# Patient Record
Sex: MALE | Race: WHITE | HISPANIC OR LATINO | Employment: OTHER | URBAN - METROPOLITAN AREA
[De-identification: names, ages, dates, MRNs, and addresses within clinical notes are randomized per-mention and may not be internally consistent; named-entity substitution may affect disease eponyms.]

---

## 2017-03-01 ENCOUNTER — TELEPHONE (OUTPATIENT)
Dept: NEUROLOGY | Facility: CLINIC | Age: 77
End: 2017-03-01

## 2017-03-01 DIAGNOSIS — M48.00 SPINAL STENOSIS, UNSPECIFIED SPINAL REGION: Primary | ICD-10-CM

## 2017-03-01 NOTE — TELEPHONE ENCOUNTER
I have pushed the CT of the spine through with a note for CS to please call the pt's daughter Denita at [phone] when scheduling this procedure and to explain that this needs to be completed prior to the pt seeing Dr. Howard's office per Dr. Howard's scheduling department.

## 2017-03-20 ENCOUNTER — TELEPHONE (OUTPATIENT)
Dept: NEUROLOGY | Facility: CLINIC | Age: 77
End: 2017-03-20

## 2017-03-20 NOTE — TELEPHONE ENCOUNTER
----- Message from Rossana Chavez PA-C sent at 3/20/2017  9:12 AM EDT -----  Regarding: RE: Referral for CT Cervical Spine w/wo contrast  Thanks.     ----- Message -----     From: Danuta Marin CMA     Sent: 3/17/2017   3:52 PM       To: Rossana Chavez PA-C  Subject: FW: Referral for CT Cervical Spine w/wo cont#     No I believe the other will work, I will work on this Monday morning Thanks  ----- Message -----     From: Rossana Chavez PA-C     Sent: 3/17/2017   3:10 PM       To: Danuta Marin CMA  Subject: RE: Referral for CT Cervical Spine w/wo cont#    Let's try referring them to Dr. García at  then. Do I need to re-order this?     ----- Message -----     From: Danuta Marin CMA     Sent: 3/17/2017   8:21 AM       To: Rossana Chavez PA-C  Subject: Referral for CT Cervical Spine w/wo contrast     CS said the procedure had been denied per [facility].  Dr. Murray's office will not see him until he has this procedure done.  What would you like to do?

## 2017-03-20 NOTE — TELEPHONE ENCOUNTER
Faxed over a request for an appt to Dr. Jacinto García at [phone] and sent notes/demographics/insurance info and so forth

## 2017-04-05 ENCOUNTER — TRANSCRIBE ORDERS (OUTPATIENT)
Dept: ADMINISTRATIVE | Facility: HOSPITAL | Age: 77
End: 2017-04-05

## 2017-04-05 DIAGNOSIS — R31.9 HEMATURIA SYNDROME: Primary | ICD-10-CM

## 2017-04-07 ENCOUNTER — HOSPITAL ENCOUNTER (OUTPATIENT)
Dept: ULTRASOUND IMAGING | Facility: HOSPITAL | Age: 77
Discharge: HOME | End: 2017-04-07
Admitting: FAMILY MEDICINE

## 2017-04-07 DIAGNOSIS — R31.9 HEMATURIA SYNDROME: ICD-10-CM

## 2017-04-07 PROCEDURE — 76775 US EXAM ABDO BACK WALL LIM: CPT

## 2018-01-01 ENCOUNTER — OFFICE VISIT (OUTPATIENT)
Dept: NEUROLOGY | Facility: CLINIC | Age: 78
End: 2018-01-01

## 2018-01-01 VITALS
DIASTOLIC BLOOD PRESSURE: 84 MMHG | SYSTOLIC BLOOD PRESSURE: 127 MMHG | BODY MASS INDEX: 27.15 KG/M2 | WEIGHT: 173 LBS | HEIGHT: 67 IN | OXYGEN SATURATION: 94 % | HEART RATE: 72 BPM

## 2018-01-01 DIAGNOSIS — G60.9 IDIOPATHIC PERIPHERAL NEUROPATHY: Primary | ICD-10-CM

## 2018-01-01 DIAGNOSIS — F03.A0 MILD DEMENTIA (HCC): ICD-10-CM

## 2018-01-01 PROCEDURE — 99213 OFFICE O/P EST LOW 20 MIN: CPT | Performed by: PSYCHIATRY & NEUROLOGY

## 2018-07-19 ENCOUNTER — OFFICE VISIT (OUTPATIENT)
Dept: NEUROLOGY | Facility: CLINIC | Age: 78
End: 2018-07-19

## 2018-07-19 VITALS
BODY MASS INDEX: 29.03 KG/M2 | HEART RATE: 68 BPM | SYSTOLIC BLOOD PRESSURE: 132 MMHG | DIASTOLIC BLOOD PRESSURE: 80 MMHG | HEIGHT: 67 IN | WEIGHT: 185 LBS | OXYGEN SATURATION: 98 %

## 2018-07-19 DIAGNOSIS — G31.84 MILD COGNITIVE IMPAIRMENT: Primary | ICD-10-CM

## 2018-07-19 DIAGNOSIS — G60.9 IDIOPATHIC PERIPHERAL NEUROPATHY: ICD-10-CM

## 2018-07-19 PROBLEM — G62.9 PERIPHERAL NEUROPATHY: Status: ACTIVE | Noted: 2018-07-19

## 2018-07-19 PROCEDURE — 99214 OFFICE O/P EST MOD 30 MIN: CPT | Performed by: PSYCHIATRY & NEUROLOGY

## 2018-07-19 RX ORDER — CHOLECALCIFEROL (VITAMIN D3) 125 MCG
500 CAPSULE ORAL DAILY
COMMUNITY

## 2018-07-19 RX ORDER — OMEPRAZOLE 20 MG/1
20 CAPSULE, DELAYED RELEASE ORAL 2 TIMES DAILY
COMMUNITY
End: 2019-01-01 | Stop reason: SDUPTHER

## 2018-07-19 RX ORDER — DONEPEZIL HYDROCHLORIDE 5 MG/1
5 TABLET, FILM COATED ORAL NIGHTLY
Qty: 30 TABLET | Refills: 0 | Status: SHIPPED | OUTPATIENT
Start: 2018-07-19 | End: 2018-07-25 | Stop reason: HOSPADM

## 2018-07-19 RX ORDER — FLUTICASONE PROPIONATE 50 MCG
2 SPRAY, SUSPENSION (ML) NASAL DAILY
COMMUNITY
End: 2019-01-01 | Stop reason: SDUPTHER

## 2018-07-19 RX ORDER — TROSPIUM CHLORIDE ER 60 MG/1
20 CAPSULE ORAL EVERY MORNING
Status: ON HOLD | COMMUNITY
End: 2019-01-01

## 2018-07-19 RX ORDER — DONEPEZIL HYDROCHLORIDE 10 MG/1
10 TABLET, FILM COATED ORAL DAILY
Qty: 30 TABLET | Refills: 11 | Status: SHIPPED | OUTPATIENT
Start: 2018-07-19 | End: 2018-09-18

## 2018-07-19 RX ORDER — IPRATROPIUM BROMIDE 42 UG/1
2 SPRAY, METERED NASAL 4 TIMES DAILY PRN
Status: ON HOLD | COMMUNITY
End: 2019-01-01

## 2018-07-23 ENCOUNTER — OFFICE VISIT (OUTPATIENT)
Dept: ORTHOPEDIC SURGERY | Facility: CLINIC | Age: 78
End: 2018-07-23

## 2018-07-23 ENCOUNTER — HOSPITAL ENCOUNTER (INPATIENT)
Facility: HOSPITAL | Age: 78
LOS: 2 days | Discharge: HOME | End: 2018-07-25
Attending: INTERNAL MEDICINE | Admitting: INTERNAL MEDICINE

## 2018-07-23 VITALS — WEIGHT: 182.98 LBS | BODY MASS INDEX: 28.72 KG/M2 | OXYGEN SATURATION: 97 % | HEART RATE: 85 BPM | HEIGHT: 67 IN

## 2018-07-23 DIAGNOSIS — R52 PAIN: Primary | ICD-10-CM

## 2018-07-23 DIAGNOSIS — M70.21 OLECRANON BURSITIS OF RIGHT ELBOW: ICD-10-CM

## 2018-07-23 DIAGNOSIS — Z74.09 IMPAIRED FUNCTIONAL MOBILITY, BALANCE, GAIT, AND ENDURANCE: Primary | ICD-10-CM

## 2018-07-23 DIAGNOSIS — M71.121 SEPTIC BURSITIS OF ELBOW, RIGHT: ICD-10-CM

## 2018-07-23 PROBLEM — I10 HTN (HYPERTENSION): Status: ACTIVE | Noted: 2018-07-23

## 2018-07-23 PROBLEM — E78.5 HLD (HYPERLIPIDEMIA): Status: ACTIVE | Noted: 2018-07-23

## 2018-07-23 PROBLEM — Z86.73 HISTORY OF STROKE: Status: ACTIVE | Noted: 2018-07-23

## 2018-07-23 PROCEDURE — 25010000002 CEFTRIAXONE PER 250 MG: Performed by: NURSE PRACTITIONER

## 2018-07-23 PROCEDURE — A9270 NON-COVERED ITEM OR SERVICE: HCPCS | Performed by: NURSE PRACTITIONER

## 2018-07-23 PROCEDURE — 63710000001 TERAZOSIN 5 MG CAPSULE: Performed by: NURSE PRACTITIONER

## 2018-07-23 PROCEDURE — 99204 OFFICE O/P NEW MOD 45 MIN: CPT | Performed by: ORTHOPAEDIC SURGERY

## 2018-07-23 PROCEDURE — 63710000001 PRAVASTATIN 40 MG TABLET: Performed by: NURSE PRACTITIONER

## 2018-07-23 PROCEDURE — 63710000001 LISINOPRIL 10 MG TABLET: Performed by: NURSE PRACTITIONER

## 2018-07-23 PROCEDURE — 25010000002 VANCOMYCIN 10 G RECONSTITUTED SOLUTION

## 2018-07-23 PROCEDURE — 63710000001 BISOPROLOL 5 MG TABLET: Performed by: NURSE PRACTITIONER

## 2018-07-23 RX ORDER — MAGNESIUM GLUCONATE 30 MG(550)
595 TABLET ORAL DAILY
COMMUNITY
End: 2019-01-01 | Stop reason: HOSPADM

## 2018-07-23 RX ORDER — ONDANSETRON 2 MG/ML
4 INJECTION INTRAMUSCULAR; INTRAVENOUS EVERY 6 HOURS PRN
Status: DISCONTINUED | OUTPATIENT
Start: 2018-07-23 | End: 2018-07-25 | Stop reason: HOSPADM

## 2018-07-23 RX ORDER — ACETAMINOPHEN 325 MG/1
650 TABLET ORAL EVERY 4 HOURS PRN
Status: DISCONTINUED | OUTPATIENT
Start: 2018-07-23 | End: 2018-07-25 | Stop reason: HOSPADM

## 2018-07-23 RX ORDER — MESALAMINE 1.2 G/1
1200 TABLET, DELAYED RELEASE ORAL DAILY
Status: ON HOLD | COMMUNITY
Start: 2017-07-11 | End: 2019-01-01

## 2018-07-23 RX ORDER — LABETALOL HYDROCHLORIDE 5 MG/ML
10 INJECTION, SOLUTION INTRAVENOUS EVERY 4 HOURS PRN
Status: DISCONTINUED | OUTPATIENT
Start: 2018-07-23 | End: 2018-07-25 | Stop reason: HOSPADM

## 2018-07-23 RX ORDER — LISINOPRIL 10 MG/1
10 TABLET ORAL 2 TIMES DAILY
Status: DISCONTINUED | OUTPATIENT
Start: 2018-07-23 | End: 2018-07-25 | Stop reason: HOSPADM

## 2018-07-23 RX ORDER — LISINOPRIL 10 MG/1
10 TABLET ORAL 2 TIMES DAILY
COMMUNITY
Start: 2018-07-10 | End: 2019-01-01 | Stop reason: HOSPADM

## 2018-07-23 RX ORDER — BISOPROLOL FUMARATE 5 MG/1
TABLET, FILM COATED ORAL
COMMUNITY
End: 2019-01-01 | Stop reason: SDUPTHER

## 2018-07-23 RX ORDER — CEFTRIAXONE SODIUM 1 G/50ML
1 INJECTION, SOLUTION INTRAVENOUS EVERY 24 HOURS
Status: DISCONTINUED | OUTPATIENT
Start: 2018-07-23 | End: 2018-07-25 | Stop reason: HOSPADM

## 2018-07-23 RX ORDER — FLUTICASONE PROPIONATE 50 MCG
SPRAY, SUSPENSION (ML) NASAL
COMMUNITY
End: 2018-07-25 | Stop reason: HOSPADM

## 2018-07-23 RX ORDER — PRAVASTATIN SODIUM 40 MG
40 TABLET ORAL NIGHTLY
Status: DISCONTINUED | OUTPATIENT
Start: 2018-07-23 | End: 2018-07-24

## 2018-07-23 RX ORDER — SODIUM CHLORIDE 0.9 % (FLUSH) 0.9 %
1-10 SYRINGE (ML) INJECTION AS NEEDED
Status: DISCONTINUED | OUTPATIENT
Start: 2018-07-23 | End: 2018-07-25 | Stop reason: HOSPADM

## 2018-07-23 RX ORDER — GABAPENTIN 100 MG/1
CAPSULE ORAL
Status: ON HOLD | COMMUNITY
End: 2018-07-23

## 2018-07-23 RX ORDER — BISOPROLOL FUMARATE 5 MG/1
5 TABLET, FILM COATED ORAL
Status: DISCONTINUED | OUTPATIENT
Start: 2018-07-23 | End: 2018-07-25 | Stop reason: HOSPADM

## 2018-07-23 RX ORDER — IPRATROPIUM BROMIDE 42 UG/1
SPRAY, METERED NASAL
Status: ON HOLD | COMMUNITY
End: 2018-07-23

## 2018-07-23 RX ORDER — TERAZOSIN 5 MG/1
5 CAPSULE ORAL NIGHTLY
Status: DISCONTINUED | OUTPATIENT
Start: 2018-07-23 | End: 2018-07-25 | Stop reason: HOSPADM

## 2018-07-23 RX ORDER — LORATADINE 10 MG/1
TABLET ORAL
Status: ON HOLD | COMMUNITY
End: 2019-01-01

## 2018-07-23 RX ORDER — PANTOPRAZOLE SODIUM 40 MG/1
40 TABLET, DELAYED RELEASE ORAL
Status: DISCONTINUED | OUTPATIENT
Start: 2018-07-24 | End: 2018-07-25 | Stop reason: HOSPADM

## 2018-07-23 RX ORDER — DOXYCYCLINE HYCLATE 100 MG/1
CAPSULE ORAL EVERY 12 HOURS SCHEDULED
COMMUNITY
Start: 2018-07-23 | End: 2018-07-25 | Stop reason: HOSPADM

## 2018-07-23 RX ADMIN — BISOPROLOL FUMARATE 5 MG: 5 TABLET ORAL at 21:47

## 2018-07-23 RX ADMIN — VANCOMYCIN HYDROCHLORIDE 2000 MG: 10 INJECTION, POWDER, LYOPHILIZED, FOR SOLUTION INTRAVENOUS at 21:46

## 2018-07-23 RX ADMIN — CEFTRIAXONE SODIUM 1 G: 1 INJECTION, SOLUTION INTRAVENOUS at 20:20

## 2018-07-23 RX ADMIN — PRAVASTATIN SODIUM 40 MG: 40 TABLET ORAL at 21:47

## 2018-07-23 RX ADMIN — TERAZOSIN HYDROCHLORIDE ANHYDROUS 5 MG: 5 CAPSULE ORAL at 21:47

## 2018-07-23 RX ADMIN — LISINOPRIL 10 MG: 10 TABLET ORAL at 21:47

## 2018-07-23 NOTE — PROGRESS NOTES
[facility] Note    Subjective     Chief Complaint   Patient presents with   • Right Elbow - Pain        HPI      Vargas De is a 77 y.o. male.  He has a two-week history of right elbow pain and redness is getting worse.  He has been on doxycycline.  He feels sick.  He had a shot of Rocephin that did not help.  He is here today for evaluation.  His pain is severe and hurts to move it and better with rest        Past Medical History:   Diagnosis Date   • Arthritis    • Depression    • Gout    • Hyperlipidemia    • Hypertension    • Macular degeneration    • Stroke (CMS/HCC)    • Stroke (CMS/HCC)    • Ulcerative colitis (CMS/HCC)       History reviewed. No pertinent surgical history.   Family History   Problem Relation Age of Onset   • Cancer Mother    • Alzheimer's disease Father    • Cancer Brother    • Diabetes Maternal Aunt      Social History     Social History   • Marital status:      Spouse name: N/A   • Number of children: N/A   • Years of education: N/A     Occupational History   • Not on file.     Social History Main Topics   • Smoking status: Never Smoker   • Smokeless tobacco: Never Used   • Alcohol use Yes      Comment: 1 a month   • Drug use: No   • Sexual activity: Defer     Other Topics Concern   • Not on file     Social History Narrative   • No narrative on file      Current Outpatient Prescriptions on File Prior to Visit   Medication Sig Dispense Refill   • acetaminophen (TYLENOL) 500 MG tablet Take 500 mg by mouth Every 6 (Six) Hours As Needed for mild pain (1-3).     • aspirin 81 MG EC tablet Take 81 mg by mouth Daily.     • bisoprolol-hydrochlorothiazide (ZIAC) 5-6.25 MG per tablet      • Cholecalciferol (VITAMIN D3) 5000 units capsule capsule Take 5,000 Units by mouth Daily.     • dicyclomine (BENTYL) 20 MG tablet      • donepezil (ARICEPT) 10 MG tablet Take 1 tablet by mouth Daily. 30 tablet 11   • donepezil (ARICEPT) 5 MG tablet Take 1 tablet by mouth Every  "Night. 30 tablet 0   • doxazosin (CARDURA) 4 MG tablet Take 4 mg by mouth Every Night.     • fluticasone (FLONASE) 50 MCG/ACT nasal spray 2 sprays into each nostril Daily.     • gabapentin (NEURONTIN) 100 MG capsule Take 100 mg by mouth 3 (Three) Times a Day.     • HYDROcodone-acetaminophen (NORCO) 7.5-325 MG per tablet      • ipratropium (ATROVENT) 0.06 % nasal spray 2 sprays into each nostril 4 (Four) Times a Day.     • lisinopril (PRINIVIL,ZESTRIL) 10 MG tablet      • Multiple Vitamins-Minerals (PRESERVISION AREDS PO) Take  by mouth.     • omeprazole (priLOSEC) 20 MG capsule Take 20 mg by mouth Daily.     • pravastatin (PRAVACHOL) 40 MG tablet      • predniSONE (DELTASONE) 5 MG tablet      • sertraline (ZOLOFT) 50 MG tablet Take 50 mg by mouth Daily.     • trospium 60 MG capsule sustained-release 24 hr capsule Take 20 mg by mouth Every Morning.     • vitamin B-12 (CYANOCOBALAMIN) 500 MCG tablet Take 500 mcg by mouth Daily.     • Zinc 50 MG capsule Take  by mouth.     • [DISCONTINUED] mesalamine (LIALDA) 1.2 G EC tablet Take 1,200 mg by mouth Daily With Breakfast.       No current facility-administered medications on file prior to visit.       Allergies   Allergen Reactions   • Iodine    • Sulfa Antibiotics         The following portions of the patient's history were reviewed and updated as appropriate: allergies, current medications, past family history, past medical history, past social history, past surgical history and problem list.    Review of Systems   Constitutional: Negative.    HENT: Negative.    Eyes: Negative.    Respiratory: Negative.    Cardiovascular: Negative.    Gastrointestinal: Negative.    Endocrine: Negative.    Genitourinary: Negative.    Musculoskeletal: Positive for arthralgias.   Skin: Negative.    Allergic/Immunologic: Negative.    Neurological: Negative.    Hematological: Negative.    Psychiatric/Behavioral: Negative.         Objective      Physical Exam  Pulse 85   Ht 170.2 cm (67.01\") "   Wt 83 kg (182 lb 15.7 oz)   SpO2 97%   BMI 28.65 kg/m²     Body mass index is 28.65 kg/m².        GENERAL APPEARANCE: awake, alert & oriented x 3, in no acute distress and well developed, well nourished  PSYCH: normal mood and affect  LUNGS:  breathing nonlabored, no wheezing  EYES: sclera anicteric, pupils equal  CARDIOVASCULAR: palpable pulses dorsalis pedis, palpable posterior tibial bilaterally. Capillary refill less than 2 seconds  INTEGUMENTARY: skin intact, no clubbing, cyanosis  NEUROLOGIC:  Normal Sensation and reflexes             Ortho Exam  Peripheral Vascular   Right Upper Extremity    No cyanotic nail beds    Pink nail beds and rapid capillary refill   Palpation    Radial Pulse - Bilaterally normal    Neurologic   Sensory - Elbow   Inspection and Palpation:    Light touch: intact - right hand   Muscle Strength and Tone:    Right bicep - 5/5    Right tricep - 5/5    Right wrist extensors - 5/5     Right wrist flexors - 5/5    Right intrinsics - 5/5    Musculoskeletal   Right Upper Extremity - Elbow   Inspection and Palpation     Tenderness -olecranon bursa with erythema and no fluctuance    Effusion - none    Crepitus - none   Range of Motion    Flexion: AROM - 145 degrees    Extension - AROM - 0 degrees     Forearm supination: AROM - 90 degrees    Forearm pronation - AROM - 90 degrees   Instability    Right - tennis elbow test negative   Deformities/Malalignments/Discepancies - non   Functional testing:    Tinel's sign negative    Valgus stress test negative    Varus stress test negative    Imaging/Studies  Imaging Results (last 7 days)     Procedure Component Value Units Date/Time    XR Elbow 3+ View Right [[id number]] Resulted:  07/23/18 1442     Updated:  07/23/18 1442    Narrative:       Right Elbow X-Ray  Indication: Pain  Views: AP, oblique and Lateral views    Findings:  No fracture  No bony lesion  Normal soft tissues  Normal joint spaces    No prior studies were available for  comparison.                  Assessment/Plan        ICD-10-CM ICD-9-CM   1. Pain R52 780.96   2. Olecranon bursitis of right elbow M70.21 726.33       Orders Placed This Encounter   Procedures   • XR Elbow 3+ View Right        Right elbow septic olecranon bursitis.  He needs to get IV antibiotics.  I called Dr. PRADO and he will admit him.  I will follow him in the hospital to make sure he gets better.  At this point he does not need surgery.    Medical Decision Making  Management Options : over-the-counter medicine and prescription/IM medicine  Data/Risk: radiology tests and independent visualization of imaging, lab tests, or EMG/NCV    Dave Dorado MD  07/23/18  3:10 PM         EMR Dragon/Transcription disclaimer:  Much of this encounter note is an electronic transcription of spoken language to printed text. Electronic transcription of spoken language may permit erroneous, or at times, nonsensical words or phrases to be inadvertently transcribed. Although I have reviewed the note for such errors, some may still exist.

## 2018-07-23 NOTE — H&P
Patient Name: Vargas De  MRN: [id number]  : 1940  DOS: 2018    Attending: Teodoro Vazquez MD    Primary Care Provider: Saba Arrington MD      Chief complaint:  Right Elbow - Pain    Subjective   Patient is a 77 y.o. male presented for direct admission from Dr. Dorado's office with right elbow redness, pain and swelling.     It started about 10 days ago. He was originally seen at urgent care and was prescribed oral antibiotics. The symptoms persisted and he was seen by his primary care doctor, his elbow was aspirated with no growth. He continued oral antibiotics and was referred to Dr. Dorado. He denies fevers, chills or night sweats. He is admitted for further medical management.     When seen he is doing ok. The elbow is very painful. He denies nausea, shortness of breath or chest pain.     He does have history of stroke about 5 years ago with residual right sided weakness.  He has hx of Ulcerative colitis, is followed by .wy    ( Above is note/ agreed/ reviewed with pt and his son)wy       Allergies:  Allergies   Allergen Reactions   • Iodine    • Sulfa Antibiotics        Meds:  Prescriptions Prior to Admission   Medication Sig Dispense Refill Last Dose   • acetaminophen (TYLENOL) 500 MG tablet Take 500 mg by mouth Every 6 (Six) Hours As Needed for mild pain (1-3).   2018 at Unknown time   • aspirin 81 MG EC tablet Take 81 mg by mouth Daily.   2018 at Unknown time   • bisoprolol (ZEBeta) 5 MG tablet bisoprolol fumarate 5 mg tablet   2018 at Unknown time   • doxycycline (VIBRAMYCIN) 100 MG capsule Take  by mouth Every 12 (Twelve) Hours.   2018 at Unknown time   • fluticasone (FLONASE) 50 MCG/ACT nasal spray fluticasone 50 mcg/actuation nasal spray,suspension   2018 at Unknown time   • gabapentin (NEURONTIN) 100 MG capsule Take 100 mg by mouth every night at bedtime.   2018 at Unknown time   • ipratropium (ATROVENT) 0.06 % nasal spray 2 sprays into  each nostril 4 (Four) Times a Day As Needed.   7/23/2018 at Unknown time   • lisinopril (PRINIVIL,ZESTRIL) 10 MG tablet Take 10 mg by mouth 2 (Two) Times a Day.   7/23/2018 at Unknown time   • mesalamine (LIALDA) 1.2 g EC tablet Take 1,200 mg by mouth Daily.   7/23/2018 at Unknown time   • Multiple Vitamins-Minerals (PRESERVISION AREDS PO) Take  by mouth.   7/23/2018 at Unknown time   • omeprazole (priLOSEC) 20 MG capsule Take 20 mg by mouth 2 (Two) Times a Day.   7/23/2018 at Unknown time   • potassium gluconate 595 (99 K) MG tablet tablet Take 595 mg by mouth Daily.   7/23/2018 at Unknown time   • pravastatin (PRAVACHOL) 40 MG tablet Take 40 mg by mouth Daily.   7/23/2018 at Unknown time   • predniSONE (DELTASONE) 5 MG tablet 5 mg 2 (Two) Times a Day.   7/23/2018 at Unknown time   • sertraline (ZOLOFT) 50 MG tablet Take 50 mg by mouth Daily.   7/23/2018 at Unknown time   • trospium 60 MG capsule sustained-release 24 hr capsule Take 20 mg by mouth Every Morning.   7/23/2018 at Unknown time   • vitamin B-12 (CYANOCOBALAMIN) 500 MCG tablet Take 500 mcg by mouth Daily.   7/23/2018 at Unknown time   • Zinc 50 MG capsule Take 50 mg by mouth Daily.   7/23/2018 at Unknown time   • bisoprolol-hydrochlorothiazide (ZIAC) 5-6.25 MG per tablet    Taking   • Cholecalciferol (VITAMIN D3) 5000 units capsule capsule Take 5,000 Units by mouth Daily.   Taking   • dicyclomine (BENTYL) 20 MG tablet 2 (Two) Times a Day As Needed.   Unknown at Unknown time   • donepezil (ARICEPT) 10 MG tablet Take 1 tablet by mouth Daily. 30 tablet 11 Taking   • donepezil (ARICEPT) 5 MG tablet Take 1 tablet by mouth Every Night. 30 tablet 0 Taking   • doxazosin (CARDURA) 4 MG tablet Take 4 mg by mouth Every Night.   Taking   • doxylamine (UNISOM) 25 MG tablet Take  by mouth.   Taking   • fluticasone (FLONASE) 50 MCG/ACT nasal spray 2 sprays into each nostril Daily.   Taking   • Fluvastatin Sodium (LESCOL PO) Lescol   Taking   • loratadine (ALLERGY) 10  "MG tablet Allergy   claritin   Taking       History:   Past Medical History:   Diagnosis Date   • Arthritis    • Depression    • Gout    • Hyperlipidemia    • Hypertension    • Macular degeneration    • Stroke (CMS/HCC)    • Stroke (CMS/HCC)    • Ulcerative colitis (CMS/HCC)      No past surgical history on file.  Family History   Problem Relation Age of Onset   • Cancer Mother    • Alzheimer's disease Father    • Cancer Brother    • Diabetes Maternal Aunt      Social History   Substance Use Topics   • Smoking status: Never Smoker   • Smokeless tobacco: Never Used   • Alcohol use Yes      Comment: 1 a month   He is a  with 4 children. He is a retired horseman.    Review of Systems  All systems were reviewed and negative except for:  Gastrointestinal: postitive for  constipation    Vital Signs  /90 (BP Location: Right arm, Patient Position: Sitting)   Pulse 75   Temp 97.6 °F (36.4 °C) (Oral)   Resp 16   Ht 170.2 cm (67\")   Wt 78.5 kg (173 lb 1 oz)   SpO2 95%   BMI 27.11 kg/m²     Physical Exam:    General Appearance:    Alert, cooperative, in no acute distress   Head:    Normocephalic, without obvious abnormality, atraumatic   Eyes:            Lids and lashes normal, conjunctivae and sclerae normal, no   icterus, no pallor, corneas clear    Ears:    Ears appear intact with no abnormalities noted   Neck:   No adenopathy, supple, trachea midline, no thyromegaly    Lungs:     Clear to auscultation,respirations regular, even and                   unlabored    Heart:    Regular rhythm and normal rate, normal S1 and S2, no            murmur, no gallop    Abdomen:     Normal bowel sounds, no masses, no organomegaly, soft        non-tender, non-distended, no guarding, no rebound                 tenderness   Genitalia:    Deferred   Extremities:   Right elbow erythema and swelling. Pain with flexion over 90 degrees.wy   Pulses:   Pulses palpable and equal bilaterally   Skin:   No bleeding, bruising or rash "   Neurologic:   Cranial nerves 2 - 12 grossly intact, sensation intact      I reviewed the patient's new clinical results.     Right Elbow X-Ray  Indication: Pain  Views: AP, oblique and Lateral views     Findings:  No fracture  No bony lesion  Normal soft tissues  Normal joint spaces     No prior studies were available for comparison.    Assessment and Plan:   Principal Problem:    Septic bursitis of elbow, right  Active Problems:    HTN (hypertension)    HLD (hyperlipidemia)    History of stroke      Plan  Having failed po abx course outpt, will admit for IV antibiotics, close monitoring to ensure improvement. If fails, consider surgery.   1. Dr. Dorado to follow  2. Pain control-prns   3. IS-encourage  4. DVT proph- Mechs/Lovenox  5. Bowel regimen  6. Resume home medications as appropriate  7. Monitor labs    [facility] consult in AM  - vanc and rocephin for now    HTN, HLD  - Continue home zebeta, lisinopril and statin  - Monitor BP   - Holding parameters for BP meds  - Labetalol PRN for SBP>170    I have personally performed the evaluation on this patient. My history is consistant  with HPI obtained. My exam finding are listed above. I have personally reviewed and discussed the above formulated treatment plan with patient, son, and . Discussed with  ANA MARÍA Simon  07/23/18  5:58 PM

## 2018-07-24 ENCOUNTER — RESULTS ENCOUNTER (OUTPATIENT)
Dept: NEUROLOGY | Facility: CLINIC | Age: 78
End: 2018-07-24

## 2018-07-24 DIAGNOSIS — G60.9 IDIOPATHIC PERIPHERAL NEUROPATHY: ICD-10-CM

## 2018-07-24 LAB
ANION GAP SERPL CALCULATED.3IONS-SCNC: 9 MMOL/L (ref 3–11)
BASOPHILS # BLD AUTO: 0.01 10*3/MM3 (ref 0–0.2)
BASOPHILS NFR BLD AUTO: 0.1 % (ref 0–1)
BUN BLD-MCNC: 9 MG/DL (ref 9–23)
BUN/CREAT SERPL: 15 (ref 7–25)
CALCIUM SPEC-SCNC: 9.3 MG/DL (ref 8.7–10.4)
CHLORIDE SERPL-SCNC: 105 MMOL/L (ref 99–109)
CK SERPL-CCNC: 43 U/L (ref 26–174)
CO2 SERPL-SCNC: 24 MMOL/L (ref 20–31)
CREAT BLD-MCNC: 0.6 MG/DL (ref 0.6–1.3)
DEPRECATED RDW RBC AUTO: 50.4 FL (ref 37–54)
EOSINOPHIL # BLD AUTO: 0.2 10*3/MM3 (ref 0–0.3)
EOSINOPHIL NFR BLD AUTO: 2.5 % (ref 0–3)
ERYTHROCYTE [DISTWIDTH] IN BLOOD BY AUTOMATED COUNT: 14 % (ref 11.3–14.5)
GFR SERPL CREATININE-BSD FRML MDRD: 131 ML/MIN/1.73
GLUCOSE BLD-MCNC: 106 MG/DL (ref 70–100)
HCT VFR BLD AUTO: 43.2 % (ref 38.9–50.9)
HGB BLD-MCNC: 14 G/DL (ref 13.1–17.5)
IMM GRANULOCYTES # BLD: 0.03 10*3/MM3 (ref 0–0.03)
IMM GRANULOCYTES NFR BLD: 0.4 % (ref 0–0.6)
LYMPHOCYTES # BLD AUTO: 3.15 10*3/MM3 (ref 0.6–4.8)
LYMPHOCYTES NFR BLD AUTO: 39.5 % (ref 24–44)
MCH RBC QN AUTO: 32 PG (ref 27–31)
MCHC RBC AUTO-ENTMCNC: 32.4 G/DL (ref 32–36)
MCV RBC AUTO: 98.9 FL (ref 80–99)
MONOCYTES # BLD AUTO: 1.27 10*3/MM3 (ref 0–1)
MONOCYTES NFR BLD AUTO: 15.9 % (ref 0–12)
NEUTROPHILS # BLD AUTO: 3.34 10*3/MM3 (ref 1.5–8.3)
NEUTROPHILS NFR BLD AUTO: 42 % (ref 41–71)
PLATELET # BLD AUTO: 195 10*3/MM3 (ref 150–450)
PMV BLD AUTO: 10.6 FL (ref 6–12)
POTASSIUM BLD-SCNC: 3.7 MMOL/L (ref 3.5–5.5)
RBC # BLD AUTO: 4.37 10*6/MM3 (ref 4.2–5.76)
SODIUM BLD-SCNC: 138 MMOL/L (ref 132–146)
WBC NRBC COR # BLD: 7.97 10*3/MM3 (ref 3.5–10.8)

## 2018-07-24 PROCEDURE — 99231 SBSQ HOSP IP/OBS SF/LOW 25: CPT | Performed by: ORTHOPAEDIC SURGERY

## 2018-07-24 PROCEDURE — A9270 NON-COVERED ITEM OR SERVICE: HCPCS | Performed by: NURSE PRACTITIONER

## 2018-07-24 PROCEDURE — 85025 COMPLETE CBC W/AUTO DIFF WBC: CPT | Performed by: NURSE PRACTITIONER

## 2018-07-24 PROCEDURE — 25010000002 ENOXAPARIN PER 10 MG: Performed by: NURSE PRACTITIONER

## 2018-07-24 PROCEDURE — 63710000001 ACETAMINOPHEN 325 MG TABLET: Performed by: NURSE PRACTITIONER

## 2018-07-24 PROCEDURE — 63710000001 LISINOPRIL 10 MG TABLET: Performed by: NURSE PRACTITIONER

## 2018-07-24 PROCEDURE — 63710000001 PREDNISONE PER 5 MG: Performed by: NURSE PRACTITIONER

## 2018-07-24 PROCEDURE — 25010000002 DAPTOMYCIN PER 1 MG: Performed by: INTERNAL MEDICINE

## 2018-07-24 PROCEDURE — 80048 BASIC METABOLIC PNL TOTAL CA: CPT | Performed by: NURSE PRACTITIONER

## 2018-07-24 PROCEDURE — 25010000002 CEFTRIAXONE PER 250 MG: Performed by: NURSE PRACTITIONER

## 2018-07-24 PROCEDURE — 82550 ASSAY OF CK (CPK): CPT | Performed by: INTERNAL MEDICINE

## 2018-07-24 PROCEDURE — 25010000002 VANCOMYCIN

## 2018-07-24 PROCEDURE — 63710000001 DIPHENHYDRAMINE PER 50 MG: Performed by: NURSE PRACTITIONER

## 2018-07-24 PROCEDURE — 63710000001 SERTRALINE 50 MG TABLET: Performed by: NURSE PRACTITIONER

## 2018-07-24 PROCEDURE — 63710000001 MESALAMINE 1.2 G TABLET DELAYED-RELEASE: Performed by: NURSE PRACTITIONER

## 2018-07-24 PROCEDURE — 63710000001 PANTOPRAZOLE 40 MG TABLET DELAYED-RELEASE: Performed by: NURSE PRACTITIONER

## 2018-07-24 RX ORDER — DIPHENHYDRAMINE HCL 25 MG
25 CAPSULE ORAL EVERY 6 HOURS PRN
Status: DISCONTINUED | OUTPATIENT
Start: 2018-07-24 | End: 2018-07-25 | Stop reason: HOSPADM

## 2018-07-24 RX ORDER — PREDNISONE 10 MG/1
5 TABLET ORAL 2 TIMES DAILY
Status: DISCONTINUED | OUTPATIENT
Start: 2018-07-24 | End: 2018-07-25 | Stop reason: HOSPADM

## 2018-07-24 RX ORDER — MESALAMINE 1.2 G/1
1200 TABLET, DELAYED RELEASE ORAL DAILY
Status: DISCONTINUED | OUTPATIENT
Start: 2018-07-24 | End: 2018-07-25 | Stop reason: HOSPADM

## 2018-07-24 RX ORDER — GABAPENTIN 100 MG/1
100 CAPSULE ORAL NIGHTLY
Status: DISCONTINUED | OUTPATIENT
Start: 2018-07-24 | End: 2018-07-25 | Stop reason: HOSPADM

## 2018-07-24 RX ADMIN — ACETAMINOPHEN 650 MG: 325 TABLET, FILM COATED ORAL at 00:21

## 2018-07-24 RX ADMIN — DAPTOMYCIN 450 MG: 500 INJECTION, POWDER, LYOPHILIZED, FOR SOLUTION INTRAVENOUS at 15:56

## 2018-07-24 RX ADMIN — PANTOPRAZOLE SODIUM 40 MG: 40 TABLET, DELAYED RELEASE ORAL at 06:04

## 2018-07-24 RX ADMIN — CEFTRIAXONE SODIUM 1 G: 1 INJECTION, SOLUTION INTRAVENOUS at 20:29

## 2018-07-24 RX ADMIN — TERAZOSIN HYDROCHLORIDE ANHYDROUS 5 MG: 5 CAPSULE ORAL at 20:31

## 2018-07-24 RX ADMIN — VANCOMYCIN HYDROCHLORIDE 1250 MG: 10 INJECTION, POWDER, LYOPHILIZED, FOR SOLUTION INTRAVENOUS at 12:29

## 2018-07-24 RX ADMIN — ACETAMINOPHEN 650 MG: 325 TABLET, FILM COATED ORAL at 06:04

## 2018-07-24 RX ADMIN — SERTRALINE HYDROCHLORIDE 50 MG: 50 TABLET ORAL at 09:08

## 2018-07-24 RX ADMIN — ENOXAPARIN SODIUM 40 MG: 40 INJECTION SUBCUTANEOUS at 09:08

## 2018-07-24 RX ADMIN — DIPHENHYDRAMINE HYDROCHLORIDE 25 MG: 25 CAPSULE ORAL at 12:36

## 2018-07-24 RX ADMIN — ACETAMINOPHEN 650 MG: 325 TABLET, FILM COATED ORAL at 21:25

## 2018-07-24 RX ADMIN — PREDNISONE 5 MG: 10 TABLET ORAL at 12:29

## 2018-07-24 RX ADMIN — LISINOPRIL 10 MG: 10 TABLET ORAL at 09:08

## 2018-07-24 RX ADMIN — BISOPROLOL FUMARATE 5 MG: 5 TABLET ORAL at 20:29

## 2018-07-24 RX ADMIN — LISINOPRIL 10 MG: 10 TABLET ORAL at 20:28

## 2018-07-24 RX ADMIN — PREDNISONE 5 MG: 10 TABLET ORAL at 20:28

## 2018-07-24 RX ADMIN — GABAPENTIN 100 MG: 100 CAPSULE ORAL at 20:30

## 2018-07-24 RX ADMIN — MESALAMINE 1.2 G: 1.2 TABLET, DELAYED RELEASE ORAL at 12:57

## 2018-07-24 NOTE — PROGRESS NOTES
" progress note      Vargas De  [id number]  1940     LOS: 0 days     Attending: Teodoro Vazquez MD    Primary Care Provider: Saba Arrington MD      Chief Complaint/Reason for visit:  Right Elbow - Pain    Subjective   Doing ok. Didn't sleep well last night. Adequate pain control. Denies f/c/n/v/sob/cp.  ( limited ambulation so far. No new problems. Pain improved)wy  Objective     Vital Signs  Blood pressure 127/82, pulse 74, temperature 97.6 °F (36.4 °C), temperature source Oral, resp. rate 16, height 170.2 cm (67\"), weight 78.5 kg (173 lb 1 oz), SpO2 92 %.  Temp (24hrs), Av.8 °F (36.6 °C), Min:97.6 °F (36.4 °C), Max:98.5 °F (36.9 °C)      Nutrition: PO    Respiratory: RA    Physical Exam:     General Appearance:    Alert, cooperative, in no acute distress   Head:    Normocephalic, without obvious abnormality, atraumatic    Lungs:     Normal effort, symmetric chest rise, no crepitus, clear to      auscultation bilaterally             Heart:    Regular rhythm and normal rate, normal S1 and S2   Abdomen:     Normal bowel sounds, no masses, no organomegaly, soft        non-tender, non-distended, no guarding, no rebound                tenderness   Extremities:   Right elbow erythema and swelling. Pain with flexion over 90 degrees/ improved flexion today/ less redness.wy   Pulses:   Pulses palpable and equal bilaterally   Skin:   No bleeding, bruising or rash   Neurologic:   Moves all extremities with no obvious focal motor deficit.  Cranial nerves 2 - 12 grossly intact     Results Review:     I reviewed the patient's new clinical results.     Results from last 7 days  Lab Units 18  0458   WBC 10*3/mm3 7.97   HEMOGLOBIN g/dL 14.0   HEMATOCRIT % 43.2   PLATELETS 10*3/mm3 195       Results from last 7 days  Lab Units 18  0458   SODIUM mmol/L 138   POTASSIUM mmol/L 3.7   CHLORIDE mmol/L 105   CO2 mmol/L 24.0   BUN mg/dL 9   CREATININE mg/dL 0.60   CALCIUM mg/dL 9.3   GLUCOSE mg/dL 106* "     I reviewed the patient's new imaging including images and reports.    All medications reviewed.     bisoprolol 5 mg Oral Q24H   ceftriaxone 1 g Intravenous Q24H   enoxaparin 40 mg Subcutaneous Q24H   gabapentin 100 mg Oral Nightly   lisinopril 10 mg Oral BID   mesalamine 1,200 mg Oral Daily   pantoprazole 40 mg Oral Q AM   pravastatin 40 mg Oral Nightly   predniSONE 5 mg Oral BID   sertraline 50 mg Oral Daily   terazosin 5 mg Oral Nightly   vancomycin 1,250 mg Intravenous Once   [START ON 7/25/2018] vancomycin 1,250 mg Intravenous Q12H       Assessment/Plan   Principal Problem:    Septic bursitis of elbow, right  Active Problems:    HTN (hypertension)    HLD (hyperlipidemia)    History of stroke      Plan  1. Ambulate. Elevate RUE/ Ask PT to see.   2. Pain control-prns   3. IS-encouraged  4. DVT proph- mechs/Lovenox  5. Bowel regimen  6. Monitor post-op labs  7. DC planning for home when abx arranged    [facility] consult   - yemi and rocephin for now( Discussed with )wy     HTN, HLD  - Continue home zebeta, lisinopril and statin  - Monitor BP   - Holding parameters for BP meds  - Labetalol PRN for SBP>170    I have personally performed the evaluation on this patient. My history is consistant  with HPI obtained. My exam finding are listed above. I have personally reviewed and discussed the above formulated treatment plan with pt, family, ID, and APRN.    ANA MARÍA Angeles  07/24/18  11:15 AM

## 2018-07-24 NOTE — PROGRESS NOTES
Discharge Planning Assessment  [facility]     Patient Name: Vargas De  MRN: [id number]  Today's Date: 7/24/2018    Admit Date: 7/23/2018          Discharge Needs Assessment     Row Name 07/24/18 0953       Living Environment    Lives With child(orion), adult;grandchild(orion)    Current Living Arrangements home/apartment/condo    Primary Care Provided by self    Provides Primary Care For no one, unable/limited ability to care for self    Family Caregiver if Needed child(orion), adult    Quality of Family Relationships helpful;involved;supportive    Able to Return to Prior Arrangements yes       Resource/Environmental Concerns    Resource/Environmental Concerns none    Transportation Concerns car, none       Transition Planning    Patient/Family Anticipates Transition to home with family    Patient/Family Anticipated Services at Transition none    Transportation Anticipated family or friend will provide       Discharge Needs Assessment    Readmission Within the Last 30 Days no previous admission in last 30 days    Concerns to be Addressed denies needs/concerns at this time;discharge planning    Equipment Currently Used at Home none    Anticipated Changes Related to Illness none    Equipment Needed After Discharge none    Current Discharge Risk chronically ill            Discharge Plan     Row Name 07/24/18 0954       Plan    Plan Return home with family at discharge    Patient/Family in Agreement with Plan yes    Plan Comments Met with Mr. De and his daughter, Ami, at bedside to initiate discharge planning. Mr. De speaks both English and Georgian. He lives in a home in [facility] [place] [facility] with his other daughter, Denita, and her  and son. He is fairly independent and does not use any DME. Per daughter, he is not very active but is able to drive himself to [employer] to do light shopping. Denita does the majority of the meal preparation, housekeeping, etc.     He had home health about 10 years ago  related to an infection and having a PICC line with IV antibiotics. He has never been to an inpatient rehab facility. Plan is to return home at discharge. Denies needs at this time, however an ID consult is pending - may require home IV antibiotics. Family can transport home. CM will continue to follow. Suzanne Yao RN [phone]    Final Discharge Disposition Code 01 - home or self-care        Destination     No service coordination in this encounter.      Durable Medical Equipment     No service coordination in this encounter.      Dialysis/Infusion     No service coordination in this encounter.      Home Medical Care     No service coordination in this encounter.      Social Care     No service coordination in this encounter.                Demographic Summary     Row Name 07/24/18 0952       General Information    Arrived From physician office    Referral Source admission list    Reason for Consult discharge planning    Preferred Language English            Functional Status     Row Name 07/24/18 0952       Functional Status    Usual Activity Tolerance fair    Current Activity Tolerance fair       Functional Status, IADL    Medications independent    Meal Preparation assistive person    Housekeeping assistive person    Laundry assistive person    Shopping assistive person       Employment/    Employment/ Comments [employer] Medicare Replacement with Rx medication coverage            Psychosocial    No documentation.           Abuse/Neglect    No documentation.           Legal    No documentation.           Substance Abuse    No documentation.           Patient Forms    No documentation.         Suzanne Yao RN

## 2018-07-24 NOTE — PLAN OF CARE
Problem: Patient Care Overview  Goal: Plan of Care Review  Outcome: Ongoing (interventions implemented as appropriate)   07/24/18 9014   Coping/Psychosocial   Plan of Care Reviewed With patient;daughter   Plan of Care Review   Progress improving   OTHER   Outcome Summary Pt VSS, continuing with antibiotic therapy. Right elbow swelling going down, less red. Pt reports being tired r/t lack of sleep from not receiving his proper medications last night, spoke with provider and meds are now correct. Will continue to monitor.     Goal: Discharge Needs Assessment  Outcome: Ongoing (interventions implemented as appropriate)    Goal: Interprofessional Rounds/Family Conf  Outcome: Ongoing (interventions implemented as appropriate)      Problem: Infection, Risk/Actual (Adult)  Goal: Identify Related Risk Factors and Signs and Symptoms  Outcome: Ongoing (interventions implemented as appropriate)    Goal: Infection Prevention/Resolution  Outcome: Ongoing (interventions implemented as appropriate)

## 2018-07-24 NOTE — CONSULTS
"Vargas De  1940  [id number]    Date of Consult: 7/24/2018    Admit Date:  7/23/2018      Requesting Provider: Teodoro Vazquez MD  Evaluating Physician: Richard Andino MD    Chief Complaint: right arm infection    Reason for Consultation: Right elbow septic bursitis    History of present illness:   Patient is a 77 y.o.  Yr old male with history of ulcerative colitis per past records in addition to prior stroke, stays with his daughter and had trauma to the right elbow with scrape/bump near the olecrenon bursa 3 weeks prior to his July admission.  2 weeks prior to his July admission, he had increased redness posterior right elbow and upper/lower arm, was seen at [facility] outpatient office and prescribed empiric doxycycline.  He later saw his PCP, Dr. Saba Arrington and received at least 1 \"shot\" antibiotics in addition to continue doxycycline but didn't make significant improvements according to daughters.  He was admitted July 23 with persistent redness/swelling.    He has pain in the posterior right elbow and upper/forearm that is dull, constant, nonradiating, worse with palpation, better with pain meds and not associated with any active drainage.  He does not have any specific restriction in elbow range of motion.    He denies any specific penetrating trauma.  No animal insect or arthropod bite.  No fresh/brackish/salt water exposure.  No prior history MRSA VRE C. difficile or ESBL/K PC organisms.    Past Medical History:   Diagnosis Date   • Arthritis    • Depression    • Gout    • Hyperlipidemia    • Hypertension    • Macular degeneration    • Stroke (CMS/[facility])    • Stroke (CMS/[facility]ly [facility])    • Ulcerative colitis (CMS/[facility]ly [facility])        No past surgical history on file.    Pediatric History   Patient Guardian Status   • Not on file.     Other Topics Concern   • Not on file     Social History Narrative   • No narrative on file   He denies current tobacco alcohol or illicit drugs    family history " includes Alzheimer's disease in his father; Cancer in his brother and mother; Diabetes in his maternal aunt.    Allergies   Allergen Reactions   • Iodine    • Sulfa Antibiotics        Medication:  Current Facility-Administered Medications   Medication Dose Route Frequency Provider Last Rate Last Dose   • acetaminophen (TYLENOL) tablet 650 mg  650 mg Oral Q4H PRN Jennifer Meneses APRN   650 mg at 07/24/18 0604   • bisoprolol (ZEBeta) tablet 5 mg  5 mg Oral Q24H Jennifer Meneses APRN   5 mg at 07/23/18 2147   • cefTRIAXone (ROCEPHIN) IVPB 1 g  1 g Intravenous Q24H Jennifer Meneses APRN   Stopped at 07/23/18 2251   • DAPTOmycin (CUBICIN) 450 mg in sodium chloride 0.9 % 50 mL IVPB  6 mg/kg Intravenous Q24H Richard Andino MD       • diphenhydrAMINE (BENADRYL) capsule 25 mg  25 mg Oral Q6H PRN ANA MARÍA Angeles       • doxylamine (UNISOM) tablet 25 mg  25 mg Oral Nightly PRN ANA MARÍA Angeles       • enoxaparin (LOVENOX) syringe 40 mg  40 mg Subcutaneous Q24H Jennifer Meneses APRN   40 mg at 07/24/18 0908   • gabapentin (NEURONTIN) capsule 100 mg  100 mg Oral Nightly Teodoro Vazquez MD       • labetalol (NORMODYNE,TRANDATE) injection 10 mg  10 mg Intravenous Q4H PRN ANA MARÍA Angeles       • lisinopril (PRINIVIL,ZESTRIL) tablet 10 mg  10 mg Oral BID ANA MARÍA Angeles   10 mg at 07/24/18 0908   • mesalamine (LIALDA) EC tablet 1.2 g  1,200 mg Oral Daily ANA MARÍA Angeles       • ondansetron (ZOFRAN) injection 4 mg  4 mg Intravenous Q6H PRN ANA MARÍA Angeles       • pantoprazole (PROTONIX) EC tablet 40 mg  40 mg Oral Q AM Jennifer Meneses APRN   40 mg at 07/24/18 0604   • Pharmacy to dose vancomycin   Does not apply Continuous PRN ANA MARÍA Angeles       • predniSONE (DELTASONE) tablet 5 mg  5 mg Oral BID ANA MARÍA Angeles       • sertraline (ZOLOFT) tablet 50 mg  50 mg Oral Daily ANA MARÍA Angeles   50 mg at 07/24/18 0908   • sodium chloride 0.9 % bolus 500 mL  500 mL Intravenous  TID PRN ANA MARÍA Angeles       • sodium chloride 0.9 % flush 1-10 mL  1-10 mL Intravenous PRN ANA MARÍA Angeles       • terazosin (HYTRIN) capsule 5 mg  5 mg Oral Nightly ANA MARÍA Angeles   5 mg at 07/23/18 2147   • vancomycin 1250 mg/250 mL 0.9% NS IVPB ([facility])  1,250 mg Intravenous Once Roz Mendoza RPH           Antibiotics:  Anti-Infectives     Ordered     Dose/Rate Route Frequency Start Stop    07/24/18 1215  DAPTOmycin (CUBICIN) 450 mg in sodium chloride 0.9 % 50 mL IVPB     Ordering Provider:  Richard Andino MD    6 mg/kg × 78.5 kg  100 mL/hr over 30 Minutes Intravenous Every 24 Hours 07/24/18 1315 08/07/18 1329    07/24/18 1059  vancomycin 1250 mg/250 mL 0.9% NS IVPB ([facility])     Ordering Provider:  Roz Mendoza RPH    1,250 mg  over 90 Minutes Intravenous Once 07/24/18 1200      07/23/18 1802  cefTRIAXone (ROCEPHIN) IVPB 1 g     Ordering Provider:  ANA MARÍA Angeles    1 g  100 mL/hr over 30 Minutes Intravenous Every 24 Hours 07/23/18 2000 07/28/18 1959    07/23/18 1812  vancomycin 2000 mg/500 mL 0.9% NS IVPB ([facility])     Ordering Provider:  Saba Javier RPH    25 mg/kg × 78.5 kg  over 120 Minutes Intravenous Once 07/23/18 2000 07/24/18 0210    07/23/18 1802  Pharmacy to dose vancomycin     Comments:  Vargas De  3E, S-344  By Jennifer GOTTI   Ordering Provider:  ANA MARÍA Angeles     Does not apply Continuous PRN 07/23/18 1801 07/28/18 1800            Review of Systems    Constitutional-- No Fever, chills or sweats.  Appetite good, and no malaise. No fatigue.  Heent-- No new vision, hearing or throat complaints.  No epistaxis or oral sores.  Denies odynophagia or dysphagia.  No flashers, floaters or eye pain. No odynophagia or dysphagia. No headache, photophobia or neck stiffness.  CV-- No chest pain, palpitation or syncope  Resp-- No SOB/cough/Hemoptysis  GI- No nausea, vomiting, or diarrhea.  No hematochezia, melena, or hematemesis. Denies jaundice or chronic  "liver disease.  -- No dysuria, hematuria, or flank pain.  Denies hesitancy, urgency or flank pain.  Lymph- no swollen lymph nodes in neck/axilla or groin.   Heme- No active bruising or bleeding; no Hx of DVT or PE.  MS-- no swelling or pain in the bones or joints of arms/legs aside from above.  No new back pain.  Neuro-- No acute focal weakness or numbness in the arms or legs.  No seizures.    Full 12 point review of systems reviewed and negative otherwise for acute complaints, except for above    Physical Exam:   Vital Signs   /56 (BP Location: Right arm, Patient Position: Lying)   Pulse 70   Temp 97.2 °F (36.2 °C) (Oral)   Resp 16   Ht 170.2 cm (67\")   Wt 78.5 kg (173 lb 1 oz)   SpO2 94%   BMI 27.11 kg/m²     GENERAL: Awake and alert, in no acute distress.   HEENT: Normocephalic, atraumatic.  PERRL. EOMI. No conjunctival injection. No icterus. Oropharynx clear without evidence of thrush or exudate. No evidence of peridontal disease.    NECK: Supple without nuchal rigidity. No mass.  LYMPH: No cervical, axillary or inguinal lymphadenopathy.  HEART: RRR; No murmur, rubs, gallops.   LUNGS: Clear to auscultation bilaterally without wheezing, rales, rhonchi. Normal respiratory effort. Nonlabored. No dullness.  ABDOMEN: Soft, nontender, nondistended. Positive bowel sounds. No rebound or guarding. NO mass or HSM.  EXT:  No cyanosis, clubbing or edema. No cord.  : Genitalia generally unremarkable.  Without Ortega catheter.  MSK: FROM without joint effusions noted arms/legs.    SKIN: Warm and dry without cutaneous eruptions on Inspection/palpation aside from below.    NEURO: Oriented to PPT.  Right side generally weaker than left in general, he has difficult time cooperating with a detailed motor exam with positioning in bed.    Right posterior arm with vague redness/induration and warmth/tenderness.  He has light scale at the posterior elbow/olecrenon bursa but no open wound or active drainage.  Vague " erythema extends to the upper arm and forearm.  No discrete mass bulge or fluctuance.  No crepitus or bulla.  No definitive ballotable fluid at the olecrenon bursa;  Skin is thickened    No peripheral stigmata/phenomena of endocarditis    Laboratory Data      Results from last 7 days  Lab Units 07/24/18  0458   WBC 10*3/mm3 7.97   HEMOGLOBIN g/dL 14.0   HEMATOCRIT % 43.2   PLATELETS 10*3/mm3 195       Results from last 7 days  Lab Units 07/24/18  0458   SODIUM mmol/L 138   POTASSIUM mmol/L 3.7   CHLORIDE mmol/L 105   CO2 mmol/L 24.0   BUN mg/dL 9   CREATININE mg/dL 0.60   GLUCOSE mg/dL 106*   CALCIUM mg/dL 9.3                   Estimated Creatinine Clearance: 88.6 mL/min (by C-G formula based on SCr of 0.6 mg/dL).      Microbiology:      Radiology:  Imaging Results (last 72 hours)     ** No results found for the last 72 hours. **            Impression:     --Acute right upper extremity septic olecrenon bursitis; superficial trauma 3 weeks prior to admission, subsequent soft tissue infection and not responding to empiric doxycycline.  Dominant pathogen in most cases is staph species.  Broad coverage ongoing with daptomycin/Rocephin.  If significant ballotable fluid evolves, orthopedics would need to give consideration to aspiration or debridement; this would help with both microbiologic diagnosis and therapy if it evolves.  In addition, if not steadily better with current antibiotics, surgical intervention should be considered for both therapeutic/diagnostic purposes to help rule out foreign body or other atypical microbiologic diagnosis.  Patient/family voice understanding and complexity and the potential for further functional loss/chronic pain or recurrent/progressive infection etc.; no evidence for foreign body by exam.     --Subacute trauma right elbow as above    --History stroke with residual right-sided weakness with chronic debility    PLAN: Thank you for asking us to see Vargas De, I recommend the  following:    --IV daptomycin/Rocephin    --I discussed potential risks and benefits of the prescribed antibiotics that include, but are not limited to, solid organ toxicity, neuro/muscle/pulmonary toxicity, renal toxicity, CDiff, cytopenias, hypersensitivity,  etc.. Patient/Family voice understanding and agree to proceed.    --Check/review labs cultures and scans    --Discussed with microbiology    --Partial history per nursing staff and family    --Discussed with patient's daughters in detail    --Discussed with Dr. PRADO    --Highly complex set of issues with high risk for further serious morbidity and other serious sequela       Richard Andino MD  7/24/2018

## 2018-07-24 NOTE — PROGRESS NOTES
"      [facility] Orthopaedic Surgery Progress Note    Subjective      LOS: 0 days   Patient Care Team:  Saba Arrington MD as PCP - General (Internal Medicine)    No chief complaint on file.       Interval History:   Right elbow still hurts but slightly better on less than 24hrs IV ABX    Objective      Vital Signs  Temp (24hrs), Av.8 °F (36.6 °C), Min:97.6 °F (36.4 °C), Max:98.5 °F (36.9 °C)      /82   Pulse 74   Temp 97.6 °F (36.4 °C) (Oral)   Resp 16   Ht 170.2 cm (67\")   Wt 78.5 kg (173 lb 1 oz)   SpO2 92%   BMI 27.11 kg/m²     Examination:   Red swollen right olecranon bursa, elbow full motion and strength, NVI    Labs:    Results from last 7 days  Lab Units 18  0458   WBC 10*3/mm3 7.97   RBC 10*6/mm3 4.37   HEMOGLOBIN g/dL 14.0   HEMATOCRIT % 43.2   PLATELETS 10*3/mm3 195       Radiology:  Imaging Results (last 24 hours)     ** No results found for the last 24 hours. **          PT:  none     Results Review:     I reviewed the patient's new clinical results.    Assessment and Plan     Assessment:   Right elbow septic olecranon bursitis    Principal Problem:    Septic bursitis of elbow, right  Active Problems:    HTN (hypertension)    HLD (hyperlipidemia)    History of stroke      Plan for disposition:Continue IV abx and may need I&D if not better in 24-48hrs      Dave Dorado MD  18  11:34 AM  "

## 2018-07-25 VITALS
RESPIRATION RATE: 16 BRPM | BODY MASS INDEX: 27.16 KG/M2 | HEART RATE: 81 BPM | SYSTOLIC BLOOD PRESSURE: 120 MMHG | OXYGEN SATURATION: 94 % | TEMPERATURE: 97.9 F | HEIGHT: 67 IN | WEIGHT: 173.06 LBS | DIASTOLIC BLOOD PRESSURE: 76 MMHG

## 2018-07-25 PROBLEM — M25.529 ELBOW PAIN: Status: ACTIVE | Noted: 2018-07-25

## 2018-07-25 PROCEDURE — 97116 GAIT TRAINING THERAPY: CPT

## 2018-07-25 PROCEDURE — 97162 PT EVAL MOD COMPLEX 30 MIN: CPT

## 2018-07-25 PROCEDURE — 99231 SBSQ HOSP IP/OBS SF/LOW 25: CPT | Performed by: ORTHOPAEDIC SURGERY

## 2018-07-25 PROCEDURE — 63710000001 PREDNISONE PER 5 MG: Performed by: NURSE PRACTITIONER

## 2018-07-25 PROCEDURE — 25010000002 ENOXAPARIN PER 10 MG: Performed by: NURSE PRACTITIONER

## 2018-07-25 RX ORDER — CEFTRIAXONE SODIUM 1 G/50ML
1 INJECTION, SOLUTION INTRAVENOUS EVERY 24 HOURS
Qty: 150 ML | Refills: 0
Start: 2018-07-25 | End: 2018-07-28

## 2018-07-25 RX ADMIN — SERTRALINE HYDROCHLORIDE 50 MG: 50 TABLET ORAL at 08:49

## 2018-07-25 RX ADMIN — BISOPROLOL FUMARATE 5 MG: 5 TABLET ORAL at 08:49

## 2018-07-25 RX ADMIN — MESALAMINE 1.2 G: 1.2 TABLET, DELAYED RELEASE ORAL at 08:49

## 2018-07-25 RX ADMIN — PREDNISONE 5 MG: 10 TABLET ORAL at 08:49

## 2018-07-25 RX ADMIN — LISINOPRIL 10 MG: 10 TABLET ORAL at 08:48

## 2018-07-25 RX ADMIN — PANTOPRAZOLE SODIUM 40 MG: 40 TABLET, DELAYED RELEASE ORAL at 05:48

## 2018-07-25 RX ADMIN — ENOXAPARIN SODIUM 40 MG: 40 INJECTION SUBCUTANEOUS at 08:49

## 2018-07-25 NOTE — DISCHARGE SUMMARY
Patient Name: Vargas De  MRN: [id number]  : 1940  DOS: 2018    Attending: No att. providers found    Primary Care Provider: Saba Arrington MD    Date of Admission:.2018  3:35 PM    Date of Discharge:  2018    Discharge Diagnosis: Principal Problem:    Septic bursitis of elbow, right  Active Problems:    HTN (hypertension)    HLD (hyperlipidemia)    History of stroke    Elbow pain      Hospital Course  Patient is a 77 y.o. male presented for direct admission from Dr. Dorado's office with right elbow redness, pain and swelling.      It started about 10 days ago. He was originally seen at urgent care and was prescribed oral antibiotics. The symptoms persisted and he was seen by his primary care doctor, his elbow was aspirated with no growth. He continued oral antibiotics and was referred to Dr. Dorado. He denies fevers, chills or night sweats. He was admitted for further medical management.      He does have history of stroke about 5 years ago with residual right sided weakness.  He has hx of Ulcerative colitis, is followed by .      After his admit, he was started on IV abx of Vanco and Ceftriaxone.     He was seen by SHAMIKA Harrison, for antibiotic management. Antibiotics were modified.   He will be discharged on IV antibiotics and will follow-up tomorrow at office.    Patient was provided pain medications as needed for pain control.  The patient used an IS for atelectasis prophylaxis and mechanicals for DVT prophylaxis.  Home medications were resumed as appropriate, and labs were monitored and remained fairly stable.     During his stay he had some confusion at night, cleared during the day.    With the progress he has made, he is ready for DC home today.    Discussed with patient regarding plan and he shows understanding and agreement.      Consultants  SHAMIKA Harrison    Pertinent Test Results:    I reviewed the patient's new clinical results.     Results from last 7  "days  Lab Units 18  0458   WBC 10*3/mm3 7.97   HEMOGLOBIN g/dL 14.0   HEMATOCRIT % 43.2   PLATELETS 10*3/mm3 195       Results from last 7 days  Lab Units 18  0458   SODIUM mmol/L 138   POTASSIUM mmol/L 3.7   CHLORIDE mmol/L 105   CO2 mmol/L 24.0   BUN mg/dL 9   CREATININE mg/dL 0.60   CALCIUM mg/dL 9.3   GLUCOSE mg/dL 106*     I reviewed the patient's new imaging including images and reports.      Physical therapy: PT eval complete. Pt presents with R elbow septic olecranon bursitis, demonstrated mild unsteadiness without use of assistive device during ambulation. Pt ambulated 200 feet with RW and CGA, limited by fatigue. Pt required CGA for bed mobility and transfers. Will attempt ambulation with SPC for household use (recommend RW for community use) and stair training next visit. Recommend d/c home with assist and HHPT vs. OPPT (family deciding).     Discharge Assessment:    Vital Signs  /76 (BP Location: Right arm, Patient Position: Lying)   Pulse 81   Temp 97.9 °F (36.6 °C) (Oral)   Resp 16   Ht 170.2 cm (67\")   Wt 78.5 kg (173 lb 1 oz)   SpO2 94%   BMI 27.11 kg/m²   Temp (24hrs), Av.1 °F (36.7 °C), Min:97.5 °F (36.4 °C), Max:99 °F (37.2 °C)      General Appearance:    Alert, cooperative, in no acute distress   Lungs:     Clear to auscultation,respirations regular, even and                   unlabored    Heart:    Regular rhythm and normal rate, normal S1 and S2   Abdomen:     Normal bowel sounds, no masses, no organomegaly, soft        non-tender, non-distended, no guarding, no rebound                 tenderness   Extremities:   Right elbow erythema and swelling. Pain with flexion over 90 degrees, improving flexion and redness   Pulses:   Pulses palpable and equal bilaterally   Skin:   No bleeding, bruising or rash   Neurologic:   Cranial nerves 2 - 12 grossly intact, sensation intact       Discharge Disposition: Home    Discharge Medications     Discharge Medications      New " Medications      Instructions Start Date   cefTRIAXone 20 MG/ML IVPB  Commonly known as:  ROCEPHIN   1 g, Intravenous, Every 24 Hours, Per [facility]      DAPTOmycin 450 mg in sodium chloride 0.9 % 50 mL   6 mg/kg, Intravenous, Every 24 Hours, Per [facility]         Changes to Medications      Instructions Start Date   donepezil 10 MG tablet  Commonly known as:  ARICEPT  What changed:  Another medication with the same name was removed. Continue taking this medication, and follow the directions you see here.   10 mg, Oral, Daily      fluticasone 50 MCG/ACT nasal spray  Commonly known as:  FLONASE  What changed:  Another medication with the same name was removed. Continue taking this medication, and follow the directions you see here.   2 sprays, Nasal, Daily         Continue These Medications      Instructions Start Date   acetaminophen 500 MG tablet  Commonly known as:  TYLENOL   500 mg, Oral, Every 6 Hours PRN      ALLERGY 10 MG tablet  Generic drug:  loratadine   Allergy   claritin      aspirin 81 MG EC tablet   81 mg, Oral, Daily      bisoprolol 5 MG tablet  Commonly known as:  ZEBeta   bisoprolol fumarate 5 mg tablet      dicyclomine 20 MG tablet  Commonly known as:  BENTYL   2 Times Daily PRN      doxazosin 4 MG tablet  Commonly known as:  CARDURA   4 mg, Oral, Nightly      doxylamine 25 MG tablet  Commonly known as:  UNISOM   Oral      gabapentin 100 MG capsule  Commonly known as:  NEURONTIN   100 mg, Oral, Every Night at Bedtime      ipratropium 0.06 % nasal spray  Commonly known as:  ATROVENT   2 sprays, Nasal, 4 Times Daily PRN      LESCOL PO   Lescol      LIALDA 1.2 g EC tablet  Generic drug:  mesalamine   1,200 mg, Oral, Daily      lisinopril 10 MG tablet  Commonly known as:  PRINIVIL,ZESTRIL   10 mg, Oral, 2 Times Daily      omeprazole 20 MG capsule  Commonly known as:  priLOSEC   20 mg, Oral, 2 Times Daily      potassium gluconate 595 (99 K) MG tablet tablet   595 mg, Oral, Daily      pravastatin 40 MG  tablet  Commonly known as:  PRAVACHOL   40 mg, Oral, Daily      predniSONE 5 MG tablet  Commonly known as:  DELTASONE   5 mg, 2 Times Daily      PRESERVISION AREDS PO   Oral      sertraline 50 MG tablet  Commonly known as:  ZOLOFT   50 mg, Oral, Daily      trospium 60 MG capsule sustained-release 24 hr capsule   20 mg, Oral, Every Morning      vitamin B-12 500 MCG tablet  Commonly known as:  CYANOCOBALAMIN   500 mcg, Oral, Daily      vitamin D3 5000 units capsule capsule   5,000 Units, Oral, Daily      Zinc 50 MG capsule   50 mg, Oral, Daily         Stop These Medications    doxycycline 100 MG capsule  Commonly known as:  VIBRAMYCIN            Discharge Diet: Regular diet    Activity at Discharge: ambulate    Follow-up Appointments  Dr. Dorado per his orders  Dr. Andino tomorrow for abx infusion, Thursday, July 26 at 9:30 AM     Discharge took over 30 min.    Discussed with pt and family, discussed with  and .     Teodoro Vazquez MD  07/26/18  10:22 AM

## 2018-07-25 NOTE — PROGRESS NOTES
Case Management Discharge Note    Final Note: Per ID note, plan is for patient to continue oupatient IV abx therapy at the [facility] office. Confirmed with Damaris at [facility] that patient has his first follow-up appointment with Dr. Andino tomorrow, 7/26/18, at 09:30. The appointment has been entered in Epic. Patient will get his abx at the time of the appointment. PT has recommended a RW and outpatient PT. Met with patient and his granddaughter in the room. They state they have a rolling walker at home. Patient would like an ambulatory order for PT at [facility] in [place]. Ambulatory order placed in Epic for APRN to cosign. Patient has been instructed to take this order to his first PT appointment, which he will schedule. Patient has also been informed of his appointment with [facility] tomorrow. No other discharge needs identified/voiced.     Destination     No service has been selected for the patient.      Durable Medical Equipment     No service has been selected for the patient.      Dialysis/Infusion - Selection Complete     Service Request Status Selected Specialties Address Phone Number Fax Number    [facility] [address] [place] [address] [address], [facility] [id number] [phone]      Home Medical Care     No service has been selected for the patient.      Social Care     No service has been selected for the patient.             Final Discharge Disposition Code: 01 - home or self-care

## 2018-07-25 NOTE — THERAPY EVALUATION
Acute Care - Physical Therapy Initial Evaluation  [facility]     Patient Name: Vargas De  : 1940  MRN: [id number]  Today's Date: 2018   Onset of Illness/Injury or Date of Surgery: 18  Date of Referral to PT: 18  Referring Physician: MD George      Admit Date: 2018    Visit Dx:     ICD-10-CM ICD-9-CM   1. Impaired functional mobility, balance, gait, and endurance Z74.09 V49.89     Patient Active Problem List   Diagnosis   • Mild cognitive impairment   • Peripheral neuropathy   • HTN (hypertension)   • Septic bursitis of elbow, right   • HLD (hyperlipidemia)   • History of stroke     Past Medical History:   Diagnosis Date   • Arthritis    • Depression    • Gout    • Hyperlipidemia    • Hypertension    • Macular degeneration    • Stroke (CMS/HCC)    • Stroke (CMS/HCC)    • Ulcerative colitis (CMS/HCC)      History reviewed. No pertinent surgical history.     PT ASSESSMENT (last 12 hours)      Physical Therapy Evaluation     Row Name 18 0938          PT Evaluation Time/Intention    Subjective Information complains of   stiffness  -LM     Document Type evaluation  -LM     Mode of Treatment physical therapy;individual therapy  -LM     Patient Effort good  -LM     Symptoms Noted During/After Treatment fatigue  -LM     Comment Next session, attempt SPC use with ambulation as pt states RW may not fit within basement apartment. RW still recommended for community distances.  -LM     Row Name 18 0938          General Information    Patient Profile Reviewed? yes  -LM     Onset of Illness/Injury or Date of Surgery 18  -LM     Referring Physician MD George  -LM     Patient Observations alert;cooperative;agree to therapy  -LM     Patient/Family Observations Granddaughter present.  -LM     General Observations of Patient Pt received semi-isabel in bed.  -LM     Prior Level of Function min assist:;community mobility;using stairs;all household  mobility;independent:;gait;transfer;bed mobility;ADL's  -LM     Equipment Currently Used at Home cane, straight;grab bar   owns cane, does not use  -LM     Pertinent History of Current Functional Problem Pt is 77 year old male, presents with 2 week history of right elbow pain and redness. Pt has history of CVA with residual R sided weakness. Found to have R elbow septic olecranon bursitis.  -LM     Existing Precautions/Restrictions fall;other (see comments)   R elbow olecranon bursitis, mild expressive aphasia  -LM     Risks Reviewed patient and family:;LOB;nausea/vomiting;dizziness;increased discomfort;change in vital signs  -LM     Benefits Reviewed patient and family:;improve function;increase independence;increase strength;increase balance;decrease pain  -LM     Barriers to Rehab previous functional deficit  -LM     Row Name 07/25/18 0938          Relationship/Environment    Lives With child(orion), adult  -LM     Concerns About Impact on Relationships Family available to assist at home as needed; not available 24/7.  -LM     Row Name 07/25/18 0938          Resource/Environmental Concerns    Current Living Arrangements home/apartment/condo  -LM     Row Name 07/25/18 0938          Stairs Within Home, Primary    Stairs, Within Home, Primary 12  -LM     Stair Railings, Within Home, Primary railing on right side (ascending)  -LM     Stairs Comment, Within Home, Primary Pt utilized steps to basement apartment.  -LM     Row Name 07/25/18 0938          Cognitive Assessment/Interventions    Additional Documentation Cognitive Assessment/Intervention (Group)  -LM     Row Name 07/25/18 0938          Cognitive Assessment/Intervention- PT/OT    Affect/Mental Status (Cognitive) WFL  -LM     Orientation Status (Cognition) oriented x 4   increased time  -LM     Follows Commands (Cognition) follows one step commands;over 90% accuracy;increased processing time needed  -LM     Cognitive Function (Cognitive) safety deficit  -LM      Safety Deficit (Cognitive) mild deficit;awareness of need for assistance;insight into deficits/self awareness;problem solving;safety precautions awareness;safety precautions follow-through/compliance  -LM     Row Name 07/25/18 0938          Safety Issues, Functional Mobility    Safety Issues Affecting Function (Mobility) awareness of need for assistance;insight into deficits/self awareness;positioning of assistive device;safety precautions follow-through/compliance;safety precaution awareness;sequencing abilities  -LM     Impairments Affecting Function (Mobility) balance;endurance/activity tolerance;coordination  -LM     Row Name 07/25/18 0938          Mobility Assessment/Treatment    Extremity Weight-bearing Status right upper extremity  -LM     Right Upper Extremity (Weight-bearing Status) weight-bearing as tolerated (WBAT)  -LM     Row Name 07/25/18 0938          Bed Mobility Assessment/Treatment    Bed Mobility Assessment/Treatment supine-sit;scooting/bridging  -LM     Supine-Sit Cloud (Bed Mobility) contact guard;verbal cues  -LM     Bed Mobility, Safety Issues decreased use of arms for pushing/pulling  -LM     Assistive Device (Bed Mobility) head of bed elevated  -LM     Comment (Bed Mobility) Verbal cues for sequencing.  -LM     Row Name 07/25/18 0938          Transfer Assessment/Treatment    Transfer Assessment/Treatment sit-stand transfer;stand-sit transfer  -LM     Comment (Transfers) Cues to push from bed with UEs when standing, reach for chair armrests when sitting.  -LM     Sit-Stand Cloud (Transfers) contact guard;verbal cues  -LM     Stand-Sit Cloud (Transfers) contact guard;verbal cues  -LM     Row Name 07/25/18 0938          Sit-Stand Transfer    Assistive Device (Sit-Stand Transfers) walker, front-wheeled  -LM     Row Name 07/25/18 0938          Stand-Sit Transfer    Assistive Device (Stand-Sit Transfers) other (see comments)   none  -     Row Name 07/25/18 0938           Gait/Stairs Assessment/Training    [place] Level (Gait) contact guard;verbal cues  -LM     Assistive Device (Gait) walker, front-wheeled   last 15 feet without AD  -LM     Distance in Feet (Gait) 200  -LM     Pattern (Gait) step-through  -LM     Deviations/Abnormal Patterns (Gait) bilateral deviations;huber decreased;stride length decreased  -LM     Bilateral Gait Deviations forward flexed posture;heel strike decreased  -LM     Comment (Gait/Stairs) Pt ambulated with step-through pattern at slow pace. Initially required cues for RW use but improvement noted with practice. Verbal cues for upright posture, remaining within RW. Pt attempted last 15 feet without AD use, demo increased unsteadiness. Pt would benefit from continued RW/AD use. Gait limited by fatigue.  -LM     Row Name 07/25/18 0938          General ROM    GENERAL ROM COMMENTS BLE WFL  -LM     Row Name 07/25/18 0938          MMT (Manual Muscle Testing)    Additional Documentation General Assessment (Manual Muscle Testing) (Group)  -LM     Row Name 07/25/18 0938          General Assessment (Manual Muscle Testing)    General Manual Muscle Testing (MMT) Assessment lower extremity strength deficits identified  -LM     Comment, General Manual Muscle Testing (MMT) Assessment BLEs 4+/5  -LM     Row Name 07/25/18 0938          Motor Assessment/Intervention    Additional Documentation Balance (Group)  -LM     Row Name 07/25/18 0938          Balance    Balance static sitting balance;static standing balance  -LM     Row Name 07/25/18 0938          Static Sitting Balance    Level of [place] (Unsupported Sitting, Static Balance) supervision  -LM     Sitting Position (Unsupported Sitting, Static Balance) sitting on edge of bed  -LM     Time Able to Maintain Position (Unsupported Sitting, Static Balance) 1 to 2 minutes  -LM     Row Name 07/25/18 0938          Static Standing Balance    Level of [place] (Supported Standing, Static Balance) contact guard  "assist  -LM     Time Able to Maintain Position (Supported Standing, Static Balance) 1 to 2 minutes  -LM     Assistive Device Utilized (Supported Standing, Static Balance) rolling walker  -LM     Row Name 07/25/18 0938          Sensory Assessment/Intervention    Sensory General Assessment no sensation deficits identified  -LM     Row Name 07/25/18 0938          Pain Assessment    Additional Documentation Pain Scale: Numbers Pre/Post-Treatment (Group)  -LM     Row Name 07/25/18 0938          Pain Scale: Numbers Pre/Post-Treatment    Pain Scale: Numbers, Pretreatment 0/10 - no pain  -LM     Pain Scale: Numbers, Post-Treatment 0/10 - no pain  -LM     Pain Location - Side Right  -LM     Pain Location - Orientation generalized  -LM     Pain Location elbow  -LM     Pre/Post Treatment Pain Comment \"stiffness\"  -LM     Pain Intervention(s) Repositioned;Ambulation/increased activity  -LM     Row Name             Wound 07/23/18 1626 Right posterior elbow unspecified    Wound - Properties Group Date first assessed: 07/23/18  -JL Time first assessed: 1626  -JL Present On Admission : yes  -JL Side: Right  -JL Orientation: posterior  -JL Location: elbow  -JL Type: unspecified  -JL    Row Name 07/25/18 0938          Plan of Care Review    Plan of Care Reviewed With patient;grandchild(orion)  -LM     Row Name 07/25/18 0938          Physical Therapy Clinical Impression    Date of Referral to PT 07/24/18  -LM     PT Diagnosis (PT Clinical Impression) Impaired functional mobility  -LM     Prognosis (PT Clinical Impression) good  -LM     Patient/Family Goals Statement (PT Clinical Impression) return home  -LM     Criteria for Skilled Interventions Met (PT Clinical Impression) yes;treatment indicated  -LM     Rehab Potential (PT Clinical Summary) good, to achieve stated therapy goals  -LM     Care Plan Review (PT) evaluation/treatment results reviewed  -LM     Care Plan Review, Other Participant (PT Clinical Impression) family  -LM     Row " Name 07/25/18 0938          Physical Therapy Goals    Bed Mobility Goal Selection (PT) bed mobility, PT goal 1  -LM     Transfer Goal Selection (PT) transfer, PT goal 1  -LM     Gait Training Goal Selection (PT) gait training, PT goal 1  -LM     Stairs Goal Selection (PT) stairs, PT goal 1  -LM     Additional Documentation Stairs Goal Selection (PT) (Row)  -LM     Row Name 07/25/18 0938          Bed Mobility Goal 1 (PT)    Activity/Assistive Device (Bed Mobility Goal 1, PT) sit to supine/supine to sit  -LM     [place] Level/Cues Needed (Bed Mobility Goal 1, PT) conditional independence  -LM     Time Frame (Bed Mobility Goal 1, PT) long term goal (LTG);5 days  -LM     Row Name 07/25/18 0938          Transfer Goal 1 (PT)    Activity/Assistive Device (Transfer Goal 1, PT) sit-to-stand/stand-to-sit;walker, rolling;cane, straight  -LM     [place] Level/Cues Needed (Transfer Goal 1, PT) conditional independence  -LM     Time Frame (Transfer Goal 1, PT) long term goal (LTG);5 days  -LM     Row Name 07/25/18 0938          Gait Training Goal 1 (PT)    Activity/Assistive Device (Gait Training Goal 1, PT) gait (walking locomotion);assistive device use;walker, rolling  -LM     [place] Level (Gait Training Goal 1, PT) conditional independence  -LM     Distance (Gait Goal 1, PT) 300 feet  -LM     Time Frame (Gait Training Goal 1, PT) long term goal (LTG);5 days  -LM     Row Name 07/25/18 0938          Stairs Goal 1 (PT)    Activity/Assistive Device (Stairs Goal 1, PT) ascending stairs;descending stairs;using handrail, left;cane, straight  -LM     [place] Level/Cues Needed (Stairs Goal 1, PT) supervision required  -LM     Number of Stairs (Stairs Goal 1, PT) 12  -LM     Time Frame (Stairs Goal 1, PT) long term goal (LTG);5 days  -LM     Row Name 07/25/18 0938          Positioning and Restraints    Pre-Treatment Position in bed  -LM     Post Treatment Position chair  -LM     In Chair notified  nsg;reclined;sitting;call light within reach;encouraged to call for assist;with family/caregiver;RUE elevated;legs elevated  -LM     Row Name 07/25/18 0938          Living Environment    Home Accessibility stairs within home;tub/shower is not walk in  -       User Key  (r) = Recorded By, (t) = Taken By, (c) = Cosigned By    Initials Name Provider Type    BERTIN Jacobo, RNA Registered Nurse    FREEDOM Nogueira, PT Physical Therapist          Physical Therapy Education     Title: PT OT SLP Therapies (Done)     Topic: Physical Therapy (Done)     Point: Mobility training (Done)    Learning Progress Summary     Learner Status Readiness Method Response Comment Documented by    Patient Done Acceptance E,D VU,NR Reviewed benefits of activity, safety with mobility and transfers, RW use, correct gait mechanics, progression of POC.  07/25/18 0938    Family Done Acceptance ED VU,NR Reviewed benefits of activity, safety with mobility and transfers, RW use, correct gait mechanics, progression of POC.  07/25/18 0938          Point: Home exercise program (Done)    Learning Progress Summary     Learner Status Readiness Method Response Comment Documented by    Patient Done Acceptance E,D VU,NR Reviewed benefits of activity, safety with mobility and transfers, RW use, correct gait mechanics, progression of POC.  07/25/18 0938    Family Done Acceptance DEISYD VU,NR Reviewed benefits of activity, safety with mobility and transfers, RW use, correct gait mechanics, progression of POC.  07/25/18 0938          Point: Body mechanics (Done)    Learning Progress Summary     Learner Status Readiness Method Response Comment Documented by    Patient Done Acceptance DEISYD VU,NR Reviewed benefits of activity, safety with mobility and transfers, RW use, correct gait mechanics, progression of POC.  07/25/18 0938    Family Done Acceptance ED VU,NR Reviewed benefits of activity, safety with mobility and transfers, RW use, correct gait  mechanics, progression of POC.  07/25/18 0938          Point: Precautions (Done)    Learning Progress Summary     Learner Status Readiness Method Response Comment Documented by    Patient Done Acceptance TUSHAR OLIVAS NR Reviewed benefits of activity, safety with mobility and transfers, RW use, correct gait mechanics, progression of POC.  07/25/18 0938    Family Done Acceptance TUSHAR OLIVAS NR Reviewed benefits of activity, safety with mobility and transfers, RW use, correct gait mechanics, progression of POC.  07/25/18 0938                      User Key     Initials Effective Dates Name Provider Type Discipline     04/03/18 -  Yen Nogueira, PT Physical Therapist PT                PT Recommendation and Plan  Anticipated Discharge Disposition (PT): home with home health, home with OP services (HHPT vs. OPPT (family deciding))  Planned Therapy Interventions (PT Eval): balance training, bed mobility training, gait training, home exercise program, stair training, strengthening, transfer training, patient/family education  Therapy Frequency (PT Clinical Impression): daily  Outcome Summary/Treatment Plan (PT)  Anticipated Equipment Needs at Discharge (PT): front wheeled walker  Anticipated Discharge Disposition (PT): home with home health, home with OP services (HHPT vs. OPPT (family deciding))  Plan of Care Reviewed With: patient, grandchild(orion)  Outcome Summary: PT eval complete. Pt presents with R elbow septic olecranon bursitis, demonstrated mild unsteadiness without use of assistive device during ambulation. Pt ambulated 200 feet with RW and CGA, limited by fatigue. Pt required CGA for bed mobility and transfers. Will attempt ambulation with SPC for household use (recommend RW for community use) and stair training next visit. Recommend d/c home with assist and HHPT vs. OPPT (family deciding).           Outcome Measures     Row Name 07/25/18 0938             How much help from another person do you currently need...     Turning from your back to your side while in flat bed without using bedrails? 3  -LM      Moving from lying on back to sitting on the side of a flat bed without bedrails? 3  -LM      Moving to and from a bed to a chair (including a wheelchair)? 3  -LM      Standing up from a chair using your arms (e.g., wheelchair, bedside chair)? 3  -LM      Climbing 3-5 steps with a railing? 2  -LM      To walk in hospital room? 3  -LM      AM-PAC 6 Clicks Score 17  -LM         Functional Assessment    Outcome Measure Options AM-PAC 6 Clicks Basic Mobility (PT)  -LM        User Key  (r) = Recorded By, (t) = Taken By, (c) = Cosigned By    Initials Name Provider Type    FREEDOM Nogueira PT Physical Therapist           Time Calculation:         PT Charges     Row Name 07/25/18 0938             Time Calculation    Start Time 0938  -LM      PT Received On 07/25/18  -LM      PT Goal Re-Cert Due Date 08/04/18  -LM         Time Calculation- PT    Total Timed Code Minutes- PT 10 minute(s)  -LM         Timed Charges    66794 - Gait Training Minutes  10  -LM        User Key  (r) = Recorded By, (t) = Taken By, (c) = Cosigned By    Initials Name Provider Type    FREEDOM Nogueira PT Physical Therapist        Therapy Suggested Charges     Code   Minutes Charges    74057 (CPT®) Hc Pt Neuromusc Re Education Ea 15 Min      92086 (CPT®) Hc Pt Ther Proc Ea 15 Min      03124 (CPT®) Hc Gait Training Ea 15 Min 10 1    96621 (CPT®) Hc Pt Therapeutic Act Ea 15 Min      08975 (CPT®) Hc Pt Manual Therapy Ea 15 Min      33812 (CPT®) Hc Pt Iontophoresis Ea 15 Min      15551 (CPT®) Hc Pt Elec Stim Ea-Per 15 Min      92891 (CPT®) Hc Pt Ultrasound Ea 15 Min      31907 (CPT®) Hc Pt Self Care/Mgmt/Train Ea 15 Min      Total  10 1        Therapy Charges for Today     Code Description Service Date Service Provider Modifiers Qty    65254869990 HC GAIT TRAINING EA 15 MIN 7/25/2018 Yen Nogueira PT GP 1    63754582343 HC PT EVAL MOD COMPLEXITY 4 7/25/2018 Yen  SHEY Nogueira, PT GP 1          PT G-Codes  Outcome Measure Options: AM-PAC 6 Clicks Basic Mobility (PT)      Yen Nogueira, PT  7/25/2018

## 2018-07-25 NOTE — PLAN OF CARE
Problem: Patient Care Overview  Goal: Plan of Care Review  Outcome: Ongoing (interventions implemented as appropriate)   07/25/18 3996   Coping/Psychosocial   Plan of Care Reviewed With patient;grandchild(orion)   OTHER   Outcome Summary PT eval complete. Pt presents with R elbow septic olecranon bursitis, demonstrated mild unsteadiness without use of assistive device during ambulation. Pt ambulated 200 feet with RW and CGA, limited by fatigue. Pt required CGA for bed mobility and transfers. Will attempt ambulation with SPC for household use (recommend RW for community use) and stair training next visit. Recommend d/c home with assist and HHPT vs. OPPT (family deciding).

## 2018-07-25 NOTE — PROGRESS NOTES
"      [facility] [place] Orthopaedic Surgery Progress Note    Subjective      LOS: 2 days   Patient Care Team:  Saba Arrington MD as PCP - General (Internal Medicine)    No chief complaint on file.       Interval History:   Right elbow feels better    Objective      Vital Signs  Temp (24hrs), Av.1 °F (36.7 °C), Min:97.5 °F (36.4 °C), Max:99 °F (37.2 °C)      /76 (BP Location: Right arm, Patient Position: Lying)   Pulse 81   Temp 97.9 °F (36.6 °C) (Oral)   Resp 16   Ht 170.2 cm (67\")   Wt 78.5 kg (173 lb 1 oz)   SpO2 94%   BMI 27.11 kg/m²     Examination:   Right elbow, less erythema, but still Dark red over the olecranon with swelling and no fluctuance.  He has full motion and strength.    Labs:    Results from last 7 days  Lab Units 18  0458   WBC 10*3/mm3 7.97   RBC 10*6/mm3 4.37   HEMOGLOBIN g/dL 14.0   HEMATOCRIT % 43.2   PLATELETS 10*3/mm3 195       Radiology:  Imaging Results (last 24 hours)     ** No results found for the last 24 hours. **            Results Review:     I reviewed the patient's new clinical results.    Assessment and Plan     Assessment:    Principal Problem:    Septic bursitis of elbow, right  Active Problems:    HTN (hypertension)    HLD (hyperlipidemia)    History of stroke      Plan for disposition: I agree with IV antibiotics.  He needs observation.  These can be done as an outpatient basis and with follow-up.  At this time he does not need surgery.  If symptoms worsen or do not were not completely go away surgical debridement and incision may be necessary.  I discussed this with his admitting physician and if symptoms get worse he may need surgery.  I will be out of town next week but my partners will be covering.      Dave Dorado MD  18  12:51 PM  "

## 2018-07-27 ENCOUNTER — TELEPHONE (OUTPATIENT)
Dept: NEUROLOGY | Facility: CLINIC | Age: 78
End: 2018-07-27

## 2018-07-27 ENCOUNTER — OFFICE VISIT (OUTPATIENT)
Dept: ORTHOPEDIC SURGERY | Facility: CLINIC | Age: 78
End: 2018-07-27

## 2018-07-27 VITALS — WEIGHT: 173.06 LBS | BODY MASS INDEX: 27.16 KG/M2 | HEART RATE: 83 BPM | HEIGHT: 67 IN | OXYGEN SATURATION: 95 %

## 2018-07-27 DIAGNOSIS — M71.121 SEPTIC BURSITIS OF ELBOW, RIGHT: Primary | ICD-10-CM

## 2018-07-27 DIAGNOSIS — M25.521 RIGHT ELBOW PAIN: ICD-10-CM

## 2018-07-27 PROCEDURE — 99212 OFFICE O/P EST SF 10 MIN: CPT | Performed by: PHYSICIAN ASSISTANT

## 2018-07-27 NOTE — TELEPHONE ENCOUNTER
Spoke to Ms Woods, to check on incomplete labs/ MRI pt currently has and elbow infection, and is on IV antibiotics once, this is healed she will cb to complete labs/ MRI.

## 2018-07-27 NOTE — PROGRESS NOTES
"    [facility] [place] Orthopaedic Surgery Clinic Note    Subjective     CC: Follow-up of the Right Elbow (4 day - Olecranon bursitis of right elbow)      EDDI De is a 77 y.o. male.  Patient was previously evaluated by Dr. Dorado and diagnosed with right elbow septic bursitis on 7/23/2018.  He was admitted for IV antibiotics.  He was discharged on 7/25/2018 and is now receiving IV antibiotics daily until Monday.  He has a follow-up appointment next Wednesday with his PCP, Dr. Arrington.    Currently he reports improvement of pain, redness and swelling.  He denies any fever, chills, night sweats or other constitutional symptoms.  He denies any numbness or tingling into the extremity.  He is very happy with the outcome thus far.      ROS:    Constiutional:Pt denies fever, chills, nausea, or vomiting.  MSK:as above    Objective      Past Medical History  Past Medical History:   Diagnosis Date   • Arthritis    • Depression    • Gout    • Hyperlipidemia    • Hypertension    • Macular degeneration    • Stroke (CMS/HCC)    • Stroke (CMS/HCC)    • Ulcerative colitis (CMS/HCC)          Physical Exam  Pulse 83   Ht 170.2 cm (67.01\")   Wt 78.5 kg (173 lb 1 oz)   SpO2 95%   BMI 27.10 kg/m²     Body mass index is 27.1 kg/m².    Patient is well nourished and well developed.        Ortho Exam  Peripheral Vascular   Right Upper Extremity    No cyanotic nail beds    Pink nail beds and rapid capillary refill   Palpation    Radial Pulse - Bilaterally normal    Neurologic   Sensory - Elbow   Inspection and Palpation:    Light touch: intact - right hand   Muscle Strength and Tone:    Right bicep - 5/5    Right tricep - 5/5    Right wrist extensors - 5/5     Right wrist flexors - 5/5    Right intrinsics - 5/5    Musculoskeletal   Right Upper Extremity - Elbow   Inspection and Palpation     Tenderness - directly over olecranon bursa with mild erythema.  No fluctuance appreciable today.    Effusion - none    Crepitus - none   Range of " Motion    Flexion: AROM - 140 degrees    Extension - AROM - 0 degrees     Forearm supination: AROM - 90 degrees    Forearm pronation - AROM - 90 degrees   Instability    Right - tennis elbow test negative   Deformities/Malalignments/Discepancies - none   Functional testing:    Valgus stress test negative    Varus stress test negative    Tinel's sign at Cubital tunnel negative    Imaging/Labs/EMG Reviewed:  Imaging Results (last 24 hours)     ** No results found for the last 24 hours. **      No new imaging today.    Assessment:  1. Septic bursitis of elbow, right    2. Right elbow pain        Plan:  1. Discussion was had with patient regarding exam, assessment and treatment options.  2. Continue working on range of motion.  3. Keep follow-up appointments for daily IV antibiotic infusion as well as follow-up appointment with PCP next week.    4. Follow-up orthotic clinic 6 August.  Return sooner if issues arise.  Questions and concerns answered.        Medical Decision Making  Management Options : prescription/IM medicine      Roz Sharma PA-C  07/27/18  1:01 PM

## 2018-07-30 ENCOUNTER — APPOINTMENT (OUTPATIENT)
Dept: MRI IMAGING | Facility: HOSPITAL | Age: 78
End: 2018-07-30
Attending: PSYCHIATRY & NEUROLOGY

## 2018-07-30 ENCOUNTER — LAB REQUISITION (OUTPATIENT)
Dept: LAB | Facility: HOSPITAL | Age: 78
End: 2018-07-30

## 2018-07-30 ENCOUNTER — HOSPITAL ENCOUNTER (OUTPATIENT)
Dept: MRI IMAGING | Facility: HOSPITAL | Age: 78
Discharge: HOME | End: 2018-07-30
Attending: PSYCHIATRY & NEUROLOGY

## 2018-07-30 DIAGNOSIS — Z00.00 ROUTINE GENERAL MEDICAL EXAMINATION AT A HEALTH CARE FACILITY: ICD-10-CM

## 2018-07-30 LAB
ALBUMIN SERPL-MCNC: 4 G/DL (ref 3.2–4.8)
ALBUMIN/GLOB SERPL: 1.5 G/DL (ref 1.5–2.5)
ALP SERPL-CCNC: 87 U/L (ref 25–100)
ALT SERPL W P-5'-P-CCNC: 32 U/L (ref 7–40)
ANION GAP SERPL CALCULATED.3IONS-SCNC: 11 MMOL/L (ref 3–11)
AST SERPL-CCNC: 25 U/L (ref 0–33)
BASOPHILS # BLD AUTO: 0.02 10*3/MM3 (ref 0–0.2)
BASOPHILS NFR BLD AUTO: 0.3 % (ref 0–1)
BILIRUB SERPL-MCNC: 0.6 MG/DL (ref 0.3–1.2)
BUN BLD-MCNC: 11 MG/DL (ref 9–23)
BUN/CREAT SERPL: 14.3 (ref 7–25)
CALCIUM SPEC-SCNC: 9.3 MG/DL (ref 8.7–10.4)
CHLORIDE SERPL-SCNC: 105 MMOL/L (ref 99–109)
CK SERPL-CCNC: 120 U/L (ref 26–174)
CO2 SERPL-SCNC: 27 MMOL/L (ref 20–31)
CREAT BLD-MCNC: 0.77 MG/DL (ref 0.6–1.3)
DEPRECATED RDW RBC AUTO: 51.6 FL (ref 37–54)
EOSINOPHIL # BLD AUTO: 0.11 10*3/MM3 (ref 0–0.3)
EOSINOPHIL NFR BLD AUTO: 1.4 % (ref 0–3)
ERYTHROCYTE [DISTWIDTH] IN BLOOD BY AUTOMATED COUNT: 14.1 % (ref 11.3–14.5)
GFR SERPL CREATININE-BSD FRML MDRD: 98 ML/MIN/1.73
GLOBULIN UR ELPH-MCNC: 2.7 GM/DL
GLUCOSE BLD-MCNC: 144 MG/DL (ref 70–100)
HCT VFR BLD AUTO: 44 % (ref 38.9–50.9)
HGB BLD-MCNC: 14.7 G/DL (ref 13.1–17.5)
IMM GRANULOCYTES # BLD: 0.01 10*3/MM3 (ref 0–0.03)
IMM GRANULOCYTES NFR BLD: 0.1 % (ref 0–0.6)
LYMPHOCYTES # BLD AUTO: 1.96 10*3/MM3 (ref 0.6–4.8)
LYMPHOCYTES NFR BLD AUTO: 24.7 % (ref 24–44)
MCH RBC QN AUTO: 33.3 PG (ref 27–31)
MCHC RBC AUTO-ENTMCNC: 33.4 G/DL (ref 32–36)
MCV RBC AUTO: 99.5 FL (ref 80–99)
MONOCYTES # BLD AUTO: 0.71 10*3/MM3 (ref 0–1)
MONOCYTES NFR BLD AUTO: 9 % (ref 0–12)
NEUTROPHILS # BLD AUTO: 5.13 10*3/MM3 (ref 1.5–8.3)
NEUTROPHILS NFR BLD AUTO: 64.6 % (ref 41–71)
PLATELET # BLD AUTO: 222 10*3/MM3 (ref 150–450)
PMV BLD AUTO: 10.2 FL (ref 6–12)
POTASSIUM BLD-SCNC: 4 MMOL/L (ref 3.5–5.5)
PROT SERPL-MCNC: 6.7 G/DL (ref 5.7–8.2)
RBC # BLD AUTO: 4.42 10*6/MM3 (ref 4.2–5.76)
SODIUM BLD-SCNC: 143 MMOL/L (ref 132–146)
WBC NRBC COR # BLD: 7.93 10*3/MM3 (ref 3.5–10.8)

## 2018-07-30 PROCEDURE — 80053 COMPREHEN METABOLIC PANEL: CPT | Performed by: INTERNAL MEDICINE

## 2018-07-30 PROCEDURE — 82550 ASSAY OF CK (CPK): CPT | Performed by: INTERNAL MEDICINE

## 2018-07-30 PROCEDURE — 85025 COMPLETE CBC W/AUTO DIFF WBC: CPT | Performed by: INTERNAL MEDICINE

## 2018-08-01 ENCOUNTER — TRANSCRIBE ORDERS (OUTPATIENT)
Dept: ADMINISTRATIVE | Facility: HOSPITAL | Age: 78
End: 2018-08-01

## 2018-08-01 ENCOUNTER — HOSPITAL ENCOUNTER (OUTPATIENT)
Dept: CARDIOLOGY | Facility: HOSPITAL | Age: 78
Discharge: HOME | End: 2018-08-01
Attending: INTERNAL MEDICINE | Admitting: INTERNAL MEDICINE

## 2018-08-01 DIAGNOSIS — R60.9 EDEMA, UNSPECIFIED TYPE: Primary | ICD-10-CM

## 2018-08-01 DIAGNOSIS — R60.9 EDEMA, UNSPECIFIED TYPE: ICD-10-CM

## 2018-08-01 LAB
BH CV UPPER VENOUS LEFT SUBCLAVIAN AUGMENT: NORMAL
BH CV UPPER VENOUS LEFT SUBCLAVIAN COMPRESS: NORMAL
BH CV UPPER VENOUS LEFT SUBCLAVIAN PHASIC: NORMAL
BH CV UPPER VENOUS LEFT SUBCLAVIAN SPONT: NORMAL
BH CV UPPER VENOUS RIGHT AXILLARY AUGMENT: NORMAL
BH CV UPPER VENOUS RIGHT AXILLARY COMPRESS: NORMAL
BH CV UPPER VENOUS RIGHT AXILLARY PHASIC: NORMAL
BH CV UPPER VENOUS RIGHT AXILLARY SPONT: NORMAL
BH CV UPPER VENOUS RIGHT BASILIC UPPER COMPRESS: NORMAL
BH CV UPPER VENOUS RIGHT BRACHIAL COMPRESS: NORMAL
BH CV UPPER VENOUS RIGHT CEPHALIC UPPER COMPRESS: NORMAL
BH CV UPPER VENOUS RIGHT INTERNAL JUGULAR AUGMENT: NORMAL
BH CV UPPER VENOUS RIGHT INTERNAL JUGULAR COMPRESS: NORMAL
BH CV UPPER VENOUS RIGHT INTERNAL JUGULAR PHASIC: NORMAL
BH CV UPPER VENOUS RIGHT INTERNAL JUGULAR SPONT: NORMAL
BH CV UPPER VENOUS RIGHT RADIAL COMPRESS: NORMAL
BH CV UPPER VENOUS RIGHT SUBCLAVIAN AUGMENT: NORMAL
BH CV UPPER VENOUS RIGHT SUBCLAVIAN COMPRESS: NORMAL
BH CV UPPER VENOUS RIGHT SUBCLAVIAN PHASIC: NORMAL
BH CV UPPER VENOUS RIGHT SUBCLAVIAN SPONT: NORMAL
BH CV UPPER VENOUS RIGHT ULNAR COMPRESS: NORMAL

## 2018-08-01 PROCEDURE — 93971 EXTREMITY STUDY: CPT | Performed by: INTERNAL MEDICINE

## 2018-08-01 PROCEDURE — 93971 EXTREMITY STUDY: CPT

## 2018-08-20 ENCOUNTER — TELEPHONE (OUTPATIENT)
Dept: NEUROLOGY | Facility: CLINIC | Age: 78
End: 2018-08-20

## 2018-08-20 NOTE — TELEPHONE ENCOUNTER
----- Message from Citlaly Wynn sent at 8/20/2018  9:22 AM EDT -----  Contact: Denita Woods (Daughter)  Matt Barger called in regards to her father, Vargas.  They had to cancel his MRI and they were wanting to reschedule. She called the diagnostic center and they said he needs a new order for the MRI, because they can no longer see it.

## 2018-08-21 DIAGNOSIS — G31.84 MILD COGNITIVE IMPAIRMENT: Primary | ICD-10-CM

## 2018-09-18 ENCOUNTER — LAB REQUISITION (OUTPATIENT)
Dept: LAB | Facility: HOSPITAL | Age: 78
End: 2018-09-18

## 2018-09-18 ENCOUNTER — OFFICE VISIT (OUTPATIENT)
Dept: NEUROLOGY | Facility: CLINIC | Age: 78
End: 2018-09-18

## 2018-09-18 ENCOUNTER — APPOINTMENT (OUTPATIENT)
Dept: LAB | Facility: HOSPITAL | Age: 78
End: 2018-09-18

## 2018-09-18 VITALS
DIASTOLIC BLOOD PRESSURE: 82 MMHG | OXYGEN SATURATION: 93 % | HEART RATE: 85 BPM | HEIGHT: 67 IN | WEIGHT: 173 LBS | SYSTOLIC BLOOD PRESSURE: 138 MMHG | BODY MASS INDEX: 27.15 KG/M2

## 2018-09-18 DIAGNOSIS — Z00.00 ROUTINE GENERAL MEDICAL EXAMINATION AT A HEALTH CARE FACILITY: ICD-10-CM

## 2018-09-18 DIAGNOSIS — G60.9 IDIOPATHIC PERIPHERAL NEUROPATHY: Primary | ICD-10-CM

## 2018-09-18 DIAGNOSIS — G31.84 MILD COGNITIVE IMPAIRMENT: ICD-10-CM

## 2018-09-18 PROCEDURE — 36415 COLL VENOUS BLD VENIPUNCTURE: CPT | Performed by: PSYCHIATRY & NEUROLOGY

## 2018-09-18 PROCEDURE — 99213 OFFICE O/P EST LOW 20 MIN: CPT | Performed by: PSYCHIATRY & NEUROLOGY

## 2018-09-18 RX ORDER — MEMANTINE HYDROCHLORIDE 28 MG/1
28 CAPSULE, EXTENDED RELEASE ORAL DAILY
Qty: 30 CAPSULE | Refills: 11 | Status: SHIPPED | OUTPATIENT
Start: 2018-09-18 | End: 2018-09-21 | Stop reason: SDUPTHER

## 2018-09-18 NOTE — PROGRESS NOTES
Subjective:    CC: Vargas De is seen today  for Gait Problem       HPI:  Current visit-since his last visit he started taking Aricept however he had night terrors even on the 5 mg daily dose.  Hence he stopped taking it.  He continues to have word finding difficulties as well as problems recalling things more so when he is depressed or is in pain.  He continues to be on Zoloft 50 mg daily.  Right after he saw me he was admitted to the hospital for a right arm infection (possible MRSA) and just completed his course of antibiotics a week ago.  He had his MRI brain yesterday that shows atrophy and extensive white matter changes with chronic infarcts in the right frontal and parietal region as well as extensive microhemorrhages consistent with a amyloid angiopathy.  With regards to his neuropathy he did not have blood work however he did increase his gabapentin to 300 mg at night which has helped improve his symptoms.    Initial [id number]-old male accompanied by his daughter with a history of hypertension, hyperlipidemia, stroke in 2013, ulcerative colitis, macular degeneration, hearing loss in left ear, depression now presents with word finding difficulties, shuffling gait and tingling and numbness in his feet.  As per patient he had a frontal stroke in 2013 that caused right-sided weakness.  He denies having any speech problems after the stroke however 6 months ago he started to have word finding difficulties.  Denies any severe problems with his memory.  He does report to being depressed after the death of his wife 3 years ago and has been on antidepressants since then.  He lives with his daughter and states that he doesn't feel like interacting with friends anymore.  He also reports to disturbances in sleep due to frequent awakenings to use the bathroom He has also had problems with his balance and walking where he has started to shuffle his feet.  He had MRIs of his back that showed severe degenerative  changes in his spine throughout.  After that he saw a surgeon who told him that he was not a good surgical candidate.  He has also been having tingling and numbness that started in his feet and has progressed up into his legs a few years ago.  He was prescribed gabapentin but is only taking 200 mg at night.  He denies having any history of diabetes or any excessive alcohol intake.    The following portions of the patient's history were reviewed today and updated as of 09/18/2018  : allergies, current medications, past family history, past medical history, past social history, past surgical history and problem list  These document will be scanned to patient's chart.      Current Outpatient Prescriptions:   •  acetaminophen (TYLENOL) 500 MG tablet, Take 500 mg by mouth Every 6 (Six) Hours As Needed for mild pain (1-3)., Disp: , Rfl:   •  aspirin 81 MG EC tablet, Take 81 mg by mouth Daily., Disp: , Rfl:   •  bisoprolol (ZEBeta) 5 MG tablet, bisoprolol fumarate 5 mg tablet, Disp: , Rfl:   •  Cholecalciferol (VITAMIN D3) 5000 units capsule capsule, Take 5,000 Units by mouth Daily., Disp: , Rfl:   •  dicyclomine (BENTYL) 20 MG tablet, 2 (Two) Times a Day As Needed., Disp: , Rfl:   •  doxazosin (CARDURA) 4 MG tablet, Take 4 mg by mouth Every Night., Disp: , Rfl:   •  doxylamine (UNISOM) 25 MG tablet, Take  by mouth., Disp: , Rfl:   •  fluticasone (FLONASE) 50 MCG/ACT nasal spray, 2 sprays into each nostril Daily., Disp: , Rfl:   •  Fluvastatin Sodium (LESCOL PO), Lescol, Disp: , Rfl:   •  gabapentin (NEURONTIN) 100 MG capsule, Take 100 mg by mouth every night at bedtime., Disp: , Rfl:   •  ipratropium (ATROVENT) 0.06 % nasal spray, 2 sprays into each nostril 4 (Four) Times a Day As Needed., Disp: , Rfl:   •  lisinopril (PRINIVIL,ZESTRIL) 10 MG tablet, Take 10 mg by mouth 2 (Two) Times a Day., Disp: , Rfl:   •  loratadine (ALLERGY) 10 MG tablet, Allergy  claritin, Disp: , Rfl:   •  mesalamine (LIALDA) 1.2 g EC tablet, Take  1,200 mg by mouth Daily., Disp: , Rfl:   •  Multiple Vitamins-Minerals (PRESERVISION AREDS PO), Take  by mouth., Disp: , Rfl:   •  omeprazole (priLOSEC) 20 MG capsule, Take 20 mg by mouth 2 (Two) Times a Day., Disp: , Rfl:   •  potassium gluconate 595 (99 K) MG tablet tablet, Take 595 mg by mouth Daily., Disp: , Rfl:   •  pravastatin (PRAVACHOL) 40 MG tablet, Take 40 mg by mouth Daily., Disp: , Rfl:   •  predniSONE (DELTASONE) 5 MG tablet, 5 mg 2 (Two) Times a Day., Disp: , Rfl:   •  sertraline (ZOLOFT) 50 MG tablet, Take 50 mg by mouth Daily., Disp: , Rfl:   •  trospium 60 MG capsule sustained-release 24 hr capsule, Take 20 mg by mouth Every Morning., Disp: , Rfl:   •  vitamin B-12 (CYANOCOBALAMIN) 500 MCG tablet, Take 500 mcg by mouth Daily., Disp: , Rfl:   •  Zinc 50 MG capsule, Take 50 mg by mouth Daily., Disp: , Rfl:   •  memantine (NAMENDA XR) 28 MG capsule sustained-release 24 hr extended release capsule, Take 1 capsule by mouth Daily., Disp: 30 capsule, Rfl: 11  •  Memantine HCl ER (NAMENDA XR TITRATION PACK) 7 & 14 & 21 &28 MG capsule sustained-release 24 hr, Take 1 po daily as directed on package, Disp: 30 capsule, Rfl: 0   Past Medical History:   Diagnosis Date   • Arthritis    • Depression    • Gout    • Hyperlipidemia    • Hypertension    • Macular degeneration    • Stroke (CMS/HCC)    • Stroke (CMS/HCC)    • Ulcerative colitis (CMS/HCC)       History reviewed. No pertinent surgical history.   Family History   Problem Relation Age of Onset   • Cancer Mother    • Alzheimer's disease Father    • Cancer Brother    • Diabetes Maternal Aunt       Social History     Social History   • Marital status:      Spouse name: N/A   • Number of children: N/A   • Years of education: N/A     Occupational History   • Not on file.     Social History Main Topics   • Smoking status: Never Smoker   • Smokeless tobacco: Never Used   • Alcohol use Yes      Comment: 1 a month   • Drug use: No   • Sexual activity: Defer  "    Other Topics Concern   • Not on file     Social History Narrative   • No narrative on file     Review of Systems   HENT: Positive for hearing loss and rhinorrhea.    Eyes: Positive for visual disturbance.   Genitourinary: Positive for frequency.   Musculoskeletal: Positive for arthralgias, back pain and gait problem.   Neurological: Positive for weakness and numbness.   Hematological: Bruises/bleeds easily.   Psychiatric/Behavioral: Positive for sleep disturbance and depressed mood.   All other systems reviewed and are negative.      Objective:    /82 (BP Location: Right arm, Patient Position: Sitting, Cuff Size: Adult)   Pulse 85   Ht 170.2 cm (67.01\")   Wt 78.5 kg (173 lb)   SpO2 93%   BMI 27.09 kg/m²     Neurology Exam:    General apperance: NAD.     Mental status: Alert, awake and oriented to time place and person.    Recent and Remote memory: Mildly impaired, immediate recall at this visit 2/3 with a delayed recall 3/3, MMSE  at last visit was 25 with a deviated recall of 0, he scored 1/3 on clock drawing    Attention span and Concentration: Normal.     Language and Speech: Intact- No dysarthria.    Fluency, Naming , Repitition and Comprehension:  Intact    Cranial Nerves:   CN II: Visual fields are full. Intact. Fundi - Normal, No papillederma, Pupils - ANAHI  CN III, IV and VI: Extraocular movements are intact. Normal saccades.   CN V: Facial sensation is intact.   CN VII: Muscles of facial expression reveal no asymmetry. Intact.   CN VIII: Hearing is intact. Whispered voice intact.   CN IX and X: Palate elevates symmetrically. Intact  CN XI: Shoulder shrug is intact.   CN XII: Tongue is midline without evidence of atrophy or fasciculation.     Motor:  Right UE muscle strength 5/5. Normal tone.     Left UE muscle strength 5/5. Normal tone.      Right LE muscle strength5/5. Normal tone.     Left LE muscle strength 5/5. Normal tone.      Sensory: Normal light touch, vibration and pinprick " sensation bilaterally.    DTRs: 2+ bilaterally in upper and lower extremities.    Babinski: Negative bilaterally.    Co-ordination: Normal finger-to-nose, heel to shin B/L.    Rhomberg: Negative.    Gait: Slow and cautious    Cardiovascular: Regular rate and rhythm without murmur, gallop or rub.    Assessment and Plan:  1. Mild cognitive impairment  His underlying depression and hearing loss may be also contributing to his symptoms  I will start him on Namenda XR gradually increasing to 28 mg daily since he could not tolerate Aricept because of night terrors.  Because of the microhemorrhages in his brain I have asked him to maintain his blood pressure less than 130/90  I have counseled him to carry out physical and mental exercises    2. Idiopathic peripheral neuropathy  I will reorder his neuropathy panel.  He should continue gabapentin 300 mg at night  - TUYET; Future  - Hemoglobin A1c; Future  - Protein Elec + Interp, Serum; Future  - TSH+Free T4; Future  - Vitamin B12 & Folate; Future  - Vitamin B6; Future  - Hemoglobin A1c       Return in about 6 weeks (around 10/30/2018).         Rafaela Gutierrez MD

## 2018-09-20 ENCOUNTER — TELEPHONE (OUTPATIENT)
Dept: NEUROLOGY | Facility: CLINIC | Age: 78
End: 2018-09-20

## 2018-09-20 NOTE — TELEPHONE ENCOUNTER
Zuleima from [facility] called they do not have any Namenda titration packets and is unsure when they will come in.  Please advise.

## 2018-09-21 ENCOUNTER — TELEPHONE (OUTPATIENT)
Dept: NEUROLOGY | Facility: CLINIC | Age: 78
End: 2018-09-21

## 2018-09-21 LAB
ALBUMIN SERPL ELPH-MCNC: 3.7 G/DL (ref 2.9–4.4)
ALBUMIN/GLOB SERPL: 1.1 {RATIO} (ref 0.7–1.7)
ALPHA1 GLOB SERPL ELPH-MCNC: 0.3 G/DL (ref 0–0.4)
ALPHA2 GLOB SERPL ELPH-MCNC: 1 G/DL (ref 0.4–1)
ANA SER QL: NEGATIVE
B-GLOBULIN SERPL ELPH-MCNC: 1 G/DL (ref 0.7–1.3)
FOLATE SERPL-MCNC: 12.39 NG/ML (ref 3.2–20)
GAMMA GLOB SERPL ELPH-MCNC: 1.2 G/DL (ref 0.4–1.8)
GLOBULIN SER CALC-MCNC: 3.4 G/DL (ref 2.2–3.9)
HBA1C MFR BLD: 6 % (ref 4.8–5.6)
LABORATORY COMMENT REPORT: NORMAL
M PROTEIN SERPL ELPH-MCNC: NORMAL G/DL
PROT PATTERN SERPL ELPH-IMP: NORMAL
PROT SERPL-MCNC: 7.1 G/DL (ref 6–8.5)
T4 FREE SERPL-MCNC: 0.96 NG/DL (ref 0.89–1.76)
TSH SERPL DL<=0.005 MIU/L-ACNC: 0.58 MIU/ML (ref 0.35–5.35)
VIT B12 SERPL-MCNC: 592 PG/ML (ref 211–911)
VIT B6 SERPL-MCNC: 3.2 UG/L (ref 5.3–46.7)

## 2018-09-21 RX ORDER — MEMANTINE HYDROCHLORIDE 28 MG/1
28 CAPSULE, EXTENDED RELEASE ORAL DAILY
Qty: 30 CAPSULE | Refills: 11 | Status: SHIPPED | OUTPATIENT
Start: 2018-09-21 | End: 2019-01-01 | Stop reason: SDUPTHER

## 2018-09-21 NOTE — TELEPHONE ENCOUNTER
"--- Message -----     From: Vargas De     Sent: 9/20/2018  9:41 PM EDT       To: Rafaela Gutierrez MD  Subject: Prescription Question    We are having a hard time getting the prescription you prescribed my father \"Namenda XR\". [facility] pharmacy is telling us that they don't have access to that medication and they have tried contacting you to see if there is something else in its place you can prescribe. They said that they tried to get the Namenda from a few different distributors they go through but can't get it. I am wondering if this is just a [facility] issue and if we should try to get the Namenda XR from another pharmacy instead. Please advice since my father is eager to start this medication and is asking daily when he can go pick it up. You can call me on my cell at [phone].   Thank you.   Denita Woods  Vargas De's daughter      Forwarded new script to [facility] and advised patient   "

## 2018-09-24 ENCOUNTER — TELEPHONE (OUTPATIENT)
Dept: NEUROLOGY | Facility: CLINIC | Age: 78
End: 2018-09-24

## 2018-09-24 NOTE — TELEPHONE ENCOUNTER
----- Message from Rafaela Gutierrez MD sent at 9/21/2018  3:01 PM EDT -----  His blood work shows that he is a prediabetic. He should watch his diet.  Also his B6 level is extremely low which could cause neuropathy.  Hence he should take vitamin B6 100 mg daily for now.  Other blood work was normal

## 2018-11-01 PROBLEM — F03.A0 MILD DEMENTIA (HCC): Status: ACTIVE | Noted: 2018-01-01

## 2018-11-01 PROBLEM — G31.84 MILD COGNITIVE IMPAIRMENT: Status: RESOLVED | Noted: 2018-07-19 | Resolved: 2018-01-01

## 2018-11-01 NOTE — PROGRESS NOTES
Subjective:    CC: Vargas De is seen today  for Peripheral Neuropathy       HPI:  Current visit-since his last visit patient reports to his memory being stable.  He has reached a dose of 28 mg on his Namenda XR.  As per his daughter he still has word finding difficulties.  Today the patient also admits to having visual hallucinations once or twice where he has seen his daughter beside himself when she has not been there.  He denies any depressive symptoms currently and is doing well on Zoloft.  With regards to his neuropathy he feels it is stable on gabapentin 300 mg at night but he has to take up to 4 Tylenols a day for his arthritis.  His blood work was normal other than a low vitamin B6 level and he has started taking supplements.      Last visit- since his last visit he started taking Aricept however he had night terrors even on the 5 mg daily dose.  Hence he stopped taking it.  He continues to have word finding difficulties as well as problems recalling things more so when he is depressed or is in pain.  He continues to be on Zoloft 50 mg daily.  Right after he saw me he was admitted to the hospital for a right arm infection (possible MRSA) and just completed his course of antibiotics a week ago.  He had his MRI brain yesterday that shows atrophy and extensive white matter changes with chronic infarcts in the right frontal and parietal region as well as extensive microhemorrhages consistent with a amyloid angiopathy.  With regards to his neuropathy he did not have blood work however he did increase his gabapentin to 300 mg at night which has helped improve his symptoms.    Initial [id number]-old male accompanied by his daughter with a history of hypertension, hyperlipidemia, stroke in 2013, ulcerative colitis, macular degeneration, hearing loss in left ear, depression now presents with word finding difficulties, shuffling gait and tingling and numbness in his feet.  As per patient he had a frontal  stroke in 2013 that caused right-sided weakness.  He denies having any speech problems after the stroke however 6 months ago he started to have word finding difficulties.  Denies any severe problems with his memory.  He does report to being depressed after the death of his wife 3 years ago and has been on antidepressants since then.  He lives with his daughter and states that he doesn't feel like interacting with friends anymore.  He also reports to disturbances in sleep due to frequent awakenings to use the bathroom He has also had problems with his balance and walking where he has started to shuffle his feet.  He had MRIs of his back that showed severe degenerative changes in his spine throughout.  After that he saw a surgeon who told him that he was not a good surgical candidate.  He has also been having tingling and numbness that started in his feet and has progressed up into his legs a few years ago.  He was prescribed gabapentin but is only taking 200 mg at night.  He denies having any history of diabetes or any excessive alcohol intake.    The following portions of the patient's history were reviewed today and updated as of 09/18/2018  : allergies, current medications, past family history, past medical history, past social history, past surgical history and problem list  These document will be scanned to patient's chart.      Current Outpatient Prescriptions:   •  acetaminophen (TYLENOL) 500 MG tablet, Take 500 mg by mouth Every 6 (Six) Hours As Needed for mild pain (1-3)., Disp: , Rfl:   •  aspirin 81 MG EC tablet, Take 81 mg by mouth Daily., Disp: , Rfl:   •  bisoprolol (ZEBeta) 5 MG tablet, bisoprolol fumarate 5 mg tablet, Disp: , Rfl:   •  Cholecalciferol (VITAMIN D3) 5000 units capsule capsule, Take 5,000 Units by mouth Daily., Disp: , Rfl:   •  dicyclomine (BENTYL) 20 MG tablet, 2 (Two) Times a Day As Needed., Disp: , Rfl:   •  doxazosin (CARDURA) 4 MG tablet, Take 4 mg by mouth Every Night., Disp: ,  Rfl:   •  doxylamine (UNISOM) 25 MG tablet, Take  by mouth., Disp: , Rfl:   •  fluticasone (FLONASE) 50 MCG/ACT nasal spray, 2 sprays into each nostril Daily., Disp: , Rfl:   •  Fluvastatin Sodium (LESCOL PO), Lescol, Disp: , Rfl:   •  gabapentin (NEURONTIN) 100 MG capsule, Take 100 mg by mouth every night at bedtime., Disp: , Rfl:   •  ipratropium (ATROVENT) 0.06 % nasal spray, 2 sprays into each nostril 4 (Four) Times a Day As Needed., Disp: , Rfl:   •  lisinopril (PRINIVIL,ZESTRIL) 10 MG tablet, Take 10 mg by mouth 2 (Two) Times a Day., Disp: , Rfl:   •  loratadine (ALLERGY) 10 MG tablet, Allergy  claritin, Disp: , Rfl:   •  memantine (NAMENDA XR) 28 MG capsule sustained-release 24 hr extended release capsule, Take 1 capsule by mouth Daily., Disp: 30 capsule, Rfl: 11  •  mesalamine (LIALDA) 1.2 g EC tablet, Take 1,200 mg by mouth Daily., Disp: , Rfl:   •  Multiple Vitamins-Minerals (PRESERVISION AREDS PO), Take  by mouth., Disp: , Rfl:   •  omeprazole (priLOSEC) 20 MG capsule, Take 20 mg by mouth 2 (Two) Times a Day., Disp: , Rfl:   •  potassium gluconate 595 (99 K) MG tablet tablet, Take 595 mg by mouth Daily., Disp: , Rfl:   •  pravastatin (PRAVACHOL) 40 MG tablet, Take 40 mg by mouth Daily., Disp: , Rfl:   •  predniSONE (DELTASONE) 5 MG tablet, 5 mg 2 (Two) Times a Day., Disp: , Rfl:   •  Pyridoxine HCl (VITAMIN B6 PO), Take  by mouth., Disp: , Rfl:   •  sertraline (ZOLOFT) 50 MG tablet, Take 50 mg by mouth Daily., Disp: , Rfl:   •  trospium 60 MG capsule sustained-release 24 hr capsule, Take 20 mg by mouth Every Morning., Disp: , Rfl:   •  vitamin B-12 (CYANOCOBALAMIN) 500 MCG tablet, Take 500 mcg by mouth Daily., Disp: , Rfl:   •  Zinc 50 MG capsule, Take 50 mg by mouth Daily., Disp: , Rfl:    Past Medical History:   Diagnosis Date   • Arthritis    • Depression    • Gout    • Hyperlipidemia    • Hypertension    • Macular degeneration    • Stroke (CMS/HCC)    • Stroke (CMS/HCC)    • Ulcerative colitis  "(CMS/[facility])       No past surgical history on file.   Family History   Problem Relation Age of Onset   • Cancer Mother    • Alzheimer's disease Father    • Cancer Brother    • Diabetes Maternal Aunt       Social History     Social History   • Marital status:      Spouse name: N/A   • Number of children: N/A   • Years of education: N/A     Occupational History   • Not on file.     Social History Main Topics   • Smoking status: Never Smoker   • Smokeless tobacco: Never Used   • Alcohol use Yes      Comment: 1 a month   • Drug use: No   • Sexual activity: Defer     Other Topics Concern   • Not on file     Social History Narrative   • No narrative on file     Review of Systems   HENT: Positive for hearing loss and rhinorrhea.    Eyes: Positive for visual disturbance.   Genitourinary: Positive for frequency.   Musculoskeletal: Positive for arthralgias, back pain and gait problem.   Neurological: Positive for weakness and numbness.   Hematological: Bruises/bleeds easily.   Psychiatric/Behavioral: Positive for sleep disturbance and depressed mood.   All other systems reviewed and are negative.      Objective:    /84   Pulse 72   Ht 170.2 cm (67.01\")   Wt 78.5 kg (173 lb)   SpO2 94%   BMI 27.09 kg/m²     Neurology Exam:    General apperance: NAD.     Mental status: Alert, awake and oriented to time, place and person.    Recent and Remote memory: Mildly impaired, MMSE  at last visit was 25 with a deviated recall of 0, he scored 1/3 on clock drawing    Attention span and Concentration: Normal.     Language and Speech: Intact- No dysarthria.    Fluency, Naming , Repitition and Comprehension:  Intact    Cranial Nerves:   CN II: Visual fields are full. Intact. Fundi - Normal, No papillederma, Pupils - ANAHI  CN III, IV and VI: Extraocular movements are intact. Normal saccades.   CN V: Facial sensation is intact.   CN VII: Muscles of facial expression reveal no asymmetry. Intact.   CN VIII: Hearing is intact. " Whispered voice intact.   CN IX and X: Palate elevates symmetrically. Intact  CN XI: Shoulder shrug is intact.   CN XII: Tongue is midline without evidence of atrophy or fasciculation.     Motor:  Right UE muscle strength 5/5. Normal tone.     Left UE muscle strength 5/5. Normal tone.      Right LE muscle strength5/5. Normal tone.     Left LE muscle strength 5/5. Normal tone.      Sensory: Normal light touch, vibration and pinprick sensation bilaterally.    DTRs: 2+ bilaterally in upper and lower extremities.    Babinski: Negative bilaterally.    Co-ordination: Normal finger-to-nose, heel to shin B/L.    Rhomberg: Negative.    Gait: Slow and cautious    Cardiovascular: Regular rate and rhythm without murmur, gallop or rub.    Assessment and Plan:  1.  Mild dementia-  Continue Namenda XR  28 mg daily since he could not tolerate Aricept because of night terrors.  Because of the microhemorrhages in his brain I have asked him to maintain his blood pressure less than 130/90  I have again counseled him to carry out physical and mental exercises    2. Idiopathic peripheral neuropathy  He should continue gabapentin 300 mg at night.  Continue vitamin B6 supplementation  I have told him to speak to his PCP about reducing his dose of Tylenol and may be switching him to another medication for his arthritis.      Return in about 3 months (around 2/1/2019).         Rafaela Gutierrez MD

## 2019-01-01 ENCOUNTER — APPOINTMENT (OUTPATIENT)
Dept: GENERAL RADIOLOGY | Facility: HOSPITAL | Age: 79
End: 2019-01-01

## 2019-01-01 ENCOUNTER — ANCILLARY PROCEDURE (OUTPATIENT)
Dept: SPEECH THERAPY | Facility: HOSPITAL | Age: 79
End: 2019-01-01

## 2019-01-01 ENCOUNTER — HOSPITAL ENCOUNTER (INPATIENT)
Facility: HOSPITAL | Age: 79
LOS: 21 days | Discharge: NURSING, REHAB OR LONG-TERM CARE | End: 2019-08-13
Attending: EMERGENCY MEDICINE | Admitting: INTERNAL MEDICINE

## 2019-01-01 ENCOUNTER — LAB (OUTPATIENT)
Dept: LAB | Facility: HOSPITAL | Age: 79
End: 2019-01-01

## 2019-01-01 ENCOUNTER — APPOINTMENT (OUTPATIENT)
Dept: CARDIOLOGY | Facility: HOSPITAL | Age: 79
End: 2019-01-01

## 2019-01-01 ENCOUNTER — OFFICE VISIT (OUTPATIENT)
Dept: CARDIOLOGY | Facility: CLINIC | Age: 79
End: 2019-01-01

## 2019-01-01 ENCOUNTER — TELEPHONE (OUTPATIENT)
Dept: NEUROLOGY | Facility: CLINIC | Age: 79
End: 2019-01-01

## 2019-01-01 ENCOUNTER — APPOINTMENT (OUTPATIENT)
Dept: MRI IMAGING | Facility: HOSPITAL | Age: 79
End: 2019-01-01

## 2019-01-01 ENCOUNTER — APPOINTMENT (OUTPATIENT)
Dept: CT IMAGING | Facility: HOSPITAL | Age: 79
End: 2019-01-01

## 2019-01-01 ENCOUNTER — OFFICE VISIT (OUTPATIENT)
Dept: INTERNAL MEDICINE | Facility: CLINIC | Age: 79
End: 2019-01-01

## 2019-01-01 ENCOUNTER — TELEPHONE (OUTPATIENT)
Dept: INTERNAL MEDICINE | Facility: CLINIC | Age: 79
End: 2019-01-01

## 2019-01-01 ENCOUNTER — OFFICE VISIT (OUTPATIENT)
Dept: NEUROLOGY | Facility: CLINIC | Age: 79
End: 2019-01-01

## 2019-01-01 ENCOUNTER — ANESTHESIA (OUTPATIENT)
Dept: TELEMETRY | Facility: HOSPITAL | Age: 79
End: 2019-01-01

## 2019-01-01 ENCOUNTER — HOSPITAL ENCOUNTER (OUTPATIENT)
Dept: GENERAL RADIOLOGY | Facility: HOSPITAL | Age: 79
Discharge: HOME | End: 2019-02-04
Admitting: PHYSICIAN ASSISTANT

## 2019-01-01 ENCOUNTER — OUTSIDE FACILITY SERVICE (OUTPATIENT)
Dept: INTERNAL MEDICINE | Facility: CLINIC | Age: 79
End: 2019-01-01

## 2019-01-01 ENCOUNTER — ANESTHESIA EVENT (OUTPATIENT)
Dept: TELEMETRY | Facility: HOSPITAL | Age: 79
End: 2019-01-01

## 2019-01-01 ENCOUNTER — HOSPITAL ENCOUNTER (INPATIENT)
Facility: HOSPITAL | Age: 79
LOS: 6 days | Discharge: NURSING, REHAB OR LONG-TERM CARE | End: 2019-03-05
Attending: EMERGENCY MEDICINE | Admitting: HOSPITALIST

## 2019-01-01 ENCOUNTER — READMISSION MANAGEMENT (OUTPATIENT)
Dept: CALL CENTER | Facility: HOSPITAL | Age: 79
End: 2019-01-01

## 2019-01-01 ENCOUNTER — HOSPITAL ENCOUNTER (INPATIENT)
Facility: HOSPITAL | Age: 79
LOS: 2 days | End: 2019-10-18
Attending: EMERGENCY MEDICINE | Admitting: INTERNAL MEDICINE

## 2019-01-01 ENCOUNTER — LAB REQUISITION (OUTPATIENT)
Dept: LAB | Facility: HOSPITAL | Age: 79
End: 2019-01-01

## 2019-01-01 ENCOUNTER — EPISODE CHANGES (OUTPATIENT)
Dept: CALL CENTER | Facility: HOSPITAL | Age: 79
End: 2019-01-01

## 2019-01-01 ENCOUNTER — TRANSITIONAL CARE MANAGEMENT TELEPHONE ENCOUNTER (OUTPATIENT)
Dept: INTERNAL MEDICINE | Facility: CLINIC | Age: 79
End: 2019-01-01

## 2019-01-01 ENCOUNTER — DOCUMENTATION (OUTPATIENT)
Dept: INTERNAL MEDICINE | Facility: CLINIC | Age: 79
End: 2019-01-01

## 2019-01-01 ENCOUNTER — TELEPHONE (OUTPATIENT)
Dept: CARDIOLOGY | Facility: CLINIC | Age: 79
End: 2019-01-01

## 2019-01-01 ENCOUNTER — APPOINTMENT (OUTPATIENT)
Dept: NEUROLOGY | Facility: HOSPITAL | Age: 79
End: 2019-01-01

## 2019-01-01 ENCOUNTER — HOSPITAL ENCOUNTER (EMERGENCY)
Facility: HOSPITAL | Age: 79
Discharge: HOME | End: 2019-07-20
Attending: EMERGENCY MEDICINE | Admitting: EMERGENCY MEDICINE

## 2019-01-01 VITALS
WEIGHT: 186.18 LBS | OXYGEN SATURATION: 57 % | RESPIRATION RATE: 12 BRPM | HEART RATE: 156 BPM | HEIGHT: 70 IN | DIASTOLIC BLOOD PRESSURE: 54 MMHG | BODY MASS INDEX: 26.65 KG/M2 | SYSTOLIC BLOOD PRESSURE: 74 MMHG | TEMPERATURE: 100.4 F

## 2019-01-01 VITALS
HEART RATE: 76 BPM | HEIGHT: 67 IN | OXYGEN SATURATION: 96 % | SYSTOLIC BLOOD PRESSURE: 102 MMHG | DIASTOLIC BLOOD PRESSURE: 64 MMHG | BODY MASS INDEX: 30.07 KG/M2

## 2019-01-01 VITALS
WEIGHT: 191 LBS | SYSTOLIC BLOOD PRESSURE: 148 MMHG | HEIGHT: 67 IN | BODY MASS INDEX: 29.98 KG/M2 | HEART RATE: 74 BPM | DIASTOLIC BLOOD PRESSURE: 84 MMHG | OXYGEN SATURATION: 98 %

## 2019-01-01 VITALS
SYSTOLIC BLOOD PRESSURE: 108 MMHG | HEIGHT: 69 IN | HEART RATE: 72 BPM | WEIGHT: 192 LBS | DIASTOLIC BLOOD PRESSURE: 72 MMHG | BODY MASS INDEX: 28.44 KG/M2

## 2019-01-01 VITALS
TEMPERATURE: 99.3 F | BODY MASS INDEX: 29.98 KG/M2 | HEIGHT: 67 IN | WEIGHT: 191 LBS | DIASTOLIC BLOOD PRESSURE: 90 MMHG | SYSTOLIC BLOOD PRESSURE: 160 MMHG | HEART RATE: 60 BPM

## 2019-01-01 VITALS
SYSTOLIC BLOOD PRESSURE: 146 MMHG | HEIGHT: 68 IN | DIASTOLIC BLOOD PRESSURE: 90 MMHG | BODY MASS INDEX: 28.34 KG/M2 | WEIGHT: 187 LBS | HEART RATE: 76 BPM

## 2019-01-01 VITALS
HEIGHT: 69 IN | BODY MASS INDEX: 27.99 KG/M2 | WEIGHT: 189 LBS | OXYGEN SATURATION: 93 % | RESPIRATION RATE: 16 BRPM | DIASTOLIC BLOOD PRESSURE: 79 MMHG | HEART RATE: 65 BPM | SYSTOLIC BLOOD PRESSURE: 124 MMHG | TEMPERATURE: 98 F

## 2019-01-01 VITALS
BODY MASS INDEX: 28.92 KG/M2 | DIASTOLIC BLOOD PRESSURE: 82 MMHG | WEIGHT: 190.8 LBS | HEART RATE: 73 BPM | SYSTOLIC BLOOD PRESSURE: 132 MMHG | HEIGHT: 68 IN

## 2019-01-01 VITALS
HEART RATE: 64 BPM | SYSTOLIC BLOOD PRESSURE: 118 MMHG | BODY MASS INDEX: 29.1 KG/M2 | DIASTOLIC BLOOD PRESSURE: 74 MMHG | WEIGHT: 192 LBS | HEIGHT: 68 IN

## 2019-01-01 VITALS
HEART RATE: 68 BPM | WEIGHT: 192 LBS | OXYGEN SATURATION: 98 % | SYSTOLIC BLOOD PRESSURE: 128 MMHG | BODY MASS INDEX: 28.44 KG/M2 | DIASTOLIC BLOOD PRESSURE: 82 MMHG | HEIGHT: 69 IN

## 2019-01-01 VITALS
SYSTOLIC BLOOD PRESSURE: 108 MMHG | HEIGHT: 68 IN | BODY MASS INDEX: 28.79 KG/M2 | WEIGHT: 190 LBS | HEART RATE: 66 BPM | DIASTOLIC BLOOD PRESSURE: 70 MMHG

## 2019-01-01 VITALS
HEART RATE: 78 BPM | RESPIRATION RATE: 16 BRPM | OXYGEN SATURATION: 95 % | WEIGHT: 192.02 LBS | HEIGHT: 67 IN | SYSTOLIC BLOOD PRESSURE: 154 MMHG | BODY MASS INDEX: 30.14 KG/M2 | TEMPERATURE: 97.7 F | DIASTOLIC BLOOD PRESSURE: 86 MMHG

## 2019-01-01 VITALS
OXYGEN SATURATION: 96 % | HEART RATE: 71 BPM | SYSTOLIC BLOOD PRESSURE: 124 MMHG | BODY MASS INDEX: 29.1 KG/M2 | DIASTOLIC BLOOD PRESSURE: 70 MMHG | HEIGHT: 68 IN | WEIGHT: 192 LBS

## 2019-01-01 VITALS
DIASTOLIC BLOOD PRESSURE: 78 MMHG | BODY MASS INDEX: 29.1 KG/M2 | WEIGHT: 192 LBS | SYSTOLIC BLOOD PRESSURE: 138 MMHG | HEIGHT: 68 IN | HEART RATE: 72 BPM

## 2019-01-01 VITALS
BODY MASS INDEX: 28.34 KG/M2 | HEIGHT: 68 IN | SYSTOLIC BLOOD PRESSURE: 114 MMHG | HEART RATE: 73 BPM | DIASTOLIC BLOOD PRESSURE: 76 MMHG | OXYGEN SATURATION: 94 % | WEIGHT: 187 LBS

## 2019-01-01 VITALS
DIASTOLIC BLOOD PRESSURE: 71 MMHG | RESPIRATION RATE: 16 BRPM | HEIGHT: 70 IN | HEART RATE: 65 BPM | SYSTOLIC BLOOD PRESSURE: 116 MMHG | TEMPERATURE: 98.5 F | BODY MASS INDEX: 27.49 KG/M2 | OXYGEN SATURATION: 94 % | WEIGHT: 192 LBS

## 2019-01-01 DIAGNOSIS — G31.83 LEWY BODY DEMENTIA WITHOUT BEHAVIORAL DISTURBANCE (HCC): Primary | ICD-10-CM

## 2019-01-01 DIAGNOSIS — I63.511 ACUTE ISCHEMIC RIGHT MCA STROKE (HCC): ICD-10-CM

## 2019-01-01 DIAGNOSIS — S42.291D OTHER CLOSED DISPLACED FRACTURE OF PROXIMAL END OF RIGHT HUMERUS WITH ROUTINE HEALING, SUBSEQUENT ENCOUNTER: ICD-10-CM

## 2019-01-01 DIAGNOSIS — I48.0 PAROXYSMAL ATRIAL FIBRILLATION (HCC): Primary | ICD-10-CM

## 2019-01-01 DIAGNOSIS — E78.2 MIXED HYPERLIPIDEMIA: Chronic | ICD-10-CM

## 2019-01-01 DIAGNOSIS — S42.291A CLOSED 3-PART FRACTURE OF PROXIMAL END OF RIGHT HUMERUS, INITIAL ENCOUNTER: Primary | ICD-10-CM

## 2019-01-01 DIAGNOSIS — Z12.5 ENCOUNTER FOR SCREENING FOR MALIGNANT NEOPLASM OF PROSTATE: ICD-10-CM

## 2019-01-01 DIAGNOSIS — S39.012A LOW BACK STRAIN, INITIAL ENCOUNTER: ICD-10-CM
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
## 2019-01-01 DIAGNOSIS — F02.80 LEWY BODY DEMENTIA WITHOUT BEHAVIORAL DISTURBANCE (HCC): Chronic | ICD-10-CM

## 2019-01-01 DIAGNOSIS — F03.A0 MILD DEMENTIA (HCC): Primary | ICD-10-CM

## 2019-01-01 DIAGNOSIS — E53.1 VITAMIN B6 DEFICIENCY: ICD-10-CM

## 2019-01-01 DIAGNOSIS — R41.841 COGNITIVE COMMUNICATION DEFICIT: ICD-10-CM

## 2019-01-01 DIAGNOSIS — R41.82 ALTERED MENTAL STATUS, UNSPECIFIED ALTERED MENTAL STATUS TYPE: ICD-10-CM

## 2019-01-01 DIAGNOSIS — I69.30 SEQUELAE, POST-STROKE: ICD-10-CM

## 2019-01-01 DIAGNOSIS — F32.4 MAJOR DEPRESSIVE DISORDER WITH SINGLE EPISODE, IN PARTIAL REMISSION (HCC): ICD-10-CM

## 2019-01-01 DIAGNOSIS — R13.12 OROPHARYNGEAL DYSPHAGIA: ICD-10-CM

## 2019-01-01 DIAGNOSIS — R53.1 ACUTE LEFT-SIDED WEAKNESS: ICD-10-CM

## 2019-01-01 DIAGNOSIS — I63.511 ACUTE ISCHEMIC RIGHT MCA STROKE (HCC): Primary | ICD-10-CM

## 2019-01-01 DIAGNOSIS — I63.9 FACIAL DROOP DUE TO ACUTE STROKE (HCC): ICD-10-CM

## 2019-01-01 DIAGNOSIS — S42.291D OTHER CLOSED DISPLACED FRACTURE OF PROXIMAL END OF RIGHT HUMERUS WITH ROUTINE HEALING, SUBSEQUENT ENCOUNTER: Chronic | ICD-10-CM

## 2019-01-01 DIAGNOSIS — H61.22 IMPACTED CERUMEN OF LEFT EAR: Chronic | ICD-10-CM

## 2019-01-01 DIAGNOSIS — I10 ELEVATED BLOOD PRESSURE READING WITH DIAGNOSIS OF HYPERTENSION: ICD-10-CM

## 2019-01-01 DIAGNOSIS — I10 BENIGN ESSENTIAL HYPERTENSION: Primary | ICD-10-CM

## 2019-01-01 DIAGNOSIS — R26.89 POOR BALANCE: ICD-10-CM

## 2019-01-01 DIAGNOSIS — I10 BENIGN ESSENTIAL HYPERTENSION: ICD-10-CM

## 2019-01-01 DIAGNOSIS — I10 ESSENTIAL HYPERTENSION: Chronic | ICD-10-CM

## 2019-01-01 DIAGNOSIS — F01.A0 MAJOR NEUROCOGNITIVE DISORDER, DUE TO VASCULAR DISEASE, WITHOUT BEHAVIORAL DISTURBANCE, MILD (HCC): ICD-10-CM

## 2019-01-01 DIAGNOSIS — R53.1 GENERALIZED WEAKNESS: ICD-10-CM

## 2019-01-01 DIAGNOSIS — M25.561 ACUTE PAIN OF BOTH KNEES: ICD-10-CM

## 2019-01-01 DIAGNOSIS — Q21.12 PFO (PATENT FORAMEN OVALE): ICD-10-CM

## 2019-01-01 DIAGNOSIS — R26.89 SHUFFLING GAIT: ICD-10-CM

## 2019-01-01 DIAGNOSIS — R41.82 ACUTE ALTERATION IN MENTAL STATUS: Primary | ICD-10-CM

## 2019-01-01 DIAGNOSIS — Z78.9 IMPAIRED MOBILITY AND ADLS: ICD-10-CM

## 2019-01-01 DIAGNOSIS — M54.41 CHRONIC BILATERAL LOW BACK PAIN WITH BILATERAL SCIATICA: ICD-10-CM

## 2019-01-01 DIAGNOSIS — M62.81 RIGHT-SIDED MUSCLE WEAKNESS: ICD-10-CM

## 2019-01-01 DIAGNOSIS — M54.50 CHRONIC RIGHT-SIDED LOW BACK PAIN WITHOUT SCIATICA: Chronic | ICD-10-CM

## 2019-01-01 DIAGNOSIS — Z74.09 IMPAIRED FUNCTIONAL MOBILITY, BALANCE, GAIT, AND ENDURANCE: ICD-10-CM

## 2019-01-01 DIAGNOSIS — Z87.898 HISTORY OF ELEVATED PSA: ICD-10-CM

## 2019-01-01 DIAGNOSIS — Z79.899 HIGH RISK MEDICATION USE: Chronic | ICD-10-CM

## 2019-01-01 DIAGNOSIS — L82.0 SEBORRHEIC KERATOSES, INFLAMED: Chronic | ICD-10-CM

## 2019-01-01 DIAGNOSIS — Z00.00 ROUTINE GENERAL MEDICAL EXAMINATION AT A HEALTH CARE FACILITY: ICD-10-CM

## 2019-01-01 DIAGNOSIS — G31.83 LEWY BODY DEMENTIA WITHOUT BEHAVIORAL DISTURBANCE (HCC): Chronic | ICD-10-CM

## 2019-01-01 DIAGNOSIS — J96.01 ACUTE RESPIRATORY FAILURE WITH HYPOXIA (HCC): Chronic | ICD-10-CM

## 2019-01-01 DIAGNOSIS — E78.2 MIXED HYPERLIPIDEMIA: ICD-10-CM

## 2019-01-01 DIAGNOSIS — I95.1 ORTHOSTATIC HYPOTENSION: Primary | Chronic | ICD-10-CM

## 2019-01-01 DIAGNOSIS — I10 ESSENTIAL HYPERTENSION: ICD-10-CM

## 2019-01-01 DIAGNOSIS — R41.82 ACUTE ALTERATION IN MENTAL STATUS: Chronic | ICD-10-CM

## 2019-01-01 DIAGNOSIS — Z74.09 IMPAIRED MOBILITY AND ADLS: ICD-10-CM

## 2019-01-01 DIAGNOSIS — I48.0 PAROXYSMAL ATRIAL FIBRILLATION (HCC): ICD-10-CM

## 2019-01-01 DIAGNOSIS — I63.81 MULTIPLE LACUNAR INFARCTS (HCC): Chronic | ICD-10-CM

## 2019-01-01 DIAGNOSIS — I95.1 ORTHOSTATIC HYPOTENSION: ICD-10-CM

## 2019-01-01 DIAGNOSIS — I16.1 HYPERTENSIVE EMERGENCY: ICD-10-CM

## 2019-01-01 DIAGNOSIS — G60.9 IDIOPATHIC PERIPHERAL NEUROPATHY: Chronic | ICD-10-CM

## 2019-01-01 DIAGNOSIS — E55.9 VITAMIN D DEFICIENCY: ICD-10-CM

## 2019-01-01 DIAGNOSIS — R29.810 FACIAL DROOP DUE TO ACUTE STROKE (HCC): ICD-10-CM

## 2019-01-01 DIAGNOSIS — I61.9 HEMORRHAGIC STROKE (HCC): Primary | ICD-10-CM

## 2019-01-01 DIAGNOSIS — R47.89 WORD FINDING DIFFICULTY: ICD-10-CM

## 2019-01-01 DIAGNOSIS — F02.80 LEWY BODY DEMENTIA WITHOUT BEHAVIORAL DISTURBANCE (HCC): Primary | ICD-10-CM

## 2019-01-01 DIAGNOSIS — R30.0 DYSURIA: Primary | ICD-10-CM

## 2019-01-01 DIAGNOSIS — M54.42 CHRONIC BILATERAL LOW BACK PAIN WITH BILATERAL SCIATICA: ICD-10-CM

## 2019-01-01 DIAGNOSIS — I63.81 MULTIPLE LACUNAR INFARCTS (HCC): ICD-10-CM

## 2019-01-01 DIAGNOSIS — F51.01 PRIMARY INSOMNIA: Chronic | ICD-10-CM

## 2019-01-01 DIAGNOSIS — M25.562 ACUTE PAIN OF BOTH KNEES: ICD-10-CM

## 2019-01-01 DIAGNOSIS — F03.A0 MILD DEMENTIA (HCC): ICD-10-CM

## 2019-01-01 DIAGNOSIS — Z86.79 HISTORY OF ATRIAL FIBRILLATION: ICD-10-CM

## 2019-01-01 DIAGNOSIS — G89.29 CHRONIC RIGHT-SIDED LOW BACK PAIN WITHOUT SCIATICA: Chronic | ICD-10-CM

## 2019-01-01 DIAGNOSIS — I69.30 SEQUELAE, POST-STROKE: Primary | ICD-10-CM

## 2019-01-01 DIAGNOSIS — I67.9 CEREBROVASCULAR SMALL VESSEL DISEASE: ICD-10-CM

## 2019-01-01 DIAGNOSIS — G89.29 CHRONIC BILATERAL LOW BACK PAIN WITH BILATERAL SCIATICA: ICD-10-CM

## 2019-01-01 LAB
25(OH)D3+25(OH)D2 SERPL-MCNC: 43 NG/ML
ABO + RH BLD: NORMAL
ABO + RH BLD: NORMAL
ABO GROUP BLD: NORMAL
ABO GROUP BLD: NORMAL
ALBUMIN SERPL-MCNC: 2.2 G/DL (ref 3.5–5.2)
ALBUMIN SERPL-MCNC: 2.3 G/DL (ref 3.5–5.2)
ALBUMIN SERPL-MCNC: 2.3 G/DL (ref 3.5–5.2)
ALBUMIN SERPL-MCNC: 2.4 G/DL (ref 3.5–5.2)
ALBUMIN SERPL-MCNC: 2.7 G/DL (ref 3.5–5.2)
ALBUMIN SERPL-MCNC: 3.3 G/DL (ref 3.5–5.2)
ALBUMIN SERPL-MCNC: 3.4 G/DL (ref 3.5–5.2)
ALBUMIN SERPL-MCNC: 4.16 G/DL (ref 3.2–4.8)
ALBUMIN SERPL-MCNC: 4.49 G/DL (ref 3.2–4.8)
ALBUMIN SERPL-MCNC: 4.5 G/DL (ref 3.5–5.2)
ALBUMIN/GLOB SERPL: 0.6 G/DL
ALBUMIN/GLOB SERPL: 0.6 G/DL
ALBUMIN/GLOB SERPL: 0.7 G/DL
ALBUMIN/GLOB SERPL: 0.9 G/DL
ALBUMIN/GLOB SERPL: 1 G/DL
ALBUMIN/GLOB SERPL: 1.6 G/DL
ALBUMIN/GLOB SERPL: 1.8 G/DL (ref 1.5–2.5)
ALBUMIN/GLOB SERPL: 1.9 G/DL (ref 1.5–2.5)
ALP SERPL-CCNC: 107 U/L (ref 25–100)
ALP SERPL-CCNC: 118 U/L (ref 39–117)
ALP SERPL-CCNC: 63 U/L (ref 39–117)
ALP SERPL-CCNC: 78 U/L (ref 39–117)
ALP SERPL-CCNC: 80 U/L (ref 39–117)
ALP SERPL-CCNC: 80 U/L (ref 39–117)
ALP SERPL-CCNC: 83 U/L (ref 39–117)
ALP SERPL-CCNC: 85 U/L (ref 39–117)
ALP SERPL-CCNC: 87 U/L (ref 25–100)
ALP SERPL-CCNC: 93 U/L (ref 39–117)
ALT SERPL W P-5'-P-CCNC: 13 U/L (ref 1–41)
ALT SERPL W P-5'-P-CCNC: 16 U/L (ref 1–41)
ALT SERPL W P-5'-P-CCNC: 18 U/L (ref 1–41)
ALT SERPL W P-5'-P-CCNC: 22 U/L (ref 1–41)
ALT SERPL W P-5'-P-CCNC: 28 U/L (ref 7–40)
ALT SERPL W P-5'-P-CCNC: 31 U/L (ref 7–40)
ALT SERPL W P-5'-P-CCNC: 41 U/L (ref 1–41)
ALT SERPL W P-5'-P-CCNC: 46 U/L (ref 1–41)
ALT SERPL W P-5'-P-CCNC: 52 U/L (ref 1–41)
ALT SERPL W P-5'-P-CCNC: 53 U/L (ref 1–41)
ALT SERPL-CCNC: 24 U/L (ref 1–41)
ALT SERPL-CCNC: 61 U/L (ref 7–40)
AMMONIA BLD-SCNC: 12 UMOL/L (ref 16–60)
AMPHET+METHAMPHET UR QL: NEGATIVE
AMPHETAMINES UR QL: NEGATIVE
ANION GAP SERPL CALCULATED.3IONS-SCNC: 10 MMOL/L (ref 5–15)
ANION GAP SERPL CALCULATED.3IONS-SCNC: 11 MMOL/L (ref 5–15)
ANION GAP SERPL CALCULATED.3IONS-SCNC: 12 MMOL/L (ref 5–15)
ANION GAP SERPL CALCULATED.3IONS-SCNC: 12 MMOL/L (ref 5–15)
ANION GAP SERPL CALCULATED.3IONS-SCNC: 13 MMOL/L (ref 5–15)
ANION GAP SERPL CALCULATED.3IONS-SCNC: 15 MMOL/L (ref 5–15)
ANION GAP SERPL CALCULATED.3IONS-SCNC: 5 MMOL/L (ref 3–11)
ANION GAP SERPL CALCULATED.3IONS-SCNC: 8 MMOL/L (ref 3–11)
ANION GAP SERPL CALCULATED.3IONS-SCNC: 8 MMOL/L (ref 5–15)
ANION GAP SERPL CALCULATED.3IONS-SCNC: 9 MMOL/L (ref 3–11)
ANION GAP SERPL CALCULATED.3IONS-SCNC: 9 MMOL/L (ref 5–15)
ANION GAP SERPL CALCULATED.3IONS-SCNC: 9 MMOL/L (ref 5–15)
APAP SERPL-MCNC: <5 MCG/ML (ref 10–30)
APTT PPP: 25 SECONDS (ref 24–37)
APTT PPP: 27.4 SECONDS (ref 24–37)
ARTERIAL PATENCY WRIST A: ABNORMAL
ARTERIAL PATENCY WRIST A: POSITIVE
ARTICHOKE IGE QN: 129 MG/DL (ref 0–130)
ARTICHOKE IGE QN: 132 MG/DL (ref 0–130)
AST SERPL-CCNC: 12 U/L (ref 1–40)
AST SERPL-CCNC: 17 U/L (ref 1–40)
AST SERPL-CCNC: 18 U/L (ref 1–40)
AST SERPL-CCNC: 21 U/L (ref 1–40)
AST SERPL-CCNC: 23 U/L (ref 1–40)
AST SERPL-CCNC: 27 U/L (ref 0–33)
AST SERPL-CCNC: 28 U/L (ref 0–33)
AST SERPL-CCNC: 35 U/L (ref 1–40)
AST SERPL-CCNC: 39 U/L (ref 0–33)
AST SERPL-CCNC: 39 U/L (ref 1–40)
AST SERPL-CCNC: 45 U/L (ref 1–40)
AST SERPL-CCNC: 50 U/L (ref 1–40)
ATMOSPHERIC PRESS: ABNORMAL MMHG
ATMOSPHERIC PRESS: ABNORMAL MM[HG]
AV VENA CONTRACTA: 0.49 CM
BACTERIA SPEC AEROBE CULT: NO GROWTH
BACTERIA SPEC AEROBE CULT: NORMAL
BACTERIA UR QL AUTO: ABNORMAL /HPF
BARBITURATES UR QL SCN: NEGATIVE
BASE EXCESS BLDA CALC-SCNC: -0.3 MMOL/L (ref 0–2)
BASE EXCESS BLDA CALC-SCNC: 4.7 MMOL/L (ref 0–2)
BASOPHILS # BLD AUTO: 0 10*3/MM3 (ref 0–0.2)
BASOPHILS # BLD AUTO: 0 10*3/MM3 (ref 0–0.2)
BASOPHILS # BLD AUTO: 0.01 10*3/MM3 (ref 0–0.2)
BASOPHILS # BLD AUTO: 0.01 10*3/MM3 (ref 0–0.2)
BASOPHILS # BLD AUTO: 0.02 10*3/MM3 (ref 0–0.2)
BASOPHILS # BLD AUTO: 0.03 10*3/MM3 (ref 0–0.2)
BASOPHILS # BLD AUTO: 0.04 10*3/MM3 (ref 0–0.2)
BASOPHILS # BLD AUTO: 0.04 10*3/MM3 (ref 0–0.2)
BASOPHILS NFR BLD AUTO: 0 % (ref 0–1)
BASOPHILS NFR BLD AUTO: 0 % (ref 0–1)
BASOPHILS NFR BLD AUTO: 0.1 % (ref 0–1)
BASOPHILS NFR BLD AUTO: 0.1 % (ref 0–1.5)
BASOPHILS NFR BLD AUTO: 0.2 % (ref 0–1.5)
BASOPHILS NFR BLD AUTO: 0.2 % (ref 0–1.5)
BASOPHILS NFR BLD AUTO: 0.3 % (ref 0–1)
BASOPHILS NFR BLD AUTO: 0.3 % (ref 0–1.5)
BASOPHILS NFR BLD AUTO: 0.4 % (ref 0–1.5)
BASOPHILS NFR BLD AUTO: 0.5 % (ref 0–1.5)
BASOPHILS NFR BLD AUTO: 0.5 % (ref 0–1.5)
BASOPHILS NFR BLD AUTO: 0.6 % (ref 0–1.5)
BDY SITE: ABNORMAL
BDY SITE: ABNORMAL
BENZODIAZ UR QL SCN: NEGATIVE
BH BB BLOOD EXPIRATION DATE: NORMAL
BH BB BLOOD EXPIRATION DATE: NORMAL
BH BB BLOOD TYPE BARCODE: 5100
BH BB BLOOD TYPE BARCODE: 9500
BH BB DISPENSE STATUS: NORMAL
BH BB DISPENSE STATUS: NORMAL
BH BB PRODUCT CODE: NORMAL
BH BB PRODUCT CODE: NORMAL
BH BB UNIT NUMBER: NORMAL
BH BB UNIT NUMBER: NORMAL
BH CV ECHO MEAS - AI DEC SLOPE: 257.7 CM/SEC^2
BH CV ECHO MEAS - AI MAX PG: 103.2 MMHG
BH CV ECHO MEAS - AI MAX VEL: 507.9 CM/SEC
BH CV ECHO MEAS - AI P1/2T: 577.2 MSEC
BH CV ECHO MEAS - AO MAX PG (FULL): 11.5 MMHG
BH CV ECHO MEAS - AO MAX PG (FULL): 13.7 MMHG
BH CV ECHO MEAS - AO MAX PG: 15.8 MMHG
BH CV ECHO MEAS - AO MAX PG: 17.2 MMHG
BH CV ECHO MEAS - AO MEAN PG (FULL): 7 MMHG
BH CV ECHO MEAS - AO MEAN PG (FULL): 7.7 MMHG
BH CV ECHO MEAS - AO MEAN PG: 9 MMHG
BH CV ECHO MEAS - AO MEAN PG: 9.4 MMHG
BH CV ECHO MEAS - AO ROOT AREA (BSA CORRECTED): 1.7
BH CV ECHO MEAS - AO ROOT AREA (BSA CORRECTED): 1.8
BH CV ECHO MEAS - AO ROOT AREA: 10.7 CM^2
BH CV ECHO MEAS - AO ROOT AREA: 9.1 CM^2
BH CV ECHO MEAS - AO ROOT DIAM: 3.4 CM
BH CV ECHO MEAS - AO ROOT DIAM: 3.5 CM
BH CV ECHO MEAS - AO ROOT DIAM: 3.7 CM
BH CV ECHO MEAS - AO V2 MAX: 199 CM/SEC
BH CV ECHO MEAS - AO V2 MAX: 207.1 CM/SEC
BH CV ECHO MEAS - AO V2 MEAN: 135 CM/SEC
BH CV ECHO MEAS - AO V2 MEAN: 145.2 CM/SEC
BH CV ECHO MEAS - AO V2 VTI: 42.2 CM
BH CV ECHO MEAS - AO V2 VTI: 47.1 CM
BH CV ECHO MEAS - ASC AORTA: 3.4 CM
BH CV ECHO MEAS - AVA(I,A): 1.5 CM^2
BH CV ECHO MEAS - AVA(I,A): 2 CM^2
BH CV ECHO MEAS - AVA(I,D): 1.5 CM^2
BH CV ECHO MEAS - AVA(I,D): 2 CM^2
BH CV ECHO MEAS - AVA(V,A): 1.4 CM^2
BH CV ECHO MEAS - AVA(V,A): 1.8 CM^2
BH CV ECHO MEAS - AVA(V,D): 1.4 CM^2
BH CV ECHO MEAS - AVA(V,D): 1.8 CM^2
BH CV ECHO MEAS - BSA(HAYCOCK): 2.1 M^2
BH CV ECHO MEAS - BSA: 2 M^2
BH CV ECHO MEAS - BZI_BMI: 27.9 KILOGRAMS/M^2
BH CV ECHO MEAS - BZI_BMI: 27.9 KILOGRAMS/M^2
BH CV ECHO MEAS - BZI_BMI: 30.1 KILOGRAMS/M^2
BH CV ECHO MEAS - BZI_METRIC_HEIGHT: 170.2 CM
BH CV ECHO MEAS - BZI_METRIC_HEIGHT: 175.3 CM
BH CV ECHO MEAS - BZI_METRIC_HEIGHT: 175.3 CM
BH CV ECHO MEAS - BZI_METRIC_WEIGHT: 85.7 KG
BH CV ECHO MEAS - BZI_METRIC_WEIGHT: 85.7 KG
BH CV ECHO MEAS - BZI_METRIC_WEIGHT: 87.1 KG
BH CV ECHO MEAS - EDV(CUBED): 67.4 ML
BH CV ECHO MEAS - EDV(CUBED): 85 ML
BH CV ECHO MEAS - EDV(MOD-SP2): 95 ML
BH CV ECHO MEAS - EDV(MOD-SP4): 74 ML
BH CV ECHO MEAS - EDV(TEICH): 72.9 ML
BH CV ECHO MEAS - EDV(TEICH): 87.5 ML
BH CV ECHO MEAS - EF(CUBED): 76.1 %
BH CV ECHO MEAS - EF(CUBED): 83.3 %
BH CV ECHO MEAS - EF(MOD-SP2): 66.3 %
BH CV ECHO MEAS - EF(MOD-SP4): 70.3 %
BH CV ECHO MEAS - EF(TEICH): 68.3 %
BH CV ECHO MEAS - EF(TEICH): 76.8 %
BH CV ECHO MEAS - ESV(CUBED): 11.2 ML
BH CV ECHO MEAS - ESV(CUBED): 20.3 ML
BH CV ECHO MEAS - ESV(MOD-SP2): 32 ML
BH CV ECHO MEAS - ESV(MOD-SP4): 22 ML
BH CV ECHO MEAS - ESV(TEICH): 17 ML
BH CV ECHO MEAS - ESV(TEICH): 27.7 ML
BH CV ECHO MEAS - FS: 37.9 %
BH CV ECHO MEAS - FS: 45 %
BH CV ECHO MEAS - IVS/LVPW: 1.1
BH CV ECHO MEAS - IVS/LVPW: 1.3
BH CV ECHO MEAS - IVSD: 0.97 CM
BH CV ECHO MEAS - IVSD: 1.1 CM
BH CV ECHO MEAS - LA DIMENSION: 3.7 CM
BH CV ECHO MEAS - LA DIMENSION: 4.1 CM
BH CV ECHO MEAS - LA/AO: 0.99
BH CV ECHO MEAS - LA/AO: 1.2
BH CV ECHO MEAS - LAD MAJOR: 6.3 CM
BH CV ECHO MEAS - LAT PEAK E' VEL: 5.2 CM/SEC
BH CV ECHO MEAS - LAT PEAK E' VEL: 7 CM/SEC
BH CV ECHO MEAS - LATERAL E/E' RATIO: 4.4
BH CV ECHO MEAS - LATERAL E/E' RATIO: 8.7
BH CV ECHO MEAS - LV DIASTOLIC VOL/BSA (35-75): 36.7 ML/M^2
BH CV ECHO MEAS - LV MASS(C)D: 127 GRAMS
BH CV ECHO MEAS - LV MASS(C)D: 136 GRAMS
BH CV ECHO MEAS - LV MASS(C)DI: 63.9 GRAMS/M^2
BH CV ECHO MEAS - LV MASS(C)DI: 67.4 GRAMS/M^2
BH CV ECHO MEAS - LV MAX PG: 3.4 MMHG
BH CV ECHO MEAS - LV MAX PG: 4.3 MMHG
BH CV ECHO MEAS - LV MEAN PG: 1.8 MMHG
BH CV ECHO MEAS - LV MEAN PG: 2 MMHG
BH CV ECHO MEAS - LV SYSTOLIC VOL/BSA (12-30): 10.9 ML/M^2
BH CV ECHO MEAS - LV V1 MAX: 104 CM/SEC
BH CV ECHO MEAS - LV V1 MAX: 92.6 CM/SEC
BH CV ECHO MEAS - LV V1 MEAN: 61.3 CM/SEC
BH CV ECHO MEAS - LV V1 MEAN: 70.1 CM/SEC
BH CV ECHO MEAS - LV V1 VTI: 22.4 CM
BH CV ECHO MEAS - LV V1 VTI: 24.1 CM
BH CV ECHO MEAS - LVIDD: 4.1 CM
BH CV ECHO MEAS - LVIDD: 4.4 CM
BH CV ECHO MEAS - LVIDS: 2.2 CM
BH CV ECHO MEAS - LVIDS: 2.7 CM
BH CV ECHO MEAS - LVLD AP2: 8.3 CM
BH CV ECHO MEAS - LVLD AP4: 7 CM
BH CV ECHO MEAS - LVLS AP2: 6.9 CM
BH CV ECHO MEAS - LVLS AP4: 5.3 CM
BH CV ECHO MEAS - LVOT AREA (M): 3.1 CM^2
BH CV ECHO MEAS - LVOT AREA (M): 3.5 CM^2
BH CV ECHO MEAS - LVOT AREA: 3.1 CM^2
BH CV ECHO MEAS - LVOT AREA: 3.5 CM^2
BH CV ECHO MEAS - LVOT DIAM: 2 CM
BH CV ECHO MEAS - LVOT DIAM: 2.1 CM
BH CV ECHO MEAS - LVPWD: 0.87 CM
BH CV ECHO MEAS - LVPWD: 0.91 CM
BH CV ECHO MEAS - MED PEAK E' VEL: 3.8 CM/SEC
BH CV ECHO MEAS - MEDIAL E/E' RATIO: 12
BH CV ECHO MEAS - MEDIAL E/E' RATIO: 5.1
BH CV ECHO MEAS - MV A MAX VEL: 68.6 CM/SEC
BH CV ECHO MEAS - MV A MAX VEL: 87.1 CM/SEC
BH CV ECHO MEAS - MV DEC TIME: 0.35 SEC
BH CV ECHO MEAS - MV E MAX VEL: 46.6 CM/SEC
BH CV ECHO MEAS - MV E MAX VEL: 47.9 CM/SEC
BH CV ECHO MEAS - MV E/A: 0.54
BH CV ECHO MEAS - MV E/A: 0.7
BH CV ECHO MEAS - MV MAX PG: 3.5 MMHG
BH CV ECHO MEAS - MV MEAN PG: 1.3 MMHG
BH CV ECHO MEAS - MV V2 MAX: 94.1 CM/SEC
BH CV ECHO MEAS - MV V2 MEAN: 53.4 CM/SEC
BH CV ECHO MEAS - MV V2 VTI: 24.8 CM
BH CV ECHO MEAS - MVA(VTI): 2.8 CM^2
BH CV ECHO MEAS - PA ACC SLOPE: 345.6 CM/SEC^2
BH CV ECHO MEAS - PA ACC SLOPE: 512 CM/SEC^2
BH CV ECHO MEAS - PA ACC TIME: 0.16 SEC
BH CV ECHO MEAS - PA ACC TIME: 0.16 SEC
BH CV ECHO MEAS - PA MAX PG: 3.3 MMHG
BH CV ECHO MEAS - PA MAX PG: 5.9 MMHG
BH CV ECHO MEAS - PA PR(ACCEL): 6.1 MMHG
BH CV ECHO MEAS - PA PR(ACCEL): 7.7 MMHG
BH CV ECHO MEAS - PA V2 MAX: 121 CM/SEC
BH CV ECHO MEAS - PA V2 MAX: 91.1 CM/SEC
BH CV ECHO MEAS - PI END-D VEL: 130.9 CM/SEC
BH CV ECHO MEAS - RAP SYSTOLE: 8 MMHG
BH CV ECHO MEAS - RAP SYSTOLE: 8 MMHG
BH CV ECHO MEAS - RVSP: 31.4 MMHG
BH CV ECHO MEAS - RVSP: 33 MMHG
BH CV ECHO MEAS - SI(AO): 192.8 ML/M^2
BH CV ECHO MEAS - SI(AO): 250.3 ML/M^2
BH CV ECHO MEAS - SI(CUBED): 28.3 ML/M^2
BH CV ECHO MEAS - SI(CUBED): 32.1 ML/M^2
BH CV ECHO MEAS - SI(LVOT): 34.3 ML/M^2
BH CV ECHO MEAS - SI(LVOT): 42 ML/M^2
BH CV ECHO MEAS - SI(MOD-SP2): 31.2 ML/M^2
BH CV ECHO MEAS - SI(MOD-SP4): 25.8 ML/M^2
BH CV ECHO MEAS - SI(TEICH): 28.2 ML/M^2
BH CV ECHO MEAS - SI(TEICH): 29.7 ML/M^2
BH CV ECHO MEAS - SV(AO): 383.1 ML
BH CV ECHO MEAS - SV(AO): 504.7 ML
BH CV ECHO MEAS - SV(CUBED): 56.2 ML
BH CV ECHO MEAS - SV(CUBED): 64.7 ML
BH CV ECHO MEAS - SV(LVOT): 69.1 ML
BH CV ECHO MEAS - SV(LVOT): 83.5 ML
BH CV ECHO MEAS - SV(MOD-SP2): 63 ML
BH CV ECHO MEAS - SV(MOD-SP4): 52 ML
BH CV ECHO MEAS - SV(TEICH): 56 ML
BH CV ECHO MEAS - SV(TEICH): 59.8 ML
BH CV ECHO MEAS - TAPSE (>1.6): 2.2 CM2
BH CV ECHO MEAS - TAPSE (>1.6): 2.7 CM2
BH CV ECHO MEAS - TR MAX PG: 23.4 MMHG
BH CV ECHO MEAS - TR MAX PG: 25 MMHG
BH CV ECHO MEAS - TR MAX VEL: 241.6 CM/SEC
BH CV ECHO MEAS - TR MAX VEL: 250.2 CM/SEC
BH CV ECHO MEAS - TV MAX PG: 2.6 MMHG
BH CV ECHO MEAS - TV V2 MAX: 80.5 CM/SEC
BH CV ECHO MEASUREMENTS AVERAGE E/E' RATIO: 10.36
BH CV LOWER VASCULAR LEFT COMMON FEMORAL AUGMENT: NORMAL
BH CV LOWER VASCULAR LEFT COMMON FEMORAL COMPRESS: NORMAL
BH CV LOWER VASCULAR LEFT COMMON FEMORAL PHASIC: NORMAL
BH CV LOWER VASCULAR LEFT COMMON FEMORAL SPONT: NORMAL
BH CV LOWER VASCULAR LEFT DISTAL FEMORAL AUGMENT: NORMAL
BH CV LOWER VASCULAR LEFT DISTAL FEMORAL COMPRESS: NORMAL
BH CV LOWER VASCULAR LEFT GASTRONEMIUS COMPRESS: NORMAL
BH CV LOWER VASCULAR LEFT GREATER SAPH AK COMPRESS: NORMAL
BH CV LOWER VASCULAR LEFT GREATER SAPH BK COMPRESS: NORMAL
BH CV LOWER VASCULAR LEFT LESSER SAPH COMPRESS: NORMAL
BH CV LOWER VASCULAR LEFT MID FEMORAL AUGMENT: NORMAL
BH CV LOWER VASCULAR LEFT MID FEMORAL COMPRESS: NORMAL
BH CV LOWER VASCULAR LEFT MID FEMORAL PHASIC: NORMAL
BH CV LOWER VASCULAR LEFT MID FEMORAL SPONT: NORMAL
BH CV LOWER VASCULAR LEFT PERONEAL COMPRESS: NORMAL
BH CV LOWER VASCULAR LEFT POPLITEAL AUGMENT: NORMAL
BH CV LOWER VASCULAR LEFT POPLITEAL COMPRESS: NORMAL
BH CV LOWER VASCULAR LEFT POPLITEAL PHASIC: NORMAL
BH CV LOWER VASCULAR LEFT POPLITEAL SPONT: NORMAL
BH CV LOWER VASCULAR LEFT POSTERIOR TIBIAL AUGMENT: NORMAL
BH CV LOWER VASCULAR LEFT POSTERIOR TIBIAL COMPRESS: NORMAL
BH CV LOWER VASCULAR LEFT PROFUNDA FEMORAL AUGMENT: NORMAL
BH CV LOWER VASCULAR LEFT PROFUNDA FEMORAL COMPRESS: NORMAL
BH CV LOWER VASCULAR LEFT PROFUNDA FEMORAL PHASIC: NORMAL
BH CV LOWER VASCULAR LEFT PROFUNDA FEMORAL SPONT: NORMAL
BH CV LOWER VASCULAR LEFT PROXIMAL FEMORAL AUGMENT: NORMAL
BH CV LOWER VASCULAR LEFT PROXIMAL FEMORAL COMPRESS: NORMAL
BH CV LOWER VASCULAR LEFT SAPHENOFEMORAL JUNCTION AUGMENT: NORMAL
BH CV LOWER VASCULAR LEFT SAPHENOFEMORAL JUNCTION COMPRESS: NORMAL
BH CV LOWER VASCULAR LEFT SAPHENOFEMORAL JUNCTION PHASIC: NORMAL
BH CV LOWER VASCULAR LEFT SAPHENOFEMORAL JUNCTION SPONT: NORMAL
BH CV LOWER VASCULAR RIGHT COMMON FEMORAL AUGMENT: NORMAL
BH CV LOWER VASCULAR RIGHT COMMON FEMORAL COMPRESS: NORMAL
BH CV LOWER VASCULAR RIGHT COMMON FEMORAL PHASIC: NORMAL
BH CV LOWER VASCULAR RIGHT COMMON FEMORAL SPONT: NORMAL
BH CV LOWER VASCULAR RIGHT DISTAL FEMORAL AUGMENT: NORMAL
BH CV LOWER VASCULAR RIGHT DISTAL FEMORAL COMPRESS: NORMAL
BH CV LOWER VASCULAR RIGHT GASTRONEMIUS COMPRESS: NORMAL
BH CV LOWER VASCULAR RIGHT GREATER SAPH AK COMPRESS: NORMAL
BH CV LOWER VASCULAR RIGHT GREATER SAPH BK COMPRESS: NORMAL
BH CV LOWER VASCULAR RIGHT LESSER SAPH COMPRESS: NORMAL
BH CV LOWER VASCULAR RIGHT MID FEMORAL AUGMENT: NORMAL
BH CV LOWER VASCULAR RIGHT MID FEMORAL COMPRESS: NORMAL
BH CV LOWER VASCULAR RIGHT MID FEMORAL PHASIC: NORMAL
BH CV LOWER VASCULAR RIGHT MID FEMORAL SPONT: NORMAL
BH CV LOWER VASCULAR RIGHT PERONEAL AUGMENT: NORMAL
BH CV LOWER VASCULAR RIGHT PERONEAL COMPRESS: NORMAL
BH CV LOWER VASCULAR RIGHT POPLITEAL AUGMENT: NORMAL
BH CV LOWER VASCULAR RIGHT POPLITEAL COMPRESS: NORMAL
BH CV LOWER VASCULAR RIGHT POPLITEAL PHASIC: NORMAL
BH CV LOWER VASCULAR RIGHT POPLITEAL SPONT: NORMAL
BH CV LOWER VASCULAR RIGHT POSTERIOR TIBIAL AUGMENT: NORMAL
BH CV LOWER VASCULAR RIGHT POSTERIOR TIBIAL COMPRESS: NORMAL
BH CV LOWER VASCULAR RIGHT PROFUNDA FEMORAL COMPRESS: NORMAL
BH CV LOWER VASCULAR RIGHT PROXIMAL FEMORAL AUGMENT: NORMAL
BH CV LOWER VASCULAR RIGHT PROXIMAL FEMORAL COMPRESS: NORMAL
BH CV LOWER VASCULAR RIGHT SAPHENOFEMORAL JUNCTION AUGMENT: NORMAL
BH CV LOWER VASCULAR RIGHT SAPHENOFEMORAL JUNCTION COMPRESS: NORMAL
BH CV LOWER VASCULAR RIGHT SAPHENOFEMORAL JUNCTION PHASIC: NORMAL
BH CV LOWER VASCULAR RIGHT SAPHENOFEMORAL JUNCTION SPONT: NORMAL
BH CV VAS BP LEFT ARM: NORMAL MMHG
BH CV VAS BP RIGHT ARM: NORMAL MMHG
BH CV VAS TCD LEFT DISTAL M1: 25 CM/SEC
BH CV VAS TCD LEFT MID M1: 29 CM/SEC
BH CV VAS TCD LEFT PROXIMAL M1: 30 CM/SEC
BH CV VAS TCD LEFT TERMINAL ICA: 23 CM/SEC
BH CV VAS TCD RIGHT DISTAL M1: 32 CM/SEC
BH CV VAS TCD RIGHT MID M1: 34 CM/SEC
BH CV VAS TCD RIGHT PROXIMAL M1: 27 CM/SEC
BH CV VAS TCD RIGHT TERMINAL ICA: 23 CM/SEC
BH CV XLRA - RV BASE: 2.7 CM
BH CV XLRA - RV BASE: 3.5 CM
BH CV XLRA - RV LENGTH: 6.3 CM
BH CV XLRA - RV LENGTH: 7.2 CM
BH CV XLRA - RV MID: 2.2 CM
BH CV XLRA - RV MID: 3.6 CM
BH CV XLRA - TDI S': 13.7 CM/SEC
BH CV XLRA - TDI S': 17.1 CM/SEC
BH CV XLRA MEAS LEFT CCA RATIO VEL: 57.8 CM/SEC
BH CV XLRA MEAS LEFT DIST CCA EDV: 18.2 CM/SEC
BH CV XLRA MEAS LEFT DIST CCA PSV: 58.5 CM/SEC
BH CV XLRA MEAS LEFT DIST ICA EDV: 26.2 CM/SEC
BH CV XLRA MEAS LEFT DIST ICA PSV: 91.2 CM/SEC
BH CV XLRA MEAS LEFT ICA RATIO VEL: 69.9 CM/SEC
BH CV XLRA MEAS LEFT ICA/CCA RATIO: 1.2
BH CV XLRA MEAS LEFT MID CCA EDV: 17.6 CM/SEC
BH CV XLRA MEAS LEFT MID CCA PSV: 91.8 CM/SEC
BH CV XLRA MEAS LEFT MID ICA EDV: 24.8 CM/SEC
BH CV XLRA MEAS LEFT MID ICA PSV: 70.3 CM/SEC
BH CV XLRA MEAS LEFT PROX CCA EDV: 15.7 CM/SEC
BH CV XLRA MEAS LEFT PROX CCA PSV: 83.6 CM/SEC
BH CV XLRA MEAS LEFT PROX ECA PSV: 56.8 CM/SEC
BH CV XLRA MEAS LEFT PROX ICA EDV: 13.3 CM/SEC
BH CV XLRA MEAS LEFT PROX ICA PSV: 34.2 CM/SEC
BH CV XLRA MEAS LEFT PROX SCLA PSV: 67.3 CM/SEC
BH CV XLRA MEAS LEFT VERTEBRAL A PSV: 126.6 CM/SEC
BH CV XLRA MEAS RIGHT CCA RATIO VEL: 48.5 CM/SEC
BH CV XLRA MEAS RIGHT DIST CCA EDV: 13.8 CM/SEC
BH CV XLRA MEAS RIGHT DIST CCA PSV: 49.1 CM/SEC
BH CV XLRA MEAS RIGHT DIST ICA EDV: 14.9 CM/SEC
BH CV XLRA MEAS RIGHT DIST ICA PSV: 58.5 CM/SEC
BH CV XLRA MEAS RIGHT ICA RATIO VEL: 42.3 CM/SEC
BH CV XLRA MEAS RIGHT ICA/CCA RATIO: 0.87
BH CV XLRA MEAS RIGHT MID CCA EDV: 19.6 CM/SEC
BH CV XLRA MEAS RIGHT MID CCA PSV: 77 CM/SEC
BH CV XLRA MEAS RIGHT MID ICA EDV: 10.4 CM/SEC
BH CV XLRA MEAS RIGHT MID ICA PSV: 40.6 CM/SEC
BH CV XLRA MEAS RIGHT PROX CCA EDV: 16.5 CM/SEC
BH CV XLRA MEAS RIGHT PROX CCA PSV: 102.1 CM/SEC
BH CV XLRA MEAS RIGHT PROX ECA PSV: 64.7 CM/SEC
BH CV XLRA MEAS RIGHT PROX ICA EDV: 15.3 CM/SEC
BH CV XLRA MEAS RIGHT PROX ICA PSV: 42.8 CM/SEC
BH CV XLRA MEAS RIGHT PROX SCLA PSV: 137.5 CM/SEC
BH CV XLRA MEAS RIGHT VERTEBRAL A PSV: 47.6 CM/SEC
BILIRUB BLD-MCNC: NEGATIVE MG/DL
BILIRUB SERPL-MCNC: 0.6 MG/DL (ref 0.2–1.2)
BILIRUB SERPL-MCNC: 0.6 MG/DL (ref 0.2–1.2)
BILIRUB SERPL-MCNC: 0.8 MG/DL (ref 0.2–1.2)
BILIRUB SERPL-MCNC: 0.9 MG/DL (ref 0.2–1.2)
BILIRUB SERPL-MCNC: 1 MG/DL (ref 0.2–1.2)
BILIRUB SERPL-MCNC: 1.1 MG/DL (ref 0.3–1.2)
BILIRUB SERPL-MCNC: 1.2 MG/DL (ref 0.3–1.2)
BILIRUB SERPL-MCNC: 1.4 MG/DL (ref 0.2–1.2)
BILIRUB SERPL-MCNC: 1.8 MG/DL (ref 0.2–1.2)
BILIRUB SERPL-MCNC: 2.4 MG/DL (ref 0.2–1.2)
BILIRUB UR QL STRIP: NEGATIVE
BLD GP AB SCN SERPL QL: NEGATIVE
BODY TEMPERATURE: 37 C
BUN BLD-MCNC: 10 MG/DL (ref 8–23)
BUN BLD-MCNC: 11 MG/DL (ref 8–23)
BUN BLD-MCNC: 11 MG/DL (ref 9–23)
BUN BLD-MCNC: 12 MG/DL (ref 9–23)
BUN BLD-MCNC: 12 MG/DL (ref 9–23)
BUN BLD-MCNC: 13 MG/DL (ref 8–23)
BUN BLD-MCNC: 15 MG/DL (ref 8–23)
BUN BLD-MCNC: 16 MG/DL (ref 8–23)
BUN BLD-MCNC: 17 MG/DL (ref 8–23)
BUN BLD-MCNC: 21 MG/DL (ref 8–23)
BUN BLD-MCNC: 5 MG/DL (ref 8–23)
BUN BLD-MCNC: 7 MG/DL (ref 8–23)
BUN BLD-MCNC: 7 MG/DL (ref 8–23)
BUN BLD-MCNC: 9 MG/DL (ref 8–23)
BUN BLDA-MCNC: 10 MG/DL (ref 8–26)
BUN BLDA-MCNC: 12 MG/DL (ref 8–26)
BUN SERPL-MCNC: 11 MG/DL (ref 9–23)
BUN SERPL-MCNC: 9 MG/DL (ref 8–23)
BUN/CREAT SERPL: 10.9 (ref 7–25)
BUN/CREAT SERPL: 11.3 (ref 7–25)
BUN/CREAT SERPL: 11.7 (ref 7–25)
BUN/CREAT SERPL: 12.2 (ref 7–25)
BUN/CREAT SERPL: 13.3 (ref 7–25)
BUN/CREAT SERPL: 14.5 (ref 7–25)
BUN/CREAT SERPL: 14.9 (ref 7–25)
BUN/CREAT SERPL: 15.8 (ref 7–25)
BUN/CREAT SERPL: 16.9 (ref 7–25)
BUN/CREAT SERPL: 17.1 (ref 7–25)
BUN/CREAT SERPL: 18.2 (ref 7–25)
BUN/CREAT SERPL: 19.6 (ref 7–25)
BUN/CREAT SERPL: 23.2 (ref 7–25)
BUN/CREAT SERPL: 23.6 (ref 7–25)
BUN/CREAT SERPL: 24.6 (ref 7–25)
BUN/CREAT SERPL: 29.3 (ref 7–25)
BUPRENORPHINE SERPL-MCNC: NEGATIVE NG/ML
CA-I BLDA-SCNC: 1.21 MMOL/L (ref 1.2–1.32)
CA-I BLDA-SCNC: 1.22 MMOL/L (ref 1.2–1.32)
CALCIUM SERPL-MCNC: 9.7 MG/DL (ref 8.6–10.5)
CALCIUM SERPL-MCNC: 9.7 MG/DL (ref 8.7–10.4)
CALCIUM SPEC-SCNC: 7.4 MG/DL (ref 8.6–10.5)
CALCIUM SPEC-SCNC: 7.7 MG/DL (ref 8.6–10.5)
CALCIUM SPEC-SCNC: 8.2 MG/DL (ref 8.6–10.5)
CALCIUM SPEC-SCNC: 8.5 MG/DL (ref 8.6–10.5)
CALCIUM SPEC-SCNC: 8.7 MG/DL (ref 8.6–10.5)
CALCIUM SPEC-SCNC: 8.8 MG/DL (ref 8.6–10.5)
CALCIUM SPEC-SCNC: 8.8 MG/DL (ref 8.6–10.5)
CALCIUM SPEC-SCNC: 8.9 MG/DL (ref 8.6–10.5)
CALCIUM SPEC-SCNC: 8.9 MG/DL (ref 8.7–10.4)
CALCIUM SPEC-SCNC: 9 MG/DL (ref 8.6–10.5)
CALCIUM SPEC-SCNC: 9 MG/DL (ref 8.7–10.4)
CALCIUM SPEC-SCNC: 9.3 MG/DL (ref 8.7–10.4)
CANNABINOIDS SERPL QL: NEGATIVE
CHLORIDE BLDA-SCNC: 101 MMOL/L (ref 98–109)
CHLORIDE BLDA-SCNC: 102 MMOL/L (ref 98–109)
CHLORIDE SERPL-SCNC: 100 MMOL/L (ref 99–109)
CHLORIDE SERPL-SCNC: 101 MMOL/L (ref 98–107)
CHLORIDE SERPL-SCNC: 102 MMOL/L (ref 98–107)
CHLORIDE SERPL-SCNC: 103 MMOL/L (ref 98–107)
CHLORIDE SERPL-SCNC: 104 MMOL/L (ref 98–107)
CHLORIDE SERPL-SCNC: 104 MMOL/L (ref 99–109)
CHLORIDE SERPL-SCNC: 105 MMOL/L (ref 99–109)
CHLORIDE SERPL-SCNC: 107 MMOL/L (ref 99–109)
CHLORIDE SERPL-SCNC: 108 MMOL/L (ref 98–107)
CHLORIDE SERPL-SCNC: 108 MMOL/L (ref 98–107)
CHLORIDE SERPL-SCNC: 97 MMOL/L (ref 98–107)
CHLORIDE SERPL-SCNC: 98 MMOL/L (ref 98–107)
CHOLEST SERPL-MCNC: 110 MG/DL (ref 0–200)
CHOLEST SERPL-MCNC: 185 MG/DL (ref 0–200)
CHOLEST SERPL-MCNC: 189 MG/DL (ref 0–200)
CHOLEST SERPL-MCNC: 89 MG/DL (ref 0–200)
CLARITY UR: CLEAR
CLARITY, POC: CLEAR
CO2 BLDA-SCNC: 27 MMOL/L (ref 22–33)
CO2 BLDA-SCNC: 28 MMOL/L (ref 24–29)
CO2 BLDA-SCNC: 29 MMOL/L (ref 24–29)
CO2 BLDA-SCNC: 29.9 MMOL/L (ref 23–27)
CO2 SERPL-SCNC: 21 MMOL/L (ref 22–29)
CO2 SERPL-SCNC: 22 MMOL/L (ref 22–29)
CO2 SERPL-SCNC: 22 MMOL/L (ref 22–29)
CO2 SERPL-SCNC: 23 MMOL/L (ref 22–29)
CO2 SERPL-SCNC: 24 MMOL/L (ref 22–29)
CO2 SERPL-SCNC: 25 MMOL/L (ref 20–31)
CO2 SERPL-SCNC: 25 MMOL/L (ref 22–29)
CO2 SERPL-SCNC: 26 MMOL/L (ref 22–29)
CO2 SERPL-SCNC: 27.8 MMOL/L (ref 22–29)
CO2 SERPL-SCNC: 28 MMOL/L (ref 20–31)
CO2 SERPL-SCNC: 28 MMOL/L (ref 20–31)
CO2 SERPL-SCNC: 28 MMOL/L (ref 22–29)
CO2 SERPL-SCNC: 28 MMOL/L (ref 22–29)
CO2 SERPL-SCNC: 29 MMOL/L (ref 20–31)
COCAINE UR QL: NEGATIVE
COHGB MFR BLD: 0.9 % (ref 0–2)
COHGB MFR BLD: 1.2 % (ref 0–2)
COLOR UR: ABNORMAL
COLOR UR: YELLOW
CREAT BLD-MCNC: 0.41 MG/DL (ref 0.76–1.27)
CREAT BLD-MCNC: 0.56 MG/DL (ref 0.76–1.27)
CREAT BLD-MCNC: 0.56 MG/DL (ref 0.76–1.27)
CREAT BLD-MCNC: 0.58 MG/DL (ref 0.76–1.27)
CREAT BLD-MCNC: 0.62 MG/DL (ref 0.76–1.27)
CREAT BLD-MCNC: 0.62 MG/DL (ref 0.76–1.27)
CREAT BLD-MCNC: 0.64 MG/DL (ref 0.76–1.27)
CREAT BLD-MCNC: 0.65 MG/DL (ref 0.6–1.3)
CREAT BLD-MCNC: 0.65 MG/DL (ref 0.76–1.27)
CREAT BLD-MCNC: 0.66 MG/DL (ref 0.6–1.3)
CREAT BLD-MCNC: 0.7 MG/DL (ref 0.6–1.3)
CREAT BLD-MCNC: 0.75 MG/DL (ref 0.76–1.27)
CREAT BLD-MCNC: 0.89 MG/DL (ref 0.76–1.27)
CREAT BLD-MCNC: 0.95 MG/DL (ref 0.76–1.27)
CREAT BLDA-MCNC: 0.5 MG/DL (ref 0.6–1.3)
CREAT BLDA-MCNC: 0.7 MG/DL (ref 0.6–1.3)
CREAT SERPL-MCNC: 0.74 MG/DL (ref 0.6–1.3)
CREAT SERPL-MCNC: 0.77 MG/DL (ref 0.76–1.27)
D-LACTATE SERPL-SCNC: 0.7 MMOL/L (ref 0.5–2)
D-LACTATE SERPL-SCNC: 1.4 MMOL/L (ref 0.5–2)
D-LACTATE SERPL-SCNC: 1.4 MMOL/L (ref 0.5–2)
DEPRECATED RDW RBC AUTO: 49.8 FL (ref 37–54)
DEPRECATED RDW RBC AUTO: 50.5 FL (ref 37–54)
DEPRECATED RDW RBC AUTO: 51 FL (ref 37–54)
DEPRECATED RDW RBC AUTO: 51.1 FL (ref 37–54)
DEPRECATED RDW RBC AUTO: 51.3 FL (ref 37–54)
DEPRECATED RDW RBC AUTO: 51.5 FL (ref 37–54)
DEPRECATED RDW RBC AUTO: 51.5 FL (ref 37–54)
DEPRECATED RDW RBC AUTO: 51.6 FL (ref 37–54)
DEPRECATED RDW RBC AUTO: 51.7 FL (ref 37–54)
DEPRECATED RDW RBC AUTO: 51.8 FL (ref 37–54)
DEPRECATED RDW RBC AUTO: 51.9 FL (ref 37–54)
DEPRECATED RDW RBC AUTO: 51.9 FL (ref 37–54)
DEPRECATED RDW RBC AUTO: 52.1 FL (ref 37–54)
DEPRECATED RDW RBC AUTO: 53.9 FL (ref 37–54)
EOSINOPHIL # BLD AUTO: 0 10*3/MM3 (ref 0–0.3)
EOSINOPHIL # BLD AUTO: 0.02 10*3/MM3 (ref 0–0.4)
EOSINOPHIL # BLD AUTO: 0.03 10*3/MM3 (ref 0–0.4)
EOSINOPHIL # BLD AUTO: 0.16 10*3/MM3 (ref 0–0.4)
EOSINOPHIL # BLD AUTO: 0.17 10*3/MM3 (ref 0–0.4)
EOSINOPHIL # BLD AUTO: 0.19 10*3/MM3 (ref 0–0.3)
EOSINOPHIL # BLD AUTO: 0.3 10*3/MM3 (ref 0–0.3)
EOSINOPHIL # BLD AUTO: 0.31 10*3/MM3 (ref 0–0.3)
EOSINOPHIL # BLD AUTO: 0.36 10*3/MM3 (ref 0–0.4)
EOSINOPHIL # BLD AUTO: 0.51 10*3/MM3 (ref 0–0.4)
EOSINOPHIL # BLD AUTO: 0.54 10*3/MM3 (ref 0–0.4)
EOSINOPHIL # BLD AUTO: 0.55 10*3/MM3 (ref 0–0.4)
EOSINOPHIL # BLD AUTO: 0.56 10*3/MM3 (ref 0–0.4)
EOSINOPHIL # BLD AUTO: 0.57 10*3/MM3 (ref 0–0.4)
EOSINOPHIL NFR BLD AUTO: 0 % (ref 0–3)
EOSINOPHIL NFR BLD AUTO: 0.2 % (ref 0.3–6.2)
EOSINOPHIL NFR BLD AUTO: 0.3 % (ref 0.3–6.2)
EOSINOPHIL NFR BLD AUTO: 1.7 % (ref 0.3–6.2)
EOSINOPHIL NFR BLD AUTO: 2.2 % (ref 0–3)
EOSINOPHIL NFR BLD AUTO: 2.3 % (ref 0.3–6.2)
EOSINOPHIL NFR BLD AUTO: 3.8 % (ref 0–3)
EOSINOPHIL NFR BLD AUTO: 5 % (ref 0–3)
EOSINOPHIL NFR BLD AUTO: 6.5 % (ref 0.3–6.2)
EOSINOPHIL NFR BLD AUTO: 7.4 % (ref 0.3–6.2)
EOSINOPHIL NFR BLD AUTO: 7.5 % (ref 0.3–6.2)
EOSINOPHIL NFR BLD AUTO: 7.7 % (ref 0.3–6.2)
EOSINOPHIL NFR BLD AUTO: 8.1 % (ref 0.3–6.2)
EOSINOPHIL NFR BLD AUTO: 8.8 % (ref 0.3–6.2)
EPAP: 0
ERYTHROCYTE [DISTWIDTH] IN BLOOD BY AUTOMATED COUNT: 13.2 % (ref 12.3–15.4)
ERYTHROCYTE [DISTWIDTH] IN BLOOD BY AUTOMATED COUNT: 13.4 % (ref 12.3–15.4)
ERYTHROCYTE [DISTWIDTH] IN BLOOD BY AUTOMATED COUNT: 13.4 % (ref 12.3–15.4)
ERYTHROCYTE [DISTWIDTH] IN BLOOD BY AUTOMATED COUNT: 13.6 % (ref 12.3–15.4)
ERYTHROCYTE [DISTWIDTH] IN BLOOD BY AUTOMATED COUNT: 13.7 % (ref 12.3–15.4)
ERYTHROCYTE [DISTWIDTH] IN BLOOD BY AUTOMATED COUNT: 13.8 % (ref 12.3–15.4)
ERYTHROCYTE [DISTWIDTH] IN BLOOD BY AUTOMATED COUNT: 13.8 % (ref 12.3–15.4)
ERYTHROCYTE [DISTWIDTH] IN BLOOD BY AUTOMATED COUNT: 14 % (ref 11.3–14.5)
ERYTHROCYTE [DISTWIDTH] IN BLOOD BY AUTOMATED COUNT: 14 % (ref 12.3–15.4)
ERYTHROCYTE [DISTWIDTH] IN BLOOD BY AUTOMATED COUNT: 14.1 % (ref 11.3–14.5)
ERYTHROCYTE [DISTWIDTH] IN BLOOD BY AUTOMATED COUNT: 14.3 % (ref 11.3–14.5)
ERYTHROCYTE [DISTWIDTH] IN BLOOD BY AUTOMATED COUNT: 14.4 % (ref 12.3–15.4)
ERYTHROCYTE [DISTWIDTH] IN BLOOD BY AUTOMATED COUNT: 14.8 % (ref 11.3–14.5)
ERYTHROCYTE [SEDIMENTATION RATE] IN BLOOD: 20 MM/HR (ref 0–20)
ETHANOL BLD-MCNC: <10 MG/DL (ref 0–10)
FERRITIN SERPL-MCNC: 366.3 NG/ML (ref 30–400)
FOLATE SERPL-MCNC: 6.32 NG/ML (ref 4.78–24.2)
FOLATE SERPL-MCNC: 8.4 NG/ML (ref 3.2–20)
GFR SERPL CREATININE-BSD FRML MDRD: 101 ML/MIN/1.73
GFR SERPL CREATININE-BSD FRML MDRD: 110 ML/MIN/1.73
GFR SERPL CREATININE-BSD FRML MDRD: 117 ML/MIN/1.73
GFR SERPL CREATININE-BSD FRML MDRD: 119 ML/MIN/1.73
GFR SERPL CREATININE-BSD FRML MDRD: 119 ML/MIN/1.73
GFR SERPL CREATININE-BSD FRML MDRD: 121 ML/MIN/1.73
GFR SERPL CREATININE-BSD FRML MDRD: 126 ML/MIN/1.73
GFR SERPL CREATININE-BSD FRML MDRD: 126 ML/MIN/1.73
GFR SERPL CREATININE-BSD FRML MDRD: 136 ML/MIN/1.73
GFR SERPL CREATININE-BSD FRML MDRD: 142 ML/MIN/1.73
GFR SERPL CREATININE-BSD FRML MDRD: 142 ML/MIN/1.73
GFR SERPL CREATININE-BSD FRML MDRD: 77 ML/MIN/1.73
GFR SERPL CREATININE-BSD FRML MDRD: 83 ML/MIN/1.73
GFR SERPL CREATININE-BSD FRML MDRD: >150 ML/MIN/1.73
GLOBULIN SER CALC-MCNC: 2.3 GM/DL
GLOBULIN SER CALC-MCNC: 2.9 GM/DL
GLOBULIN UR ELPH-MCNC: 2.4 GM/DL
GLOBULIN UR ELPH-MCNC: 3.1 GM/DL
GLOBULIN UR ELPH-MCNC: 3.3 GM/DL
GLOBULIN UR ELPH-MCNC: 3.4 GM/DL
GLOBULIN UR ELPH-MCNC: 3.5 GM/DL
GLOBULIN UR ELPH-MCNC: 3.5 GM/DL
GLOBULIN UR ELPH-MCNC: 3.6 GM/DL
GLOBULIN UR ELPH-MCNC: 3.8 GM/DL
GLUCOSE BLD-MCNC: 100 MG/DL (ref 65–99)
GLUCOSE BLD-MCNC: 100 MG/DL (ref 70–100)
GLUCOSE BLD-MCNC: 103 MG/DL (ref 65–99)
GLUCOSE BLD-MCNC: 119 MG/DL (ref 65–99)
GLUCOSE BLD-MCNC: 127 MG/DL (ref 65–99)
GLUCOSE BLD-MCNC: 130 MG/DL (ref 65–99)
GLUCOSE BLD-MCNC: 137 MG/DL (ref 65–99)
GLUCOSE BLD-MCNC: 150 MG/DL (ref 70–100)
GLUCOSE BLD-MCNC: 90 MG/DL (ref 65–99)
GLUCOSE BLD-MCNC: 93 MG/DL (ref 70–100)
GLUCOSE BLD-MCNC: 95 MG/DL (ref 65–99)
GLUCOSE BLD-MCNC: 97 MG/DL (ref 65–99)
GLUCOSE BLD-MCNC: 97 MG/DL (ref 65–99)
GLUCOSE BLD-MCNC: 99 MG/DL (ref 65–99)
GLUCOSE BLDC GLUCOMTR-MCNC: 100 MG/DL (ref 70–130)
GLUCOSE BLDC GLUCOMTR-MCNC: 105 MG/DL (ref 70–130)
GLUCOSE BLDC GLUCOMTR-MCNC: 105 MG/DL (ref 70–130)
GLUCOSE BLDC GLUCOMTR-MCNC: 106 MG/DL (ref 70–130)
GLUCOSE BLDC GLUCOMTR-MCNC: 114 MG/DL (ref 70–130)
GLUCOSE BLDC GLUCOMTR-MCNC: 121 MG/DL (ref 70–130)
GLUCOSE BLDC GLUCOMTR-MCNC: 122 MG/DL (ref 70–130)
GLUCOSE BLDC GLUCOMTR-MCNC: 134 MG/DL (ref 70–130)
GLUCOSE BLDC GLUCOMTR-MCNC: 135 MG/DL (ref 70–130)
GLUCOSE BLDC GLUCOMTR-MCNC: 135 MG/DL (ref 70–130)
GLUCOSE BLDC GLUCOMTR-MCNC: 151 MG/DL (ref 70–130)
GLUCOSE BLDC GLUCOMTR-MCNC: 177 MG/DL (ref 70–130)
GLUCOSE BLDC GLUCOMTR-MCNC: 81 MG/DL (ref 70–130)
GLUCOSE BLDC GLUCOMTR-MCNC: 90 MG/DL (ref 70–130)
GLUCOSE BLDC GLUCOMTR-MCNC: 94 MG/DL (ref 70–130)
GLUCOSE BLDC GLUCOMTR-MCNC: 98 MG/DL (ref 70–130)
GLUCOSE SERPL-MCNC: 114 MG/DL (ref 65–99)
GLUCOSE SERPL-MCNC: 82 MG/DL (ref 70–100)
GLUCOSE UR STRIP-MCNC: NEGATIVE MG/DL
HBA1C MFR BLD: 5.5 % (ref 4.8–5.6)
HBA1C MFR BLD: 5.8 % (ref 4.8–5.6)
HBA1C MFR BLD: 6.3 % (ref 4.8–5.6)
HCO3 BLDA-SCNC: 25.6 MMOL/L (ref 20–26)
HCO3 BLDA-SCNC: 28.7 MMOL/L (ref 20–26)
HCT VFR BLD AUTO: 26.9 % (ref 37.5–51)
HCT VFR BLD AUTO: 33.1 % (ref 37.5–51)
HCT VFR BLD AUTO: 34.2 % (ref 37.5–51)
HCT VFR BLD AUTO: 35.3 % (ref 37.5–51)
HCT VFR BLD AUTO: 38 % (ref 37.5–51)
HCT VFR BLD AUTO: 40.1 % (ref 37.5–51)
HCT VFR BLD AUTO: 40.5 % (ref 37.5–51)
HCT VFR BLD AUTO: 40.9 % (ref 37.5–51)
HCT VFR BLD AUTO: 41.1 % (ref 37.5–51)
HCT VFR BLD AUTO: 44.6 % (ref 37.5–51)
HCT VFR BLD AUTO: 45.6 % (ref 38.9–50.9)
HCT VFR BLD AUTO: 46.1 % (ref 37.5–51)
HCT VFR BLD AUTO: 46.7 % (ref 38.9–50.9)
HCT VFR BLD AUTO: 48 % (ref 38.9–50.9)
HCT VFR BLD AUTO: 48.4 % (ref 37.5–51)
HCT VFR BLD AUTO: 49.5 % (ref 38.9–50.9)
HCT VFR BLD CALC: 37.6 %
HCT VFR BLD CALC: 44.7 %
HCT VFR BLDA CALC: 48 % (ref 38–51)
HCT VFR BLDA CALC: 49 % (ref 38–51)
HDLC SERPL-MCNC: 46 MG/DL (ref 40–60)
HDLC SERPL-MCNC: 48 MG/DL (ref 40–60)
HDLC SERPL-MCNC: 52 MG/DL (ref 40–60)
HDLC SERPL-MCNC: 57 MG/DL (ref 40–60)
HGB BLD-MCNC: 10.4 G/DL (ref 13–17.7)
HGB BLD-MCNC: 10.9 G/DL (ref 13–17.7)
HGB BLD-MCNC: 11.1 G/DL (ref 13–17.7)
HGB BLD-MCNC: 12.2 G/DL (ref 13–17.7)
HGB BLD-MCNC: 12.4 G/DL (ref 13–17.7)
HGB BLD-MCNC: 12.9 G/DL (ref 13–17.7)
HGB BLD-MCNC: 12.9 G/DL (ref 13–17.7)
HGB BLD-MCNC: 13.3 G/DL (ref 13–17.7)
HGB BLD-MCNC: 14.2 G/DL (ref 13–17.7)
HGB BLD-MCNC: 15.1 G/DL (ref 13.1–17.5)
HGB BLD-MCNC: 15.1 G/DL (ref 13.1–17.5)
HGB BLD-MCNC: 15.1 G/DL (ref 13–17.7)
HGB BLD-MCNC: 15.2 G/DL (ref 13–17.7)
HGB BLD-MCNC: 15.9 G/DL (ref 13.1–17.5)
HGB BLD-MCNC: 15.9 G/DL (ref 13.1–17.5)
HGB BLD-MCNC: 8.2 G/DL (ref 13–17.7)
HGB BLDA-MCNC: 12.3 G/DL (ref 13.5–17.5)
HGB BLDA-MCNC: 14.6 G/DL (ref 13.5–17.5)
HGB BLDA-MCNC: 16.3 G/DL (ref 12–17)
HGB BLDA-MCNC: 16.7 G/DL (ref 12–17)
HGB UR QL STRIP.AUTO: ABNORMAL
HGB UR QL STRIP.AUTO: NEGATIVE
HOLD SPECIMEN: NORMAL
HOROWITZ INDEX BLD+IHG-RTO: 36 %
HOROWITZ INDEX BLD+IHG-RTO: 50 %
HYALINE CASTS UR QL AUTO: ABNORMAL /LPF
IMM GRANULOCYTES # BLD AUTO: 0.02 10*3/MM3 (ref 0–0.03)
IMM GRANULOCYTES # BLD AUTO: 0.02 10*3/MM3 (ref 0–0.05)
IMM GRANULOCYTES # BLD AUTO: 0.03 10*3/MM3 (ref 0–0.05)
IMM GRANULOCYTES # BLD AUTO: 0.04 10*3/MM3 (ref 0–0.05)
IMM GRANULOCYTES # BLD AUTO: 0.04 10*3/MM3 (ref 0–0.05)
IMM GRANULOCYTES # BLD AUTO: 0.05 10*3/MM3 (ref 0–0.05)
IMM GRANULOCYTES # BLD AUTO: 0.06 10*3/MM3 (ref 0–0.05)
IMM GRANULOCYTES # BLD AUTO: 0.06 10*3/MM3 (ref 0–0.05)
IMM GRANULOCYTES NFR BLD AUTO: 0.2 % (ref 0–0.5)
IMM GRANULOCYTES NFR BLD AUTO: 0.2 % (ref 0–0.6)
IMM GRANULOCYTES NFR BLD AUTO: 0.3 % (ref 0–0.5)
IMM GRANULOCYTES NFR BLD AUTO: 0.3 % (ref 0–0.5)
IMM GRANULOCYTES NFR BLD AUTO: 0.3 % (ref 0–0.6)
IMM GRANULOCYTES NFR BLD AUTO: 0.3 % (ref 0–0.6)
IMM GRANULOCYTES NFR BLD AUTO: 0.4 % (ref 0–0.5)
IMM GRANULOCYTES NFR BLD AUTO: 0.5 % (ref 0–0.6)
IMM GRANULOCYTES NFR BLD AUTO: 0.6 % (ref 0–0.5)
IMM GRANULOCYTES NFR BLD AUTO: 0.7 % (ref 0–0.5)
IMM GRANULOCYTES NFR BLD AUTO: 0.7 % (ref 0–0.5)
IMM GRANULOCYTES NFR BLD AUTO: 0.9 % (ref 0–0.5)
INR PPP: 1 (ref 0.8–1.2)
INR PPP: 1 (ref 0.8–1.2)
IPAP: 0
IRON 24H UR-MRATE: 52 MCG/DL (ref 59–158)
IRON SATN MFR SERPL: 26 % (ref 20–50)
KETONES UR QL STRIP: ABNORMAL
KETONES UR QL STRIP: NEGATIVE
KETONES UR QL: ABNORMAL
LDLC SERPL CALC-MCNC: 32 MG/DL (ref 0–100)
LDLC SERPL CALC-MCNC: 46 MG/DL (ref 0–100)
LDLC/HDLC SERPL: 0.66 {RATIO}
LDLC/HDLC SERPL: 0.99 {RATIO}
LEFT ATRIUM VOLUME INDEX: 33.2 ML/M^2
LEFT ATRIUM VOLUME: 67 ML
LEUKOCYTE EST, POC: NEGATIVE
LEUKOCYTE ESTERASE UR QL STRIP.AUTO: ABNORMAL
LEUKOCYTE ESTERASE UR QL STRIP.AUTO: NEGATIVE
LV EF 2D ECHO EST: 55 %
LV EF 2D ECHO EST: 60 %
LYMPHOCYTES # BLD AUTO: 1.12 10*3/MM3 (ref 0.7–3.1)
LYMPHOCYTES # BLD AUTO: 1.41 10*3/MM3 (ref 0.7–3.1)
LYMPHOCYTES # BLD AUTO: 1.45 10*3/MM3 (ref 0.7–3.1)
LYMPHOCYTES # BLD AUTO: 1.48 10*3/MM3 (ref 0.7–3.1)
LYMPHOCYTES # BLD AUTO: 2.12 10*3/MM3 (ref 0.6–4.8)
LYMPHOCYTES # BLD AUTO: 2.13 10*3/MM3 (ref 0.7–3.1)
LYMPHOCYTES # BLD AUTO: 2.24 10*3/MM3 (ref 0.7–3.1)
LYMPHOCYTES # BLD AUTO: 2.31 10*3/MM3 (ref 0.7–3.1)
LYMPHOCYTES # BLD AUTO: 2.33 10*3/MM3 (ref 0.6–4.8)
LYMPHOCYTES # BLD AUTO: 2.41 10*3/MM3 (ref 0.7–3.1)
LYMPHOCYTES # BLD AUTO: 2.53 10*3/MM3 (ref 0.6–4.8)
LYMPHOCYTES # BLD AUTO: 2.72 10*3/MM3 (ref 0.6–4.8)
LYMPHOCYTES # BLD AUTO: 3.09 10*3/MM3 (ref 0.7–3.1)
LYMPHOCYTES # BLD AUTO: 3.18 10*3/MM3 (ref 0.7–3.1)
LYMPHOCYTES NFR BLD AUTO: 12.2 % (ref 19.6–45.3)
LYMPHOCYTES NFR BLD AUTO: 18.3 % (ref 19.6–45.3)
LYMPHOCYTES NFR BLD AUTO: 20.7 % (ref 19.6–45.3)
LYMPHOCYTES NFR BLD AUTO: 20.7 % (ref 19.6–45.3)
LYMPHOCYTES NFR BLD AUTO: 23 % (ref 19.6–45.3)
LYMPHOCYTES NFR BLD AUTO: 23 % (ref 24–44)
LYMPHOCYTES NFR BLD AUTO: 25.2 % (ref 19.6–45.3)
LYMPHOCYTES NFR BLD AUTO: 27.8 % (ref 19.6–45.3)
LYMPHOCYTES NFR BLD AUTO: 29.6 % (ref 24–44)
LYMPHOCYTES NFR BLD AUTO: 31.2 % (ref 24–44)
LYMPHOCYTES NFR BLD AUTO: 33.1 % (ref 19.6–45.3)
LYMPHOCYTES NFR BLD AUTO: 40 % (ref 19.6–45.3)
LYMPHOCYTES NFR BLD AUTO: 40.5 % (ref 24–44)
LYMPHOCYTES NFR BLD AUTO: 45.9 % (ref 19.6–45.3)
MAGNESIUM SERPL-MCNC: 1.8 MG/DL (ref 1.6–2.4)
MAGNESIUM SERPL-MCNC: 1.9 MG/DL (ref 1.6–2.4)
MAGNESIUM SERPL-MCNC: 2 MG/DL (ref 1.6–2.4)
MAGNESIUM SERPL-MCNC: 2.1 MG/DL (ref 1.6–2.4)
MAXIMAL PREDICTED HEART RATE: 144 BPM
MCH RBC QN AUTO: 31.5 PG (ref 26.6–33)
MCH RBC QN AUTO: 31.7 PG (ref 26.6–33)
MCH RBC QN AUTO: 31.7 PG (ref 26.6–33)
MCH RBC QN AUTO: 31.8 PG (ref 26.6–33)
MCH RBC QN AUTO: 31.8 PG (ref 26.6–33)
MCH RBC QN AUTO: 32 PG (ref 26.6–33)
MCH RBC QN AUTO: 32 PG (ref 27–31)
MCH RBC QN AUTO: 32.2 PG (ref 26.6–33)
MCH RBC QN AUTO: 32.3 PG (ref 26.6–33)
MCH RBC QN AUTO: 32.3 PG (ref 26.6–33)
MCH RBC QN AUTO: 32.4 PG (ref 26.6–33)
MCH RBC QN AUTO: 32.5 PG (ref 27–31)
MCH RBC QN AUTO: 32.6 PG (ref 27–31)
MCH RBC QN AUTO: 33 PG (ref 27–31)
MCHC RBC AUTO-ENTMCNC: 30.5 G/DL (ref 31.5–35.7)
MCHC RBC AUTO-ENTMCNC: 30.9 G/DL (ref 31.5–35.7)
MCHC RBC AUTO-ENTMCNC: 31.4 G/DL (ref 31.5–35.7)
MCHC RBC AUTO-ENTMCNC: 31.5 G/DL (ref 31.5–35.7)
MCHC RBC AUTO-ENTMCNC: 31.8 G/DL (ref 31.5–35.7)
MCHC RBC AUTO-ENTMCNC: 31.9 G/DL (ref 31.5–35.7)
MCHC RBC AUTO-ENTMCNC: 31.9 G/DL (ref 31.5–35.7)
MCHC RBC AUTO-ENTMCNC: 32.1 G/DL (ref 31.5–35.7)
MCHC RBC AUTO-ENTMCNC: 32.1 G/DL (ref 32–36)
MCHC RBC AUTO-ENTMCNC: 32.3 G/DL (ref 32–36)
MCHC RBC AUTO-ENTMCNC: 32.4 G/DL (ref 31.5–35.7)
MCHC RBC AUTO-ENTMCNC: 32.8 G/DL (ref 31.5–35.7)
MCHC RBC AUTO-ENTMCNC: 33.1 G/DL (ref 32–36)
MCHC RBC AUTO-ENTMCNC: 33.1 G/DL (ref 32–36)
MCV RBC AUTO: 100.2 FL (ref 79–97)
MCV RBC AUTO: 100.5 FL (ref 79–97)
MCV RBC AUTO: 100.5 FL (ref 79–97)
MCV RBC AUTO: 100.8 FL (ref 79–97)
MCV RBC AUTO: 100.8 FL (ref 79–97)
MCV RBC AUTO: 100.9 FL (ref 80–99)
MCV RBC AUTO: 101.1 FL (ref 79–97)
MCV RBC AUTO: 101.2 FL (ref 79–97)
MCV RBC AUTO: 101.5 FL (ref 79–97)
MCV RBC AUTO: 101.5 FL (ref 79–97)
MCV RBC AUTO: 101.8 FL (ref 79–97)
MCV RBC AUTO: 105.5 FL (ref 79–97)
MCV RBC AUTO: 98.3 FL (ref 80–99)
MCV RBC AUTO: 98.9 FL (ref 79–97)
MCV RBC AUTO: 99.6 FL (ref 80–99)
MCV RBC AUTO: 99.6 FL (ref 80–99)
METHADONE UR QL SCN: NEGATIVE
METHGB BLD QL: 0.3 % (ref 0–1.5)
METHGB BLD QL: 1.1 % (ref 0–1.5)
MODALITY: ABNORMAL
MODALITY: ABNORMAL
MONOCYTES # BLD AUTO: 0.2 10*3/MM3 (ref 0–1)
MONOCYTES # BLD AUTO: 0.53 10*3/MM3 (ref 0.1–0.9)
MONOCYTES # BLD AUTO: 0.62 10*3/MM3 (ref 0–1)
MONOCYTES # BLD AUTO: 0.68 10*3/MM3 (ref 0.1–0.9)
MONOCYTES # BLD AUTO: 0.68 10*3/MM3 (ref 0.1–0.9)
MONOCYTES # BLD AUTO: 0.75 10*3/MM3 (ref 0.1–0.9)
MONOCYTES # BLD AUTO: 0.77 10*3/MM3 (ref 0–1)
MONOCYTES # BLD AUTO: 0.84 10*3/MM3 (ref 0.1–0.9)
MONOCYTES # BLD AUTO: 0.87 10*3/MM3 (ref 0.1–0.9)
MONOCYTES # BLD AUTO: 0.87 10*3/MM3 (ref 0.1–0.9)
MONOCYTES # BLD AUTO: 1.23 10*3/MM3 (ref 0.1–0.9)
MONOCYTES # BLD AUTO: 1.24 10*3/MM3 (ref 0–1)
MONOCYTES # BLD AUTO: 1.28 10*3/MM3 (ref 0.1–0.9)
MONOCYTES # BLD AUTO: 1.69 10*3/MM3 (ref 0.1–0.9)
MONOCYTES NFR BLD AUTO: 10.1 % (ref 5–12)
MONOCYTES NFR BLD AUTO: 10.1 % (ref 5–12)
MONOCYTES NFR BLD AUTO: 10.9 % (ref 5–12)
MONOCYTES NFR BLD AUTO: 11.9 % (ref 5–12)
MONOCYTES NFR BLD AUTO: 12 % (ref 5–12)
MONOCYTES NFR BLD AUTO: 12.2 % (ref 5–12)
MONOCYTES NFR BLD AUTO: 14.2 % (ref 5–12)
MONOCYTES NFR BLD AUTO: 14.2 % (ref 5–12)
MONOCYTES NFR BLD AUTO: 15.8 % (ref 0–12)
MONOCYTES NFR BLD AUTO: 2.2 % (ref 0–12)
MONOCYTES NFR BLD AUTO: 8.6 % (ref 5–12)
MONOCYTES NFR BLD AUTO: 8.8 % (ref 0–12)
MONOCYTES NFR BLD AUTO: 9.8 % (ref 5–12)
MONOCYTES NFR BLD AUTO: 9.9 % (ref 0–12)
NEUTROPHILS # BLD AUTO: 2.4 10*3/MM3 (ref 1.7–7)
NEUTROPHILS # BLD AUTO: 2.74 10*3/MM3 (ref 1.5–8.3)
NEUTROPHILS # BLD AUTO: 2.86 10*3/MM3 (ref 1.7–7)
NEUTROPHILS # BLD AUTO: 3.15 10*3/MM3 (ref 1.7–7)
NEUTROPHILS # BLD AUTO: 3.23 10*3/MM3 (ref 1.7–7)
NEUTROPHILS # BLD AUTO: 3.25 10*3/MM3 (ref 1.7–7)
NEUTROPHILS # BLD AUTO: 3.97 10*3/MM3 (ref 1.5–8.3)
NEUTROPHILS # BLD AUTO: 4.17 10*3/MM3 (ref 1.7–7)
NEUTROPHILS # BLD AUTO: 4.87 10*3/MM3 (ref 1.7–7)
NEUTROPHILS # BLD AUTO: 5.02 10*3/MM3 (ref 1.5–8.3)
NEUTROPHILS # BLD AUTO: 6.72 10*3/MM3 (ref 1.7–7)
NEUTROPHILS # BLD AUTO: 6.87 10*3/MM3 (ref 1.5–8.3)
NEUTROPHILS # BLD AUTO: 8.65 10*3/MM3 (ref 1.7–7)
NEUTROPHILS # BLD AUTO: 8.97 10*3/MM3 (ref 1.7–7)
NEUTROPHILS NFR BLD AUTO: 40.8 % (ref 42.7–76)
NEUTROPHILS NFR BLD AUTO: 41.3 % (ref 42.7–76)
NEUTROPHILS NFR BLD AUTO: 43.8 % (ref 41–71)
NEUTROPHILS NFR BLD AUTO: 47.8 % (ref 42.7–76)
NEUTROPHILS NFR BLD AUTO: 50.5 % (ref 41–71)
NEUTROPHILS NFR BLD AUTO: 52.8 % (ref 42.7–76)
NEUTROPHILS NFR BLD AUTO: 54 % (ref 42.7–76)
NEUTROPHILS NFR BLD AUTO: 56.1 % (ref 42.7–76)
NEUTROPHILS NFR BLD AUTO: 57.5 % (ref 41–71)
NEUTROPHILS NFR BLD AUTO: 58.4 % (ref 42.7–76)
NEUTROPHILS NFR BLD AUTO: 65.2 % (ref 42.7–76)
NEUTROPHILS NFR BLD AUTO: 71 % (ref 42.7–76)
NEUTROPHILS NFR BLD AUTO: 72.7 % (ref 42.7–76)
NEUTROPHILS NFR BLD AUTO: 74.5 % (ref 41–71)
NITRITE UR QL STRIP: NEGATIVE
NITRITE UR-MCNC: NEGATIVE MG/ML
NOTE: ABNORMAL
NOTE: ABNORMAL
NRBC BLD AUTO-RTO: 0 /100 WBC (ref 0–0.2)
OPIATES UR QL: NEGATIVE
OXYCODONE UR QL SCN: NEGATIVE
OXYHGB MFR BLDV: 89.9 % (ref 94–99)
OXYHGB MFR BLDV: 94.4 % (ref 94–99)
PAW @ PEAK INSP FLOW SETTING VENT: 0 CMH2O
PCO2 BLDA: 39.5 MM HG
PCO2 BLDA: 45.4 MM HG
PCO2 TEMP ADJ BLD: 39.5 MM HG (ref 35–48)
PCO2 TEMP ADJ BLD: ABNORMAL MM[HG]
PCP UR QL SCN: NEGATIVE
PH BLDA: 7.36 PH UNITS (ref 7.35–7.45)
PH BLDA: 7.47 PH UNITS (ref 7.35–7.45)
PH UR STRIP.AUTO: 5.5 [PH] (ref 5–8)
PH UR STRIP.AUTO: 5.5 [PH] (ref 5–8)
PH UR STRIP.AUTO: 6 [PH] (ref 5–8)
PH UR STRIP.AUTO: 7 [PH] (ref 5–8)
PH UR STRIP.AUTO: 8 [PH] (ref 5–8)
PH UR STRIP.AUTO: <=5 [PH] (ref 5–8)
PH UR STRIP.AUTO: <=5 [PH] (ref 5–8)
PH UR: 6 [PH] (ref 5–8)
PH, TEMP CORRECTED: 7.47 PH UNITS
PH, TEMP CORRECTED: ABNORMAL
PHOSPHATE SERPL-MCNC: 3.3 MG/DL (ref 2.5–4.5)
PLATELET # BLD AUTO: 110 10*3/MM3 (ref 140–450)
PLATELET # BLD AUTO: 141 10*3/MM3 (ref 140–450)
PLATELET # BLD AUTO: 149 10*3/MM3 (ref 140–450)
PLATELET # BLD AUTO: 166 10*3/MM3 (ref 150–450)
PLATELET # BLD AUTO: 167 10*3/MM3 (ref 140–450)
PLATELET # BLD AUTO: 177 10*3/MM3 (ref 150–450)
PLATELET # BLD AUTO: 179 10*3/MM3 (ref 150–450)
PLATELET # BLD AUTO: 180 10*3/MM3 (ref 150–450)
PLATELET # BLD AUTO: 182 10*3/MM3 (ref 140–450)
PLATELET # BLD AUTO: 195 10*3/MM3 (ref 140–450)
PLATELET # BLD AUTO: 218 10*3/MM3 (ref 140–450)
PLATELET # BLD AUTO: 253 10*3/MM3 (ref 140–450)
PLATELET # BLD AUTO: 257 10*3/MM3 (ref 140–450)
PLATELET # BLD AUTO: 305 10*3/MM3 (ref 140–450)
PLATELET # BLD AUTO: 324 10*3/MM3 (ref 140–450)
PLATELET # BLD AUTO: 361 10*3/MM3 (ref 140–450)
PMV BLD AUTO: 10 FL (ref 6–12)
PMV BLD AUTO: 10.2 FL (ref 6–12)
PMV BLD AUTO: 10.2 FL (ref 6–12)
PMV BLD AUTO: 10.3 FL (ref 6–12)
PMV BLD AUTO: 10.5 FL (ref 6–12)
PMV BLD AUTO: 11 FL (ref 6–12)
PMV BLD AUTO: 11.1 FL (ref 6–12)
PMV BLD AUTO: 9.3 FL (ref 6–12)
PMV BLD AUTO: 9.3 FL (ref 6–12)
PMV BLD AUTO: 9.4 FL (ref 6–12)
PMV BLD AUTO: 9.5 FL (ref 6–12)
PMV BLD AUTO: 9.8 FL (ref 6–12)
PO2 BLDA: 58.3 MM HG (ref 83–108)
PO2 BLDA: 90.7 MM HG (ref 83–108)
PO2 TEMP ADJ BLD: 58.3 MM HG (ref 83–108)
PO2 TEMP ADJ BLD: ABNORMAL MM[HG]
POTASSIUM BLD-SCNC: 3.1 MMOL/L (ref 3.5–5.2)
POTASSIUM BLD-SCNC: 3.2 MMOL/L (ref 3.5–5.2)
POTASSIUM BLD-SCNC: 3.3 MMOL/L (ref 3.5–5.2)
POTASSIUM BLD-SCNC: 3.3 MMOL/L (ref 3.5–5.2)
POTASSIUM BLD-SCNC: 3.5 MMOL/L (ref 3.5–5.5)
POTASSIUM BLD-SCNC: 3.6 MMOL/L (ref 3.5–5.2)
POTASSIUM BLD-SCNC: 3.7 MMOL/L (ref 3.5–5.2)
POTASSIUM BLD-SCNC: 3.8 MMOL/L (ref 3.5–5.2)
POTASSIUM BLD-SCNC: 3.8 MMOL/L (ref 3.5–5.2)
POTASSIUM BLD-SCNC: 3.9 MMOL/L (ref 3.5–5.5)
POTASSIUM BLD-SCNC: 3.9 MMOL/L (ref 3.5–5.5)
POTASSIUM BLD-SCNC: 4 MMOL/L (ref 3.5–5.2)
POTASSIUM BLD-SCNC: 4.1 MMOL/L (ref 3.5–5.2)
POTASSIUM BLD-SCNC: 4.1 MMOL/L (ref 3.5–5.2)
POTASSIUM BLD-SCNC: 4.3 MMOL/L (ref 3.5–5.2)
POTASSIUM BLD-SCNC: 4.3 MMOL/L (ref 3.5–5.2)
POTASSIUM BLD-SCNC: 4.7 MMOL/L (ref 3.5–5.2)
POTASSIUM BLD-SCNC: 5 MMOL/L (ref 3.5–5.2)
POTASSIUM BLDA-SCNC: 3.9 MMOL/L (ref 3.5–4.9)
POTASSIUM BLDA-SCNC: 4.8 MMOL/L (ref 3.5–4.9)
POTASSIUM SERPL-SCNC: 4.2 MMOL/L (ref 3.5–5.5)
POTASSIUM SERPL-SCNC: 5.6 MMOL/L (ref 3.5–5.2)
PROCALCITONIN SERPL-MCNC: 0.04 NG/ML (ref 0.1–0.25)
PROCALCITONIN SERPL-MCNC: 0.11 NG/ML (ref 0.1–0.25)
PROCALCITONIN SERPL-MCNC: 0.22 NG/ML (ref 0.1–0.25)
PROPOXYPH UR QL: NEGATIVE
PROT SERPL-MCNC: 5.4 G/DL (ref 6–8.5)
PROT SERPL-MCNC: 5.6 G/DL (ref 6–8.5)
PROT SERPL-MCNC: 5.9 G/DL (ref 6–8.5)
PROT SERPL-MCNC: 5.9 G/DL (ref 6–8.5)
PROT SERPL-MCNC: 6.5 G/DL (ref 5.7–8.2)
PROT SERPL-MCNC: 6.5 G/DL (ref 6–8.5)
PROT SERPL-MCNC: 6.7 G/DL (ref 6–8.5)
PROT SERPL-MCNC: 6.8 G/DL (ref 6–8.5)
PROT SERPL-MCNC: 6.9 G/DL (ref 5.7–8.2)
PROT SERPL-MCNC: 7.4 G/DL (ref 6–8.5)
PROT UR QL STRIP: ABNORMAL
PROT UR QL STRIP: NEGATIVE
PROT UR STRIP-MCNC: ABNORMAL MG/DL
PROTHROMBIN TIME: 11.5 SECONDS (ref 12.8–15.2)
PROTHROMBIN TIME: 12.2 SECONDS (ref 12.8–15.2)
PSA SERPL-MCNC: 0.58 NG/ML (ref 0–4)
RBC # BLD AUTO: 2.55 10*6/MM3 (ref 4.14–5.8)
RBC # BLD AUTO: 3.27 10*6/MM3 (ref 4.14–5.8)
RBC # BLD AUTO: 3.37 10*6/MM3 (ref 4.14–5.8)
RBC # BLD AUTO: 3.49 10*6/MM3 (ref 4.14–5.8)
RBC # BLD AUTO: 3.77 10*6/MM3 (ref 4.14–5.8)
RBC # BLD AUTO: 3.94 10*6/MM3 (ref 4.14–5.8)
RBC # BLD AUTO: 3.99 10*6/MM3 (ref 4.14–5.8)
RBC # BLD AUTO: 4.07 10*6/MM3 (ref 4.14–5.8)
RBC # BLD AUTO: 4.1 10*6/MM3 (ref 4.14–5.8)
RBC # BLD AUTO: 4.44 10*6/MM3 (ref 4.14–5.8)
RBC # BLD AUTO: 4.63 10*6/MM3 (ref 4.2–5.76)
RBC # BLD AUTO: 4.64 10*6/MM3 (ref 4.2–5.76)
RBC # BLD AUTO: 4.66 10*6/MM3 (ref 4.14–5.8)
RBC # BLD AUTO: 4.8 10*6/MM3 (ref 4.14–5.8)
RBC # BLD AUTO: 4.82 10*6/MM3 (ref 4.2–5.76)
RBC # BLD AUTO: 4.97 10*6/MM3 (ref 4.2–5.76)
RBC # UR STRIP: ABNORMAL /UL
RBC # UR: ABNORMAL /HPF
REF LAB TEST METHOD: ABNORMAL
RH BLD: POSITIVE
RH BLD: POSITIVE
S PYO AG THROAT QL: NEGATIVE
SALICYLATES SERPL-MCNC: <0.3 MG/DL
SODIUM BLD-SCNC: 134 MMOL/L (ref 136–145)
SODIUM BLD-SCNC: 135 MMOL/L (ref 136–145)
SODIUM BLD-SCNC: 137 MMOL/L (ref 132–146)
SODIUM BLD-SCNC: 137 MMOL/L (ref 136–145)
SODIUM BLD-SCNC: 138 MMOL/L (ref 136–145)
SODIUM BLD-SCNC: 139 MMOL/L (ref 132–146)
SODIUM BLD-SCNC: 140 MMOL/L (ref 132–146)
SODIUM BLD-SCNC: 140 MMOL/L (ref 136–145)
SODIUM BLD-SCNC: 141 MMOL/L (ref 136–145)
SODIUM BLD-SCNC: 141 MMOL/L (ref 136–145)
SODIUM BLD-SCNC: 142 MMOL/L (ref 136–145)
SODIUM BLDA-SCNC: 139 MMOL/L (ref 138–146)
SODIUM BLDA-SCNC: 143 MMOL/L (ref 138–146)
SODIUM SERPL-SCNC: 139 MMOL/L (ref 132–146)
SODIUM SERPL-SCNC: 141 MMOL/L (ref 136–145)
SP GR UR STRIP: 1.01 (ref 1–1.03)
SP GR UR STRIP: 1.02 (ref 1–1.03)
SP GR UR STRIP: 1.02 (ref 1–1.03)
SP GR UR STRIP: 1.03 (ref 1–1.03)
SP GR UR STRIP: 1.03 (ref 1–1.03)
SP GR UR: 1.03 (ref 1–1.03)
SQUAMOUS #/AREA URNS HPF: ABNORMAL /HPF
STRESS TARGET HR: 122 BPM
T&S EXPIRATION DATE: NORMAL
T4 FREE SERPL-MCNC: 0.97 NG/DL (ref 0.93–1.7)
T4 FREE SERPL-MCNC: 1.09 NG/DL (ref 0.89–1.76)
T4 FREE SERPL-MCNC: 1.24 NG/DL (ref 0.93–1.7)
TIBC SERPL-MCNC: 203 MCG/DL (ref 298–536)
TOTAL RATE: 0 BREATHS/MINUTE
TRANSFERRIN SERPL-MCNC: 136 MG/DL (ref 200–360)
TRICYCLICS UR QL SCN: NEGATIVE
TRIGL SERPL-MCNC: 101 MG/DL (ref 0–150)
TRIGL SERPL-MCNC: 47 MG/DL (ref 0–150)
TRIGL SERPL-MCNC: 48 MG/DL (ref 0–150)
TRIGL SERPL-MCNC: 92 MG/DL (ref 0–150)
TROPONIN I SERPL-MCNC: 0 NG/ML (ref 0–0.07)
TROPONIN T SERPL-MCNC: <0.01 NG/ML (ref 0–0.03)
TROPONIN T SERPL-MCNC: <0.01 NG/ML (ref 0–0.03)
TSH SERPL DL<=0.05 MIU/L-ACNC: 0.22 MIU/ML (ref 0.35–5.35)
TSH SERPL DL<=0.05 MIU/L-ACNC: 0.29 UIU/ML (ref 0.27–4.2)
TSH SERPL DL<=0.05 MIU/L-ACNC: 0.45 MIU/ML (ref 0.27–4.2)
TSH SERPL DL<=0.05 MIU/L-ACNC: 0.65 MIU/ML (ref 0.35–5.35)
UNIT  ABO: NORMAL
UNIT  ABO: NORMAL
UNIT  RH: NORMAL
UNIT  RH: NORMAL
UROBILINOGEN UR QL STRIP: ABNORMAL
UROBILINOGEN UR QL STRIP: NORMAL
UROBILINOGEN UR QL STRIP: NORMAL
UROBILINOGEN UR QL: NORMAL
VIT B12 BLD-MCNC: 494 PG/ML (ref 211–946)
VIT B12 SERPL-MCNC: 703 PG/ML (ref 211–911)
VIT B6 SERPL-MCNC: 96.4 UG/L (ref 5.3–46.7)
VLDLC SERPL-MCNC: 18.4 MG/DL
VLDLC SERPL-MCNC: 9.4 MG/DL
WBC # BLD AUTO: 6.93 10*3/MM3 (ref 3.4–10.8)
WBC # BLD AUTO: 7.86 10*3/MM3 (ref 3.5–10.8)
WBC NRBC COR # BLD: 10.29 10*3/MM3 (ref 3.4–10.8)
WBC NRBC COR # BLD: 10.29 10*3/MM3 (ref 3.4–10.8)
WBC NRBC COR # BLD: 11.89 10*3/MM3 (ref 3.4–10.8)
WBC NRBC COR # BLD: 12.63 10*3/MM3 (ref 3.4–10.8)
WBC NRBC COR # BLD: 4.45 10*3/MM3 (ref 3.4–10.8)
WBC NRBC COR # BLD: 6.14 10*3/MM3 (ref 3.4–10.8)
WBC NRBC COR # BLD: 6.25 10*3/MM3 (ref 3.5–10.8)
WBC NRBC COR # BLD: 6.76 10*3/MM3 (ref 3.4–10.8)
WBC NRBC COR # BLD: 7.14 10*3/MM3 (ref 3.4–10.8)
WBC NRBC COR # BLD: 7.72 10*3/MM3 (ref 3.4–10.8)
WBC NRBC COR # BLD: 8.68 10*3/MM3 (ref 3.4–10.8)
WBC NRBC COR # BLD: 8.73 10*3/MM3 (ref 3.5–10.8)
WBC NRBC COR # BLD: 9.22 10*3/MM3 (ref 3.5–10.8)
WBC NRBC COR # BLD: 9.95 10*3/MM3 (ref 3.4–10.8)
WBC UR QL AUTO: ABNORMAL /HPF
WHOLE BLOOD HOLD SPECIMEN: NORMAL

## 2019-01-01 PROCEDURE — 99233 SBSQ HOSP IP/OBS HIGH 50: CPT | Performed by: INTERNAL MEDICINE

## 2019-01-01 PROCEDURE — 80061 LIPID PANEL: CPT | Performed by: NURSE PRACTITIONER

## 2019-01-01 PROCEDURE — 97110 THERAPEUTIC EXERCISES: CPT

## 2019-01-01 PROCEDURE — 5A1945Z RESPIRATORY VENTILATION, 24-96 CONSECUTIVE HOURS: ICD-10-PCS | Performed by: EMERGENCY MEDICINE

## 2019-01-01 PROCEDURE — 82962 GLUCOSE BLOOD TEST: CPT

## 2019-01-01 PROCEDURE — 81003 URINALYSIS AUTO W/O SCOPE: CPT | Performed by: NURSE PRACTITIONER

## 2019-01-01 PROCEDURE — 25010000002 PROTHROMBIN COMPLEX CONC HUMAN 1000 UNITS KIT: Performed by: EMERGENCY MEDICINE

## 2019-01-01 PROCEDURE — 97166 OT EVAL MOD COMPLEX 45 MIN: CPT

## 2019-01-01 PROCEDURE — 97535 SELF CARE MNGMENT TRAINING: CPT

## 2019-01-01 PROCEDURE — 90653 IIV ADJUVANT VACCINE IM: CPT | Performed by: INTERNAL MEDICINE

## 2019-01-01 PROCEDURE — 97116 GAIT TRAINING THERAPY: CPT

## 2019-01-01 PROCEDURE — 25010000002 DIPHENHYDRAMINE PER 50 MG: Performed by: EMERGENCY MEDICINE

## 2019-01-01 PROCEDURE — 99214 OFFICE O/P EST MOD 30 MIN: CPT | Performed by: INTERNAL MEDICINE

## 2019-01-01 PROCEDURE — 81003 URINALYSIS AUTO W/O SCOPE: CPT | Performed by: EMERGENCY MEDICINE

## 2019-01-01 PROCEDURE — 80053 COMPREHEN METABOLIC PANEL: CPT | Performed by: PHYSICIAN ASSISTANT

## 2019-01-01 PROCEDURE — 84132 ASSAY OF SERUM POTASSIUM: CPT | Performed by: INTERNAL MEDICINE

## 2019-01-01 PROCEDURE — 92526 ORAL FUNCTION THERAPY: CPT

## 2019-01-01 PROCEDURE — 97530 THERAPEUTIC ACTIVITIES: CPT

## 2019-01-01 PROCEDURE — 94799 UNLISTED PULMONARY SVC/PX: CPT

## 2019-01-01 PROCEDURE — 25010000002 MORPHINE PER 10 MG: Performed by: INTERNAL MEDICINE

## 2019-01-01 PROCEDURE — 99214 OFFICE O/P EST MOD 30 MIN: CPT | Performed by: PSYCHIATRY & NEUROLOGY

## 2019-01-01 PROCEDURE — 80048 BASIC METABOLIC PNL TOTAL CA: CPT | Performed by: INTERNAL MEDICINE

## 2019-01-01 PROCEDURE — 92507 TX SP LANG VOICE COMM INDIV: CPT

## 2019-01-01 PROCEDURE — 82140 ASSAY OF AMMONIA: CPT | Performed by: PSYCHIATRY & NEUROLOGY

## 2019-01-01 PROCEDURE — 87040 BLOOD CULTURE FOR BACTERIA: CPT | Performed by: EMERGENCY MEDICINE

## 2019-01-01 PROCEDURE — 99232 SBSQ HOSP IP/OBS MODERATE 35: CPT | Performed by: INTERNAL MEDICINE

## 2019-01-01 PROCEDURE — 63710000001 PREDNISONE PER 5 MG: Performed by: INTERNAL MEDICINE

## 2019-01-01 PROCEDURE — 94660 CPAP INITIATION&MGMT: CPT

## 2019-01-01 PROCEDURE — 99213 OFFICE O/P EST LOW 20 MIN: CPT | Performed by: INTERNAL MEDICINE

## 2019-01-01 PROCEDURE — 0 IOPAMIDOL PER 1 ML: Performed by: EMERGENCY MEDICINE

## 2019-01-01 PROCEDURE — 80307 DRUG TEST PRSMV CHEM ANLYZR: CPT | Performed by: EMERGENCY MEDICINE

## 2019-01-01 PROCEDURE — 0042T HC CT CEREBRAL PERFUSION W/WO CONTRAST: CPT

## 2019-01-01 PROCEDURE — 25010000002 SULFUR HEXAFLUORIDE MICROSPH 60.7-25 MG RECONSTITUTED SUSPENSION: Performed by: NURSE PRACTITIONER

## 2019-01-01 PROCEDURE — 99233 SBSQ HOSP IP/OBS HIGH 50: CPT | Performed by: NURSE PRACTITIONER

## 2019-01-01 PROCEDURE — 25010000002 SUCCINYLCHOLINE PER 20 MG: Performed by: EMERGENCY MEDICINE

## 2019-01-01 PROCEDURE — 81001 URINALYSIS AUTO W/SCOPE: CPT

## 2019-01-01 PROCEDURE — 70551 MRI BRAIN STEM W/O DYE: CPT

## 2019-01-01 PROCEDURE — 99215 OFFICE O/P EST HI 40 MIN: CPT | Performed by: PSYCHIATRY & NEUROLOGY

## 2019-01-01 PROCEDURE — 93312 ECHO TRANSESOPHAGEAL: CPT | Performed by: INTERNAL MEDICINE

## 2019-01-01 PROCEDURE — G0378 HOSPITAL OBSERVATION PER HR: HCPCS

## 2019-01-01 PROCEDURE — 36600 WITHDRAWAL OF ARTERIAL BLOOD: CPT

## 2019-01-01 PROCEDURE — 70498 CT ANGIOGRAPHY NECK: CPT

## 2019-01-01 PROCEDURE — 71046 X-RAY EXAM CHEST 2 VIEWS: CPT

## 2019-01-01 PROCEDURE — 99222 1ST HOSP IP/OBS MODERATE 55: CPT | Performed by: INTERNAL MEDICINE

## 2019-01-01 PROCEDURE — 99232 SBSQ HOSP IP/OBS MODERATE 35: CPT | Performed by: PSYCHIATRY & NEUROLOGY

## 2019-01-01 PROCEDURE — 25010000002 HYDROCORTISONE SODIUM SUCCINATE 100 MG RECONSTITUTED SOLUTION: Performed by: INTERNAL MEDICINE

## 2019-01-01 PROCEDURE — 25010000002 HYDROMORPHONE 1 MG/ML SOLUTION: Performed by: NURSE PRACTITIONER

## 2019-01-01 PROCEDURE — 97162 PT EVAL MOD COMPLEX 30 MIN: CPT

## 2019-01-01 PROCEDURE — 36415 COLL VENOUS BLD VENIPUNCTURE: CPT | Performed by: INTERNAL MEDICINE

## 2019-01-01 PROCEDURE — 82805 BLOOD GASES W/O2 SATURATION: CPT | Performed by: INTERNAL MEDICINE

## 2019-01-01 PROCEDURE — 84484 ASSAY OF TROPONIN QUANT: CPT

## 2019-01-01 PROCEDURE — 93306 TTE W/DOPPLER COMPLETE: CPT | Performed by: INTERNAL MEDICINE

## 2019-01-01 PROCEDURE — 25010000002 METHYLPREDNISOLONE PER 40 MG: Performed by: EMERGENCY MEDICINE

## 2019-01-01 PROCEDURE — 25010000002 ENOXAPARIN PER 10 MG: Performed by: PHYSICIAN ASSISTANT

## 2019-01-01 PROCEDURE — 99495 TRANSJ CARE MGMT MOD F2F 14D: CPT | Performed by: INTERNAL MEDICINE

## 2019-01-01 PROCEDURE — 25010000002 FUROSEMIDE PER 20 MG: Performed by: NURSE PRACTITIONER

## 2019-01-01 PROCEDURE — 99232 SBSQ HOSP IP/OBS MODERATE 35: CPT | Performed by: PHYSICIAN ASSISTANT

## 2019-01-01 PROCEDURE — 25010000003 POTASSIUM CHLORIDE 10 MEQ/100ML SOLUTION: Performed by: INTERNAL MEDICINE

## 2019-01-01 PROCEDURE — 94003 VENT MGMT INPAT SUBQ DAY: CPT

## 2019-01-01 PROCEDURE — 73502 X-RAY EXAM HIP UNI 2-3 VIEWS: CPT

## 2019-01-01 PROCEDURE — 92523 SPEECH SOUND LANG COMPREHEN: CPT

## 2019-01-01 PROCEDURE — 25010000002 ONDANSETRON PER 1 MG: Performed by: PHYSICIAN ASSISTANT

## 2019-01-01 PROCEDURE — 84450 TRANSFERASE (AST) (SGOT): CPT | Performed by: EMERGENCY MEDICINE

## 2019-01-01 PROCEDURE — 87880 STREP A ASSAY W/OPTIC: CPT | Performed by: INTERNAL MEDICINE

## 2019-01-01 PROCEDURE — 99239 HOSP IP/OBS DSCHRG MGMT >30: CPT | Performed by: NURSE PRACTITIONER

## 2019-01-01 PROCEDURE — 83735 ASSAY OF MAGNESIUM: CPT | Performed by: NURSE PRACTITIONER

## 2019-01-01 PROCEDURE — 93880 EXTRACRANIAL BILAT STUDY: CPT

## 2019-01-01 PROCEDURE — 83036 HEMOGLOBIN GLYCOSYLATED A1C: CPT | Performed by: NURSE PRACTITIONER

## 2019-01-01 PROCEDURE — 86900 BLOOD TYPING SEROLOGIC ABO: CPT

## 2019-01-01 PROCEDURE — 99214 OFFICE O/P EST MOD 30 MIN: CPT | Performed by: PHYSICIAN ASSISTANT

## 2019-01-01 PROCEDURE — 85027 COMPLETE CBC AUTOMATED: CPT | Performed by: PHYSICIAN ASSISTANT

## 2019-01-01 PROCEDURE — 93306 TTE W/DOPPLER COMPLETE: CPT

## 2019-01-01 PROCEDURE — 80053 COMPREHEN METABOLIC PANEL: CPT | Performed by: INTERNAL MEDICINE

## 2019-01-01 PROCEDURE — 81001 URINALYSIS AUTO W/SCOPE: CPT | Performed by: EMERGENCY MEDICINE

## 2019-01-01 PROCEDURE — 85014 HEMATOCRIT: CPT

## 2019-01-01 PROCEDURE — 97110 THERAPEUTIC EXERCISES: CPT | Performed by: PHYSICAL THERAPIST

## 2019-01-01 PROCEDURE — 25010000002 MIDAZOLAM PER 1 MG: Performed by: INTERNAL MEDICINE

## 2019-01-01 PROCEDURE — 86850 RBC ANTIBODY SCREEN: CPT | Performed by: EMERGENCY MEDICINE

## 2019-01-01 PROCEDURE — 85652 RBC SED RATE AUTOMATED: CPT

## 2019-01-01 PROCEDURE — 85025 COMPLETE CBC W/AUTO DIFF WBC: CPT | Performed by: INTERNAL MEDICINE

## 2019-01-01 PROCEDURE — 80061 LIPID PANEL: CPT | Performed by: PHYSICIAN ASSISTANT

## 2019-01-01 PROCEDURE — 92612 ENDOSCOPY SWALLOW (FEES) VID: CPT

## 2019-01-01 PROCEDURE — 80048 BASIC METABOLIC PNL TOTAL CA: CPT | Performed by: NURSE PRACTITIONER

## 2019-01-01 PROCEDURE — 87070 CULTURE OTHR SPECIMN AEROBIC: CPT | Performed by: INTERNAL MEDICINE

## 2019-01-01 PROCEDURE — 93010 ELECTROCARDIOGRAM REPORT: CPT | Performed by: INTERNAL MEDICINE

## 2019-01-01 PROCEDURE — 84145 PROCALCITONIN (PCT): CPT | Performed by: EMERGENCY MEDICINE

## 2019-01-01 PROCEDURE — 93005 ELECTROCARDIOGRAM TRACING: CPT | Performed by: PHYSICIAN ASSISTANT

## 2019-01-01 PROCEDURE — G0180 MD CERTIFICATION HHA PATIENT: HCPCS | Performed by: INTERNAL MEDICINE

## 2019-01-01 PROCEDURE — 99285 EMERGENCY DEPT VISIT HI MDM: CPT

## 2019-01-01 PROCEDURE — 99496 TRANSJ CARE MGMT HIGH F2F 7D: CPT | Performed by: INTERNAL MEDICINE

## 2019-01-01 PROCEDURE — 71045 X-RAY EXAM CHEST 1 VIEW: CPT

## 2019-01-01 PROCEDURE — 99231 SBSQ HOSP IP/OBS SF/LOW 25: CPT | Performed by: INTERNAL MEDICINE

## 2019-01-01 PROCEDURE — 82728 ASSAY OF FERRITIN: CPT | Performed by: INTERNAL MEDICINE

## 2019-01-01 PROCEDURE — 86901 BLOOD TYPING SEROLOGIC RH(D): CPT | Performed by: EMERGENCY MEDICINE

## 2019-01-01 PROCEDURE — 84460 ALANINE AMINO (ALT) (SGPT): CPT | Performed by: EMERGENCY MEDICINE

## 2019-01-01 PROCEDURE — 83540 ASSAY OF IRON: CPT | Performed by: INTERNAL MEDICINE

## 2019-01-01 PROCEDURE — 85610 PROTHROMBIN TIME: CPT

## 2019-01-01 PROCEDURE — 25010000002 ROPIVACAINE PER 1 MG: Performed by: NURSE ANESTHETIST, CERTIFIED REGISTERED

## 2019-01-01 PROCEDURE — 83735 ASSAY OF MAGNESIUM: CPT | Performed by: INTERNAL MEDICINE

## 2019-01-01 PROCEDURE — 96374 THER/PROPH/DIAG INJ IV PUSH: CPT

## 2019-01-01 PROCEDURE — 99232 SBSQ HOSP IP/OBS MODERATE 35: CPT | Performed by: NURSE PRACTITIONER

## 2019-01-01 PROCEDURE — 97165 OT EVAL LOW COMPLEX 30 MIN: CPT

## 2019-01-01 PROCEDURE — 70450 CT HEAD/BRAIN W/O DYE: CPT

## 2019-01-01 PROCEDURE — 36600 WITHDRAWAL OF ARTERIAL BLOOD: CPT | Performed by: INTERNAL MEDICINE

## 2019-01-01 PROCEDURE — 87205 SMEAR GRAM STAIN: CPT | Performed by: INTERNAL MEDICINE

## 2019-01-01 PROCEDURE — 25010000002 PIPERACILLIN SOD-TAZOBACTAM PER 1 G: Performed by: INTERNAL MEDICINE

## 2019-01-01 PROCEDURE — 84443 ASSAY THYROID STIM HORMONE: CPT

## 2019-01-01 PROCEDURE — 69210 REMOVE IMPACTED EAR WAX UNI: CPT | Performed by: INTERNAL MEDICINE

## 2019-01-01 PROCEDURE — 80061 LIPID PANEL: CPT

## 2019-01-01 PROCEDURE — 86901 BLOOD TYPING SEROLOGIC RH(D): CPT

## 2019-01-01 PROCEDURE — 97535 SELF CARE MNGMENT TRAINING: CPT | Performed by: OCCUPATIONAL THERAPIST

## 2019-01-01 PROCEDURE — 83735 ASSAY OF MAGNESIUM: CPT | Performed by: EMERGENCY MEDICINE

## 2019-01-01 PROCEDURE — 85025 COMPLETE CBC W/AUTO DIFF WBC: CPT | Performed by: EMERGENCY MEDICINE

## 2019-01-01 PROCEDURE — 93970 EXTREMITY STUDY: CPT

## 2019-01-01 PROCEDURE — 99231 SBSQ HOSP IP/OBS SF/LOW 25: CPT | Performed by: PSYCHIATRY & NEUROLOGY

## 2019-01-01 PROCEDURE — 80053 COMPREHEN METABOLIC PANEL: CPT | Performed by: EMERGENCY MEDICINE

## 2019-01-01 PROCEDURE — 85027 COMPLETE CBC AUTOMATED: CPT | Performed by: NURSE PRACTITIONER

## 2019-01-01 PROCEDURE — 25010000002 FENTANYL CITRATE (PF) 2500 MCG/50ML SOLUTION: Performed by: EMERGENCY MEDICINE

## 2019-01-01 PROCEDURE — C9132 KCENTRA, PER I.U.: HCPCS | Performed by: EMERGENCY MEDICINE

## 2019-01-01 PROCEDURE — 25010000002 LORAZEPAM PER 2 MG: Performed by: NURSE PRACTITIONER

## 2019-01-01 PROCEDURE — 93005 ELECTROCARDIOGRAM TRACING: CPT | Performed by: INTERNAL MEDICINE

## 2019-01-01 PROCEDURE — 25010000002 KETOROLAC TROMETHAMINE PER 15 MG: Performed by: INTERNAL MEDICINE

## 2019-01-01 PROCEDURE — 87086 URINE CULTURE/COLONY COUNT: CPT | Performed by: INTERNAL MEDICINE

## 2019-01-01 PROCEDURE — 80053 COMPREHEN METABOLIC PANEL: CPT | Performed by: NURSE PRACTITIONER

## 2019-01-01 PROCEDURE — 94002 VENT MGMT INPAT INIT DAY: CPT

## 2019-01-01 PROCEDURE — 93005 ELECTROCARDIOGRAM TRACING: CPT | Performed by: EMERGENCY MEDICINE

## 2019-01-01 PROCEDURE — 93893 TCD STD ICR ART VEN-ART SHNT: CPT

## 2019-01-01 PROCEDURE — 85025 COMPLETE CBC W/AUTO DIFF WBC: CPT | Performed by: NURSE PRACTITIONER

## 2019-01-01 PROCEDURE — 63710000001 PREDNISONE PER 5 MG: Performed by: PHYSICIAN ASSISTANT

## 2019-01-01 PROCEDURE — 85025 COMPLETE CBC W/AUTO DIFF WBC: CPT

## 2019-01-01 PROCEDURE — 73110 X-RAY EXAM OF WRIST: CPT

## 2019-01-01 PROCEDURE — 93880 EXTRACRANIAL BILAT STUDY: CPT | Performed by: INTERNAL MEDICINE

## 2019-01-01 PROCEDURE — 84484 ASSAY OF TROPONIN QUANT: CPT | Performed by: EMERGENCY MEDICINE

## 2019-01-01 PROCEDURE — 94770: CPT

## 2019-01-01 PROCEDURE — 81001 URINALYSIS AUTO W/SCOPE: CPT | Performed by: INTERNAL MEDICINE

## 2019-01-01 PROCEDURE — 92610 EVALUATE SWALLOWING FUNCTION: CPT

## 2019-01-01 PROCEDURE — 93970 EXTREMITY STUDY: CPT | Performed by: INTERNAL MEDICINE

## 2019-01-01 PROCEDURE — 93005 ELECTROCARDIOGRAM TRACING: CPT | Performed by: NURSE PRACTITIONER

## 2019-01-01 PROCEDURE — 84443 ASSAY THYROID STIM HORMONE: CPT | Performed by: PSYCHIATRY & NEUROLOGY

## 2019-01-01 PROCEDURE — P9612 CATHETERIZE FOR URINE SPEC: HCPCS

## 2019-01-01 PROCEDURE — 25010000002 MORPHINE PER 10 MG: Performed by: PHYSICIAN ASSISTANT

## 2019-01-01 PROCEDURE — 82607 VITAMIN B-12: CPT | Performed by: PSYCHIATRY & NEUROLOGY

## 2019-01-01 PROCEDURE — 85730 THROMBOPLASTIN TIME PARTIAL: CPT | Performed by: EMERGENCY MEDICINE

## 2019-01-01 PROCEDURE — 93312 ECHO TRANSESOPHAGEAL: CPT

## 2019-01-01 PROCEDURE — 84100 ASSAY OF PHOSPHORUS: CPT | Performed by: NURSE PRACTITIONER

## 2019-01-01 PROCEDURE — G0179 MD RECERTIFICATION HHA PT: HCPCS | Performed by: INTERNAL MEDICINE

## 2019-01-01 PROCEDURE — 97110 THERAPEUTIC EXERCISES: CPT | Performed by: OCCUPATIONAL THERAPIST

## 2019-01-01 PROCEDURE — 71275 CT ANGIOGRAPHY CHEST: CPT

## 2019-01-01 PROCEDURE — 70490 CT SOFT TISSUE NECK W/O DYE: CPT

## 2019-01-01 PROCEDURE — 87081 CULTURE SCREEN ONLY: CPT | Performed by: INTERNAL MEDICINE

## 2019-01-01 PROCEDURE — 25010000002 PIPERACILLIN SOD-TAZOBACTAM PER 1 G: Performed by: EMERGENCY MEDICINE

## 2019-01-01 PROCEDURE — 87040 BLOOD CULTURE FOR BACTERIA: CPT | Performed by: NURSE PRACTITIONER

## 2019-01-01 PROCEDURE — 84466 ASSAY OF TRANSFERRIN: CPT | Performed by: INTERNAL MEDICINE

## 2019-01-01 PROCEDURE — 80053 COMPREHEN METABOLIC PANEL: CPT

## 2019-01-01 PROCEDURE — 25010000002 ONDANSETRON PER 1 MG

## 2019-01-01 PROCEDURE — 83605 ASSAY OF LACTIC ACID: CPT | Performed by: EMERGENCY MEDICINE

## 2019-01-01 PROCEDURE — 99220 PR INITIAL OBSERVATION CARE/DAY 70 MINUTES: CPT | Performed by: INTERNAL MEDICINE

## 2019-01-01 PROCEDURE — 73560 X-RAY EXAM OF KNEE 1 OR 2: CPT

## 2019-01-01 PROCEDURE — 99291 CRITICAL CARE FIRST HOUR: CPT | Performed by: INTERNAL MEDICINE

## 2019-01-01 PROCEDURE — 81003 URINALYSIS AUTO W/O SCOPE: CPT | Performed by: INTERNAL MEDICINE

## 2019-01-01 PROCEDURE — 93000 ELECTROCARDIOGRAM COMPLETE: CPT | Performed by: INTERNAL MEDICINE

## 2019-01-01 PROCEDURE — 93325 DOPPLER ECHO COLOR FLOW MAPG: CPT | Performed by: INTERNAL MEDICINE

## 2019-01-01 PROCEDURE — G0008 ADMIN INFLUENZA VIRUS VAC: HCPCS | Performed by: INTERNAL MEDICINE

## 2019-01-01 PROCEDURE — 95819 EEG AWAKE AND ASLEEP: CPT

## 2019-01-01 PROCEDURE — 84439 ASSAY OF FREE THYROXINE: CPT | Performed by: EMERGENCY MEDICINE

## 2019-01-01 PROCEDURE — 83605 ASSAY OF LACTIC ACID: CPT | Performed by: NURSE PRACTITIONER

## 2019-01-01 PROCEDURE — 83036 HEMOGLOBIN GLYCOSYLATED A1C: CPT | Performed by: PHYSICIAN ASSISTANT

## 2019-01-01 PROCEDURE — 31500 INSERT EMERGENCY AIRWAY: CPT

## 2019-01-01 PROCEDURE — 96375 TX/PRO/DX INJ NEW DRUG ADDON: CPT

## 2019-01-01 PROCEDURE — 25010000002 CEFTRIAXONE PER 250 MG: Performed by: INTERNAL MEDICINE

## 2019-01-01 PROCEDURE — 94640 AIRWAY INHALATION TREATMENT: CPT

## 2019-01-01 PROCEDURE — 84439 ASSAY OF FREE THYROXINE: CPT | Performed by: INTERNAL MEDICINE

## 2019-01-01 PROCEDURE — 72125 CT NECK SPINE W/O DYE: CPT

## 2019-01-01 PROCEDURE — 84145 PROCALCITONIN (PCT): CPT | Performed by: NURSE PRACTITIONER

## 2019-01-01 PROCEDURE — 80047 BASIC METABLC PNL IONIZED CA: CPT

## 2019-01-01 PROCEDURE — 84443 ASSAY THYROID STIM HORMONE: CPT | Performed by: EMERGENCY MEDICINE

## 2019-01-01 PROCEDURE — 25010000002 KETOROLAC TROMETHAMINE PER 15 MG: Performed by: NURSE PRACTITIONER

## 2019-01-01 PROCEDURE — 25010000002 LORAZEPAM PER 2 MG: Performed by: FAMILY MEDICINE

## 2019-01-01 PROCEDURE — 93320 DOPPLER ECHO COMPLETE: CPT | Performed by: INTERNAL MEDICINE

## 2019-01-01 PROCEDURE — 99238 HOSP IP/OBS DSCHRG MGMT 30/<: CPT | Performed by: INTERNAL MEDICINE

## 2019-01-01 PROCEDURE — 17000 DESTRUCT PREMALG LESION: CPT | Performed by: INTERNAL MEDICINE

## 2019-01-01 PROCEDURE — 81001 URINALYSIS AUTO W/SCOPE: CPT | Performed by: NURSE PRACTITIONER

## 2019-01-01 PROCEDURE — 84443 ASSAY THYROID STIM HORMONE: CPT | Performed by: INTERNAL MEDICINE

## 2019-01-01 PROCEDURE — 97116 GAIT TRAINING THERAPY: CPT | Performed by: PHYSICAL THERAPIST

## 2019-01-01 PROCEDURE — 99223 1ST HOSP IP/OBS HIGH 75: CPT | Performed by: PSYCHIATRY & NEUROLOGY

## 2019-01-01 PROCEDURE — 70496 CT ANGIOGRAPHY HEAD: CPT

## 2019-01-01 PROCEDURE — 73030 X-RAY EXAM OF SHOULDER: CPT

## 2019-01-01 PROCEDURE — 0BH17EZ INSERTION OF ENDOTRACHEAL AIRWAY INTO TRACHEA, VIA NATURAL OR ARTIFICIAL OPENING: ICD-10-PCS | Performed by: EMERGENCY MEDICINE

## 2019-01-01 PROCEDURE — 93325 DOPPLER ECHO COLOR FLOW MAPG: CPT

## 2019-01-01 PROCEDURE — 82805 BLOOD GASES W/O2 SATURATION: CPT

## 2019-01-01 PROCEDURE — 84439 ASSAY OF FREE THYROXINE: CPT | Performed by: PSYCHIATRY & NEUROLOGY

## 2019-01-01 PROCEDURE — 25010000002 DEXAMETHASONE PER 1 MG: Performed by: NURSE PRACTITIONER

## 2019-01-01 PROCEDURE — 99284 EMERGENCY DEPT VISIT MOD MDM: CPT

## 2019-01-01 PROCEDURE — 25010000002 PROPOFOL 10 MG/ML EMULSION: Performed by: EMERGENCY MEDICINE

## 2019-01-01 PROCEDURE — 86900 BLOOD TYPING SEROLOGIC ABO: CPT | Performed by: EMERGENCY MEDICINE

## 2019-01-01 PROCEDURE — 82746 ASSAY OF FOLIC ACID SERUM: CPT | Performed by: PSYCHIATRY & NEUROLOGY

## 2019-01-01 PROCEDURE — 93321 DOPPLER ECHO F-UP/LMTD STD: CPT

## 2019-01-01 PROCEDURE — 17003 DESTRUCT PREMALG LES 2-14: CPT | Performed by: INTERNAL MEDICINE

## 2019-01-01 PROCEDURE — 25010000002 HALOPERIDOL LACTATE PER 5 MG: Performed by: PHYSICIAN ASSISTANT

## 2019-01-01 RX ORDER — MORPHINE SULFATE 2 MG/ML
2 INJECTION, SOLUTION INTRAMUSCULAR; INTRAVENOUS
Status: DISCONTINUED | OUTPATIENT
Start: 2019-01-01 | End: 2019-01-01

## 2019-01-01 RX ORDER — AMLODIPINE BESYLATE 2.5 MG/1
2.5 TABLET ORAL
Start: 2019-01-01 | End: 2019-01-01 | Stop reason: SDUPTHER

## 2019-01-01 RX ORDER — METHYLPREDNISOLONE SODIUM SUCCINATE 40 MG/ML
40 INJECTION, POWDER, LYOPHILIZED, FOR SOLUTION INTRAMUSCULAR; INTRAVENOUS ONCE
Status: COMPLETED | OUTPATIENT
Start: 2019-01-01 | End: 2019-01-01

## 2019-01-01 RX ORDER — IPRATROPIUM BROMIDE AND ALBUTEROL SULFATE 2.5; .5 MG/3ML; MG/3ML
3 SOLUTION RESPIRATORY (INHALATION) EVERY 4 HOURS PRN
Status: DISCONTINUED | OUTPATIENT
Start: 2019-01-01 | End: 2019-01-01 | Stop reason: HOSPADM

## 2019-01-01 RX ORDER — DULOXETIN HYDROCHLORIDE 20 MG/1
20 CAPSULE, DELAYED RELEASE ORAL DAILY
Qty: 30 CAPSULE | Refills: 11 | Status: SHIPPED | OUTPATIENT
Start: 2019-01-01 | End: 2019-01-01 | Stop reason: DRUGHIGH

## 2019-01-01 RX ORDER — POTASSIUM CHLORIDE 750 MG/1
40 CAPSULE, EXTENDED RELEASE ORAL AS NEEDED
Status: DISCONTINUED | OUTPATIENT
Start: 2019-01-01 | End: 2019-01-01 | Stop reason: HOSPADM

## 2019-01-01 RX ORDER — SODIUM CHLORIDE 0.9 % (FLUSH) 0.9 %
3 SYRINGE (ML) INJECTION EVERY 12 HOURS SCHEDULED
Status: DISCONTINUED | OUTPATIENT
Start: 2019-01-01 | End: 2019-01-01 | Stop reason: HOSPADM

## 2019-01-01 RX ORDER — ASPIRIN 81 MG/1
81 TABLET ORAL DAILY
Status: DISCONTINUED | OUTPATIENT
Start: 2019-01-01 | End: 2019-01-01 | Stop reason: HOSPADM

## 2019-01-01 RX ORDER — ATORVASTATIN CALCIUM 80 MG/1
80 TABLET, FILM COATED ORAL NIGHTLY
Qty: 30 TABLET | Refills: 11 | Status: SHIPPED | OUTPATIENT
Start: 2019-01-01

## 2019-01-01 RX ORDER — FAMOTIDINE 20 MG/1
20 TABLET, FILM COATED ORAL
Status: DISCONTINUED | OUTPATIENT
Start: 2019-01-01 | End: 2019-01-01

## 2019-01-01 RX ORDER — DIPHENHYDRAMINE HYDROCHLORIDE 50 MG/ML
25 INJECTION INTRAMUSCULAR; INTRAVENOUS ONCE
Status: COMPLETED | OUTPATIENT
Start: 2019-01-01 | End: 2019-01-01

## 2019-01-01 RX ORDER — QUETIAPINE FUMARATE 25 MG/1
25 TABLET, FILM COATED ORAL NIGHTLY PRN
Status: DISCONTINUED | OUTPATIENT
Start: 2019-01-01 | End: 2019-01-01

## 2019-01-01 RX ORDER — MEMANTINE HYDROCHLORIDE 10 MG/1
10 TABLET ORAL EVERY 12 HOURS SCHEDULED
Status: DISCONTINUED | OUTPATIENT
Start: 2019-01-01 | End: 2019-01-01

## 2019-01-01 RX ORDER — GABAPENTIN 300 MG/1
CAPSULE ORAL
Qty: 30 CAPSULE | Refills: 1 | Status: SHIPPED | OUTPATIENT
Start: 2019-01-01 | End: 2019-01-01

## 2019-01-01 RX ORDER — ACETAMINOPHEN 160 MG/5ML
650 SOLUTION ORAL EVERY 6 HOURS PRN
Status: DISCONTINUED | OUTPATIENT
Start: 2019-01-01 | End: 2019-01-01

## 2019-01-01 RX ORDER — SODIUM CHLORIDE 0.9 % (FLUSH) 0.9 %
10 SYRINGE (ML) INJECTION AS NEEDED
Status: DISCONTINUED | OUTPATIENT
Start: 2019-01-01 | End: 2019-01-01 | Stop reason: HOSPADM

## 2019-01-01 RX ORDER — PANTOPRAZOLE SODIUM 40 MG/1
40 TABLET, DELAYED RELEASE ORAL
Status: DISCONTINUED | OUTPATIENT
Start: 2019-01-01 | End: 2019-01-01 | Stop reason: HOSPADM

## 2019-01-01 RX ORDER — POTASSIUM CHLORIDE 750 MG/1
20 TABLET, FILM COATED, EXTENDED RELEASE ORAL DAILY
Qty: 180 TABLET | Refills: 1 | Status: SHIPPED | OUTPATIENT
Start: 2019-01-01

## 2019-01-01 RX ORDER — ATORVASTATIN CALCIUM 80 MG/1
80 TABLET, FILM COATED ORAL NIGHTLY
Start: 2019-01-01 | End: 2019-01-01 | Stop reason: SDUPTHER

## 2019-01-01 RX ORDER — MINERAL OIL AND WHITE PETROLATUM 150; 830 MG/G; MG/G
OINTMENT OPHTHALMIC NIGHTLY
Status: DISCONTINUED | OUTPATIENT
Start: 2019-01-01 | End: 2019-01-01 | Stop reason: HOSPADM

## 2019-01-01 RX ORDER — MESALAMINE 0.38 G/1
0.75 CAPSULE, EXTENDED RELEASE ORAL NIGHTLY
Status: DISCONTINUED | OUTPATIENT
Start: 2019-01-01 | End: 2019-01-01 | Stop reason: HOSPADM

## 2019-01-01 RX ORDER — LISINOPRIL 40 MG/1
40 TABLET ORAL
Start: 2019-01-01 | End: 2019-01-01 | Stop reason: ALTCHOICE

## 2019-01-01 RX ORDER — GABAPENTIN 300 MG/1
300 CAPSULE ORAL
COMMUNITY

## 2019-01-01 RX ORDER — ACETAMINOPHEN 650 MG/1
650 SUPPOSITORY RECTAL EVERY 4 HOURS PRN
Status: DISCONTINUED | OUTPATIENT
Start: 2019-01-01 | End: 2019-01-01

## 2019-01-01 RX ORDER — TEMAZEPAM 7.5 MG/1
7.5 CAPSULE ORAL NIGHTLY PRN
Status: DISCONTINUED | OUTPATIENT
Start: 2019-01-01 | End: 2019-01-01 | Stop reason: HOSPADM

## 2019-01-01 RX ORDER — FAMOTIDINE 20 MG/1
20 TABLET, FILM COATED ORAL DAILY
Status: DISCONTINUED | OUTPATIENT
Start: 2019-01-01 | End: 2019-01-01 | Stop reason: HOSPADM

## 2019-01-01 RX ORDER — GABAPENTIN 100 MG/1
100 CAPSULE ORAL DAILY PRN
Status: DISCONTINUED | OUTPATIENT
Start: 2019-01-01 | End: 2019-01-01 | Stop reason: HOSPADM

## 2019-01-01 RX ORDER — ANTIOX #8/OM3/DHA/EPA/LUT/ZEAX 250-2.5 MG
1 CAPSULE ORAL 2 TIMES DAILY
Status: DISCONTINUED | OUTPATIENT
Start: 2019-01-01 | End: 2019-01-01 | Stop reason: HOSPADM

## 2019-01-01 RX ORDER — FAMOTIDINE 10 MG/ML
20 INJECTION, SOLUTION INTRAVENOUS EVERY 12 HOURS SCHEDULED
Status: DISCONTINUED | OUTPATIENT
Start: 2019-01-01 | End: 2019-01-01 | Stop reason: HOSPADM

## 2019-01-01 RX ORDER — HYDROXYZINE HYDROCHLORIDE 25 MG/1
25 TABLET, FILM COATED ORAL ONCE
Status: COMPLETED | OUTPATIENT
Start: 2019-01-01 | End: 2019-01-01

## 2019-01-01 RX ORDER — HYDROCODONE BITARTRATE AND ACETAMINOPHEN 5; 325 MG/1; MG/1
1 TABLET ORAL EVERY 6 HOURS PRN
Qty: 15 TABLET | Refills: 0 | Status: SHIPPED | OUTPATIENT
Start: 2019-01-01 | End: 2019-01-01 | Stop reason: SDUPTHER

## 2019-01-01 RX ORDER — POTASSIUM CHLORIDE 1.5 G/1.77G
40 POWDER, FOR SOLUTION ORAL AS NEEDED
Status: DISCONTINUED | OUTPATIENT
Start: 2019-01-01 | End: 2019-01-01 | Stop reason: HOSPADM

## 2019-01-01 RX ORDER — CHOLECALCIFEROL (VITAMIN D3) 125 MCG
5 CAPSULE ORAL NIGHTLY
Start: 2019-01-01 | End: 2019-01-01

## 2019-01-01 RX ORDER — QUETIAPINE FUMARATE 25 MG/1
12.5 TABLET, FILM COATED ORAL EVERY 12 HOURS SCHEDULED
Status: DISCONTINUED | OUTPATIENT
Start: 2019-01-01 | End: 2019-01-01

## 2019-01-01 RX ORDER — LISINOPRIL 20 MG/1
1 TABLET ORAL 2 TIMES DAILY
COMMUNITY
Start: 2019-01-01 | End: 2019-01-01 | Stop reason: SDUPTHER

## 2019-01-01 RX ORDER — MEMANTINE HYDROCHLORIDE 5 MG/1
10 TABLET ORAL EVERY 12 HOURS SCHEDULED
Status: DISCONTINUED | OUTPATIENT
Start: 2019-01-01 | End: 2019-01-01 | Stop reason: HOSPADM

## 2019-01-01 RX ORDER — BISOPROLOL FUMARATE 5 MG/1
TABLET, FILM COATED ORAL
Qty: 30 TABLET | Refills: 10 | Status: SHIPPED | OUTPATIENT
Start: 2019-01-01 | End: 2019-01-01

## 2019-01-01 RX ORDER — POTASSIUM CHLORIDE 7.45 MG/ML
10 INJECTION INTRAVENOUS
Status: DISCONTINUED | OUTPATIENT
Start: 2019-01-01 | End: 2019-01-01 | Stop reason: HOSPADM

## 2019-01-01 RX ORDER — CETIRIZINE HYDROCHLORIDE 10 MG/1
5 TABLET ORAL DAILY
Status: DISCONTINUED | OUTPATIENT
Start: 2019-01-01 | End: 2019-01-01

## 2019-01-01 RX ORDER — SODIUM CHLORIDE 0.9 % (FLUSH) 0.9 %
3-10 SYRINGE (ML) INJECTION AS NEEDED
Status: DISCONTINUED | OUTPATIENT
Start: 2019-01-01 | End: 2019-01-01 | Stop reason: HOSPADM

## 2019-01-01 RX ORDER — MIDODRINE HYDROCHLORIDE 5 MG/1
TABLET ORAL
Qty: 90 TABLET | Refills: 3 | Status: SHIPPED | OUTPATIENT
Start: 2019-01-01 | End: 2019-01-01

## 2019-01-01 RX ORDER — GABAPENTIN 300 MG/1
300 CAPSULE ORAL NIGHTLY
Status: DISCONTINUED | OUTPATIENT
Start: 2019-01-01 | End: 2019-01-01 | Stop reason: HOSPADM

## 2019-01-01 RX ORDER — MESALAMINE 0.38 G/1
0.75 CAPSULE, EXTENDED RELEASE ORAL NIGHTLY
Status: DISCONTINUED | OUTPATIENT
Start: 2019-01-01 | End: 2019-01-01

## 2019-01-01 RX ORDER — PANTOPRAZOLE SODIUM 40 MG/1
40 TABLET, DELAYED RELEASE ORAL
Status: DISCONTINUED | OUTPATIENT
Start: 2019-01-01 | End: 2019-01-01

## 2019-01-01 RX ORDER — LORAZEPAM 2 MG/ML
1 INJECTION INTRAMUSCULAR EVERY 4 HOURS PRN
Status: DISCONTINUED | OUTPATIENT
Start: 2019-01-01 | End: 2019-01-01 | Stop reason: HOSPADM

## 2019-01-01 RX ORDER — AMLODIPINE BESYLATE 2.5 MG/1
2.5 TABLET ORAL DAILY
Status: CANCELLED | OUTPATIENT
Start: 2019-01-01

## 2019-01-01 RX ORDER — BISOPROLOL FUMARATE 5 MG/1
5 TABLET, FILM COATED ORAL
Status: DISCONTINUED | OUTPATIENT
Start: 2019-01-01 | End: 2019-01-01 | Stop reason: HOSPADM

## 2019-01-01 RX ORDER — FENTANYL CITRATE-0.9 % NACL/PF 10 MCG/ML
50-300 PLASTIC BAG, INJECTION (ML) INTRAVENOUS
Status: DISCONTINUED | OUTPATIENT
Start: 2019-01-01 | End: 2019-01-01

## 2019-01-01 RX ORDER — GLYCOPYRROLATE 0.2 MG/ML
0.1 INJECTION INTRAMUSCULAR; INTRAVENOUS ONCE
Status: COMPLETED | OUTPATIENT
Start: 2019-01-01 | End: 2019-01-01

## 2019-01-01 RX ORDER — MESALAMINE 400 MG/1
800 CAPSULE, DELAYED RELEASE ORAL 3 TIMES DAILY
Status: DISCONTINUED | OUTPATIENT
Start: 2019-01-01 | End: 2019-01-01 | Stop reason: ALTCHOICE

## 2019-01-01 RX ORDER — ACETAMINOPHEN 325 MG/1
650 TABLET ORAL EVERY 8 HOURS SCHEDULED
Status: DISCONTINUED | OUTPATIENT
Start: 2019-01-01 | End: 2019-01-01

## 2019-01-01 RX ORDER — CHOLECALCIFEROL (VITAMIN D3) 125 MCG
5 CAPSULE ORAL NIGHTLY
Status: DISCONTINUED | OUTPATIENT
Start: 2019-01-01 | End: 2019-01-01 | Stop reason: HOSPADM

## 2019-01-01 RX ORDER — CHOLECALCIFEROL (VITAMIN D3) 125 MCG
5 CAPSULE ORAL NIGHTLY PRN
Status: DISCONTINUED | OUTPATIENT
Start: 2019-01-01 | End: 2019-01-01 | Stop reason: HOSPADM

## 2019-01-01 RX ORDER — SODIUM CHLORIDE 9 MG/ML
50 INJECTION, SOLUTION INTRAVENOUS CONTINUOUS
Status: DISCONTINUED | OUTPATIENT
Start: 2019-01-01 | End: 2019-01-01

## 2019-01-01 RX ORDER — OMEPRAZOLE 20 MG/1
CAPSULE, DELAYED RELEASE ORAL
Qty: 60 CAPSULE | Refills: 10 | Status: SHIPPED | OUTPATIENT
Start: 2019-01-01

## 2019-01-01 RX ORDER — GABAPENTIN 300 MG/1
300 CAPSULE ORAL NIGHTLY
Status: DISCONTINUED | OUTPATIENT
Start: 2019-01-01 | End: 2019-01-01

## 2019-01-01 RX ORDER — AMLODIPINE BESYLATE 2.5 MG/1
2.5 TABLET ORAL
Status: DISCONTINUED | OUTPATIENT
Start: 2019-01-01 | End: 2019-01-01 | Stop reason: HOSPADM

## 2019-01-01 RX ORDER — LORATADINE 10 MG/1
10 TABLET ORAL AS NEEDED
COMMUNITY
End: 2019-01-01 | Stop reason: HOSPADM

## 2019-01-01 RX ORDER — DICYCLOMINE HCL 20 MG
TABLET ORAL
Qty: 60 TABLET | Refills: 1 | Status: SHIPPED | OUTPATIENT
Start: 2019-01-01 | End: 2019-01-01 | Stop reason: SDUPTHER

## 2019-01-01 RX ORDER — CLOPIDOGREL BISULFATE 75 MG/1
75 TABLET ORAL ONCE
Status: COMPLETED | OUTPATIENT
Start: 2019-01-01 | End: 2019-01-01

## 2019-01-01 RX ORDER — LIDOCAINE 50 MG/G
2 PATCH TOPICAL NIGHTLY
Start: 2019-01-01 | End: 2019-01-01

## 2019-01-01 RX ORDER — POLYVINYL ALCOHOL 14 MG/ML
1 SOLUTION/ DROPS OPHTHALMIC
Status: DISCONTINUED | OUTPATIENT
Start: 2019-01-01 | End: 2019-01-01 | Stop reason: HOSPADM

## 2019-01-01 RX ORDER — HALOPERIDOL 5 MG/ML
2 INJECTION INTRAMUSCULAR ONCE
Status: COMPLETED | OUTPATIENT
Start: 2019-01-01 | End: 2019-01-01

## 2019-01-01 RX ORDER — METHYLPREDNISOLONE SODIUM SUCCINATE 40 MG/ML
80 INJECTION, POWDER, LYOPHILIZED, FOR SOLUTION INTRAMUSCULAR; INTRAVENOUS ONCE
Status: COMPLETED | OUTPATIENT
Start: 2019-01-01 | End: 2019-01-01

## 2019-01-01 RX ORDER — ONDANSETRON 2 MG/ML
4 INJECTION INTRAMUSCULAR; INTRAVENOUS ONCE
Status: COMPLETED | OUTPATIENT
Start: 2019-01-01 | End: 2019-01-01

## 2019-01-01 RX ORDER — ASPIRIN 300 MG/1
300 SUPPOSITORY RECTAL DAILY
Status: DISCONTINUED | OUTPATIENT
Start: 2019-01-01 | End: 2019-01-01

## 2019-01-01 RX ORDER — GLYCOPYRROLATE 0.2 MG/ML
0.2 INJECTION INTRAMUSCULAR; INTRAVENOUS
Status: DISCONTINUED | OUTPATIENT
Start: 2019-01-01 | End: 2019-01-01 | Stop reason: HOSPADM

## 2019-01-01 RX ORDER — PREDNISONE 10 MG/1
5 TABLET ORAL DAILY
Status: DISCONTINUED | OUTPATIENT
Start: 2019-01-01 | End: 2019-01-01

## 2019-01-01 RX ORDER — LISINOPRIL 20 MG/1
20 TABLET ORAL 2 TIMES DAILY
Qty: 180 TABLET | Refills: 1 | Status: SHIPPED | OUTPATIENT
Start: 2019-01-01 | End: 2019-01-01 | Stop reason: HOSPADM

## 2019-01-01 RX ORDER — PANTOPRAZOLE SODIUM 40 MG/10ML
40 INJECTION, POWDER, LYOPHILIZED, FOR SOLUTION INTRAVENOUS
Status: DISCONTINUED | OUTPATIENT
Start: 2019-01-01 | End: 2019-01-01

## 2019-01-01 RX ORDER — KETOROLAC TROMETHAMINE 30 MG/ML
10 INJECTION, SOLUTION INTRAMUSCULAR; INTRAVENOUS EVERY 6 HOURS PRN
Status: DISCONTINUED | OUTPATIENT
Start: 2019-01-01 | End: 2019-01-01

## 2019-01-01 RX ORDER — DICYCLOMINE HCL 20 MG
20 TABLET ORAL 2 TIMES DAILY PRN
COMMUNITY
End: 2019-01-01 | Stop reason: HOSPADM

## 2019-01-01 RX ORDER — MESALAMINE 1.2 G/1
1.2 TABLET, DELAYED RELEASE ORAL
Status: DISCONTINUED | OUTPATIENT
Start: 2019-01-01 | End: 2019-01-01

## 2019-01-01 RX ORDER — IPRATROPIUM BROMIDE AND ALBUTEROL SULFATE 2.5; .5 MG/3ML; MG/3ML
3 SOLUTION RESPIRATORY (INHALATION)
Status: DISCONTINUED | OUTPATIENT
Start: 2019-01-01 | End: 2019-01-01

## 2019-01-01 RX ORDER — BUPIVACAINE HYDROCHLORIDE 2.5 MG/ML
INJECTION, SOLUTION EPIDURAL; INFILTRATION; INTRACAUDAL
Status: COMPLETED | OUTPATIENT
Start: 2019-01-01 | End: 2019-01-01

## 2019-01-01 RX ORDER — ATORVASTATIN CALCIUM 40 MG/1
80 TABLET, FILM COATED ORAL NIGHTLY
Status: DISCONTINUED | OUTPATIENT
Start: 2019-01-01 | End: 2019-01-01 | Stop reason: HOSPADM

## 2019-01-01 RX ORDER — MORPHINE SULFATE 2 MG/ML
2 INJECTION, SOLUTION INTRAMUSCULAR; INTRAVENOUS ONCE
Status: COMPLETED | OUTPATIENT
Start: 2019-01-01 | End: 2019-01-01

## 2019-01-01 RX ORDER — LOPERAMIDE HYDROCHLORIDE 2 MG/1
2 CAPSULE ORAL 4 TIMES DAILY PRN
Status: DISCONTINUED | OUTPATIENT
Start: 2019-01-01 | End: 2019-01-01 | Stop reason: HOSPADM

## 2019-01-01 RX ORDER — DEXAMETHASONE SODIUM PHOSPHATE 4 MG/ML
4 INJECTION, SOLUTION INTRA-ARTICULAR; INTRALESIONAL; INTRAMUSCULAR; INTRAVENOUS; SOFT TISSUE ONCE
Status: COMPLETED | OUTPATIENT
Start: 2019-01-01 | End: 2019-01-01

## 2019-01-01 RX ORDER — MIDAZOLAM HYDROCHLORIDE 1 MG/ML
INJECTION INTRAMUSCULAR; INTRAVENOUS
Status: COMPLETED | OUTPATIENT
Start: 2019-01-01 | End: 2019-01-01

## 2019-01-01 RX ORDER — POTASSIUM CHLORIDE 1.5 G/1.77G
40 POWDER, FOR SOLUTION ORAL AS NEEDED
Status: DISCONTINUED | OUTPATIENT
Start: 2019-01-01 | End: 2019-01-01

## 2019-01-01 RX ORDER — DULOXETIN HYDROCHLORIDE 20 MG/1
20 CAPSULE, DELAYED RELEASE ORAL DAILY
Status: DISCONTINUED | OUTPATIENT
Start: 2019-01-01 | End: 2019-01-01 | Stop reason: HOSPADM

## 2019-01-01 RX ORDER — ACETAMINOPHEN 325 MG/1
650 TABLET ORAL EVERY 4 HOURS PRN
Status: DISCONTINUED | OUTPATIENT
Start: 2019-01-01 | End: 2019-01-01 | Stop reason: HOSPADM

## 2019-01-01 RX ORDER — LISINOPRIL 20 MG/1
20 TABLET ORAL EVERY 12 HOURS SCHEDULED
Status: DISCONTINUED | OUTPATIENT
Start: 2019-01-01 | End: 2019-01-01

## 2019-01-01 RX ORDER — GABAPENTIN 100 MG/1
CAPSULE ORAL
Qty: 150 CAPSULE | Refills: 2 | Status: ON HOLD | OUTPATIENT
Start: 2019-01-01 | End: 2019-01-01

## 2019-01-01 RX ORDER — DONEPEZIL HYDROCHLORIDE 5 MG/1
5 TABLET, FILM COATED ORAL NIGHTLY
Qty: 30 TABLET | Refills: 3 | Status: ON HOLD | OUTPATIENT
Start: 2019-01-01 | End: 2019-01-01

## 2019-01-01 RX ORDER — LORAZEPAM 2 MG/ML
0.5 INJECTION INTRAMUSCULAR ONCE
Status: COMPLETED | OUTPATIENT
Start: 2019-01-01 | End: 2019-01-01

## 2019-01-01 RX ORDER — GABAPENTIN 300 MG/1
300 CAPSULE ORAL NIGHTLY
Status: ON HOLD | COMMUNITY
End: 2019-01-01 | Stop reason: SDUPTHER

## 2019-01-01 RX ORDER — MINERAL OIL AND WHITE PETROLATUM 150; 830 MG/G; MG/G
OINTMENT OPHTHALMIC NIGHTLY
Start: 2019-01-01 | End: 2019-01-01

## 2019-01-01 RX ORDER — BISOPROLOL FUMARATE 5 MG/1
5 TABLET, FILM COATED ORAL EVERY EVENING
COMMUNITY

## 2019-01-01 RX ORDER — ACETAMINOPHEN 325 MG/1
650 TABLET ORAL EVERY 6 HOURS SCHEDULED
Status: DISCONTINUED | OUTPATIENT
Start: 2019-01-01 | End: 2019-01-01 | Stop reason: HOSPADM

## 2019-01-01 RX ORDER — SODIUM CHLORIDE/ALOE VERA
GEL (GRAM) NASAL
Status: DISCONTINUED | OUTPATIENT
Start: 2019-01-01 | End: 2019-01-01 | Stop reason: HOSPADM

## 2019-01-01 RX ORDER — ETOMIDATE 2 MG/ML
20 INJECTION INTRAVENOUS ONCE
Status: COMPLETED | OUTPATIENT
Start: 2019-01-01 | End: 2019-01-01

## 2019-01-01 RX ORDER — SODIUM CHLORIDE 0.9 % (FLUSH) 0.9 %
10 SYRINGE (ML) INJECTION EVERY 12 HOURS SCHEDULED
Status: DISCONTINUED | OUTPATIENT
Start: 2019-01-01 | End: 2019-01-01

## 2019-01-01 RX ORDER — AMOXICILLIN AND CLAVULANATE POTASSIUM 875; 125 MG/1; MG/1
1 TABLET, FILM COATED ORAL EVERY 12 HOURS SCHEDULED
Status: COMPLETED | OUTPATIENT
Start: 2019-01-01 | End: 2019-01-01

## 2019-01-01 RX ORDER — CHLORHEXIDINE GLUCONATE 0.12 MG/ML
15 RINSE ORAL EVERY 12 HOURS SCHEDULED
Status: DISCONTINUED | OUTPATIENT
Start: 2019-01-01 | End: 2019-01-01 | Stop reason: HOSPADM

## 2019-01-01 RX ORDER — ATORVASTATIN CALCIUM 40 MG/1
80 TABLET, FILM COATED ORAL NIGHTLY
Status: DISCONTINUED | OUTPATIENT
Start: 2019-01-01 | End: 2019-01-01

## 2019-01-01 RX ORDER — TRAMADOL HYDROCHLORIDE 50 MG/1
50 TABLET ORAL EVERY 6 HOURS PRN
Status: DISCONTINUED | OUTPATIENT
Start: 2019-01-01 | End: 2019-01-01

## 2019-01-01 RX ORDER — FLUTICASONE PROPIONATE 50 MCG
2 SPRAY, SUSPENSION (ML) NASAL DAILY
Qty: 1 BOTTLE | Refills: 5 | Status: SHIPPED | OUTPATIENT
Start: 2019-01-01 | End: 2019-01-01

## 2019-01-01 RX ORDER — ACETAMINOPHEN 500 MG
500 TABLET ORAL EVERY 6 HOURS PRN
Status: DISCONTINUED | OUTPATIENT
Start: 2019-01-01 | End: 2019-01-01 | Stop reason: HOSPADM

## 2019-01-01 RX ORDER — ONDANSETRON 2 MG/ML
INJECTION INTRAMUSCULAR; INTRAVENOUS
Status: COMPLETED
Start: 2019-01-01 | End: 2019-01-01

## 2019-01-01 RX ORDER — ACETAMINOPHEN 325 MG/1
650 TABLET ORAL EVERY 6 HOURS PRN
Status: DISCONTINUED | OUTPATIENT
Start: 2019-01-01 | End: 2019-01-01

## 2019-01-01 RX ORDER — DULOXETIN HYDROCHLORIDE 30 MG/1
30 CAPSULE, DELAYED RELEASE ORAL DAILY
Qty: 30 CAPSULE | Refills: 5 | Status: SHIPPED | OUTPATIENT
Start: 2019-01-01

## 2019-01-01 RX ORDER — QUETIAPINE FUMARATE 25 MG/1
12.5 TABLET, FILM COATED ORAL NIGHTLY
Status: DISCONTINUED | OUTPATIENT
Start: 2019-01-01 | End: 2019-01-01

## 2019-01-01 RX ORDER — ROPIVACAINE HYDROCHLORIDE 2 MG/ML
6 INJECTION, SOLUTION EPIDURAL; INFILTRATION CONTINUOUS
Status: DISPENSED | OUTPATIENT
Start: 2019-01-01 | End: 2019-01-01

## 2019-01-01 RX ORDER — LISINOPRIL 20 MG/1
20 TABLET ORAL 2 TIMES DAILY
Status: CANCELLED | OUTPATIENT
Start: 2019-01-01

## 2019-01-01 RX ORDER — DICYCLOMINE HCL 20 MG
20 TABLET ORAL 2 TIMES DAILY PRN
Status: CANCELLED | OUTPATIENT
Start: 2019-01-01

## 2019-01-01 RX ORDER — FAMOTIDINE 10 MG/ML
20 INJECTION, SOLUTION INTRAVENOUS ONCE
Status: COMPLETED | OUTPATIENT
Start: 2019-01-01 | End: 2019-01-01

## 2019-01-01 RX ORDER — SODIUM CHLORIDE 9 MG/ML
50 INJECTION, SOLUTION INTRAVENOUS CONTINUOUS
Status: ACTIVE | OUTPATIENT
Start: 2019-01-01 | End: 2019-01-01

## 2019-01-01 RX ORDER — PREDNISONE 1 MG/1
TABLET ORAL
Qty: 180 TABLET | Refills: 3 | Status: SHIPPED | OUTPATIENT
Start: 2019-01-01

## 2019-01-01 RX ORDER — DOCUSATE SODIUM 100 MG/1
100 CAPSULE, LIQUID FILLED ORAL 2 TIMES DAILY
Status: DISCONTINUED | OUTPATIENT
Start: 2019-01-01 | End: 2019-01-01

## 2019-01-01 RX ORDER — HYDROXYZINE HYDROCHLORIDE 25 MG/1
25 TABLET, FILM COATED ORAL 3 TIMES DAILY PRN
Status: DISCONTINUED | OUTPATIENT
Start: 2019-01-01 | End: 2019-01-01 | Stop reason: HOSPADM

## 2019-01-01 RX ORDER — FLUTICASONE PROPIONATE 50 MCG
2 SPRAY, SUSPENSION (ML) NASAL DAILY PRN
COMMUNITY

## 2019-01-01 RX ORDER — BISACODYL 10 MG
10 SUPPOSITORY, RECTAL RECTAL ONCE
Status: COMPLETED | OUTPATIENT
Start: 2019-01-01 | End: 2019-01-01

## 2019-01-01 RX ORDER — DULOXETIN HYDROCHLORIDE 20 MG/1
20 CAPSULE, DELAYED RELEASE ORAL DAILY
Status: CANCELLED | OUTPATIENT
Start: 2019-01-01

## 2019-01-01 RX ORDER — DICYCLOMINE HCL 20 MG
TABLET ORAL
Qty: 60 TABLET | Refills: 0 | Status: SHIPPED | OUTPATIENT
Start: 2019-01-01 | End: 2019-01-01

## 2019-01-01 RX ORDER — SODIUM CHLORIDE 9 MG/ML
75 INJECTION, SOLUTION INTRAVENOUS ONCE
Status: COMPLETED | OUTPATIENT
Start: 2019-01-01 | End: 2019-01-01

## 2019-01-01 RX ORDER — PREDNISONE 10 MG/1
5 TABLET ORAL
Status: DISCONTINUED | OUTPATIENT
Start: 2019-01-01 | End: 2019-01-01 | Stop reason: HOSPADM

## 2019-01-01 RX ORDER — GABAPENTIN 300 MG/1
300 CAPSULE ORAL NIGHTLY
Qty: 30 CAPSULE | Refills: 2 | Status: SHIPPED | OUTPATIENT
Start: 2019-01-01 | End: 2019-01-01 | Stop reason: SDUPTHER

## 2019-01-01 RX ORDER — PREDNISONE 10 MG/1
5 TABLET ORAL 2 TIMES DAILY WITH MEALS
Status: DISCONTINUED | OUTPATIENT
Start: 2019-01-01 | End: 2019-01-01 | Stop reason: HOSPADM

## 2019-01-01 RX ORDER — DULOXETIN HYDROCHLORIDE 20 MG/1
20 CAPSULE, DELAYED RELEASE ORAL DAILY
Start: 2019-01-01 | End: 2019-01-01 | Stop reason: SDUPTHER

## 2019-01-01 RX ORDER — AMLODIPINE BESYLATE 2.5 MG/1
2.5 TABLET ORAL
Status: DISCONTINUED | OUTPATIENT
Start: 2019-01-01 | End: 2019-01-01

## 2019-01-01 RX ORDER — LIDOCAINE 50 MG/G
2 PATCH TOPICAL NIGHTLY
Status: DISCONTINUED | OUTPATIENT
Start: 2019-01-01 | End: 2019-01-01 | Stop reason: HOSPADM

## 2019-01-01 RX ORDER — MEMANTINE HYDROCHLORIDE 10 MG/1
10 TABLET ORAL EVERY 12 HOURS SCHEDULED
Status: DISCONTINUED | OUTPATIENT
Start: 2019-01-01 | End: 2019-01-01 | Stop reason: HOSPADM

## 2019-01-01 RX ORDER — MESALAMINE 0.38 G/1
0.75 CAPSULE, EXTENDED RELEASE ORAL DAILY
Status: DISCONTINUED | OUTPATIENT
Start: 2019-01-01 | End: 2019-01-01 | Stop reason: HOSPADM

## 2019-01-01 RX ORDER — MESALAMINE 375 MG/1
0.75 CAPSULE, EXTENDED RELEASE ORAL NIGHTLY
COMMUNITY
Start: 2019-01-01

## 2019-01-01 RX ORDER — CALCIUM CARBONATE 200(500)MG
1 TABLET,CHEWABLE ORAL 3 TIMES DAILY PRN
Status: DISCONTINUED | OUTPATIENT
Start: 2019-01-01 | End: 2019-01-01 | Stop reason: HOSPADM

## 2019-01-01 RX ORDER — POTASSIUM CHLORIDE 750 MG/1
20 TABLET, FILM COATED, EXTENDED RELEASE ORAL DAILY
COMMUNITY
Start: 2019-01-01 | End: 2019-01-01 | Stop reason: SDUPTHER

## 2019-01-01 RX ORDER — NYSTATIN 100000 [USP'U]/G
POWDER TOPICAL EVERY 12 HOURS SCHEDULED
Status: DISCONTINUED | OUTPATIENT
Start: 2019-01-01 | End: 2019-01-01 | Stop reason: HOSPADM

## 2019-01-01 RX ORDER — SACCHAROMYCES BOULARDII 250 MG
250 CAPSULE ORAL 2 TIMES DAILY
Status: DISCONTINUED | OUTPATIENT
Start: 2019-01-01 | End: 2019-01-01 | Stop reason: HOSPADM

## 2019-01-01 RX ORDER — PANTOPRAZOLE SODIUM 40 MG/1
40 TABLET, DELAYED RELEASE ORAL EVERY MORNING
Status: CANCELLED | OUTPATIENT
Start: 2019-01-01

## 2019-01-01 RX ORDER — BISOPROLOL FUMARATE 5 MG/1
5 TABLET, FILM COATED ORAL EVERY EVENING
Status: DISCONTINUED | OUTPATIENT
Start: 2019-01-01 | End: 2019-01-01

## 2019-01-01 RX ORDER — TERAZOSIN 5 MG/1
5 CAPSULE ORAL NIGHTLY
Status: DISCONTINUED | OUTPATIENT
Start: 2019-01-01 | End: 2019-01-01

## 2019-01-01 RX ORDER — MESALAMINE 1.2 G/1
2.4 TABLET, DELAYED RELEASE ORAL
Status: DISCONTINUED | OUTPATIENT
Start: 2019-01-01 | End: 2019-01-01 | Stop reason: ALTCHOICE

## 2019-01-01 RX ORDER — HYDROCODONE BITARTRATE AND ACETAMINOPHEN 5; 325 MG/1; MG/1
1 TABLET ORAL EVERY 6 HOURS PRN
Qty: 15 TABLET | Refills: 0 | Status: SHIPPED | OUTPATIENT
Start: 2019-01-01 | End: 2019-01-01 | Stop reason: HOSPADM

## 2019-01-01 RX ORDER — AMLODIPINE BESYLATE 2.5 MG/1
2.5 TABLET ORAL DAILY
Qty: 30 TABLET | Refills: 11 | Status: SHIPPED | OUTPATIENT
Start: 2019-01-01 | End: 2019-01-01 | Stop reason: HOSPADM

## 2019-01-01 RX ORDER — LISINOPRIL 20 MG/1
20 TABLET ORAL 2 TIMES DAILY
Status: DISCONTINUED | OUTPATIENT
Start: 2019-01-01 | End: 2019-01-01

## 2019-01-01 RX ORDER — ACETAMINOPHEN 650 MG/1
650 SUPPOSITORY RECTAL EVERY 4 HOURS PRN
Status: DISCONTINUED | OUTPATIENT
Start: 2019-01-01 | End: 2019-01-01 | Stop reason: HOSPADM

## 2019-01-01 RX ORDER — POTASSIUM CHLORIDE 750 MG/1
40 CAPSULE, EXTENDED RELEASE ORAL AS NEEDED
Status: DISCONTINUED | OUTPATIENT
Start: 2019-01-01 | End: 2019-01-01

## 2019-01-01 RX ORDER — GABAPENTIN 300 MG/1
300 CAPSULE ORAL NIGHTLY
Qty: 3 CAPSULE | Refills: 0 | Status: SHIPPED | OUTPATIENT
Start: 2019-01-01 | End: 2019-01-01 | Stop reason: SDUPTHER

## 2019-01-01 RX ORDER — GLYCOPYRROLATE 0.2 MG/ML
0.4 INJECTION INTRAMUSCULAR; INTRAVENOUS ONCE
Status: COMPLETED | OUTPATIENT
Start: 2019-01-01 | End: 2019-01-01

## 2019-01-01 RX ORDER — AMLODIPINE BESYLATE 2.5 MG/1
2.5 TABLET ORAL DAILY
Status: DISCONTINUED | OUTPATIENT
Start: 2019-01-01 | End: 2019-01-01

## 2019-01-01 RX ORDER — FLUTICASONE PROPIONATE 50 MCG
2 SPRAY, SUSPENSION (ML) NASAL DAILY
Status: DISCONTINUED | OUTPATIENT
Start: 2019-01-01 | End: 2019-01-01 | Stop reason: HOSPADM

## 2019-01-01 RX ORDER — DICYCLOMINE HCL 20 MG
20 TABLET ORAL DAILY PRN
Qty: 60 TABLET | Refills: 1 | Status: SHIPPED | OUTPATIENT
Start: 2019-01-01 | End: 2019-01-01 | Stop reason: SDUPTHER

## 2019-01-01 RX ORDER — TRAMADOL HYDROCHLORIDE 50 MG/1
25 TABLET ORAL EVERY 6 HOURS PRN
Status: DISCONTINUED | OUTPATIENT
Start: 2019-01-01 | End: 2019-01-01

## 2019-01-01 RX ORDER — LISINOPRIL 10 MG/1
40 TABLET ORAL
Status: DISCONTINUED | OUTPATIENT
Start: 2019-01-01 | End: 2019-01-01 | Stop reason: HOSPADM

## 2019-01-01 RX ORDER — LISINOPRIL 10 MG/1
10 TABLET ORAL EVERY 12 HOURS SCHEDULED
Status: DISCONTINUED | OUTPATIENT
Start: 2019-01-01 | End: 2019-01-01

## 2019-01-01 RX ORDER — GABAPENTIN 300 MG/1
300 CAPSULE ORAL 2 TIMES DAILY
Status: DISCONTINUED | OUTPATIENT
Start: 2019-01-01 | End: 2019-01-01

## 2019-01-01 RX ORDER — DICYCLOMINE HCL 20 MG
20 TABLET ORAL 2 TIMES DAILY PRN
Status: DISCONTINUED | OUTPATIENT
Start: 2019-01-01 | End: 2019-01-01

## 2019-01-01 RX ORDER — ASPIRIN 325 MG
325 TABLET ORAL DAILY
Status: DISCONTINUED | OUTPATIENT
Start: 2019-01-01 | End: 2019-01-01

## 2019-01-01 RX ORDER — DICYCLOMINE HCL 20 MG
20 TABLET ORAL 2 TIMES DAILY PRN
Status: DISCONTINUED | OUTPATIENT
Start: 2019-01-01 | End: 2019-01-01 | Stop reason: HOSPADM

## 2019-01-01 RX ORDER — FUROSEMIDE 10 MG/ML
20 INJECTION INTRAMUSCULAR; INTRAVENOUS ONCE
Status: COMPLETED | OUTPATIENT
Start: 2019-01-01 | End: 2019-01-01

## 2019-01-01 RX ORDER — LISINOPRIL 10 MG/1
20 TABLET ORAL EVERY 12 HOURS SCHEDULED
Status: DISCONTINUED | OUTPATIENT
Start: 2019-01-01 | End: 2019-01-01

## 2019-01-01 RX ORDER — SACCHAROMYCES BOULARDII 250 MG
250 CAPSULE ORAL 2 TIMES DAILY
Start: 2019-01-01 | End: 2019-01-01

## 2019-01-01 RX ORDER — TEMAZEPAM 7.5 MG/1
7.5 CAPSULE ORAL NIGHTLY PRN
Start: 2019-01-01 | End: 2019-01-01

## 2019-01-01 RX ORDER — ACETAMINOPHEN 325 MG/1
650 TABLET ORAL EVERY 4 HOURS PRN
Start: 2019-01-01

## 2019-01-01 RX ORDER — ACETAMINOPHEN 325 MG/1
650 TABLET ORAL EVERY 8 HOURS
Status: DISCONTINUED | OUTPATIENT
Start: 2019-01-01 | End: 2019-01-01

## 2019-01-01 RX ORDER — KETOROLAC TROMETHAMINE 30 MG/ML
15 INJECTION, SOLUTION INTRAMUSCULAR; INTRAVENOUS ONCE AS NEEDED
Status: COMPLETED | OUTPATIENT
Start: 2019-01-01 | End: 2019-01-01

## 2019-01-01 RX ORDER — SUCCINYLCHOLINE CHLORIDE 20 MG/ML
80 INJECTION INTRAMUSCULAR; INTRAVENOUS ONCE
Status: COMPLETED | OUTPATIENT
Start: 2019-01-01 | End: 2019-01-01

## 2019-01-01 RX ORDER — PANTOPRAZOLE SODIUM 40 MG/1
40 TABLET, DELAYED RELEASE ORAL ONCE
Status: COMPLETED | OUTPATIENT
Start: 2019-01-01 | End: 2019-01-01

## 2019-01-01 RX ORDER — DULOXETIN HYDROCHLORIDE 20 MG/1
20 CAPSULE, DELAYED RELEASE ORAL DAILY
Status: DISCONTINUED | OUTPATIENT
Start: 2019-01-01 | End: 2019-01-01

## 2019-01-01 RX ORDER — MEMANTINE HYDROCHLORIDE 28 MG/1
CAPSULE, EXTENDED RELEASE ORAL
Qty: 30 CAPSULE | Refills: 0 | Status: SHIPPED | OUTPATIENT
Start: 2019-01-01

## 2019-01-01 RX ADMIN — MORPHINE SULFATE 2 MG: 2 INJECTION, SOLUTION INTRAMUSCULAR; INTRAVENOUS at 22:13

## 2019-01-01 RX ADMIN — TRAMADOL HYDROCHLORIDE 50 MG: 50 TABLET, COATED ORAL at 05:55

## 2019-01-01 RX ADMIN — CALCIUM CARBONATE 1 TABLET: 500 TABLET ORAL at 09:27

## 2019-01-01 RX ADMIN — MORPHINE SULFATE 2 MG: 2 INJECTION, SOLUTION INTRAMUSCULAR; INTRAVENOUS at 19:04

## 2019-01-01 RX ADMIN — KETOROLAC TROMETHAMINE 10 MG: 30 INJECTION, SOLUTION INTRAMUSCULAR at 04:40

## 2019-01-01 RX ADMIN — MELATONIN TAB 5 MG 5 MG: 5 TAB at 21:41

## 2019-01-01 RX ADMIN — SODIUM CHLORIDE, PRESERVATIVE FREE 3 ML: 5 INJECTION INTRAVENOUS at 09:19

## 2019-01-01 RX ADMIN — CALCIUM CARBONATE 1 TABLET: 500 TABLET ORAL at 15:33

## 2019-01-01 RX ADMIN — SODIUM CHLORIDE 250 ML: 9 INJECTION, SOLUTION INTRAVENOUS at 15:51

## 2019-01-01 RX ADMIN — ACETAMINOPHEN 650 MG: 325 TABLET ORAL at 21:32

## 2019-01-01 RX ADMIN — PREDNISONE 5 MG: 10 TABLET ORAL at 09:30

## 2019-01-01 RX ADMIN — HYDROCORTISONE SODIUM SUCCINATE 20 MG: 100 INJECTION, POWDER, FOR SOLUTION INTRAMUSCULAR; INTRAVENOUS at 10:57

## 2019-01-01 RX ADMIN — NYSTATIN 5 ML: 100000 SUSPENSION ORAL at 14:00

## 2019-01-01 RX ADMIN — MEMANTINE 10 MG: 10 TABLET ORAL at 21:25

## 2019-01-01 RX ADMIN — SODIUM CHLORIDE, PRESERVATIVE FREE 3 ML: 5 INJECTION INTRAVENOUS at 08:24

## 2019-01-01 RX ADMIN — SODIUM CHLORIDE, PRESERVATIVE FREE 3 ML: 5 INJECTION INTRAVENOUS at 09:33

## 2019-01-01 RX ADMIN — PANTOPRAZOLE SODIUM 40 MG: 40 TABLET, DELAYED RELEASE ORAL at 05:33

## 2019-01-01 RX ADMIN — SODIUM CHLORIDE, PRESERVATIVE FREE 3 ML: 5 INJECTION INTRAVENOUS at 09:24

## 2019-01-01 RX ADMIN — ACETAMINOPHEN 650 MG: 325 TABLET ORAL at 06:43

## 2019-01-01 RX ADMIN — DOCUSATE SODIUM 100 MG: 100 CAPSULE, LIQUID FILLED ORAL at 08:39

## 2019-01-01 RX ADMIN — HYDROCORTISONE SODIUM SUCCINATE 20 MG: 100 INJECTION, POWDER, FOR SOLUTION INTRAMUSCULAR; INTRAVENOUS at 08:24

## 2019-01-01 RX ADMIN — DICLOFENAC SODIUM 2 G: 10 GEL TOPICAL at 09:33

## 2019-01-01 RX ADMIN — ACETAMINOPHEN 650 MG: 325 TABLET ORAL at 06:18

## 2019-01-01 RX ADMIN — LIDOCAINE 2 PATCH: 50 PATCH CUTANEOUS at 21:00

## 2019-01-01 RX ADMIN — ACETAMINOPHEN 650 MG: 325 TABLET ORAL at 18:46

## 2019-01-01 RX ADMIN — SODIUM CHLORIDE, PRESERVATIVE FREE 3 ML: 5 INJECTION INTRAVENOUS at 09:25

## 2019-01-01 RX ADMIN — PANTOPRAZOLE SODIUM 40 MG: 40 TABLET, DELAYED RELEASE ORAL at 05:55

## 2019-01-01 RX ADMIN — AMOXICILLIN AND CLAVULANATE POTASSIUM 1 TABLET: 875; 125 TABLET, FILM COATED ORAL at 08:49

## 2019-01-01 RX ADMIN — ATORVASTATIN CALCIUM 80 MG: 40 TABLET, FILM COATED ORAL at 22:06

## 2019-01-01 RX ADMIN — DICLOFENAC SODIUM 2 G: 10 GEL TOPICAL at 21:22

## 2019-01-01 RX ADMIN — MESALAMINE 0.75 G: 375 CAPSULE, EXTENDED RELEASE ORAL at 21:26

## 2019-01-01 RX ADMIN — GABAPENTIN 300 MG: 300 CAPSULE ORAL at 22:03

## 2019-01-01 RX ADMIN — HYDROMORPHONE HYDROCHLORIDE 1 MG: 1 INJECTION, SOLUTION INTRAMUSCULAR; INTRAVENOUS; SUBCUTANEOUS at 18:17

## 2019-01-01 RX ADMIN — ACETAMINOPHEN 650 MG: 325 TABLET ORAL at 21:25

## 2019-01-01 RX ADMIN — Medication 250 MG: at 22:03

## 2019-01-01 RX ADMIN — MESALAMINE 0.75 G: 0.38 CAPSULE, EXTENDED RELEASE ORAL at 15:42

## 2019-01-01 RX ADMIN — PANTOPRAZOLE SODIUM 40 MG: 40 TABLET, DELAYED RELEASE ORAL at 06:18

## 2019-01-01 RX ADMIN — ATORVASTATIN CALCIUM 80 MG: 40 TABLET, FILM COATED ORAL at 20:25

## 2019-01-01 RX ADMIN — APIXABAN 5 MG: 5 TABLET, FILM COATED ORAL at 09:28

## 2019-01-01 RX ADMIN — ACETAMINOPHEN 650 MG: 325 TABLET ORAL at 21:13

## 2019-01-01 RX ADMIN — APIXABAN 5 MG: 5 TABLET, FILM COATED ORAL at 09:24

## 2019-01-01 RX ADMIN — ASPIRIN 81 MG: 81 TABLET, COATED ORAL at 09:31

## 2019-01-01 RX ADMIN — MINERAL OIL, PETROLATUM: 425; 568 OINTMENT OPHTHALMIC at 20:31

## 2019-01-01 RX ADMIN — AMLODIPINE BESYLATE 2.5 MG: 2.5 TABLET ORAL at 08:39

## 2019-01-01 RX ADMIN — QUETIAPINE FUMARATE 12.5 MG: 25 TABLET ORAL at 20:10

## 2019-01-01 RX ADMIN — MEMANTINE 10 MG: 10 TABLET ORAL at 11:52

## 2019-01-01 RX ADMIN — SODIUM CHLORIDE, PRESERVATIVE FREE 3 ML: 5 INJECTION INTRAVENOUS at 08:46

## 2019-01-01 RX ADMIN — GLYCOPYRROLATE 0.1 MG: 0.2 INJECTION, SOLUTION INTRAMUSCULAR; INTRAVITREAL at 02:44

## 2019-01-01 RX ADMIN — SODIUM CHLORIDE, PRESERVATIVE FREE 3 ML: 5 INJECTION INTRAVENOUS at 08:15

## 2019-01-01 RX ADMIN — PREDNISONE 5 MG: 10 TABLET ORAL at 09:10

## 2019-01-01 RX ADMIN — ATORVASTATIN CALCIUM 80 MG: 40 TABLET, FILM COATED ORAL at 21:06

## 2019-01-01 RX ADMIN — Medication 250 MG: at 09:11

## 2019-01-01 RX ADMIN — MESALAMINE 2.4 G: 1.2 TABLET, DELAYED RELEASE ORAL at 09:11

## 2019-01-01 RX ADMIN — POTASSIUM CHLORIDE 40 MEQ: 1.5 POWDER, FOR SOLUTION ORAL at 12:01

## 2019-01-01 RX ADMIN — ROPIVACAINE HYDROCHLORIDE 6 ML/HR: 2 INJECTION, SOLUTION EPIDURAL; INFILTRATION at 11:48

## 2019-01-01 RX ADMIN — DICLOFENAC SODIUM 2 G: 10 GEL TOPICAL at 18:00

## 2019-01-01 RX ADMIN — MEMANTINE 10 MG: 10 TABLET ORAL at 20:25

## 2019-01-01 RX ADMIN — DICLOFENAC SODIUM 2 G: 10 GEL TOPICAL at 21:02

## 2019-01-01 RX ADMIN — APIXABAN 5 MG: 5 TABLET, FILM COATED ORAL at 09:11

## 2019-01-01 RX ADMIN — SODIUM CHLORIDE, PRESERVATIVE FREE 3 ML: 5 INJECTION INTRAVENOUS at 21:15

## 2019-01-01 RX ADMIN — SODIUM CHLORIDE, PRESERVATIVE FREE 3 ML: 5 INJECTION INTRAVENOUS at 20:40

## 2019-01-01 RX ADMIN — ACETAMINOPHEN 650 MG: 325 TABLET ORAL at 22:52

## 2019-01-01 RX ADMIN — MEMANTINE 10 MG: 5 TABLET ORAL at 09:11

## 2019-01-01 RX ADMIN — PREDNISONE 5 MG: 10 TABLET ORAL at 08:49

## 2019-01-01 RX ADMIN — HYDROMORPHONE HYDROCHLORIDE 1 MG: 1 INJECTION, SOLUTION INTRAMUSCULAR; INTRAVENOUS; SUBCUTANEOUS at 19:47

## 2019-01-01 RX ADMIN — GABAPENTIN 300 MG: 300 CAPSULE ORAL at 20:31

## 2019-01-01 RX ADMIN — BISOPROLOL FUMARATE 5 MG: 5 TABLET ORAL at 10:45

## 2019-01-01 RX ADMIN — FAMOTIDINE 20 MG: 10 INJECTION, SOLUTION INTRAVENOUS at 18:31

## 2019-01-01 RX ADMIN — PREDNISONE 5 MG: 10 TABLET ORAL at 08:42

## 2019-01-01 RX ADMIN — FAMOTIDINE 20 MG: 20 TABLET ORAL at 17:38

## 2019-01-01 RX ADMIN — IPRATROPIUM BROMIDE AND ALBUTEROL SULFATE 3 ML: 2.5; .5 SOLUTION RESPIRATORY (INHALATION) at 19:39

## 2019-01-01 RX ADMIN — ACETAMINOPHEN 650 MG: 325 TABLET ORAL at 23:31

## 2019-01-01 RX ADMIN — NYSTATIN: 100000 POWDER TOPICAL at 09:33

## 2019-01-01 RX ADMIN — LOPERAMIDE HYDROCHLORIDE 2 MG: 2 CAPSULE ORAL at 04:26

## 2019-01-01 RX ADMIN — BISOPROLOL FUMARATE 5 MG: 5 TABLET ORAL at 13:07

## 2019-01-01 RX ADMIN — SODIUM CHLORIDE, PRESERVATIVE FREE 3 ML: 5 INJECTION INTRAVENOUS at 10:03

## 2019-01-01 RX ADMIN — AMOXICILLIN AND CLAVULANATE POTASSIUM 1 TABLET: 875; 125 TABLET, FILM COATED ORAL at 21:02

## 2019-01-01 RX ADMIN — SODIUM CHLORIDE, PRESERVATIVE FREE 3 ML: 5 INJECTION INTRAVENOUS at 21:01

## 2019-01-01 RX ADMIN — LISINOPRIL 20 MG: 20 TABLET ORAL at 22:07

## 2019-01-01 RX ADMIN — IPRATROPIUM BROMIDE AND ALBUTEROL SULFATE 3 ML: 2.5; .5 SOLUTION RESPIRATORY (INHALATION) at 16:05

## 2019-01-01 RX ADMIN — LIDOCAINE 2 PATCH: 50 PATCH CUTANEOUS at 20:37

## 2019-01-01 RX ADMIN — BISOPROLOL FUMARATE 5 MG: 5 TABLET, FILM COATED ORAL at 09:01

## 2019-01-01 RX ADMIN — AMOXICILLIN AND CLAVULANATE POTASSIUM 1 TABLET: 875; 125 TABLET, FILM COATED ORAL at 09:24

## 2019-01-01 RX ADMIN — GABAPENTIN 300 MG: 300 CAPSULE ORAL at 20:38

## 2019-01-01 RX ADMIN — DICLOFENAC SODIUM 2 G: 10 GEL TOPICAL at 12:02

## 2019-01-01 RX ADMIN — SODIUM CHLORIDE 50 ML/HR: 9 INJECTION, SOLUTION INTRAVENOUS at 00:50

## 2019-01-01 RX ADMIN — PREDNISONE 5 MG: 10 TABLET ORAL at 09:11

## 2019-01-01 RX ADMIN — DICLOFENAC SODIUM 2 G: 10 GEL TOPICAL at 14:27

## 2019-01-01 RX ADMIN — SODIUM CHLORIDE, PRESERVATIVE FREE 3 ML: 5 INJECTION INTRAVENOUS at 09:03

## 2019-01-01 RX ADMIN — LIDOCAINE 2 PATCH: 50 PATCH CUTANEOUS at 22:52

## 2019-01-01 RX ADMIN — LIDOCAINE 2 PATCH: 50 PATCH CUTANEOUS at 21:34

## 2019-01-01 RX ADMIN — Medication 1 CAPSULE: at 20:41

## 2019-01-01 RX ADMIN — LISINOPRIL 20 MG: 20 TABLET ORAL at 09:13

## 2019-01-01 RX ADMIN — ONDANSETRON 4 MG: 2 INJECTION INTRAMUSCULAR; INTRAVENOUS at 08:47

## 2019-01-01 RX ADMIN — DULOXETINE HYDROCHLORIDE 20 MG: 20 CAPSULE, DELAYED RELEASE ORAL at 13:41

## 2019-01-01 RX ADMIN — AMOXICILLIN AND CLAVULANATE POTASSIUM 1 TABLET: 875; 125 TABLET, FILM COATED ORAL at 20:43

## 2019-01-01 RX ADMIN — PANTOPRAZOLE SODIUM 40 MG: 40 TABLET, DELAYED RELEASE ORAL at 05:53

## 2019-01-01 RX ADMIN — SODIUM CHLORIDE, PRESERVATIVE FREE 3 ML: 5 INJECTION INTRAVENOUS at 08:13

## 2019-01-01 RX ADMIN — FAMOTIDINE 20 MG: 10 INJECTION, SOLUTION INTRAVENOUS at 19:06

## 2019-01-01 RX ADMIN — DICLOFENAC SODIUM 2 G: 10 GEL TOPICAL at 11:12

## 2019-01-01 RX ADMIN — DULOXETINE HYDROCHLORIDE 20 MG: 20 CAPSULE, DELAYED RELEASE ORAL at 09:09

## 2019-01-01 RX ADMIN — ACETAMINOPHEN 650 MG: 325 TABLET ORAL at 11:48

## 2019-01-01 RX ADMIN — DULOXETINE HYDROCHLORIDE 20 MG: 20 CAPSULE, DELAYED RELEASE ORAL at 12:29

## 2019-01-01 RX ADMIN — POTASSIUM CHLORIDE 10 MEQ: 7.46 INJECTION, SOLUTION INTRAVENOUS at 08:32

## 2019-01-01 RX ADMIN — PANTOPRAZOLE SODIUM 40 MG: 40 TABLET, DELAYED RELEASE ORAL at 05:42

## 2019-01-01 RX ADMIN — MEMANTINE 10 MG: 10 TABLET ORAL at 08:46

## 2019-01-01 RX ADMIN — DICLOFENAC SODIUM 2 G: 10 GEL TOPICAL at 17:09

## 2019-01-01 RX ADMIN — PREDNISONE 5 MG: 10 TABLET ORAL at 09:24

## 2019-01-01 RX ADMIN — DICLOFENAC SODIUM 2 G: 10 GEL TOPICAL at 20:58

## 2019-01-01 RX ADMIN — ROPIVACAINE HYDROCHLORIDE 6 ML/HR: 2 INJECTION, SOLUTION EPIDURAL; INFILTRATION at 23:18

## 2019-01-01 RX ADMIN — FAMOTIDINE 20 MG: 10 INJECTION, SOLUTION INTRAVENOUS at 15:59

## 2019-01-01 RX ADMIN — POLYVINYL ALCOHOL 1 DROP: 14 SOLUTION/ DROPS OPHTHALMIC at 09:00

## 2019-01-01 RX ADMIN — DICLOFENAC SODIUM 2 G: 10 GEL TOPICAL at 18:07

## 2019-01-01 RX ADMIN — PANTOPRAZOLE SODIUM 40 MG: 40 TABLET, DELAYED RELEASE ORAL at 05:43

## 2019-01-01 RX ADMIN — BISOPROLOL FUMARATE 5 MG: 5 TABLET, FILM COATED ORAL at 08:47

## 2019-01-01 RX ADMIN — CETIRIZINE HYDROCHLORIDE 5 MG: 10 TABLET, FILM COATED ORAL at 11:58

## 2019-01-01 RX ADMIN — FAMOTIDINE 20 MG: 20 TABLET ORAL at 16:50

## 2019-01-01 RX ADMIN — DOCUSATE SODIUM 100 MG: 100 CAPSULE, LIQUID FILLED ORAL at 21:07

## 2019-01-01 RX ADMIN — POTASSIUM CHLORIDE 10 MEQ: 7.46 INJECTION, SOLUTION INTRAVENOUS at 13:00

## 2019-01-01 RX ADMIN — TRAMADOL HYDROCHLORIDE 50 MG: 50 TABLET, COATED ORAL at 05:56

## 2019-01-01 RX ADMIN — PREDNISONE 5 MG: 10 TABLET ORAL at 18:30

## 2019-01-01 RX ADMIN — ATORVASTATIN CALCIUM 80 MG: 40 TABLET, FILM COATED ORAL at 22:25

## 2019-01-01 RX ADMIN — NYSTATIN 5 ML: 100000 SUSPENSION ORAL at 05:55

## 2019-01-01 RX ADMIN — SODIUM CHLORIDE 100 ML/HR: 9 INJECTION, SOLUTION INTRAVENOUS at 02:55

## 2019-01-01 RX ADMIN — Medication 250 MG: at 09:32

## 2019-01-01 RX ADMIN — AMLODIPINE BESYLATE 2.5 MG: 2.5 TABLET ORAL at 14:43

## 2019-01-01 RX ADMIN — SODIUM CHLORIDE 75 ML/HR: 9 INJECTION, SOLUTION INTRAVENOUS at 23:29

## 2019-01-01 RX ADMIN — ATORVASTATIN CALCIUM 80 MG: 40 TABLET, FILM COATED ORAL at 21:19

## 2019-01-01 RX ADMIN — TAZOBACTAM SODIUM AND PIPERACILLIN SODIUM 3.38 G: 375; 3 INJECTION, SOLUTION INTRAVENOUS at 05:30

## 2019-01-01 RX ADMIN — PANTOPRAZOLE SODIUM 40 MG: 40 TABLET, DELAYED RELEASE ORAL at 15:40

## 2019-01-01 RX ADMIN — DULOXETINE HYDROCHLORIDE 20 MG: 20 CAPSULE, DELAYED RELEASE ORAL at 11:58

## 2019-01-01 RX ADMIN — MESALAMINE 0.75 G: 375 CAPSULE, EXTENDED RELEASE ORAL at 20:30

## 2019-01-01 RX ADMIN — Medication 250 MG: at 09:15

## 2019-01-01 RX ADMIN — ACETAMINOPHEN 650 MG: 325 TABLET ORAL at 17:40

## 2019-01-01 RX ADMIN — POTASSIUM CHLORIDE 40 MEQ: 1.5 POWDER, FOR SOLUTION ORAL at 11:51

## 2019-01-01 RX ADMIN — PANTOPRAZOLE SODIUM 40 MG: 40 TABLET, DELAYED RELEASE ORAL at 06:01

## 2019-01-01 RX ADMIN — PREDNISONE 5 MG: 10 TABLET ORAL at 11:58

## 2019-01-01 RX ADMIN — Medication: at 12:01

## 2019-01-01 RX ADMIN — METHYLPREDNISOLONE SODIUM SUCCINATE 80 MG: 40 INJECTION, POWDER, FOR SOLUTION INTRAMUSCULAR; INTRAVENOUS at 19:05

## 2019-01-01 RX ADMIN — ACETAMINOPHEN 650 MG: 325 TABLET ORAL at 14:09

## 2019-01-01 RX ADMIN — Medication 250 MG: at 21:13

## 2019-01-01 RX ADMIN — DICLOFENAC SODIUM 2 G: 10 GEL TOPICAL at 10:01

## 2019-01-01 RX ADMIN — MEMANTINE 10 MG: 10 TABLET ORAL at 20:31

## 2019-01-01 RX ADMIN — ACETAMINOPHEN 650 MG: 325 TABLET ORAL at 13:47

## 2019-01-01 RX ADMIN — SUCCINYLCHOLINE CHLORIDE 80 MG: 20 INJECTION, SOLUTION INTRAMUSCULAR; INTRAVENOUS at 08:48

## 2019-01-01 RX ADMIN — PREDNISONE 5 MG: 10 TABLET ORAL at 18:19

## 2019-01-01 RX ADMIN — PANTOPRAZOLE SODIUM 40 MG: 40 TABLET, DELAYED RELEASE ORAL at 06:12

## 2019-01-01 RX ADMIN — CEFTRIAXONE SODIUM 1 G: 1 INJECTION, POWDER, FOR SOLUTION INTRAMUSCULAR; INTRAVENOUS at 16:25

## 2019-01-01 RX ADMIN — MELATONIN TAB 5 MG 5 MG: 5 TAB at 20:46

## 2019-01-01 RX ADMIN — Medication 1 CAPSULE: at 08:46

## 2019-01-01 RX ADMIN — LISINOPRIL 40 MG: 10 TABLET ORAL at 10:45

## 2019-01-01 RX ADMIN — APIXABAN 5 MG: 5 TABLET, FILM COATED ORAL at 09:01

## 2019-01-01 RX ADMIN — Medication 250 MG: at 08:46

## 2019-01-01 RX ADMIN — IPRATROPIUM BROMIDE AND ALBUTEROL SULFATE 3 ML: 2.5; .5 SOLUTION RESPIRATORY (INHALATION) at 07:51

## 2019-01-01 RX ADMIN — CALCIUM CARBONATE 1 TABLET: 500 TABLET ORAL at 02:25

## 2019-01-01 RX ADMIN — KETOROLAC TROMETHAMINE 10 MG: 30 INJECTION, SOLUTION INTRAMUSCULAR at 09:15

## 2019-01-01 RX ADMIN — APIXABAN 5 MG: 5 TABLET, FILM COATED ORAL at 08:39

## 2019-01-01 RX ADMIN — ATORVASTATIN CALCIUM 80 MG: 40 TABLET, FILM COATED ORAL at 22:51

## 2019-01-01 RX ADMIN — MEMANTINE 10 MG: 10 TABLET ORAL at 22:06

## 2019-01-01 RX ADMIN — SODIUM CHLORIDE, PRESERVATIVE FREE 3 ML: 5 INJECTION INTRAVENOUS at 09:12

## 2019-01-01 RX ADMIN — TRAMADOL HYDROCHLORIDE 50 MG: 50 TABLET, COATED ORAL at 20:09

## 2019-01-01 RX ADMIN — MESALAMINE 0.75 G: 375 CAPSULE, EXTENDED RELEASE ORAL at 22:04

## 2019-01-01 RX ADMIN — BISOPROLOL FUMARATE 5 MG: 5 TABLET, FILM COATED ORAL at 08:13

## 2019-01-01 RX ADMIN — APIXABAN 5 MG: 5 TABLET, FILM COATED ORAL at 22:24

## 2019-01-01 RX ADMIN — LISINOPRIL 10 MG: 10 TABLET ORAL at 18:22

## 2019-01-01 RX ADMIN — APIXABAN 5 MG: 5 TABLET, FILM COATED ORAL at 20:39

## 2019-01-01 RX ADMIN — Medication 250 MG: at 20:31

## 2019-01-01 RX ADMIN — BISOPROLOL FUMARATE 5 MG: 5 TABLET ORAL at 09:31

## 2019-01-01 RX ADMIN — DICLOFENAC SODIUM 2 G: 10 GEL TOPICAL at 20:24

## 2019-01-01 RX ADMIN — ATORVASTATIN CALCIUM 80 MG: 40 TABLET, FILM COATED ORAL at 21:25

## 2019-01-01 RX ADMIN — MEMANTINE 10 MG: 10 TABLET ORAL at 21:29

## 2019-01-01 RX ADMIN — MELATONIN TAB 5 MG 5 MG: 5 TAB at 21:31

## 2019-01-01 RX ADMIN — DULOXETINE HYDROCHLORIDE 20 MG: 20 CAPSULE, DELAYED RELEASE ORAL at 09:03

## 2019-01-01 RX ADMIN — MEMANTINE 10 MG: 10 TABLET ORAL at 08:14

## 2019-01-01 RX ADMIN — DULOXETINE HYDROCHLORIDE 20 MG: 20 CAPSULE, DELAYED RELEASE ORAL at 18:22

## 2019-01-01 RX ADMIN — PREDNISONE 5 MG: 10 TABLET ORAL at 10:46

## 2019-01-01 RX ADMIN — ACETAMINOPHEN 650 MG: 325 TABLET ORAL at 18:15

## 2019-01-01 RX ADMIN — ENOXAPARIN SODIUM 90 MG: 100 INJECTION SUBCUTANEOUS at 09:00

## 2019-01-01 RX ADMIN — FUROSEMIDE 20 MG: 10 INJECTION, SOLUTION INTRAMUSCULAR; INTRAVENOUS at 02:45

## 2019-01-01 RX ADMIN — SODIUM CHLORIDE, PRESERVATIVE FREE 3 ML: 5 INJECTION INTRAVENOUS at 08:40

## 2019-01-01 RX ADMIN — QUETIAPINE FUMARATE 12.5 MG: 25 TABLET ORAL at 22:07

## 2019-01-01 RX ADMIN — ATORVASTATIN CALCIUM 80 MG: 40 TABLET, FILM COATED ORAL at 20:09

## 2019-01-01 RX ADMIN — LISINOPRIL 20 MG: 10 TABLET ORAL at 22:25

## 2019-01-01 RX ADMIN — APIXABAN 5 MG: 5 TABLET, FILM COATED ORAL at 21:25

## 2019-01-01 RX ADMIN — Medication: at 09:15

## 2019-01-01 RX ADMIN — LIDOCAINE 2 PATCH: 50 PATCH CUTANEOUS at 21:30

## 2019-01-01 RX ADMIN — Medication 250 MG: at 08:13

## 2019-01-01 RX ADMIN — ACETAMINOPHEN 650 MG: 325 TABLET ORAL at 23:53

## 2019-01-01 RX ADMIN — ACETAMINOPHEN 650 MG: 325 TABLET ORAL at 12:01

## 2019-01-01 RX ADMIN — BISOPROLOL FUMARATE 5 MG: 5 TABLET ORAL at 18:22

## 2019-01-01 RX ADMIN — SODIUM CHLORIDE 75 ML/HR: 9 INJECTION, SOLUTION INTRAVENOUS at 08:14

## 2019-01-01 RX ADMIN — SODIUM CHLORIDE, PRESERVATIVE FREE 3 ML: 5 INJECTION INTRAVENOUS at 21:34

## 2019-01-01 RX ADMIN — MESALAMINE 0.75 G: 375 CAPSULE, EXTENDED RELEASE ORAL at 20:42

## 2019-01-01 RX ADMIN — IPRATROPIUM BROMIDE AND ALBUTEROL SULFATE 3 ML: 2.5; .5 SOLUTION RESPIRATORY (INHALATION) at 11:45

## 2019-01-01 RX ADMIN — SODIUM CHLORIDE, PRESERVATIVE FREE 3 ML: 5 INJECTION INTRAVENOUS at 08:20

## 2019-01-01 RX ADMIN — LIDOCAINE 2 PATCH: 50 PATCH CUTANEOUS at 22:05

## 2019-01-01 RX ADMIN — DEXAMETHASONE SODIUM PHOSPHATE 4 MG: 4 INJECTION, SOLUTION INTRAMUSCULAR; INTRAVENOUS at 23:27

## 2019-01-01 RX ADMIN — MICONAZOLE NITRATE: 2 CREAM TOPICAL at 20:25

## 2019-01-01 RX ADMIN — SODIUM CHLORIDE, PRESERVATIVE FREE 3 ML: 5 INJECTION INTRAVENOUS at 21:19

## 2019-01-01 RX ADMIN — AMOXICILLIN AND CLAVULANATE POTASSIUM 1 TABLET: 875; 125 TABLET, FILM COATED ORAL at 09:01

## 2019-01-01 RX ADMIN — TAZOBACTAM SODIUM AND PIPERACILLIN SODIUM 3.38 G: 375; 3 INJECTION, SOLUTION INTRAVENOUS at 05:43

## 2019-01-01 RX ADMIN — SODIUM CHLORIDE, PRESERVATIVE FREE 3 ML: 5 INJECTION INTRAVENOUS at 09:13

## 2019-01-01 RX ADMIN — GABAPENTIN 300 MG: 300 CAPSULE ORAL at 21:19

## 2019-01-01 RX ADMIN — DICYCLOMINE HYDROCHLORIDE 20 MG: 20 TABLET ORAL at 18:34

## 2019-01-01 RX ADMIN — METHOHEXITAL SODIUM 10 MG: 500 INJECTION, POWDER, LYOPHILIZED, FOR SOLUTION INTRAMUSCULAR; INTRAVENOUS; RECTAL at 11:10

## 2019-01-01 RX ADMIN — POTASSIUM CHLORIDE 40 MEQ: 750 CAPSULE, EXTENDED RELEASE ORAL at 13:50

## 2019-01-01 RX ADMIN — CLOPIDOGREL BISULFATE 75 MG: 75 TABLET ORAL at 20:49

## 2019-01-01 RX ADMIN — DULOXETINE HYDROCHLORIDE 20 MG: 20 CAPSULE, DELAYED RELEASE ORAL at 08:39

## 2019-01-01 RX ADMIN — GLYCOPYRROLATE 0.2 MG: 0.2 INJECTION, SOLUTION INTRAMUSCULAR; INTRAVITREAL at 23:41

## 2019-01-01 RX ADMIN — ACETAMINOPHEN 500 MG: 500 TABLET, FILM COATED ORAL at 08:13

## 2019-01-01 RX ADMIN — PREDNISONE 5 MG: 10 TABLET ORAL at 09:01

## 2019-01-01 RX ADMIN — DICYCLOMINE HYDROCHLORIDE 20 MG: 20 TABLET ORAL at 10:05

## 2019-01-01 RX ADMIN — DULOXETINE HYDROCHLORIDE 20 MG: 20 CAPSULE, DELAYED RELEASE ORAL at 08:49

## 2019-01-01 RX ADMIN — CALCIUM CARBONATE 1 TABLET: 500 TABLET ORAL at 04:12

## 2019-01-01 RX ADMIN — GABAPENTIN 300 MG: 300 CAPSULE ORAL at 22:52

## 2019-01-01 RX ADMIN — SODIUM CHLORIDE, PRESERVATIVE FREE 3 ML: 5 INJECTION INTRAVENOUS at 20:33

## 2019-01-01 RX ADMIN — FLUTICASONE PROPIONATE 2 SPRAY: 50 SPRAY, METERED NASAL at 10:42

## 2019-01-01 RX ADMIN — DICLOFENAC SODIUM 2 G: 10 GEL TOPICAL at 08:27

## 2019-01-01 RX ADMIN — APIXABAN 5 MG: 5 TABLET, FILM COATED ORAL at 08:20

## 2019-01-01 RX ADMIN — AMLODIPINE BESYLATE 2.5 MG: 2.5 TABLET ORAL at 09:32

## 2019-01-01 RX ADMIN — ACETAMINOPHEN 650 MG: 325 TABLET ORAL at 06:00

## 2019-01-01 RX ADMIN — ACETAMINOPHEN 649.6 MG: 650 SOLUTION ORAL at 10:37

## 2019-01-01 RX ADMIN — POLYVINYL ALCOHOL 1 DROP: 14 SOLUTION/ DROPS OPHTHALMIC at 18:23

## 2019-01-01 RX ADMIN — MESALAMINE 0.75 G: 375 CAPSULE, EXTENDED RELEASE ORAL at 21:01

## 2019-01-01 RX ADMIN — MESALAMINE 0.75 G: 375 CAPSULE, EXTENDED RELEASE ORAL at 20:51

## 2019-01-01 RX ADMIN — APIXABAN 5 MG: 5 TABLET, FILM COATED ORAL at 22:04

## 2019-01-01 RX ADMIN — NYSTATIN 5 ML: 100000 SUSPENSION ORAL at 23:52

## 2019-01-01 RX ADMIN — BISOPROLOL FUMARATE 5 MG: 5 TABLET ORAL at 20:09

## 2019-01-01 RX ADMIN — TEMAZEPAM 7.5 MG: 7.5 CAPSULE ORAL at 20:38

## 2019-01-01 RX ADMIN — IPRATROPIUM BROMIDE AND ALBUTEROL SULFATE 3 ML: 2.5; .5 SOLUTION RESPIRATORY (INHALATION) at 12:20

## 2019-01-01 RX ADMIN — FAMOTIDINE 20 MG: 10 INJECTION, SOLUTION INTRAVENOUS at 21:10

## 2019-01-01 RX ADMIN — BISOPROLOL FUMARATE 5 MG: 5 TABLET, FILM COATED ORAL at 08:45

## 2019-01-01 RX ADMIN — DULOXETINE HYDROCHLORIDE 20 MG: 20 CAPSULE, DELAYED RELEASE ORAL at 08:14

## 2019-01-01 RX ADMIN — MEMANTINE 10 MG: 10 TABLET ORAL at 09:32

## 2019-01-01 RX ADMIN — FENTANYL CITRATE 50 MCG/HR: 50 INJECTION INTRAVENOUS at 09:00

## 2019-01-01 RX ADMIN — ATORVASTATIN CALCIUM 80 MG: 40 TABLET, FILM COATED ORAL at 21:29

## 2019-01-01 RX ADMIN — APIXABAN 5 MG: 5 TABLET, FILM COATED ORAL at 10:43

## 2019-01-01 RX ADMIN — ATORVASTATIN CALCIUM 80 MG: 40 TABLET, FILM COATED ORAL at 20:34

## 2019-01-01 RX ADMIN — ACETAMINOPHEN 650 MG: 325 TABLET ORAL at 05:30

## 2019-01-01 RX ADMIN — TAZOBACTAM SODIUM AND PIPERACILLIN SODIUM 3.38 G: 375; 3 INJECTION, SOLUTION INTRAVENOUS at 09:46

## 2019-01-01 RX ADMIN — PREDNISONE 5 MG: 10 TABLET ORAL at 08:39

## 2019-01-01 RX ADMIN — ASPIRIN 81 MG: 81 TABLET, COATED ORAL at 09:11

## 2019-01-01 RX ADMIN — MINERAL OIL, PETROLATUM: 425; 568 OINTMENT OPHTHALMIC at 20:35

## 2019-01-01 RX ADMIN — DICLOFENAC SODIUM 2 G: 10 GEL TOPICAL at 18:01

## 2019-01-01 RX ADMIN — NYSTATIN: 100000 POWDER TOPICAL at 09:03

## 2019-01-01 RX ADMIN — BISOPROLOL FUMARATE 5 MG: 5 TABLET, FILM COATED ORAL at 09:10

## 2019-01-01 RX ADMIN — HYDROCORTISONE SODIUM SUCCINATE 20 MG: 100 INJECTION, POWDER, FOR SOLUTION INTRAMUSCULAR; INTRAVENOUS at 10:02

## 2019-01-01 RX ADMIN — DOCUSATE SODIUM 100 MG: 100 CAPSULE, LIQUID FILLED ORAL at 11:58

## 2019-01-01 RX ADMIN — SULFUR HEXAFLUORIDE 5 ML: KIT at 10:25

## 2019-01-01 RX ADMIN — DIPHENHYDRAMINE HYDROCHLORIDE 25 MG: 50 INJECTION INTRAMUSCULAR; INTRAVENOUS at 19:05

## 2019-01-01 RX ADMIN — PROPOFOL 10 MCG/KG/MIN: 10 INJECTION, EMULSION INTRAVENOUS at 08:59

## 2019-01-01 RX ADMIN — MEMANTINE 10 MG: 10 TABLET ORAL at 08:12

## 2019-01-01 RX ADMIN — APIXABAN 5 MG: 5 TABLET, FILM COATED ORAL at 13:03

## 2019-01-01 RX ADMIN — MEMANTINE 10 MG: 5 TABLET ORAL at 09:32

## 2019-01-01 RX ADMIN — APIXABAN 5 MG: 5 TABLET, FILM COATED ORAL at 21:22

## 2019-01-01 RX ADMIN — CETIRIZINE HYDROCHLORIDE 5 MG: 10 TABLET, FILM COATED ORAL at 08:59

## 2019-01-01 RX ADMIN — LISINOPRIL 20 MG: 10 TABLET ORAL at 21:23

## 2019-01-01 RX ADMIN — IOPAMIDOL 69 ML: 755 INJECTION, SOLUTION INTRAVENOUS at 17:08

## 2019-01-01 RX ADMIN — DICLOFENAC SODIUM 2 G: 10 GEL TOPICAL at 12:40

## 2019-01-01 RX ADMIN — MESALAMINE 0.75 G: 375 CAPSULE, EXTENDED RELEASE ORAL at 21:12

## 2019-01-01 RX ADMIN — FAMOTIDINE 20 MG: 20 TABLET, FILM COATED ORAL at 10:44

## 2019-01-01 RX ADMIN — LISINOPRIL 20 MG: 20 TABLET ORAL at 08:59

## 2019-01-01 RX ADMIN — ENOXAPARIN SODIUM 90 MG: 100 INJECTION SUBCUTANEOUS at 00:17

## 2019-01-01 RX ADMIN — GABAPENTIN 300 MG: 300 CAPSULE ORAL at 20:34

## 2019-01-01 RX ADMIN — MINERAL OIL, PETROLATUM: 425; 568 OINTMENT OPHTHALMIC at 20:24

## 2019-01-01 RX ADMIN — ACETAMINOPHEN 650 MG: 650 SUPPOSITORY RECTAL at 20:48

## 2019-01-01 RX ADMIN — DICLOFENAC SODIUM 2 G: 10 GEL TOPICAL at 20:36

## 2019-01-01 RX ADMIN — DICLOFENAC SODIUM 2 G: 10 GEL TOPICAL at 09:23

## 2019-01-01 RX ADMIN — ASPIRIN 81 MG: 81 TABLET, COATED ORAL at 13:03

## 2019-01-01 RX ADMIN — MEMANTINE 10 MG: 10 TABLET ORAL at 20:09

## 2019-01-01 RX ADMIN — TRAMADOL HYDROCHLORIDE 50 MG: 50 TABLET, COATED ORAL at 22:06

## 2019-01-01 RX ADMIN — POTASSIUM CHLORIDE 10 MEQ: 7.46 INJECTION, SOLUTION INTRAVENOUS at 11:06

## 2019-01-01 RX ADMIN — LORAZEPAM 1 MG: 2 INJECTION INTRAMUSCULAR; INTRAVENOUS at 17:44

## 2019-01-01 RX ADMIN — DICLOFENAC SODIUM 2 G: 10 GEL TOPICAL at 21:50

## 2019-01-01 RX ADMIN — SODIUM CHLORIDE, PRESERVATIVE FREE 3 ML: 5 INJECTION INTRAVENOUS at 22:08

## 2019-01-01 RX ADMIN — BISOPROLOL FUMARATE 5 MG: 5 TABLET, FILM COATED ORAL at 08:42

## 2019-01-01 RX ADMIN — HYDROXYZINE HYDROCHLORIDE 25 MG: 25 TABLET, FILM COATED ORAL at 23:53

## 2019-01-01 RX ADMIN — APIXABAN 5 MG: 5 TABLET, FILM COATED ORAL at 08:13

## 2019-01-01 RX ADMIN — LIDOCAINE 2 PATCH: 50 PATCH CUTANEOUS at 20:50

## 2019-01-01 RX ADMIN — POLYETHYLENE GLYCOL 3350 17 G: 17 POWDER, FOR SOLUTION ORAL at 09:00

## 2019-01-01 RX ADMIN — Medication 250 MG: at 21:29

## 2019-01-01 RX ADMIN — MICONAZOLE NITRATE: 2 CREAM TOPICAL at 19:58

## 2019-01-01 RX ADMIN — Medication 1 CAPSULE: at 09:03

## 2019-01-01 RX ADMIN — IPRATROPIUM BROMIDE AND ALBUTEROL SULFATE 3 ML: 2.5; .5 SOLUTION RESPIRATORY (INHALATION) at 12:00

## 2019-01-01 RX ADMIN — DICLOFENAC SODIUM 2 G: 10 GEL TOPICAL at 12:29

## 2019-01-01 RX ADMIN — HYDROCORTISONE SODIUM SUCCINATE 20 MG: 100 INJECTION, POWDER, FOR SOLUTION INTRAMUSCULAR; INTRAVENOUS at 08:12

## 2019-01-01 RX ADMIN — ATORVASTATIN CALCIUM 80 MG: 40 TABLET, FILM COATED ORAL at 20:31

## 2019-01-01 RX ADMIN — SODIUM CHLORIDE, PRESERVATIVE FREE 3 ML: 5 INJECTION INTRAVENOUS at 22:05

## 2019-01-01 RX ADMIN — MESALAMINE 0.75 G: 375 CAPSULE, EXTENDED RELEASE ORAL at 21:18

## 2019-01-01 RX ADMIN — DICLOFENAC SODIUM 2 G: 10 GEL TOPICAL at 11:51

## 2019-01-01 RX ADMIN — QUETIAPINE FUMARATE 12.5 MG: 25 TABLET ORAL at 08:59

## 2019-01-01 RX ADMIN — ACETAMINOPHEN 500 MG: 500 TABLET, FILM COATED ORAL at 21:22

## 2019-01-01 RX ADMIN — HYDROMORPHONE HYDROCHLORIDE 1 MG: 1 INJECTION, SOLUTION INTRAMUSCULAR; INTRAVENOUS; SUBCUTANEOUS at 23:41

## 2019-01-01 RX ADMIN — APIXABAN 5 MG: 5 TABLET, FILM COATED ORAL at 21:11

## 2019-01-01 RX ADMIN — Medication 250 MG: at 20:24

## 2019-01-01 RX ADMIN — ONDANSETRON 4 MG: 2 INJECTION INTRAMUSCULAR; INTRAVENOUS at 19:04

## 2019-01-01 RX ADMIN — ATORVASTATIN CALCIUM 80 MG: 40 TABLET, FILM COATED ORAL at 21:02

## 2019-01-01 RX ADMIN — ACETAMINOPHEN 650 MG: 325 TABLET ORAL at 14:29

## 2019-01-01 RX ADMIN — BISOPROLOL FUMARATE 5 MG: 5 TABLET, FILM COATED ORAL at 09:32

## 2019-01-01 RX ADMIN — MORPHINE SULFATE 2 MG: 2 INJECTION, SOLUTION INTRAMUSCULAR; INTRAVENOUS at 02:19

## 2019-01-01 RX ADMIN — IPRATROPIUM BROMIDE AND ALBUTEROL SULFATE 3 ML: 2.5; .5 SOLUTION RESPIRATORY (INHALATION) at 16:41

## 2019-01-01 RX ADMIN — DICLOFENAC SODIUM 2 G: 10 GEL TOPICAL at 11:07

## 2019-01-01 RX ADMIN — MELATONIN TAB 5 MG 5 MG: 5 TAB at 21:47

## 2019-01-01 RX ADMIN — AMLODIPINE BESYLATE 2.5 MG: 2.5 TABLET ORAL at 08:58

## 2019-01-01 RX ADMIN — SODIUM CHLORIDE, PRESERVATIVE FREE 3 ML: 5 INJECTION INTRAVENOUS at 21:26

## 2019-01-01 RX ADMIN — LIDOCAINE 2 PATCH: 50 PATCH CUTANEOUS at 21:50

## 2019-01-01 RX ADMIN — DICLOFENAC SODIUM 2 G: 10 GEL TOPICAL at 08:42

## 2019-01-01 RX ADMIN — HALOPERIDOL LACTATE 2 MG: 5 INJECTION INTRAMUSCULAR at 05:28

## 2019-01-01 RX ADMIN — MELATONIN TAB 5 MG 5 MG: 5 TAB at 21:23

## 2019-01-01 RX ADMIN — NYSTATIN 5 ML: 100000 SUSPENSION ORAL at 03:44

## 2019-01-01 RX ADMIN — MEMANTINE 10 MG: 10 TABLET ORAL at 09:16

## 2019-01-01 RX ADMIN — ATORVASTATIN CALCIUM 80 MG: 40 TABLET, FILM COATED ORAL at 20:21

## 2019-01-01 RX ADMIN — LISINOPRIL 20 MG: 20 TABLET ORAL at 11:51

## 2019-01-01 RX ADMIN — APIXABAN 5 MG: 5 TABLET, FILM COATED ORAL at 21:29

## 2019-01-01 RX ADMIN — ATORVASTATIN CALCIUM 80 MG: 40 TABLET, FILM COATED ORAL at 22:04

## 2019-01-01 RX ADMIN — APIXABAN 5 MG: 5 TABLET, FILM COATED ORAL at 09:31

## 2019-01-01 RX ADMIN — NYSTATIN: 100000 POWDER TOPICAL at 20:37

## 2019-01-01 RX ADMIN — ACETAMINOPHEN 650 MG: 325 TABLET ORAL at 05:56

## 2019-01-01 RX ADMIN — ACETAMINOPHEN 650 MG: 325 TABLET ORAL at 20:38

## 2019-01-01 RX ADMIN — PREDNISONE 5 MG: 10 TABLET ORAL at 09:13

## 2019-01-01 RX ADMIN — APIXABAN 5 MG: 5 TABLET, FILM COATED ORAL at 09:16

## 2019-01-01 RX ADMIN — DICLOFENAC SODIUM 2 G: 10 GEL TOPICAL at 20:33

## 2019-01-01 RX ADMIN — MEMANTINE 10 MG: 10 TABLET ORAL at 20:34

## 2019-01-01 RX ADMIN — CHLORHEXIDINE GLUCONATE 0.12% ORAL RINSE 15 ML: 1.2 LIQUID ORAL at 18:31

## 2019-01-01 RX ADMIN — PREDNISONE 5 MG: 10 TABLET ORAL at 08:45

## 2019-01-01 RX ADMIN — AMLODIPINE BESYLATE 2.5 MG: 2.5 TABLET ORAL at 11:53

## 2019-01-01 RX ADMIN — MEMANTINE 10 MG: 10 TABLET ORAL at 21:32

## 2019-01-01 RX ADMIN — ACETAMINOPHEN 650 MG: 325 TABLET ORAL at 05:42

## 2019-01-01 RX ADMIN — MEMANTINE 10 MG: 10 TABLET ORAL at 13:54

## 2019-01-01 RX ADMIN — MEMANTINE 10 MG: 5 TABLET ORAL at 10:44

## 2019-01-01 RX ADMIN — ACETAMINOPHEN 650 MG: 325 TABLET ORAL at 22:07

## 2019-01-01 RX ADMIN — AMOXICILLIN AND CLAVULANATE POTASSIUM 1 TABLET: 875; 125 TABLET, FILM COATED ORAL at 21:00

## 2019-01-01 RX ADMIN — PREDNISONE 5 MG: 10 TABLET ORAL at 09:29

## 2019-01-01 RX ADMIN — ACETAMINOPHEN 650 MG: 325 TABLET ORAL at 11:59

## 2019-01-01 RX ADMIN — AMLODIPINE BESYLATE 2.5 MG: 2.5 TABLET ORAL at 09:13

## 2019-01-01 RX ADMIN — ROPIVACAINE HYDROCHLORIDE 6 ML/HR: 2 INJECTION, SOLUTION EPIDURAL; INFILTRATION at 16:07

## 2019-01-01 RX ADMIN — HYDROXYZINE HYDROCHLORIDE 25 MG: 25 TABLET, FILM COATED ORAL at 20:25

## 2019-01-01 RX ADMIN — LOPERAMIDE HYDROCHLORIDE 2 MG: 2 CAPSULE ORAL at 21:25

## 2019-01-01 RX ADMIN — DIPHENHYDRAMINE HYDROCHLORIDE 25 MG: 50 INJECTION INTRAMUSCULAR; INTRAVENOUS at 15:59

## 2019-01-01 RX ADMIN — APIXABAN 5 MG: 5 TABLET, FILM COATED ORAL at 08:46

## 2019-01-01 RX ADMIN — SODIUM CHLORIDE, PRESERVATIVE FREE 3 ML: 5 INJECTION INTRAVENOUS at 20:21

## 2019-01-01 RX ADMIN — AMOXICILLIN AND CLAVULANATE POTASSIUM 1 TABLET: 875; 125 TABLET, FILM COATED ORAL at 23:01

## 2019-01-01 RX ADMIN — GABAPENTIN 100 MG: 100 CAPSULE ORAL at 21:42

## 2019-01-01 RX ADMIN — SODIUM CHLORIDE 50 ML/HR: 9 INJECTION, SOLUTION INTRAVENOUS at 21:08

## 2019-01-01 RX ADMIN — PREDNISONE 5 MG: 10 TABLET ORAL at 08:08

## 2019-01-01 RX ADMIN — MESALAMINE 0.75 G: 0.38 CAPSULE, EXTENDED RELEASE ORAL at 09:37

## 2019-01-01 RX ADMIN — DOCUSATE SODIUM 100 MG: 100 CAPSULE, LIQUID FILLED ORAL at 08:59

## 2019-01-01 RX ADMIN — TAZOBACTAM SODIUM AND PIPERACILLIN SODIUM 3.38 G: 375; 3 INJECTION, SOLUTION INTRAVENOUS at 06:27

## 2019-01-01 RX ADMIN — MELATONIN TAB 5 MG 5 MG: 5 TAB at 21:01

## 2019-01-01 RX ADMIN — SODIUM CHLORIDE 75 ML/HR: 9 INJECTION, SOLUTION INTRAVENOUS at 04:49

## 2019-01-01 RX ADMIN — IPRATROPIUM BROMIDE AND ALBUTEROL SULFATE 3 ML: 2.5; .5 SOLUTION RESPIRATORY (INHALATION) at 15:21

## 2019-01-01 RX ADMIN — Medication 250 MG: at 08:12

## 2019-01-01 RX ADMIN — METHYLPREDNISOLONE SODIUM SUCCINATE 40 MG: 40 INJECTION, POWDER, FOR SOLUTION INTRAMUSCULAR; INTRAVENOUS at 15:59

## 2019-01-01 RX ADMIN — SODIUM CHLORIDE, PRESERVATIVE FREE 3 ML: 5 INJECTION INTRAVENOUS at 21:21

## 2019-01-01 RX ADMIN — MEMANTINE 10 MG: 10 TABLET ORAL at 22:03

## 2019-01-01 RX ADMIN — AMLODIPINE BESYLATE 2.5 MG: 2.5 TABLET ORAL at 10:44

## 2019-01-01 RX ADMIN — BISACODYL 10 MG: 10 SUPPOSITORY RECTAL at 16:15

## 2019-01-01 RX ADMIN — PREDNISONE 5 MG: 10 TABLET ORAL at 09:03

## 2019-01-01 RX ADMIN — DICLOFENAC SODIUM 2 G: 10 GEL TOPICAL at 09:13

## 2019-01-01 RX ADMIN — ACETAMINOPHEN 650 MG: 325 TABLET ORAL at 14:10

## 2019-01-01 RX ADMIN — DICLOFENAC SODIUM 2 G: 10 GEL TOPICAL at 08:19

## 2019-01-01 RX ADMIN — SODIUM CHLORIDE 500 ML: 9 INJECTION, SOLUTION INTRAVENOUS at 08:59

## 2019-01-01 RX ADMIN — LOPERAMIDE HYDROCHLORIDE 2 MG: 2 CAPSULE ORAL at 15:37

## 2019-01-01 RX ADMIN — APIXABAN 5 MG: 5 TABLET, FILM COATED ORAL at 22:52

## 2019-01-01 RX ADMIN — MEMANTINE 10 MG: 10 TABLET ORAL at 09:11

## 2019-01-01 RX ADMIN — FAMOTIDINE 20 MG: 20 TABLET, FILM COATED ORAL at 09:28

## 2019-01-01 RX ADMIN — IPRATROPIUM BROMIDE AND ALBUTEROL SULFATE 3 ML: 2.5; .5 SOLUTION RESPIRATORY (INHALATION) at 07:26

## 2019-01-01 RX ADMIN — MEMANTINE 10 MG: 10 TABLET ORAL at 08:59

## 2019-01-01 RX ADMIN — METHOHEXITAL SODIUM 20 MG: 500 INJECTION, POWDER, LYOPHILIZED, FOR SOLUTION INTRAMUSCULAR; INTRAVENOUS; RECTAL at 11:08

## 2019-01-01 RX ADMIN — ATORVASTATIN CALCIUM 80 MG: 40 TABLET, FILM COATED ORAL at 21:01

## 2019-01-01 RX ADMIN — DULOXETINE HYDROCHLORIDE 20 MG: 20 CAPSULE, DELAYED RELEASE ORAL at 10:04

## 2019-01-01 RX ADMIN — DULOXETINE HYDROCHLORIDE 20 MG: 20 CAPSULE, DELAYED RELEASE ORAL at 09:33

## 2019-01-01 RX ADMIN — SODIUM CHLORIDE 5 MG/HR: 9 INJECTION, SOLUTION INTRAVENOUS at 09:35

## 2019-01-01 RX ADMIN — FLUTICASONE PROPIONATE 2 SPRAY: 50 SPRAY, METERED NASAL at 09:39

## 2019-01-01 RX ADMIN — MEMANTINE 10 MG: 10 TABLET ORAL at 09:03

## 2019-01-01 RX ADMIN — NYSTATIN: 100000 POWDER TOPICAL at 20:24

## 2019-01-01 RX ADMIN — BISOPROLOL FUMARATE 5 MG: 5 TABLET ORAL at 09:28

## 2019-01-01 RX ADMIN — SODIUM CHLORIDE, PRESERVATIVE FREE 3 ML: 5 INJECTION INTRAVENOUS at 09:06

## 2019-01-01 RX ADMIN — ACETAMINOPHEN 650 MG: 325 TABLET ORAL at 13:50

## 2019-01-01 RX ADMIN — SODIUM CHLORIDE, PRESERVATIVE FREE 3 ML: 5 INJECTION INTRAVENOUS at 02:56

## 2019-01-01 RX ADMIN — ACETAMINOPHEN 650 MG: 325 TABLET ORAL at 04:26

## 2019-01-01 RX ADMIN — MICONAZOLE NITRATE: 2 CREAM TOPICAL at 08:42

## 2019-01-01 RX ADMIN — ACETAMINOPHEN 650 MG: 650 SUPPOSITORY RECTAL at 14:01

## 2019-01-01 RX ADMIN — ACETAMINOPHEN 650 MG: 325 TABLET ORAL at 00:11

## 2019-01-01 RX ADMIN — NYSTATIN 5 ML: 100000 SUSPENSION ORAL at 15:25

## 2019-01-01 RX ADMIN — MELATONIN TAB 5 MG 5 MG: 5 TAB at 21:29

## 2019-01-01 RX ADMIN — BUPIVACAINE HYDROCHLORIDE 15 ML: 2.5 INJECTION, SOLUTION EPIDURAL; INFILTRATION; INTRACAUDAL; PERINEURAL at 09:08

## 2019-01-01 RX ADMIN — ROPIVACAINE HYDROCHLORIDE 6 ML/HR: 2 INJECTION, SOLUTION EPIDURAL; INFILTRATION at 21:38

## 2019-01-01 RX ADMIN — ACETAMINOPHEN 650 MG: 325 TABLET, FILM COATED ORAL at 11:07

## 2019-01-01 RX ADMIN — MEMANTINE 10 MG: 5 TABLET ORAL at 10:25

## 2019-01-01 RX ADMIN — MESALAMINE 0.75 G: 375 CAPSULE, EXTENDED RELEASE ORAL at 21:20

## 2019-01-01 RX ADMIN — PANTOPRAZOLE SODIUM 40 MG: 40 TABLET, DELAYED RELEASE ORAL at 21:20

## 2019-01-01 RX ADMIN — SODIUM CHLORIDE, PRESERVATIVE FREE 3 ML: 5 INJECTION INTRAVENOUS at 14:46

## 2019-01-01 RX ADMIN — AMOXICILLIN AND CLAVULANATE POTASSIUM 1 TABLET: 875; 125 TABLET, FILM COATED ORAL at 09:13

## 2019-01-01 RX ADMIN — DICLOFENAC SODIUM 2 G: 10 GEL TOPICAL at 21:31

## 2019-01-01 RX ADMIN — SODIUM CHLORIDE, PRESERVATIVE FREE 3 ML: 5 INJECTION INTRAVENOUS at 20:34

## 2019-01-01 RX ADMIN — GABAPENTIN 300 MG: 300 CAPSULE ORAL at 21:32

## 2019-01-01 RX ADMIN — GLYCOPYRROLATE 0.2 MG: 0.2 INJECTION, SOLUTION INTRAMUSCULAR; INTRAVITREAL at 19:47

## 2019-01-01 RX ADMIN — ENOXAPARIN SODIUM 90 MG: 100 INJECTION SUBCUTANEOUS at 11:51

## 2019-01-01 RX ADMIN — GLYCOPYRROLATE 0.4 MG: 0.2 INJECTION, SOLUTION INTRAMUSCULAR; INTRAVITREAL at 17:45

## 2019-01-01 RX ADMIN — POLYVINYL ALCOHOL 1 DROP: 14 SOLUTION/ DROPS OPHTHALMIC at 18:15

## 2019-01-01 RX ADMIN — SODIUM CHLORIDE, PRESERVATIVE FREE 3 ML: 5 INJECTION INTRAVENOUS at 21:48

## 2019-01-01 RX ADMIN — DICLOFENAC SODIUM 2 G: 10 GEL TOPICAL at 21:29

## 2019-01-01 RX ADMIN — DICLOFENAC SODIUM 2 G: 10 GEL TOPICAL at 22:08

## 2019-01-01 RX ADMIN — DULOXETINE HYDROCHLORIDE 20 MG: 20 CAPSULE, DELAYED RELEASE ORAL at 10:46

## 2019-01-01 RX ADMIN — FENTANYL CITRATE 50 MCG/HR: 50 INJECTION INTRAVENOUS at 04:00

## 2019-01-01 RX ADMIN — NYSTATIN 5 ML: 100000 SUSPENSION ORAL at 23:13

## 2019-01-01 RX ADMIN — APIXABAN 5 MG: 5 TABLET, FILM COATED ORAL at 21:03

## 2019-01-01 RX ADMIN — CHLORHEXIDINE GLUCONATE 0.12% ORAL RINSE 15 ML: 1.2 LIQUID ORAL at 20:10

## 2019-01-01 RX ADMIN — SODIUM CHLORIDE 75 ML/HR: 9 INJECTION, SOLUTION INTRAVENOUS at 09:10

## 2019-01-01 RX ADMIN — SODIUM CHLORIDE, PRESERVATIVE FREE 3 ML: 5 INJECTION INTRAVENOUS at 09:01

## 2019-01-01 RX ADMIN — APIXABAN 5 MG: 5 TABLET, FILM COATED ORAL at 21:00

## 2019-01-01 RX ADMIN — MICONAZOLE NITRATE: 2 CREAM TOPICAL at 09:02

## 2019-01-01 RX ADMIN — LORAZEPAM 1 MG: 2 INJECTION INTRAMUSCULAR; INTRAVENOUS at 23:41

## 2019-01-01 RX ADMIN — SODIUM CHLORIDE, PRESERVATIVE FREE 3 ML: 5 INJECTION INTRAVENOUS at 20:48

## 2019-01-01 RX ADMIN — APIXABAN 5 MG: 5 TABLET, FILM COATED ORAL at 20:25

## 2019-01-01 RX ADMIN — GABAPENTIN 300 MG: 300 CAPSULE ORAL at 14:43

## 2019-01-01 RX ADMIN — DICLOFENAC SODIUM 2 G: 10 GEL TOPICAL at 09:14

## 2019-01-01 RX ADMIN — LISINOPRIL 20 MG: 10 TABLET ORAL at 09:28

## 2019-01-01 RX ADMIN — ACETAMINOPHEN 650 MG: 325 TABLET ORAL at 05:39

## 2019-01-01 RX ADMIN — GABAPENTIN 300 MG: 300 CAPSULE ORAL at 21:14

## 2019-01-01 RX ADMIN — GABAPENTIN 300 MG: 300 CAPSULE ORAL at 23:18

## 2019-01-01 RX ADMIN — AMLODIPINE BESYLATE 2.5 MG: 2.5 TABLET ORAL at 09:01

## 2019-01-01 RX ADMIN — KETOROLAC TROMETHAMINE 15 MG: 30 INJECTION, SOLUTION INTRAMUSCULAR at 21:55

## 2019-01-01 RX ADMIN — MIDAZOLAM HYDROCHLORIDE 1 MG: 1 INJECTION, SOLUTION INTRAMUSCULAR; INTRAVENOUS at 11:07

## 2019-01-01 RX ADMIN — PANTOPRAZOLE SODIUM 40 MG: 40 TABLET, DELAYED RELEASE ORAL at 06:09

## 2019-01-01 RX ADMIN — FLUTICASONE PROPIONATE 2 SPRAY: 50 SPRAY, METERED NASAL at 09:32

## 2019-01-01 RX ADMIN — DICLOFENAC SODIUM 2 G: 10 GEL TOPICAL at 21:14

## 2019-01-01 RX ADMIN — DICLOFENAC SODIUM 2 G: 10 GEL TOPICAL at 18:15

## 2019-01-01 RX ADMIN — Medication 250 MG: at 08:42

## 2019-01-01 RX ADMIN — TAZOBACTAM SODIUM AND PIPERACILLIN SODIUM 3.38 G: 375; 3 INJECTION, SOLUTION INTRAVENOUS at 04:56

## 2019-01-01 RX ADMIN — ACETAMINOPHEN 650 MG: 325 TABLET ORAL at 01:34

## 2019-01-01 RX ADMIN — SODIUM CHLORIDE, PRESERVATIVE FREE 3 ML: 5 INJECTION INTRAVENOUS at 21:30

## 2019-01-01 RX ADMIN — LORAZEPAM 0.5 MG: 2 INJECTION INTRAMUSCULAR; INTRAVENOUS at 03:59

## 2019-01-01 RX ADMIN — DICLOFENAC SODIUM 2 G: 10 GEL TOPICAL at 18:08

## 2019-01-01 RX ADMIN — DICLOFENAC SODIUM 2 G: 10 GEL TOPICAL at 09:11

## 2019-01-01 RX ADMIN — APIXABAN 5 MG: 5 TABLET, FILM COATED ORAL at 09:32

## 2019-01-01 RX ADMIN — APIXABAN 5 MG: 5 TABLET, FILM COATED ORAL at 21:48

## 2019-01-01 RX ADMIN — ACETAMINOPHEN 650 MG: 325 TABLET ORAL at 15:38

## 2019-01-01 RX ADMIN — ACETAMINOPHEN 650 MG: 325 TABLET, FILM COATED ORAL at 04:35

## 2019-01-01 RX ADMIN — DOCUSATE SODIUM 100 MG: 100 CAPSULE, LIQUID FILLED ORAL at 20:09

## 2019-01-01 RX ADMIN — APIXABAN 5 MG: 5 TABLET, FILM COATED ORAL at 20:31

## 2019-01-01 RX ADMIN — PREDNISONE 5 MG: 10 TABLET ORAL at 08:13

## 2019-01-01 RX ADMIN — POTASSIUM CHLORIDE 40 MEQ: 1.5 POWDER, FOR SOLUTION ORAL at 15:33

## 2019-01-01 RX ADMIN — DICLOFENAC SODIUM 2 G: 10 GEL TOPICAL at 11:40

## 2019-01-01 RX ADMIN — IPRATROPIUM BROMIDE AND ALBUTEROL SULFATE 3 ML: 2.5; .5 SOLUTION RESPIRATORY (INHALATION) at 07:02

## 2019-01-01 RX ADMIN — MELATONIN TAB 5 MG 5 MG: 5 TAB at 20:34

## 2019-01-01 RX ADMIN — FAMOTIDINE 20 MG: 20 TABLET, FILM COATED ORAL at 09:32

## 2019-01-01 RX ADMIN — IPRATROPIUM BROMIDE AND ALBUTEROL SULFATE 3 ML: 2.5; .5 SOLUTION RESPIRATORY (INHALATION) at 11:41

## 2019-01-01 RX ADMIN — DULOXETINE HYDROCHLORIDE 20 MG: 20 CAPSULE, DELAYED RELEASE ORAL at 08:59

## 2019-01-01 RX ADMIN — DICLOFENAC SODIUM 2 G: 10 GEL TOPICAL at 16:50

## 2019-01-01 RX ADMIN — BISOPROLOL FUMARATE 5 MG: 5 TABLET, FILM COATED ORAL at 09:13

## 2019-01-01 RX ADMIN — MICONAZOLE NITRATE: 2 CREAM TOPICAL at 20:31

## 2019-01-01 RX ADMIN — SODIUM CHLORIDE, PRESERVATIVE FREE 3 ML: 5 INJECTION INTRAVENOUS at 22:40

## 2019-01-01 RX ADMIN — ACETAMINOPHEN 650 MG: 325 TABLET, FILM COATED ORAL at 23:12

## 2019-01-01 RX ADMIN — APIXABAN 5 MG: 5 TABLET, FILM COATED ORAL at 08:49

## 2019-01-01 RX ADMIN — ATORVASTATIN CALCIUM 80 MG: 40 TABLET, FILM COATED ORAL at 21:32

## 2019-01-01 RX ADMIN — NYSTATIN: 100000 POWDER TOPICAL at 20:32

## 2019-01-01 RX ADMIN — PREDNISONE 5 MG: 10 TABLET ORAL at 08:59

## 2019-01-01 RX ADMIN — SODIUM CHLORIDE, PRESERVATIVE FREE 3 ML: 5 INJECTION INTRAVENOUS at 22:14

## 2019-01-01 RX ADMIN — Medication 250 MG: at 09:03

## 2019-01-01 RX ADMIN — DICLOFENAC SODIUM 2 G: 10 GEL TOPICAL at 22:04

## 2019-01-01 RX ADMIN — IPRATROPIUM BROMIDE AND ALBUTEROL SULFATE 3 ML: 2.5; .5 SOLUTION RESPIRATORY (INHALATION) at 19:31

## 2019-01-01 RX ADMIN — DULOXETINE HYDROCHLORIDE 20 MG: 20 CAPSULE, DELAYED RELEASE ORAL at 09:28

## 2019-01-01 RX ADMIN — SODIUM CHLORIDE, PRESERVATIVE FREE 3 ML: 5 INJECTION INTRAVENOUS at 21:04

## 2019-01-01 RX ADMIN — BISOPROLOL FUMARATE 5 MG: 5 TABLET ORAL at 22:06

## 2019-01-01 RX ADMIN — TRAMADOL HYDROCHLORIDE 50 MG: 50 TABLET, COATED ORAL at 21:30

## 2019-01-01 RX ADMIN — PREDNISONE 5 MG: 10 TABLET ORAL at 17:40

## 2019-01-01 RX ADMIN — LISINOPRIL 20 MG: 20 TABLET ORAL at 21:07

## 2019-01-01 RX ADMIN — LIDOCAINE 2 PATCH: 50 PATCH CUTANEOUS at 21:17

## 2019-01-01 RX ADMIN — SODIUM CHLORIDE, PRESERVATIVE FREE 10 ML: 5 INJECTION INTRAVENOUS at 18:32

## 2019-01-01 RX ADMIN — ASPIRIN 81 MG: 81 TABLET, COATED ORAL at 09:28

## 2019-01-01 RX ADMIN — ROPIVACAINE HYDROCHLORIDE 6 ML/HR: 2 INJECTION, SOLUTION EPIDURAL; INFILTRATION at 03:59

## 2019-01-01 RX ADMIN — FAMOTIDINE 20 MG: 20 TABLET ORAL at 08:24

## 2019-01-01 RX ADMIN — Medication: at 15:33

## 2019-01-01 RX ADMIN — SODIUM CHLORIDE, PRESERVATIVE FREE 3 ML: 5 INJECTION INTRAVENOUS at 09:14

## 2019-01-01 RX ADMIN — Medication 250 MG: at 12:29

## 2019-01-01 RX ADMIN — TAZOBACTAM SODIUM AND PIPERACILLIN SODIUM 3.38 G: 375; 3 INJECTION, SOLUTION INTRAVENOUS at 22:05

## 2019-01-01 RX ADMIN — ATORVASTATIN CALCIUM 80 MG: 40 TABLET, FILM COATED ORAL at 20:38

## 2019-01-01 RX ADMIN — APIXABAN 5 MG: 5 TABLET, FILM COATED ORAL at 09:03

## 2019-01-01 RX ADMIN — IPRATROPIUM BROMIDE AND ALBUTEROL SULFATE 3 ML: 2.5; .5 SOLUTION RESPIRATORY (INHALATION) at 20:25

## 2019-01-01 RX ADMIN — MICONAZOLE NITRATE: 2 CREAM TOPICAL at 16:24

## 2019-01-01 RX ADMIN — ATORVASTATIN CALCIUM 80 MG: 40 TABLET, FILM COATED ORAL at 21:11

## 2019-01-01 RX ADMIN — MEMANTINE 10 MG: 5 TABLET ORAL at 20:38

## 2019-01-01 RX ADMIN — PANTOPRAZOLE SODIUM 40 MG: 40 TABLET, DELAYED RELEASE ORAL at 05:29

## 2019-01-01 RX ADMIN — APIXABAN 5 MG: 5 TABLET, FILM COATED ORAL at 21:19

## 2019-01-01 RX ADMIN — PANTOPRAZOLE SODIUM 40 MG: 40 TABLET, DELAYED RELEASE ORAL at 05:56

## 2019-01-01 RX ADMIN — LISINOPRIL 20 MG: 20 TABLET ORAL at 08:48

## 2019-01-01 RX ADMIN — AMLODIPINE BESYLATE 2.5 MG: 2.5 TABLET ORAL at 08:49

## 2019-01-01 RX ADMIN — BISOPROLOL FUMARATE 5 MG: 5 TABLET, FILM COATED ORAL at 13:57

## 2019-01-01 RX ADMIN — ROPIVACAINE HYDROCHLORIDE 6 ML/HR: 2 INJECTION, SOLUTION EPIDURAL; INFILTRATION at 06:58

## 2019-01-01 RX ADMIN — ACETAMINOPHEN 650 MG: 325 TABLET ORAL at 20:44

## 2019-01-01 RX ADMIN — PREDNISONE 5 MG: 10 TABLET ORAL at 17:35

## 2019-01-01 RX ADMIN — APIXABAN 5 MG: 5 TABLET, FILM COATED ORAL at 20:10

## 2019-01-01 RX ADMIN — SODIUM CHLORIDE, PRESERVATIVE FREE 3 ML: 5 INJECTION INTRAVENOUS at 22:36

## 2019-01-01 RX ADMIN — APIXABAN 5 MG: 5 TABLET, FILM COATED ORAL at 09:13

## 2019-01-01 RX ADMIN — Medication 250 MG: at 20:34

## 2019-01-01 RX ADMIN — Medication 1 CAPSULE: at 12:28

## 2019-01-01 RX ADMIN — LISINOPRIL 20 MG: 20 TABLET ORAL at 20:34

## 2019-01-01 RX ADMIN — IPRATROPIUM BROMIDE AND ALBUTEROL SULFATE 3 ML: 2.5; .5 SOLUTION RESPIRATORY (INHALATION) at 19:48

## 2019-01-01 RX ADMIN — Medication: at 22:05

## 2019-01-01 RX ADMIN — SODIUM CHLORIDE, PRESERVATIVE FREE 3 ML: 5 INJECTION INTRAVENOUS at 10:49

## 2019-01-01 RX ADMIN — IPRATROPIUM BROMIDE AND ALBUTEROL SULFATE 3 ML: 2.5; .5 SOLUTION RESPIRATORY (INHALATION) at 19:16

## 2019-01-01 RX ADMIN — APIXABAN 5 MG: 5 TABLET, FILM COATED ORAL at 08:42

## 2019-01-01 RX ADMIN — TRAMADOL HYDROCHLORIDE 50 MG: 50 TABLET, COATED ORAL at 13:01

## 2019-01-01 RX ADMIN — DICLOFENAC SODIUM 2 G: 10 GEL TOPICAL at 16:40

## 2019-01-01 RX ADMIN — HYDROXYZINE HYDROCHLORIDE 25 MG: 25 TABLET, FILM COATED ORAL at 21:49

## 2019-01-01 RX ADMIN — DICLOFENAC SODIUM 2 G: 10 GEL TOPICAL at 18:27

## 2019-01-01 RX ADMIN — DOCUSATE SODIUM 100 MG: 100 CAPSULE, LIQUID FILLED ORAL at 22:07

## 2019-01-01 RX ADMIN — LISINOPRIL 20 MG: 20 TABLET ORAL at 22:52

## 2019-01-01 RX ADMIN — Medication 250 MG: at 21:19

## 2019-01-01 RX ADMIN — MEMANTINE 10 MG: 10 TABLET ORAL at 21:13

## 2019-01-01 RX ADMIN — MESALAMINE 0.75 G: 375 CAPSULE, EXTENDED RELEASE ORAL at 21:47

## 2019-01-01 RX ADMIN — ACETAMINOPHEN 650 MG: 325 TABLET ORAL at 12:43

## 2019-01-01 RX ADMIN — GABAPENTIN 300 MG: 300 CAPSULE ORAL at 21:31

## 2019-01-01 RX ADMIN — DICYCLOMINE HYDROCHLORIDE 20 MG: 20 TABLET ORAL at 16:55

## 2019-01-01 RX ADMIN — DULOXETINE HYDROCHLORIDE 20 MG: 20 CAPSULE, DELAYED RELEASE ORAL at 09:14

## 2019-01-01 RX ADMIN — FAMOTIDINE 20 MG: 10 INJECTION, SOLUTION INTRAVENOUS at 08:12

## 2019-01-01 RX ADMIN — DICLOFENAC SODIUM 2 G: 10 GEL TOPICAL at 21:30

## 2019-01-01 RX ADMIN — ACETAMINOPHEN 650 MG: 325 TABLET ORAL at 17:09

## 2019-01-01 RX ADMIN — ACETAMINOPHEN 650 MG: 325 TABLET ORAL at 09:29

## 2019-01-01 RX ADMIN — ASPIRIN 81 MG: 81 TABLET, COATED ORAL at 10:46

## 2019-01-01 RX ADMIN — IPRATROPIUM BROMIDE AND ALBUTEROL SULFATE 3 ML: 2.5; .5 SOLUTION RESPIRATORY (INHALATION) at 09:06

## 2019-01-01 RX ADMIN — ACETAMINOPHEN 650 MG: 325 TABLET ORAL at 15:33

## 2019-01-01 RX ADMIN — DICLOFENAC SODIUM 2 G: 10 GEL TOPICAL at 11:25

## 2019-01-01 RX ADMIN — APIXABAN 5 MG: 5 TABLET, FILM COATED ORAL at 22:07

## 2019-01-01 RX ADMIN — NYSTATIN: 100000 POWDER TOPICAL at 21:29

## 2019-01-01 RX ADMIN — IPRATROPIUM BROMIDE AND ALBUTEROL SULFATE 3 ML: 2.5; .5 SOLUTION RESPIRATORY (INHALATION) at 15:17

## 2019-01-01 RX ADMIN — ACETAMINOPHEN 650 MG: 325 TABLET, FILM COATED ORAL at 06:12

## 2019-01-01 RX ADMIN — ACETAMINOPHEN 650 MG: 325 TABLET ORAL at 18:10

## 2019-01-01 RX ADMIN — SODIUM CHLORIDE 75 ML/HR: 9 INJECTION, SOLUTION INTRAVENOUS at 22:28

## 2019-01-01 RX ADMIN — LIDOCAINE 2 PATCH: 50 PATCH CUTANEOUS at 22:02

## 2019-01-01 RX ADMIN — PANTOPRAZOLE SODIUM 40 MG: 40 TABLET, DELAYED RELEASE ORAL at 06:43

## 2019-01-01 RX ADMIN — ACETAMINOPHEN 650 MG: 325 TABLET ORAL at 13:48

## 2019-01-01 RX ADMIN — BISOPROLOL FUMARATE 5 MG: 5 TABLET, FILM COATED ORAL at 09:03

## 2019-01-01 RX ADMIN — GABAPENTIN 300 MG: 300 CAPSULE ORAL at 22:08

## 2019-01-01 RX ADMIN — SODIUM CHLORIDE 75 ML/HR: 9 INJECTION, SOLUTION INTRAVENOUS at 14:46

## 2019-01-01 RX ADMIN — SODIUM CHLORIDE, PRESERVATIVE FREE 3 ML: 5 INJECTION INTRAVENOUS at 20:25

## 2019-01-01 RX ADMIN — ACETAMINOPHEN 649.6 MG: 650 SOLUTION ORAL at 23:01

## 2019-01-01 RX ADMIN — MESALAMINE 0.75 G: 375 CAPSULE, EXTENDED RELEASE ORAL at 21:33

## 2019-01-01 RX ADMIN — DICLOFENAC SODIUM 2 G: 10 GEL TOPICAL at 18:11

## 2019-01-01 RX ADMIN — LISINOPRIL 20 MG: 20 TABLET ORAL at 20:09

## 2019-01-01 RX ADMIN — APIXABAN 5 MG: 5 TABLET, FILM COATED ORAL at 08:59

## 2019-01-01 RX ADMIN — ACETAMINOPHEN 650 MG: 650 SUPPOSITORY RECTAL at 00:53

## 2019-01-01 RX ADMIN — ACETAMINOPHEN 650 MG: 325 TABLET ORAL at 05:33

## 2019-01-01 RX ADMIN — MELATONIN TAB 5 MG 5 MG: 5 TAB at 22:14

## 2019-01-01 RX ADMIN — DICLOFENAC SODIUM 2 G: 10 GEL TOPICAL at 08:59

## 2019-01-01 RX ADMIN — BISOPROLOL FUMARATE 5 MG: 5 TABLET, FILM COATED ORAL at 08:59

## 2019-01-01 RX ADMIN — POTASSIUM CHLORIDE 10 MEQ: 7.46 INJECTION, SOLUTION INTRAVENOUS at 09:54

## 2019-01-01 RX ADMIN — HYDROXYZINE HYDROCHLORIDE 25 MG: 25 TABLET, FILM COATED ORAL at 02:14

## 2019-01-01 RX ADMIN — LIDOCAINE 2 PATCH: 50 PATCH CUTANEOUS at 20:24

## 2019-01-01 RX ADMIN — GABAPENTIN 300 MG: 300 CAPSULE ORAL at 22:26

## 2019-01-01 RX ADMIN — POLYVINYL ALCOHOL 1 DROP: 14 SOLUTION/ DROPS OPHTHALMIC at 20:32

## 2019-01-01 RX ADMIN — SODIUM CHLORIDE 75 ML/HR: 9 INJECTION, SOLUTION INTRAVENOUS at 18:17

## 2019-01-01 RX ADMIN — MELATONIN TAB 5 MG 5 MG: 5 TAB at 21:25

## 2019-01-01 RX ADMIN — CETIRIZINE HYDROCHLORIDE 5 MG: 10 TABLET, FILM COATED ORAL at 08:40

## 2019-01-01 RX ADMIN — SODIUM CHLORIDE, PRESERVATIVE FREE 3 ML: 5 INJECTION INTRAVENOUS at 09:32

## 2019-01-01 RX ADMIN — IPRATROPIUM BROMIDE AND ALBUTEROL SULFATE 3 ML: 2.5; .5 SOLUTION RESPIRATORY (INHALATION) at 15:46

## 2019-01-01 RX ADMIN — MELATONIN TAB 5 MG 5 MG: 5 TAB at 22:35

## 2019-01-01 RX ADMIN — POTASSIUM CHLORIDE 10 MEQ: 7.46 INJECTION, SOLUTION INTRAVENOUS at 08:26

## 2019-01-01 RX ADMIN — BISOPROLOL FUMARATE 5 MG: 5 TABLET, FILM COATED ORAL at 08:09

## 2019-01-01 RX ADMIN — FLUTICASONE PROPIONATE 2 SPRAY: 50 SPRAY, METERED NASAL at 13:10

## 2019-01-01 RX ADMIN — Medication 1 CAPSULE: at 20:23

## 2019-01-01 RX ADMIN — DICLOFENAC SODIUM 2 G: 10 GEL TOPICAL at 12:01

## 2019-01-01 RX ADMIN — LIDOCAINE 2 PATCH: 50 PATCH CUTANEOUS at 20:33

## 2019-01-01 RX ADMIN — MEMANTINE 10 MG: 10 TABLET ORAL at 08:39

## 2019-01-01 RX ADMIN — DULOXETINE HYDROCHLORIDE 20 MG: 20 CAPSULE, DELAYED RELEASE ORAL at 09:36

## 2019-01-01 RX ADMIN — TAZOBACTAM SODIUM AND PIPERACILLIN SODIUM 3.38 G: 375; 3 INJECTION, SOLUTION INTRAVENOUS at 21:12

## 2019-01-01 RX ADMIN — SODIUM CHLORIDE, PRESERVATIVE FREE 3 ML: 5 INJECTION INTRAVENOUS at 09:54

## 2019-01-01 RX ADMIN — SODIUM CHLORIDE, PRESERVATIVE FREE 3 ML: 5 INJECTION INTRAVENOUS at 09:20

## 2019-01-01 RX ADMIN — DULOXETINE HYDROCHLORIDE 20 MG: 20 CAPSULE, DELAYED RELEASE ORAL at 08:13

## 2019-01-01 RX ADMIN — LIDOCAINE 2 PATCH: 50 PATCH CUTANEOUS at 08:26

## 2019-01-01 RX ADMIN — LIDOCAINE 2 PATCH: 50 PATCH CUTANEOUS at 21:20

## 2019-01-01 RX ADMIN — DICLOFENAC SODIUM 2 G: 10 GEL TOPICAL at 08:18

## 2019-01-01 RX ADMIN — TAZOBACTAM SODIUM AND PIPERACILLIN SODIUM 3.38 G: 375; 3 INJECTION, SOLUTION INTRAVENOUS at 08:07

## 2019-01-01 RX ADMIN — BISOPROLOL FUMARATE 5 MG: 5 TABLET ORAL at 09:11

## 2019-01-01 RX ADMIN — DICLOFENAC SODIUM 2 G: 10 GEL TOPICAL at 23:02

## 2019-01-01 RX ADMIN — ACETAMINOPHEN 650 MG: 325 TABLET ORAL at 05:35

## 2019-01-01 RX ADMIN — NYSTATIN: 100000 POWDER TOPICAL at 12:02

## 2019-01-01 RX ADMIN — GABAPENTIN 300 MG: 300 CAPSULE ORAL at 20:10

## 2019-01-01 RX ADMIN — GABAPENTIN 300 MG: 300 CAPSULE ORAL at 21:22

## 2019-01-01 RX ADMIN — LISINOPRIL 10 MG: 10 TABLET ORAL at 13:09

## 2019-01-01 RX ADMIN — MESALAMINE 0.75 G: 375 CAPSULE, EXTENDED RELEASE ORAL at 20:58

## 2019-01-01 RX ADMIN — LISINOPRIL 20 MG: 10 TABLET ORAL at 09:11

## 2019-01-01 RX ADMIN — Medication: at 21:33

## 2019-01-01 RX ADMIN — TAZOBACTAM SODIUM AND PIPERACILLIN SODIUM 3.38 G: 375; 3 INJECTION, SOLUTION INTRAVENOUS at 20:10

## 2019-01-01 RX ADMIN — SODIUM CHLORIDE, PRESERVATIVE FREE 3 ML: 5 INJECTION INTRAVENOUS at 23:30

## 2019-01-01 RX ADMIN — MEMANTINE 10 MG: 5 TABLET ORAL at 21:23

## 2019-01-01 RX ADMIN — SODIUM CHLORIDE, PRESERVATIVE FREE 3 ML: 5 INJECTION INTRAVENOUS at 20:20

## 2019-01-01 RX ADMIN — MELATONIN TAB 5 MG 5 MG: 5 TAB at 22:38

## 2019-01-01 RX ADMIN — ACETAMINOPHEN 650 MG: 325 TABLET ORAL at 05:55

## 2019-01-01 RX ADMIN — Medication 250 MG: at 08:58

## 2019-01-01 RX ADMIN — TAZOBACTAM SODIUM AND PIPERACILLIN SODIUM 3.38 G: 375; 3 INJECTION, SOLUTION INTRAVENOUS at 21:50

## 2019-01-01 RX ADMIN — APIXABAN 5 MG: 5 TABLET, FILM COATED ORAL at 08:14

## 2019-01-01 RX ADMIN — MEMANTINE 10 MG: 10 TABLET ORAL at 21:06

## 2019-01-01 RX ADMIN — DICLOFENAC SODIUM 2 G: 10 GEL TOPICAL at 09:02

## 2019-01-01 RX ADMIN — MESALAMINE 0.75 G: 375 CAPSULE, EXTENDED RELEASE ORAL at 20:23

## 2019-01-01 RX ADMIN — ACETAMINOPHEN 650 MG: 325 TABLET ORAL at 05:54

## 2019-01-01 RX ADMIN — ATORVASTATIN CALCIUM 80 MG: 40 TABLET, FILM COATED ORAL at 21:22

## 2019-01-01 RX ADMIN — FLUTICASONE PROPIONATE 2 SPRAY: 50 SPRAY, METERED NASAL at 09:12

## 2019-01-01 RX ADMIN — MEMANTINE 10 MG: 10 TABLET ORAL at 08:42

## 2019-01-01 RX ADMIN — ACETAMINOPHEN 650 MG: 325 TABLET, FILM COATED ORAL at 15:25

## 2019-01-01 RX ADMIN — POTASSIUM CHLORIDE 40 MEQ: 1.5 POWDER, FOR SOLUTION ORAL at 09:31

## 2019-01-01 RX ADMIN — NYSTATIN: 100000 POWDER TOPICAL at 08:59

## 2019-01-01 RX ADMIN — GABAPENTIN 300 MG: 300 CAPSULE ORAL at 20:25

## 2019-01-01 RX ADMIN — MELATONIN TAB 5 MG 5 MG: 5 TAB at 20:39

## 2019-01-01 RX ADMIN — DULOXETINE HYDROCHLORIDE 20 MG: 20 CAPSULE, DELAYED RELEASE ORAL at 08:46

## 2019-01-01 RX ADMIN — APIXABAN 5 MG: 5 TABLET, FILM COATED ORAL at 21:13

## 2019-01-01 RX ADMIN — CHLORHEXIDINE GLUCONATE 0.12% ORAL RINSE 15 ML: 1.2 LIQUID ORAL at 08:12

## 2019-01-01 RX ADMIN — Medication 250 MG: at 21:25

## 2019-01-01 RX ADMIN — BISOPROLOL FUMARATE 5 MG: 5 TABLET, FILM COATED ORAL at 09:15

## 2019-01-01 RX ADMIN — MEMANTINE 10 MG: 5 TABLET ORAL at 22:25

## 2019-01-01 RX ADMIN — ACETAMINOPHEN 650 MG: 325 TABLET ORAL at 22:04

## 2019-01-01 RX ADMIN — LISINOPRIL 40 MG: 10 TABLET ORAL at 09:36

## 2019-01-01 RX ADMIN — DICLOFENAC SODIUM 2 G: 10 GEL TOPICAL at 08:46

## 2019-01-01 RX ADMIN — ACETAMINOPHEN 650 MG: 325 TABLET ORAL at 05:43

## 2019-01-01 RX ADMIN — DICLOFENAC SODIUM 2 G: 10 GEL TOPICAL at 18:23

## 2019-01-01 RX ADMIN — SODIUM CHLORIDE, PRESERVATIVE FREE 3 ML: 5 INJECTION INTRAVENOUS at 12:00

## 2019-01-01 RX ADMIN — PANTOPRAZOLE SODIUM 40 MG: 40 TABLET, DELAYED RELEASE ORAL at 05:35

## 2019-01-01 RX ADMIN — HYDROXYZINE HYDROCHLORIDE 25 MG: 25 TABLET, FILM COATED ORAL at 22:05

## 2019-01-01 RX ADMIN — Medication 250 MG: at 21:32

## 2019-01-01 RX ADMIN — DULOXETINE HYDROCHLORIDE 20 MG: 20 CAPSULE, DELAYED RELEASE ORAL at 08:45

## 2019-01-01 RX ADMIN — GABAPENTIN 300 MG: 300 CAPSULE ORAL at 21:25

## 2019-01-01 RX ADMIN — LISINOPRIL 20 MG: 20 TABLET ORAL at 08:39

## 2019-01-01 RX ADMIN — PREDNISONE 5 MG: 10 TABLET ORAL at 09:15

## 2019-01-01 RX ADMIN — SODIUM CHLORIDE, PRESERVATIVE FREE 3 ML: 5 INJECTION INTRAVENOUS at 09:39

## 2019-01-01 RX ADMIN — DICLOFENAC SODIUM 2 G: 10 GEL TOPICAL at 12:44

## 2019-01-01 RX ADMIN — MESALAMINE 0.75 G: 0.38 CAPSULE, EXTENDED RELEASE ORAL at 20:10

## 2019-01-01 RX ADMIN — DICLOFENAC SODIUM 2 G: 10 GEL TOPICAL at 20:32

## 2019-01-01 RX ADMIN — MESALAMINE 0.75 G: 375 CAPSULE, EXTENDED RELEASE ORAL at 23:15

## 2019-01-01 RX ADMIN — APIXABAN 5 MG: 5 TABLET, FILM COATED ORAL at 21:32

## 2019-01-01 RX ADMIN — APIXABAN 5 MG: 5 TABLET, FILM COATED ORAL at 08:24

## 2019-01-01 RX ADMIN — PHENOL 2 SPRAY: 1.5 LIQUID ORAL at 05:51

## 2019-01-01 RX ADMIN — MESALAMINE 800 MG: 400 CAPSULE, DELAYED RELEASE ORAL at 10:04

## 2019-01-01 RX ADMIN — NYSTATIN: 100000 POWDER TOPICAL at 08:42

## 2019-01-01 RX ADMIN — TAZOBACTAM SODIUM AND PIPERACILLIN SODIUM 3.38 G: 375; 3 INJECTION, SOLUTION INTRAVENOUS at 14:07

## 2019-01-01 RX ADMIN — POLYETHYLENE GLYCOL 3350 17 G: 17 POWDER, FOR SOLUTION ORAL at 08:39

## 2019-01-01 RX ADMIN — HYDROXYZINE HYDROCHLORIDE 25 MG: 25 TABLET, FILM COATED ORAL at 20:46

## 2019-01-01 RX ADMIN — APIXABAN 5 MG: 5 TABLET, FILM COATED ORAL at 20:34

## 2019-01-01 RX ADMIN — MEMANTINE 10 MG: 10 TABLET ORAL at 21:19

## 2019-01-01 RX ADMIN — SODIUM CHLORIDE, PRESERVATIVE FREE 3 ML: 5 INJECTION INTRAVENOUS at 09:02

## 2019-01-01 RX ADMIN — BISOPROLOL FUMARATE 5 MG: 5 TABLET ORAL at 21:06

## 2019-01-01 RX ADMIN — ENOXAPARIN SODIUM 90 MG: 100 INJECTION SUBCUTANEOUS at 21:31

## 2019-01-01 RX ADMIN — MESALAMINE 0.75 G: 0.38 CAPSULE, EXTENDED RELEASE ORAL at 09:31

## 2019-01-01 RX ADMIN — FAMOTIDINE 20 MG: 20 TABLET ORAL at 06:38

## 2019-01-01 RX ADMIN — ACETAMINOPHEN 650 MG: 325 TABLET ORAL at 22:38

## 2019-01-01 RX ADMIN — MESALAMINE 0.75 G: 0.38 CAPSULE, EXTENDED RELEASE ORAL at 10:48

## 2019-01-01 RX ADMIN — PREDNISONE 5 MG: 10 TABLET ORAL at 09:32

## 2019-01-01 RX ADMIN — TAZOBACTAM SODIUM AND PIPERACILLIN SODIUM 3.38 G: 375; 3 INJECTION, SOLUTION INTRAVENOUS at 12:59

## 2019-01-01 RX ADMIN — SODIUM CHLORIDE, PRESERVATIVE FREE 3 ML: 5 INJECTION INTRAVENOUS at 21:00

## 2019-01-01 RX ADMIN — DICLOFENAC SODIUM 2 G: 10 GEL TOPICAL at 09:01

## 2019-01-01 RX ADMIN — ROPIVACAINE HYDROCHLORIDE 6 ML/HR: 2 INJECTION, SOLUTION EPIDURAL; INFILTRATION at 14:19

## 2019-01-01 RX ADMIN — GABAPENTIN 300 MG: 300 CAPSULE ORAL at 21:06

## 2019-01-01 RX ADMIN — ACETAMINOPHEN 650 MG: 325 TABLET ORAL at 11:12

## 2019-01-01 RX ADMIN — ATORVASTATIN CALCIUM 80 MG: 40 TABLET, FILM COATED ORAL at 21:48

## 2019-01-01 RX ADMIN — PREDNISONE 5 MG: 10 TABLET ORAL at 08:19

## 2019-01-01 RX ADMIN — LIDOCAINE 2 PATCH: 50 PATCH CUTANEOUS at 21:23

## 2019-01-01 RX ADMIN — SODIUM CHLORIDE, PRESERVATIVE FREE 10 ML: 5 INJECTION INTRAVENOUS at 21:11

## 2019-01-01 RX ADMIN — IOPAMIDOL 115 ML: 755 INJECTION, SOLUTION INTRAVENOUS at 19:10

## 2019-01-01 RX ADMIN — ASPIRIN 325 MG ORAL TABLET 325 MG: 325 PILL ORAL at 09:20

## 2019-01-01 RX ADMIN — PANTOPRAZOLE SODIUM 40 MG: 40 TABLET, DELAYED RELEASE ORAL at 05:39

## 2019-01-01 RX ADMIN — LISINOPRIL 20 MG: 10 TABLET ORAL at 20:38

## 2019-01-01 RX ADMIN — LOPERAMIDE HYDROCHLORIDE 2 MG: 2 CAPSULE ORAL at 09:11

## 2019-01-01 RX ADMIN — ETOMIDATE 20 MG: 2 INJECTION, SOLUTION INTRAVENOUS at 08:47

## 2019-01-01 RX ADMIN — TAZOBACTAM SODIUM AND PIPERACILLIN SODIUM 3.38 G: 375; 3 INJECTION, SOLUTION INTRAVENOUS at 14:10

## 2019-01-01 RX ADMIN — ACETAMINOPHEN 650 MG: 325 TABLET ORAL at 18:23

## 2019-01-01 RX ADMIN — ATORVASTATIN CALCIUM 80 MG: 40 TABLET, FILM COATED ORAL at 21:13

## 2019-01-01 RX ADMIN — MEMANTINE 10 MG: 5 TABLET ORAL at 21:26

## 2019-01-01 RX ADMIN — AMLODIPINE BESYLATE 2.5 MG: 2.5 TABLET ORAL at 09:28

## 2019-01-01 RX ADMIN — QUETIAPINE FUMARATE 12.5 MG: 25 TABLET ORAL at 21:07

## 2019-01-01 RX ADMIN — QUETIAPINE FUMARATE 12.5 MG: 25 TABLET ORAL at 12:40

## 2019-01-01 RX ADMIN — SODIUM CHLORIDE, PRESERVATIVE FREE 3 ML: 5 INJECTION INTRAVENOUS at 21:09

## 2019-01-01 RX ADMIN — SODIUM CHLORIDE 100 ML/HR: 9 INJECTION, SOLUTION INTRAVENOUS at 08:47

## 2019-01-28 PROBLEM — M62.81 RIGHT-SIDED MUSCLE WEAKNESS: Status: ACTIVE | Noted: 2019-01-01

## 2019-01-28 PROBLEM — R40.0 DAYTIME SOMNOLENCE: Status: ACTIVE | Noted: 2019-01-01

## 2019-01-28 PROBLEM — F98.5 ACQUIRED STUTTERING: Status: ACTIVE | Noted: 2019-01-01

## 2019-01-28 PROBLEM — I67.9 CEREBROVASCULAR SMALL VESSEL DISEASE: Status: ACTIVE | Noted: 2019-01-01

## 2019-01-28 PROBLEM — R26.89 SHUFFLING GAIT: Status: ACTIVE | Noted: 2019-01-01

## 2019-01-28 PROBLEM — R42 DIZZINESS: Status: ACTIVE | Noted: 2019-01-01

## 2019-01-28 PROBLEM — F32.4 MAJOR DEPRESSIVE DISORDER WITH SINGLE EPISODE, IN PARTIAL REMISSION (HCC): Status: ACTIVE | Noted: 2019-01-01

## 2019-01-28 PROBLEM — Z79.52 CURRENT CHRONIC USE OF SYSTEMIC STEROIDS: Status: ACTIVE | Noted: 2019-01-01

## 2019-01-28 PROBLEM — R73.09 ABNORMAL GLUCOSE LEVEL: Status: ACTIVE | Noted: 2017-07-11

## 2019-01-28 PROBLEM — E55.9 VITAMIN D DEFICIENCY: Status: ACTIVE | Noted: 2019-01-01

## 2019-01-28 PROBLEM — F01.A0: Status: ACTIVE | Noted: 2019-01-01

## 2019-01-28 PROBLEM — M19.071 OSTEOARTHRITIS OF TOE JOINT, RIGHT: Status: ACTIVE | Noted: 2019-01-01

## 2019-01-28 PROBLEM — J30.89 PERENNIAL ALLERGIC RHINITIS: Status: ACTIVE | Noted: 2019-01-01

## 2019-01-28 PROBLEM — K50.10 CROHN'S DISEASE OF COLON (HCC): Status: ACTIVE | Noted: 2017-07-11

## 2019-01-28 PROBLEM — M54.2 CERVICALGIA: Status: ACTIVE | Noted: 2019-01-01

## 2019-01-28 PROBLEM — I10 BENIGN ESSENTIAL HYPERTENSION: Status: ACTIVE | Noted: 2017-07-11

## 2019-01-28 PROBLEM — M54.50 LOW BACK PAIN: Status: ACTIVE | Noted: 2019-01-01

## 2019-01-28 PROBLEM — H35.30 MACULAR DEGENERATION OF BOTH EYES: Status: ACTIVE | Noted: 2019-01-01

## 2019-01-28 PROBLEM — I69.391 DYSPHAGIA DUE TO OLD CEREBROVASCULAR ACCIDENT: Status: ACTIVE | Noted: 2019-01-01

## 2019-01-28 PROBLEM — R26.89 POOR BALANCE: Status: ACTIVE | Noted: 2019-01-01

## 2019-01-28 PROBLEM — K50.111 CROHN'S COLITIS, WITH RECTAL BLEEDING (HCC): Status: ACTIVE | Noted: 2017-07-11

## 2019-01-28 PROBLEM — R35.1 NOCTURIA: Status: ACTIVE | Noted: 2019-01-01

## 2019-01-28 PROBLEM — K21.9 GERD WITHOUT ESOPHAGITIS: Status: ACTIVE | Noted: 2019-01-01

## 2019-01-28 PROBLEM — H91.92 HEARING LOSS OF LEFT EAR: Status: ACTIVE | Noted: 2019-01-01

## 2019-01-28 PROBLEM — M48.02 SPINAL STENOSIS IN CERVICAL REGION: Status: ACTIVE | Noted: 2019-01-01

## 2019-01-28 PROBLEM — R31.29 MICROHEMATURIA: Status: ACTIVE | Noted: 2019-01-01

## 2019-01-28 PROBLEM — R20.0 NUMBNESS ON RIGHT SIDE: Status: ACTIVE | Noted: 2019-01-01

## 2019-01-28 PROBLEM — R21 RASH: Status: ACTIVE | Noted: 2019-01-01

## 2019-01-28 PROBLEM — R35.0 FREQUENT URINATION: Status: ACTIVE | Noted: 2019-01-01

## 2019-02-04 NOTE — PROGRESS NOTES
Subjective:    CC: Vargas De is seen today for Peripheral Neuropathy       HPI:  Current visit-since his last visit he denies having any major problems with his memory however he does admit to stuttering where he is unable to say a word despite knowing what to say.  As per the daughter he does this while conversing in English or Hebrew.  He also misplaces things around the house but can carry out all his activities of daily living.  He continues to be on Namenda XR 28 mg daily.  For his neuropathy he is also taking gabapentin 300 mg at night which controls his symptoms well however he has recently started to have right arm pain off and on.  He does have degenerative changes in his cervical spine and has tried physical therapy several times before and does not want to try it again.    Last visit- since his last visit patient reports to his memory being stable.  He has reached a dose of 28 mg on his Namenda XR.  As per his daughter he still has word finding difficulties.  Today the patient also admits to having visual hallucinations once or twice where he has seen his daughter beside himself when she has not been there.  He denies any depressive symptoms currently and is doing well on Zoloft.  With regards to his neuropathy he feels it is stable on gabapentin 300 mg at night but he has to take up to 4 Tylenols a day for his arthritis.  His blood work was normal other than a low vitamin B6 level and he has started taking supplements.       Last visit- since his last visit he started taking Aricept however he had night terrors even on the 5 mg daily dose.  Hence he stopped taking it.  He continues to have word finding difficulties as well as problems recalling things more so when he is depressed or is in pain.  He continues to be on Zoloft 50 mg daily.  Right after he saw me he was admitted to the hospital for a right arm infection (possible MRSA) and just completed his course of antibiotics a week ago.  He had  his MRI brain yesterday that shows atrophy and extensive white matter changes with chronic infarcts in the right frontal and parietal region as well as extensive microhemorrhages consistent with a amyloid angiopathy.  With regards to his neuropathy he did not have blood work however he did increase his gabapentin to 300 mg at night which has helped improve his symptoms.     Initial [id number] male accompanied by his daughter with a history of hypertension, hyperlipidemia, stroke in 2013, ulcerative colitis, macular degeneration, hearing loss in left ear, depression now presents with word finding difficulties, shuffling gait and tingling and numbness in his feet.  As per patient he had a frontal stroke in 2013 that caused right-sided weakness.  He denies having any speech problems after the stroke however 6 months ago he started to have word finding difficulties.  Denies any severe problems with his memory.  He does report to being depressed after the death of his wife 3 years ago and has been on antidepressants since then.  He lives with his daughter and states that he doesn't feel like interacting with friends anymore.  He also reports to disturbances in sleep due to frequent awakenings to use the bathroom He has also had problems with his balance and walking where he has started to shuffle his feet.  He had MRIs of his back that showed severe degenerative changes in his spine throughout.  After that he saw a surgeon who told him that he was not a good surgical candidate.  He has also been having tingling and numbness that started in his feet and has progressed up into his legs a few years ago.  He was prescribed gabapentin but is only taking 200 mg at night.  He denies having any history of diabetes or any excessive alcohol intake.        The following portions of the patient's history were reviewed today and updated as of 02/04/2019  : allergies, current medications, past family history, past medical  history, past social history, past surgical history and problem list  These document will be scanned to patient's chart.      Current Outpatient Medications:   •  acetaminophen (TYLENOL) 500 MG tablet, Take 500 mg by mouth Every 6 (Six) Hours As Needed for mild pain (1-3)., Disp: , Rfl:   •  APRISO 0.375 g 24 hr capsule, , Disp: , Rfl:   •  aspirin 81 MG EC tablet, Take 81 mg by mouth Daily., Disp: , Rfl:   •  bisoprolol (ZEBeta) 5 MG tablet, bisoprolol fumarate 5 mg tablet, Disp: , Rfl:   •  Cholecalciferol (VITAMIN D3) 5000 units capsule capsule, Take 5,000 Units by mouth Daily., Disp: , Rfl:   •  dicyclomine (BENTYL) 20 MG tablet, 2 (Two) Times a Day As Needed., Disp: , Rfl:   •  doxazosin (CARDURA) 4 MG tablet, Take 4 mg by mouth Every Night., Disp: , Rfl:   •  doxylamine (UNISOM) 25 MG tablet, Take  by mouth., Disp: , Rfl:   •  fluticasone (FLONASE) 50 MCG/ACT nasal spray, 2 sprays into each nostril Daily., Disp: , Rfl:   •  gabapentin (NEURONTIN) 100 MG capsule, TAKE ONE CAPSULE BY MOUTH EVERY MORNING, TAKE ONE CAPSULE BY MOUTH AT NOON, AND TAKE THREE CAPSULES BY MOUTH EVERY EVENING, Disp: 150 capsule, Rfl: 2  •  ipratropium (ATROVENT) 0.06 % nasal spray, 2 sprays into each nostril 4 (Four) Times a Day As Needed., Disp: , Rfl:   •  lisinopril (PRINIVIL,ZESTRIL) 10 MG tablet, Take 10 mg by mouth 2 (Two) Times a Day., Disp: , Rfl:   •  loratadine (ALLERGY) 10 MG tablet, Allergy  claritin, Disp: , Rfl:   •  memantine (NAMENDA XR) 28 MG capsule sustained-release 24 hr extended release capsule, Take 1 capsule by mouth Daily., Disp: 30 capsule, Rfl: 11  •  mesalamine (LIALDA) 1.2 g EC tablet, Take 1,200 mg by mouth Daily., Disp: , Rfl:   •  Multiple Vitamins-Minerals (PRESERVISION AREDS PO), Take  by mouth., Disp: , Rfl:   •  mupirocin (BACTROBAN) 2 % ointment, , Disp: , Rfl:   •  omeprazole (priLOSEC) 20 MG capsule, Take 20 mg by mouth 2 (Two) Times a Day., Disp: , Rfl:   •  potassium gluconate 595 (99 K) MG tablet  tablet, Take 595 mg by mouth Daily., Disp: , Rfl:   •  pravastatin (PRAVACHOL) 40 MG tablet, Take 40 mg by mouth Daily., Disp: , Rfl:   •  predniSONE (DELTASONE) 5 MG tablet, 5 mg 2 (Two) Times a Day., Disp: , Rfl:   •  Pyridoxine HCl (VITAMIN B6 PO), Take  by mouth., Disp: , Rfl:   •  sertraline (ZOLOFT) 50 MG tablet, TAKE ONE TABLET BY MOUTH DAILY, Disp: 30 tablet, Rfl: 5  •  trospium 60 MG capsule sustained-release 24 hr capsule, Take 20 mg by mouth Every Morning., Disp: , Rfl:   •  vitamin B-12 (CYANOCOBALAMIN) 500 MCG tablet, Take 500 mcg by mouth Daily., Disp: , Rfl:   •  Zinc 50 MG capsule, Take 50 mg by mouth Daily., Disp: , Rfl:   •  donepezil (ARICEPT) 5 MG tablet, Take 1 tablet by mouth Every Night., Disp: 30 tablet, Rfl: 3   Past Medical History:   Diagnosis Date   • Arthritis    • BPH (benign prostatic hyperplasia)      urology   • Depression    • Gout    • Hearing loss     left ear   • Hyperlipidemia    • Hypertension    • Macular degeneration    • Spinal stenosis     cervical and lumbosacral spine   • Stroke (CMS/HCC)    • Stroke (CMS/HCC)    • Ulcerative colitis (CMS/HCC)       Past Surgical History:   Procedure Laterality Date   • CATARACT EXTRACTION     • HEMORRHOIDECTOMY     • TONSILLECTOMY        Family History   Problem Relation Age of Onset   • Cancer Mother    • Alzheimer's disease Father    • Arthritis Father    • Cancer Brother    • Diabetes Maternal Aunt    • Arthritis Other    • Diabetes Other    • Stroke Maternal Cousin       Social History     Socioeconomic History   • Marital status:      Spouse name: Not on file   • Number of children: Not on file   • Years of education: Not on file   • Highest education level: Not on file   Social Needs   • Financial resource strain: Not on file   • Food insecurity - worry: Not on file   • Food insecurity - inability: Not on file   • Transportation needs - medical: Not on file   • Transportation needs - non-medical: Not on file   Occupational  "History   • Not on file   Tobacco Use   • Smoking status: Never Smoker   • Smokeless tobacco: Never Used   Substance and Sexual Activity   • Alcohol use: Yes     Comment: 1 a month   • Drug use: No   • Sexual activity: Defer   Other Topics Concern   • Not on file   Social History Narrative   • Not on file     Review of Systems   Musculoskeletal: Positive for back pain and gait problem.   Neurological: Positive for speech difficulty and weakness.   Psychiatric/Behavioral: Positive for decreased concentration, sleep disturbance and depressed mood.   All other systems reviewed and are negative.      Objective:    /84 (BP Location: Right arm, Patient Position: Sitting, Cuff Size: Adult)   Pulse 74   Ht 170.2 cm (67.01\")   Wt 86.6 kg (191 lb)   SpO2 98%   BMI 29.91 kg/m²     Neurology Exam:    General apperance: NAD.     Mental status: Alert, awake and oriented to time place and person.    Recent and Remote memory: Mildly impaired, delayed recall today was 3/3, MMSE at previous visit was 25 with a delayed recall of 0    Attention span and Concentration: Normal.     Language and Speech: Intact- No dysarthria.    Fluency, Naming , Repitition and Comprehension:  Intact    Cranial Nerves:   CN II: Visual fields are full. Intact. Fundi - Normal, No papillederma, Pupils - ANAHI  CN III, IV and VI: Extraocular movements are intact. Normal saccades.   CN V: Facial sensation is intact.   CN VII: Muscles of facial expression reveal no asymmetry. Intact.   CN VIII: Hearing is intact. Whispered voice intact.   CN IX and X: Palate elevates symmetrically. Intact  CN XI: Shoulder shrug is intact.   CN XII: Tongue is midline without evidence of atrophy or fasciculation.     Ophthalmoscopic exam of optic disc-normal    Motor:  Right UE muscle strength 5/5. Normal tone.     Left UE muscle strength 5/5. Normal tone.      Right LE muscle strength5/5. Normal tone.     Left LE muscle strength 5/5. Normal tone.      Sensory: Normal " light touch, vibration and pinprick sensation bilaterally.    DTRs: 2+ bilaterally in upper and lower extremities.    Babinski: Negative bilaterally.    Co-ordination: Normal finger-to-nose, heel to shin B/L.    Rhomberg: Negative.    Gait: Slow and cautious    Cardiovascular: Regular rate and rhythm without murmur, gallop or rub.    Assessment and Plan:  1. Mild dementia  Patient is having increased stuttering.  It may or may not be related to cognitive impairment.  I will again started Aricept 5 mg daily.  Now that he is on Namenda he may be able to tolerate the Aricept better  I have asked him to continue doing mental activities  I have also advised him for better blood pressure management.  He has discussed it with his PCP and is in the process of getting his medications changed.    2. Neuropathy-on gabapentin 300 mg at night as well as vitamin B6 supplements  Patient does not want to try PT for his cervical radiculopathy on the right    Return in about 3 months (around 5/4/2019).         Rafaela Gutierrez MD

## 2019-02-04 NOTE — PROGRESS NOTES
Theresa De is a 78 y.o. male.     Hypertension   This is a new problem. The current episode started 1 to 4 weeks ago. The problem has been gradually worsening since onset. The problem is uncontrolled. Associated symptoms include chest pain. Pertinent negatives include no anxiety, blurred vision, malaise/fatigue, neck pain, palpitations, peripheral edema or shortness of breath. There are no associated agents to hypertension. Risk factors for coronary artery disease include male gender, obesity, dyslipidemia and sedentary lifestyle. Past treatments include ACE inhibitors. Current antihypertension treatment includes ACE inhibitors, alpha 1 blockers and beta blockers. The current treatment provides no improvement. Compliance problems include diet and exercise.    Chest Pain    This is a new problem. The current episode started 1 to 4 weeks ago. The onset quality is gradual. The problem occurs intermittently. The problem has been unchanged. The pain is present in the epigastric region. The pain is at a severity of 5/10. The pain is moderate. The quality of the pain is described as sharp. The pain does not radiate. Associated symptoms include abdominal pain. Pertinent negatives include no cough, dizziness, fever, irregular heartbeat, malaise/fatigue, near-syncope, palpitations or shortness of breath. The pain is aggravated by movement. He has tried rest for the symptoms. Risk factors include male gender and obesity.        The following portions of the patient's history were reviewed and updated as appropriate: current medications, past family history, past medical history, past social history, past surgical history and problem list.    Review of Systems   Constitutional: Positive for unexpected weight gain. Negative for chills, fatigue, fever and malaise/fatigue.   HENT: Negative for congestion, ear pain and sinus pressure.    Eyes: Negative for blurred vision and visual disturbance.   Respiratory:  Negative for cough, chest tightness, shortness of breath and wheezing.    Cardiovascular: Positive for chest pain. Negative for palpitations, leg swelling and near-syncope.   Gastrointestinal: Positive for abdominal pain and GERD. Negative for abdominal distention, blood in stool and constipation.   Genitourinary: Negative for urinary incontinence, decreased urine volume and urgency.   Musculoskeletal: Positive for arthralgias. Negative for joint swelling and neck pain.   Skin: Negative for color change and dry skin.   Allergic/Immunologic: Negative for environmental allergies.   Neurological: Negative for dizziness, speech difficulty and headache.   Psychiatric/Behavioral: Negative for decreased concentration. The patient is not nervous/anxious.        Objective   Physical Exam   Constitutional: He appears well-developed and well-nourished.   HENT:   Head: Normocephalic and atraumatic.   Right Ear: External ear normal.   Left Ear: External ear normal.   Nose: Nose normal.   Mouth/Throat: Oropharynx is clear and moist.   Eyes: Conjunctivae and EOM are normal. Pupils are equal, round, and reactive to light.   Neck: Normal range of motion. Neck supple.   Cardiovascular: Normal rate, regular rhythm, normal heart sounds and intact distal pulses.   Pulmonary/Chest: Effort normal and breath sounds normal.   Abdominal: Soft. Bowel sounds are normal. He exhibits no distension. There is tenderness. There is guarding.   Musculoskeletal:        Right shoulder: He exhibits tenderness.        Arms:  Nursing note and vitals reviewed.      Procedures    Assessment/Plan   Vargas was seen today for follow-up.    Diagnoses and all orders for this visit:    Benign essential hypertension  Comments:  Repeat 160/90  Instructed daughter to recheck daily  Labs today  He has gained 18 pounds since November  Orders:  -     Comprehensive Metabolic Panel; Future  -     TSH; Future  -     Urinalysis With Microscopic - Urine, Clean Catch;  Future  -     Lipid Panel; Future  -     CBC & Differential; Future  -     Sedimentation Rate; Future  -     XR Chest PA & Lateral; Future    Right-sided muscle weakness  Comments:  Hx of right sided rib fractures x 2  Chest xray today  Orders:  -     Comprehensive Metabolic Panel; Future  -     TSH; Future  -     Urinalysis With Microscopic - Urine, Clean Catch; Future  -     Lipid Panel; Future  -     CBC & Differential; Future  -     Sedimentation Rate; Future  -     XR Chest PA & Lateral; Future    Mixed hyperlipidemia  -     Comprehensive Metabolic Panel; Future  -     TSH; Future  -     Urinalysis With Microscopic - Urine, Clean Catch; Future  -     Lipid Panel; Future  -     CBC & Differential; Future  -     Sedimentation Rate; Future  -     XR Chest PA & Lateral; Future      Plan of care reviewed with patient at the conclusion of today's visit. Education was provided regarding diagnosis, management and any prescribed or recommended OTC medications.  Patient verbalizes understanding of and agreement with management plan.     Return if symptoms worsen or fail to improve.

## 2019-02-06 NOTE — TELEPHONE ENCOUNTER
She called back. She said as of last night pain was not better. She said if not better tomorrow will call and schedule appt to come in and see you.

## 2019-02-06 NOTE — TELEPHONE ENCOUNTER
I sent a lab letter yesterday on the portal;  Labs were within normal range.  Is he still having pain?

## 2019-02-06 NOTE — PROGRESS NOTES
I sent a lab letter yesterday.  Did they receive it?  Labs are stable.  Is he still having discomfort?

## 2019-02-27 PROBLEM — G45.9 TIA (TRANSIENT ISCHEMIC ATTACK): Status: ACTIVE | Noted: 2019-01-01

## 2019-02-27 PROBLEM — I63.9 CVA (CEREBRAL VASCULAR ACCIDENT) (HCC): Status: ACTIVE | Noted: 2019-01-01

## 2019-02-28 PROBLEM — I63.511 ACUTE ISCHEMIC RIGHT MCA STROKE (HCC): Status: ACTIVE | Noted: 2019-01-01

## 2019-02-28 PROBLEM — E04.1 THYROID NODULE: Status: ACTIVE | Noted: 2019-01-01

## 2019-03-01 PROBLEM — Q21.12 PFO (PATENT FORAMEN OVALE): Status: ACTIVE | Noted: 2019-01-01

## 2019-03-01 PROBLEM — K51.90 ULCERATIVE COLITIS (HCC): Status: ACTIVE | Noted: 2019-01-01

## 2019-03-01 NOTE — PROGRESS NOTES
[facility] Medicine Services  PROGRESS NOTE    Patient Name: Vargas De  : 1940  MRN: [id number]    Date of Admission: 2019  Length of Stay: 1  Primary Care Physician: Saba Arrington MD    Subjective   Subjective     CC:  Left facial droop     HPI:  No acute events. Still with sinus pressure and joint pains. REJI today positive for PFO and started eliquis. Will DC fluids because this is making him urinate too often.     Review of Systems  Gen- No fevers, chills  CV- No chest pain, palpitations  Resp- No cough, dyspnea  GI- No N/V/D, abd pain    Otherwise ROS is negative except as mentioned in the HPI.    Objective   Objective     Vital Signs:   Temp:  [95 °F (35 °C)-98.6 °F (37 °C)] 95 °F (35 °C)  Heart Rate:  [57-80] 71  Resp:  [15-18] 18  BP: (132-188)/() 147/92  Total (NIH Stroke Scale): 3     Physical Exam:  Constitutional: No acute distress, awake, alert  HENT: NCAT, mucous membranes moist; left facial droop   Respiratory: Clear to auscultation bilaterally, respiratory effort normal   Cardiovascular: RRR, no murmurs, rubs, or gallops, palpable pedal pulses bilaterally  Gastrointestinal: Positive bowel sounds, soft, nontender, nondistended  Musculoskeletal: No bilateral ankle edema  Psychiatric: Appropriate affect, cooperative  Neurologic: Oriented x 3, strength symmetric in all extremities, Cranial Nerves grossly intact to confrontation - left facial droop, speech clear  Skin: No rashes    Results Reviewed:  I have personally reviewed current lab, radiology, and data and agree.    Results from last 7 days   Lab Units 19  0554 19  19019  19019  190   WBC 10*3/mm3 9.22 6.25  --   --    HEMOGLOBIN g/dL 15.1 15.9  --   --    HEMOGLOBIN, POC g/dL  --   --  16.3  --    HEMATOCRIT % 45.6 48.0  --   --    HEMATOCRIT POC %  --   --  48  --    PLATELETS 10*3/mm3 179 166  --   --    INR   --   --   --  1.0     Results from last 7 days   Lab  Units 02/28/19  0554 02/27/19 1910 02/27/19 1909 02/27/19 1902   SODIUM mmol/L 140  --   --   --    POTASSIUM mmol/L 3.9  --   --   --    CHLORIDE mmol/L 107  --   --   --    CO2 mmol/L 25.0  --   --   --    BUN mg/dL 12  --   --   --    CREATININE mg/dL 0.66  --   --  0.70   GLUCOSE mg/dL 150*  --   --   --    CALCIUM mg/dL 9.0  --   --   --    ALT (SGPT) U/L  --   --  28  --    AST (SGOT) U/L  --   --  28  --    TROPONIN I ng/mL  --  0.00  --   --      Estimated Creatinine Clearance: 85.2 mL/min (by C-G formula based on SCr of 0.66 mg/dL).    No results found for: BNP    Microbiology Results Abnormal     None          Imaging Results (last 24 hours)     Procedure Component Value Units Date/Time    Fiberoptic Endo (fees) [[id number]] Resulted:  02/28/19 1715     Updated:  02/28/19 1715    Narrative:       This procedure was auto-finalized with no dictation required.          Results for orders placed during the hospital encounter of 02/27/19   Adult Transthoracic Echo Complete W/ Cont if Necessary Per Protocol (With Agitated Saline)    Narrative · Estimated EF = 55%.  · There is calcification of the aortic valve.  · Aortic valve maximum pressure gradient is 17.2 mmHg.  · Mild to moderate aortic valve regurgitation is present.  · Mild to moderate tricuspid valve regurgitation is present.  · Left ventricular systolic function is normal.  · Left ventricular diastolic dysfunction (grade I) consistent with   impaired relaxation.          I have reviewed the medications:    Current Facility-Administered Medications:   •  acetaminophen (TYLENOL) tablet 650 mg, 650 mg, Oral, Q4H PRN, 650 mg at 02/28/19 1846 **OR** acetaminophen (TYLENOL) suppository 650 mg, 650 mg, Rectal, Q4H PRN, Christin Bailon, APRN  •  apixaban (ELIQUIS) tablet 5 mg, 5 mg, Oral, Q12H, Tja Maldonado MD, 5 mg at 03/01/19 1303  •  aspirin EC tablet 81 mg, 81 mg, Oral, Daily, Taj Maldonado MD, 81 mg at 03/01/19 1303  •  atorvastatin  (LIPITOR) tablet 80 mg, 80 mg, Oral, Nightly, Christin Bailon APRN, 80 mg at 02/28/19 2021  •  bisoprolol (ZEBeta) tablet 5 mg, 5 mg, Oral, Q24H, Traci Lewis DO, 5 mg at 03/01/19 1307  •  DULoxetine (CYMBALTA) DR capsule 20 mg, 20 mg, Oral, Daily, Dave Arrington MD, 20 mg at 03/01/19 1341  •  fluticasone (FLONASE) 50 MCG/ACT nasal spray 2 spray, 2 spray, Each Nare, Daily, Traci Lewis DO, 2 spray at 03/01/19 1310  •  lisinopril (PRINIVIL,ZESTRIL) tablet 20 mg, 20 mg, Oral, Q12H, Taj Maldonado MD  •  melatonin tablet 5 mg, 5 mg, Oral, Nightly, Dave Arrington MD  •  Pharmacy Consult - [facility], , Does not apply, Daily, Sienna Sanabria PA, Stopped at 03/01/19 0905  •  predniSONE (DELTASONE) tablet 5 mg, 5 mg, Oral, BID With Meals, Traci Lewis DO, Stopped at 03/01/19 0904  •  sodium chloride 0.9 % flush 10 mL, 10 mL, Intravenous, PRN, Andrzej Sosa MD  •  sodium chloride 0.9 % flush 3 mL, 3 mL, Intravenous, Q12H, Christin Bailon APRN, 3 mL at 03/01/19 0906  •  sodium chloride 0.9 % flush 3-10 mL, 3-10 mL, Intravenous, PRN, Christin Bailon APRN  •  sodium chloride 0.9 % infusion, 50 mL/hr, Intravenous, Continuous, Traci Lewis DO, Last Rate: 50 mL/hr at 03/01/19 1309, 50 mL/hr at 03/01/19 1309  •  temazepam (RESTORIL) capsule 7.5 mg, 7.5 mg, Oral, Nightly PRN, Dave Arrington MD  •  terazosin (HYTRIN) capsule 5 mg, 5 mg, Oral, Nightly, Traci Lewis, DO      Assessment/Plan   Assessment / Plan     Active Hospital Problems    Diagnosis Date Noted   • **CVA (cerebral vascular accident) (CMS/[facility]) [I63.9] 02/27/2019   • Thyroid nodule [E04.1] 02/28/2019   • Acute ischemic right MCA stroke (CMS/[facility]) [I63.511] 02/28/2019   • Spinal stenosis in cervical region [M48.02] 01/28/2019   • GERD without esophagitis [K21.9] 01/28/2019   • Mild dementia [F03.90] 11/01/2018   • HLD (hyperlipidemia) [E78.5] 07/23/2018   • Benign essential hypertension [I10] 07/11/2017          Brief  Hospital Course to date:  78 year old male presenting to the ED with complaint of left sided facial droop who is found to have CVA     CVA  - Continue ASA and started plavix; atorva 80 mg   - ECHO; EF 55%, impaired relaxation; mild aortic regug  - Carotid duplex: 0-49% bilaterally    - FLP and A1C ok   - Neuro following; concern for cardioembolic etiology  - REJI with presence of PFO with positive bubble   - Patient was started on apixaban 5 mg BID in addition to ASA   - Discharge with 30 day telemetry monitor   - PT/OT with acute rehab      Dysphagia  - Related to CVA; speech following; diet modified      Hypertension   - resume antihypertensives; bisoprolol, Lisinopril, hytrin      Hyperlipidemia  -on statin therapy, changed to atorvastatin 80 mg daily      Chronic pain/spinal stenosis  - continue gabapentin  - Zoloft changed to cymbalta     Ulcerative colitis   - On apriso at home; will start lialda because this is not on formulary tonight. Can supply home meds tomorrow   - chronic steroid use; will resume prednisone        Mild dementia  -continue aricept and namenda      Thyroid nodule  - TSH and T4 ok; no follow up needed based on size per radiology      DVT prophylaxis:  scds      CODE STATUS:   Code Status and Medical Interventions:   Ordered at: 02/27/19 5568     Code Status:    CPR     Medical Interventions (Level of Support Prior to Arrest):    Full         Electronically signed by Traci Lewis DO, 03/01/19, 2:41 PM.

## 2019-03-01 NOTE — PROGRESS NOTES
Neurology       Patient Care Team:  Saba Arrington MD as PCP - General (Internal Medicine)    Chief complaint: Stroke    History: Patient slept poorly.    He is finished with his studies.    REJI shows PFO with a dilated left atrium.  Started on Eliquis by Dr. Maldonado.  Past Medical History:   Diagnosis Date   • Arthritis    • BPH (benign prostatic hyperplasia)      urology   • Depression    • Gout    • Hearing loss     left ear   • Hyperlipidemia    • Hypertension    • Macular degeneration    • Spinal stenosis     cervical and lumbosacral spine   • Stroke (CMS/HCC)    • Stroke (CMS/HCC)    • Ulcerative colitis (CMS/HCC)        Vital Signs   Vitals:    03/01/19 1142 03/01/19 1148 03/01/19 1153 03/01/19 1157   BP: 148/98 152/95 143/98 146/91   BP Location:       Patient Position:       Pulse: 66 67 74 68   Resp:       Temp:       TempSrc:       SpO2: 98% 95% 94% 93%   Weight:       Height:           Physical Exam:   General: Sleep              Neuro: Mild slurred speech.    Moderate left facial weakness.    Mild left-sided weakness.        Results Review:  Transcranial Doppler strongly positive.    REJI personally discussed with Dr. Baker shows PFO and a dilated  Results from last 7 days   Lab Units 02/28/19  0554   WBC 10*3/mm3 9.22   HEMOGLOBIN g/dL 15.1   HEMATOCRIT % 45.6   PLATELETS 10*3/mm3 179     Results from last 7 days   Lab Units 02/28/19  0554 02/27/19  1909 02/27/19  1902   SODIUM mmol/L 140  --   --    POTASSIUM mmol/L 3.9  --   --    CHLORIDE mmol/L 107  --   --    CO2 mmol/L 25.0  --   --    BUN mg/dL 12  --   --    CREATININE mg/dL 0.66  --  0.70   CALCIUM mg/dL 9.0  --   --    ALT (SGPT) U/L  --  28  --    AST (SGOT) U/L  --  28  --    GLUCOSE mg/dL 150*  --   --        Imaging Results (last 24 hours)     Procedure Component Value Units Date/Time    Fiberoptic Endo (fees) [[id number]] Resulted:  02/28/19 1715     Updated:  02/28/19 1715    Narrative:       This procedure was auto-finalized with  no dictation required.          Assessment:  Multiple embolic strokes    Vascular cognitive impairment    Plan:  Continue aspirin, Lipitor and Eliquis.    Cymbalta 20 mg daily.    Increase as tolerated in the future for further pain relief and mood enhancement new    Rehab placement to [facility]    Comment:  25 minutes was spent with the family going through the diagnosis and treatment options.         I discussed the patients findings and my recommendations with patient, family and nursing staff    Dave Arrington MD  03/01/19  1:27 PM

## 2019-03-01 NOTE — PROGRESS NOTES
Continued Stay Note  [facility]     Patient Name: Vargas De  MRN: [id number]  Today's Date: 3/1/2019    Admit Date: 2/27/2019    Discharge Plan     Row Name 03/01/19 0929       Plan    Plan  [facility]    Patient/Family in Agreement with Plan  yes    Plan Comments  Spoke with patient and family in room.  Therapy recommending IRF at discharge.  Patient agreeable and prefers [facility].  Referral called to [facility].  She will work up referral and initiate precert with patient's insurance later today.  CM will continue to follow.  Silvia Benz RN [phone]    Final Discharge Disposition Code  62 - inpatient rehab facility        Discharge Codes    No documentation.       Expected Discharge Date and Time     Expected Discharge Date Expected Discharge Time    Mar 2, 2019             Silvia Benz RN

## 2019-03-01 NOTE — PROGRESS NOTES
[facility] at [facility]  Cardiology Progress Note      Chief Complaint/Reason for service:    · Cardioembolic CVA      Reports frequent urination overnight and also expresses concerns about BP being higher at times.  Did take his medications for the first time last night    Past medical, surgical, social and family history reviewed in the patient's electronic medical record.    Review of Systems:   The following systems were reviewed and negative;  constitution, respiratory, cardiovascular and hematologic / lymphatic        Vital Sign Min/Max for last 24 hours  Temp  Min: 97.4 °F (36.3 °C)  Max: 98.6 °F (37 °C)   BP  Min: 132/85  Max: 174/97   Pulse  Min: 59  Max: 80   Resp  Min: 15  Max: 18   SpO2  Min: 93 %  Max: 98 %   No Data Recorded      Intake/Output Summary (Last 24 hours) at 3/1/2019 0802  Last data filed at 3/1/2019 0600  Gross per 24 hour   Intake 842 ml   Output 1200 ml   Net -358 ml           Physical Exam   Constitutional: He is oriented to person, place, and time. He appears well-developed and well-nourished.   HENT:   Head: Normocephalic and atraumatic.   Neck: Normal range of motion.   Cardiovascular: Normal rate and regular rhythm. Exam reveals no gallop and no friction rub.   No murmur heard.  Abdominal: Soft. Bowel sounds are normal.   Musculoskeletal: He exhibits no edema.   Neurological: He is alert and oriented to person, place, and time.   Facial drooping.   Skin: Skin is warm and dry.   Psychiatric: He has a normal mood and affect. His behavior is normal.   Still with some dysarthria and dysphasia as well    Tele: NSR  Results Review (reviewed the patient's recent labs in the electronic medical record):   EKG:  None for review today  CXR: None for review today        Results from last 7 days   Lab Units 02/28/19  0554 02/27/19  1902   SODIUM mmol/L 140  --    POTASSIUM mmol/L 3.9  --    CHLORIDE mmol/L 107  --    BUN mg/dL 12  --    CREATININE mg/dL 0.66 0.70          Results from last 7 days   Lab Units 02/28/19  0554 02/27/19  1909 02/27/19  1902   WBC 10*3/mm3 9.22 6.25  --    HEMOGLOBIN g/dL 15.1 15.9  --    HEMOGLOBIN, POC g/dL  --   --  16.3   HEMATOCRIT % 45.6 48.0  --    HEMATOCRIT POC %  --   --  48   PLATELETS 10*3/mm3 179 166  --        Lab Results   Component Value Date    HGBA1C 5.50 02/28/2019       Lab Results   Component Value Date    CHOL 185 02/28/2019    TRIG 48 02/28/2019    HDL 52 02/28/2019     02/28/2019       Active Hospital Problems    Diagnosis Date Noted   • **CVA (cerebral vascular accident) (CMS/HCC) [I63.9] 02/27/2019   • Thyroid nodule [E04.1] 02/28/2019   • Acute ischemic right MCA stroke (CMS/[facility]) [I63.511] 02/28/2019   • Spinal stenosis in cervical region [M48.02] 01/28/2019   • GERD without esophagitis [K21.9] 01/28/2019   • Mild dementia [F03.90] 11/01/2018   • HLD (hyperlipidemia) [E78.5] 07/23/2018   • Benign essential hypertension [I10] 07/11/2017         ASSESSMENT/PLAN:  Cardioembolic CVA  - hx multiple CVAs  - CTA showed no significant blockages  -Carotid ultrasound did not show any hemodynamically significant stenosis bilaterally there was atherosclerotic plaque  -Lower extremity venous duplex negative  - CT perfusion was negative  - MRI of the brain revealed 2 small acute R occipital infarcts and a small acute R basal ganglia infarct in addition to extensive white matter changes and multiple old lacunar infarcts, all suggestive of cardioembolic source.  -Echo EF 55%, AV calcification, mild to moderate AI, mild to moderate TR, normal RVSP, saline test negative  - transcranial doppler bubble study positive  - REJI today confirms presence of a PFO with positive bubble study, left atrium is dilated there was no left atrial appendage thrombus, there is mild to moderate aortic atherosclerotic plaque.  -With presence of PFO as well as risk factors for atrial fibrillation we will start the patient on apixaban 5 mg twice daily in  addition aspirin 81 mg daily.  He has a history of multiple CVAs  ROPE score 3, which in that model suggests 0% chance that stroke is PFO related  -  We will also discharge with a 30-day mobile telemetry monitor to continue to screen for A. Fib. He has PACs on the telemetry here  -Follow-up with me in 6 weeks     Hx Palpitations  -Further outpatient monitoring as above     HTN  - cont lisinopril and bisoprolol.  -We will increase the lisinopril dose to 20 mg twice daily     HLD  -   - atorvastatin 80 initiated. Pravastatin at home     Mild Dementia  Per medicine     Spinal Stenosis/Chronic Pain  - chronic steroid use  - neuro changed Zoloft to Cymbalta for pain benefit          Electronically signed by ANA MARÍA Galindo, 03/01/19, 8:03 AM.    I, Taj Maldonado M.D.,  have reviewed the notes, assessments, and/or procedures performed by ANA MARÍA/PA, I CONCUR with the documentation of Vargas Mccullough for discharge from a cardiology standpoint.  We will start the apixaban today.  Increase his lisinopril dose for better blood pressure control.  If discharged today please call our office to have his 30-day mobile telemetry monitor placed.  He is pending possible discharge to [facility]

## 2019-03-02 NOTE — PROGRESS NOTES
[facility]  PROGRESS NOTE    Patient Name: Vargas De  : 1940  MRN: [id number]    Date of Admission: 2019  Length of Stay: 2  Primary Care Physician: Saba Arrington MD    Subjective   Subjective     CC:  Facial droop, speech difficulty     HPI:  No acute events. Slept better last night. Aching from his arthritis. Having sinus pressure and HA. Flonase started yesterday     Review of Systems  Gen- No fevers, chills  CV- No chest pain, palpitations  Resp- No cough, dyspnea  GI- No N/V/D, abd pain    Otherwise ROS is negative except as mentioned in the HPI.    Objective   Objective     Vital Signs:   Temp:  [97.3 °F (36.3 °C)-97.7 °F (36.5 °C)] 97.6 °F (36.4 °C)  Heart Rate:  [63-68] 66  Resp:  [16-18] 18  BP: (129-154)/(75-97) 129/75  Total (NIH Stroke Scale): 3     Physical Exam:  Constitutional: No acute distress, awake, alert  HENT: NCAT, mucous membranes moist; left facial droop   Respiratory: Clear to auscultation bilaterally, respiratory effort normal   Cardiovascular: RRR, no murmurs, rubs, or gallops, palpable pedal pulses bilaterally  Gastrointestinal: Positive bowel sounds, soft, nontender, nondistended  Musculoskeletal: No bilateral ankle edema  Psychiatric: Appropriate affect, cooperative  Neurologic: Oriented x 3, strength symmetric in all extremities, Cranial Nerves grossly intact to confrontation, speech clear  Skin: No rashes    Results Reviewed:  I have personally reviewed current lab, radiology, and data and agree.    Results from last 7 days   Lab Units 19  0554 19  19019  19019  190   WBC 10*3/mm3 9.22 6.25  --   --    HEMOGLOBIN g/dL 15.1 15.9  --   --    HEMOGLOBIN, POC g/dL  --   --  16.3  --    HEMATOCRIT % 45.6 48.0  --   --    HEMATOCRIT POC %  --   --  48  --    PLATELETS 10*3/mm3 179 166  --   --    INR   --   --   --  1.0     Results from last 7 days   Lab Units 19  0554 19  1910  02/27/19 1909 02/27/19  1902   SODIUM mmol/L 140  --   --   --    POTASSIUM mmol/L 3.9  --   --   --    CHLORIDE mmol/L 107  --   --   --    CO2 mmol/L 25.0  --   --   --    BUN mg/dL 12  --   --   --    CREATININE mg/dL 0.66  --   --  0.70   GLUCOSE mg/dL 150*  --   --   --    CALCIUM mg/dL 9.0  --   --   --    ALT (SGPT) U/L  --   --  28  --    AST (SGOT) U/L  --   --  28  --    TROPONIN I ng/mL  --  0.00  --   --      Estimated Creatinine Clearance: 85.2 mL/min (by C-G formula based on SCr of 0.66 mg/dL).    No results found for: BNP    Microbiology Results Abnormal     None          Imaging Results (last 24 hours)     ** No results found for the last 24 hours. **          Results for orders placed during the hospital encounter of 02/27/19   Adult Transesophageal Echo (REJI) W/ Cont if Necessary Per Protocol    Narrative · Left ventricular systolic function is normal. Estimated EF appears to be   in the range of 56 - 60%.  · Left atrial cavity size is moderately dilated. The left atrial appendage   was visualized through multiple planes. Left atrial appendage was found to   be multilobar in nature. Doppler interrogation shows normal flow within   the left atrial appendage. No evidence of a left atrial appendage thrombus   was present  · Patent foramen ovale present. Saline test results are positive for right   to left atrial level shunt  · Mild to moderate aortic valve regurgitation is present  · There is mild (grade 2) plaque in the aortic arch present. There is mild   (grade 2) plaque in the descending aorta present.          I have reviewed the medications:    Current Facility-Administered Medications:   •  acetaminophen (TYLENOL) tablet 650 mg, 650 mg, Oral, Q4H PRN, 650 mg at 03/01/19 2038 **OR** acetaminophen (TYLENOL) suppository 650 mg, 650 mg, Rectal, Q4H PRN, Christin Bailon, APRN  •  apixaban (ELIQUIS) tablet 5 mg, 5 mg, Oral, Q12H, Taj Maldonado MD, 5 mg at 03/02/19 0911  •  aspirin EC  tablet 81 mg, 81 mg, Oral, Daily, Taj Maldonado MD, 81 mg at 03/02/19 0911  •  atorvastatin (LIPITOR) tablet 80 mg, 80 mg, Oral, Nightly, Christin Bailon APRN, 80 mg at 03/01/19 2038  •  bisoprolol (ZEBeta) tablet 5 mg, 5 mg, Oral, Q24H, Traci Lewis DO, 5 mg at 03/02/19 0911  •  dicyclomine (BENTYL) tablet 20 mg, 20 mg, Oral, BID PRN, Traci Lewis DO, 20 mg at 03/02/19 1005  •  DULoxetine (CYMBALTA) DR capsule 20 mg, 20 mg, Oral, Daily, Dave Arrington MD, 20 mg at 03/02/19 1004  •  fluticasone (FLONASE) 50 MCG/ACT nasal spray 2 spray, 2 spray, Each Nare, Daily, Traci Lewis DO, 2 spray at 03/02/19 0912  •  gabapentin (NEURONTIN) capsule 300 mg, 300 mg, Oral, Nightly, Traci Lewis DO, 300 mg at 03/01/19 2038  •  lisinopril (PRINIVIL,ZESTRIL) tablet 20 mg, 20 mg, Oral, Q12H, Taj Maldonado MD, 20 mg at 03/02/19 0911  •  melatonin tablet 5 mg, 5 mg, Oral, Nightly, Dave Arrington MD, 5 mg at 03/01/19 2039  •  memantine (NAMENDA) tablet 10 mg, 10 mg, Oral, Q12H, Traci Lewis DO, 10 mg at 03/02/19 0911  •  mesalamine (DELZICOL) delayed release capsule 800 mg, 800 mg, Oral, TID, Traci Lewis DO, 800 mg at 03/02/19 1004  •  Pharmacy Consult - MTM, , Does not apply, Daily, Sienna Sanabria PA, Stopped at 03/01/19 0905  •  predniSONE (DELTASONE) tablet 5 mg, 5 mg, Oral, BID With Meals, Traci Lweis DO, 5 mg at 03/02/19 0911  •  sodium chloride 0.9 % flush 10 mL, 10 mL, Intravenous, PRN, Andrzej Sosa MD  •  sodium chloride 0.9 % flush 3 mL, 3 mL, Intravenous, Q12H, Christin Bailon, APRN, 3 mL at 03/02/19 0912  •  sodium chloride 0.9 % flush 3-10 mL, 3-10 mL, Intravenous, PRN, Christin Bailon, APRN  •  temazepam (RESTORIL) capsule 7.5 mg, 7.5 mg, Oral, Nightly PRN, Dave Arrington MD, 7.5 mg at 03/01/19 2038  •  terazosin (HYTRIN) capsule 5 mg, 5 mg, Oral, Nightly, Traci Lewis DO      Assessment/Plan   Assessment / Plan     Active Hospital Problems     Diagnosis Date Noted   • **CVA (cerebral vascular accident) (CMS/[facility]) [I63.9] 02/27/2019   • PFO (patent foramen ovale) [Q21.1] 03/01/2019   • Ulcerative colitis (CMS/[facility]) [K51.90] 03/01/2019   • Thyroid nodule [E04.1] 02/28/2019   • Acute ischemic right MCA stroke (CMS/[facility]) [I63.511] 02/28/2019   • Spinal stenosis in cervical region [M48.02] 01/28/2019   • GERD without esophagitis [K21.9] 01/28/2019   • Mild dementia [F03.90] 11/01/2018   • HLD (hyperlipidemia) [E78.5] 07/23/2018   • Benign essential hypertension [I10] 07/11/2017          Brief Hospital Course to date:  78 year old male presenting to the ED with complaint of left sided facial droop who is found to have CVA. MRI with multiple chronic lacunar infarcts and acute lacunar infarcts. Neuro and cardiology consulted. REJI was positive for PFO. He was started on apixaban in addition to eliquis and will be discharged with a 30 day monitor. Eval by PT/OT recommended acute rehab. Speech eval and FEES done and patient started on modified diet.      CVA  - Continue ASA atorva 80 mg   - ECHO; EF 55%, impaired relaxation; mild aortic regug  - Carotid duplex: 0-49% bilaterally    - FLP and A1C ok   - Neuro following; concern for cardioembolic etiology  - REJI with presence of PFO with positive bubble; transcranial doppler positive bubble study  - Patient was started on apixaban 5 mg BID in addition to ASA   - Discharge with 30 day telemetry monitor   - PT/OT with acute rehab      Dysphagia  - Related to CVA; speech following; diet modified      Hypertension   - resume antihypertensives; bisoprolol, Lisinopril, hytrin      Hyperlipidemia  -on statin therapy, changed to atorvastatin 80 mg daily      Chronic pain/spinal stenosis  - continue gabapentin  - Zoloft changed to cymbalta      Ulcerative colitis   - On apriso at home; will continue mesalamine on formulary  - chronic steroid use; will resume prednisone   - Bentyl PRN      Mild dementia  -continue namenda --  patient no longer taking aricept due to side effects      Thyroid nodule  - TSH and T4 ok; no follow up needed based on size per radiology      DVT prophylaxis:  scds     Dispo; rehab likely on Monday       CODE STATUS:   Code Status and Medical Interventions:   Ordered at: 02/27/19 8293     Code Status:    CPR     Medical Interventions (Level of Support Prior to Arrest):    Full         Electronically signed by Traci Lewis DO, 03/02/19, 1:26 PM.

## 2019-03-02 NOTE — SIGNIFICANT NOTE
03/02/19 1543   SLP Deferred Reason   SLP Deferred Reason (Instrumental needed prior to diet advancement.  Spoke with pt/family/RN.  FEES ordered for tomorrow.)

## 2019-03-02 NOTE — PLAN OF CARE
Problem: Patient Care Overview  Goal: Plan of Care Review  Outcome: Ongoing (interventions implemented as appropriate)   03/02/19 1721   OTHER   Outcome Summary INCREASED DISTANCE AMBULATED. NEEDS CUES FOR SAFETY AWARENESS WITH R WALKER DURING TRANSFERS/ADL'S. WILL BENEFIT FROM IRF AT D/C.    Coping/Psychosocial   Plan of Care Reviewed With patient   Plan of Care Review   Progress improving       Problem: Stroke (Ischemic) (Adult)  Goal: Signs and Symptoms of Listed Potential Problems Will be Absent, Minimized or Managed (Stroke)  Outcome: Ongoing (interventions implemented as appropriate)   03/02/19 1721   Goal/Outcome Evaluation   Problems Assessed (Stroke (Ischemic)) cognitive impairment;communication impairment;motor/sensory impairment   Problems Assessed (Stroke (Ischemic)) motor/sensory impairment;communication impairment

## 2019-03-02 NOTE — THERAPY TREATMENT NOTE
Acute Care - Physical Therapy Treatment Note  [facility]     Patient Name: Vargas De  : 1940  MRN: [id number]  Today's Date: 3/2/2019  Onset of Illness/Injury or Date of Surgery: 19  Date of Referral to PT: 19  Referring Physician: Uvaldo GOTTI    Admit Date: 2019    Visit Dx:    ICD-10-CM ICD-9-CM   1. Acute ischemic right MCA stroke (CMS/HCC) I63.511 434.91   2. Impaired mobility and ADLs Z74.09 799.89   3. Cognitive communication deficit R41.841 799.52   4. Impaired functional mobility, balance, gait, and endurance Z74.09 V49.89   5. Oropharyngeal dysphagia R13.12 787.22     Patient Active Problem List   Diagnosis   • Peripheral neuropathy   • Septic bursitis of elbow, right   • HLD (hyperlipidemia)   • History of stroke   • Elbow pain   • Mild dementia   • Benign essential hypertension   • Abnormal glucose level   • Crohn's colitis, with rectal bleeding (CMS/HCC)   • Rash   • Right-sided muscle weakness   • Numbness on right side   • Dysphagia due to old cerebrovascular accident   • Frequent urination   • Nocturia   • Dizziness   • Poor balance   • Vitamin D deficiency   • Major depressive disorder with single episode, in partial remission (CMS/HCC)   • Cerebrovascular small vessel disease   • Major neurocognitive disorder, due to vascular disease, without behavioral disturbance, mild   • Hearing loss of left ear   • Current chronic use of systemic steroids   • Macular degeneration of both eyes   • GERD without esophagitis   • Cervicalgia   • Spinal stenosis in cervical region   • Low back pain   • Microhematuria   • Daytime somnolence   • Osteoarthritis of toe joint, right   • Shuffling gait   • Acquired stuttering   • Perennial allergic rhinitis   • TIA (transient ischemic attack)   • CVA (cerebral vascular accident) (CMS/HCC)   • Thyroid nodule   • Acute ischemic right MCA stroke (CMS/HCC)   • PFO (patent foramen ovale)   • Ulcerative colitis (CMS/HCC)       Therapy  "Treatment    Rehabilitation Treatment Summary     Row Name 03/02/19 1435             Treatment Time/Intention    Discipline  physical therapist  -CD      Document Type  therapy note (daily note)  -CD      Subjective Information  complains of;pain R HIP PAIN. \"ARTHRITIS\"   -CD      Mode of Treatment  physical therapy  -CD      Patient/Family Observations  DTR AND OTHER FAMILY PRESENT. PT SITTING UIC UPON ARRIVAL. ASKING TO GO TO THE BATHROOM.   -CD      Care Plan Review  care plan/treatment goals reviewed;patient/other agree to care plan  -CD      Care Plan Review, Other Participant(s)  daughter;family  -CD      Therapy Frequency (PT Clinical Impression)  daily  -CD      Patient Effort  good  -CD      Comment  HAS SPINAL STENOSIS, CHRONIC L HIP /LOWBACK PAIN.   -CD      Existing Precautions/Restrictions  fall  -CD      Recorded by [CD] Gogo Thibodeaux, PT 03/02/19 1720      Row Name 03/02/19 1435             Vital Signs    Pre Systolic BP Rehab  -- VSS. NSG CLEARED FOR TREATMENT.   -CD      Recorded by [CD] Gogo Thibodeaux, PT 03/02/19 1720      Row Name 03/02/19 1435             Cognitive Assessment/Intervention- PT/OT    Orientation Status (Cognition)  oriented x 3  -CD      Follows Commands (Cognition)  follows one step commands;over 90% accuracy  -CD      Recorded by [CD] Gogo Thibodeaux, PT 03/02/19 1720      Row Name 03/02/19 1435             Safety Issues, Functional Mobility    Safety Issues Affecting Function (Mobility)  insight into deficits/self awareness;awareness of need for assistance;safety precaution awareness;safety precautions follow-through/compliance  -CD      Impairments Affecting Function (Mobility)  balance;endurance/activity tolerance;pain;strength  -CD      Recorded by [CD] Gogo Thibodeaux, PT 03/02/19 1720      Row Name 03/02/19 1435             Bed Mobility Assessment/Treatment    Bed Mobility Assessment/Treatment  sit-supine  -CD      Sit-Supine [place] (Bed Mobility)  minimum assist (75% " patient effort) TO LIFT LE'S INTO BED.   -CD      Assistive Device (Bed Mobility)  bed rails;head of bed elevated  -CD      Comment (Bed Mobility)  CUES FOR SEQUENCING.   -CD      Recorded by [CD] Gogo Thibodeaux, PT 03/02/19 1720      Row Name 03/02/19 1435             Transfer Assessment/Treatment    Transfer Assessment/Treatment  sit-stand transfer;stand-sit transfer;toilet transfer  -CD      Comment (Transfers)  NEEDS CUES FOR HAND PLACEMENT/SAFETY WITH R WALKER DURING TOILETING/ADL'S AT SINK.   -CD      Recorded by [CD] Gogo Thibodeaux, PT 03/02/19 1720      Row Name 03/02/19 1435             Sit-Stand Transfer    Sit-Stand [place] (Transfers)  contact guard;verbal cues  -CD      Assistive Device (Sit-Stand Transfers)  walker, front-wheeled  -CD      Recorded by [CD] Gogo Thibodeaux, PT 03/02/19 1720      Row Name 03/02/19 1435             Stand-Sit Transfer    Stand-Sit [place] (Transfers)  verbal cues;contact guard  -CD      Assistive Device (Stand-Sit Transfers)  walker, front-wheeled  -CD      Recorded by [CD] Gogo Thibodeaux, PT 03/02/19 1720      Row Name 03/02/19 1435             Toilet Transfer    Type (Toilet Transfer)  stand-sit;sit-stand  -CD      [place] Level (Toilet Transfer)  contact guard  -CD      Assistive Device (Toilet Transfer)  walker, front-wheeled  -CD      Recorded by [CD] Gogo Thibodeaux, PT 03/02/19 1720      Row Name 03/02/19 1435             Gait/Stairs Assessment/Training    96293 - Gait Training Minutes   15  -CD      Gait/Stairs Assessment/Training  gait/ambulation independence;gait/ambulation assistive device;distance ambulated  -CD      [place] Level (Gait)  contact guard  -CD      Assistive Device (Gait)  walker, front-wheeled  -CD      Distance in Feet (Gait)  300  -CD      Pattern (Gait)  step-through  -CD      Deviations/Abnormal Patterns (Gait)  base of support, narrow;ataxic  -CD      Comment (Gait/Stairs)  AS PT FATIGUES STEP LENGTH SHORTENS. NEEDS CUES TO  INCREASE STEP LENGTH.   -CD      Recorded by [CD] Gogo Thibodeaux, PT 03/02/19 1720      Row Name 03/02/19 1435             Motor Skills Assessment/Interventions    Additional Documentation  Therapeutic Exercise Interventions (Group);Therapeutic Exercise (Group);Balance (Group);Balance Interventions (Group)  -CD      Recorded by [CD] Gogo Thibodeaux, PT 03/02/19 1725      Row Name 03/02/19 1435             Therapeutic Exercise    75372 - PT Therapeutic Exercise Minutes  8  -CD      24047 - PT Therapeutic Activity Minutes  15  -CD      Recorded by [CD] Gogo Thibodeaux, PT 03/02/19 1725      Row Name 03/02/19 1435             Therapeutic Exercise    Lower Extremity (Therapeutic Exercise)  marching while seated;LAQ (long arc quad), bilateral DF/PF  -CD      Exercise Type (Therapeutic Exercise)  AROM (active range of motion)  -CD      Position (Therapeutic Exercise)  seated  -CD      Sets/Reps (Therapeutic Exercise)  1 SET OF 10 REPS, RECIPROCAL.   -CD      Recorded by [CD] Gogo Thibodeaux, PT 03/02/19 1720      Row Name 03/02/19 1435             Balance    Balance  dynamic standing balance  -CD      Recorded by [CD] Gogo Thibodeaux, PT 03/02/19 1720      Row Name 03/02/19 1435             Dynamic Standing Balance    Level of [place], Reaches Outside Midline (Standing, Dynamic Balance)  contact guard assist  -CD      Time Able to Maintain Position, Reaches Outside Midline (Standing, Dynamic Balance)  more than 5 minutes  -CD      Assistive Device Utilized (Supported Standing, Dynamic Balance)  walker, rolling  -CD      Recorded by [CD] Gogo Thibodeaux, PT 03/02/19 1720      Row Name 03/02/19 1435             Positioning and Restraints    Pre-Treatment Position  sitting in chair/recliner  -CD      Post Treatment Position  bed  -CD      In Bed  supine;call light within reach;exit alarm on;with family/caregiver;notified nsg  -CD      Recorded by [CD] Gogo Thibodeaux, PT 03/02/19 1720      Row Name 03/02/19 1435              Pain Scale: FACES Pre/Post-Treatment    Pain: FACES Scale, Pretreatment  2-->hurts little bit  -CD      Pain: FACES Scale, Post-Treatment  2-->hurts little bit  -CD      Recorded by [CD] Gogo Thibodeaux, PT 03/02/19 1720      Row Name 03/02/19 1435             Outcome Summary/Treatment Plan (PT)    Daily Summary of Progress (PT)  progress toward functional goals is good  -CD      Recorded by [CD] Gogo Thibodeaux, PT 03/02/19 1720        User Key  (r) = Recorded By, (t) = Taken By, (c) = Cosigned By    Initials Name Effective Dates Discipline    CD Gogo Thibodeaux, PT 06/19/15 -  PT                   Physical Therapy Education     Title: PT OT SLP Therapies (Done)     Topic: Physical Therapy (Done)     Point: Mobility training (Done)     Learning Progress Summary           Patient Acceptance, E, VU,NR by CD at 3/2/2019  5:20 PM    Comment:  SAFETY WTIH MOBLITY, THER EX, TRANSFER/GAIT TRAINING WITH R WALKER, D/C PLANNING.   Family Acceptance, E, VU,NR by CD at 3/2/2019  5:20 PM    Comment:  SAFETY WTIH MOBLITY, THER EX, TRANSFER/GAIT TRAINING WITH R WALKER, D/C PLANNING.                   Point: Home exercise program (Done)     Learning Progress Summary           Patient Acceptance, E, VU,NR by CD at 3/2/2019  5:20 PM    Comment:  SAFETY WTIH MOBLITY, THER EX, TRANSFER/GAIT TRAINING WITH R WALKER, D/C PLANNING.   Family Acceptance, E, VU,NR by CD at 3/2/2019  5:20 PM    Comment:  SAFETY WTIH MOBLITY, THER EX, TRANSFER/GAIT TRAINING WITH R WALKER, D/C PLANNING.                   Point: Body mechanics (Done)     Learning Progress Summary           Patient Acceptance, E, VU,NR by CD at 3/2/2019  5:20 PM    Comment:  SAFETY WTIH MOBLITY, THER EX, TRANSFER/GAIT TRAINING WITH R WALKER, D/C PLANNING.   Family Acceptance, E, VU,NR by CD at 3/2/2019  5:20 PM    Comment:  SAFETY WTIH MOBLITY, THER EX, TRANSFER/GAIT TRAINING WITH R WALKER, D/C PLANNING.                   Point: Precautions (Done)     Learning Progress Summary            Patient Acceptance, E, MARITZA,NR by CD at 3/2/2019  5:20 PM    Comment:  SAFETY WTIH MOBLITY, THER EX, TRANSFER/GAIT TRAINING WITH R WALKER, D/C PLANNING.   Family Acceptance, E, MARITZA,NR by CD at 3/2/2019  5:20 PM    Comment:  SAFETY WTIH MOBLITY, THER EX, TRANSFER/GAIT TRAINING WITH R WALKER, D/C PLANNING.                               User Key     Initials Effective Dates Name Provider Type Discipline    CD 06/19/15 -  Gogo Thibodeaux, PT Physical Therapist PT                PT Recommendation and Plan  Therapy Frequency (PT Clinical Impression): daily  Outcome Summary/Treatment Plan (PT)  Daily Summary of Progress (PT): progress toward functional goals is good  Plan of Care Reviewed With: patient  Progress: improving  Outcome Summary: INCREASED DISTANCE AMBULATED. NEEDS CUES FOR SAFETY AWARENESS WITH R WALKER DURING TRANSFERS/ADL'S. WILL BENEFIT FROM IRF AT D/C.   Outcome Measures     Row Name 03/02/19 1435 02/28/19 1333 02/28/19 0920       How much help from another person do you currently need...    Turning from your back to your side while in flat bed without using bedrails?  4  -CD  4  -SC  --    Moving from lying on back to sitting on the side of a flat bed without bedrails?  4  -CD  4  -SC  --    Moving to and from a bed to a chair (including a wheelchair)?  3  -CD  3  -SC  --    Standing up from a chair using your arms (e.g., wheelchair, bedside chair)?  3  -CD  3  -SC  --    Climbing 3-5 steps with a railing?  2  -CD  2  -SC  --    To walk in hospital room?  3  -CD  3  -SC  --    AM-PAC 6 Clicks Score  19  -CD  19  -SC  --       How much help from another is currently needed...    Putting on and taking off regular lower body clothing?  --  --  2  -AN    Bathing (including washing, rinsing, and drying)  --  --  2  -AN    Toileting (which includes using toilet bed pan or urinal)  --  --  3  -AN    Putting on and taking off regular upper body clothing  --  --  3  -AN    Taking care of personal grooming (such as  brushing teeth)  --  --  4  -AN    Eating meals  --  --  4  -AN    Score  --  --  18  -AN       Modified Kendrick Scale    Modified Kendrick Scale  3 - Moderate disability.  Requiring some help, but able to walk without assistance. WITH R WALKER.   -CD  3 - Moderate disability.  Requiring some help, but able to walk without assistance.  -SC  3 - Moderate disability.  Requiring some help, but able to walk without assistance. with AD  -AN       Functional Assessment    Outcome Measure Options  AM-PAC 6 Clicks Basic Mobility (PT);Modified [place]rick  -CD  AM-PAC 6 Clicks Basic Mobility (PT)  -SC  AM-PAC 6 Clicks Daily Activity (OT);Modified [place]  -AN      User Key  (r) = Recorded By, (t) = Taken By, (c) = Cosigned By    Initials Name Provider Type    SC Ramsey Downey, PT Physical Therapist    Gogo Tinoco, PT Physical Therapist    Bibiana Barrera, OT Occupational Therapist         Time Calculation:   PT Charges     Row Name 03/02/19 1435             Time Calculation    Start Time  1435  -CD      PT Received On  03/02/19  -CD      PT Goal Re-Cert Due Date  03/10/19  -CD         Time Calculation- PT    Total Timed Code Minutes- PT  38 minute(s)  -CD         Timed Charges    60177 - PT Therapeutic Exercise Minutes  8  -CD      49774 - Gait Training Minutes   15  -CD      22362 - PT Therapeutic Activity Minutes  15  -CD        User Key  (r) = Recorded By, (t) = Taken By, (c) = Cosigned By    Initials Name Provider Type    Gogo Tinoco, PT Physical Therapist        Therapy Suggested Charges     Code   Minutes Charges    74087 (CPT®) Hc Pt Neuromusc Re Education Ea 15 Min      34848 (CPT®) Hc Pt Ther Proc Ea 15 Min 8 1    78671 (CPT®) Hc Gait Training Ea 15 Min 15 1    27701 (CPT®) Hc Pt Therapeutic Act Ea 15 Min 15 1    63638 (CPT®) Hc Pt Manual Therapy Ea 15 Min      33006 (CPT®) Hc Pt Iontophoresis Ea 15 Min      15956 (CPT®) Hc Pt Elec Stim Ea-Per 15 Min      31817 (CPT®) Hc Pt Ultrasound Ea 15 Min      91642  (CPT®) Hc Pt Self Care/Mgmt/Train Ea 15 Min      70032 (CPT®) Hc Pt Prosthetic (S) Train Initial Encounter, Each 15 Min      86475 (CPT®) Hc Pt Orthotic(S)/Prosthetic(S) Encounter, Each 15 Min      71509 (CPT®) Hc Orthotic(S) Mgmt/Train Initial Encounter, Each 15min      Total  38 3        Therapy Charges for Today     Code Description Service Date Service Provider Modifiers Qty    86491267901 HC PT THER PROC EA 15 MIN 3/2/2019 Gogo Thibodeaux, PT GP 1    73952618771 HC GAIT TRAINING EA 15 MIN 3/2/2019 Gogo Thibodeaux, PT GP 1    38890045478 HC PT THERAPEUTIC ACT EA 15 MIN 3/2/2019 Gogo Thibodeaux, PT GP 1          PT G-Codes  Outcome Measure Options: AM-PAC 6 Clicks Basic Mobility (PT), Modified Kendrick  AM-PAC 6 Clicks Score: 19  Score: 18  Modified Kendrick Scale: 3 - Moderate disability.  Requiring some help, but able to walk without assistance.(WITH R WALKER. )    Gogo Thibodeaux, PT  3/2/2019

## 2019-03-02 NOTE — PLAN OF CARE
"Problem: Patient Care Overview  Goal: Plan of Care Review  Outcome: Ongoing (interventions implemented as appropriate)   03/02/19 1640   OTHER   Outcome Summary Alert and oriented. NIHSS \"4\" facial asymmetry, L leg drift, aphasia, dysarthria. Respirations unlabored. Monitor Sinus Arrythmia w/PAC's PVC's. Up in recliner and room with family assist. C/o generalized arthritic pain. C/o reflux. PRN Tylenol and Bentyl once. SLP asked to re-eval dysphagia diet per family request. Meds given crushed in applesauce and pharmacy consulted on extended-release meds. Family bedside entire shift.    Coping/Psychosocial   Plan of Care Reviewed With patient;family   Plan of Care Review   Progress no change         "

## 2019-03-03 NOTE — PLAN OF CARE
Problem: Patient Care Overview  Goal: Plan of Care Review  Outcome: Ongoing (interventions implemented as appropriate)   03/03/19 1418   OTHER   Outcome Summary FEES   Coping/Psychosocial   Plan of Care Reviewed With patient;daughter   SLP evaluation and caregiver instruction completed. Will continue to address dysphagia. Please see note for further details and recommendations.

## 2019-03-03 NOTE — PROGRESS NOTES
[facility] Medicine Services  PROGRESS NOTE    Patient Name: Vargas De  : 1940  MRN: [id number]    Date of Admission: 2019  Length of Stay: 3  Primary Care Physician: Saba Arrington MD    Subjective   Subjective     CC:  Facial droop, speech difficulty     HPI:  No acute events. Worked with PT and did better. Ambulated today and felt much more tired.  Mood a bit down today.     Review of Systems  Gen- No fevers, chills  CV- No chest pain, palpitations  Resp- No cough, dyspnea  GI- No N/V/D, + abd pain related to GERD     Otherwise ROS is negative except as mentioned in the HPI.    Objective   Objective     Vital Signs:   Temp:  [97.5 °F (36.4 °C)-98 °F (36.7 °C)] 97.5 °F (36.4 °C)  Heart Rate:  [60-67] 62  Resp:  [14-18] 17  BP: (129-157)/(73-97) 157/97  Total (NIH Stroke Scale): 4     Physical Exam:  Constitutional: No acute distress, awake, alert  HENT: NCAT, mucous membranes moist; left facial droop improved   Respiratory: Clear to auscultation bilaterally, respiratory effort normal   Cardiovascular: RRR, no murmurs, rubs, or gallops, palpable pedal pulses bilaterally  Gastrointestinal: Positive bowel sounds, soft, nontender, nondistended  Musculoskeletal: No bilateral ankle edema  Psychiatric: Appropriate affect, cooperative  Neurologic: Oriented x 3, strength symmetric in all extremities, Cranial Nerves grossly intact to confrontation, speech clear  Skin: No rashes    Results Reviewed:  I have personally reviewed current lab, radiology, and data and agree.    Results from last 7 days   Lab Units 19  0554 19  19019  190   WBC 10*3/mm3 9.22 6.25  --   --    HEMOGLOBIN g/dL 15.1 15.9  --   --    HEMOGLOBIN, POC g/dL  --   --  16.3  --    HEMATOCRIT % 45.6 48.0  --   --    HEMATOCRIT POC %  --   --  48  --    PLATELETS 10*3/mm3 179 166  --   --    INR   --   --   --  1.0     Results from last 7 days   Lab Units 19  0709  02/28/19  0554 02/27/19 1910 02/27/19 1909 02/27/19 1902   SODIUM mmol/L 139 140  --   --   --    POTASSIUM mmol/L 3.5 3.9  --   --   --    CHLORIDE mmol/L 105 107  --   --   --    CO2 mmol/L 29.0 25.0  --   --   --    BUN mg/dL 11 12  --   --   --    CREATININE mg/dL 0.65 0.66  --   --  0.70   GLUCOSE mg/dL 100 150*  --   --   --    CALCIUM mg/dL 8.9 9.0  --   --   --    ALT (SGPT) U/L  --   --   --  28  --    AST (SGOT) U/L  --   --   --  28  --    TROPONIN I ng/mL  --   --  0.00  --   --      Estimated Creatinine Clearance: 85.2 mL/min (by C-G formula based on SCr of 0.65 mg/dL).    No results found for: BNP    Microbiology Results Abnormal     None          Imaging Results (last 24 hours)     ** No results found for the last 24 hours. **          Results for orders placed during the hospital encounter of 02/27/19   Adult Transesophageal Echo (REJI) W/ Cont if Necessary Per Protocol    Narrative · Left ventricular systolic function is normal. Estimated EF appears to be   in the range of 56 - 60%.  · Left atrial cavity size is moderately dilated. The left atrial appendage   was visualized through multiple planes. Left atrial appendage was found to   be multilobar in nature. Doppler interrogation shows normal flow within   the left atrial appendage. No evidence of a left atrial appendage thrombus   was present  · Patent foramen ovale present. Saline test results are positive for right   to left atrial level shunt  · Mild to moderate aortic valve regurgitation is present  · There is mild (grade 2) plaque in the aortic arch present. There is mild   (grade 2) plaque in the descending aorta present.          I have reviewed the medications:    Current Facility-Administered Medications:   •  acetaminophen (TYLENOL) tablet 650 mg, 650 mg, Oral, Q4H PRN, 650 mg at 03/03/19 0929 **OR** acetaminophen (TYLENOL) suppository 650 mg, 650 mg, Rectal, Q4H PRN, Christin Bailon, APRN  •  amLODIPine (NORVASC) tablet 2.5 mg, 2.5  mg, Oral, Q24H, Traci Lewis DO, 2.5 mg at 03/03/19 0928  •  apixaban (ELIQUIS) tablet 5 mg, 5 mg, Oral, Q12H, Taj Maldonado MD, 5 mg at 03/03/19 0928  •  aspirin EC tablet 81 mg, 81 mg, Oral, Daily, Taj Maldonado MD, 81 mg at 03/03/19 0928  •  atorvastatin (LIPITOR) tablet 80 mg, 80 mg, Oral, Nightly, Christin Bailon APRN, 80 mg at 03/02/19 2122  •  bisoprolol (ZEBeta) tablet 5 mg, 5 mg, Oral, Q24H, Traci Lewis DO, 5 mg at 03/03/19 0928  •  dicyclomine (BENTYL) tablet 20 mg, 20 mg, Oral, BID PRN, Traci Lewis DO, 20 mg at 03/02/19 1005  •  DULoxetine (CYMBALTA) DR capsule 20 mg, 20 mg, Oral, Daily, Dave Arrington MD, 20 mg at 03/03/19 0928  •  famotidine (PEPCID) tablet 20 mg, 20 mg, Oral, Daily, Traci Lewis DO, 20 mg at 03/03/19 0928  •  fluticasone (FLONASE) 50 MCG/ACT nasal spray 2 spray, 2 spray, Each Nare, Daily, Traci Lewis DO, 2 spray at 03/03/19 0932  •  gabapentin (NEURONTIN) capsule 300 mg, 300 mg, Oral, Nightly, Traci Lewis DO, 300 mg at 03/02/19 2122  •  lisinopril (PRINIVIL,ZESTRIL) tablet 20 mg, 20 mg, Oral, Q12H, Taj Maldonado MD, 20 mg at 03/03/19 0928  •  melatonin tablet 5 mg, 5 mg, Oral, Nightly, Dave Arrington MD, 5 mg at 03/02/19 2123  •  memantine (NAMENDA) tablet 10 mg, 10 mg, Oral, Q12H, Traci Lewis DO, 10 mg at 03/03/19 1025  •  mesalamine (APRISO) 24 hr capsule 0.75 g, 0.75 g, Oral, Daily, Traci Lewis DO, 0.75 g at 03/03/19 0931  •  Pharmacy Consult - [facility], , Does not apply, Daily, Sienna Sanabria PA, Stopped at 03/01/19 0905  •  predniSONE (DELTASONE) tablet 5 mg, 5 mg, Oral, BID With Meals, Traci Lewis DO, 5 mg at 03/03/19 0929  •  sodium chloride 0.9 % flush 10 mL, 10 mL, Intravenous, PRN, Andrzej Sosa MD  •  sodium chloride 0.9 % flush 3 mL, 3 mL, Intravenous, Q12H, Christin Bailon, APRN, 3 mL at 03/03/19 0932  •  sodium chloride 0.9 % flush 3-10 mL, 3-10 mL, Intravenous, PRN, Christin Bailon,  APRN  •  temazepam (RESTORIL) capsule 7.5 mg, 7.5 mg, Oral, Nightly PRN, Dave Arrington MD, 7.5 mg at 03/01/19 2038      Assessment/Plan   Assessment / Plan     Active Hospital Problems    Diagnosis Date Noted   • **CVA (cerebral vascular accident) (CMS/[facility]) [I63.9] 02/27/2019   • PFO (patent foramen ovale) [Q21.1] 03/01/2019   • Ulcerative colitis (CMS/[facility]) [K51.90] 03/01/2019   • Thyroid nodule [E04.1] 02/28/2019   • Acute ischemic right MCA stroke (CMS/[facility]) [I63.511] 02/28/2019   • Spinal stenosis in cervical region [M48.02] 01/28/2019   • GERD without esophagitis [K21.9] 01/28/2019   • Mild dementia [F03.90] 11/01/2018   • HLD (hyperlipidemia) [E78.5] 07/23/2018   • Benign essential hypertension [I10] 07/11/2017          Brief Hospital Course to date:  78 year old male presenting to the ED with complaint of left sided facial droop who is found to have CVA. MRI with multiple chronic lacunar infarcts and acute lacunar infarcts. Neuro and cardiology consulted. REJI was positive for PFO. He was started on apixaban in addition to eliquis and will be discharged with a 30 day monitor. Eval by PT/OT recommended acute rehab. Speech eval and FEES done and patient started on modified diet.      CVA  - Continue ASA atorva 80 mg   - ECHO; EF 55%, impaired relaxation; mild aortic regug  - Carotid duplex: 0-49% bilaterally    - FLP and A1C ok   - Neuro following; concern for cardioembolic etiology  - Cards consult   - REJI with presence of PFO with positive bubble; transcranial doppler positive bubble study  - Patient was started on apixaban 5 mg BID in addition to ASA   - Discharge with 30 day telemetry monitor   - PT/OT with acute rehab   - Will need cards follow up in 6 weeks and neuro follow up      Dysphagia  - Related to CVA; speech following; diet modified  - Repeat eval today      Hypertension   - resume antihypertensives; bisoprolol, Lisinopril  - Hytrin DC as not taking this at home  - Start amlodipine 2.5 mg  daily      Hyperlipidemia  -on statin therapy, changed to atorvastatin 80 mg daily      Chronic pain/spinal stenosis  - continue gabapentin  - Zoloft changed to cymbalta      Ulcerative colitis   - On apriso at home; will continue mesalamine on formulary  - chronic steroid use; will resume prednisone   - Bentyl PRN      Mild dementia  -continue namenda -- patient no longer taking aricept due to side effects      Thyroid nodule  - TSH and T4 ok; no follow up needed based on size per radiology     GERD; start pepcid - can't crush PPI      DVT prophylaxis:  scds/eliquis      Dispo; rehab likely on Monday pending insurance approval       CODE STATUS:   Code Status and Medical Interventions:   Ordered at: 02/27/19 2348     Code Status:    CPR     Medical Interventions (Level of Support Prior to Arrest):    Full         Electronically signed by Traci Lewis DO, 03/03/19, 10:51 AM.

## 2019-03-03 NOTE — THERAPY EVALUATION
Acute Care - Speech Language Pathology   Swallow Initial Evaluation [facility] Fiberoptic Endoscopic Evaluation of Swallowing (FEES)       Patient Name: Vargas De  : 1940  MRN: [id number]  Today's Date: 3/3/2019  Onset of Illness/Injury or Date of Surgery: 19     Referring Physician: Uvaldo GOTTI      Admit Date: 2019    Visit Dx:     ICD-10-CM ICD-9-CM   1. Acute ischemic right MCA stroke (CMS/HCC) I63.511 434.91   2. Impaired mobility and ADLs Z74.09 799.89   3. Cognitive communication deficit R41.841 799.52   4. Impaired functional mobility, balance, gait, and endurance Z74.09 V49.89   5. Oropharyngeal dysphagia R13.12 787.22     Patient Active Problem List   Diagnosis   • Peripheral neuropathy   • Septic bursitis of elbow, right   • HLD (hyperlipidemia)   • History of stroke   • Elbow pain   • Mild dementia   • Benign essential hypertension   • Abnormal glucose level   • Crohn's colitis, with rectal bleeding (CMS/Formerly [facility])   • Rash   • Right-sided muscle weakness   • Numbness on right side   • Dysphagia due to old cerebrovascular accident   • Frequent urination   • Nocturia   • Dizziness   • Poor balance   • Vitamin D deficiency   • Major depressive disorder with single episode, in partial remission (CMS/Formerly [facility])   • Cerebrovascular small vessel disease   • Major neurocognitive disorder, due to vascular disease, without behavioral disturbance, mild   • Hearing loss of left ear   • Current chronic use of systemic steroids   • Macular degeneration of both eyes   • GERD without esophagitis   • Cervicalgia   • Spinal stenosis in cervical region   • Low back pain   • Microhematuria   • Daytime somnolence   • Osteoarthritis of toe joint, right   • Shuffling gait   • Acquired stuttering   • Perennial allergic rhinitis   • TIA (transient ischemic attack)   • CVA (cerebral vascular accident) (CMS/Formerly [facility])   • Thyroid nodule   • Acute ischemic right MCA stroke (CMS/HCC)   • PFO (patent foramen ovale)   •  Ulcerative colitis (CMS/HCC)     Past Medical History:   Diagnosis Date   • Arthritis    • BPH (benign prostatic hyperplasia)      urology   • Depression    • Gout    • Hearing loss     left ear   • Hyperlipidemia    • Hypertension    • Macular degeneration    • Spinal stenosis     cervical and lumbosacral spine   • Stroke (CMS/HCC)    • Stroke (CMS/HCC)    • Ulcerative colitis (CMS/HCC)      Past Surgical History:   Procedure Laterality Date   • CATARACT EXTRACTION     • HEMORRHOIDECTOMY     • TONSILLECTOMY          SWALLOW EVALUATION (last 72 hours)      SLP Adult Swallow Evaluation     Row Name 03/03/19 1230 02/28/19 1547                Rehab Evaluation    Document Type  evaluation  -ML  evaluation  -AC       Subjective Information  no complaints  -ML  no complaints  -AC       Patient Observations  alert;cooperative;agree to therapy  -ML  alert;cooperative  -AC       Patient/Family Observations  Family present  -ML  Pt's dtrs present.   -AC       Patient Effort  excellent  -ML  good  -AC          General Information    Patient Profile Reviewed  yes  -ML  yes  -AC       Pertinent History Of Current Problem  Admitted and found to have multiple lacunar strokes, hx of stroke, GERD, and FEES this admission with oropharyngeal dysphagia.    -ML  --       Current Method of Nutrition  mechanical soft, no mixed consistencies;nectar/syrup-thick liquids  -ML  NPO  -AC       Precautions/Limitations, Vision  WFL with corrective lenses;for purposes of eval  -ML  --       Precautions/Limitations, Hearing  hearing impairment, left  -ML  --       Prior Level of Function-Communication  expressive language impairment;cognitive-linguistic impairment  -ML  --       Prior Level of Function-Swallowing  no diet consistency restrictions  -ML  no diet consistency restrictions  -AC       Plans/Goals Discussed with  patient and family;agreed upon  -ML  patient and family;agreed upon  -AC       Barriers to Rehab  medically complex;cognitive  status  -ML  language barrier  -AC       Patient's Goals for Discharge  return to regular diet  -ML  return to PO diet  -AC       Family Goals for Discharge  patient able to return to regular diet  -ML  patient able to eat/drink without coughing/choking;patient able to return to PO diet  -AC          Pain Assessment    Additional Documentation  Pain Scale: Numbers Pre/Post-Treatment (Group)  -ML  --          Pain Scale: Numbers Pre/Post-Treatment    Pain Scale: Numbers, Pretreatment  0/10 - no pain  -ML  --          Pain Scale: FACES Pre/Post-Treatment    Pain: FACES Scale, Pretreatment  --  0-->no hurt  -AC       Pain: FACES Scale, Post-Treatment  --  0-->no hurt  -AC          General Eating/Swallowing Observations    Respiratory Support Currently in Use  --  room air  -AC       Eating/Swallowing Skills  --  fed by staff/caregiver;self-fed;appropriate self-feeding skills observed  -AC       Positioning During Eating  --  upright 90 degree;upright in bed  -AC          Fiberoptic Endoscopic Evaluation of Swallowing (FEES)    Risks/Benefits Reviewed  risks/benefits explained;patient;family;agreed to eval  -ML  risks/benefits explained;patient;family;agreed to eval  -AC       Nasal Entry  right:  -ML  right:  -AC       Special Considerations  Scope 837  -ML  Scope #338  -AC          Anatomy and Physiology    Anatomic Considerations  other (see comments) Noted candidiasis tongue base, omega shaped epiglottis  -ML  anatomic deviation observed (see comments)  -AC       Comment  --  L side of base of tongue appeared more prominent c/t right side.   -AC       Velopharyngeal  reduced movement  -ML  reduced movement  -AC       Base of Tongue  symmetrical;range reduced  -ML  asymmetrical;range reduced  -AC       Epiglottis  omega shape  -ML  omega shape  -AC       Laryngeal Function Breathing  symmetrical  -ML  symmetrical  -AC       Laryngeal Function Phonation  symmetrical  -ML  symmetrical  -AC       Laryngeal Function to  Breath Hold  TVF contact  -ML  TVF contact  -AC       Secretion Rating Scale (Mohan et al. 1996)  2- secretions initially outside the vestibule but later entered the vestibule  -ML  0- normal, no visible secretions  -AC       Secretion Description  thin;thick Stringy-thin between vocal cords but thick outside of vestib  -ML  --       Ice Chips  elicited swallow  -ML  DNA  -AC       Spontaneous Swallow  frequency reduced  -ML  --       Sensory  reduced sensation  -ML  reduced sensation  -AC       Utensils Used  Spoon;Cup;Straw  -ML  Spoon;Cup;Straw  -AC       Consistencies Trialed  thin liquids;nectar-thick liquids;pudding/puree;Regular textures  -ML  thin liquids;nectar-thick liquids;pudding/puree;mechanical soft  -AC          FEES Interpretation    Oral Phase  prolonged manipulation  -ML  anterior loss;with liquids only;prolonged manipulation;with solids only;reduced lingual control;prespill of liquids into pharynx  -AC       Oral Phase, Comment  Functional oral phase for soft solids, however prolonged manipulation/clearance.  -ML  Mild anterior loss on L side w/ thin liquid 2' reduced labial seal. Incr'd oral prep w/ solid 2' reduced lingual strength/coordination. Intermittent premature spillage w/ thin & nectar-thick liquids 2' reduced lingual control.  -AC          Initiation of Pharyngeal Swallow    Initiation of Pharyngeal Swallow  bolus in valleculae  -ML  bolus in pyriform sinuses  -AC       Pharyngeal Phase  impaired pharyngeal phase of swallowing  -ML  impaired pharyngeal phase of swallowing  -AC       Penetration Before the Swallow  thin liquids;secondary to delayed swallow initiation or mistiming  -ML  nectar-thick liquids;secondary to reduced back of tongue control;secondary to delayed swallow initiation or mistiming  -AC       Aspiration Before the Swallow  --  thin liquids;secondary to reduced back of tongue control;secondary to delayed swallow initiation or mistiming  -AC       Aspiration During the  Swallow  thin liquids;secondary to delayed swallow initiation or mistiming  -ML  thin liquids;secondary to delayed swallow initiation or mistiming;secondary to reduced laryngeal elevation;secondary to reduced vestibular closure  -AC       Response to Aspiration  no response, silent aspiration Cued cough effective  -ML  no response, silent aspiration  -AC       Rosenbek's Scale  thin:;8-->Level 8;nectar:;pudding/puree:;regular textures:;1-->Level 1  -ML  thin:;8-->Level 8;nectar:;2-->Level 2;pudding/puree:;mechanical soft;1-->Level 1  -AC       Residue  thin liquids;diffuse within pharynx;secondary to reduced laryngeal elevation;secondary to reduced base of tongue retraction;secondary to reduced posterior pharyngeal wall stripping  -ML  all consistencies tested;diffuse within pharynx;secondary to reduced base of tongue retraction;secondary to reduced posterior pharyngeal wall stripping;secondary to reduced laryngeal elevation;secondary to reduced hyolaryngeal excursion  -AC       Response to Residue  cleared residue;with cued swallow  -ML  cleared residue;with spontaneous subsequent swallow;with cued swallow  -AC       Attempted Compensatory Maneuvers  bolus size;chin tuck  -ML  bolus size;bolus presentation style;chin tuck;head turn to left  -AC       Response to Attempted Compensatory Maneuvers  did not prevent penetration;did not prevent aspiration  -ML  did not prevent aspiration;other (see comments) of thin liquid  -AC       FEES Summary  FEES completed with family present and assisting. Pt up in chair. Placed nasoendoscope in right naris per pt request.  Oral phase is slow with decreased oral clearance between bites but ultimately clears.  Moderate pharyngeal impairment with mistiming, reduced tongue base strenth and laryngeal elevation.  Laryngeal penetration and aspiration of thin before/during the swallow without a cough reaction.  Cued cough effective.  Compensatory strategies are not consistently  effective, however pt toleratd a few trials of thin via spoon with chin down posture before penetration occurred.  Aspiration persistent with thin via straw despite chin down.  OK with nectar regardless of bolus size. Pt declined cup drinking.  Diffe pharyngeal residue after thin but minimal residue after other po.  No laryngeal penetration or aspiration with puddig/solids.  OK for po meds with nectar.  OK for soft diet but no thin material.  Family agreeable to continued chopped meat due to slow oral phase.  Reviewed FEES results/recommendations/plan of care, and exercises.    -ML  Pt was DIMITRI when transporter attempted to bring pt to radiology for MBS this afternoon. When pt later returned, u/a to reschedule MBS for today @ that point, so again offered FEES & pt agreed to participate. Mild-moderate oropharyngeal dysphagia. Intermittent premature spillage w/ thin/nectar-thick liquid. This, along w/ delayed pharyngeal swallow initiation and reduced vestibular closure resulted in SILENT aspiration of thin liquid before/during the swallow. There was intermittent penetration w/ netar-thick liquid before the swallow, as well, but mild vestibular residue cleared w/ subsequent and spontaneous/spontaneous swallows w/o evidence of aspiration. No penetration/aspiration appreciated w/ pudding or solid. There was mild-mod diffuse pharyngeal residue across consistencies. Majority of pharyngeal residue cleared w/ spontaneous swallows. Cued effortful swallow helped to clear residue, as well.  -          Health Promotion    Psychosocial Eating History  -- Family reports some choking at home prior to admission-   -ML  --          Clinical Impression    SLP Swallowing Diagnosis  mild;oral dysfunction;moderate;pharyngeal dysfunction;esophageal dysfunction  -ML  mild-moderate;oral dysfunction;pharyngeal dysfunction  -AC       Functional Impact  risk of aspiration/pneumonia;risk of dehydration  -ML  risk of aspiration/pneumonia;risk  of malnutrition;risk of dehydration  -AC       Rehab Potential/Prognosis, Swallowing  good, to achieve stated therapy goals  -ML  good, to achieve stated therapy goals  -AC       Swallow Criteria for Skilled Therapeutic Interventions Met  demonstrates skilled criteria  -ML  demonstrates skilled criteria  -AC          Recommendations    Therapy Frequency (Swallow)  5 days per week  -ML  5 days per week  -AC       Predicted Duration Therapy Intervention (Days)  until discharge  -ML  until discharge  -AC       SLP Diet Recommendation  mechanical soft with no mixed consistencies;nectar thick liquids  -ML  mechanical soft with no mixed consistencies;nectar thick liquids  -AC       Recommended Diagnostics  VFSS (MBS);reassess via FEES  -ML  --       Recommended Precautions and Strategies  upright posture during/after eating;small bites of food and sips of liquid Reflux precautions  -  upright posture during/after eating;small bites of food and sips of liquid;multiple swallows per bite of food;multiple swallows per sip of liquid;check mouth frequently for oral residue/pocketing;other (see comments) slow pace; asp prctns; oral care BID/PRN  -AC       SLP Rec. for Method of Medication Administration  meds whole;with thick liquids  -ML  meds whole;meds crushed;with pudding or applesauce;as tolerated  -       Monitor for Signs of Aspiration  notify SLP if any concerns  -ML  yes;notify SLP if any concerns  -AC       Anticipated Dischage Disposition  --  inpatient rehabilitation facility;anticipate therapy at next level of care  -          Swallow Goals (SLP)    Oral Nutrition/Hydration Goal Selection (SLP)  oral nutrition/hydration, SLP goal 1;oral nutrition/hydration, SLP goal 2;oral nutrition/hydration, SLP goal (free text)  -ML  --       Lingual Strengthening Goal Selection (SLP)  lingual strengthening, SLP goal 1  -ML  --       Pharyngeal Strengthening Exercise Goal Selection (SLP)  pharyngeal strengthening exercise,  SLP goal 1  -ML  --       Swallow Compensatory Strategies Goal Selection (SLP)  swallow compensatory strategies, SLP goal 1  -ML  --          Oral Nutrition/Hydration Goal 1 (SLP)    Oral Nutrition/Hydration Goal 1, SLP  LTG: Pt will return to regular diet & thin liquid and tolerate w/o s/sxs aspiration w/ 100% acc w/o cues.  -ML  --       Time Frame (Oral Nutrition/Hydration Goal 1, SLP)  by discharge  -ML  --          Oral Nutrition/Hydration Goal 2 (SLP)    Oral Nutrition/Hydration Goal 2, SLP  Tolerate nectar via straw, whole meds, soft diet  -ML  --       Time Frame (Oral Nutrition/Hydration Goal 2, SLP)  short term goal (STG);by discharge  -ML  --          Oral Nutrition/Hydration Goal (SLP)    Oral Nutrition/Hydration Goal, SLP  Pt will tolerate therapeutic trials of H2O w/o s/sxs aspiration w/ 70% acc w/o cues.  -ML  --       Time Frame (Oral Nutrition/Hydration Goal, SLP)  short term goal (STG);by discharge  -ML  --          Labial Strengthening Goal 1 (SLP)    Activity (Labial Strengthening Goal 1, SLP)  increase labial tone  -ML  --       Time Frame (Labial Strengthening Goal 1, SLP)  -- Goal met  -ML  --          Lingual Strengthening Goal 1 (SLP)    Activity (Lingual Strengthening Goal 1, SLP)  increase tongue back strength  -ML  --       Increase Tongue Back Strength  lingual resistance exercises  -ML  --       [place]/Accuracy (Lingual Strengthening Goal 1, SLP)  with minimal cues (75-90% accuracy)  -ML  --       Time Frame (Lingual Strengthening Goal 1, SLP)  short term goal (STG);by discharge  -ML  --          Pharyngeal Strengthening Exercise Goal 1 (SLP)    Activity (Pharyngeal Strengthening Goal 1, SLP)  increase timing;increase tongue base retraction;increase superior movement of the hyolaryngeal complex  -ML  --       Increase Timing  prepping - 3 second prep or suck swallow or 3-step swallow  -ML  --       Increase Superior Movement of the Hyolaryngeal Complex  Mendelsohn  -ML  --        Increase Anterior Movement of the Hyolaryngeal Complex  chin tuck against resistance (CTAR);shaker  -ML  --       Increase Closure at Entrance to Airway/Closure of Airway at Glottis  super-supraglottic swallow  -ML  --       Increase Squeeze/Positive Pressure Generation  hard effortful swallow  -ML  --       Increase Tongue Base Retraction  nabila  -ML  --       [place]/Accuracy (Pharyngeal Strengthening Goal 1, SLP)  with minimal cues (75-90% accuracy)  -ML  --       Time Frame (Pharyngeal Strengthening Goal 1, SLP)  short term goal (STG);by discharge  -ML  --          Swallow Compensatory Strategies Goal 1 (SLP)    Activity (Swallow Compensatory Strategies/Techniques Goal 1, SLP)  compensatory strategies;during meal intake;during p.o. trials;small bites;small cup sips;small straw sips;food/liquid placed on stronger right side;other (see comments)  -ML  --       Progress/Outcomes (Swallow Compensatory Strategies/Techniques Goal 1, SLP)  goal met  -ML  --         User Key  (r) = Recorded By, (t) = Taken By, (c) = Cosigned By    Initials Name Effective Dates    AC Rose Chavez, MS CCC-SLP 07/27/17 -     ML Camille Lombardo, CCC-SLP 04/03/18 -           EDUCATION  The patient has been educated in the following areas:   Dysphagia (Swallowing Impairment).    SLP Recommendation and Plan  SLP Swallowing Diagnosis: mild, oral dysfunction, moderate, pharyngeal dysfunction, esophageal dysfunction  SLP Diet Recommendation: mechanical soft with no mixed consistencies, nectar thick liquids  Recommended Precautions and Strategies: upright posture during/after eating, small bites of food and sips of liquid(Reflux precautions)     Monitor for Signs of Aspiration: notify SLP if any concerns  Recommended Diagnostics: VFSS (MBS), reassess via FEES  Swallow Criteria for Skilled Therapeutic Interventions Met: demonstrates skilled criteria     Rehab Potential/Prognosis, Swallowing: good, to achieve stated therapy  goals  Therapy Frequency (Swallow): 5 days per week  Predicted Duration Therapy Intervention (Days): until discharge       Plan of Care Reviewed With: patient, daughter  Plan of Care Review  Plan of Care Reviewed With: patient, daughter  Outcome Summary: HEATH    SLP GOALS     Row Name 03/03/19 1230 02/28/19 [id number]          Oral Nutrition/Hydration Goal 1 (SLP)    Oral Nutrition/Hydration Goal 1, SLP  LTG: Pt will return to regular diet & thin liquid and tolerate w/o s/sxs aspiration w/ 100% acc w/o cues.  -ML  LTG: Pt will return to regular diet & thin liquid and tolerate w/o s/sxs aspiration w/ 100% acc w/o cues.  -AC     Time Frame (Oral Nutrition/Hydration Goal 1, SLP)  by discharge  -ML  by discharge  -AC        Oral Nutrition/Hydration Goal 2 (SLP)    Oral Nutrition/Hydration Goal 2, SLP  Tolerate nectar via straw, whole meds, soft diet  -ML  Pt will tolerate trials of soft solid & nectar-thick liquid w/o s/sxs aspiration w/ 100% acc w/o cues.  -AC     Time Frame (Oral Nutrition/Hydration Goal 2, SLP)  short term goal (STG);by discharge  -ML  short term goal (STG);by discharge  -AC        Oral Nutrition/Hydration Goal (SLP)    Oral Nutrition/Hydration Goal, SLP  Pt will tolerate therapeutic trials of H2O w/o s/sxs aspiration w/ 70% acc w/o cues.  -ML  Pt will tolerate therapeutic trials of H2O w/o s/sxs aspiration w/ 70% acc w/o cues.  -AC     Time Frame (Oral Nutrition/Hydration Goal, SLP)  short term goal (STG);by discharge  -ML  short term goal (STG);by discharge  -AC        Labial Strengthening Goal 1 (SLP)    Activity (Labial Strengthening Goal 1, SLP)  increase labial tone  -ML  increase labial tone  -AC     Increase Labial Tone  --  labial resistance exercises  -AC     [place]/Accuracy (Labial Strengthening Goal 1, SLP)  --  with minimal cues (75-90% accuracy)  -AC     Time Frame (Labial Strengthening Goal 1, SLP)  -- Goal met  -ML  short term goal (STG);by discharge  -AC        Lingual Strengthening  Goal 1 (SLP)    Activity (Lingual Strengthening Goal 1, SLP)  increase tongue back strength  -ML  increase tongue back strength  -AC     Increase Tongue Back Strength  lingual resistance exercises  -ML  lingual resistance exercises  -AC     [place]/Accuracy (Lingual Strengthening Goal 1, SLP)  with minimal cues (75-90% accuracy)  -ML  with minimal cues (75-90% accuracy)  -AC     Time Frame (Lingual Strengthening Goal 1, SLP)  short term goal (STG);by discharge  -ML  short term goal (STG);by discharge  -AC        Pharyngeal Strengthening Exercise Goal 1 (SLP)    Activity (Pharyngeal Strengthening Goal 1, SLP)  increase timing;increase tongue base retraction;increase superior movement of the hyolaryngeal complex  -ML  increase timing;increase superior movement of the hyolaryngeal complex;increase anterior movement of the hyolaryngeal complex;increase closure at entrance to airway/closure of airway at glottis;increase tongue base retraction;increase squeeze/positive pressure generation  -AC     Increase Timing  prepping - 3 second prep or suck swallow or 3-step swallow  -ML  prepping - 3 second prep or suck swallow or 3-step swallow  -AC     Increase Superior Movement of the Hyolaryngeal Complex  Mendelsohn  -ML  Mendelsohn  -AC     Increase Anterior Movement of the Hyolaryngeal Complex  chin tuck against resistance (CTAR);shaker  -ML  chin tuck against resistance (CTAR);shaker  -AC     Increase Closure at Entrance to Airway/Closure of Airway at Glottis  super-supraglottic swallow  -ML  super-supraglottic swallow  -AC     Increase Squeeze/Positive Pressure Generation  hard effortful swallow  -ML  hard effortful swallow  -AC     Increase Tongue Base Retraction  nabila  -ML  nabila  -AC     [place]/Accuracy (Pharyngeal Strengthening Goal 1, SLP)  with minimal cues (75-90% accuracy)  -ML  with minimal cues (75-90% accuracy)  -AC     Time Frame (Pharyngeal Strengthening Goal 1, SLP)  short term goal (STG);by  discharge  -ML  short term goal (STG);by discharge  -AC        Swallow Compensatory Strategies Goal 1 (SLP)    Activity (Swallow Compensatory Strategies/Techniques Goal 1, SLP)  compensatory strategies;during meal intake;during p.o. trials;small bites;small cup sips;small straw sips;food/liquid placed on stronger right side;other (see comments)  -ML  compensatory strategies;during meal intake;during p.o. trials;small bites;small cup sips;small straw sips;food/liquid placed on stronger right side;other (see comments) multiple swallows intermittently  -AC     [place]/Accuracy (Swallow Compensatory Strategies/Techniques Goal 1, SLP)  --  independently (over 90% accuracy)  -AC     Time Frame (Swallow Compensatory Strategies/Techniques Goal 1, SLP)  --  short term goal (STG);by discharge  -     Progress/Outcomes (Swallow Compensatory Strategies/Techniques Goal 1, SLP)  goal met  -ML  --       User Key  (r) = Recorded By, (t) = Taken By, (c) = Cosigned By    Initials Name Provider Type    Rose Weinstein MS CCC-SLP Speech and Language Pathologist    Camille Saleem CCC-SLP Speech and Language Pathologist             Time Calculation:   Time Calculation- SLP     Row Name 03/03/19 1419             Time Calculation- SLP    SLP Start Time  1230  -ML      SLP Received On  03/03/19  -        User Key  (r) = Recorded By, (t) = Taken By, (c) = Cosigned By    Initials Name Provider Type    Camille Saleem CCC-SLP Speech and Language Pathologist          Therapy Charges for Today     Code Description Service Date Service Provider Modifiers Qty    [id number]  ST SELF CARE/MGMT/TRAIN EA 15 MIN 3/3/2019 Camille Lombardo CCC-SLP GN 2    [id number]  ST FIBEROPTIC ENDO EVAL SWALL 5 3/3/2019 Camille Lombardo CCC-SLP GN 1               SONIYA Raines  3/3/2019

## 2019-03-03 NOTE — PLAN OF CARE
"Problem: Fall Risk (Adult)  Goal: Identify Related Risk Factors and Signs and Symptoms  Outcome: Ongoing (interventions implemented as appropriate)   03/03/19 0311   Fall Risk (Adult)   Related Risk Factors (Fall Risk) age-related changes;depression/anxiety;fatigue/slow reaction   Signs and Symptoms (Fall Risk) presence of risk factors     Goal: Absence of Fall  Outcome: Ongoing (interventions implemented as appropriate)   03/03/19 0311   Fall Risk (Adult)   Absence of Fall achieves outcome       Problem: Patient Care Overview  Goal: Plan of Care Review   03/03/19 0311   OTHER   Outcome Summary CO \"acid reflux\". Reports takes omprazole 20mg PO bid at home. Spoke with nurse prac and got order for protonix 40 mg PO Q pm. Educated pt on meds prior to administration. VSS. Pt has possible transfer to [facility] on Monday. Continue to monitor.    Coping/Psychosocial   Plan of Care Reviewed With patient       Problem: Stroke (Ischemic) (Adult)  Goal: Signs and Symptoms of Listed Potential Problems Will be Absent, Minimized or Managed (Stroke)  Outcome: Ongoing (interventions implemented as appropriate)   03/03/19 0311   Goal/Outcome Evaluation   Problems Assessed (Stroke (Ischemic)) cognitive impairment;communication impairment;eating/swallowing impairment   Problems Assessed (Stroke (Ischemic)) motor/sensory impairment         "

## 2019-03-03 NOTE — THERAPY TREATMENT NOTE
Acute Care - Occupational Therapy Treatment Note  [facility]     Patient Name: Vargas De  : 1940  MRN: [id number]  Today's Date: 3/3/2019  Onset of Illness/Injury or Date of Surgery: 19  Date of Referral to OT: 19  Referring Physician: Uvaldo GOTTI    Admit Date: 2019       ICD-10-CM ICD-9-CM   1. Acute ischemic right MCA stroke (CMS/HCC) I63.511 434.91   2. Impaired mobility and ADLs Z74.09 799.89   3. Cognitive communication deficit R41.841 799.52   4. Impaired functional mobility, balance, gait, and endurance Z74.09 V49.89   5. Oropharyngeal dysphagia R13.12 787.22     Patient Active Problem List   Diagnosis   • Peripheral neuropathy   • Septic bursitis of elbow, right   • HLD (hyperlipidemia)   • History of stroke   • Elbow pain   • Mild dementia   • Benign essential hypertension   • Abnormal glucose level   • Crohn's colitis, with rectal bleeding (CMS/HCC)   • Rash   • Right-sided muscle weakness   • Numbness on right side   • Dysphagia due to old cerebrovascular accident   • Frequent urination   • Nocturia   • Dizziness   • Poor balance   • Vitamin D deficiency   • Major depressive disorder with single episode, in partial remission (CMS/HCC)   • Cerebrovascular small vessel disease   • Major neurocognitive disorder, due to vascular disease, without behavioral disturbance, mild   • Hearing loss of left ear   • Current chronic use of systemic steroids   • Macular degeneration of both eyes   • GERD without esophagitis   • Cervicalgia   • Spinal stenosis in cervical region   • Low back pain   • Microhematuria   • Daytime somnolence   • Osteoarthritis of toe joint, right   • Shuffling gait   • Acquired stuttering   • Perennial allergic rhinitis   • TIA (transient ischemic attack)   • CVA (cerebral vascular accident) (CMS/HCC)   • Thyroid nodule   • Acute ischemic right MCA stroke (CMS/HCC)   • PFO (patent foramen ovale)   • Ulcerative colitis (CMS/HCC)     Past Medical History:    Diagnosis Date   • Arthritis    • BPH (benign prostatic hyperplasia)      urology   • Depression    • Gout    • Hearing loss     left ear   • Hyperlipidemia    • Hypertension    • Macular degeneration    • Spinal stenosis     cervical and lumbosacral spine   • Stroke (CMS/HCC)    • Stroke (CMS/HCC)    • Ulcerative colitis (CMS/HCC)      Past Surgical History:   Procedure Laterality Date   • CATARACT EXTRACTION     • HEMORRHOIDECTOMY     • TONSILLECTOMY         Therapy Treatment    Rehabilitation Treatment Summary     Row Name 03/03/19 1122             Treatment Time/Intention    Discipline  occupational therapist  -ALEXIA      Document Type  therapy note (daily note)  -ALEXIA      Subjective Information  complains of;pain  -ALEXIA      Mode of Treatment  individual therapy;occupational therapy  -ALEXIA      Patient/Family Observations  Dtr. and LAMONT present.  Pt. sitting up in bed RA on tele.  -ALEXIA      Care Plan Review  care plan/treatment goals reviewed;risks/benefits reviewed;current/potential barriers reviewed;patient/other agree to care plan  -ALEXIA      Care Plan Review, Other Participant(s)  daughter;family  -ALEXIA      Therapy Frequency (OT Eval)  daily  -ALEXIA      Patient Effort  excellent  -ALEXIA      Comment  Pt. wtih spinal stenosis and chronic L hip/lower back pain.  -ALEXIA      Existing Precautions/Restrictions  fall  -ALEXIA      Recorded by [ALEXIA] Emilie Park, OT 03/03/19 1204      Row Name 03/03/19 1122             Vital Signs    Pre Systolic BP Rehab  157  -ALEXIA      Pre Treatment Diastolic BP  97  -ALEXIA      Post Systolic BP Rehab  147  -ALEXIA      Post Treatment Diastolic BP  86  -ALEXIA      Pretreatment Heart Rate (beats/min)  65  -ALEXIA      Posttreatment Heart Rate (beats/min)  65  -ALEXIA      Pre SpO2 (%)  96  -ALEXIA      O2 Delivery Pre Treatment  room air  -ALEXIA      Intra SpO2 (%)  98  -ALEXIA      O2 Delivery Intra Treatment  room air  -ALEXIA      Post SpO2 (%)  98  -ALEXIA      O2 Delivery Post Treatment  room air  -ALEXIA      Pre Patient Position   Supine  -ALEXIA      Intra Patient Position  Standing  -ALEXIA      Post Patient Position  Sitting  -ALEXIA      Recorded by [ALEXIA] Emilie Park, OT 03/03/19 1204      Row Name 03/03/19 1122             Cognitive Assessment/Intervention    Additional Documentation  Cognitive Assessment/Intervention (Group)  -ALEXIA      Recorded by [ALEXIA] Emilie Park OT 03/03/19 1204      Row Name 03/03/19 1122             Cognitive Assessment/Intervention- PT/OT    Affect/Mental Status (Cognitive)  confused slow processing  -ALEXIA      Orientation Status (Cognition)  oriented x 3  -ALEXIA      Follows Commands (Cognition)  follows one step commands;over 90% accuracy;repetition of directions required;verbal cues/prompting required;physical/tactile prompts required  -ALEXIA      Cognitive Interventions (Cognitive)  occupation/activity based interventions  -ALEXIA      Personal Safety Interventions  fall prevention program maintained;gait belt;nonskid shoes/slippers when out of bed  -ALEXIA      Recorded by [ALEXIA] Emilie Park, OT 03/03/19 1204      Row Name 03/03/19 1122             Safety Issues, Functional Mobility    Safety Issues Affecting Function (Mobility)  insight into deficits/self awareness;judgment;problem solving;safety precaution awareness;safety precautions follow-through/compliance  -ALEXIA      Impairments Affecting Function (Mobility)  endurance/activity tolerance;strength;balance;pain  -ALEXIA      Recorded by [ALEXIA] Emilie Park, OT 03/03/19 1204      Row Name 03/03/19 1122             Bed Mobility Assessment/Treatment    Scooting/Bridging [place] (Bed Mobility)  independent  -ALEXIA      Supine-Sit [place] (Bed Mobility)  supervision;verbal cues  -ALEXIA      Assistive Device (Bed Mobility)  -- bed flat no rails up  -ALEXIA      Comment (Bed Mobility)  Pt. needed cues to sequence task, but with extra caroline completed on his own.  -ALEXIA      Recorded by [ALEXIA] Emilie Park, OT 03/03/19 1204      Row Name 03/03/19 1122             Functional Mobility     Functional Mobility- Ind. Level  contact guard assist  -ALEXIA      Functional Mobility- Device  rolling walker  -ALEXIA      Functional Mobility-Distance (Feet)  128  -ALEXIA      Functional Mobility- Safety Issues  step length decreased  -ALEXIA      Functional Mobility- Comment  cues intially to stay in walker and in center.    -ALEXIA      Recorded by [ALEXIA] Emilie Park, OT 03/03/19 1204      Row Name 03/03/19 1122             Transfer Assessment/Treatment    Transfer Assessment/Treatment  sit-stand transfer;stand-sit transfer  -ALEXIA      Comment (Transfers)  cues for hand placement to push up and reach back and not pull on walker and to fully back to chair.  -ALEXIA      Recorded by [ALEXIA] Emilie Park, OT 03/03/19 1204      Row Name 03/03/19 1122             Sit-Stand Transfer    Sit-Stand [place] (Transfers)  contact guard;verbal cues  -ALEXIA      Assistive Device (Sit-Stand Transfers)  walker, front-wheeled  -ALEXIA      Recorded by [ALEXIA] Emilie Park, OT 03/03/19 1204      Row Name 03/03/19 1122             Stand-Sit Transfer    Stand-Sit [place] (Transfers)  contact guard;verbal cues  -ALEXIA      Assistive Device (Stand-Sit Transfers)  walker, front-wheeled  -ALEXIA      Recorded by [ALEXIA] Emilie Park, OT 03/03/19 1204      Row Name 03/03/19 1122             ADL Assessment/Intervention    46730 - OT Self Care/Mgmt Minutes  12  -ALEXIA      BADL Assessment/Intervention  lower body dressing;grooming pt. declined grooming, toileting or bathing task .  -ALEXIA      Recorded by [ALEXIA] Emilie Park, OT 03/03/19 1204      Row Name 03/03/19 1122             Lower Body Dressing Assessment/Training    Lower Body Dressing [place] Level  don;doff;socks;shoes/slippers;supervision;set up  -ALEXIA      Lower Body Dressing Position  edge of bed sitting  -ALEXIA      Comment (Lower Body Dressing)  donned shoes only.  Pt. needed extra time, but once handed items pt. complete on own when asked to.  -ALEXIA      Recorded by [ALEXIA] Emilie Park, OT 03/03/19 1204       Row Name 03/03/19 1122             Grooming Assessment/Training    Comment (Grooming)  Pt. able to rub hand gel on hands prior to meal  -ALEXIA      Recorded by [ALEXIA] Emilie Park, OT 03/03/19 1204      Row Name 03/03/19 1122             BADL Safety/Performance    Impairments, BADL Safety/Performance  cognition completes, but still very slowed per prior per family  -ALEXIA      Cognitive Impairments, BADL Safety/Performance  problem solving/reasoning;safety precaution awareness;safety precaution follow-through;judgment  -ALEXIA      Skilled BADL Treatment/Intervention  cognitive/safety deficit modifications  -ALEXIA      Progress in BADL Status  independence level  -ALEXIA      Recorded by [ALEXIA] Emilie Park, OT 03/03/19 1204      Row Name 03/03/19 1122             Motor Skills Assessment/Interventions    Additional Documentation  Balance (Group);Balance Interventions (Group);Therapeutic Exercise (Group);Therapeutic Exercise Interventions (Group)  -ALEXIA      Recorded by [ALEXAI] Emilie Park, OT 03/03/19 1204      Row Name 03/03/19 1122             Therapeutic Exercise    76249 - OT Therapeutic Exercise Minutes  7  -ALEXIA      60668 - OT Therapeutic Activity Minutes  8  -ALEXIA      Recorded by [ALEXIA] Emilie Park, OT 03/03/19 1204      Row Name 03/03/19 1122             Therapeutic Exercise    Upper Extremity Range of Motion (Therapeutic Exercise)  shoulder flexion/extension, bilateral;shoulder horizontal abduction/adduction, bilateral;elbow flexion/extension, bilateral  -ALEXIA      Exercise Type (Therapeutic Exercise)  AROM (active range of motion);resistive exercises  -ALEXIA      Position (Therapeutic Exercise)  seated  -ALEXIA      Sets/Reps (Therapeutic Exercise)  1/10-15  -ALEXIA      Expected Outcome (Therapeutic Exercise)  improve functional tolerance, self-care activity;improve performance, transfer skills  -ALEXIA      Comment (Therapeutic Exercise)  visual and verbal cues  -ALEXIA      Recorded by [ALEXIA] Emilie Park, OT 03/03/19 1204      Row  Name 03/03/19 1122             Gross Motor Coordination    Gross Motor Skill, Impairments Detail  slowed mvmts  -ALEXIA      Recorded by [ALEXIA] Emilie Park, OT 03/03/19 1204      Row Name 03/03/19 1122             Dynamic Sitting Balance    Level of [place], Reaches Outside Midline (Sitting, Dynamic Balance)  supervision  -ALEXIA      Sitting Position, Reaches Outside Midline (Sitting, Dynamic Balance)  sitting on edge of bed dressing  -ALEXIA      Recorded by [ALEXIA] Emilie Park, OT 03/03/19 1204      Row Name 03/03/19 1122             Static Standing Balance    Level of [place] (Supported Standing, Static Balance)  supervision  -ALEXIA      Assistive Device Utilized (Supported Standing, Static Balance)  walker, rolling  -ALEXIA      Recorded by [ALEXIA] Emilie Park, OT 03/03/19 1204      Row Name 03/03/19 1122             Dynamic Standing Balance    Level of [place], Reaches Outside Midline (Standing, Dynamic Balance)  contact guard assist  -ALEXIA      Assistive Device Utilized (Supported Standing, Dynamic Balance)  walker, rolling  -ALEXIA      Recorded by [ALEXIA] Emilie Park, OT 03/03/19 1204      Row Name 03/03/19 1122             Positioning and Restraints    Pre-Treatment Position  in bed  -ALEXIA      Post Treatment Position  chair no alarm per family request, no bed alarm on when arrived  -ALEXIA      In Bed  sitting;call light within reach;encouraged to call for assist;with family/caregiver per family not using alarms due to family there all time  -ALEXIA      Recorded by [ALEXIA] Emilie Park, OT 03/03/19 1204      Row Name 03/03/19 1122             Plan of Care Review    Plan of Care Reviewed With  patient;family  -ALEXIA      Recorded by [ALEXIA] Emilie Park, OT 03/03/19 1204      Row Name 03/03/19 1122             Outcome Summary/Treatment Plan (OT)    Daily Summary of Progress (OT)  progress toward functional goals is good  -ALEXIA      Barriers to Overall Progress (OT)  slowed processing  -ALEXIA      Plan for Continued Treatment  (OT)  cont OT POC  -ALEXIA      Anticipated Discharge Disposition (OT)  inpatient rehabilitation facility  -ALEXIA      Recorded by [ALEXIA] Emilie Park, OT 03/03/19 1204        User Key  (r) = Recorded By, (t) = Taken By, (c) = Cosigned By    Initials Name Effective Dates Discipline    Emilie Fuentes, OT 06/08/18 -  OT           Rehab Goal Summary     Row Name 03/03/19 1122             Transfer Goal 1 (OT)    Progress/Outcome (Transfer Goal 1, OT)  goal ongoing  -ALEXIA         Bathing Goal 1 (OT)    Progress/Outcomes (Bathing Goal 1, OT)  goal ongoing  -ALEXIA         Dressing Goal 1 (OT)    Progress/Outcome (Dressing Goal 1, OT)  goal partially met met socks and shoes  -ALEXIA         Functional Mobility Goal 1 (OT)    Progress/Outcome (Functional Mobility Goal 1, OT)  continuing progress toward goal CGA level  -ALEXIA        User Key  (r) = Recorded By, (t) = Taken By, (c) = Cosigned By    Initials Name Provider Type Discipline    ALEXIA Emilie Park Caprice, OT Occupational Therapist OT        Occupational Therapy Education     Title: PT OT SLP Therapies (In Progress)     Topic: Occupational Therapy (In Progress)     Point: ADL training (In Progress)     Description: Instruct learner(s) on proper safety adaptation and remediation techniques during self care or transfers.   Instruct in proper use of assistive devices.    Learning Progress Summary           Patient Acceptance, E,D, NR by ALEXIA at 3/3/2019 11:22 AM    Comment:  bed mobility, wx/transfer safety, UE ther. exercises, family to make a list of activities pt. needs to do alone at home when family at work.    Acceptance, E, VU,NR by NAREN at 3/1/2019 11:38 PM    Acceptance, E, VU,NR by NAREN at 2/28/2019 11:19 PM    Acceptance, E, VU,NR by WANDA at 2/28/2019  9:20 AM    Comment:  Educated on current deficits and OT role. Discussed discharge plans.   Family Acceptance, E,D, NR by ALEXIA at 3/3/2019 11:22 AM    Comment:  bed mobility, wx/transfer safety, UE ther. exercises, family to make a list of  activities pt. needs to do alone at home when family at work.    Acceptance, E, VU,NR by LD at 3/1/2019 11:38 PM    Acceptance, E, VU,NR by LD at 2/28/2019 11:19 PM                   Point: Home exercise program (In Progress)     Description: Instruct learner(s) on appropriate technique for monitoring, assisting and/or progressing therapeutic exercises/activities.    Learning Progress Summary           Patient Acceptance, E,D, NR by ALEXIA at 3/3/2019 11:22 AM    Comment:  bed mobility, wx/transfer safety, UE ther. exercises, family to make a list of activities pt. needs to do alone at home when family at work.    Acceptance, E, VU,NR by NAREN at 3/1/2019 11:38 PM    Acceptance, E, VU,NR by LD at 2/28/2019 11:19 PM    Acceptance, E, VU,NR by WANDA at 2/28/2019  9:20 AM    Comment:  Educated on current deficits and OT role. Discussed discharge plans.   Family Acceptance, E,D, NR by ALEXIA at 3/3/2019 11:22 AM    Comment:  bed mobility, wx/transfer safety, UE ther. exercises, family to make a list of activities pt. needs to do alone at home when family at work.    Acceptance, E, VU,NR by NAREN at 3/1/2019 11:38 PM    Acceptance, E, VU,NR by LD at 2/28/2019 11:19 PM                   Point: Precautions (In Progress)     Description: Instruct learner(s) on prescribed precautions during self-care and functional transfers.    Learning Progress Summary           Patient Acceptance, E,D, NR by ALEXIA at 3/3/2019 11:22 AM    Comment:  bed mobility, wx/transfer safety, UE ther. exercises, family to make a list of activities pt. needs to do alone at home when family at work.    Acceptance, E, VU,NR by NAREN at 3/1/2019 11:38 PM    Acceptance, E, VU,NR by NAREN at 2/28/2019 11:19 PM   Family Acceptance, E,D, NR by ALEIXA at 3/3/2019 11:22 AM    Comment:  bed mobility, wx/transfer safety, UE ther. exercises, family to make a list of activities pt. needs to do alone at home when family at work.    Acceptance, E, VU,NR by LD at 3/1/2019 11:38 PM    Acceptance,  E, VU,NR by LD at 2/28/2019 11:19 PM                   Point: Body mechanics (Done)     Description: Instruct learner(s) on proper positioning and spine alignment during self-care, functional mobility activities and/or exercises.    Learning Progress Summary           Patient Acceptance, E, VU,NR by LD at 3/1/2019 11:38 PM    Acceptance, E, VU,NR by LD at 2/28/2019 11:19 PM   Family Acceptance, E, VU,NR by LD at 3/1/2019 11:38 PM    Acceptance, E, VU,NR by LD at 2/28/2019 11:19 PM                               User Key     Initials Effective Dates Name Provider Type Discipline    ALEXIA 06/08/18 -  Emilie Park, OT Occupational Therapist OT    AN 06/22/15 -  Bibiana Lion OT Occupational Therapist OT    LD 07/11/18 -  Karen Medel RN Registered Nurse Nurse                OT Recommendation and Plan  Outcome Summary/Treatment Plan (OT)  Daily Summary of Progress (OT): progress toward functional goals is good  Barriers to Overall Progress (OT): slowed processing  Plan for Continued Treatment (OT): cont OT POC  Anticipated Discharge Disposition (OT): inpatient rehabilitation facility  Therapy Frequency (OT Eval): daily  Daily Summary of Progress (OT): progress toward functional goals is good  Plan of Care Review  Plan of Care Reviewed With: patient, family  Plan of Care Reviewed With: patient, family  Outcome Summary: Pt. Supervision OOB today and to vicente shoes and socks, but needs cues to sequence and slowed processing.  CGA to stand and mobilize in hallway.  Plans to [facility].  Outcome Measures     Row Name 03/03/19 1122 03/02/19 1435 02/28/19 1333       How much help from another person do you currently need...    Turning from your back to your side while in flat bed without using bedrails?  --  4  -CD  4  -SC    Moving from lying on back to sitting on the side of a flat bed without bedrails?  --  4  -CD  4  -SC    Moving to and from a bed to a chair (including a wheelchair)?  --  3  -CD  3  -SC    Standing up from a  chair using your arms (e.g., wheelchair, bedside chair)?  --  3  -CD  3  -SC    Climbing 3-5 steps with a railing?  --  2  -CD  2  -SC    To walk in hospital room?  --  3  -CD  3  -SC    AM-PAC 6 Clicks Score  --  19  -CD  19  -SC       How much help from another is currently needed...    Putting on and taking off regular lower body clothing?  3  -ALEXIA  --  --    Bathing (including washing, rinsing, and drying)  3  -ALEXIA  --  --    Toileting (which includes using toilet bed pan or urinal)  3  -ALEXIA  --  --    Putting on and taking off regular upper body clothing  3  -ALEXIA  --  --    Taking care of personal grooming (such as brushing teeth)  4  -ALEXIA  --  --    Eating meals  4  -ALEXIA  --  --    Score  20  -ALEXIA  --  --       Modified Kendrick Scale    Modified [place] Scale  3 - Moderate disability.  Requiring some help, but able to walk without assistance.  -ALEXIA  3 - Moderate disability.  Requiring some help, but able to walk without assistance. WITH R WALKER.   -CD  3 - Moderate disability.  Requiring some help, but able to walk without assistance.  -SC       Functional Assessment    Outcome Measure Options  AM-PAC 6 Clicks Daily Activity (OT)  -  AM-[facility] 6 Clicks Basic Mobility (PT);Modified Rock  -  AM-[facility] 6 Clicks Basic Mobility (PT)  -SC      User Key  (r) = Recorded By, (t) = Taken By, (c) = Cosigned By    Initials Name Provider Type    SC Ramsey Downey, PT Physical Therapist    Emilie Fuentes, OT Occupational Therapist    Gogo Tinoco, PT Physical Therapist           Time Calculation:   Time Calculation- OT     Row Name 03/03/19 1122             Time Calculation- OT    OT Start Time  1122  -ALEXIA      OT Received On  03/03/19  -ALEXIA      OT Goal Re-Cert Due Date  03/10/19  -ALEXIA         Timed Charges    68814 - OT Therapeutic Exercise Minutes  7  -ALEXIA      95224 - OT Therapeutic Activity Minutes  8  -ALEXIA      32815 - OT Self Care/Mgmt Minutes  12  -ALEXIA        User Key  (r) = Recorded By, (t) = Taken By, (c) = Cosigned By     Initials Name Provider Type    ALEXIA Emilie Park, OT Occupational Therapist           Therapy Suggested Charges     Code   Minutes Charges    28259 (CPT®) Hc Ot Neuromusc Re Education Ea 15 Min      48397 (CPT®) Hc Ot Ther Proc Ea 15 Min 7     29115 (CPT®) Hc Ot Therapeutic Act Ea 15 Min 8 1    65386 (CPT®) Hc Ot Manual Therapy Ea 15 Min      03458 (CPT®) Hc Ot Iontophoresis Ea 15 Min      37837 (CPT®) Hc Ot Elec Stim Ea-Per 15 Min      16524 (CPT®) Hc Ot Ultrasound Ea 15 Min      76968 (CPT®) Hc Ot Self Care/Mgmt/Train Ea 15 Min 12 1    Total  27 2        Therapy Charges for Today     Code Description Service Date Service Provider Modifiers Qty    13201660624 HC OT THERAPEUTIC ACT EA 15 MIN 3/3/2019 Emilie Park, OT GO 1    96835408580 HC OT SELF CARE/MGMT/TRAIN EA 15 MIN 3/3/2019 Emilie Park, OT GO 1               Emilie Park OT  3/3/2019

## 2019-03-03 NOTE — PLAN OF CARE
Problem: Patient Care Overview  Goal: Plan of Care Review  Outcome: Ongoing (interventions implemented as appropriate)   03/03/19 1122   OTHER   Outcome Summary Pt. Supervision OOB today and to vicente shoes and socks, but needs cues to sequence and slowed processing. CGA to stand and mobilize in hallway. Plans to [facility].   Coping/Psychosocial   Plan of Care Reviewed With patient;family   Plan of Care Review   Progress improving

## 2019-03-04 NOTE — PROGRESS NOTES
"[facility] Progress Note     LOS: 4 days   Patient Care Team:  Saba Arrington MD as PCP - General (Internal Medicine)  PCP:  Saba Arrington MD    Chief Complaint: Follow-up stroke, hypertension    Subjective: Patient complains of some right hand numbness but no left hand complaints.  Still has some facial droop.  Amlodipine was added over the weekend for persistent elevated blood pressure      Review of Systems:   All systems have been reviewed and are negative with the exception of those mentioned above.      Objective:    Vital Sign Min/Max for last 24 hours  Temp  Min: 97.3 °F (36.3 °C)  Max: 97.7 °F (36.5 °C)   BP  Min: 138/80  Max: 147/86   Pulse  Min: 52  Max: 75   Resp  Min: 16  Max: 18   SpO2  Min: 95 %  Max: 97 %   No Data Recorded   No Data Recorded     Flowsheet Rows      First Filed Value   Admission Height  175.3 cm (69\") Documented at 02/27/2019 1800   Admission Weight  86.6 kg (191 lb) Documented at 02/27/2019 1800          Telemetry: Sinus rhythm with PACs      Intake/Output Summary (Last 24 hours) at 3/4/2019 1000  Last data filed at 3/3/2019 1600  Gross per 24 hour   Intake 240 ml   Output --   Net 240 ml     Intake & Output (last 3 days)       03/01 0701 - 03/02 0700 03/02 0701 - 03/03 0700 03/03 0701 - 03/04 0700 03/04 0701 - 03/05 0700    P.O.   240     I.V. (mL/kg) 854 (10)       Total Intake(mL/kg) 854 (10)  240 (2.8)     Urine (mL/kg/hr) 100 (0) 100 (0) 150 (0.1)     Stool        Total Output 100 100 150     Net +754 -100 +90             Urine Unmeasured Occurrence 3 x 3 x      Stool Unmeasured Occurrence  1 x             Physical Exam:  Physical Exam   Constitutional: He is oriented to person, place, and time. He appears well-developed and well-nourished.   Cardiovascular: Normal rate and regular rhythm. Exam reveals no gallop and no friction rub.   Occasional PACs  No murmur heard.  Abdominal: Soft. Bowel sounds are normal. "   Musculoskeletal: He exhibits no edema.   Neurological: He is alert and oriented to person, place, and time.   Facial drooping.   Skin: Skin is warm and dry.   Psychiatric: He has a normal mood and affect. His behavior is normal.              LABS/DIAGNOSTIC DATA:  Results from last 7 days   Lab Units 02/28/19  0554 02/27/19 1909 02/27/19 1902   WBC 10*3/mm3 9.22 6.25  --    HEMOGLOBIN g/dL 15.1 15.9  --    HEMOGLOBIN, POC g/dL  --   --  16.3   HEMATOCRIT % 45.6 48.0  --    HEMATOCRIT POC %  --   --  48   PLATELETS 10*3/mm3 179 166  --      No results found for: TROPONINT  Results from last 7 days   Lab Units 02/27/19 1908 02/27/19 1901   INR   --  1.0   APTT seconds 25.0  --      Results from last 7 days   Lab Units 03/03/19  0709 02/28/19 0554 02/27/19 1909 02/27/19 1902   SODIUM mmol/L 139 140  --   --    POTASSIUM mmol/L 3.5 3.9  --   --    CHLORIDE mmol/L 105 107  --   --    CO2 mmol/L 29.0 25.0  --   --    BUN mg/dL 11 12  --   --    CREATININE mg/dL 0.65 0.66  --  0.70   CALCIUM mg/dL 8.9 9.0  --   --    ALT (SGPT) U/L  --   --  28  --    AST (SGOT) U/L  --   --  28  --    GLUCOSE mg/dL 100 150*  --   --      Results from last 7 days   Lab Units 02/28/19  0554   HEMOGLOBIN A1C % 5.50     Results from last 7 days   Lab Units 02/28/19 0554   CHOLESTEROL mg/dL 185   TRIGLYCERIDES mg/dL 48   HDL CHOL mg/dL 52   LDL CHOL mg/dL 129     Results from last 7 days   Lab Units 02/28/19 0554   TSH mIU/mL 0.219*   FREE T4 ng/dL 1.09           Medication Review:     amLODIPine 2.5 mg Oral Q24H   apixaban 5 mg Oral Q12H   aspirin 81 mg Oral Daily   atorvastatin 80 mg Oral Nightly   bisoprolol 5 mg Oral Q24H   DULoxetine 20 mg Oral Daily   famotidine 20 mg Oral Daily   fluticasone 2 spray Each Nare Daily   gabapentin 300 mg Oral Nightly   lisinopril 40 mg Oral Q24H   melatonin 5 mg Oral Nightly   memantine 10 mg Oral Q12H   mesalamine 0.75 g Oral Daily   predniSONE 5 mg Oral BID With Meals   sodium chloride 3 mL  Intravenous Q12H              CVA (cerebral vascular accident) (CMS/HCC)    HLD (hyperlipidemia)    Mild dementia    Benign essential hypertension    GERD without esophagitis    Spinal stenosis in cervical region    Thyroid nodule    Acute ischemic right MCA stroke (CMS/HCC)    PFO (patent foramen ovale)    Ulcerative colitis (CMS/[facility])      Assessment/Plan:    Cardioembolic CVA  - hx multiple CVAs  - CTA showed no significant blockages  -Carotid ultrasound did not show any hemodynamically significant stenosis bilaterally there was atherosclerotic plaque  -Lower extremity venous duplex negative  - CT perfusion was negative  - MRI of the brain revealed 2 small acute R occipital infarcts and a small acute R basal ganglia infarct in addition to extensive white matter changes and multiple old lacunar infarcts, all suggestive of cardioembolic source.  -Echo EF 55%, AV calcification, mild to moderate AI, mild to moderate TR, normal RVSP, saline test negative  - transcranial doppler bubble study positive  - REJI  confirms presence of a PFO with positive bubble study, left atrium is dilated there was no left atrial appendage thrombus, there is mild to moderate aortic atherosclerotic plaque.  -With presence of PFO as well as risk factors for atrial fibrillation we will start the patient on apixaban 5 mg twice daily in addition aspirin 81 mg daily.  He has a history of multiple CVAs  ROPE score 3, which in that model suggests 0% chance that stroke is PFO related  -  We will also discharge with a 30-day mobile telemetry monitor to continue to screen for A. Fib. He has PACs on the telemetry here  -Follow-up with me in 6 weeks     HTN  - cont lisinopril at decreased dose 40 mg daily, bisoprolol, and amlodipine     HLD  -   - atorvastatin 80 initiated.         Spinal Stenosis/Chronic Pain  - chronic steroid use  - neuro changed Zoloft to Cymbalta for pain benefit       Plan for discharge today to rehab at [facility].   MCOT cardiac monitor will be placed today prior to him discharging.  Follow-up request placed for brianna Maldonado MD   03/04/19  10:00 AM

## 2019-03-04 NOTE — PROGRESS NOTES
[facility]  PROGRESS NOTE     Patient Name: Vargas De  : 1940  MRN: [id number]     Date of Admission: 2019  Length of Stay: 6  Primary Care Physician: Saba Arrington MD      Subjective    Subjective    CC:  Facial droop, speech difficulty      HPI:  Awake and sitting up in bed. Difficulty noted in following simple commands. Slow processing noted. Daughter at bedside and verbalized concerns for the patient's safety related to his difficulty communicating and slow processing. She states that Mr. De lives in her basement but has to use stairs frequently. NAD. No adverse events overnight.      Review of Systems  Gen- No fevers, chills  CV- No chest pain, palpitations  Resp- No cough, dyspnea  GI- No N/V/D, + abd pain related to GERD    Otherwise ROS is negative except as mentioned in the HPI.      Objective    Objective    Vital Signs:   Temp:  [97.5 °F (36.4 °C)-98 °F (36.7 °C)] 97.5 °F (36.4 °C)  Heart Rate:  [60-67] 62  Resp:  [14-18] 17  BP: (129-157)/(73-97) 157/97  Total (NIH Stroke Scale): 4   Physical Exam:  Constitutional: No acute distress, awake, alert  Head: NCAT  ENT: mucous membranes moist; left facial droop improved   Respiratory: Clear to auscultation bilaterally, respiratory effort normal   Cardiovascular: RRR, no murmurs, rubs, or gallops, palpable pedal pulses bilaterally  Gastrointestinal: Positive bowel sounds, soft, nontender, nondistended  Musculoskeletal: No bilateral ankle edema  Psychiatric: Appropriate affect, cooperative  Neurologic: Oriented x 3,  5/5 on right, 4/5 on left,symmetric in BLE extremities, Cranial Nerves grossly intact to confrontation, speech clear  Skin: No rashes on exposed skin     Results Reviewed:  I have personally reviewed current lab, radiology, and data and agree.             Results from last 7 days   Lab Units 19  0554 19  1909 19   WBC 10*3/mm3 9.22  6.25  --   --    HEMOGLOBIN g/dL 15.1 15.9  --   --    HEMOGLOBIN, POC g/dL  --   --  16.3  --    HEMATOCRIT % 45.6 48.0  --   --    HEMATOCRIT POC %  --   --  48  --    PLATELETS 10*3/mm3 179 166  --   --    INR    --   --   --  1.0               Results from last 7 days   Lab Units 03/03/19  0709 02/28/19  0554 02/27/19  1910 02/27/19  1909 02/27/19  1902   SODIUM mmol/L 139 140  --   --   --    POTASSIUM mmol/L 3.5 3.9  --   --   --    CHLORIDE mmol/L 105 107  --   --   --    CO2 mmol/L 29.0 25.0  --   --   --    BUN mg/dL 11 12  --   --   --    CREATININE mg/dL 0.65 0.66  --   --  0.70   GLUCOSE mg/dL 100 150*  --   --   --    CALCIUM mg/dL 8.9 9.0  --   --   --    ALT (SGPT) U/L  --   --   --  28  --    AST (SGOT) U/L  --   --   --  28  --    TROPONIN I ng/mL  --   --  0.00  --   --       Estimated Creatinine Clearance: 85.2 mL/min (by C-G formula based on SCr of 0.65 mg/dL).     No results found for: BNP         Microbiology Results Abnormal      None                 Imaging Results (last 24 hours)      ** No results found for the last 24 hours. **                  Results for orders placed during the hospital encounter of 02/27/19   Adult Transesophageal Echo (REJI) W/ Cont if Necessary Per Protocol     Narrative · Left ventricular systolic function is normal. Estimated EF appears to be   in the range of 56 - 60%.  · Left atrial cavity size is moderately dilated. The left atrial appendage   was visualized through multiple planes. Left atrial appendage was found to   be multilobar in nature. Doppler interrogation shows normal flow within   the left atrial appendage. No evidence of a left atrial appendage thrombus   was present  · Patent foramen ovale present. Saline test results are positive for right   to left atrial level shunt  · Mild to moderate aortic valve regurgitation is present  · There is mild (grade 2) plaque in the aortic arch present. There is mild   (grade 2) plaque in the descending aorta  present.            I have reviewed the medications:     Current Facility-Administered Medications:   •  acetaminophen (TYLENOL) tablet 650 mg, 650 mg, Oral, Q4H PRN, 650 mg at 03/03/19 0929 **OR** acetaminophen (TYLENOL) suppository 650 mg, 650 mg, Rectal, Q4H PRN, Christin Bailon APRN  •  amLODIPine (NORVASC) tablet 2.5 mg, 2.5 mg, Oral, Q24H, Traci Lewis DO, 2.5 mg at 03/03/19 0928  •  apixaban (ELIQUIS) tablet 5 mg, 5 mg, Oral, Q12H, Taj Maldonado MD, 5 mg at 03/03/19 0928  •  aspirin EC tablet 81 mg, 81 mg, Oral, Daily, Taj Maldonado MD, 81 mg at 03/03/19 0928  •  atorvastatin (LIPITOR) tablet 80 mg, 80 mg, Oral, Nightly, Christin Bailon APRN, 80 mg at 03/02/19 2122  •  bisoprolol (ZEBeta) tablet 5 mg, 5 mg, Oral, Q24H, Traci Lewis DO, 5 mg at 03/03/19 0928  •  dicyclomine (BENTYL) tablet 20 mg, 20 mg, Oral, BID PRN, Traci Lewis DO, 20 mg at 03/02/19 1005  •  DULoxetine (CYMBALTA) DR capsule 20 mg, 20 mg, Oral, Daily, Dave Arrington MD, 20 mg at 03/03/19 0928  •  famotidine (PEPCID) tablet 20 mg, 20 mg, Oral, Daily, Traci Lewis DO, 20 mg at 03/03/19 0928  •  fluticasone (FLONASE) 50 MCG/ACT nasal spray 2 spray, 2 spray, Each Nare, Daily, Traci Lewis DO, 2 spray at 03/03/19 0932  •  gabapentin (NEURONTIN) capsule 300 mg, 300 mg, Oral, Nightly, Traci Lewis DO, 300 mg at 03/02/19 2122  •  lisinopril (PRINIVIL,ZESTRIL) tablet 20 mg, 20 mg, Oral, Q12H, Taj Maldonado MD, 20 mg at 03/03/19 0928  •  melatonin tablet 5 mg, 5 mg, Oral, Nightly, Dave Arrington MD, 5 mg at 03/02/19 2123  •  memantine (NAMENDA) tablet 10 mg, 10 mg, Oral, Q12H, Traci Lewis DO, 10 mg at 03/03/19 1025  •  mesalamine (APRISO) 24 hr capsule 0.75 g, 0.75 g, Oral, Daily, Traci Lewis DO, 0.75 g at 03/03/19 0931  •  Pharmacy Consult - [facility], , Does not apply, Daily, Sienna Sanabria PA, Stopped at 03/01/19 0905  •  predniSONE (DELTASONE) tablet 5 mg, 5 mg, Oral, BID  With Meals, Traci Lewis DO, 5 mg at 03/03/19 0929  •  sodium chloride 0.9 % flush 10 mL, 10 mL, Intravenous, PRN, Andrzej Sosa MD  •  sodium chloride 0.9 % flush 3 mL, 3 mL, Intravenous, Q12H, Christin Bailon APRN, 3 mL at 03/03/19 0932  •  sodium chloride 0.9 % flush 3-10 mL, 3-10 mL, Intravenous, PRN, Christin Bailon APRAURE  •  temazepam (RESTORIL) capsule 7.5 mg, 7.5 mg, Oral, Nightly PRN, Dave Arrington MD, 7.5 mg at 03/01/19 2038             Assessment/Plan      Assessment / Plan           Active Hospital Problems     Diagnosis Date Noted   • **CVA (cerebral vascular accident) (CMS/[facility]) [I63.9] 02/27/2019   • PFO (patent foramen ovale) [Q21.1] 03/01/2019   • Ulcerative colitis (CMS/[facility]) [K51.90] 03/01/2019   • Thyroid nodule [E04.1] 02/28/2019   • Acute ischemic right MCA stroke (CMS/[facility]) [I63.511] 02/28/2019   • Spinal stenosis in cervical region [M48.02] 01/28/2019   • GERD without esophagitis [K21.9] 01/28/2019   • Mild dementia [F03.90] 11/01/2018   • HLD (hyperlipidemia) [E78.5] 07/23/2018   • Benign essential hypertension [I10] 07/11/2017         Brief Hospital Course to date:  78 year old male presenting to the ED with complaint of left sided facial droop who is found to have CVA. MRI with multiple chronic lacunar infarcts and acute lacunar infarcts. Neuro and cardiology consulted. REJI was positive for PFO. He was started on apixaban in addition to eliquis and will be discharged with a 30 day monitor. Eval by PT/OT recommended acute rehab. Speech eval and FEES done and patient started on modified diet.      CVA  - Continue ASA atorva 80 mg   - ECHO; EF 55%, impaired relaxation; mild aortic regug  - Carotid duplex: 0-49% bilaterally    - FLP and A1C ok   - Neuro following; concern for cardioembolic etiology  - Cards consult   - REJI with presence of PFO with positive bubble; transcranial doppler positive bubble study  - Patient was started on apixaban 5 mg BID in addition to ASA   -  Discharge with 30 day telemetry monitor   - PT/OT with acute rehab   - Will need cards follow up in 6 weeks and neuro follow up   - Increasing ambulation per PT  - Needs cues to sequence and demonstrates slow processing per OT  - Difficulty following simple commands on exam; slow processing noted  - PT; discussed evaluating the patient's ability to do stairs     Dysphagia  - Related to CVA; speech following  - FEES 3/3; mild, oral dysfunction, moderate, pharyngeal dysfunction, esophageal dysfunction  - Dietary recommendations; mechanical soft with no mixed consistencies, nectar thick liquids     Hypertension   - resume antihypertensives; bisoprolol, Lisinopril  - Hytrin DC as not taking this at home  - Started amlodipine 2.5 mg daily on 3/3  - 24HR SBP 130s-150s/ DBP 80s-90s; Permissive Mild HTN d/t stroke     Hyperlipidemia  -on statin therapy, changed to atorvastatin 80 mg daily      Chronic pain/spinal stenosis  - continue gabapentin  - Zoloft changed to cymbalta      Ulcerative colitis   - On apriso at home; will continue mesalamine on formulary  - chronic steroid use; will resume prednisone   - Bentyl PRN      Mild dementia  -continue namenda -- patient no longer taking aricept due to side effects      Thyroid nodule  - TSH and T4 ok; no follow up needed based on size per radiology      GERD  - start pepcid - can't crush PPI      DVT prophylaxis:  scds/eliquis      Disposition: Rehab pending insurance approval.     CODE STATUS:       Code Status and Medical Interventions:   Ordered at: 02/27/19 2911     Code Status:     CPR     Medical Interventions (Level of Support Prior to Arrest):     Full      Electronically signed by Traci Lewis DO, 03/03/19, 10:51 AM.

## 2019-03-04 NOTE — PLAN OF CARE
Problem: Patient Care Overview  Goal: Plan of Care Review  Outcome: Ongoing (interventions implemented as appropriate)   03/04/19 1011   OTHER   Outcome Summary Pt ambulated 200 feet with Christie and RW, limited by fatigue. Pt still requires cues for sequencing and safety and is limited by decreased processing. Continue to recommend rehab at discharge. Will continue to progress mobility as able   Coping/Psychosocial   Plan of Care Reviewed With patient;daughter   Plan of Care Review   Progress improving       Problem: Stroke (Ischemic) (Adult)  Goal: Signs and Symptoms of Listed Potential Problems Will be Absent, Minimized or Managed (Stroke)  Outcome: Ongoing (interventions implemented as appropriate)   03/04/19 1011   Goal/Outcome Evaluation   Problems Assessed (Stroke (Ischemic)) communication impairment;motor/sensory impairment;cognitive impairment   Problems Assessed (Stroke (Ischemic)) communication impairment;motor/sensory impairment;cognitive impairment

## 2019-03-04 NOTE — THERAPY TREATMENT NOTE
Acute Care - Physical Therapy Treatment Note  [facility]     Patient Name: Vargas De  : 1940  MRN: [id number]  Today's Date: 3/4/2019  Onset of Illness/Injury or Date of Surgery: 19  Date of Referral to PT: 19  Referring Physician: Uvaldo GOTTI    Admit Date: 2019    Visit Dx:    ICD-10-CM ICD-9-CM   1. Acute ischemic right MCA stroke (CMS/HCC) I63.511 434.91   2. Impaired mobility and ADLs Z74.09 799.89   3. Cognitive communication deficit R41.841 799.52   4. Impaired functional mobility, balance, gait, and endurance Z74.09 V49.89   5. Oropharyngeal dysphagia R13.12 787.22     Patient Active Problem List   Diagnosis   • Peripheral neuropathy   • Septic bursitis of elbow, right   • HLD (hyperlipidemia)   • History of stroke   • Elbow pain   • Mild dementia   • Benign essential hypertension   • Abnormal glucose level   • Crohn's colitis, with rectal bleeding (CMS/HCC)   • Rash   • Right-sided muscle weakness   • Numbness on right side   • Dysphagia due to old cerebrovascular accident   • Frequent urination   • Nocturia   • Dizziness   • Poor balance   • Vitamin D deficiency   • Major depressive disorder with single episode, in partial remission (CMS/HCC)   • Cerebrovascular small vessel disease   • Major neurocognitive disorder, due to vascular disease, without behavioral disturbance, mild   • Hearing loss of left ear   • Current chronic use of systemic steroids   • Macular degeneration of both eyes   • GERD without esophagitis   • Cervicalgia   • Spinal stenosis in cervical region   • Low back pain   • Microhematuria   • Daytime somnolence   • Osteoarthritis of toe joint, right   • Shuffling gait   • Acquired stuttering   • Perennial allergic rhinitis   • TIA (transient ischemic attack)   • CVA (cerebral vascular accident) (CMS/HCC)   • Thyroid nodule   • Acute ischemic right MCA stroke (CMS/HCC)   • PFO (patent foramen ovale)   • Ulcerative colitis (CMS/HCC)       Therapy  Treatment    Rehabilitation Treatment Summary     Row Name 03/04/19 1011             Treatment Time/Intention    Discipline  physical therapist  -MJ      Document Type  therapy note (daily note)  -MJ      Subjective Information  complains of;pain  -MJ      Mode of Treatment  physical therapy  -MJ      Patient/Family Observations  Pt supine in bed. Dtr present  -MJ      Care Plan Review  patient/other agree to care plan  -MJ      Care Plan Review, Other Participant(s)  daughter  -MJ      Patient Effort  good  -MJ      Existing Precautions/Restrictions  fall  -MJ      Treatment Considerations/Comments  Pt has spinal stenosis  -MJ      Recorded by [MJ] Bret Beltran, PT 03/04/19 1125      Row Name 03/04/19 1011             Vital Signs    Pre Systolic BP Rehab  139  -MJ      Pre Treatment Diastolic BP  85  -MJ      Post Systolic BP Rehab  122  -MJ      Post Treatment Diastolic BP  75  -MJ      Pretreatment Heart Rate (beats/min)  77  -MJ      Posttreatment Heart Rate (beats/min)  75  -MJ      Pre Patient Position  Supine  -MJ      Intra Patient Position  Standing  -MJ      Post Patient Position  Sitting  -MJ      Recorded by [MJ] Bret Beltran, PT 03/04/19 1125      Row Name 03/04/19 1011             Cognitive Assessment/Intervention- PT/OT    Affect/Mental Status (Cognitive)  confused difficulty with word finding, decreased processing  -MJ      Orientation Status (Cognition)  oriented x 3  -MJ      Follows Commands (Cognition)  follows one step commands;75-90% accuracy;verbal cues/prompting required;repetition of directions required;increased processing time needed  -MJ      Recorded by [MJ] Bret Beltran, PT 03/04/19 1125      Row Name 03/04/19 1011             Safety Issues, Functional Mobility    Impairments Affecting Function (Mobility)  endurance/activity tolerance;strength  -MJ      Recorded by [MJ] Bret Beltran, PT 03/04/19 1125      Row Name 03/04/19 1011             Bed Mobility Assessment/Treatment    Supine-Sit  [place] (Bed Mobility)  supervision;verbal cues  -MJ      Sit-Supine [place] (Bed Mobility)  not tested Pt UIC  -MJ      Assistive Device (Bed Mobility)  bed rails;head of bed elevated  -MJ      Comment (Bed Mobility)  Verbal cues for sequencing  -MJ      Recorded by [MJ] Bret Beltran, PT 03/04/19 1125      Row Name 03/04/19 1011             Transfer Assessment/Treatment    Transfer Assessment/Treatment  sit-stand transfer;stand-sit transfer  -MJ      Comment (Transfers)  Verbal cues for correct hand placement, increased time for processing  -MJ2      Recorded by [MJ] Bret Beltran, PT 03/04/19 1125  [MJ2] Bret Beltran, PT 03/04/19 1128      Row Name 03/04/19 1011             Sit-Stand Transfer    Sit-Stand [place] (Transfers)  contact guard;verbal cues  -MJ      Assistive Device (Sit-Stand Transfers)  walker, front-wheeled  -MJ      Recorded by [MJ] Bret Beltran, PT 03/04/19 1125      Row Name 03/04/19 1011             Stand-Sit Transfer    Stand-Sit [place] (Transfers)  contact guard;verbal cues  -MJ      Assistive Device (Stand-Sit Transfers)  walker, front-wheeled  -MJ      Recorded by [MJ] Bret Beltran, PT 03/04/19 1125      Row Name 03/04/19 1011             Gait/Stairs Assessment/Training    99864 - Gait Training Minutes   15  -MJ      [place] Level (Gait)  minimum assist (75% patient effort);verbal cues  -MJ      Assistive Device (Gait)  walker, front-wheeled  -MJ      Distance in Feet (Gait)  200  -MJ      Pattern (Gait)  step-through  -MJ      Deviations/Abnormal Patterns (Gait)  bilateral deviations;gait speed decreased;stride length decreased  -MJ      Bilateral Gait Deviations  heel strike decreased  -MJ      Comment (Gait/Stairs)  Pt demo step through gait pattern at slow pace. Verbal cues for upright posture, to stay in middle of RW, to  feet during swing and to increase LE weight bearing, mild improvement. Pt required assist to turn RW, otherwise required CGA. Gait  "limited by fatigue.   -MJ      Recorded by [MJ] Bret Beltran, PT 03/04/19 1125      Row Name 03/04/19 1011             Therapeutic Exercise    51533 - PT Therapeutic Exercise Minutes  10  -MJ      Recorded by [MJ] Bret Beltran, PT 03/04/19 1125      Row Name 03/04/19 1011             Therapeutic Exercise    Lower Extremity (Therapeutic Exercise)  heel slides, bilateral;quad sets, bilateral;SLR (straight leg raise), bilateral  -MJ      Lower Extremity Range of Motion (Therapeutic Exercise)  hip abduction/adduction, bilateral;ankle dorsiflexion/plantar flexion, bilateral  -MJ      Exercise Type (Therapeutic Exercise)  AROM (active range of motion)  -MJ      Position (Therapeutic Exercise)  supine  -MJ      Sets/Reps (Therapeutic Exercise)  10x each  -MJ      Comment (Therapeutic Exercise)  Cues for technique. No physical assist required  -MJ      Recorded by [MJ] Bret Beltran, PT 03/04/19 1125      Row Name 03/04/19 1011             Positioning and Restraints    Pre-Treatment Position  in bed  -MJ      Post Treatment Position  chair  -MJ      In Chair  notified nsg;reclined;call light within reach;encouraged to call for assist;with family/caregiver  -MJ      Recorded by [MJ] Bret Beltran, PT 03/04/19 1125      Row Name 03/04/19 1011             Pain Assessment    Additional Documentation  Pain Scale: Numbers Pre/Post-Treatment (Group)  -MJ      Recorded by [GIOVANNY] Bret Beltran, PT 03/04/19 1125      Row Name 03/04/19 1011             Pain Scale: Numbers Pre/Post-Treatment    Pain Scale: Numbers, Pretreatment  4/10  -MJ      Pain Scale: Numbers, Post-Treatment  3/10  -MJ      Pain Location  back  -MJ      Pre/Post Treatment Pain Comment  \"stiffness\"  -MJ      Pain Intervention(s)  Repositioned;Ambulation/increased activity  -MJ      Recorded by [GIOVANNY] Bret Beltran, PT 03/04/19 1125      Row Name 03/04/19 1011             Plan of Care Review    Plan of Care Reviewed With  patient;daughter  -MJ      Recorded by [GIOVANNY] Devin, " Bret, PT 03/04/19 1125      Row Name 03/04/19 1011             Outcome Summary/Treatment Plan (PT)    Daily Summary of Progress (PT)  progress toward functional goals is gradual  -MJ      Recorded by [GIOVANNY] Bret Beltran, PT 03/04/19 1125        User Key  (r) = Recorded By, (t) = Taken By, (c) = Cosigned By    Initials Name Effective Dates Discipline     Bret Beltran, PT 04/03/18 -  PT                   Physical Therapy Education     Title: PT OT SLP Therapies (In Progress)     Topic: Physical Therapy (Done)     Point: Mobility training (Done)     Learning Progress Summary           Patient Acceptance, E,D, VU,NR by  at 3/4/2019 10:11 AM    Comment:  Reviewed HEP, gait mechanics, benefits of mobility, safety with mobility    Acceptance, E, VU,NR by CD at 3/2/2019  5:20 PM    Comment:  SAFETY WTIH MOBLITY, THER EX, TRANSFER/GAIT TRAINING WITH R WALKER, D/C PLANNING.   Family Acceptance, E,D, VU,NR by  at 3/4/2019 10:11 AM    Comment:  Reviewed HEP, gait mechanics, benefits of mobility, safety with mobility    Acceptance, E, VU,NR by CD at 3/2/2019  5:20 PM    Comment:  SAFETY WTIH MOBLITY, THER EX, TRANSFER/GAIT TRAINING WITH R WALKER, D/C PLANNING.                   Point: Home exercise program (Done)     Learning Progress Summary           Patient Acceptance, E,D, VU,NR by  at 3/4/2019 10:11 AM    Comment:  Reviewed HEP, gait mechanics, benefits of mobility, safety with mobility    Acceptance, E, VU,NR by CD at 3/2/2019  5:20 PM    Comment:  SAFETY WTIH MOBLITY, THER EX, TRANSFER/GAIT TRAINING WITH R WALKER, D/C PLANNING.   Family Acceptance, E,D, VU,NR by  at 3/4/2019 10:11 AM    Comment:  Reviewed HEP, gait mechanics, benefits of mobility, safety with mobility    Acceptance, E, VU,NR by CD at 3/2/2019  5:20 PM    Comment:  SAFETY WTIH MOBLITY, THER EX, TRANSFER/GAIT TRAINING WITH R WALKER, D/C PLANNING.                   Point: Body mechanics (Done)     Learning Progress Summary           Patient  Acceptance, E,D, VU,NR by  at 3/4/2019 10:11 AM    Comment:  Reviewed HEP, gait mechanics, benefits of mobility, safety with mobility    Acceptance, E, VU,NR by CD at 3/2/2019  5:20 PM    Comment:  SAFETY WTIH MOBLITY, THER EX, TRANSFER/GAIT TRAINING WITH R WALKER, D/C PLANNING.   Family Acceptance, E,D, VU,NR by  at 3/4/2019 10:11 AM    Comment:  Reviewed HEP, gait mechanics, benefits of mobility, safety with mobility    Acceptance, E, VU,NR by CD at 3/2/2019  5:20 PM    Comment:  SAFETY WTIH MOBLITY, THER EX, TRANSFER/GAIT TRAINING WITH R WALKER, D/C PLANNING.                   Point: Precautions (Done)     Learning Progress Summary           Patient Acceptance, E,D, VU,NR by  at 3/4/2019 10:11 AM    Comment:  Reviewed HEP, gait mechanics, benefits of mobility, safety with mobility    Acceptance, E, VU,NR by CD at 3/2/2019  5:20 PM    Comment:  SAFETY WTIH MOBLITY, THER EX, TRANSFER/GAIT TRAINING WITH R WALKER, D/C PLANNING.   Family Acceptance, E,D, VU,NR by  at 3/4/2019 10:11 AM    Comment:  Reviewed HEP, gait mechanics, benefits of mobility, safety with mobility    Acceptance, E, VU,NR by CD at 3/2/2019  5:20 PM    Comment:  SAFETY WTIH MOBLITY, THER EX, TRANSFER/GAIT TRAINING WITH R WALKER, D/C PLANNING.                               User Key     Initials Effective Dates Name Provider Type Discipline     06/19/15 -  Gogo Thibodeaux PT Physical Therapist PT     04/03/18 -  Bret Beltran, PT Physical Therapist PT                PT Recommendation and Plan     Outcome Summary/Treatment Plan (PT)  Daily Summary of Progress (PT): progress toward functional goals is gradual  Plan of Care Reviewed With: patient, daughter  Progress: improving  Outcome Summary: Pt ambulated 200 feet with Christie and RW, limited by fatigue. Pt still requires cues for sequencing and safety and is limited by decreased processing. Continue to recommend rehab at discharge. Will continue to progress mobility as able  Outcome Measures      Row Name 03/04/19 1011 03/03/19 1122 03/02/19 1435       How much help from another person do you currently need...    Turning from your back to your side while in flat bed without using bedrails?  3  -MJ  --  4  -CD    Moving from lying on back to sitting on the side of a flat bed without bedrails?  3  -MJ  --  4  -CD    Moving to and from a bed to a chair (including a wheelchair)?  3  -MJ  --  3  -CD    Standing up from a chair using your arms (e.g., wheelchair, bedside chair)?  3  -MJ  --  3  -CD    Climbing 3-5 steps with a railing?  2  -MJ  --  2  -CD    To walk in hospital room?  3  -MJ  --  3  -CD    AM-PAC 6 Clicks Score  17  -MJ  --  19  -CD       How much help from another is currently needed...    Putting on and taking off regular lower body clothing?  --  3  -ALEXIA  --    Bathing (including washing, rinsing, and drying)  --  3  -ALEXIA  --    Toileting (which includes using toilet bed pan or urinal)  --  3  -ALEXIA  --    Putting on and taking off regular upper body clothing  --  3  -ALEXIA  --    Taking care of personal grooming (such as brushing teeth)  --  4  -ALEXIA  --    Eating meals  --  4  -ALEXIA  --    Score  --  20  -ALEXIA  --       Modified [place] Scale    Modified [place] Scale  3 - Moderate disability.  Requiring some help, but able to walk without assistance.  -MJ  3 - Moderate disability.  Requiring some help, but able to walk without assistance.  -ALEXIA  3 - Moderate disability.  Requiring some help, but able to walk without assistance. WITH R WALKER.   -CD       Functional Assessment    Outcome Measure Options  AM-PAC 6 Clicks Basic Mobility (PT)  -  AM-PAC 6 Clicks Daily Activity (OT)  -ALEXIA  AM-PAC 6 Clicks Basic Mobility (PT);Modified [place]  -CD      User Key  (r) = Recorded By, (t) = Taken By, (c) = Cosigned By    Initials Name Provider Type    Emilie Fuentes, OT Occupational Therapist    Gogo Tinoco, PT Physical Therapist    Bret Granados, PT Physical Therapist         Time Calculation:   PT  Charges     Row Name 03/04/19 1011             Time Calculation    Start Time  1011  -MJ      PT Received On  03/04/19  -MJ      PT Goal Re-Cert Due Date  03/10/19  -MJ         Time Calculation- PT    Total Timed Code Minutes- PT  25 minute(s)  -MJ         Timed Charges    64131 - PT Therapeutic Exercise Minutes  10  -MJ      01098 - Gait Training Minutes   15  -MJ        User Key  (r) = Recorded By, (t) = Taken By, (c) = Cosigned By    Initials Name Provider Type    Bret Granados, PT Physical Therapist        Therapy Suggested Charges     Code   Minutes Charges    37348 (CPT®) Hc Pt Neuromusc Re Education Ea 15 Min      64264 (CPT®) Hc Pt Ther Proc Ea 15 Min 10 1    35711 (CPT®) Hc Gait Training Ea 15 Min 15 1    32247 (CPT®) Hc Pt Therapeutic Act Ea 15 Min      47019 (CPT®) Hc Pt Manual Therapy Ea 15 Min      80228 (CPT®) Hc Pt Iontophoresis Ea 15 Min      47484 (CPT®) Hc Pt Elec Stim Ea-Per 15 Min      26979 (CPT®) Hc Pt Ultrasound Ea 15 Min      85965 (CPT®) Hc Pt Self Care/Mgmt/Train Ea 15 Min      19488 (CPT®) Hc Pt Prosthetic (S) Train Initial Encounter, Each 15 Min      13782 (CPT®) Hc Pt Orthotic(S)/Prosthetic(S) Encounter, Each 15 Min      32681 (CPT®) Hc Orthotic(S) Mgmt/Train Initial Encounter, Each 15min      Total  25 2        Therapy Charges for Today     Code Description Service Date Service Provider Modifiers Qty    77346604160 HC PT THER PROC EA 15 MIN 3/4/2019 Bret Beltran, PT GP 1    67951674421 HC GAIT TRAINING EA 15 MIN 3/4/2019 Bret Beltran, PT GP 1          PT G-Codes  Outcome Measure Options: AM-PAC 6 Clicks Basic Mobility (PT)  AM-PAC 6 Clicks Score: 17  Score: 20  Modified [place] Scale: 3 - Moderate disability.  Requiring some help, but able to walk without assistance.    Bret Beltran, PT  3/4/2019

## 2019-03-04 NOTE — PROGRESS NOTES
Neurology       Patient Care Team:  Saba Arrington MD as PCP - General (Internal Medicine)    Chief complaint: Gait disturbance    History: Patient remains unsteady when his gait.  He has decreased balance and decreased stamina.    Placement rehab.      Past Medical History:   Diagnosis Date   • Arthritis    • BPH (benign prostatic hyperplasia)     [facility]   • Depression    • Gout    • Hearing loss     left ear   • Hyperlipidemia    • Hypertension    • Macular degeneration    • Spinal stenosis     cervical and lumbosacral spine   • Stroke (CMS/HCC)    • Stroke (CMS/HCC)    • Ulcerative colitis (CMS/HCC)        Vital Signs   Vitals:    03/03/19 1600 03/03/19 1934 03/04/19 0404 03/04/19 0931   BP: 138/80 146/82 145/87 139/85   BP Location: Left arm Left arm Left arm    Patient Position: Lying Lying Lying    Pulse: 57 63 52 75   Resp: 18 16 18    Temp: 97.6 °F (36.4 °C) 97.3 °F (36.3 °C) 97.7 °F (36.5 °C)    TempSrc: Oral Oral Oral    SpO2: 95% 96% 97%    Weight:       Height:           Physical Exam:   General: No distress              Neuro: Awake and alert awake and alert.    Poor standing balance.    Left facial paresthesias.    Mild left-sided weakness.            Results Review:  No new results  Results from last 7 days   Lab Units 02/28/19  0554   WBC 10*3/mm3 9.22   HEMOGLOBIN g/dL 15.1   HEMATOCRIT % 45.6   PLATELETS 10*3/mm3 179     Results from last 7 days   Lab Units 03/03/19  0709 02/28/19  0554 02/27/19  1909 02/27/19  1902   SODIUM mmol/L 139 140  --   --    POTASSIUM mmol/L 3.5 3.9  --   --    CHLORIDE mmol/L 105 107  --   --    CO2 mmol/L 29.0 25.0  --   --    BUN mg/dL 11 12  --   --    CREATININE mg/dL 0.65 0.66  --  0.70   CALCIUM mg/dL 8.9 9.0  --   --    ALT (SGPT) U/L  --   --  28  --    AST (SGOT) U/L  --   --  28  --    GLUCOSE mg/dL 100 150*  --   --        Imaging Results (last 24 hours)     ** No results found for the last 24 hours. **          Assessment:  Multiple acute  strokes.    Extensive bilateral old infarcts.    Gait disturbance secondary to both of the above.        Plan:  Patient is a significant fall risk.    Think that he needs inpatient rehabilitation.    Comment:  Progressing nicely.  Blood pressure is down         I discussed the patients findings and my recommendations with patient, family and nursing staff    Dave Arrington MD  03/04/19  5:14 PM

## 2019-03-04 NOTE — PROGRESS NOTES
Continued Stay Note  [facility]     Patient Name: Vargas De  MRN: [id number]  Today's Date: 3/4/2019    Admit Date: 2/27/2019    Discharge Plan     Row Name 03/04/19 1047       Plan    Plan Comments  [facility] has begun precert. CM to follow.        Discharge Codes    No documentation.       Expected Discharge Date and Time     Expected Discharge Date Expected Discharge Time    Mar 2, 2019             Maris Martell RN

## 2019-03-05 NOTE — DISCHARGE SUMMARY
[facility] Medicine Services  DISCHARGE SUMMARY    Patient Name: Vargas De  : 1940  MRN: [id number]    Date of Admission: 2019  Date of Discharge:  3/5/19  Primary Care Physician: Saba Arrington MD    Consults     Date and Time Order Name Status Description    2019 1217 Inpatient Cardiology Consult      2019 0030 Inpatient Neurology Consult Stroke Completed     2019 1853 Inpatient Neurology Consult Stroke Completed           Hospital Course     Presenting Problem:   TIA (transient ischemic attack) [G45.9]  Acute ischemic right MCA stroke (CMS/HCC) [I63.511]    Active Hospital Problems    Diagnosis  POA   • **CVA (cerebral vascular accident) (CMS/HCC) [I63.9]  Yes   • PFO (patent foramen ovale) [Q21.1]  Not Applicable   • Ulcerative colitis (CMS/HCC) [K51.90]  Yes   • Thyroid nodule [E04.1]  Yes   • Acute ischemic right MCA stroke (CMS/HCC) [I63.511]  Yes   • Spinal stenosis in cervical region [M48.02]  Yes   • GERD without esophagitis [K21.9]  Yes   • Mild dementia [F03.90]  Yes   • HLD (hyperlipidemia) [E78.5]  Yes   • Benign essential hypertension [I10]  Yes      Resolved Hospital Problems   No resolved problems to display.          Hospital Course:  Vargas De is a 78 y.o. male with past medical history significant for ulcerative colitis on chronic prednisone therapy, chronic pain with spinal stenosis of the cervical region, hyperlipidemia, hypertension, mild dementia who presents to [facility] ED with complaints of left-sided weakness and facial droop.  He was found to have acute CVA.  MRI found multiple chronic lacunar infarcts right basal ganglia and occipital lobes.     Stroke workup was completed and neurology, cardiology consulted this admission.  Echocardiogram did reveal EF 55% with PFO and mild aortic regurgitation.  Transcranial Doppler also positive study.  Cardiology following along and he has been placed on apixaban with low-dose  aspirin and high intensity statin this admission.  He will be continued on these at discharge.  He will need 30-day cardiac event monitor and will be followed by cardiology within 6 weeks for follow-up results.  Antihypertensives have been adjusted.  Lisinopril increased and Norvasc added.    Patient underwent speech language evaluation and had findings of dysphasia.  He will continue on a modified diet with mechanical soft and nectar thickened liquids.  He is still reportedly weak requiring assist in ongoing therapy services.  He will be transferred to [facility] today with ongoing need for PT/OT/speech language therapy.  We will continue home medications for ulcerative colitis as below and chronic pain.    Of note, thyroid nodule found on CT scan incidentally.  Follow-up was not recommended per radiology.    Discharge Follow Up Recommendations for labs/diagnostics:  Follow-up with primary care in 1 week post discharge from [facility]  Follow-up with cardiology: Dr. Maldonado in 6 weeks  Stroke follow-up, [facility] neurology at next available (1 month)  Continue to wear cardiac event monitor for the next 30 days    Day of Discharge     HPI:   Resting in bed.  No overnight complaints.  States he has a mild congested cough with upper airway drainage, no significant shortness of breath.  No fever or chills, nausea or vomiting.  Still complains of mild paresthesia to the right side and overall general weakness that is improving.  Still feels weak and requires assistance with ambulation    Review of Systems  Gen- No fevers, chills  CV- No chest pain, palpitations  Resp- + cough, dyspnea  GI- No N/V/D, abd pain      Otherwise ROS is negative except as mentioned in the HPI.    Vital Signs:   Temp:  [97.1 °F (36.2 °C)-98.1 °F (36.7 °C)] 97.4 °F (36.3 °C)  Heart Rate:  [55-70] 63  Resp:  [16-18] 16  BP: (115-151)/(72-89) 151/81     Physical Exam:  Constitutional: No acute distress, awake, alert  HENT: NCAT,  mucous membranes moist- mild left facial droop  Respiratory: Clear to auscultation bilaterally, respiratory effort normal   Cardiovascular: RRR, no murmurs, rubs, or gallops, palpable pedal pulses bilaterally  Gastrointestinal: Positive bowel sounds, soft, nontender, nondistended  Musculoskeletal: No bilateral ankle edema  Psychiatric: Appropriate affect, cooperative  Neurologic: Oriented x 3, strength symmetric in all extremities, Cranial Nerves grossly intact to confrontation, speech clear  Skin: No rashes      Pertinent  and/or Most Recent Results     Results from last 7 days   Lab Units 03/03/19  0709 02/28/19  0554 02/27/19 1909 02/27/19 1902   WBC 10*3/mm3  --  9.22 6.25  --    HEMOGLOBIN g/dL  --  15.1 15.9  --    HEMOGLOBIN, POC g/dL  --   --   --  16.3   HEMATOCRIT %  --  45.6 48.0  --    HEMATOCRIT POC %  --   --   --  48   PLATELETS 10*3/mm3  --  179 166  --    SODIUM mmol/L 139 140  --   --    POTASSIUM mmol/L 3.5 3.9  --   --    CHLORIDE mmol/L 105 107  --   --    CO2 mmol/L 29.0 25.0  --   --    BUN mg/dL 11 12  --   --    CREATININE mg/dL 0.65 0.66  --  0.70   GLUCOSE mg/dL 100 150*  --   --    CALCIUM mg/dL 8.9 9.0  --   --      Results from last 7 days   Lab Units 02/27/19 1909 02/27/19 1908 02/27/19 1901   ALT (SGPT) U/L 28  --   --    AST (SGOT) U/L 28  --   --    PROTIME seconds  --   --  12.2*   INR   --   --  1.0   APTT seconds  --  25.0  --      Results from last 7 days   Lab Units 02/28/19  0554   CHOLESTEROL mg/dL 185   TRIGLYCERIDES mg/dL 48   HDL CHOL mg/dL 52     Results from last 7 days   Lab Units 02/28/19 0554 02/27/19  1910   TSH mIU/mL 0.219*  --    HEMOGLOBIN A1C % 5.50  --    TROPONIN I ng/mL  --  0.00       Brief Urine Lab Results  (Last result in the past 365 days)      Color   Clarity   Blood   Leuk Est   Nitrite   Protein   CREAT   Urine HCG        02/27/19 1947 Yellow Clear Negative Negative Negative Negative               Microbiology Results Abnormal     None           Imaging Results (all)     Procedure Component Value Units Date/Time    Fiberoptic Endo (fees) [[id number]] Resulted:  03/03/19 1350     Updated:  03/03/19 1350    Narrative:       This procedure was auto-finalized with no dictation required.    Fiberoptic Endo (fees) [[id number]] Resulted:  02/28/19 1715     Updated:  02/28/19 1715    Narrative:       This procedure was auto-finalized with no dictation required.    CT Head Without Contrast Stroke Protocol [[id number]] Collected:  02/27/19 1918     Updated:  02/28/19 0805    Narrative:       EXAMINATION: CT HEAD WO CONTRAST - 2/27/2019     INDICATION: Evaluate for stroke.     TECHNIQUE:  Axial CT data of the head were acquired helically without  contrast. Multiplanar reformatted images were generated and reviewed.  The radiation dose reduction device was turned on for each scan per the  ALARA (As Low as Reasonably Achievable) protocol.     COMPARISONS:  12/5/2016     FINDINGS: No parenchymal or extra-axial hemorrhage. Old infarcts in the  basal ganglia and the thalami noted. Old infarct in the right frontal  lobe noted. No calvarial destructive lesion or fracture. Imaged portions  of the paranasal sinuses are clear. Intracranial atherosclerosis noted.       Impression:       No evidence of acute hemorrhage.     Scan time 6:50 PM, report time in person 6:52 PM on 2/27/2019.     DICTATED:   2/27/2019  EDITED/ls :   2/27/2019       This report was finalized on 2/28/2019 8:02 AM by Taj Mcclain.       MRI Brain Without Contrast [[id number]] Collected:  02/27/19 2115     Updated:  02/27/19 2153    Narrative:       EXAM:    MR Head Without Contrast     EXAM DATE/TIME:    2/27/2019 9:15 PM     CLINICAL HISTORY:    78 years old, male; Cerebral infarction due to unspecified occlusion or   stenosis of right middle cerebral artery; Signs and symptoms; Speech   disturbance; Patient HX: H/o stroke, dementia and unsteady gait since his   strokes. PT had left sided facial drooping  at 1745 and was not speaking normal.   No HX of cx or SX to brain. ; Additional info: Neuro deficit(s), subacute     TECHNIQUE:    MR of the head without contrast.     COMPARISON:    CT HEAD WO CONTRAST STROKE PROTOCOL 2/27/2019 6:49 PM     FINDINGS:    Brain: Late acute/early subacute lacunar infarcts involving the right basal   ganglia and parasagittal right occipital lobe. Multiple chronic lacunar   infarcts involving the bilateral basal ganglia and bilateral thalami as well as   the bilateral cerebral hemispheres. Age-related involutional changes and   chronic microvascular ischemic disease. No midline shift. No extra-axial   collection. No acute intracranial hemorrhage. Basal cisterns are patent. Small   bifrontal chronic subdural hematomas.    Ventricles: Normal. No ventriculomegaly.    Bones/joints: Unremarkable.    Soft tissues: Normal.    Sinuses: Normal as visualized. No acute sinusitis.    Mastoid air cells: Normal as visualized. No mastoid effusion.    Orbits:  Bilateral cataract surgery.       Impression:       Late acute/early subacute lacunar infarcts involving the right basal ganglia   and parasagittal right occipital lobe. No evidence of acute hemorrhage.     THIS REPORT CONTAINS FINDINGS THAT MAY BE CRITICAL TO PATIENT CARE. The   findings were verbally communicated via telephone conference with GRAYSON BARAJAS at 9:51 PM EST on 2/27/2019. The findings were acknowledged and understood.      THIS DOCUMENT HAS BEEN ELECTRONICALLY SIGNED BY LORIE BERGER MD    XR Chest 1 View [[id number]] Collected:  02/27/19 1953     Updated:  02/27/19 2003    Narrative:       EXAM:    XR Chest, 1 View     EXAM DATE/TIME:    2/27/2019 7:53 PM     CLINICAL HISTORY:    78 years old, male; Pain; Other: Stroke; Additional info: Acute stroke   protocol (onset < 12 hrs)     TECHNIQUE:    XR of the chest, 1 view.     COMPARISON:    CR XR CHEST PA AND LATERAL 2/4/2019 3:02 PM     FINDINGS:    Lungs:  Clear lungs.    Pleural  space:  No pneumothorax. No sizable pleural effusion.    Heart/Mediastinum:  No cardiomegaly.    Bones/joints: Unremarkable.       Impression:       Clear lungs.    THIS DOCUMENT HAS BEEN ELECTRONICALLY SIGNED BY LORIE BERGER MD    CT Angiogram Head With & Without Contrast [[id number]] Collected:  02/27/19 1901     Updated:  02/27/19 1959    Narrative:       EXAM:    CT Angiography Head With Contrast     EXAM DATE/TIME:    2/27/2019 7:01 PM     CLINICAL HISTORY:    78 years old, male; Signs and symptoms; Speech disturbance and weakness;   Slurred speech; Additional info: Left sided facial droop, slurred speech, code   stroke     TECHNIQUE:    Axial computed tomographic angiography images of the head with intravenous   contrast using CT angiography protocol.    All CT scans at this facility use at least one of these dose optimization   techniques: automated exposure control; mA and/or kV adjustment per patient   size (includes targeted exams where dose is matched to clinical indication); or   iterative reconstruction.    Coronal and sagittal reformatted images were created and reviewed.    MIP reconstructed images were created and reviewed.     CONTRAST:    Contrast Material: 75 ml of Isovue 370; Contrast Route: Exisiting IV     COMPARISON:    CT HEAD WO CONTRAST 12/5/2016 9:25 AM     FINDINGS:      Right internal carotid artery: Intracranial segment is patent with no   significant stenosis. No aneurysm.    Right anterior cerebral artery: No occlusion or significant stenosis. No   aneurysm.     Right middle cerebral artery: No occlusion or significant stenosis. No   aneurysm.     Right posterior cerebral artery: No occlusion or significant stenosis. No   aneurysm.     Right vertebral artery: No occlusion or significant stenosis. No aneurysm.       Left internal carotid artery: Intracranial segment is patent with no   significant stenosis. No aneurysm.    Left anterior cerebral artery: No occlusion or significant  stenosis. No   aneurysm.     Left middle cerebral artery: No occlusion or significant stenosis. No   aneurysm.     Left posterior cerebral artery: No occlusion or significant stenosis. No   aneurysm.     Left vertebral artery: No occlusion or significant stenosis. No aneurysm.       Basilar artery: No occlusion or significant stenosis. No aneurysm.       Impression:       No significant stenosis or aneurysm. No evidence of acute dissection.    THIS DOCUMENT HAS BEEN ELECTRONICALLY SIGNED BY LORIE BERGER MD    CT Angiogram Neck With & Without Contrast [[id number]] Collected:  02/27/19 1901     Updated:  02/27/19 1957    Narrative:       EXAM:    CT Angiography Neck Without And With Contrast     EXAM DATE/TIME:    2/27/2019 7:01 PM     CLINICAL HISTORY:    78 years old, male; Signs and symptoms; Speech disturbance and weakness;   Slurred speech; Additional info: Left sided facial droop, slurred speech, code   stroke     TECHNIQUE:    Axial computed tomographic angiography images of the neck without and with   intravenous contrast using CT angiography protocol.    All CT scans at this facility use at least one of these dose optimization   techniques: automated exposure control; mA and/or kV adjustment per patient   size (includes targeted exams where dose is matched to clinical indication); or   iterative reconstruction.    Coronal and sagittal reformatted images were created and reviewed.    MIP reconstructed images were created and reviewed.     CONTRAST:    Contrast Material: 75 ml of Isovue 370; Contrast Route: Exisiting IV     COMPARISON:    No relevant prior studies available.     FINDINGS:     VASCULATURE:    Right common carotid artery: No significant stenosis. No dissection or   occlusion.    Right internal carotid artery: Extracranial segment is patent with no   significant stenosis. No dissection or occlusion.    Right external carotid artery: No occlusion or significant stenosis.    Right vertebral artery: No  significant stenosis. No dissection or occlusion.      Left common carotid artery: No significant stenosis. No dissection or   occlusion.    Left internal carotid artery: Extracranial segment is patent with no   significant stenosis. No dissection or occlusion.    Left external carotid artery: No occlusion or significant stenosis.    Left vertebral artery: No significant stenosis. No dissection or occlusion.     NECK:    Thyroid:  Right thyroid 1 cm hypodense nodule.    Bones/joints: No acute fracture.       Impression:       1. No significant stenosis or aneurysm. No evidence of acute dissection.   2. Right thyroid 1 cm hypodense nodule. No followup is needed.     COMMENT:    Consistent with the American College of [employer] Incidental Findings   Committee Report (J Am Wayne Radiol 2015): Unless the patient has clinical risk   factors for thyroid cancer or has suspicious findings characterized in this   report, for patients under 35 years old any thyroid nodule less than 1.0 cm,   and for patients at least 35 years old any thyroid nodule less than 1.5 cm is   highly likely to be benign and does not require follow-up imaging or biopsy.   Patients with limited life expectancy and/or comorbidities do not require   follow up imaging or biopsy for nodules of any size.      Reference per NASCET criteria for degree of stenosis: Mild: less than 50%   stenosis. Moderate: 50-69% stenosis. Severe: 70-94% stenosis. Near occlusion:   95-99% stenosis.    THIS DOCUMENT HAS BEEN ELECTRONICALLY SIGNED BY LORIE BERGER MD    CT Cerebral Perfusion With & Without Contrast [[id number]] Collected:  02/27/19 1901     Updated:  02/27/19 1937    Narrative:       EXAM:    CT Cerebral Perfusion With Contrast     EXAM DATE/TIME:    2/27/2019 7:01 PM     CLINICAL HISTORY:    78 years old, male; Signs and symptoms; Speech disturbance; Aphasia;   Additional info: TIA, initial screening     TECHNIQUE:    Axial computed tomography images of the brain  with intravenous contrast using   cerebral perfusion protocol. Post-processing parametric maps were created and   reviewed. These include cerebral blood flow, cerebral blood volume and mean   transit time.    All CT scans at this facility use at least one of these dose optimization   techniques: automated exposure control; mA and/or kV adjustment per patient   size (includes targeted exams where dose is matched to clinical indication); or   iterative reconstruction.    MIP reconstructed images were created and reviewed.     CONTRAST:    Contrast Material: 115 ml of isovue 370; Contrast Route: IV     COMPARISON:    CT HEAD WO CONTRAST 12/5/2016 9:25 AM     FINDINGS:    Cerebral Blood Flow: Unremarkable. No hypoperfusion.    Cerebral Blood Volume: Unremarkable. No hypoperfusion.    Mean Transit Time: Unremarkable. No delayed perfusion.         Impression:       No acute infarct or ischemia.     THIS DOCUMENT HAS BEEN ELECTRONICALLY SIGNED BY LORIE BERGER MD          Results for orders placed during the hospital encounter of 02/27/19   Duplex Venous Lower Extremity - Bilateral CAR    Narrative · Normal bilateral lower extremity venous duplex scan.          Results for orders placed during the hospital encounter of 02/27/19   Duplex Venous Lower Extremity - Bilateral CAR    Narrative · Normal bilateral lower extremity venous duplex scan.          Results for orders placed during the hospital encounter of 02/27/19   Adult Transesophageal Echo (REJI) W/ Cont if Necessary Per Protocol    Narrative · Left ventricular systolic function is normal. Estimated EF appears to be   in the range of 56 - 60%.  · Left atrial cavity size is moderately dilated. The left atrial appendage   was visualized through multiple planes. Left atrial appendage was found to   be multilobar in nature. Doppler interrogation shows normal flow within   the left atrial appendage. No evidence of a left atrial appendage thrombus   was present  · Patent  foramen ovale present. Saline test results are positive for right   to left atrial level shunt  · Mild to moderate aortic valve regurgitation is present  · There is mild (grade 2) plaque in the aortic arch present. There is mild   (grade 2) plaque in the descending aorta present.            Discharge Details        Discharge Medications      New Medications      Instructions Start Date   amLODIPine 2.5 MG tablet  Commonly known as:  NORVASC   2.5 mg, Oral, Every 24 Hours Scheduled      apixaban 5 MG tablet tablet  Commonly known as:  ELIQUIS   5 mg, Oral, Every 12 Hours Scheduled      atorvastatin 80 MG tablet  Commonly known as:  LIPITOR   80 mg, Oral, Nightly      DULoxetine 20 MG capsule  Commonly known as:  CYMBALTA   20 mg, Oral, Daily      melatonin 5 MG tablet tablet   5 mg, Oral, Nightly      temazepam 7.5 MG capsule  Commonly known as:  RESTORIL   7.5 mg, Oral, Nightly PRN         Changes to Medications      Instructions Start Date   acetaminophen 325 MG tablet  Commonly known as:  TYLENOL  What changed:    · medication strength  · how much to take  · when to take this  · reasons to take this   650 mg, Oral, Every 4 Hours PRN      lisinopril 40 MG tablet  Commonly known as:  PRINIVILZESTRIL  What changed:    · medication strength  · how much to take  · when to take this   40 mg, Oral, Every 24 Hours Scheduled         Continue These Medications      Instructions Start Date   APRISO 0.375 g 24 hr capsule  Generic drug:  mesalamine   375 mg, Oral, 2 Times Daily      aspirin 81 MG EC tablet   81 mg, Oral, Daily      bisoprolol 5 MG tablet  Commonly known as:  ZEBeta   bisoprolol fumarate 5 mg tablet nightly      MELENDEZ PO   Oral      dicyclomine 20 MG tablet  Commonly known as:  BENTYL   TAKE 1 TABLET 1-2 TIMES A DAY AS NEEDED      fluticasone 50 MCG/ACT nasal spray  Commonly known as:  FLONASE   2 sprays, Nasal, Daily      gabapentin 300 MG capsule  Commonly known as:  NEURONTIN   300 mg, Oral, Nightly       loratadine 10 MG tablet  Commonly known as:  CLARITIN   10 mg, Oral, As Needed      memantine 28 MG capsule sustained-release 24 hr extended release capsule  Commonly known as:  NAMENDA XR   28 mg, Oral, Daily      omeprazole 20 MG capsule  Commonly known as:  priLOSEC   20 mg, Oral, 2 Times Daily      predniSONE 5 MG tablet  Commonly known as:  DELTASONE   5 mg, 2 Times Daily      PRESERVISION AREDS PO   Oral, 2 Times Daily      vitamin B-12 500 MCG tablet  Commonly known as:  CYANOCOBALAMIN   500 mcg, Oral, Daily      VITAMIN B6 PO   Oral      vitamin D3 5000 units capsule capsule   5,000 Units, Oral, Daily      Zinc 50 MG capsule   50 mg, Oral, Daily         Stop These Medications    doxylamine 25 MG tablet  Commonly known as:  UNISOM     potassium gluconate 595 (99 K) MG tablet tablet     pravastatin 40 MG tablet  Commonly known as:  PRAVACHOL     sertraline 50 MG tablet  Commonly known as:  ZOLOFT            Allergies   Allergen Reactions   • Donepezil Other (See Comments)     nightmares   • Iodine    • Sulfa Antibiotics          Discharge Disposition:  Rehab Facility or Unit (DC - External)    Discharge Diet:  Diet Order   Procedures   • Diet Dysphagia; IV - Mechanical Soft No Mixed Consistencies; Nectar / Syrup Thick; Cardiac         Discharge Activity:   Activity Instructions     Activity as Tolerated              CODE STATUS:    Code Status and Medical Interventions:   Ordered at: 02/27/19 3318     Code Status:    CPR     Medical Interventions (Level of Support Prior to Arrest):    Full         Future Appointments   Date Time Provider Department Center   4/16/2019  2:15 PM Rafaela Gutierrez MD [facility] None       Additional Instructions for the Follow-ups that You Need to Schedule     Discharge Follow-up with PCP   As directed       Currently Documented PCP:    Saba Arrington MD    PCP Phone Number:    [phone]     Follow Up Details:  1 week post discharge [facility]         Discharge Follow-up with  Specified Provider: Neurology- stroke follow up [facility]- next available; 1 Month   As directed      To:  Neurology- stroke follow up [facility]- next available    Follow Up:  1 Month         Discharge Follow-up with Specified Provider: cardiologySuleiman Maldonado; 6 Weeks   As directed      To:  cardiologySuleiman Maldonado    Follow Up:  6 Weeks               Time Spent on Discharge:  40 minutes    Electronically signed by ANA MARÍA Fong, 03/05/19, 11:17 AM.

## 2019-03-05 NOTE — PROGRESS NOTES
Continued Stay Note  [facility]     Patient Name: Vargas De  MRN: [id number]  Today's Date: 3/5/2019    Admit Date: 2/27/2019    Discharge Plan     Row Name 03/05/19 0903       Plan    Plan Comments  Pt has been accepted to [facility] for today 3/5/19. Family to transport. Report to be called to [facility]9189.    Final Discharge Disposition Code  62 - inpatient rehab facility    Final Note  [facility]; report to be called to [facility]-[id number]        Discharge Codes    No documentation.       Expected Discharge Date and Time     Expected Discharge Date Expected Discharge Time    Mar 2, 2019             Maris Martell RN

## 2019-03-05 NOTE — PROGRESS NOTES
Continued Stay Note  [facility]     Patient Name: Vargas De  MRN: [id number]  Today's Date: 3/5/2019    Admit Date: 2/27/2019    Discharge Plan     Row Name 03/05/19 0903       Plan    Plan Comments  Pt has been accepted to [facility] Unit for today 3/5/19. Family to transport. Report to be called to [facility]2376.    Final Discharge Disposition Code  62 - inpatient rehab facility    Final Note  [facility]; report to be called to [facility]-[id number]        Discharge Codes    No documentation.       Expected Discharge Date and Time     Expected Discharge Date Expected Discharge Time    Mar 2, 2019             Maris Martell RN

## 2019-03-05 NOTE — PROGRESS NOTES
Case Management Discharge Note    Final Note: [facility]; report to be called to [phone]    Destination - Selection Complete      Service Provider Request Status Selected Services Address Phone Number Fax Number    [facility] Selected Inpatient Rehabilitation [address] [id number] [phone]      Durable Medical Equipment      No service has been selected for the patient.      Dialysis/Infusion      No service has been selected for the patient.      Home Medical Care      No service has been selected for the patient.      Community Resources      No service has been selected for the patient.             Final Discharge Disposition Code: 62 - inpatient rehab facility

## 2019-03-12 NOTE — TELEPHONE ENCOUNTER
Patient discharged from [facility] to [facility] 3/5/19.  I called [facility] and confirmed pt is still inpatient as of 3/12/19. TCM not applicable until DC from [facility].  Please notify Patient Care Coordinators when pt DC's from [facility].

## 2019-03-20 PROBLEM — I69.321: Status: ACTIVE | Noted: 2019-01-01

## 2019-03-20 PROBLEM — I69.30 HISTORY OF STROKE WITH RESIDUAL DEFICIT: Status: ACTIVE | Noted: 2019-01-01

## 2019-03-20 PROBLEM — IMO0002 BODY MASS INDEX (BMI) OF 25.0 TO 29.9: Status: ACTIVE | Noted: 2019-01-01

## 2019-03-22 NOTE — TELEPHONE ENCOUNTER
Pt was DC'd from [facility] today. Please do TCM call, appt is scheduled for 4/2/19.

## 2019-03-22 NOTE — OUTREACH NOTE
Prep Survey      Responses   Facility patient discharged from?  [facility]   Is patient eligible?  Yes   Discharge diagnosis  CVA, PFO, Ulcerative colitis, mild dementia, HTN   Does the patient have one of the following disease processes/diagnoses(primary or secondary)?  Stroke (TIA)   Does the patient have Home health ordered?  Yes   What is the Home health agency?   no agency specified   Is there a DME ordered?  No   General alerts for this patient  DC sub-acute care 3/22/19   Prep survey completed?  Yes          Laurie Mancilla RN

## 2019-03-22 NOTE — TELEPHONE ENCOUNTER
CALLED LIYAH THAT YOU ARE LISTED AS HIS PRIMARY PROVIDER SHE SEE THAT HE HAS AN APPT HER ON 4/2/19 AT 9:30   SO SHE NEEDS TO KNOW THAT IN FACT YOU WILL BE FOLLOWING HIS HH ORDERS

## 2019-03-25 NOTE — TELEPHONE ENCOUNTER
NEEDS VERBAL ON OT SERVICES  WHICH I GAVE TO HER    ALSO NEEDS   WRITTEN N ORDERS TO BE FAXED OVER TO THEM FOR A ROLLATOR   PREFERABLY ONE WITH STORAGE UNDER THE SEAT     FAX # [phone]

## 2019-03-25 NOTE — OUTREACH NOTE
TCM call completed and has upcoming appointment with Dr. Arrington 3/22/19 for hospital follow up.   Talked with daughter and she states he is doing very well.  Speech and left sided facial drooping have both improved.  Able to ambulate at home without help.  Does have walker in case needed at home, but has not had to use. Did have some blood in stool at [facility], however they monitored this, along with blood counts and was determined it was from internal hemorrhoid and not his ulcerative colitis and knows to call Dr. Shen for any further problems.   [facility] has already been in contact and is to start this week.  She is aware of all of his upcoming appt., PCP, neurology and cardiology appointments.  She has no questions or concerns at present.

## 2019-03-25 NOTE — OUTREACH NOTE
Stroke Week 4 Survey      Responses   Facility patient discharged from?  [facility]   Does the patient have one of the following disease processes/diagnoses(primary or secondary)?  Stroke (TIA)   Week 4 attempt successful?  Yes   Call start time  1442   Call end time  1452   Discharge diagnosis  CVA, PFO, Ulcerative colitis, mild dementia, HTN   List who call center can speak with  Denita, daughter   Person spoke with today (if not patient) and relationship  D/C Friday from [facility]   Meds reviewed with patient/caregiver?  Yes   Is the patient taking all medications as directed (includes completed medication regime)?  Yes   Has the patient kept scheduled appointments due by today?  N/A [Appts are scheduled.  Patient was discharged from [facility]]   Comments  April FLOR Arrington 04/02   Cardiology and neurologist in April   Is the patient still receiving Home Health Services?  Yes   Psychosocial issues?  No   Does the patient require any assistance with activities of daily living such as eating, bathing, dressing, walking, etc.?  Yes   ADL comments  Requires help with bathing. Ambulates with walker sometimes   Does the patient have any residual symptoms from stroke/TIA?  Yes   Residual symptoms comments  Speech improving.   Does the patient understand the diet ordered at discharge?  Yes   What is the patient's perception of their health status since discharge?  Improving   Nursing interventions  Nurse provided patient education   Is the patient able to teach back FAST for Stroke?  Yes   Is the patient/caregiver able to teach back the risk factors for a stroke?  High blood pressure-goal below 120/80, Smoking, Diabetes, High Cholesterol, Physical inactivity and obesity, Carotid or other artery disease, History of TIAs, History of Afib, Sleep apnea   Is the patient/caregiver able to teach back signs and symptoms related to disease process for when to call PCP?  Yes   Is the patient/caregiver able to teach back  signs and symptoms related to disease process for when to call 911?  Yes   Is the patient/caregiver able to teach back the hierarchy of who to call/visit for symptoms/problems? PCP, Specialist, Home health nurse, Urgent Care, ED, 911  Yes   Additional teach back comments  B/P is under control  110/70   Week 4 Call Completed?  Yes   Would the patient like one additional call?  No   Graduated  Yes          Thea No RN

## 2019-04-02 PROBLEM — I63.81 MULTIPLE LACUNAR INFARCTS (HCC): Status: ACTIVE | Noted: 2019-01-01

## 2019-04-02 PROBLEM — E53.1 VITAMIN B6 DEFICIENCY: Status: ACTIVE | Noted: 2019-01-01

## 2019-04-02 PROBLEM — Z86.73 HISTORY OF STROKE: Status: RESOLVED | Noted: 2018-07-23 | Resolved: 2019-01-01

## 2019-04-02 PROBLEM — G45.9 TIA (TRANSIENT ISCHEMIC ATTACK): Status: RESOLVED | Noted: 2019-01-01 | Resolved: 2019-01-01

## 2019-04-02 PROBLEM — M71.121 SEPTIC BURSITIS OF ELBOW, RIGHT: Status: RESOLVED | Noted: 2018-07-23 | Resolved: 2019-01-01

## 2019-04-02 PROBLEM — I63.9 FACIAL DROOP DUE TO ACUTE STROKE (HCC): Status: ACTIVE | Noted: 2019-01-01

## 2019-04-02 PROBLEM — R47.89 WORD FINDING DIFFICULTY: Status: ACTIVE | Noted: 2019-01-01

## 2019-04-02 PROBLEM — R29.810 FACIAL DROOP DUE TO ACUTE STROKE (HCC): Status: ACTIVE | Noted: 2019-01-01

## 2019-04-02 PROBLEM — G89.29 CHRONIC RIGHT-SIDED LOW BACK PAIN WITHOUT SCIATICA: Chronic | Status: ACTIVE | Noted: 2019-01-01

## 2019-04-02 PROBLEM — R53.1 ACUTE LEFT-SIDED WEAKNESS: Status: ACTIVE | Noted: 2019-01-01

## 2019-04-02 PROBLEM — E78.2 MIXED HYPERLIPIDEMIA: Chronic | Status: ACTIVE | Noted: 2019-01-01

## 2019-04-02 PROBLEM — M25.529 ELBOW PAIN: Status: RESOLVED | Noted: 2018-07-25 | Resolved: 2019-01-01

## 2019-04-02 PROBLEM — M54.50 CHRONIC RIGHT-SIDED LOW BACK PAIN WITHOUT SCIATICA: Chronic | Status: ACTIVE | Noted: 2019-01-01

## 2019-04-02 PROBLEM — R21 RASH: Status: RESOLVED | Noted: 2019-01-01 | Resolved: 2019-01-01

## 2019-04-02 NOTE — PROGRESS NOTES
Transitional Care Follow Up Visit  Subjective     Vargas De is a 78 y.o. male who presents for a transitional care management visit.    Within 48 business hours after discharge our office contacted him via telephone to coordinate his care and needs.      I reviewed and discussed the details of that call along with the discharge summary, hospital problems, inpatient lab results, inpatient diagnostic studies, and consultation reports with Vargas.     Current outpatient and discharge medications have been reconciled for the patient.    Date of TCM Phone Call 3/25/2019   Hospital [facility]   Date of Discharge 3/22/2019   Discharge Disposition Home-[facility]     Risk for Readmission (LACE) No Data Recorded    History of Present Illness   Course During Hospital Stay:  Admitted to [facility] 2/27/19 with dysarthria and left facial droop.  He also had troubleThinking of the right words to say.  He had trouble swallowing his food.  At the ER they found him to have a right middle cerebral artery stroke.  His blood pressure medications were adjusted to improve blood pressure control.  Amlodipine was added and dose of lisinopril was increased.  He was started on Eliquis.  His statin was changed to atorvastatin 80 mg every evening.  He failed the swallowing test after admission.  He was put on thick liquids.    He was discharged to [facility] on 3/5/2019 for rehabilitation.  While at [facility] he participated in physical therapy and occupational therapy and speech therapy.  He did have an episode of blood in the stool While at [facility].  It was bright red.  It was determined to be probably hemorrhoidal instead of due to colitis.    The swallowing test was repeated couple weeks later and symptoms had resolved.  He does not complain of any trouble swallowing    Home Health doing PT and OT and will start start speech therapy this week.  He still has some difficulty getting out the words he  wants to say.  There is some hesitancy with his speech.  No dysarthria.    He still feels some general weakness and some particular weakness on the right side.  He still feels that his balance is off although he is walking without assistance.  He feels that physical therapy and occupational therapy are helping him.    Appetite not good but he eats 3 meals a day  And daughter says he eats well.    He notices irregular heartbeat, but not rapid. Has Holter monitor on now to rule out atrial fibrillation.    BP now running well- 110's/ 70's. Lowest BP was 97 systolic, only once.  He denies feeling dizzy when he stands up.    His right  back stiffness and soreness is constant.  No radiation of pain to the legs.         Review of Systems   Constitutional: Positive for appetite change. Negative for chills, fatigue and fever.   HENT: Negative for congestion, ear pain, sinus pressure, sore throat and trouble swallowing.    Eyes: Negative for visual disturbance.   Respiratory: Negative for cough, chest tightness, shortness of breath and wheezing.    Cardiovascular: Positive for palpitations. Negative for chest pain and leg swelling.   Gastrointestinal: Negative for abdominal pain, blood in stool, constipation, diarrhea and nausea.   Endocrine: Negative for cold intolerance and heat intolerance.   Genitourinary: Negative for dysuria and frequency.   Musculoskeletal: Positive for back pain and gait problem. Negative for arthralgias.   Skin: Negative for color change.   Allergic/Immunologic: Negative for food allergies.   Neurological: Positive for speech difficulty and weakness. Negative for dizziness, light-headedness, numbness and headaches.   Hematological: Negative for adenopathy. Does not bruise/bleed easily.   Psychiatric/Behavioral: Negative for confusion, dysphoric mood and sleep disturbance. The patient is not nervous/anxious.        Objective   Physical Exam   Constitutional: He is oriented to person, place, and time.  He appears well-developed and well-nourished.   HENT:   Head: Normocephalic.   Eyes: Conjunctivae and EOM are normal. Pupils are equal, round, and reactive to light.   Neck: Normal range of motion. Neck supple. No thyromegaly present.   Cardiovascular: Normal rate, regular rhythm, normal heart sounds and intact distal pulses.   Pulmonary/Chest: Effort normal and breath sounds normal. He has no wheezes.   Musculoskeletal: Normal range of motion. He exhibits no edema.   Lymphadenopathy:     He has no cervical adenopathy.   Neurological: He is alert and oriented to person, place, and time. No cranial nerve deficit. Gait abnormal.   General weakness.  Right leg is mildly weaker than the left one.  Slight droop of the left side of the mouth.    His speech is hesitant and sometimes he searches for the right word.  He is coherent and not confused.    Mild short-term memory loss   Skin: Skin is warm and dry.   Psychiatric: He has a normal mood and affect. His behavior is normal. Thought content normal. His speech is delayed. He does not express impulsivity or inappropriate judgment. He exhibits abnormal recent memory.   Nursing note and vitals reviewed.      Procedures     Assessment/Plan   Problems Addressed this Visit        Cardiovascular and Mediastinum    Mixed hyperlipidemia (Chronic)    Relevant Medications    atorvastatin (LIPITOR) 80 MG tablet    Benign essential hypertension    Relevant Medications    lisinopril (PRINIVIL,ZESTRIL) 20 MG tablet    amLODIPine (NORVASC) 2.5 MG tablet    Other Relevant Orders    CBC & Differential    Comprehensive Metabolic Panel    Cerebrovascular small vessel disease    Acute ischemic right MCA stroke (CMS/HCC) - Primary    Relevant Medications    apixaban (ELIQUIS) 5 MG tablet tablet    PFO (patent foramen ovale)    Relevant Medications    amLODIPine (NORVASC) 2.5 MG tablet    Facial droop due to acute stroke (CMS/HCC)       Digestive    Vitamin D deficiency    Relevant Orders     Vitamin D 25 Hydroxy    Folate    Vitamin B6 deficiency    Relevant Orders    Vitamin B6    Vitamin B12       Nervous and Auditory    Chronic right-sided low back pain without sciatica (Chronic)    Major neurocognitive disorder, due to vascular disease, without behavioral disturbance, mild    Acute left-sided weakness    Multiple lacunar infarcts       Other    Poor balance    Major depressive disorder with single episode, in partial remission (CMS/HCC)    Shuffling gait    Word finding difficulty          Acute middle cerebral artery stroke with left facial droop and left-sided weakness, dysarthria, trouble with word finding, poor balance.  He will continue physical therapy and occupational therapy with home health.  Speech therapy is to start this week.  Continue Eliquis twice a day.  Continue aspirin daily.  Continue atorvastatin 80 mg every evening.  Continue good blood pressure control.    If the blood pressures start running lower causing dizziness upon standing, let me know.  Continue to monitor the blood pressure at home.  Continue to avoid salt in the diet.  Try to do some exercises every day.    For memory problems associated with cerebrovascular disease, continue taking memantine teen twice a day.  Continue reading and doing word puzzles.  Physical activity also improves memory.    For hyperlipidemia, continue atorvastatin.  Continue low-fat diet.    He will follow-up with Dr. Maldonado after the Holter monitor is finished.    For right-sided low back pain, try to do some back stretches every day.  Walk around the house multiple times throughout the day.  Moist heat to relax tight muscles.    For depression symptoms, continue taking duloxetine daily.  Physical activity also helps.

## 2019-04-03 NOTE — PATIENT INSTRUCTIONS
Acute middle cerebral artery stroke with left facial droop and left-sided weakness, dysarthria, trouble with word finding, poor balance.  He will continue physical therapy and occupational therapy with home health.  Speech therapy is to start this week.  Continue Eliquis twice a day.  Continue aspirin daily.  Continue atorvastatin 80 mg every evening.  Continue good blood pressure control.    If the blood pressures start running lower causing dizziness upon standing, let me know.  Continue to monitor the blood pressure at home.  Continue to avoid salt in the diet.  Try to do some exercises every day.    For memory problems associated with cerebrovascular disease, continue taking memantine teen twice a day.  Continue reading and doing word puzzles.  Physical activity also improves memory.    For hyperlipidemia, continue atorvastatin.  Continue low-fat diet.    He will follow-up with Dr. Maldonado after the Holter monitor is finished.    For right-sided low back pain, try to do some back stretches every day.  Walk around the house multiple times throughout the day.  Moist heat to relax tight muscles.    For depression symptoms, continue taking duloxetine daily.  Physical activity also helps.

## 2019-04-16 PROBLEM — I69.30 SEQUELAE, POST-STROKE: Status: ACTIVE | Noted: 2019-01-01

## 2019-04-16 NOTE — PROGRESS NOTES
Subjective:    CC: Vargas De is seen today  for Dementia       HPI:  Current visit- patient started having symptoms of left facial drooping with speech problems and left arm weakness around 2/27.  He was brought to the hospital where he had an MRI brain that showed embolic infarcts in the right parietal and occipital region.  After that he had a carotid Doppler that did not show any significant stenosis and an echocardiogram that showed a normal EF with moderate left atrial dilatation, PFO and aortic valve regurgitation as well as a grade 2 plaque in the aortic arch.  TCD confirmed the PFO.  Patient was started on Eliquis 5 mg twice daily in addition to aspirin 81 mg daily.  His blood pressure medications were also modified.  He was also started on atorvastatin 80 mg.  Lipid panel was as follows-, TG 48, , HDL 52.  He underwent speech and physical therapy after discharge and is now back to baseline.  He also had a Holter monitor after discharge which showed atrial fibrillation.  With regards to his memory he feels that his symptoms are stable.  Continues to be on Namenda 28 mg daily.  Did not restart Aricept as he was afraid of the side effects.  Of note-I personally reviewed his MRI brain, Dr. Arrington's notes and his ER discharge summary    Last visit-since his last visit since his last visit he denies having any major problems with his memory however he does admit to stuttering where he is unable to say a word despite knowing what to say.  As per the daughter he does this while conversing in English or Romanian.  He also misplaces things around the house but can carry out all his activities of daily living.  He continues to be on Namenda XR 28 mg daily.  For his neuropathy he is also taking gabapentin 300 mg at night which controls his symptoms well however he has recently started to have right arm pain off and on.  He does have degenerative changes in his cervical spine and has tried physical  therapy several times before and does not want to try it again.    Last visit- since his last visit patient reports to his memory being stable.  He has reached a dose of 28 mg on his Namenda XR.  As per his daughter he still has word finding difficulties.  Today the patient also admits to having visual hallucinations once or twice where he has seen his daughter beside himself when she has not been there.  He denies any depressive symptoms currently and is doing well on Zoloft.  With regards to his neuropathy he feels it is stable on gabapentin 300 mg at night but he has to take up to 4 Tylenols a day for his arthritis.  His blood work was normal other than a low vitamin B6 level and he has started taking supplements.       Last visit- since his last visit he started taking Aricept however he had night terrors even on the 5 mg daily dose.  Hence he stopped taking it.  He continues to have word finding difficulties as well as problems recalling things more so when he is depressed or is in pain.  He continues to be on Zoloft 50 mg daily.  Right after he saw me he was admitted to the hospital for a right arm infection (possible MRSA) and just completed his course of antibiotics a week ago.  He had his MRI brain yesterday that shows atrophy and extensive white matter changes with chronic infarcts in the right frontal and parietal region as well as extensive microhemorrhages consistent with a amyloid angiopathy.  With regards to his neuropathy he did not have blood work however he did increase his gabapentin to 300 mg at night which has helped improve his symptoms.     Initial [id number]-old male accompanied by his daughter with a history of hypertension, hyperlipidemia, stroke in 2013, ulcerative colitis, macular degeneration, hearing loss in left ear, depression now presents with word finding difficulties, shuffling gait and tingling and numbness in his feet.  As per patient he had a frontal stroke in 2013 that  caused right-sided weakness.  He denies having any speech problems after the stroke however 6 months ago he started to have word finding difficulties.  Denies any severe problems with his memory.  He does report to being depressed after the death of his wife 3 years ago and has been on antidepressants since then.  He lives with his daughter and states that he doesn't feel like interacting with friends anymore.  He also reports to disturbances in sleep due to frequent awakenings to use the bathroom He has also had problems with his balance and walking where he has started to shuffle his feet.  He had MRIs of his back that showed severe degenerative changes in his spine throughout.  After that he saw a surgeon who told him that he was not a good surgical candidate.  He has also been having tingling and numbness that started in his feet and has progressed up into his legs a few years ago.  He was prescribed gabapentin but is only taking 200 mg at night.  He denies having any history of diabetes or any excessive alcohol intake.      The following portions of the patient's history were reviewed today and updated as of 04/16/2019  : allergies, current medications, past family history, past medical history, past social history, past surgical history and problem list  These document will be scanned to patient's chart.      Current Outpatient Medications:   •  acetaminophen (TYLENOL) 325 MG tablet, Take 2 tablets by mouth Every 4 (Four) Hours As Needed for Mild Pain  or Fever (Temperature greater than or equal to 37 C)., Disp: , Rfl:   •  amLODIPine (NORVASC) 2.5 MG tablet, Take 1 tablet by mouth Daily., Disp: , Rfl:   •  apixaban (ELIQUIS) 5 MG tablet tablet, Take 1 tablet by mouth Every 12 (Twelve) Hours., Disp: 60 tablet, Rfl:   •  APRISO 0.375 g 24 hr capsule, Take 375 mg by mouth 2 (Two) Times a Day., Disp: , Rfl:   •  aspirin 81 MG EC tablet, Take 81 mg by mouth Daily., Disp: , Rfl:   •  atorvastatin (LIPITOR) 80 MG  tablet, Take 1 tablet by mouth Every Night., Disp: , Rfl:   •  bisoprolol (ZEBeta) 5 MG tablet, bisoprolol fumarate 5 mg tablet nightly, Disp: , Rfl:   •  MELENDEZ PO, Take  by mouth., Disp: , Rfl:   •  Cholecalciferol (VITAMIN D3) 5000 units capsule capsule, Take 5,000 Units by mouth Daily., Disp: , Rfl:   •  dicyclomine (BENTYL) 20 MG tablet, TAKE 1 TABLET 1-2 TIMES A DAY AS NEEDED, Disp: 60 tablet, Rfl: 1  •  DULoxetine (CYMBALTA) 20 MG capsule, Take 1 capsule by mouth Daily., Disp: , Rfl:   •  fluticasone (FLONASE) 50 MCG/ACT nasal spray, 2 sprays into each nostril Daily., Disp: , Rfl:   •  gabapentin (NEURONTIN) 300 MG capsule, Take 1 capsule by mouth Every Night., Disp: 3 capsule, Rfl: 0  •  lisinopril (PRINIVIL,ZESTRIL) 20 MG tablet, Take 1 tablet by mouth 2 (Two) Times a Day., Disp: 180 tablet, Rfl: 1  •  loratadine (CLARITIN) 10 MG tablet, Take 10 mg by mouth As Needed for Allergies., Disp: , Rfl:   •  melatonin 5 MG tablet tablet, Take 1 tablet by mouth Every Night., Disp: , Rfl:   •  memantine (NAMENDA XR) 28 MG capsule sustained-release 24 hr extended release capsule, Take 1 capsule by mouth Daily., Disp: 30 capsule, Rfl: 11  •  Misc. Devices (ROLLATOR) misc, 1 Units Daily., Disp: 1 each, Rfl: 0  •  Multiple Vitamins-Minerals (PRESERVISION AREDS PO), Take  by mouth 2 (Two) Times a Day., Disp: , Rfl:   •  omeprazole (priLOSEC) 20 MG capsule, Take 20 mg by mouth 2 (Two) Times a Day., Disp: , Rfl:   •  predniSONE (DELTASONE) 5 MG tablet, 5 mg 2 (Two) Times a Day., Disp: , Rfl:   •  Pyridoxine HCl (VITAMIN B6 PO), Take  by mouth., Disp: , Rfl:   •  vitamin B-12 (CYANOCOBALAMIN) 500 MCG tablet, Take 500 mcg by mouth Daily., Disp: , Rfl:   •  Zinc 50 MG capsule, Take 50 mg by mouth Daily., Disp: , Rfl:    Past Medical History:   Diagnosis Date   • Allergic arthritis    • Arthritis    • BPH (benign prostatic hyperplasia)      urology   • BPH/nocturia/microhematuria (work up negative)    • chronic hearing loss  left ear    • Crohn's colitis (CMS/HCC)     on colonoscopy 7/15.2017.  [facility]-continue Lialda   • Depression     medication, after death of wife 2014   • Elbow pain 7/25/2018   • Gout    • Hearing loss     left ear   • History of stroke 7/23/2018   • Hyperlipidemia    • Hypertension    • Ingrown toenail 03/18/2018   • Low back pain    • Macular degeneration    • Macular degeneration    • Multiple lacunar infarcts     noted on CT 2/2019   • Neck pain    • Rash 1/28/2019   • Rib pain on left side 07/11/2017   • Septic bursitis of elbow, right 7/23/2018   • Spinal stenosis     cervical and lumbosacral spine   • Spinal stenosis-cervical and lumbosacral spine    • Stroke (CMS/HCC)     aspirin, blood pressure conyrol.  Infarct frontal lobe.  R sided weakness, R hand and R foot numbness,dysphasia with stuttering.   • Stroke (CMS/HCC) 02/27/2019    MCA infarct with L facial droop and L sided weakness,dysphasia,dysarthria   • TIA (transient ischemic attack) 2/27/2019   • Ulcerative colitis (CMS/HCC)    • Urinary frequency       Past Surgical History:   Procedure Laterality Date   • CATARACT EXTRACTION     • COLONOSCOPY  07/15/2017   • HEMORRHOIDECTOMY     • HERNIA REPAIR     • KNEE SURGERY     • TONSILLECTOMY        Family History   Problem Relation Age of Onset   • Cancer Mother    • Alzheimer's disease Father    • Arthritis Father    • Cancer Brother    • Diabetes Maternal Aunt    • Arthritis Other    • Diabetes Other    • Stroke Maternal Cousin    • Cancer Other    • Hypertension Other       Social History     Socioeconomic History   • Marital status:      Spouse name: Not on file   • Number of children: Not on file   • Years of education: Not on file   • Highest education level: Not on file   Tobacco Use   • Smoking status: Never Smoker   • Smokeless tobacco: Never Used   Substance and Sexual Activity   • Alcohol use: Yes     Comment: 1 a month   • Drug use: No   • Sexual activity: Defer     Review of Systems  "  Neurological: Positive for dizziness and weakness.   Hematological: Bruises/bleeds easily.   All other systems reviewed and are negative.      Objective:    /82 (BP Location: Right arm, Patient Position: Sitting, Cuff Size: Adult)   Pulse 68   Ht 175.3 cm (69\")   Wt 87.1 kg (192 lb)   SpO2 98%   BMI 28.35 kg/m²     Neurology Exam:    General apperance: NAD.     Mental status: Alert, awake and oriented to time place and person.    Recent and Remote memory: Mildly impaired, previously-MMSE at previous visit was 25 with a delayed recall of 0    Attention span and Concentration: Normal.     Language and Speech: Intact- mild dysarthria.    Fluency, Naming , Repitition and Comprehension:  Intact    Cranial Nerves:   CN II: Visual fields are full. Intact. Fundi - Normal, No papillederma, Pupils - ANAHI  CN III, IV and VI: Extraocular movements are intact. Normal saccades.   CN V: Facial sensation is intact.   CN VII: Muscles of facial expression reveal no asymmetry. Intact.   CN VIII: Hearing is intact. Whispered voice intact.   CN IX and X: Palate elevates symmetrically. Intact  CN XI: Shoulder shrug is intact.   CN XII: Tongue is midline without evidence of atrophy or fasciculation.     Ophthalmoscopic exam of optic disc-normal    Motor:  Right UE muscle strength 5/5. Normal tone.     Left UE muscle strength 5/5. Normal tone.      Right LE muscle strength5/5. Normal tone.     Left LE muscle strength 5/5. Normal tone.      Sensory: Normal light touch, vibration and pinprick sensation bilaterally.    DTRs: 2+ bilaterally in upper and lower extremities.    Babinski: Negative bilaterally.    Co-ordination: Normal finger-to-nose, heel to shin B/L.    Rhomberg: Negative.    Gait: Normal.  Walks with a cane    Cardiovascular: Regular rate and rhythm without murmur, gallop or rub.    Assessment and Plan:  1. Mild dementia  Continue Namenda XR 28 mg daily    2. Sequelae, post-stroke  Right embolic infarcts in the " setting of PFO and atrial fibrillation  Continue Eliquis 5 mg twice daily.  He is having excessive bruising on his hands hence I have asked him to speak to his cardiologist about stopping aspirin  Continue atorvastatin 80 mg for now.  I may reduce the dose at his next visit.  Goal LDL of less than 100.  His LDL was 129  Maintain blood pressure less than 1 3 0/90       Return in about 3 months (around 7/16/2019).     I spent over 45  minutes with the patient face to face out of which over 50% (25 minutes) was spent in management, instructions and education regarding his memory and stroke symptoms.     Rafaela Gutierrez MD

## 2019-04-19 NOTE — TELEPHONE ENCOUNTER
Home Health called to notify the office that they had to omit occupational patient therapy visit for today because the patient had a doctor's visit today

## 2019-04-19 NOTE — TELEPHONE ENCOUNTER
Pts daughter called in requesting refills for his Rx that cardinal acosta prescribed him.     gabapentin (NEURONTIN) 300 MG capsule     DULoxetine (CYMBALTA) 20 MG capsule     atorvastatin (LIPITOR) 80 MG tablet   apixaban (ELIQUIS) 5 MG tablet tablet   amLODIPine (NORVASC) 2.5 MG tablet

## 2019-04-22 NOTE — TELEPHONE ENCOUNTER
Pts daughter called stating that Briana had not received any medications that were sent in on Friday. I explained to the pt that all the meds that she requested be refilled, was sent in on 4/19/2019. She voiced understanding and stated she would call the pharmacy.

## 2019-04-22 NOTE — TELEPHONE ENCOUNTER
Called patient to remind them of their appointment on 04/23/2019 @ 7612. Told patient to arrive 30 minutes prior to appointment and bring detailed med list or medication bottles with them.    Patient confirmed

## 2019-04-22 NOTE — PROGRESS NOTES
[facility]  Follow Up Visit  Vargas De  1940    VISIT DATE:  04/23/19    PCP:   Saba Arrington MD  [id number] [address]      CC:  Hyperlipidemia      Problem List:  1.  Hypertension  2.  HLD  3.  Afib  4.  PFO      History of Present Illness:  Vargas De  Is a 78 y.o. male with pertinent cardiac history detailed above.  I saw him in the hospital in March for embolic CVA.  Workup included carotid ultrasound which did not show any significant stenosis.  REJI showed a small PFO with dilated left atrium.  Mild to moderate aortic atherosclerotic plaque.  Patient was started on empiric apixaban 5 mg twice daily.  Further outpatient telemetry monitoring did show atrial fibrillation, this was a new diagnosis for him.  He has completed rehab at [facility].  Since discharge blood pressures been controlled.  His speech is improved.  He still has some generalized weakness.  Is also had some frequent bruising and bleeding in his hands.  He saw Dr. Gutierrez this week who felt he could come off of his aspirin, given that predominant mechanisms for this most recent stroke were likely his atrial fibrillation.  I am in agreement.  Patient has no other new complaints today      Patient Active Problem List    Diagnosis Date Noted   • Paroxysmal atrial fibrillation (CMS/HCC) 04/23/2019   • Sequelae, post-stroke 04/16/2019   • Mixed hyperlipidemia 04/02/2019   • Facial droop due to acute stroke (CMS/HCC) 04/02/2019   • Acute left-sided weakness 04/02/2019   • Word finding difficulty 04/02/2019   • Vitamin B6 deficiency 04/02/2019   • Multiple lacunar infarcts 04/02/2019   • Dysphasia due to old cerebrovascular accident 03/20/2019   • Body mass index (BMI) of 25.0 to 29.9 03/20/2019   • History of stroke with residual deficit 03/20/2019   • PFO (patent foramen ovale) 03/01/2019   • Ulcerative colitis (CMS/HCC) 03/01/2019   • Thyroid nodule 02/28/2019    • Acute ischemic right MCA stroke (CMS/HCC) 02/28/2019   • Right-sided muscle weakness 01/28/2019   • Numbness on right side 01/28/2019   • Dysphagia due to old cerebrovascular accident 01/28/2019   • Frequent urination 01/28/2019   • Nocturia 01/28/2019   • Dizziness 01/28/2019   • Poor balance 01/28/2019   • Vitamin D deficiency 01/28/2019   • Major depressive disorder with single episode, in partial remission (CMS/HCC) 01/28/2019   • Cerebrovascular small vessel disease 01/28/2019   • Major neurocognitive disorder, due to vascular disease, without behavioral disturbance, mild 01/28/2019   • Hearing loss of left ear 01/28/2019   • Current chronic use of systemic steroids 01/28/2019   • Macular degeneration of both eyes 01/28/2019   • GERD without esophagitis 01/28/2019   • Cervicalgia 01/28/2019   • Spinal stenosis in cervical region 01/28/2019   • Chronic right-sided low back pain without sciatica 01/28/2019   • Microhematuria 01/28/2019   • Daytime somnolence 01/28/2019   • Osteoarthritis of toe joint, right 01/28/2019   • Shuffling gait 01/28/2019   • Acquired stuttering 01/28/2019   • Perennial allergic rhinitis 01/28/2019   • Peripheral neuropathy 07/19/2018   • Benign essential hypertension 07/11/2017   • Abnormal glucose level 07/11/2017   • Crohn's colitis, with rectal bleeding (CMS/HCC) 07/11/2017       Allergies   Allergen Reactions   • Donepezil Other (See Comments)     nightmares   • Iodine    • Sulfa Antibiotics        Social History     Socioeconomic History   • Marital status:      Spouse name: Not on file   • Number of children: Not on file   • Years of education: Not on file   • Highest education level: Not on file   Tobacco Use   • Smoking status: Never Smoker   • Smokeless tobacco: Never Used   Substance and Sexual Activity   • Alcohol use: Yes     Comment: rarely   • Drug use: No   • Sexual activity: Defer       Family History   Problem Relation Age of Onset   • Cancer Mother    •  Alzheimer's disease Father    • Arthritis Father    • Cancer Brother    • Diabetes Maternal Aunt    • Arthritis Other    • Diabetes Other    • Stroke Maternal Cousin    • Cancer Other    • Hypertension Other        Current Medications:    Current Outpatient Medications:   •  acetaminophen (TYLENOL) 325 MG tablet, Take 2 tablets by mouth Every 4 (Four) Hours As Needed for Mild Pain  or Fever (Temperature greater than or equal to 37 C)., Disp: , Rfl:   •  amLODIPine (NORVASC) 2.5 MG tablet, Take 1 tablet by mouth Daily., Disp: 30 tablet, Rfl: 11  •  apixaban (ELIQUIS) 5 MG tablet tablet, Take 1 tablet by mouth Every 12 (Twelve) Hours., Disp: 60 tablet, Rfl: 11  •  APRISO 0.375 g 24 hr capsule, Take 0.375 g by mouth 2 (Two) Times a Day., Disp: , Rfl:   •  atorvastatin (LIPITOR) 80 MG tablet, Take 1 tablet by mouth Every Night., Disp: 30 tablet, Rfl: 11  •  bisoprolol (ZEBeta) 5 MG tablet, bisoprolol fumarate 5 mg tablet nightly, Disp: , Rfl:   •  MELENDEZ PO, Take  by mouth., Disp: , Rfl:   •  Cholecalciferol (VITAMIN D3) 5000 units capsule capsule, Take 5,000 Units by mouth Daily., Disp: , Rfl:   •  dicyclomine (BENTYL) 20 MG tablet, TAKE 1 TABLET 1-2 TIMES A DAY AS NEEDED, Disp: 60 tablet, Rfl: 1  •  DOXYLAMINE SUCCINATE, SLEEP, PO, Take 1 tablet by mouth Every Night. 25mg, Disp: , Rfl:   •  DULoxetine (CYMBALTA) 20 MG capsule, Take 1 capsule by mouth Daily., Disp: 30 capsule, Rfl: 11  •  fluticasone (FLONASE) 50 MCG/ACT nasal spray, 2 sprays into each nostril Daily., Disp: , Rfl:   •  gabapentin (NEURONTIN) 300 MG capsule, Take 1 capsule by mouth Every Night., Disp: 30 capsule, Rfl: 2  •  lisinopril (PRINIVIL,ZESTRIL) 20 MG tablet, Take 1 tablet by mouth 2 (Two) Times a Day., Disp: 180 tablet, Rfl: 1  •  loratadine (CLARITIN) 10 MG tablet, Take 10 mg by mouth As Needed for Allergies., Disp: , Rfl:   •  memantine (NAMENDA XR) 28 MG capsule sustained-release 24 hr extended release capsule, Take 1 capsule by mouth  "Daily., Disp: 30 capsule, Rfl: 11  •  Misc. Devices (ROLLATOR) misc, 1 Units Daily., Disp: 1 each, Rfl: 0  •  Multiple Vitamins-Minerals (PRESERVISION AREDS PO), Take  by mouth 2 (Two) Times a Day., Disp: , Rfl:   •  omeprazole (priLOSEC) 20 MG capsule, Take 20 mg by mouth 2 (Two) Times a Day., Disp: , Rfl:   •  predniSONE (DELTASONE) 5 MG tablet, 5 mg 2 (Two) Times a Day., Disp: , Rfl:   •  vitamin B-12 (CYANOCOBALAMIN) 500 MCG tablet, Take 500 mcg by mouth Daily., Disp: , Rfl:   •  Zinc 50 MG capsule, Take 50 mg by mouth Daily., Disp: , Rfl:      Review of Systems   Constitution: Positive for weakness.   Cardiovascular: Negative for chest pain, dyspnea on exertion, irregular heartbeat, leg swelling, near-syncope and syncope.   Hematologic/Lymphatic: Bruises/bleeds easily.       Vitals:    04/23/19 1319   BP: 124/70   BP Location: Left arm   Patient Position: Sitting   Pulse: 71   SpO2: 96%   Weight: 87.1 kg (192 lb)   Height: 171.5 cm (67.5\")       Physical Exam   Constitutional: He appears well-developed and well-nourished.   HENT:   Head: Normocephalic and atraumatic.   Eyes: EOM are normal.   Neck: Neck supple.   Cardiovascular: Normal rate, regular rhythm, normal heart sounds and intact distal pulses.   Pulmonary/Chest: Effort normal.   Abdominal: Soft. There is no tenderness.   Skin: Skin is warm and dry.   Multiple areas of bruising on his forearms and hands   Psychiatric: He has a normal mood and affect.       Diagnostic Data:  Procedures  Lab Results   Component Value Date    TRIG 48 02/28/2019    HDL 52 02/28/2019     Lab Results   Component Value Date    GLUCOSE 100 03/03/2019    BUN 11 04/02/2019    CREATININE 0.74 04/02/2019     04/02/2019    K 4.2 04/02/2019     04/02/2019    CO2 28.0 04/02/2019     Lab Results   Component Value Date    HGBA1C 5.50 02/28/2019     Lab Results   Component Value Date    WBC 7.86 04/02/2019    HGB 15.1 04/02/2019    HCT 46.7 04/02/2019     04/02/2019 "       Assessment:   Diagnosis Plan   1. Acute ischemic right MCA stroke (CMS/HCC)         Plan:      1.  A. fib and cardioembolic strokes  -Continue apixaban   -Given his frequent bruising and bleeding and likelihood that stroke was A. fib mediated can discontinue aspirin  -The patient does have to have a colonoscopy in July he is at increased risk for colon cancer given history of ulcerative colitis, I informed him that he could hold his Eliquis for 48 hours if this is required by his GI physician  -Continue high intensity statin, last LDL was 129    2.  HTN  -Controlled on current medications no changes made today    Patient has neurology follow-up in 3 months I will follow-up with him again in 4 months.      Taj Maldonado MD

## 2019-04-22 NOTE — TELEPHONE ENCOUNTER
Pts daughter called and LVM stating Briana had still not received his medication. I checked his chart, in which the medication was sent again.

## 2019-04-23 PROBLEM — I48.0 PAROXYSMAL ATRIAL FIBRILLATION (HCC): Status: ACTIVE | Noted: 2019-01-01

## 2019-06-13 NOTE — ASSESSMENT & PLAN NOTE
Stable.  Pt has discontinued home PT and OT.  Continuing to improve.  BP stable. Next follow up with neurology set for mid July.

## 2019-06-13 NOTE — PROGRESS NOTES
Patient Care Team:  Saba Arrington MD as PCP - General (Internal Medicine)  Hermilo Shen MD as Consulting Physician (Colon and Rectal Surgery)  Rafaela Gutierrez MD as Consulting Physician (Neurology)  Taj Maldonado MD as Consulting Physician (Cardiology)    Chief Complaint::   Chief Complaint   Patient presents with   • Cerebrovascular Accident     follow up on stroke from March 2019        Subjective     HPI  Follow up from acute CVA 4/2019  He has having increase in difficulty reading-blurred vision.    Neurology-Dr. Gutierrez-followup scheduled for mid July    Hypertension   This is a chronic problem. The current episode started more than 1 year ago. The problem has been resolved since onset. Pertinent negatives include no chest pain, palpitations or shortness of breath. Risk factors for coronary artery disease include dyslipidemia, family history, male gender and sedentary lifestyle. Past treatments include ACE inhibitors. Current antihypertension treatment includes ACE inhibitors, beta blockers, direct vasodilators and lifestyle changes. The current treatment provides significant improvement. There are no compliance problems.    Hyperlipidemia   This is a chronic problem. The current episode started more than 1 year ago. The problem is controlled. There are no known factors aggravating his hyperlipidemia. Pertinent negatives include no chest pain or shortness of breath. Current antihyperlipidemic treatment includes diet change and statins. The current treatment provides moderate improvement of lipids. There are no compliance problems.  Risk factors for coronary artery disease include dyslipidemia, family history, hypertension, male sex and a sedentary lifestyle.       PM  The following portions of the patient's history were reviewed and updated as appropriate: allergies, current medications, past family history, past medical history, past social history, past surgical history and problem  "list.    Review of Systems:   Review of Systems   Constitutional: Negative for chills, fatigue and fever.   HENT: Negative for congestion, ear pain and sinus pressure.    Respiratory: Negative for cough, chest tightness, shortness of breath and wheezing.    Cardiovascular: Negative for chest pain and palpitations.   Gastrointestinal: Negative for abdominal pain, blood in stool and constipation.   Endocrine: Negative for cold intolerance and heat intolerance.   Skin: Negative for color change.   Allergic/Immunologic: Positive for environmental allergies.   Neurological: Positive for speech difficulty and headache. Negative for dizziness, light-headedness, numbness and memory problem.   Psychiatric/Behavioral: Positive for decreased concentration. The patient is not nervous/anxious.        Vital Signs  Vitals:    06/13/19 1412   BP: 138/78   BP Location: Left arm   Patient Position: Sitting   Cuff Size: Adult   Pulse: 72   Weight: 87.1 kg (192 lb)   Height: 171.5 cm (67.52\")   PainSc:   5   PainLoc: Generalized  Comment: Arthritis       Labs  Office Visit on 04/02/2019   Component Date Value Ref Range Status   • 25 Hydroxy, Vitamin D 04/02/2019 43.0  ng/ml Final    Comment: Reference Ranges for Total Vitamin D 25(OH)  Deficiency      <20.0 ng/ml  Insufficiency   20-30 ng/ml  Sufficiency     ng/ml  Toxicity         >100 ng/ml     • Glucose 04/02/2019 82  70 - 100 mg/dL Final   • BUN 04/02/2019 11  9 - 23 mg/dL Final   • Creatinine 04/02/2019 0.74  0.60 - 1.30 mg/dL Final   • eGFR Non African Am 04/02/2019 103  >60 mL/min/1.73 Final    Comment: [employer] Guidelines  Stage     Description        GFR  1         Normal or High     90+  2         Mild decrease      60-89  3         Moderate decrease  30-59  4         Severe decrease    15-29  5         Kidney failure     <15  The MDRD GFR formula is only valid for adults with stable  renal function between ages 18 and 70.     • eGFR  Am " 04/02/2019 125  >60 mL/min/1.73 Final   • BUN/Creatinine Ratio 04/02/2019 14.9  7.0 - 25.0 Final   • Sodium 04/02/2019 139  132 - 146 mmol/L Final   • Potassium 04/02/2019 4.2  3.5 - 5.5 mmol/L Final   • Chloride 04/02/2019 104  99 - 109 mmol/L Final   • Total CO2 04/02/2019 28.0  20.0 - 31.0 mmol/L Final   • Calcium 04/02/2019 9.7  8.7 - 10.4 mg/dL Final   • Total Protein 04/02/2019 6.5  5.7 - 8.2 g/dL Final   • Albumin 04/02/2019 4.16  3.20 - 4.80 g/dL Final   • Globulin 04/02/2019 2.3  gm/dL Final   • A/G Ratio 04/02/2019 1.8  1.5 - 2.5 g/dL Final   • Total Bilirubin 04/02/2019 1.1  0.3 - 1.2 mg/dL Final   • Alkaline Phosphatase 04/02/2019 107* 25 - 100 U/L Final   • AST (SGOT) 04/02/2019 39* 0 - 33 U/L Final   • ALT (SGPT) 04/02/2019 61* 7 - 40 U/L Final   • WBC 04/02/2019 7.86  3.50 - 10.80 10*3/mm3 Final   • RBC 04/02/2019 4.63  4.20 - 5.76 10*6/mm3 Final   • Hemoglobin 04/02/2019 15.1  13.1 - 17.5 g/dL Final   • Hematocrit 04/02/2019 46.7  38.9 - 50.9 % Final   • MCV 04/02/2019 100.9* 80.0 - 99.0 fL Final   • MCH 04/02/2019 32.6* 27.0 - 31.0 pg Final   • MCHC 04/02/2019 32.3  32.0 - 36.0 g/dL Final   • RDW 04/02/2019 14.8* 11.3 - 14.5 % Final   • Platelets 04/02/2019 180  150 - 450 10*3/mm3 Final   • Neutrophil Rel % 04/02/2019 50.5  41.0 - 71.0 % Final   • Lymphocyte Rel % 04/02/2019 29.6  24.0 - 44.0 % Final   • Monocyte Rel % 04/02/2019 15.8* 0.0 - 12.0 % Final   • Eosinophil Rel % 04/02/2019 3.8* 0.0 - 3.0 % Final   • Basophil Rel % 04/02/2019 0.0  0.0 - 1.0 % Final   • Neutrophils Absolute 04/02/2019 3.97  1.50 - 8.30 10*3/mm3 Final   • Lymphocytes Absolute 04/02/2019 2.33  0.60 - 4.80 10*3/mm3 Final   • Monocytes Absolute 04/02/2019 1.24* 0.00 - 1.00 10*3/mm3 Final   • Eosinophils Absolute 04/02/2019 0.30  0.00 - 0.30 10*3/mm3 Final   • Basophils Absolute 04/02/2019 0.00  0.00 - 0.20 10*3/mm3 Final   • Immature Granulocyte Rel % 04/02/2019 0.3  0.0 - 0.6 % Final   • Immature Grans Absolute 04/02/2019  0.02  0.00 - 0.05 10*3/mm3 Final   • Folate 04/02/2019 8.40  3.20 - 20.00 ng/mL Final    Results may be falsely increased if patient taking Biotin.   • Vitamin B-12 04/02/2019 703  211 - 911 pg/mL Final   • Vitamin B6 04/02/2019 96.4* 5.3 - 46.7 ug/L Final    Comment: This test was developed and its performance characteristics  determined by [facility]. It has not been cleared or approved  by the Food and Drug Administration.         Imaging  All imaging:   Imaging Results (all)     None             Current Outpatient Medications:   •  acetaminophen (TYLENOL) 325 MG tablet, Take 2 tablets by mouth Every 4 (Four) Hours As Needed for Mild Pain  or Fever (Temperature greater than or equal to 37 C)., Disp: , Rfl:   •  amLODIPine (NORVASC) 2.5 MG tablet, Take 1 tablet by mouth Daily., Disp: 30 tablet, Rfl: 11  •  apixaban (ELIQUIS) 5 MG tablet tablet, Take 1 tablet by mouth Every 12 (Twelve) Hours., Disp: 60 tablet, Rfl: 11  •  APRISO 0.375 g 24 hr capsule, Take 0.375 g by mouth 2 (Two) Times a Day., Disp: , Rfl:   •  atorvastatin (LIPITOR) 80 MG tablet, Take 1 tablet by mouth Every Night., Disp: 30 tablet, Rfl: 11  •  bisoprolol (ZEBeta) 5 MG tablet, bisoprolol fumarate 5 mg tablet nightly, Disp: , Rfl:   •  MELENDEZ PO, Take 2 capsules by mouth 2 (Two) Times a Day., Disp: , Rfl:   •  Cholecalciferol (VITAMIN D3) 5000 units capsule capsule, Take 5,000 Units by mouth Daily., Disp: , Rfl:   •  dicyclomine (BENTYL) 20 MG tablet, TAKE 1 TABLET 1-2 TIMES A DAY AS NEEDED, Disp: 60 tablet, Rfl: 1  •  DOXYLAMINE SUCCINATE, SLEEP, PO, Take 1 tablet by mouth Every Night. 25mg, Disp: , Rfl:   •  DULoxetine (CYMBALTA) 20 MG capsule, Take 1 capsule by mouth Daily., Disp: 30 capsule, Rfl: 11  •  fluticasone (FLONASE) 50 MCG/ACT nasal spray, 2 sprays into the nostril(s) as directed by provider Daily., Disp: 1 bottle, Rfl: 5  •  gabapentin (NEURONTIN) 300 MG capsule, Take 1 capsule by mouth Every Night., Disp: 30 capsule, Rfl: 2  •   lisinopril (PRINIVIL,ZESTRIL) 20 MG tablet, Take 1 tablet by mouth 2 (Two) Times a Day., Disp: 180 tablet, Rfl: 1  •  loratadine (CLARITIN) 10 MG tablet, Take 10 mg by mouth As Needed for Allergies., Disp: , Rfl:   •  memantine (NAMENDA XR) 28 MG capsule sustained-release 24 hr extended release capsule, Take 1 capsule by mouth Daily., Disp: 30 capsule, Rfl: 11  •  Multiple Vitamins-Minerals (PRESERVISION AREDS PO), Take 1 capsule by mouth 2 (Two) Times a Day., Disp: , Rfl:   •  omeprazole (priLOSEC) 20 MG capsule, Take 20 mg by mouth 2 (Two) Times a Day., Disp: , Rfl:   •  predniSONE (DELTASONE) 5 MG tablet, Take 5 mg by mouth 2 (Two) Times a Day., Disp: , Rfl:   •  vitamin B-12 (CYANOCOBALAMIN) 500 MCG tablet, Take 500 mcg by mouth Daily., Disp: , Rfl:   •  Zinc 50 MG capsule, Take 50 mg by mouth Daily., Disp: , Rfl:     Physical Exam:    Physical Exam   Constitutional: He is oriented to person, place, and time. He appears well-developed and well-nourished. No distress.   HENT:   Head: Normocephalic and atraumatic.   Nose: Nose normal.   Mouth/Throat: Oropharynx is clear and moist.   Eyes: Conjunctivae and EOM are normal. Pupils are equal, round, and reactive to light.   Neck: Normal range of motion. Neck supple. No JVD present.   Cardiovascular: Normal rate, regular rhythm, normal heart sounds and intact distal pulses.   No murmur heard.  Pulmonary/Chest: Effort normal and breath sounds normal. No respiratory distress. He exhibits no tenderness.   Abdominal: Soft. Bowel sounds are normal. He exhibits no distension. There is no tenderness.   Musculoskeletal: Normal range of motion. He exhibits no edema.   Neurological: He is alert and oriented to person, place, and time.   Skin: Skin is warm and dry. He is not diaphoretic. No erythema. No pallor.   Psychiatric: He has a normal mood and affect.   Nursing note and vitals reviewed.      Procedures        Assessment/Plan   Problem List Items Addressed This Visit         Cardiovascular and Mediastinum    Mixed hyperlipidemia (Chronic)    Current Assessment & Plan     Well controlled.  Compliant with medication.         Relevant Medications    atorvastatin (LIPITOR) 80 MG tablet    Benign essential hypertension    Current Assessment & Plan     Controlled well.         Relevant Medications    bisoprolol (ZEBeta) 5 MG tablet    lisinopril (PRINIVIL,ZESTRIL) 20 MG tablet    amLODIPine (NORVASC) 2.5 MG tablet    Acute ischemic right MCA stroke (CMS/HCC) - Primary    Current Assessment & Plan     Stable.  Pt has discontinued home PT and OT.  Continuing to improve.  BP stable. Next follow up with neurology set for mid July.         Relevant Medications    apixaban (ELIQUIS) 5 MG tablet tablet          Return in about 3 months (around 9/13/2019) for with Dr. Arrington, Next scheduled follow up.    Plan of care reviewed with patient at the conclusion of today's visit. Education was provided regarding diagnosis, management, and any prescribed or recommended OTC medications.Patient verbalizes understanding of and agreement with management plan.     Minnie Cloud PA-C

## 2019-07-17 PROBLEM — G89.29 CHRONIC BILATERAL LOW BACK PAIN: Status: ACTIVE | Noted: 2019-01-01

## 2019-07-17 PROBLEM — M54.50 CHRONIC BILATERAL LOW BACK PAIN: Status: ACTIVE | Noted: 2019-01-01

## 2019-07-17 NOTE — PROGRESS NOTES
Subjective:    CC: Vargas De is seen today for stroke and memory problems    HPI:  Current visit- since his last visit patient continues to have some word finding difficulties as well as occasional drooping of the left side of his face especially when he is tired to sleep deprived.  He went off of aspirin and now only takes Eliquis 5 mg twice a day in addition to atorvastatin 40 mg daily.  With regards to his memory his daughter feels that he is stable and can still do most of his activities of daily living at home.  Occasionally misplaces things around the house but does not have any problems with names.  Continues to be on Namenda XR 28 mg daily.  He is still having significant low back pain that radiates down his legs.  In the past he had imaging of his lumbar spine that showed spinal stenosis but was told by neurosurgery that he was not a surgical candidate.  He has been on prednisone 5 mg twice a day for several years now.  He also continues to take gabapentin 300 mg at night.    Last visit-patient started having symptoms of left facial drooping with speech problems and left arm weakness around 2/27.  He was brought to the hospital where he had an MRI brain that showed embolic infarcts in the right parietal and occipital region.  After that he had a carotid Doppler that did not show any significant stenosis and an echocardiogram that showed a normal EF with moderate left atrial dilatation, PFO and aortic valve regurgitation as well as a grade 2 plaque in the aortic arch.  TCD confirmed the PFO.  Patient was started on Eliquis 5 mg twice daily in addition to aspirin 81 mg daily.  His blood pressure medications were also modified.  He was also started on atorvastatin 80 mg.  Lipid panel was as follows-, TG 48, , HDL 52.  He underwent speech and physical therapy after discharge and is now back to baseline.  He also had a Holter monitor after discharge which showed atrial fibrillation.  With  regards to his memory he feels that his symptoms are stable.  Continues to be on Namenda 28 mg daily.  Did not restart Aricept as he was afraid of the side effects.    Last visit-since his last visit since his last visit he denies having any major problems with his memory however he does admit to stuttering where he is unable to say a word despite knowing what to say.  As per the daughter he does this while conversing in English or Croatian.  He also misplaces things around the house but can carry out all his activities of daily living.  He continues to be on Namenda XR 28 mg daily.  For his neuropathy he is also taking gabapentin 300 mg at night which controls his symptoms well however he has recently started to have right arm pain off and on.  He does have degenerative changes in his cervical spine and has tried physical therapy several times before and does not want to try it again.    Last visit- since his last visit patient reports to his memory being stable.  He has reached a dose of 28 mg on his Namenda XR.  As per his daughter he still has word finding difficulties.  Today the patient also admits to having visual hallucinations once or twice where he has seen his daughter beside himself when she has not been there.  He denies any depressive symptoms currently and is doing well on Zoloft.  With regards to his neuropathy he feels it is stable on gabapentin 300 mg at night but he has to take up to 4 Tylenols a day for his arthritis.  His blood work was normal other than a low vitamin B6 level and he has started taking supplements.       Last visit- since his last visit he started taking Aricept however he had night terrors even on the 5 mg daily dose.  Hence he stopped taking it.  He continues to have word finding difficulties as well as problems recalling things more so when he is depressed or is in pain.  He continues to be on Zoloft 50 mg daily.  Right after he saw me he was admitted to the hospital for a  right arm infection (possible MRSA) and just completed his course of antibiotics a week ago.  He had his MRI brain yesterday that shows atrophy and extensive white matter changes with chronic infarcts in the right frontal and parietal region as well as extensive microhemorrhages consistent with a amyloid angiopathy.  With regards to his neuropathy he did not have blood work however he did increase his gabapentin to 300 mg at night which has helped improve his symptoms.     Initial [id number]-old male accompanied by his daughter with a history of hypertension, hyperlipidemia, stroke in 2013, ulcerative colitis, macular degeneration, hearing loss in left ear, depression now presents with word finding difficulties, shuffling gait and tingling and numbness in his feet.  As per patient he had a frontal stroke in 2013 that caused right-sided weakness.  He denies having any speech problems after the stroke however 6 months ago he started to have word finding difficulties.  Denies any severe problems with his memory.  He does report to being depressed after the death of his wife 3 years ago and has been on antidepressants since then.  He lives with his daughter and states that he doesn't feel like interacting with friends anymore.  He also reports to disturbances in sleep due to frequent awakenings to use the bathroom He has also had problems with his balance and walking where he has started to shuffle his feet.  He had MRIs of his back that showed severe degenerative changes in his spine throughout.  After that he saw a surgeon who told him that he was not a good surgical candidate.  He has also been having tingling and numbness that started in his feet and has progressed up into his legs a few years ago.  He was prescribed gabapentin but is only taking 200 mg at night.  He denies having any history of diabetes or any excessive alcohol intake.    The following portions of the patient's history were reviewed today and  updated as of 07/17/2019  : allergies, current medications, past family history, past medical history, past social history, past surgical history and problem list  These document will be scanned to patient's chart.      Current Outpatient Medications:   •  acetaminophen (TYLENOL) 325 MG tablet, Take 2 tablets by mouth Every 4 (Four) Hours As Needed for Mild Pain  or Fever (Temperature greater than or equal to 37 C)., Disp: , Rfl:   •  amLODIPine (NORVASC) 2.5 MG tablet, Take 1 tablet by mouth Daily., Disp: 30 tablet, Rfl: 11  •  apixaban (ELIQUIS) 5 MG tablet tablet, Take 1 tablet by mouth Every 12 (Twelve) Hours., Disp: 60 tablet, Rfl: 11  •  APRISO 0.375 g 24 hr capsule, Take 0.375 g by mouth 2 (Two) Times a Day., Disp: , Rfl:   •  atorvastatin (LIPITOR) 80 MG tablet, Take 1 tablet by mouth Every Night., Disp: 30 tablet, Rfl: 11  •  bisoprolol (ZEBeta) 5 MG tablet, TAKE ONE TABLET BY MOUTH EVERY EVENING, Disp: 30 tablet, Rfl: 10  •  MELENDEZ PO, Take 2 capsules by mouth 2 (Two) Times a Day., Disp: , Rfl:   •  Cholecalciferol (VITAMIN D3) 5000 units capsule capsule, Take 5,000 Units by mouth Daily., Disp: , Rfl:   •  dicyclomine (BENTYL) 20 MG tablet, TAKE ONE TABLET BY MOUTH ONE TO TWO TIMES A DAY AS NEEDED, Disp: 60 tablet, Rfl: 0  •  DOXYLAMINE SUCCINATE, SLEEP, PO, Take 1 tablet by mouth Every Night. 25mg, Disp: , Rfl:   •  DULoxetine (CYMBALTA) 20 MG capsule, Take 1 capsule by mouth Daily., Disp: 30 capsule, Rfl: 11  •  fluticasone (FLONASE) 50 MCG/ACT nasal spray, 2 sprays into the nostril(s) as directed by provider Daily., Disp: 1 bottle, Rfl: 5  •  gabapentin (NEURONTIN) 300 MG capsule, Take 1 capsule by mouth Every Night., Disp: 30 capsule, Rfl: 2  •  lisinopril (PRINIVIL,ZESTRIL) 20 MG tablet, Take 1 tablet by mouth 2 (Two) Times a Day., Disp: 180 tablet, Rfl: 1  •  loratadine (CLARITIN) 10 MG tablet, Take 10 mg by mouth As Needed for Allergies., Disp: , Rfl:   •  memantine (NAMENDA XR) 28 MG capsule  sustained-release 24 hr extended release capsule, Take 1 capsule by mouth Daily., Disp: 30 capsule, Rfl: 11  •  Multiple Vitamins-Minerals (PRESERVISION AREDS PO), Take 1 capsule by mouth 2 (Two) Times a Day., Disp: , Rfl:   •  omeprazole (priLOSEC) 20 MG capsule, Take 20 mg by mouth 2 (Two) Times a Day., Disp: , Rfl:   •  predniSONE (DELTASONE) 5 MG tablet, Take 5 mg by mouth 2 (Two) Times a Day., Disp: , Rfl:   •  vitamin B-12 (CYANOCOBALAMIN) 500 MCG tablet, Take 500 mcg by mouth Daily., Disp: , Rfl:   •  Zinc 50 MG capsule, Take 50 mg by mouth Daily., Disp: , Rfl:    Past Medical History:   Diagnosis Date   • Allergic arthritis    • Arthritis    • BPH (benign prostatic hyperplasia)      urology   • BPH/nocturia/microhematuria (work up negative)    • chronic hearing loss left ear    • Crohn's colitis (CMS/HCC)     on colonoscopy 7/15.2017.  [facility]-continue Lialda   • Depression     medication, after death of wife 2014   • Elbow pain 7/25/2018   • Gout    • Hearing loss     left ear   • History of stroke 7/23/2018   • Hyperlipidemia    • Hypertension    • Ingrown toenail 03/18/2018   • Low back pain    • Macular degeneration    • Macular degeneration    • Multiple lacunar infarcts     noted on CT 2/2019   • Neck pain    • Rash 1/28/2019   • Rib pain on left side 07/11/2017   • Septic bursitis of elbow, right 7/23/2018   • Spinal stenosis     cervical and lumbosacral spine   • Spinal stenosis-cervical and lumbosacral spine    • Stroke (CMS/HCC)     aspirin, blood pressure conyrol.  Infarct frontal lobe.  R sided weakness, R hand and R foot numbness,dysphasia with stuttering.   • Stroke (CMS/HCC) 02/27/2019    MCA infarct with L facial droop and L sided weakness,dysphasia,dysarthria   • TIA (transient ischemic attack) 2/27/2019   • Ulcerative colitis (CMS/HCC)    • Urinary frequency       Past Surgical History:   Procedure Laterality Date   • CATARACT EXTRACTION     • COLONOSCOPY  07/15/2017   • HEMORRHOIDECTOMY     •  HERNIA REPAIR     • KNEE SURGERY     • TONSILLECTOMY        Family History   Problem Relation Age of Onset   • Cancer Mother    • Alzheimer's disease Father    • Arthritis Father    • Cancer Brother    • Diabetes Maternal Aunt    • Arthritis Other    • Diabetes Other    • Stroke Maternal Cousin    • Cancer Other    • Hypertension Other       Social History     Socioeconomic History   • Marital status:      Spouse name: Not on file   • Number of children: Not on file   • Years of education: Not on file   • Highest education level: Not on file   Tobacco Use   • Smoking status: Never Smoker   • Smokeless tobacco: Never Used   Substance and Sexual Activity   • Alcohol use: Yes     Comment: rarely   • Drug use: No   • Sexual activity: Defer     Review of Systems   HENT: Positive for hearing loss and rhinorrhea.    Eyes: Positive for visual disturbance (Visited eye doctor yesterday- Bilateral Clouding after cataract surgery).   Genitourinary: Positive for frequency.   Musculoskeletal: Positive for arthralgias, back pain, gait problem, joint swelling and neck stiffness.   Neurological: Positive for dizziness, speech difficulty, weakness, numbness and confusion.   Hematological: Bruises/bleeds easily.   Psychiatric/Behavioral: Positive for decreased concentration, sleep disturbance (uncomfortable/tosses and turns, congested) and depressed mood.       Objective:    As noted in the chart    Neurology Exam:    General apperance: NAD.     Mental status: Alert, awake and oriented to time place and person.    Recent and Remote memory: Mildly impaired, delayed recall of 3/3 today.  At previous visit he had a delayed recall of 0 with a MMSE of 25    Attention span and Concentration: Normal.     Language and Speech: Intact- No dysarthria.    Fluency, Naming , Repitition and Comprehension:  Intact    Cranial Nerves:   CN II: Visual fields are full. Intact. Fundi - Normal, No papillederma, Pupils - ANAHI  CN III, IV and VI:  Extraocular movements are intact. Normal saccades.   CN V: Facial sensation is intact.   CN VII: Muscles of facial expression reveal mild left facial droop  CN VIII: Hearing is intact. Whispered voice intact.   CN IX and X: Palate elevates symmetrically. Intact  CN XI: Shoulder shrug is intact.   CN XII: Tongue is midline without evidence of atrophy or fasciculation.     Ophthalmoscopic exam of optic disc-normal    Motor:  Right UE muscle strength 5/5. Normal tone.     Left UE muscle strength 5/5. Normal tone.      Right LE muscle strength5/5. Normal tone.     Left LE muscle strength 5/5. Normal tone.      Sensory: Normal light touch, vibration and pinprick sensation bilaterally.    DTRs: 2+ bilaterally in upper and lower extremities.    Babinski: Negative bilaterally.    Co-ordination: Normal finger-to-nose, heel to shin B/L.    Rhomberg: Negative.    Gait: Normal.    Cardiovascular: Regular rate and rhythm without murmur, gallop or rub.    Assessment and Plan:  1. Sequelae, post-stroke  Patient had right embolic infarct secondary to atrial fibrillation.  He also has a PFO  I have asked him to continue Eliquis 5 mg twice daily and atorvastatin 40 mg daily for goal LDL of less than 100.  Patient's LDL was 129  Maintain blood pressure less than 130/90  I explained to the patient and his daughter that some of his mild stroke symptoms could persist or worsen if he is excessively tired or has an infection but if he notices a significant increase in his weakness he should go to the hospital to rule out a new stroke.    2. Mild dementia  Continue Namenda XR 28 mg daily    3. Chronic bilateral low back pain with bilateral sciatica  I have told him to reduce his dose of prednisone to 5 mg daily.  After that if he can tolerate it he should completely wean himself off of it  If he increased pain I can go up on his gabapentin to 600 mg at night.       Return in about 3 months (around 10/17/2019).         Rafaela Gutierrez MD

## 2019-07-23 PROBLEM — S42.201A FRACTURE OF PROXIMAL END OF RIGHT HUMERUS: Status: ACTIVE | Noted: 2019-01-01

## 2019-07-23 PROBLEM — R41.82 ACUTE ALTERATION IN MENTAL STATUS: Status: ACTIVE | Noted: 2019-01-01

## 2019-07-23 NOTE — TELEPHONE ENCOUNTER
Patient's sister called (Ami James) the office and stated the family was taking patient to the [facility] ER.    She stated that patient had a fall on Saturday and went to the [facility] ER and was told he had a broken shoulder.  He was to go to the orthopedic doctor today but the family stated he needed to go back to the ER due to pain.    He is not able to walk and he is dead weight.  Patient is drooling, showing signs of confusion and they are going to call EMS to come and get him and have him evaluated for possible admission.    They will keep us updated.

## 2019-07-24 NOTE — TELEPHONE ENCOUNTER
Regarding: Non-Urgent Medical Question  Contact: [phone]  ----- Message from Mychart, Generic sent at 7/24/2019  1:48 PM EDT -----    Dr. Gutierrez,  This is Vargas Alcantar's daughter. I wanted to let you know that dad fell at home on Saturday and fractured his right shoulder. He was sent home that evening but unfortunately he has taken a turn for the worse. It's as if the fall might have triggered the demencia. He is hallucinating and unaware of where he is half the time sometimes he is more with it than others. We decided to call 911 yesterday and he had been admitted at [facility] ER. They are doing all kinds of tests checking to see if the extreme pain he was in from the shoulder fractures might have caused another stroke. He is in room 346 in case you wanted to check on him. Just wanted to make you aware. I will update you when we know more. Thank you.   Denita

## 2019-07-24 NOTE — TELEPHONE ENCOUNTER
Can you please tell the daughter that I am not in the hospital this week but Dr. Martinez saw the patient and she is really good.  I reviewed all his testing done so far and luckily his MRI brain does not show any new strokes.  They are also going to be doing an EEG to rule out seizures but so far I feel the confusion may have started due to his pain medications in addition to the sleep deprivation.

## 2019-07-27 PROBLEM — R06.03 ACUTE RESPIRATORY DISTRESS: Status: ACTIVE | Noted: 2019-01-01

## 2019-08-01 PROBLEM — R42 DIZZINESS: Status: RESOLVED | Noted: 2019-01-01 | Resolved: 2019-01-01

## 2019-08-01 PROBLEM — R40.0 DAYTIME SOMNOLENCE: Status: RESOLVED | Noted: 2019-01-01 | Resolved: 2019-01-01

## 2019-08-01 PROBLEM — R53.1 ACUTE LEFT-SIDED WEAKNESS: Status: RESOLVED | Noted: 2019-01-01 | Resolved: 2019-01-01

## 2019-08-01 PROBLEM — R26.89 POOR BALANCE: Status: RESOLVED | Noted: 2019-01-01 | Resolved: 2019-01-01

## 2019-08-01 PROBLEM — F98.5 ACQUIRED STUTTERING: Status: RESOLVED | Noted: 2019-01-01 | Resolved: 2019-01-01

## 2019-08-01 PROBLEM — E04.1 THYROID NODULE: Status: RESOLVED | Noted: 2019-01-01 | Resolved: 2019-01-01

## 2019-08-01 PROBLEM — F01.A0: Status: RESOLVED | Noted: 2019-01-01 | Resolved: 2019-01-01

## 2019-08-01 PROBLEM — R35.0 FREQUENT URINATION: Status: RESOLVED | Noted: 2019-01-01 | Resolved: 2019-01-01

## 2019-08-01 PROBLEM — I69.30 SEQUELAE, POST-STROKE: Status: RESOLVED | Noted: 2019-01-01 | Resolved: 2019-01-01

## 2019-08-01 PROBLEM — R29.810 FACIAL DROOP DUE TO ACUTE STROKE (HCC): Status: RESOLVED | Noted: 2019-01-01 | Resolved: 2019-01-01

## 2019-08-01 PROBLEM — M62.81 RIGHT-SIDED MUSCLE WEAKNESS: Status: RESOLVED | Noted: 2019-01-01 | Resolved: 2019-01-01

## 2019-08-01 PROBLEM — Z79.52 CURRENT CHRONIC USE OF SYSTEMIC STEROIDS: Status: RESOLVED | Noted: 2019-01-01 | Resolved: 2019-01-01

## 2019-08-01 PROBLEM — E53.1 VITAMIN B6 DEFICIENCY: Status: RESOLVED | Noted: 2019-01-01 | Resolved: 2019-01-01

## 2019-08-01 PROBLEM — R20.0 NUMBNESS ON RIGHT SIDE: Status: RESOLVED | Noted: 2019-01-01 | Resolved: 2019-01-01

## 2019-08-01 PROBLEM — I63.9 FACIAL DROOP DUE TO ACUTE STROKE (HCC): Status: RESOLVED | Noted: 2019-01-01 | Resolved: 2019-01-01

## 2019-08-01 PROBLEM — R31.29 MICROHEMATURIA: Status: RESOLVED | Noted: 2019-01-01 | Resolved: 2019-01-01

## 2019-08-01 PROBLEM — F02.80 LEWY BODY DEMENTIA (HCC): Status: ACTIVE | Noted: 2019-01-01

## 2019-08-01 PROBLEM — R47.89 WORD FINDING DIFFICULTY: Status: RESOLVED | Noted: 2019-01-01 | Resolved: 2019-01-01

## 2019-08-01 PROBLEM — R35.1 NOCTURIA: Status: RESOLVED | Noted: 2019-01-01 | Resolved: 2019-01-01

## 2019-08-01 PROBLEM — I69.391 DYSPHAGIA DUE TO OLD CEREBROVASCULAR ACCIDENT: Status: RESOLVED | Noted: 2019-01-01 | Resolved: 2019-01-01

## 2019-08-01 PROBLEM — E55.9 VITAMIN D DEFICIENCY: Status: RESOLVED | Noted: 2019-01-01 | Resolved: 2019-01-01

## 2019-08-01 PROBLEM — G31.83 LEWY BODY DEMENTIA (HCC): Status: ACTIVE | Noted: 2019-01-01

## 2019-08-01 PROBLEM — I63.511 ACUTE ISCHEMIC RIGHT MCA STROKE (HCC): Status: RESOLVED | Noted: 2019-01-01 | Resolved: 2019-01-01

## 2019-08-01 PROBLEM — R26.89 SHUFFLING GAIT: Status: RESOLVED | Noted: 2019-01-01 | Resolved: 2019-01-01

## 2019-08-01 NOTE — THERAPY TREATMENT NOTE
Acute Care - Occupational Therapy Treatment Note  [facility]     Patient Name: Vargas De  : 1940  MRN: [id number]  Today's Date: 2019  Onset of Illness/Injury or Date of Surgery: 19  Date of Referral to OT: 19       Admit Date: 2019       ICD-10-CM ICD-9-CM   1. Acute alteration in mental status R41.82 780.97   2. Generalized weakness R53.1 780.79   3. Other closed displaced fracture of proximal end of right humerus with routine healing, subsequent encounter S42.291D V54.11   4. Elevated blood pressure reading with diagnosis of hypertension I10 401.9   5. Impaired functional mobility, balance, gait, and endurance Z74.09 V49.89   6. Impaired mobility and ADLs Z74.09 799.89   7. Cognitive communication deficit R41.841 799.52   8. Oropharyngeal dysphagia R13.12 787.22     Patient Active Problem List   Diagnosis   • Peripheral neuropathy   • Essential hypertension   • Crohn's colitis, with rectal bleeding (CMS/HCC)   • Major depressive disorder with single episode, in partial remission (CMS/HCC)   • Macular degeneration of both eyes   • GERD without esophagitis   • Cervicalgia   • Spinal stenosis in cervical region   • Chronic right-sided low back pain without sciatica   • Osteoarthritis of toe joint, right   • PFO (patent foramen ovale)   • Ulcerative colitis (CMS/HCC)   • Body mass index (BMI) of 25.0 to 29.9   • History of stroke with residual deficit   • Mixed hyperlipidemia   • Multiple lacunar infarcts   • Paroxysmal atrial fibrillation (CMS/HCC)   • Chronic bilateral low back pain   • Acute alteration in mental status   • Fracture of proximal end of right humerus   • Acute respiratory distress   • Lewy body dementia     Past Medical History:   Diagnosis Date   • Allergic arthritis    • Arthritis    • BPH (benign prostatic hyperplasia)      urology   • BPH/nocturia/microhematuria (work up negative)    • chronic hearing loss left ear    • Crohn's colitis (CMS/HCC)     on  colonoscopy 7/15.2017.  UC-continue Lialda   • Depression     medication, after death of wife 2014   • Elbow pain 7/25/2018   • Gout    • Hearing loss     left ear   • History of stroke 7/23/2018   • Hyperlipidemia    • Hypertension    • Ingrown toenail 03/18/2018   • Low back pain    • Macular degeneration    • Macular degeneration    • Multiple lacunar infarcts     noted on CT 2/2019   • Neck pain    • Rash 1/28/2019   • Rib pain on left side 07/11/2017   • Septic bursitis of elbow, right 7/23/2018   • Spinal stenosis     cervical and lumbosacral spine   • Spinal stenosis-cervical and lumbosacral spine    • Stroke (CMS/HCC)     aspirin, blood pressure conyrol.  Infarct frontal lobe.  R sided weakness, R hand and R foot numbness,dysphasia with stuttering.   • Stroke (CMS/HCC) 02/27/2019    MCA infarct with L facial droop and L sided weakness,dysphasia,dysarthria   • TIA (transient ischemic attack) 2/27/2019   • Ulcerative colitis (CMS/HCC)    • Urinary frequency      Past Surgical History:   Procedure Laterality Date   • CATARACT EXTRACTION     • COLONOSCOPY  07/15/2017   • HEMORRHOIDECTOMY     • HERNIA REPAIR     • KNEE SURGERY     • TONSILLECTOMY         Therapy Treatment    Rehabilitation Treatment Summary     Row Name 08/01/19 1440 08/01/19 1020          Treatment Time/Intention    Discipline  occupational therapist  -AN  physical therapist  -KM     Document Type  therapy note (daily note)  -AN  therapy note (daily note)  -KM     Subjective Information  no complaints  -AN  complains of;fatigue per family did not sleep well  -KM     Mode of Treatment  occupational therapy  -AN  physical therapy  -KM     Patient/Family Observations  pt reclined in chair, RN leaving  -AN  supine in bed, iv intact,R u/e in sling  -KM     Care Plan Review  care plan/treatment goals reviewed;risks/benefits reviewed  -AN  care plan/treatment goals reviewed;risks/benefits reviewed;patient/other agree to care plan  -KM     Therapy  Frequency (PT Clinical Impression)  --  daily  -KM     Patient Effort  good  -AN  good  -KM     Comment  New shoulder sling this date'RN deferred mobility due to increase HR  -AN2  --     Existing Precautions/Restrictions  --  fall sling R u/e  -KM     Treatment Considerations/Comments  R humeral fx, NWB, no shoulder ROM; AROM elbowo wrist hand  -AN2  R humeral fx  -KM     Recorded by [AN] Bibiana Lion, OT 08/01/19 1505  [AN2] Bibiana Lion, OT 08/01/19 1511 [KM] Denise Hdz, PT 08/01/19 1124     Row Name 08/01/19 1440 08/01/19 1020          Vital Signs    Pre Systolic BP Rehab  122  -AN  --     Pre Treatment Diastolic BP  79  -AN  --     Post Treatment Diastolic BP  121  (Significant)   -AN  --     Intratreatment Heart Rate (beats/min)  111  -AN  --     Pre SpO2 (%)  --  94  -KM     O2 Delivery Pre Treatment  --  supplemental O2  -KM     O2 Delivery Intra Treatment  --  supplemental O2  -KM     Post SpO2 (%)  --  94  -KM     O2 Delivery Post Treatment  --  supplemental O2  -KM     Recorded by [AN] Bibiana Lion, OT 08/01/19 1511 [KM] Denise Hdz, PT 08/01/19 1124     Row Name 08/01/19 1440 08/01/19 1020          Cognitive Assessment/Intervention- PT/OT    Affect/Mental Status (Cognitive)  WFL  -AN  --     Orientation Status (Cognition)  oriented x 3  -AN  oriented x 3  -KM     Follows Commands (Cognition)  --  follows one step commands;over 90% accuracy  -KM     Cognitive Function (Cognitive)  --  safety deficit  -KM     Personal Safety Interventions  --  fall prevention program maintained  -KM     Recorded by [AN] Bibiana Lion, OT 08/01/19 1511 [KM] Denise Hdz, PT 08/01/19 1124     Row Name 08/01/19 1020             Bed Mobility Assessment/Treatment    Scooting/Bridging [place] (Bed Mobility)  minimum assist (75% patient effort)  -KM      Supine-Sit [place] (Bed Mobility)  moderate assist (50% patient effort)  -KM      Bed Mobility, Safety Issues  decreased use of  arms for pushing/pulling;decreased use of legs for bridging/pushing  -KM      Assistive Device (Bed Mobility)  draw sheet;head of bed elevated  -KM      Recorded by [KM] Denise Hdz, PT 08/01/19 1124      Row Name 08/01/19 1020             Sit-Stand Transfer    Sit-Stand [place] (Transfers)  verbal cues;minimum assist (75% patient effort)  -KM      Recorded by [KM] Denise Hdz, PT 08/01/19 1124      Row Name 08/01/19 1020             Stand-Sit Transfer    Stand-Sit [place] (Transfers)  minimum assist (75% patient effort);verbal cues  -KM      Recorded by [KM] Denise Hdz, PT 08/01/19 1124      Row Name 08/01/19 1020             Gait/Stairs Assessment/Training    72495 - Gait Training Minutes   25  -KM      Gait/Stairs Assessment/Training  gait/ambulation independence  -KM      [place] Level (Gait)  minimum assist (75% patient effort);2 person assist;verbal cues;1 person to manage equipment followed with chair  -KM      Assistive Device (Gait)  other (see comments) L u/e support  -KM      Distance in Feet (Gait)  60+45  -KM      Pattern (Gait)  step-through  -KM      Deviations/Abnormal Patterns (Gait)  huber decreased;stride length decreased  -KM      Bilateral Gait Deviations  forward flexed posture  -KM      Recorded by [KM] Denise Hdz, PT 08/01/19 1124      Row Name 08/01/19 1440             Upper Body Dressing Assessment/Training    Comment (Upper Body Dressing)  Cheri harden,  max assist,educated family  -AN      Recorded by [AN] Bibiana Lion OT 08/01/19 1511      Row Name 08/01/19 1440             Therapeutic Exercise    81496 - OT Therapeutic Exercise Minutes  8  -AN      Recorded by [AN] Bibiana Lion OT 08/01/19 1511      Row Name 08/01/19 1440 08/01/19 1020          Therapeutic Exercise    Upper Extremity Range of Motion (Therapeutic Exercise)  elbow flexion/extension, right;forearm supination/pronation, right;wrist  flexion/extension, right  -AN  --     Hand (Therapeutic Exercise)  finger flexion/extension, right  -AN  --     Lower Extremity (Therapeutic Exercise)  --  LAQ (long arc quad), bilateral;marching while seated  -KM     Lower Extremity Range of Motion (Therapeutic Exercise)  --  ankle dorsiflexion/plantar flexion, bilateral  -KM     Exercise Type (Therapeutic Exercise)  --  AROM (active range of motion)  -KM     Position (Therapeutic Exercise)  --  seated  -KM     Sets/Reps (Therapeutic Exercise)  --  1/10  -KM     Comment (Therapeutic Exercise)  Pt with increased elbow pain, only 5 reps  -AN  --     Recorded by [AN] Bibiana Lion, OT 08/01/19 1511 [KM] Denise Hdz, PT 08/01/19 1124     Row Name 08/01/19 1020             Static Standing Balance    Level of [place] (Supported Standing, Static Balance)  contact guard assist  -KM      Recorded by [KM] Denise Hdz, PT 08/01/19 1124      Row Name 08/01/19 1020             Dynamic Standing Balance    Level of [place], Reaches Outside Midline (Standing, Dynamic Balance)  minimal assist, 75% patient effort  -KM      Recorded by [KM] Denise Hdz, PT 08/01/19 1124      Row Name 08/01/19 1020             Positioning and Restraints    Pre-Treatment Position  in bed  -KM      Post Treatment Position  chair  -KM      In Chair  reclined;call light within reach;encouraged to call for assist;exit alarm on;with family/caregiver  -KM      Recorded by [KM] Denise Hdz, PT 08/01/19 1124      Row Name 08/01/19 1440             Pain Scale: Numbers Pre/Post-Treatment    Pain Location - Side  Right  -AN      Pain Location  elbow  -AN      Recorded by [AN] Bibiana Lion, OT 08/01/19 1511      Row Name 08/01/19 1440 08/01/19 1020          Pain Scale: FACES Pre/Post-Treatment    Pain: FACES Scale, Pretreatment  4-->hurts little more  -AN  2-->hurts little bit  -KM     Pain: FACES Scale, Post-Treatment  4-->hurts little more  -AN  4-->hurts  little more  -KM     Recorded by [AN] Bibiana Lion, OT 08/01/19 1511 [KM] Denise Hdz, PT 08/01/19 1124     Row Name                Wound 07/23/19 2130 Right anterior toe pressure injury    Wound - Properties Group Date first assessed: 07/23/19 [DB] Time first assessed: 2130 [DB] Present On Admission : yes [DB] Side: Right [DB] Orientation: anterior [DB] Location: toe [DB] Type: pressure injury [DB] Stage, Pressure Injury: Stage 1 [DB] Recorded by:  [DB] Henok Marie RN 07/24/19 0229    Row Name                Wound 07/20/19 1649 Left hand skin tear    Wound - Properties Group Date first assessed: 07/20/19 [SW] Time first assessed: 1649 [SW] Present On Admission : yes [SW] Side: Left [SW] Location: hand [SW] Type: skin tear [SW] Recorded by:  [SW] Odilia Joy RN 07/20/19 1649    Row Name                Wound 07/20/19 1648 Right knee abrasion    Wound - Properties Group Date first assessed: 07/20/19 [SW] Time first assessed: 1648 [SW] Present On Admission : yes [SW] Side: Right [SW] Location: knee [SW] Type: abrasion [SW] Recorded by:  [SW] Odilia Joy RN 07/20/19 1648    Row Name 08/01/19 1440 08/01/19 1020          Plan of Care Review    Plan of Care Reviewed With  patient;family  -AN  patient;family  -KM     Recorded by [AN] Bibiana Lion, OT 08/01/19 1511 [KM] Denise Hdz, PT 08/01/19 1124     Row Name 08/01/19 1440             Outcome Summary/Treatment Plan (OT)    Daily Summary of Progress (OT)  progress toward functional goals as expected  -AN      Barriers to Overall Progress (OT)  medical changes  -AN      Anticipated Discharge Disposition (OT)  inpatient rehabilitation facility  -AN      Recorded by [AN] Bibiana Lion, OT 08/01/19 1511        User Key  (r) = Recorded By, (t) = Taken By, (c) = Cosigned By    Initials Name Effective Dates Discipline    KM Denise Hdz, PT 06/19/15 -  PT    Bibiana Barrera, OT 06/22/15 -  OT    Henok Vega  RN 06/16/16 -  Nurse    Odilia Gordillo RN 06/16/16 -  Nurse        Wound 07/20/19 1648 Right knee abrasion (Active)   Dressing Appearance open to air 8/1/2019  8:30 AM   Base scab 8/1/2019  8:30 AM   Periwound intact 8/1/2019  4:05 AM   Periwound Temperature warm 8/1/2019  4:05 AM   Periwound Skin Turgor soft 8/1/2019  4:05 AM   Drainage Amount none 8/1/2019  4:05 AM   Dressing Care, Wound open to air 8/1/2019  4:05 AM       Wound 07/20/19 1649 Left hand skin tear (Active)   Dressing Appearance dry;intact 8/1/2019  8:30 AM   Closure DEMAR 8/1/2019  8:30 AM   Base scab;dry 8/1/2019  4:05 AM   Periwound Temperature warm 8/1/2019  4:05 AM   Periwound Skin Turgor soft 8/1/2019  4:05 AM   Drainage Amount none 8/1/2019  4:05 AM   Dressing Care, Wound dressing reinforced 8/1/2019  4:05 AM       Wound 07/23/19 2130 Right anterior toe pressure injury (Active)   Dressing Appearance open to air 8/1/2019  4:05 AM   Closure Open to air 8/1/2019  4:05 AM   Base red;non-blanchable 8/1/2019  4:05 AM   Red (%), Wound Tissue Color 100 8/1/2019  4:05 AM   Periwound intact;dry 8/1/2019  4:05 AM   Periwound Temperature warm 8/1/2019  4:05 AM   Periwound Skin Turgor soft 8/1/2019  4:05 AM   Drainage Amount none 8/1/2019  4:05 AM   Dressing Care, Wound open to air 8/1/2019  4:05 AM   Periwound Care, Wound dry periwound area maintained 8/1/2019  4:05 AM       Occupational Therapy Education     Title: PT OT SLP Therapies (In Progress)     Topic: Occupational Therapy (In Progress)     Point: ADL training (Done)     Description: Instruct learner(s) on proper safety adaptation and remediation techniques during self care or transfers.   Instruct in proper use of assistive devices.    Learning Progress Summary           Patient Acceptance, E,D, VU,NR by AN at 8/1/2019  2:40 PM    Comment:  Educated on new sling management, reviewed R UE protection.    Acceptance, E,H, MARITZA,NR by AN at 7/26/2019  2:55 PM    Comment:  Educated on R UE AROM ex  and shoulder restrictions.    Acceptance, E,D, VU,NR by AN at 7/24/2019  9:02 AM    Comment:  Current deficits, OT role and discharge.   Family Acceptance, E,H, VU,NR by AN at 7/26/2019  2:55 PM    Comment:  Educated on R UE AROM ex and shoulder restrictions.    Acceptance, E,D, VU,NR by AN at 7/24/2019  9:02 AM    Comment:  Current deficits, OT role and discharge.                   Point: Home exercise program (Done)     Description: Instruct learner(s) on appropriate technique for monitoring, assisting and/or progressing therapeutic exercises/activities.    Learning Progress Summary           Patient Acceptance, E,D, VU,NR by AN at 8/1/2019  2:40 PM    Comment:  Educated on new sling management, reviewed R UE protection.    Acceptance, D,E, VU,NR by AN at 7/30/2019  2:38 PM    Comment:  Educated on R UE elbow, wrist, hand ROM; protect R UE by elevation and sling tightened while sleeping.    Acceptance, E,H, VU,NR by AN at 7/26/2019  2:55 PM    Comment:  Educated on R UE AROM ex and shoulder restrictions.   Family Acceptance, D,E, VU,NR by AN at 7/30/2019  2:38 PM    Comment:  Educated on R UE elbow, wrist, hand ROM; protect R UE by elevation and sling tightened while sleeping.    Acceptance, E,H, VU,NR by AN at 7/26/2019  2:55 PM    Comment:  Educated on R UE AROM ex and shoulder restrictions.                               User Key     Initials Effective Dates Name Provider Type Discipline     06/22/15 -  Bibiana Lion, OT Occupational Therapist OT                OT Recommendation and Plan  Outcome Summary/Treatment Plan (OT)  Daily Summary of Progress (OT): progress toward functional goals as expected  Barriers to Overall Progress (OT): medical changes  Plan for Continued Treatment (OT): continue POC  Anticipated Discharge Disposition (OT): inpatient rehabilitation facility  Therapy Frequency (OT Eval): daily  Daily Summary of Progress (OT): progress toward functional goals as expected  Plan of Care  Review  Plan of Care Reviewed With: patient, family  Plan of Care Reviewed With: patient, family  Outcome Summary: Pt with increased elbow pain, increased HR this date, limited mobilty/activities. Provided new sling for R UE for comfort and support. Continue POC.  Outcome Measures     Row Name 08/01/19 1440 08/01/19 1020 07/31/19 1335       How much help from another person do you currently need...    Turning from your back to your side while in flat bed without using bedrails?  --  3  -KM  3  -AA    Moving from lying on back to sitting on the side of a flat bed without bedrails?  --  3  -KM  3  -AA    Moving to and from a bed to a chair (including a wheelchair)?  --  3  -KM  3  -AA    Standing up from a chair using your arms (e.g., wheelchair, bedside chair)?  --  3  -KM  3  -AA    Climbing 3-5 steps with a railing?  --  2  -KM  1  -AA    To walk in hospital room?  --  2  -KM  2  -AA    AM-PAC 6 Clicks Score (PT)  --  16  -KM  15  -AA       How much help from another is currently needed...    Putting on and taking off regular lower body clothing?  1  -AN  --  --    Bathing (including washing, rinsing, and drying)  2  -AN  --  --    Toileting (which includes using toilet bed pan or urinal)  1  -AN  --  --    Putting on and taking off regular upper body clothing  1  -AN  --  --    Taking care of personal grooming (such as brushing teeth)  2  -AN  --  --    Eating meals  2  -AN  --  --    AM-PAC 6 Clicks Score (OT)  9  -AN  --  --       Functional Assessment    Outcome Measure Options  --  AM-PAC 6 Clicks Basic Mobility (PT)  -KM  AM-PAC 6 Clicks Basic Mobility (PT)  -AA    Row Name 07/30/19 1438 07/30/19 1353          How much help from another person do you currently need...    Turning from your back to your side while in flat bed without using bedrails?  --  3  -AA     Moving from lying on back to sitting on the side of a flat bed without bedrails?  --  2  -AA     Moving to and from a bed to a chair (including a  wheelchair)?  --  2  -AA     Standing up from a chair using your arms (e.g., wheelchair, bedside chair)?  --  3  -AA     Climbing 3-5 steps with a railing?  --  1  -AA     To walk in hospital room?  --  2  -AA     AM-PAC 6 Clicks Score (PT)  --  13  -AA        How much help from another is currently needed...    Putting on and taking off regular lower body clothing?  1  -AN  --     Bathing (including washing, rinsing, and drying)  2  -AN  --     Toileting (which includes using toilet bed pan or urinal)  1  -AN  --     Putting on and taking off regular upper body clothing  1  -AN  --     Taking care of personal grooming (such as brushing teeth)  2  -AN  --     Eating meals  2  -AN  --     AM-PAC 6 Clicks Score (OT)  9  -AN  --        Functional Assessment    Outcome Measure Options  --  AM-PAC 6 Clicks Basic Mobility (PT)  -AA       User Key  (r) = Recorded By, (t) = Taken By, (c) = Cosigned By    Initials Name Provider Type    Denise Titus, PT Physical Therapist    Bibiana Barrera, OT Occupational Therapist    Mere Mullins, PT Physical Therapist           Time Calculation:   Time Calculation- OT     Row Name 08/01/19 1514 08/01/19 1440 08/01/19 1020       Time Calculation- OT    OT Start Time  1440  -AN  --  --    Total Timed Code Minutes- OT  8 minute(s)  -AN  --  --    OT Received On  08/01/19  -AN  --  --    OT Goal Re-Cert Due Date  08/03/19  -AN  --  --       Timed Charges    82986 - OT Therapeutic Exercise Minutes  --  8  -AN  --    22231 - Gait Training Minutes   --  --  25  -KM      User Key  (r) = Recorded By, (t) = Taken By, (c) = Cosigned By    Initials Name Provider Type    Denise Titus, PT Physical Therapist    Bibiana Barrera, OT Occupational Therapist        Therapy Charges for Today     Code Description Service Date Service Provider Modifiers Qty    [id number]  OT THER PROC EA 15 MIN 8/1/2019 Bibiana Lion OT GO 1               Bibiana Lion OT  8/1/2019

## 2019-08-01 NOTE — PLAN OF CARE
Problem: Patient Care Overview  Goal: Plan of Care Review  Outcome: Ongoing (interventions implemented as appropriate)   08/01/19 0333 08/01/19 0442   Coping/Psychosocial   Plan of Care Reviewed With --  patient;family   Plan of Care Review   Progress --  no change   OTHER   Outcome Summary A & O x 4, NSR, RA during the day & 2L NC at night, using incentive spirometer up to 750, RUE pain increased to 6 out of 10/Ropivicaine was increased to 10ml/hr x 2 hrs, polyuria - urine clear yellow without sediment/denies dysuria or flank pain/afebrile/has urgency at baseline/family concerned that IVFs should be decreased since Pt is now consuming liquids PO, Pt and family did request med to help with anxiety so he could sleep/Atarax given but with little effect, will continue to monitor --        Problem: Fall Risk (Adult)  Goal: Identify Related Risk Factors and Signs and Symptoms  Outcome: Ongoing (interventions implemented as appropriate)    Goal: Absence of Fall  Outcome: Outcome(s) achieved Date Met: 08/01/19      Problem: Pain, Chronic (Adult)  Goal: Acceptable Pain/Comfort Level and Functional Ability  Outcome: Ongoing (interventions implemented as appropriate)      Problem: Skin Injury Risk (Adult)  Goal: Skin Health and Integrity  Outcome: Ongoing (interventions implemented as appropriate)      Problem: Palliative Care (Adult)  Goal: Maximized Comfort  Outcome: Ongoing (interventions implemented as appropriate)    Goal: Enhanced Quality of Life  Outcome: Ongoing (interventions implemented as appropriate)      Problem: Confusion, Chronic (Adult)  Goal: Cognitive/Functional Impairments Minimized  Outcome: Ongoing (interventions implemented as appropriate)    Goal: Free from Harm/Injuries  Outcome: Ongoing (interventions implemented as appropriate)      Problem: Pain, Acute (Adult)  Goal: Acceptable Pain Control/Comfort Level  Outcome: Ongoing (interventions implemented as appropriate)

## 2019-08-01 NOTE — PLAN OF CARE
Problem: Patient Care Overview  Goal: Plan of Care Review  Outcome: Ongoing (interventions implemented as appropriate)   08/01/19 1440   Coping/Psychosocial   Plan of Care Reviewed With patient;family   OTHER   Outcome Summary Pt with increased elbow pain, increased HR this date, limited mobilty/activities. Provided new sling for R UE for comfort and support. Continue POC.

## 2019-08-01 NOTE — PROGRESS NOTES
Continued Stay Note  [facility]     Patient Name: Vargas De  MRN: [id number]  Today's Date: 8/1/2019    Admit Date: 7/23/2019    Discharge Plan     Row Name 08/01/19 1459       Plan    Plan  Inpatient rehab    Patient/Family in Agreement with Plan  yes    Plan Comments  Spoke with patient's daugther at bedside.  Patient up to chair today.  Continuing to improve daily.  Daughter has questions about plans for the future, and weather it is appropriate for patient to go to [facility] versus SNF, or if patient should go home with DME.  Discussed disease process with daughter, and plan of care as written by MD.  Plan to discontinue nerve block 8/2, and attempt to control pain with PO pain medications.   Per [facility] recomendations, patient should have adequete pain control for 24-48 hours prior to transferring to facility.  Daughter understands that plan for placement will likely need to be re-evalutated early next week, but that case management is available for questions and discharge planning as needed.  Will continue to follow, and assist with planning for discharge.      Final Discharge Disposition Code  62 - inpatient rehab facility        Discharge Codes    No documentation.       Expected Discharge Date and Time     Expected Discharge Date Expected Discharge Time    Aug 2, 2019             Olesya Barton RN

## 2019-08-01 NOTE — PROGRESS NOTES
[facility] Medicine Services  PROGRESS NOTE    Patient Name: Vargas De  : 1940  MRN: [id number]    Date of Admission: 2019  Length of Stay: 9  Primary Care Physician: Saba Arrington MD    Subjective   Subjective     CC:  AMS     HPI:  Difficult night due to frequent urination, general discomfort and not being able to sleep. Elbow is bothersome. Weaned to room air yesterday    Review of Systems  Gen- No fevers, chills  CV- No chest pain, palpitations  Resp- No cough, dyspnea  GI- No N/V/D, abd pain    Otherwise ROS is negative except as mentioned in the HPI.    Objective   Objective     Vital Signs:   Temp:  [97.4 °F (36.3 °C)-98.6 °F (37 °C)] 98.6 °F (37 °C)  Heart Rate:  [] 119  Resp:  [16-20] 16  BP: (116-164)/() 116/85  Total (NIH Stroke Scale): 9     Physical Exam:  Constitutional: No acute distress, awake, alert  HENT: NCAT, mucous membranes moist  Respiratory: coarse but improving   Cardiovascular: tachycardic, no murmurs, rubs, or gallops, palpable pedal pulses bilaterally  Gastrointestinal: Positive bowel sounds, soft, nontender, nondistended  Musculoskeletal: No bilateral ankle edema; right arm sling   Psychiatric: Appropriate affect, cooperative  Neurologic: Oriented x 3, strength symmetric in all extremities, Cranial Nerves grossly intact to confrontation, speech clear  Skin: No rashes      Results Reviewed:  I have personally reviewed current lab, radiology, and data and agree.    Results from last 7 days   Lab Units 19  0147   WBC 10*3/mm3 4.45 6.14 7.14  --    HEMOGLOBIN g/dL 8.2* 10.9* 11.1*  --    HEMATOCRIT % 26.9* 34.2* 35.3*  --    PLATELETS 10*3/mm3 182 253 257  --    PROCALCITONIN ng/mL  --   --   --  0.11     Results from last 7 days   Lab Units 19  0647 19  19319  042   SODIUM mmol/L 137  --  137  --  142   POTASSIUM mmol/L 3.2* 4.3 3.1*   < >  3.3*   CHLORIDE mmol/L 108*  --  102  --  104   CO2 mmol/L 21.0*  --  23.0  --  25.0   BUN mg/dL 5*  --  9  --  16   CREATININE mg/dL 0.41*  --  0.62*  --  0.65*   GLUCOSE mg/dL 90  --  100*  --  97   CALCIUM mg/dL 7.7*  --  8.2*  --  8.5*   ALT (SGPT) U/L 41  --  46*  --  53*   AST (SGOT) U/L 35  --  39  --  50*    < > = values in this interval not displayed.     Estimated Creatinine Clearance: 82.8 mL/min (A) (by C-G formula based on SCr of 0.41 mg/dL (L)).    Microbiology Results Abnormal     Procedure Component Value - Date/Time    Blood Culture - Blood, Arm, Left [[id number]] Collected:  07/27/19 0152    Lab Status:  Final result Specimen:  Blood from Arm, Left Updated:  08/01/19 0431     Blood Culture No growth at 5 days    Blood Culture - Blood, Hand, Left [[id number]] Collected:  07/27/19 0147    Lab Status:  Final result Specimen:  Blood from Hand, Left Updated:  08/01/19 0245     Blood Culture No growth at 5 days    Urine Culture - Urine, Urine, Clean Catch [[id number]]  (Normal) Collected:  07/28/19 1430    Lab Status:  Final result Specimen:  Urine, Clean Catch Updated:  07/29/19 1856     Urine Culture No growth    Beta Strep Culture, Throat - Swab, Throat [[id number]]  (Normal) Collected:  07/27/19 1630    Lab Status:  Final result Specimen:  Swab from Throat Updated:  07/29/19 1355     Throat Culture, Beta Strep No Beta Hemolytic Streptococcus Isolated    Narrative:       Group A Strep incidence is low in adults. Positive culture for Beta hemolytic Streptococcus species can reflect colonization and not true infection. Please correlate clinically.    Blood Culture - Blood, Arm, Left [[id number]] Collected:  07/23/19 1334    Lab Status:  Final result Specimen:  Blood from Arm, Left Updated:  07/28/19 1415     Blood Culture No growth at 5 days    Blood Culture - Blood, Hand, Right [[id number]] Collected:  07/23/19 1341    Lab Status:  Final result Specimen:  Blood from Hand, Right Updated:  07/28/19 2776      Blood Culture No growth at 5 days    Rapid Strep A Screen - Swab, Throat [[id number]]  (Normal) Collected:  07/27/19 1630    Lab Status:  Final result Specimen:  Swab from Throat Updated:  07/27/19 1727     Strep A Ag Negative    Narrative:       Test performed by Direct Antigen Testing.          Imaging Results (last 24 hours)     ** No results found for the last 24 hours. **          Results for orders placed during the hospital encounter of 07/23/19   Adult Transthoracic Echo Complete W/ Cont if Necessary Per Protocol    Narrative · Left ventricular systolic function is normal. Estimated EF = 60%.  · Left ventricular diastolic dysfunction (grade I) consistent with   impaired relaxation.  · Left atrial cavity size is borderline dilated.  · There is mild calcification of the aortic valve.          I have reviewed the medications:  Scheduled Meds:  acetaminophen 650 mg Oral Q8H   amoxicillin-clavulanate 1 tablet Oral Q12H   apixaban 5 mg Oral Q12H   atorvastatin 80 mg Oral Nightly   bisoprolol 5 mg Oral Q24H   diclofenac 2 g Topical 4x Daily   ipratropium-albuterol 3 mL Nebulization 4x Daily - RT   lidocaine 2 patch Transdermal Nightly   mesalamine 0.75 g Oral Nightly   pantoprazole 40 mg Oral Q AM   predniSONE 5 mg Oral Daily With Breakfast   sodium chloride 3 mL Intravenous Q12H     Continuous Infusions:  ropivacaine (NAROPIN) 0.2% peripheral nerve cath (moog) 6 mL/hr Last Rate: 6 mL/hr (08/01/19 0658)     PRN Meds:.•  acetaminophen  •  calcium carbonate  •  loperamide  •  magic mouthwash  •  melatonin  •  phenol  •  potassium chloride  •  potassium chloride  •  [DISCONTINUED] potassium chloride **OR** [DISCONTINUED] potassium chloride **OR** potassium chloride  •  saline  •  sodium chloride  •  sodium chloride      Assessment/Plan   Assessment / Plan     Active Hospital Problems    Diagnosis  POA   • **Acute alteration in mental status [R41.82]  Yes   • Acute respiratory distress [R06.03]  Yes   • Fracture of  proximal end of right humerus [S42.201A]  Yes   • Paroxysmal atrial fibrillation (CMS/HCC) [I48.0]  Yes   • Mixed hyperlipidemia [E78.2]  Yes   • Multiple lacunar infarcts [I63.81]  Yes   • PFO (patent foramen ovale) [Q21.1]  Not Applicable   • GERD without esophagitis [K21.9]  Yes   • Benign essential hypertension [I10]  Yes   • Crohn's colitis, with rectal bleeding (CMS/HCC) [K50.111]  Yes      Resolved Hospital Problems   No resolved problems to display.        Brief Hospital Course to date:  Vargas De is a 78 y.o. male with a past medical history significant for ulcerative colitis on chronic prednisone therapy, chronic pain with spinal stenosis of the cervical region, hyperlipidemia, hypertension, PFO, atrial fibrillation ( on Eliquis) mild dementia, and multiple lacunar infarcts who presents with acute change in mental status.  MRI negative for CVA.  EEG negative for seizures.     Sepsis  Acute hypoxic respiratory failure  Likely aspiration   - Suspect likely aspiration as patient without the ability to clear secretions.  Frequent suctioning which is improving   - Fever curve improved   - Patient was started on zosyn 7/28 - will transition to Augmentin to complete 7 days   - BCx NGTD; strep negative   -Patient with swollen uvula with exudate.  CT scan without contrast shows narrowing of his airway. The scan without contrast not ordered as patient with iodine allergy; ENT eval; cont PEEP if needed; diminished oropharyngeal sensation  - Code status changed to DNR   - Speech eval 7/29 with modified diet; continue to monitor   - Palliative following    - Overall slowly improving      AMS:  Suspected Lewy body dementia  - improved with discontinuation of Seroquel and narcotics   -  Neurology followed;  etiology likely worsening of lewy body dementia in setting of sleep deprivation, pain as well as recent fracture   - continue home statin, ASA     Dysphasia  - improving;  mental status is improving  - Speech  eval 7/29 with diet modifications     Right Humerus Fracture:  - pain control as needed  - consult to ortho, non-operative management, therapy as tolerated, appreciate assistance   - continue with therapy as above  - Dr. Steinberg follow up in 10 days   - s/p nerve block 7/29; consider discontinuing 8/2 and will need to adjust pain control      PAF:  - sinus on admission  - [id number] = 5. On Eliquis.     Junctional rhythml; tachycardia  - Resume bisoprolol; cards consult   - K replaced; check Mg     Frequent urination   - UA ok; check PVR      Right wrist pain  -imaging with no acute fracture      History of CVA  - workup in May and CVA felt to be embolic; follows with Dr. Gutierrez and Dr. Maldonado. Has been on Eliquis and ASA discontinued       Chronic steroid:  - on 5 mg daily for Crohn's/lumbar stenosis. Currently being weaned down, to eventually discontinue; will resume home prednisone       HTN:  - stable. Home meds held due fluctuating BP. Will resume bisoprolol and amlodipine; monitor        DVT prophylaxis:   Eliquis     Disposition: I expect the patient to be discharged TBD       CODE STATUS:   Code Status and Medical Interventions:   Ordered at: 07/28/19 1018     Limited Support to NOT Include:    Intubation     Level Of Support Discussed With:    Health Care Surrogate    Next of Kin (If No Surrogate)     Code Status:    No CPR     Medical Interventions (Level of Support Prior to Arrest):    Limited         Electronically signed by Traci Lewis DO, 08/01/19, 1:55 PM.

## 2019-08-01 NOTE — PLAN OF CARE
Problem: Patient Care Overview  Goal: Interprofessional Rounds/Family Conf  Outcome: Ongoing (interventions implemented as appropriate)  Palliative Care Team Meeting 1300:  Dr. Marlo Morin, ANA MARÍA German, Jeanine Gordon RN, PN, and Sherry Javier LCSW   08/01/19 1300   Interdisciplinary Rounds/Family Conf   Summary Patient is sleeping sitting up in a chair, daughter did not want him awaken. Daughter is concern that it is requiring family to stay with him due to restlessness. He wants to go home. Pallitive Trudy GOTTI saw and daughter did not want her to adjust his medication. Palliative will continue to follow and support.   Participants advanced practice nurse;nursing;physician;social work/services

## 2019-08-01 NOTE — PROGRESS NOTES
"Palliative Care Consult Note    Patient Name: Vargas De   : 1940  Sex: male    Date of Admission: 2019    Code Status: No CPR.  DNI. No feeding tube per daughter  NOK: Four Children Denita Flor  -[id number] (patient lives with her), Avi Ami and Vargas Dueñas     Subjective:  78-year-old white male with history of recent stroke, ulcerative colitis/prednisone/mesalamine, chronic back pain/cervical spinal stenosis, A. fib/Eliquis, and mild LBD and vascular dementia (recent embolic stroke) who fell last Saturday and fractured his right humerus.     Per daughter-daytime hallucinations did not occur until after humerus fracture.  At home he was able to ambulate, go up and down stairs, make his own meals, and was still driving some.   Underwent peripheral nerve catheter for ropivacaine infusion right humerus fracture pain control. Speech eval  with modified diet/honey thick ordered.  Continues to have difficulty managing oral secretions and using Yaunker suction tip frequently-especially after meals.   Patient sitting up in recliner eating lunch when I came by today. Daughter reports no new concerns. She feels that his symptoms are well managed right now. She does express some concern about how she is going to care for him in the home. She would like for him to get some rehab first.      CC: \"My elbow is getting sore.\"      Past 24hrs:  no events      Meds given for symptoms: PRN Tylenol.      ROS:  Review of Systems   All other systems reviewed and are negative.      Reviewed current scheduled and prn medications for route, type, dose and frequency.    ropivacaine (NAROPIN) 0.2% peripheral nerve cath (moog) 6 mL/hr Last Rate: 6 mL/hr (19 0658)     •  acetaminophen  •  calcium carbonate  •  loperamide  •  magic mouthwash  •  melatonin  •  phenol  •  potassium chloride  •  potassium chloride  •  [DISCONTINUED] potassium chloride **OR** [DISCONTINUED] potassium chloride **OR** potassium " "chloride  •  saline  •  sodium chloride  •  sodium chloride    Objective:   /85 (BP Location: Left arm, Patient Position: Sitting)   Pulse 119   Temp 98.6 °F (37 °C) (Oral)   Resp 16   Ht 170.2 cm (67\")   Wt 87.1 kg (192 lb 0.3 oz)   SpO2 97%   BMI 30.07 kg/m²    Intake & Output (last day)       07/31 0701 - 08/01 0700 08/01 0701 - 08/02 0700    P.O. 120     I.V. (mL/kg) 692 (7.9)     IV Piggyback 100     Epidural      Total Intake(mL/kg) 912 (10.5)     Urine (mL/kg/hr) 1365 (0.7) 400 (0.6)    Stool 0 0    Total Output 1365 400    Net -453 -400          Urine Unmeasured Occurrence 3 x     Stool Unmeasured Occurrence 5 x 1 x        Lab Results (last 24 hours)     Procedure Component Value Units Date/Time    Comprehensive Metabolic Panel [[id number]]  (Abnormal) Collected:  08/01/19 0647    Specimen:  Blood Updated:  08/01/19 0800     Glucose 90 mg/dL      BUN 5 mg/dL      Creatinine 0.41 mg/dL      Sodium 137 mmol/L      Potassium 3.2 mmol/L      Chloride 108 mmol/L      CO2 21.0 mmol/L      Calcium 7.7 mg/dL      Total Protein 5.6 g/dL      Albumin 2.20 g/dL      ALT (SGPT) 41 U/L      AST (SGOT) 35 U/L      Alkaline Phosphatase 78 U/L      Total Bilirubin 0.6 mg/dL      eGFR Non African Amer >150 mL/min/1.73      Globulin 3.4 gm/dL      A/G Ratio 0.6 g/dL      BUN/Creatinine Ratio 12.2     Anion Gap 8.0 mmol/L     Narrative:       GFR Normal >60  Chronic Kidney Disease <60  Kidney Failure <15    CBC & Differential [[id number]] Collected:  08/01/19 0647    Specimen:  Blood Updated:  08/01/19 0736    Narrative:       The following orders were created for panel order CBC & Differential.  Procedure                               Abnormality         Status                     ---------                               -----------         ------                     Scan Slide[[id number]]                                                                  CBC Auto Differential[[id number]]        Abnormal            " Final result                 Please view results for these tests on the individual orders.    CBC Auto Differential [[id number]]  (Abnormal) Collected:  08/01/19 0647    Specimen:  Blood Updated:  08/01/19 0736     WBC 4.45 10*3/mm3      RBC 2.55 10*6/mm3      Hemoglobin 8.2 g/dL      Hematocrit 26.9 %      .5 fL      MCH 32.2 pg      MCHC 30.5 g/dL      RDW 13.4 %      RDW-SD 51.6 fl      MPV 9.4 fL      Platelets 182 10*3/mm3      Neutrophil % 54.0 %      Lymphocyte % 25.2 %      Monocyte % 11.9 %      Eosinophil % 8.1 %      Basophil % 0.4 %      Immature Grans % 0.4 %      Neutrophils, Absolute 2.40 10*3/mm3      Lymphocytes, Absolute 1.12 10*3/mm3      Monocytes, Absolute 0.53 10*3/mm3      Eosinophils, Absolute 0.36 10*3/mm3      Basophils, Absolute 0.02 10*3/mm3      Immature Grans, Absolute 0.02 10*3/mm3      nRBC 0.0 /100 WBC     Urinalysis With Culture If Indicated - Urine, Clean Catch [[id number]]  (Normal) Collected:  08/01/19 0622    Specimen:  Urine, Clean Catch Updated:  08/01/19 0700     Color, UA Yellow     Appearance, UA Clear     pH, UA 8.0     Specific Gravity, UA 1.006     Glucose, UA Negative     Ketones, UA Negative     Bilirubin, UA Negative     Blood, UA Negative     Protein, UA Negative     Leuk Esterase, UA Negative     Nitrite, UA Negative     Urobilinogen, UA 0.2 E.U./dL    Narrative:       Urine microscopic not indicated.    Blood Culture - Blood, Arm, Left [[id number]] Collected:  07/27/19 0152    Specimen:  Blood from Arm, Left Updated:  08/01/19 0431     Blood Culture No growth at 5 days    Blood Culture - Blood, Hand, Left [[id number]] Collected:  07/27/19 0147    Specimen:  Blood from Hand, Left Updated:  08/01/19 0245     Blood Culture No growth at 5 days    Potassium [[id number]]  (Normal) Collected:  07/31/19 1938    Specimen:  Blood Updated:  07/31/19 2007     Potassium 4.3 mmol/L         Imaging Results (last 24 hours)     ** No results found for the last 24 hours. **           Physical Exam:  Gen: Elderly white male lying in bed.  Weak and fatigued but NAD  HEENT: patient chewing and swallowing his lunch with no cough or trouble with secretions at this time  Card: S1-S2-regular rate and rhythm.  No edema.  Pulm: Respirations unlabored.  Not using O2.  No rhonchi heard.  GI: Flat, soft, non-tender.  Very quiet bowel sounds.  Neuro: Awake but fatigued.    Psych: Calm        PMHx:    Acute alteration in mental status    Benign essential hypertension    Crohn's colitis, with rectal bleeding (CMS/HCC)    GERD without esophagitis    PFO (patent foramen ovale)    Mixed hyperlipidemia    Multiple lacunar infarcts    Paroxysmal atrial fibrillation (CMS/HCC)    Fracture of proximal end of right humerus    Acute respiratory distress      Assessment/Plan:   Info:   78-year-old white male with history of recent stroke, ulcerative colitis/prednisone/mesalamine, chronic back pain/cervical spinal stenosis, A. fib/Eliquis, and mild LBD and vascular dementia  who fractured right humerus.      Currently has peripheral nerve catheter with ropivacaine infusion right humerus fracture pain control.    Edematous uvula with swallowing difficulties upon arrival, speech eval 7/29 with modified diet/honey thick ordered.  Is having difficulty managing oral secretions and using Yaunker suction      Problem 1.  Musculoskeletal/OA pain -long-standing.  Plan: Will order Tylenol 650 mg p.o. 3 times daily-max 24-hour Tylenol dose is 3000 mg. Continue. May be discontinuing block soon.      Problem 2.  Abdominal pain with frequent loose diarrhea stools - X5. No blood seen per staff. No foul odor.  History of ulcerative colitis.  Not on any stool softeners.  Is on prednisone and mesalamine.  Also on Protonix (took Prilosec at home).  Plan: will add prn Imodium 2 mg 4 times daily. Continue. Diarrhea has improved somewhat      Problem 3.  Right arm fracture pain   Plan: Adding scheduled Tylenol.  Suggest PRN Norco to be  started once block is discontinued.     Problem 4.  Altered mental status related to baseline LBD, fracture, pain, shortness of air with hypoxia, and hospitalization.  Plan.  Improved today.  Unclear what his end-recovery mental status will be at this time    GOC: Plan is for patient to be discharged for outpatient therapy either at [facility] or at a [facility] local nursing home.  Patient was at [facility] after his stroke.  They are taking into consideration what his tolerance for physical therapy will be.  Support offered.  Palliative care will continue to follow.        Total Visit Time: 30 minutes   Face to Face Time: 15 minutes       ANA MARÍA Booth  (C) [phone]  (O) [phone]  08/01/19  2:19 PM

## 2019-08-01 NOTE — THERAPY TREATMENT NOTE
Acute Care - Physical Therapy Treatment Note  [facility]     Patient Name: Vargas De  : 1940  MRN: [id number]  Today's Date: 2019  Onset of Illness/Injury or Date of Surgery: 19  Date of Referral to PT: 19       Admit Date: 2019    Visit Dx:    ICD-10-CM ICD-9-CM   1. Acute alteration in mental status R41.82 780.97   2. Generalized weakness R53.1 780.79   3. Other closed displaced fracture of proximal end of right humerus with routine healing, subsequent encounter S42.291D V54.11   4. Elevated blood pressure reading with diagnosis of hypertension I10 401.9   5. Impaired functional mobility, balance, gait, and endurance Z74.09 V49.89   6. Impaired mobility and ADLs Z74.09 799.89   7. Cognitive communication deficit R41.841 799.52   8. Oropharyngeal dysphagia R13.12 787.22     Patient Active Problem List   Diagnosis   • Peripheral neuropathy   • Benign essential hypertension   • Abnormal glucose level   • Crohn's colitis, with rectal bleeding (CMS/HCC)   • Right-sided muscle weakness   • Numbness on right side   • Dysphagia due to old cerebrovascular accident   • Frequent urination   • Nocturia   • Dizziness   • Poor balance   • Vitamin D deficiency   • Major depressive disorder with single episode, in partial remission (CMS/HCC)   • Cerebrovascular small vessel disease   • Major neurocognitive disorder, due to vascular disease, without behavioral disturbance, mild   • Hearing loss of left ear   • Current chronic use of systemic steroids   • Macular degeneration of both eyes   • GERD without esophagitis   • Cervicalgia   • Spinal stenosis in cervical region   • Chronic right-sided low back pain without sciatica   • Microhematuria   • Daytime somnolence   • Osteoarthritis of toe joint, right   • Shuffling gait   • Acquired stuttering   • Perennial allergic rhinitis   • Thyroid nodule   • Acute ischemic right MCA stroke (CMS/HCC)   • PFO (patent foramen ovale)   • Ulcerative colitis  (CMS/HCC)   • Dysphasia due to old cerebrovascular accident   • Body mass index (BMI) of 25.0 to 29.9   • History of stroke with residual deficit   • Mixed hyperlipidemia   • Facial droop due to acute stroke (CMS/HCC)   • Acute left-sided weakness   • Word finding difficulty   • Vitamin B6 deficiency   • Multiple lacunar infarcts   • Sequelae, post-stroke   • Paroxysmal atrial fibrillation (CMS/HCC)   • Chronic bilateral low back pain   • Acute alteration in mental status   • Fracture of proximal end of right humerus   • Acute respiratory distress       Therapy Treatment    Rehabilitation Treatment Summary     Row Name 08/01/19 1020             Treatment Time/Intention    Discipline  physical therapist  -KM      Document Type  therapy note (daily note)  -KM      Subjective Information  complains of;fatigue per family did not sleep well  -KM      Mode of Treatment  physical therapy  -KM      Patient/Family Observations  supine in bed, iv intact,R u/e in sling  -KM      Care Plan Review  care plan/treatment goals reviewed;risks/benefits reviewed;patient/other agree to care plan  -KM      Therapy Frequency (PT Clinical Impression)  daily  -KM      Patient Effort  good  -KM      Existing Precautions/Restrictions  fall sling R u/e  -KM      Treatment Considerations/Comments  R humeral fx  -KM      Recorded by [KM] Denise Hdz, PT 08/01/19 1124      Row Name 08/01/19 1020             Vital Signs    Pre SpO2 (%)  94  -KM      O2 Delivery Pre Treatment  supplemental O2  -KM      O2 Delivery Intra Treatment  supplemental O2  -KM      Post SpO2 (%)  94  -KM      O2 Delivery Post Treatment  supplemental O2  -KM      Recorded by [KM] Denise Hdz, PT 08/01/19 1124      Row Name 08/01/19 1020             Cognitive Assessment/Intervention- PT/OT    Orientation Status (Cognition)  oriented x 3  -KM      Follows Commands (Cognition)  follows one step commands;over 90% accuracy  -KM      Cognitive Function  (Cognitive)  safety deficit  -KM      Personal Safety Interventions  fall prevention program maintained  -KM      Recorded by [KM] Denise Hdz, PT 08/01/19 1124      Row Name 08/01/19 1020             Bed Mobility Assessment/Treatment    Scooting/Bridging [place] (Bed Mobility)  minimum assist (75% patient effort)  -KM      Supine-Sit [place] (Bed Mobility)  moderate assist (50% patient effort)  -KM      Bed Mobility, Safety Issues  decreased use of arms for pushing/pulling;decreased use of legs for bridging/pushing  -KM      Assistive Device (Bed Mobility)  draw sheet;head of bed elevated  -KM      Recorded by [KM] Denise Hdz, PT 08/01/19 1124      Row Name 08/01/19 1020             Sit-Stand Transfer    Sit-Stand [place] (Transfers)  verbal cues;minimum assist (75% patient effort)  -KM      Recorded by [KM] Denise Hdz, PT 08/01/19 1124      Row Name 08/01/19 1020             Stand-Sit Transfer    Stand-Sit [place] (Transfers)  minimum assist (75% patient effort);verbal cues  -KM      Recorded by [KM] Denise Hdz, PT 08/01/19 1124      Row Name 08/01/19 1020             Gait/Stairs Assessment/Training    45159 - Gait Training Minutes   25  -KM      Gait/Stairs Assessment/Training  gait/ambulation independence  -KM      [place] Level (Gait)  minimum assist (75% patient effort);2 person assist;verbal cues;1 person to manage equipment followed with chair  -KM      Assistive Device (Gait)  other (see comments) L u/e support  -KM      Distance in Feet (Gait)  60+45  -KM      Pattern (Gait)  step-through  -KM      Deviations/Abnormal Patterns (Gait)  huber decreased;stride length decreased  -KM      Bilateral Gait Deviations  forward flexed posture  -KM      Recorded by [KM] Denise Hdz, PT 08/01/19 1124      Row Name 08/01/19 1020             Therapeutic Exercise    Lower Extremity (Therapeutic Exercise)  LAQ (long arc quad),  bilateral;marching while seated  -KM      Lower Extremity Range of Motion (Therapeutic Exercise)  ankle dorsiflexion/plantar flexion, bilateral  -KM      Exercise Type (Therapeutic Exercise)  AROM (active range of motion)  -KM      Position (Therapeutic Exercise)  seated  -KM      Sets/Reps (Therapeutic Exercise)  1/10  -KM      Recorded by [KM] Denise Hdz, PT 08/01/19 1124      Row Name 08/01/19 1020             Static Standing Balance    Level of [place] (Supported Standing, Static Balance)  contact guard assist  -KM      Recorded by [KM] Denise Hdz, PT 08/01/19 1124      Row Name 08/01/19 1020             Dynamic Standing Balance    Level of [place], Reaches Outside Midline (Standing, Dynamic Balance)  minimal assist, 75% patient effort  -KM      Recorded by [KM] Denise Hdz, PT 08/01/19 1124      Row Name 08/01/19 1020             Positioning and Restraints    Pre-Treatment Position  in bed  -KM      Post Treatment Position  chair  -KM      In Chair  reclined;call light within reach;encouraged to call for assist;exit alarm on;with family/caregiver  -KM      Recorded by [KM] Denise Hdz, PT 08/01/19 1124      Row Name 08/01/19 1020             Pain Scale: FACES Pre/Post-Treatment    Pain: FACES Scale, Pretreatment  2-->hurts little bit  -KM      Pain: FACES Scale, Post-Treatment  4-->hurts little more  -KM      Recorded by [KM] Denise Hdz, PT 08/01/19 1124      Row Name                Wound 07/23/19 2130 Right anterior toe pressure injury    Wound - Properties Group Date first assessed: 07/23/19 [DB] Time first assessed: 2130 [DB] Present On Admission : yes [DB] Side: Right [DB] Orientation: anterior [DB] Location: toe [DB] Type: pressure injury [DB] Stage, Pressure Injury: Stage 1 [DB] Recorded by:  [DB] Henok Marie RN 07/24/19 0229    Row Name                Wound 07/20/19 1649 Left hand skin tear    Wound - Properties Group Date first  assessed: 07/20/19 [SW] Time first assessed: 1649 [SW] Present On Admission : yes [SW] Side: Left [SW] Location: hand [SW] Type: skin tear [SW] Recorded by:  [SW] Odilia Joy RN 07/20/19 1649    Row Name                Wound 07/20/19 1648 Right knee abrasion    Wound - Properties Group Date first assessed: 07/20/19 [SW] Time first assessed: 1648 [SW] Present On Admission : yes [SW] Side: Right [SW] Location: knee [SW] Type: abrasion [SW] Recorded by:  [SW] Odilia Joy RN 07/20/19 1648    Row Name 08/01/19 1020             Plan of Care Review    Plan of Care Reviewed With  patient;family  -KM      Recorded by [KM] Denise Hdz, PT 08/01/19 1124        User Key  (r) = Recorded By, (t) = Taken By, (c) = Cosigned By    Initials Name Effective Dates Discipline    KM Denise Hdz, PT 06/19/15 -  PT    DB Henok Marie RN 06/16/16 -  Nurse    SW Odilia Joy RN 06/16/16 -  Nurse          Wound 07/20/19 1648 Right knee abrasion (Active)   Dressing Appearance open to air 8/1/2019  8:30 AM   Base scab 8/1/2019  8:30 AM   Periwound intact 8/1/2019  4:05 AM   Periwound Temperature warm 8/1/2019  4:05 AM   Periwound Skin Turgor soft 8/1/2019  4:05 AM   Drainage Amount none 8/1/2019  4:05 AM   Dressing Care, Wound open to air 8/1/2019  4:05 AM       Wound 07/20/19 1649 Left hand skin tear (Active)   Dressing Appearance dry;intact 8/1/2019  8:30 AM   Closure DEMAR 8/1/2019  8:30 AM   Base scab;dry 8/1/2019  4:05 AM   Periwound Temperature warm 8/1/2019  4:05 AM   Periwound Skin Turgor soft 8/1/2019  4:05 AM   Drainage Amount none 8/1/2019  4:05 AM   Dressing Care, Wound dressing reinforced 8/1/2019  4:05 AM       Wound 07/23/19 2130 Right anterior toe pressure injury (Active)   Dressing Appearance open to air 8/1/2019  4:05 AM   Closure Open to air 8/1/2019  4:05 AM   Base red;non-blanchable 8/1/2019  4:05 AM   Red (%), Wound Tissue Color 100 8/1/2019  4:05 AM   Periwound intact;dry  8/1/2019  4:05 AM   Periwound Temperature warm 8/1/2019  4:05 AM   Periwound Skin Turgor soft 8/1/2019  4:05 AM   Drainage Amount none 8/1/2019  4:05 AM   Dressing Care, Wound open to air 8/1/2019  4:05 AM   Periwound Care, Wound dry periwound area maintained 8/1/2019  4:05 AM           Physical Therapy Education     Title: PT OT SLP Therapies (In Progress)     Topic: Physical Therapy (Done)     Point: Mobility training (Done)     Learning Progress Summary           Patient Eager, E, VU by TANK at 8/1/2019 10:20 AM    Eager, E,D, VU,NR by BRANDON at 7/31/2019  2:46 PM    Eager, E, NR by BRANDON at 7/30/2019  3:17 PM    Acceptance, E, VU,NR by JUSTIN at 7/29/2019 11:04 AM    Acceptance, E, VU,NR by CD at 7/25/2019  4:42 PM    Comment:  DTR INSTRUCTED IN ROM EXERCISES, POSITIONING FOR COMFORT/ PROTECTION OF R UE, BENEFITS OF OOB ACTIVITY, D/C PLANNING,    Acceptance, E, VU,NR by CD at 7/24/2019 10:36 AM    Comment:  SEE FLOWSHEET.   Family Eager, E,D, VU,NR by BRANDON at 7/31/2019  2:46 PM    Acceptance, E, VU,NR by NOBLE at 7/25/2019  4:42 PM    Comment:  DTR INSTRUCTED IN ROM EXERCISES, POSITIONING FOR COMFORT/ PROTECTION OF R UE, BENEFITS OF OOB ACTIVITY, D/C PLANNING,                   Point: Home exercise program (Done)     Learning Progress Summary           Patient Eager, E, VU by TANK at 8/1/2019 10:20 AM    Acceptance, E, VU,NR by JUSTIN at 7/29/2019 11:04 AM    Acceptance, E, VU,NR by NOBLE at 7/25/2019  4:42 PM    Comment:  DTR INSTRUCTED IN ROM EXERCISES, POSITIONING FOR COMFORT/ PROTECTION OF R UE, BENEFITS OF OOB ACTIVITY, D/C PLANNING,    Acceptance, E, VU,NR by CD at 7/24/2019 10:36 AM    Comment:  SEE FLOWSHEET.   Family Acceptance, E, VU,NR by CD at 7/25/2019  4:42 PM    Comment:  DTR INSTRUCTED IN ROM EXERCISES, POSITIONING FOR COMFORT/ PROTECTION OF R UE, BENEFITS OF OOB ACTIVITY, D/C PLANNING,                   Point: Body mechanics (Done)     Learning Progress Summary           Patient E[place]am E, VU by TANK at 8/1/2019 10:20 AM     Eager, E,D, VU,NR by AA at 7/31/2019  2:46 PM    Eager, E, NR by AA at 7/30/2019  3:17 PM    Acceptance, E, VU,NR by JUSTIN at 7/29/2019 11:04 AM    Acceptance, E, VU,NR by NOBLE at 7/25/2019  4:42 PM    Comment:  DTR INSTRUCTED IN ROM EXERCISES, POSITIONING FOR COMFORT/ PROTECTION OF R UE, BENEFITS OF OOB ACTIVITY, D/C PLANNING,    Acceptance, E, VU,NR by CD at 7/24/2019 10:36 AM    Comment:  SEE FLOWSHEET.   Family Eager, E,D, VU,NR by AA at 7/31/2019  2:46 PM    Acceptance, E, VU,NR by NOBLE at 7/25/2019  4:42 PM    Comment:  DTR INSTRUCTED IN ROM EXERCISES, POSITIONING FOR COMFORT/ PROTECTION OF R UE, BENEFITS OF OOB ACTIVITY, D/C PLANNING,                   Point: Precautions (Done)     Learning Progress Summary           Patient Eager, E, VU by TANK at 8/1/2019 10:20 AM    Eager, E,D, VU,NR by AA at 7/31/2019  2:46 PM    Eager, E, NR by AA at 7/30/2019  3:17 PM    Acceptance, E, VU,NR by JUSTIN at 7/29/2019 11:04 AM    Acceptance, E, VU,NR by NOBLE at 7/25/2019  4:42 PM    Comment:  DTR INSTRUCTED IN ROM EXERCISES, POSITIONING FOR COMFORT/ PROTECTION OF R UE, BENEFITS OF OOB ACTIVITY, D/C PLANNING,    Acceptance, E, VU,NR by NOBLE at 7/24/2019 10:36 AM    Comment:  SEE FLOWSHEET.   Family Eager, E,D, VU,NR by AA at 7/31/2019  2:46 PM    Acceptance, E, VU,NR by NOBLE at 7/25/2019  4:42 PM    Comment:  DTR INSTRUCTED IN ROM EXERCISES, POSITIONING FOR COMFORT/ PROTECTION OF R UE, BENEFITS OF OOB ACTIVITY, D/C PLANNING,                               User Key     Initials Effective Dates Name Provider Type Discipline    CD 06/19/15 -  Gogo Thibodeaux, PT Physical Therapist PT    EJ 11/20/18 -  Nurys Chahal, PT Physical Therapist PT    TANK 06/19/15 -  Denise Hdz, PT Physical Therapist PT    AA 04/02/18 -  Mere Ramon, PT Physical Therapist PT                PT Recommendation and Plan  Therapy Frequency (PT Clinical Impression): daily  Plan of Care Reviewed With: patient, family  Outcome Summary: Pt transitions oob with  min to mod assist, stands and ambulates with min assist x 2 60+4 ft followed by chair. Recommend snf for rehab  Outcome Measures     Row Name 08/01/19 1020 07/31/19 1335 07/30/19 1438       How much help from another person do you currently need...    Turning from your back to your side while in flat bed without using bedrails?  3  -KM  3  -AA  --    Moving from lying on back to sitting on the side of a flat bed without bedrails?  3  -KM  3  -AA  --    Moving to and from a bed to a chair (including a wheelchair)?  3  -KM  3  -AA  --    Standing up from a chair using your arms (e.g., wheelchair, bedside chair)?  3  -KM  3  -AA  --    Climbing 3-5 steps with a railing?  2  -KM  1  -AA  --    To walk in hospital room?  2  -KM  2  -AA  --    AM-PAC 6 Clicks Score (PT)  16  -KM  15  -AA  --       How much help from another is currently needed...    Putting on and taking off regular lower body clothing?  --  --  1  -AN    Bathing (including washing, rinsing, and drying)  --  --  2  -AN    Toileting (which includes using toilet bed pan or urinal)  --  --  1  -AN    Putting on and taking off regular upper body clothing  --  --  1  -AN    Taking care of personal grooming (such as brushing teeth)  --  --  2  -AN    Eating meals  --  --  2  -AN    AM-PAC 6 Clicks Score (OT)  --  --  9  -AN       Functional Assessment    Outcome Measure Options  [facility]  [facility]  [facility] Mob[facility] (PT)  -AA  --    Row Name 07/30/19 1353             How much help from another person do you currently need...    Turning from your back to your side while in flat bed without using bedrails?  3  -AA      Moving from lying on back to sitting on the side of a flat bed without bedrails?  2  -AA      Moving to and from a bed to a chair (including a wheelchair)?  2  -AA      Standing up from a chair using your arms (e.g., wheelchair, bedside chair)?  3  -AA      Climbing 3-5 steps with a railing?  1  -AA      To walk  in hospital room?  2  -AA      AM-PAC 6 Clicks Score (PT)  13  -AA         Functional Assessment    Outcome Measure Options  AM-PAC 6 Clicks Basic Mobility (PT)  -AA        User Key  (r) = Recorded By, (t) = Taken By, (c) = Cosigned By    Initials Name Provider Type    Denise Titus, PT Physical Therapist    Bibiana Barrera, OT Occupational Therapist    Mere Mullins, PT Physical Therapist         Time Calculation:   PT Charges     Row Name 08/01/19 1020             Time Calculation    Start Time  1020  -KM      PT Received On  08/01/19  -KM      PT Goal Re-Cert Due Date  08/03/19  -KM         Time Calculation- PT    Total Timed Code Minutes- PT  25 minute(s)  -KM         Timed Charges    25068 - Gait Training Minutes   25  -KM        User Key  (r) = Recorded By, (t) = Taken By, (c) = Cosigned By    Initials Name Provider Type    Denise Titus, PT Physical Therapist        Therapy Charges for Today     Code Description Service Date Service Provider Modifiers Qty    54231763112 HC GAIT TRAINING EA 15 MIN 8/1/2019 Denise Hdz, PT GP 2    72619119223 HC PT THER SUPP EA 15 MIN 8/1/2019 Denise Hdz, PT GP 3          PT G-Codes  Outcome Measure Options: AM-PAC 6 Clicks Basic Mobility (PT)  AM-PAC 6 Clicks Score (PT): 16  AM-PAC 6 Clicks Score (OT): 9  Modified [place] Scale: 4 - Moderately severe disability.  Unable to walk without assistance, and unable to attend to own bodily needs without assistance.    Denise Hdz, PT  8/1/2019

## 2019-08-01 NOTE — CONSULTS
Inpatient Cardiology Consult  Consult performed by: Sarah Jessica APRN  Consult ordered by: Traci Lewis DO          [facility]  Cardiovascular Consultation Note           Patient is a 78-year-old male with a history of dementia likely from Lewy body, multiple CVAs, newly diagnosed atrial fibrillation on Eliquis, hypertension, spinal stenosis and hyperlipidemia that we are being asked to consult for abnormal EKG that suggested a junctional tachycardia.  The patient had a recent fall resulting in a right humerus fracture.  The patient resides the basement apartment of his daughters and several days after returning home from his fracture he developed weakness that they worried could be a stroke and had worsening altered mental status changes.  During his hospitalization for this the patient developed aspiration pneumonia and has been n.p.o.  He had not been getting his amlodipine or bisoprolol.  He has not had any atrial fibrillation during this hospitalization but EKG this morning was concerning for an atrial tachycardia.   The patient received his bisoprolol earlier and currently telemetry monitor shows sinus tachycardia at 102.  His magnesium level is within normal limits and he is slightly hypokalemic at 3.2.  Patient is currently sitting up in the chair very drowsy but converses appropriately.  His family are at bedside and answer most questions.  They deny he has had any episodes of chest pain.  He had a recent echocardiogram a few days ago that showed a normal LVEF and grade 1 diastolic dysfunction.      Past medical and surgical history, social and family history reviewed in EMR.    REVIEW OF SYSTEMS:   H&P ROS reviewed and pertinent CV ROS as noted in HPI.         Vital Sign Min/Max for last 24 hours  Temp  Min: 97.4 °F (36.3 °C)  Max: 98.6 °F (37 °C)   BP  Min: 116/85  Max: 164/100   Pulse  Min: 84  Max: 119   Resp  Min: 16  Max: 20   SpO2  Min: 95 %  Max: 100  %   No Data Recorded      Intake/Output Summary (Last 24 hours) at 8/1/2019 1432  Last data filed at 8/1/2019 1300  Gross per 24 hour   Intake 720 ml   Output 1775 ml   Net -1055 ml           Physical Exam   Constitutional: He is oriented to person, place, and time. He appears well-developed and well-nourished.   Drowsy   HENT:   Head: Normocephalic and atraumatic.   Eyes: Pupils are equal, round, and reactive to light. No scleral icterus.   Neck: No JVD present. Carotid bruit is not present. No thyromegaly present.   Cardiovascular: Regular rhythm. Tachycardia present. Exam reveals no gallop.   No murmur heard.  Pulmonary/Chest: Effort normal and breath sounds normal.   Abdominal: Soft. He exhibits no distension. There is no hepatosplenomegaly.   Musculoskeletal: He exhibits no edema.   Neurological: He is alert and oriented to person, place, and time.   Skin: Skin is warm and dry.   Psychiatric: He has a normal mood and affect. His behavior is normal.     EKG: Atrial tachycardia possibly SVT 8/1/2019    Lab Review:   Labs reviewed in the electronic medical record.  Pertinent findings include:  Lab Results   Component Value Date    GLUCOSE 90 08/01/2019    BUN 5 (L) 08/01/2019    CREATININE 0.41 (L) 08/01/2019    EGFRIFNONA >150 08/01/2019    EGFRIFAFRI 125 04/02/2019    BCR 12.2 08/01/2019    K 3.2 (L) 08/01/2019    CO2 21.0 (L) 08/01/2019    CALCIUM 7.7 (L) 08/01/2019    PROTENTOTREF 6.5 04/02/2019    ALBUMIN 2.20 (L) 08/01/2019    LABIL2 1.8 04/02/2019    AST 35 08/01/2019    ALT 41 08/01/2019     Lab Results   Component Value Date    WBC 4.45 08/01/2019    HGB 8.2 (L) 08/01/2019    HCT 26.9 (L) 08/01/2019    .5 (H) 08/01/2019     08/01/2019     Lab Results   Component Value Date    CHOL 89 07/24/2019    TRIG 47 07/24/2019    HDL 48 07/24/2019    LDL 32 07/24/2019                Active Hospital Problems    Diagnosis   • **Acute alteration in mental status   • Lewy body dementia   • Acute  respiratory distress   • Paroxysmal atrial fibrillation (CMS/HCC)     · 30-day MCOT monitor (04/2019): Episode of likely atrial fibrillation.  Wide-complex tachycardia possibly atrial fibrillation with abberancy  · Chads Vasc= 6     • History of stroke with residual deficit     · History of multiple CVAs with residual gait disturbances  · Hospitalization [facility] with cardioembolic CVA on  2/2019  · MRI (2/28/2019): 2 small acute right occipital infarcts and small acute right basal ganglia infarct.   · Echo (2/28/2019):LVEF = 55%.  Mild to moderate AI and TR.  Grade 1 diastolic dysfunction.  · REJI (3/1/2019): Patent ji ovale with right to left shunt.  Normal LVEF     • Fracture of proximal end of right humerus   • Mixed hyperlipidemia   • Multiple lacunar infarcts   • PFO (patent foramen ovale)     · REJI (3/1/2019): Patent ji ovale with right to left shunting  · RoPE= 3.  0% chance stroke is due to PFO     • GERD without esophagitis   • Spinal stenosis in cervical region   • Essential hypertension   • Crohn's colitis, with rectal bleeding (CMS/HCC)     Patient likely had an episode of a SVT/atrial tachycardia due to being off beta-blocker therapy.  Recommend resume his home medications.  He had a recent normal echocardiogram has not had any atrial fibrillation since this hospitalization.       · Continue bisoprolol, amlodipine, and statin therapy  · Continue chronic anticoagulation with Eliquis  · Please contact us with any further questions further cardiac work-up needed    ANA MARÍA [address].

## 2019-08-01 NOTE — PLAN OF CARE
Problem: Patient Care Overview  Goal: Plan of Care Review  Outcome: Ongoing (interventions implemented as appropriate)   08/01/19 1020   Coping/Psychosocial   Plan of Care Reviewed With patient;family   OTHER   Outcome Summary Pt transitions oob with min to mod assist, stands and ambulates with min assist x 2 60+4 ft followed by chair. Recommend snf for rehab

## 2019-08-01 NOTE — PROGRESS NOTES
[facility]    Acute pain service Inpatient Progress Note    Patient Name: Vargas De  :  1940  MRN:  [id number]        Acute Pain  Service Inpatient Progress Note:    Analgesia:Good  LOC: alert and awake  Resp Status: room air  Cardiac: VS stable  Side Effects:None  Catheter Site:clean, dressing intact and dry  Cath type: peripheral nerve cath(MOOG pump)  Infusion rate: 6ml/hr  Catheter Plan:Catheter to remain Insitu and Continue catheter infusion rate unchanged  Comments: Pt sitting up in chair, taking meds, family at chairside,, soreness to right arm, burning elbow pain, reports hx of spinal stenosis and chronic gabapentin use, Mar review, not present this admission, will restart at low dose and evaluate for effectiveness , and increase dose if effective.  Thank you

## 2019-08-02 NOTE — PROGRESS NOTES
Continued Stay Note   [place]     Patient Name: Vargas De  MRN: [id number]  Today's Date: 8/2/2019    Admit Date: 7/23/2019    Discharge Plan     Row Name 08/02/19 1600       Plan    Plan  Inpatient rehab    Patient/Family in Agreement with Plan  yes    Plan Comments  Spoke with patient and son at bedside.  Patient more alert today, and talking to son.  Patient states that he would like to get up and walk.  Plan to discontinue nerve block to fractured arm tomorrow 8/3, and attempt to trial oral pain medication.  Will reevaluate on Monday to determine if it is appropriate to start precert for [facility].  Patient and family have been updated, and are agreeable on plan of care.  Will continue to follow for discharge planning needs.      Final Discharge Disposition Code  62 - inpatient rehab facility        Discharge Codes    No documentation.       Expected Discharge Date and Time     Expected Discharge Date Expected Discharge Time    Aug 2, 2019             Olesya Barton RN

## 2019-08-02 NOTE — PROGRESS NOTES
[facility] Medicine Services  PROGRESS NOTE    Patient Name: Vargas De  : 1940  MRN: [id number]    Date of Admission: 2019  Length of Stay: 10  Primary Care Physician: Saba Arrington MD    Subjective   Subjective     CC:  AMS     HPI:  Slept a bit better last night. Occ loose stools. Urinary frequency improved. Discussed night time gabapentin and agreeable. Likely DC nerve block tomorrow.     Review of Systems  Gen- No fevers, chills  CV- No chest pain, palpitations  Resp- No cough, dyspnea  GI- No N/V/D, abd pain    Otherwise ROS is negative except as mentioned in the HPI.    Objective   Objective     Vital Signs:   Temp:  [97.9 °F (36.6 °C)-98.6 °F (37 °C)] 97.9 °F (36.6 °C)  Heart Rate:  [72-80] 74  Resp:  [16-18] 18  BP: (119-146)/(76-97) 144/92  Total (NIH Stroke Scale): 9     Physical Exam:  Constitutional: No acute distress, awake, alert  HENT: NCAT, mucous membranes moist  Respiratory: Clear to auscultation bilaterally, respiratory effort normal   Cardiovascular: RRR, II/VI murmurs, no rubs, or gallops, palpable pedal pulses bilaterally  Gastrointestinal: Positive bowel sounds, soft, nontender, nondistended  Musculoskeletal: trace bilateral ankle edema  Psychiatric: Appropriate affect, cooperative  Neurologic: Oriented x 3, strength symmetric in all extremities, Cranial Nerves grossly intact to confrontation, speech clear  Skin: No rashes    Results Reviewed:  I have personally reviewed current lab, radiology, and data and agree.    Results from last 7 days   Lab Units 19  0537 19  0647 19  0616  19  0147   WBC 10*3/mm3 6.76 4.45 6.14   < >  --    HEMOGLOBIN g/dL 12.4* 8.2* 10.9*   < >  --    HEMATOCRIT % 40.1 26.9* 34.2*   < >  --    PLATELETS 10*3/mm3 305 182 253   < >  --    PROCALCITONIN ng/mL  --   --   --   --  0.11    < > = values in this interval not displayed.     Results from last 7 days   Lab Units 19  0516  08/01/19  1801 08/01/19  0647  07/31/19  0616   SODIUM mmol/L 137  --  137  --  137   POTASSIUM mmol/L 4.1 5.0 3.2*   < > 3.1*   CHLORIDE mmol/L 102  --  108*  --  102   CO2 mmol/L 25.0  --  21.0*  --  23.0   BUN mg/dL 7*  --  5*  --  9   CREATININE mg/dL 0.64*  --  0.41*  --  0.62*   GLUCOSE mg/dL 95  --  90  --  100*   CALCIUM mg/dL 8.9  --  7.7*  --  8.2*   ALT (SGPT) U/L 52*  --  41  --  46*   AST (SGOT) U/L 45*  --  35  --  39    < > = values in this interval not displayed.     Estimated Creatinine Clearance: 81.5 mL/min (A) (by C-G formula based on SCr of 0.64 mg/dL (L)).    Microbiology Results Abnormal     Procedure Component Value - Date/Time    Blood Culture - Blood, Arm, Left [[id number]] Collected:  07/27/19 0152    Lab Status:  Final result Specimen:  Blood from Arm, Left Updated:  08/01/19 0431     Blood Culture No growth at 5 days    Blood Culture - Blood, Hand, Left [[id number]] Collected:  07/27/19 0147    Lab Status:  Final result Specimen:  Blood from Hand, Left Updated:  08/01/19 0245     Blood Culture No growth at 5 days    Urine Culture - Urine, Urine, Clean Catch [[id number]]  (Normal) Collected:  07/28/19 1430    Lab Status:  Final result Specimen:  Urine, Clean Catch Updated:  07/29/19 1856     Urine Culture No growth    Beta Strep Culture, Throat - Swab, Throat [[id number]]  (Normal) Collected:  07/27/19 1630    Lab Status:  Final result Specimen:  Swab from Throat Updated:  07/29/19 1355     Throat Culture, Beta Strep No Beta Hemolytic Streptococcus Isolated    Narrative:       Group A Strep incidence is low in adults. Positive culture for Beta hemolytic Streptococcus species can reflect colonization and not true infection. Please correlate clinically.    Blood Culture - Blood, Arm, Left [[id number]] Collected:  07/23/19 1334    Lab Status:  Final result Specimen:  Blood from Arm, Left Updated:  07/28/19 1415     Blood Culture No growth at 5 days    Blood Culture - Blood, Hand, Right  [[id number]] Collected:  07/23/19 1341    Lab Status:  Final result Specimen:  Blood from Hand, Right Updated:  07/28/19 1415     Blood Culture No growth at 5 days    Rapid Strep A Screen - Swab, Throat [[id number]]  (Normal) Collected:  07/27/19 1630    Lab Status:  Final result Specimen:  Swab from Throat Updated:  07/27/19 1727     Strep A Ag Negative    Narrative:       Test performed by Direct Antigen Testing.          Imaging Results (last 24 hours)     ** No results found for the last 24 hours. **          Results for orders placed during the hospital encounter of 07/23/19   Adult Transthoracic Echo Complete W/ Cont if Necessary Per Protocol    Narrative · Left ventricular systolic function is normal. Estimated EF = 60%.  · Left ventricular diastolic dysfunction (grade I) consistent with   impaired relaxation.  · Left atrial cavity size is borderline dilated.  · There is mild calcification of the aortic valve.          I have reviewed the medications:  Scheduled Meds:  acetaminophen 650 mg Oral Q8H   amLODIPine 2.5 mg Oral Q24H   amoxicillin-clavulanate 1 tablet Oral Q12H   apixaban 5 mg Oral Q12H   atorvastatin 80 mg Oral Nightly   bisoprolol 5 mg Oral Q24H   diclofenac 2 g Topical 4x Daily   gabapentin 300 mg Oral Nightly   lidocaine 2 patch Transdermal Nightly   mesalamine 0.75 g Oral Nightly   pantoprazole 40 mg Oral Q AM   predniSONE 5 mg Oral Daily With Breakfast   sodium chloride 3 mL Intravenous Q12H     Continuous Infusions:  ropivacaine (NAROPIN) 0.2% peripheral nerve cath (moog) 6 mL/hr Last Rate: 6 mL/hr (08/01/19 1548)     PRN Meds:.•  acetaminophen  •  calcium carbonate  •  ipratropium-albuterol  •  loperamide  •  magic mouthwash  •  melatonin  •  phenol  •  potassium chloride  •  potassium chloride  •  [DISCONTINUED] potassium chloride **OR** [DISCONTINUED] potassium chloride **OR** potassium chloride  •  saline  •  sodium chloride  •  sodium chloride      Assessment/Plan   Assessment / Plan      Active Hospital Problems    Diagnosis  POA   • **Acute alteration in mental status [R41.82]  Yes   • Lewy body dementia [G31.83, F02.80]  Yes   • Acute respiratory distress [R06.03]  Yes   • Fracture of proximal end of right humerus [S42.201A]  Yes   • Paroxysmal atrial fibrillation (CMS/HCC) [I48.0]  Yes   • Mixed hyperlipidemia [E78.2]  Yes   • Multiple lacunar infarcts [I63.81]  Yes   • History of stroke with residual deficit [I69.30]  Not Applicable   • PFO (patent foramen ovale) [Q21.1]  Not Applicable   • GERD without esophagitis [K21.9]  Yes   • Spinal stenosis in cervical region [M48.02]  Yes   • Essential hypertension [I10]  Yes   • Crohn's colitis, with rectal bleeding (CMS/HCC) [K50.111]  Yes      Resolved Hospital Problems   No resolved problems to display.        Brief Hospital Course to date:  Vargas De is a 78 y.o. male with a past medical history significant for ulcerative colitis on chronic prednisone therapy, chronic pain with spinal stenosis of the cervical region, hyperlipidemia, hypertension, PFO, atrial fibrillation ( on Eliquis) mild dementia, and multiple lacunar infarcts who presents with acute change in mental status.  MRI negative for CVA.  EEG negative for seizures.     Sepsis  Acute hypoxic respiratory failure  Likely aspiration   - overall improved; fevers improved and on room air   - Patient was started on zosyn 7/28 - will transition to Augmentin to complete 7 days   - BCx NGTD; strep negative   -Patient with swollen uvula with exudate.  CT scan without contrast shows narrowing of his airway. The scan without contrast not ordered as patient with iodine allergy; ENT eval; cont PEEP if needed; diminished oropharyngeal sensation  - Code status changed to DNR   - Speech eval 7/29 with modified diet; continue to monitor   - Palliative following       AMS  Suspected Lewy body dementia  - improved with discontinuation of Seroquel and narcotics   -  Neurology followed;  etiology  likely worsening of lewy body dementia in setting of sleep deprivation, pain as well as recent fracture   - continue home statin, ASA  - Will resume home gabapentin qHS only - patient states this helped him rest      Dysphasia  - improving;  mental status is improving  - Speech eval 7/29 with diet modifications    Loose stools   - no WBC; no abd pain; monitor and consider c.diff testing      Right Humerus Fracture:  - pain control as needed  - consult to ortho, non-operative management, therapy as tolerated, appreciate assistance   - continue with therapy as above  - Dr. Steinberg follow up in 10 days   - s/p nerve block 7/29; likely discontinuing 8/3; palliative following to assist with pain control      PAF:  - sinus on admission  - [id number] = 5. On Eliquis.      Junctional rhythml; tachycardia  - Resume bisoprolol; cards consult and recommend continued beta blocker   - K replaced; check Mg      Frequent urination   - UA ok; PVR ok -- overall improving      Right wrist pain  -imaging with no acute fracture      History of CVA  - workup in May and CVA felt to be embolic; follows with Dr. Gutierrez and Dr. Maldonado. Has been on Eliquis and ASA discontinued       Chronic steroid:  - on 5 mg daily for Crohn's/lumbar stenosis. Currently being weaned down, to eventually discontinue; will resume home prednisone       HTN:  - stable. Home meds held due fluctuating BP but has been trending up. Continued bisoprolol and amlodipine; lisinopril restarted 8/2         DVT prophylaxis:   Eliquis     Disposition: I expect the patient to be discharged TBD       CODE STATUS:   Code Status and Medical Interventions:   Ordered at: 07/28/19 1018     Limited Support to NOT Include:    Intubation     Level Of Support Discussed With:    Health Care Surrogate    Next of Kin (If No Surrogate)     Code Status:    No CPR     Medical Interventions (Level of Support Prior to Arrest):    Limited         Electronically signed by Traci Lewis DO,  08/02/19, 3:23 PM.

## 2019-08-02 NOTE — THERAPY TREATMENT NOTE
Acute Care - Speech Language Pathology   Swallow Progress Note [facility]     Patient Name: Vargas De  : 1940  MRN: [id number]  Today's Date: 2019  Onset of Illness/Injury or Date of Surgery: 19            Admit Date: 2019    Visit Dx:      ICD-10-CM ICD-9-CM   1. Acute alteration in mental status R41.82 780.97   2. Generalized weakness R53.1 780.79   3. Other closed displaced fracture of proximal end of right humerus with routine healing, subsequent encounter S42.291D V54.11   4. Elevated blood pressure reading with diagnosis of hypertension I10 401.9   5. Impaired functional mobility, balance, gait, and endurance Z74.09 V49.89   6. Impaired mobility and ADLs Z74.09 799.89   7. Cognitive communication deficit R41.841 799.52   8. Oropharyngeal dysphagia R13.12 787.22     Patient Active Problem List   Diagnosis   • Peripheral neuropathy   • Essential hypertension   • Crohn's colitis, with rectal bleeding (CMS/HCC)   • Major depressive disorder with single episode, in partial remission (CMS/HCC)   • Macular degeneration of both eyes   • GERD without esophagitis   • Cervicalgia   • Spinal stenosis in cervical region   • Chronic right-sided low back pain without sciatica   • Osteoarthritis of toe joint, right   • PFO (patent foramen ovale)   • Ulcerative colitis (CMS/HCC)   • Body mass index (BMI) of 25.0 to 29.9   • History of stroke with residual deficit   • Mixed hyperlipidemia   • Multiple lacunar infarcts   • Paroxysmal atrial fibrillation (CMS/HCC)   • Chronic bilateral low back pain   • Acute alteration in mental status   • Fracture of proximal end of right humerus   • Acute respiratory distress   • Lewy body dementia       Therapy Treatment  Rehabilitation Treatment Summary     Row Name 19 1030             Treatment Time/Intention    Discipline  speech language pathologist  -LR      Document Type  therapy note (daily note)  -LR      Subjective Information  no complaints  -LR       Mode of Treatment  speech-language pathology  -LR      Care Plan Review  care plan/treatment goals reviewed;evaluation/treatment results reviewed  -LR      Therapy Frequency (Swallow)  5 days per week  -LR      Patient Effort  good  -LR      Comment  Pt's son present.   -LR      Recorded by [LR] Karen Veronica MS CCC-SLP 08/02/19 1254      Row Name                Wound 07/23/19 2130 Right anterior toe pressure injury    Wound - Properties Group Date first assessed: 07/23/19 [DB] Time first assessed: 2130 [DB] Present On Admission : yes [DB] Side: Right [DB] Orientation: anterior [DB] Location: toe [DB] Type: pressure injury [DB] Stage, Pressure Injury: Stage 1 [DB] Recorded by:  [DB] Henok Marie RN 07/24/19 0229    Row Name                Wound 07/20/19 1649 Left hand skin tear    Wound - Properties Group Date first assessed: 07/20/19 [SW] Time first assessed: 1649 [SW] Present On Admission : yes [SW] Side: Left [SW] Location: hand [SW] Type: skin tear [SW] Recorded by:  [SW] Odilia Joy RN 07/20/19 1649    Row Name                Wound 07/20/19 1648 Right knee abrasion    Wound - Properties Group Date first assessed: 07/20/19 [SW] Time first assessed: 1648 [SW] Present On Admission : yes [SW] Side: Right [SW] Location: knee [SW] Type: abrasion [SW] Recorded by:  [SW] Odilia Joy RN 07/20/19 1648    Row Name 08/02/19 1030             Outcome Summary/Treatment Plan (SLP)    Daily Summary of Progress (SLP)  progress toward functional goals as expected  -LR      Plan for Continued Treatment (SLP)  Pt seen for dysphagia tx.  Reviewed FEES results with son and educated on dysphagia. Completed dysphagia exercises. Pt required extra time and frequent drinks to achieve swallows for exercises. No s/s of aspiration with trials of honey thick liquids via spoon and pureed. Recommend to continue a Level IV diet with honey thick liquids. Speech to continue to follow for dysphagia.   -LR      Anticipated  Dischage Disposition  anticipate therapy at next level of care  -LR      Recorded by [LR] Karen Veronica MS CCC-SLP 08/02/19 0784        User Key  (r) = Recorded By, (t) = Taken By, (c) = Cosigned By    Initials Name Effective Dates Discipline    LR Karen Veronica MS CCC-SLP 06/22/15 -  SLP    Henok Vega RN 06/16/16 -  Nurse    Odilia Gordillo RN 06/16/16 -  Nurse          Outcome Summary  Outcome Summary/Treatment Plan (SLP)  Daily Summary of Progress (SLP): progress toward functional goals as expected (08/02/19 1030 : Karen Veronica, MS CCC-SLP)  Plan for Continued Treatment (SLP): Pt seen for dysphagia tx.  Reviewed FEES results with son and educated on dysphagia. Completed dysphagia exercises. Pt required extra time and frequent drinks to achieve swallows for exercises. No s/s of aspiration with trials of honey thick liquids via spoon and pureed. Recommend to continue a Level IV diet with honey thick liquids. Speech to continue to follow for dysphagia.  (08/02/19 1030 : Karen Veronica, MS CCC-SLP)  Anticipated Dischage Disposition: anticipate therapy at next level of care (08/02/19 1030 : Karen Veronica, [facility]-SLP)      SLP GOALS     Row Name 08/02/19 1030 07/31/19 1100          Oral Nutrition/Hydration Goal 1 (SLP)    Oral Nutrition/Hydration Goal 1, SLP  --  LTG: Pt will return to regular diet & thin liquid w/o s/sxs aspiration w/ 100% acc w/o cues.  -     Time Frame (Oral Nutrition/Hydration Goal 1, SLP)  --  by discharge  -     Progress/Outcomes (Oral Nutrition/Hydration Goal 1, SLP)  --  goal ongoing  -CH        Oral Nutrition/Hydration Goal 2 (SLP)    Oral Nutrition/Hydration Goal 2, SLP  Pt will tolerate trials of soft solids & honey-thick liquids w/o s/sxs aspiration w/ 100% acc w/o cues.  -LR  Pt will tolerate trials of soft solids & honey-thick liquids w/o s/sxs aspiration w/ 100% acc w/o cues.  -     Time Frame (Oral Nutrition/Hydration Goal 2, SLP)  short term goal (STG);by  discharge  -LR  short term goal (STG);by discharge  -CH     Barriers (Oral Nutrition/Hydration Goal 2, SLP)  Pt given trials of honey thick liquids and pureed via spoon. No overt s/s of aspiration with any trials.   -LR  Patient did not want soft solids. Good tolerance of pureed solids . Delayed cough x 1 with HT liquid trials.  -CH     Progress/Outcomes (Oral Nutrition/Hydration Goal 2, SLP)  continuing progress toward goal  -LR  continuing progress toward goal  -CH        Oral Nutrition/Hydration Goal (SLP)    Oral Nutrition/Hydration Goal, SLP  --  Pt will tolerate therapeutic trials of ice between meals & after oral care w/ 100% acc w/o cues.  -CH     Time Frame (Oral Nutrition/Hydration Goal, SLP)  --  short term goal (STG);by discharge  -     Progress/Outcomes (Oral Nutrition/Hydration Goal, SLP)  --  goal ongoing  -        Lingual Strengthening Goal 1 (SLP)    Activity (Lingual Strengthening Goal 1, SLP)  increase tongue back strength  -LR  increase tongue back strength  -     Increase Tongue Back Strength  lingual resistance exercises  -LR  lingual resistance exercises  -     [place]/Accuracy (Lingual Strengthening Goal 1, SLP)  with minimal cues (75-90% accuracy)  -LR  with minimal cues (75-90% accuracy)  -     Time Frame (Lingual Strengthening Goal 1, SLP)  short term goal (STG);by discharge  -LR  short term goal (STG);by discharge  -CH     Barriers (Lingual Strengthening Goal 1, SLP)  --  Reviewed excs w/ pt & his family. Pt completed exercises with ST models.  -CH     Progress/Outcomes (Lingual Strengthening Goal 1, SLP)  good progress toward goal  -LR  good progress toward goal  -CH        Pharyngeal Strengthening Exercise Goal 1 (SLP)    Activity (Pharyngeal Strengthening Goal 1, SLP)  increase timing;increase superior movement of the hyolaryngeal complex;increase anterior movement of the hyolaryngeal complex;increase closure at entrance to airway/closure of airway at glottis;increase  squeeze/positive pressure generation;increase tongue base retraction  -LR  increase timing;increase superior movement of the hyolaryngeal complex;increase anterior movement of the hyolaryngeal complex;increase closure at entrance to airway/closure of airway at glottis;increase squeeze/positive pressure generation;increase tongue base retraction  -CH     Increase Timing  --  prepping - 3 second prep or suck swallow or 3-step swallow  -CH     Increase Superior Movement of the Hyolaryngeal Complex  falsetto;effortful pitch glide (falsetto + pharyngeal squeeze)  -LR  effortful pitch glide (falsetto + pharyngeal squeeze)  -CH     Increase Anterior Movement of the Hyolaryngeal Complex  --  chin tuck against resistance (CTAR)  -CH     Increase Closure at Entrance to Airway/Closure of Airway at Glottis  --  super-supraglottic swallow  -CH     Increase Squeeze/Positive Pressure Generation  hard effortful swallow  -LR  hard effortful swallow  -CH     Increase Tongue Base Retraction  nabila;hard effortful swallow  -LR  nabila  -CH     [place]/Accuracy (Pharyngeal Strengthening Goal 1, SLP)  with minimal cues (75-90% accuracy)  -LR  with minimal cues (75-90% accuracy)  -CH     Time Frame (Pharyngeal Strengthening Goal 1, SLP)  short term goal (STG);by discharge  -LR  short term goal (STG);by discharge  -CH     Barriers (Pharyngeal Strengthening Goal 1, SLP)  Pt required drinks of honey thick liquids to complete effortful swallow. Pt required max cues to complete nabila.   -LR  Reviewed excs w/ pt & his family. Pt completed exercises with ST models.  -CH     Progress/Outcomes (Pharyngeal Strengthening Goal 1, SLP)  continuing progress toward goal  -LR  good progress toward goal  -CH        Swallow Compensatory Strategies Goal 1 (SLP)    Activity (Swallow Compensatory Strategies/Techniques Goal 1, SLP)  --  compensatory strategies;during meal intake;during p.o. trials;small cup sips;small straw sips;alternate food/liquid  intake;extra swallow per bolus  -     [place]/Accuracy (Swallow Compensatory Strategies/Techniques Goal 1, SLP)  --  independently (over 90% accuracy)  -     Time Frame (Swallow Compensatory Strategies/Techniques Goal 1, SLP)  --  short term goal (STG);by discharge  -     Barriers (Swallow Compensatory Strategies/Techniques Goal 1, SLP)  --  Pt taking small, clinician-controlled sips. Reviewed strategies w/ pt & his family. They stated understanding.  -     Progress/Outcomes (Swallow Compensatory Strategies/Techniques Goal 1, SLP)  --  good progress toward goal  -CH        Comprehend Questions Goal 1 (SLP)    Improve Ability to Comprehend Questions Goal 1 (SLP)  --  complex yes/no questions;80%;with minimal cues (75-90%)  -     Time Frame (Comprehend Questions Goal 1, SLP)  --  short term goal (STG);by discharge  -     Progress (Ability to Comprehend Questions Goal 1, SLP)  --  60%;with moderate cues (50-74%)  -     Progress/Outcomes (Comprehend Questions Goal 1, SLP)  --  goal ongoing  -        Word Retrieval Skills Goal 1 (SLP)    Improve Word Retrieval Skills By Goal 1 (SLP)  --  completing functional word finding tasks;80%;with minimal cues (75-90%)  -     Time Frame (Word Retrieval Goal 1, SLP)  --  short term goal (STG);by discharge  -     Progress (Word Retrieval Skills Goal 1, SLP)  --  70%;with minimal cues (75-90%)  -     Progress/Outcomes (Word Retrieval Goal 1, SLP)  --  good progress toward goal  -        Ability to Construct Phrase and Sentence Level Response Goal 1 (SLP)    Improve Ability to Construct Phrase and Sentence Level Responses By Goal 1 (SLP)  --  answering question with phrase;constructing a sentence with a key word;80%;with minimal cues (75-90%)  -     Time Frame (Phrase and Sentence Level Response Goal 1, SLP)  --  short term goal (STG);by discharge  -     Progress (Construct Phrase and Sentence Level Response Goal 1, SLP)  --  70%;with minimal cues  (75-90%)  -     Progress/Outcomes (Phrase and Sentence Level Response Goal 1, SLP)  --  good progress toward goal  -CH        Patient Will Implement Strategies Goal 1 (SLP)    Implement Strategies of Goal 1 (SLP)  --  using external rate control;80%;with minimal cues (75-90%)  -     Time Frame (Strategy Implementation Goal 1, SLP)  --  short term goal (STG);by discharge  -     Progress/Outcomes (Strategy Implementation Goal 1, SLP)  --  goal ongoing  -        Orientation Goal 1 (SLP)    Improve Orientation Through Goal 1 (SLP)  --  demonstrating orientation to month;demonstrating orientation to place;80%;independently (over 90% accuracy)  -     Time Frame (Orientation Goal 1, SLP)  --  short term goal (STG);by discharge  -     Progress/Outcomes (Orientation Goal 1, SLP)  --  goal ongoing  -        Additional Goal 1 (SLP)    Additional Goal 1, SLP  --  LTG: Pt will improve cognitive-communicaiton skills in order to participate in care in hospital setting w/ 100% acc w/o cues.  -     Time Frame (Additional Goal 1, SLP)  --  by discharge  -     Progress/Outcomes (Additional Goal 1, SLP)  --  continuing progress toward goal  -       User Key  (r) = Recorded By, (t) = Taken By, (c) = Cosigned By    Initials Name Provider Type    Karen Marte, MS CCC-SLP Speech and Language Pathologist    Ashley Amaya MS CCC-SLP Speech and Language Pathologist          EDUCATION  The patient has been educated in the following areas:   Dysphagia (Swallowing Impairment).    SLP Recommendation and Plan  Daily Summary of Progress (SLP): progress toward functional goals as expected     Plan for Continued Treatment (SLP): Pt seen for dysphagia tx.  Reviewed FEES results with son and educated on dysphagia. Completed dysphagia exercises. Pt required extra time and frequent drinks to achieve swallows for exercises. No s/s of aspiration with trials of honey thick liquids via spoon and pureed. Recommend to continue  a Level IV diet with honey thick liquids. Speech to continue to follow for dysphagia.   Anticipated Dischage Disposition: anticipate therapy at next level of care                    Time Calculation:   Time Calculation- SLP     Row Name 08/02/19 1301             Time Calculation- SLP    SLP Start Time  1030  -LR      SLP Received On  08/02/19  -LR        User Key  (r) = Recorded By, (t) = Taken By, (c) = Cosigned By    Initials Name Provider Type    LR Karen Veronica, MS CCC-SLP Speech and Language Pathologist          Therapy Charges for Today     Code Description Service Date Service Provider Modifiers Qty    [id number]  ST TREATMENT SWALLOW 4 8/2/2019 Karen Veronica MS CCC-SLP GN 1                 Karen Veronica MS CCC-CRIS  8/2/2019

## 2019-08-02 NOTE — PLAN OF CARE
Problem: Patient Care Overview  Goal: Plan of Care Review  Outcome: Ongoing (interventions implemented as appropriate)   08/02/19 0306   Coping/Psychosocial   Plan of Care Reviewed With patient;son   Plan of Care Review   Progress improving   OTHER   Outcome Summary A & O x 4, NSR, 2L NC, pain well controlled with Ropivicaine 0.2% @ 6ml/hr, does c/o stiffness in RUE/extremity is in sling but Pt is able to wiggle fingers, states Neurontin has not only provided pain relief but has also helped him to relax and rest comfortably, using incentive spirometer up to 750, [place] 18/refuses offloading boots but pillows are being used to elevate heels, son at bedside, will continue to monitor       Problem: Fall Risk (Adult)  Goal: Identify Related Risk Factors and Signs and Symptoms  Outcome: Ongoing (interventions implemented as appropriate)      Problem: Pain, Chronic (Adult)  Goal: Acceptable Pain/Comfort Level and Functional Ability  Outcome: Ongoing (interventions implemented as appropriate)      Problem: Skin Injury Risk (Adult)  Goal: Skin Health and Integrity  Outcome: Ongoing (interventions implemented as appropriate)      Problem: Palliative Care (Adult)  Goal: Maximized Comfort  Outcome: Ongoing (interventions implemented as appropriate)    Goal: Enhanced Quality of Life  Outcome: Ongoing (interventions implemented as appropriate)      Problem: Confusion, Chronic (Adult)  Goal: Cognitive/Functional Impairments Minimized  Outcome: Ongoing (interventions implemented as appropriate)    Goal: Free from Harm/Injuries  Outcome: Ongoing (interventions implemented as appropriate)      Problem: Pain, Acute (Adult)  Goal: Acceptable Pain Control/Comfort Level  Outcome: Ongoing (interventions implemented as appropriate)

## 2019-08-02 NOTE — PLAN OF CARE
Problem: Patient Care Overview  Goal: Plan of Care Review  Outcome: Ongoing (interventions implemented as appropriate)   08/02/19 3038   Coping/Psychosocial   Plan of Care Reviewed With patient   Plan of Care Review   Progress improving   OTHER   Outcome Summary PT GIVES GOOD EFFORT. AMBULATES WITH MOD ASSIST OF 2 FOR BALANCE WITH CUES FOR INCREASED STEP LENGTH. GOOD PAIN CONTROL WITH NERVE CATHETER.

## 2019-08-02 NOTE — PLAN OF CARE
Problem: Patient Care Overview  Goal: Interprofessional Rounds/Family Conf  Outcome: Ongoing (interventions implemented as appropriate)  Palliative Team Meeting 1300:  Dr. Marlo Morin, ANA MARÍA German, Jeanine Gordon RN, PN, DAVID CampaW and CARRIE Cole   08/02/19 1300   Interdisciplinary Rounds/Family Conf   Summary CARRIE Cole and ANA MARÍA German saw patient and reports patient complains of arm pain, neurontin was restarted, had a more peaceful sleep, nerve block will be d/c tomorrow and transition to oral pain meds. CM reports will work on placement on Monday. Palliative to continue to follow for symptoms, support and goals.   Participants advanced practice nurse;nursing;pastoral care;physician;social work/services

## 2019-08-02 NOTE — PROGRESS NOTES
[facility]    Acute pain service Inpatient Progress Note    Patient Name: Vargas De  :  1940  MRN:  [id number]        Acute Pain  Service Inpatient Progress Note:    Analgesia:Good  Pain Score:3/10  LOC: alert and awake  Resp Status: room air  Cardiac: VS stable  Side Effects:None  Catheter Site:clean, dressing intact and dry  Cath type: peripheral nerve cath(MOOG pump)  Infusion rate: 6ml/hr  Catheter Plan:Catheter to remain Insitu and Continue catheter infusion rate unchanged

## 2019-08-02 NOTE — PROGRESS NOTES
"Palliative Care Consult Note    Patient Name: Vargas De   : 1940  Sex: male    Code Status: No CPR.  DNI. No feeding tube per daughter  NOK: Four Children Denita Flor  [id number] (patient lives with her), Lawrence Cárdenas Jr      Date of Admission: 2019    Subjective:  78-year-old white male with history of recent stroke, ulcerative colitis/prednisone/mesalamine, chronic back pain/cervical spinal stenosis, A. fib/Eliquis, and mild LBD and vascular dementia (recent embolic stroke) who fell last Saturday and fractured his right humerus.     Per daughter-daytime hallucinations did not occur until after humerus fracture.  At home he was able to ambulate, go up and down stairs, make his own meals, and was still driving some.   Underwent peripheral nerve catheter for ropivacaine infusion right humerus fracture pain control.     Patient sitting up in recliner eating lunch when I came by today. Daughter reports no new concerns. She feels that his symptoms are well managed right now. She does express some concern about how she is going to care for him in the home. She would like for him to get some rehab first.      CC: \"My elbow is getting sore.\"      Past 24hrs:  no events      Meds given for symptoms: PRN Tylenol.        ROS:  Review of Systems   Constitutional: Positive for fatigue.   All other systems reviewed and are negative.      Reviewed current scheduled and prn medications for route, type, dose and frequency.    ropivacaine (NAROPIN) 0.2% peripheral nerve cath (moog) 6 mL/hr Last Rate: 6 mL/hr (19 1279)     •  acetaminophen  •  calcium carbonate  •  ipratropium-albuterol  •  loperamide  •  magic mouthwash  •  melatonin  •  phenol  •  potassium chloride  •  potassium chloride  •  [DISCONTINUED] potassium chloride **OR** [DISCONTINUED] potassium chloride **OR** potassium chloride  •  saline  •  sodium chloride  •  sodium chloride    Objective:   /92 (BP Location: Left arm, " "Patient Position: Lying)   Pulse 74   Temp 97.9 °F (36.6 °C) (Axillary)   Resp 18   Ht 170.2 cm (67\")   Wt 87.1 kg (192 lb 0.3 oz)   SpO2 97%   BMI 30.07 kg/m²    Intake & Output (last day)       08/01 0701 - 08/02 0700 08/02 0701 - 08/03 0700    P.O.      I.V. (mL/kg)      IV Piggyback      Total Intake(mL/kg)      Urine (mL/kg/hr) 900 (0.4) 600 (1)    Stool 0 0    Total Output 900 600    Net -900 -600          Urine Unmeasured Occurrence 1 x     Stool Unmeasured Occurrence 5 x 1 x        Lab Results (last 24 hours)     Procedure Component Value Units Date/Time    CBC & Differential [[id number]] Collected:  08/02/19 0537    Specimen:  Blood Updated:  08/02/19 0731    Narrative:       The following orders were created for panel order CBC & Differential.  Procedure                               Abnormality         Status                     ---------                               -----------         ------                     CBC Auto Differential[[id number]]        Abnormal            Final result                 Please view results for these tests on the individual orders.    CBC Auto Differential [[id number]]  (Abnormal) Collected:  08/02/19 0537    Specimen:  Blood Updated:  08/02/19 0731     WBC 6.76 10*3/mm3      RBC 3.94 10*6/mm3      Hemoglobin 12.4 g/dL      Hematocrit 40.1 %      .8 fL      MCH 31.5 pg      MCHC 30.9 g/dL      RDW 13.7 %      RDW-SD 51.9 fl      MPV 9.3 fL      Platelets 305 10*3/mm3      Neutrophil % 47.8 %      Lymphocyte % 33.1 %      Monocyte % 10.1 %      Eosinophil % 7.5 %      Basophil % 0.6 %      Immature Grans % 0.9 %      Neutrophils, Absolute 3.23 10*3/mm3      Lymphocytes, Absolute 2.24 10*3/mm3      Monocytes, Absolute 0.68 10*3/mm3      Eosinophils, Absolute 0.51 10*3/mm3      Basophils, Absolute 0.04 10*3/mm3      Immature Grans, Absolute 0.06 10*3/mm3      nRBC 0.0 /100 WBC     Comprehensive Metabolic Panel [[id number]]  (Abnormal) Collected:  08/02/19 0537    " Specimen:  Blood Updated:  08/02/19 0650     Glucose 95 mg/dL      BUN 7 mg/dL      Creatinine 0.64 mg/dL      Sodium 137 mmol/L      Potassium 4.1 mmol/L      Chloride 102 mmol/L      CO2 25.0 mmol/L      Calcium 8.9 mg/dL      Total Protein 6.5 g/dL      Albumin 2.70 g/dL      ALT (SGPT) 52 U/L      AST (SGOT) 45 U/L      Alkaline Phosphatase 93 U/L      Total Bilirubin 0.6 mg/dL      eGFR Non African Amer 121 mL/min/1.73      Globulin 3.8 gm/dL      A/G Ratio 0.7 g/dL      BUN/Creatinine Ratio 10.9     Anion Gap 10.0 mmol/L     Narrative:       GFR Normal >60  Chronic Kidney Disease <60  Kidney Failure <15    Ferritin [[id number]]  (Normal) Collected:  08/02/19 0537    Specimen:  Blood Updated:  08/02/19 0649     Ferritin 366.30 ng/mL     Iron Profile [[id number]]  (Abnormal) Collected:  08/02/19 0537    Specimen:  Blood Updated:  08/02/19 0648     Iron 52 mcg/dL      Iron Saturation 26 %      Transferrin 136 mg/dL      TIBC 203 mcg/dL     Potassium [[id number]]  (Normal) Collected:  08/01/19 1801    Specimen:  Blood Updated:  08/01/19 1833     Potassium 5.0 mmol/L     Magnesium [[id number]]  (Normal) Collected:  08/01/19 1418    Specimen:  Blood Updated:  08/01/19 1501     Magnesium 2.0 mg/dL         Imaging Results (last 24 hours)     ** No results found for the last 24 hours. **            Physical Exam:  Gen: Elderly white male lying in bed.  Weak and fatigued but NAD  HEENT: patient chewing and swallowing his lunch with no cough or trouble with secretions at this time  Card: S1-S2-regular rate and rhythm.  No edema.  Pulm: Respirations unlabored.  Not using O2.  No rhonchi heard.  GI: Flat, soft, non-tender.  Very quiet bowel sounds.  Neuro: Awake but fatigued.    Psych: Calm            Acute alteration in mental status    Essential hypertension    Crohn's colitis, with rectal bleeding (CMS/HCC)    GERD without esophagitis    Spinal stenosis in cervical region    PFO (patent foramen ovale)    History of stroke  with residual deficit    Mixed hyperlipidemia    Multiple lacunar infarcts    Paroxysmal atrial fibrillation (CMS/HCC)    Fracture of proximal end of right humerus    Acute respiratory distress    Lewy body dementia      Assessment/Plan:   Info:   78-year-old white male with history of recent stroke, ulcerative colitis/prednisone/mesalamine, chronic back pain/cervical spinal stenosis, A. fib/Eliquis, and mild LBD and vascular dementia  who fractured right humerus.      Currently has peripheral nerve catheter with ropivacaine infusion right humerus fracture pain control.    Edematous uvula with swallowing difficulties upon arrival, speech eval 7/29 with modified diet/honey thick ordered.  Is having difficulty managing oral secretions and using Yaunker suction      Problem 1.  Musculoskeletal/OA pain -long-standing.  Plan: Will order Tylenol 650 mg p.o. 3 times daily-max 24-hour Tylenol dose is 3000 mg. Continue. May be discontinuing block soon.      Problem 2.  Abdominal pain with frequent loose diarrhea stools - X5. No blood seen per staff. No foul odor.  History of ulcerative colitis.  Not on any stool softeners.  Is on prednisone and mesalamine.  Also on Protonix (took Prilosec at home).  Plan: will add prn Imodium 2 mg 4 times daily. Continue. Diarrhea has improved somewhat      Problem 3.  Right arm fracture pain   Plan: Adding scheduled Tylenol.  Suggest PRN Norco to be started once block is discontinued.     Problem 4.  Altered mental status related to baseline LBD, fracture, pain, shortness of air with hypoxia, and hospitalization.  Plan.  Improved today.  Unclear what his end-recovery mental status will be at this time     GOC: Plan is for patient to be discharged for outpatient therapy either at [facility] or at a looking local nursing home.  Patient was at [facility] after his stroke.  They are taking into consideration what his tolerance for physical therapy will be.  Support  offered.  Palliative care will continue to follow.        Total Visit Time: 30 minutes  Face to Face Time: 15 minutes      ANA MARÍA Booth  (C) [phone]  (O) [phone]  08/02/19  1:59 PM

## 2019-08-02 NOTE — PLAN OF CARE
Problem: Patient Care Overview  Goal: Plan of Care Review  Outcome: Ongoing (interventions implemented as appropriate)   08/02/19 1300   Coping/Psychosocial   Plan of Care Reviewed With patient;son   SLP treatment completed. Will continue to address dysphagia. Please see note for further details and recommendations.

## 2019-08-02 NOTE — THERAPY TREATMENT NOTE
Acute Care - Physical Therapy Treatment Note  [facility]     Patient Name: Vargas De  : 1940  MRN: [id number]  Today's Date: 2019  Onset of Illness/Injury or Date of Surgery: 19  Date of Referral to PT: 19       Admit Date: 2019    Visit Dx:    ICD-10-CM ICD-9-CM   1. Acute alteration in mental status R41.82 780.97   2. Generalized weakness R53.1 780.79   3. Other closed displaced fracture of proximal end of right humerus with routine healing, subsequent encounter S42.291D V54.11   4. Elevated blood pressure reading with diagnosis of hypertension I10 401.9   5. Impaired functional mobility, balance, gait, and endurance Z74.09 V49.89   6. Impaired mobility and ADLs Z74.09 799.89   7. Cognitive communication deficit R41.841 799.52   8. Oropharyngeal dysphagia R13.12 787.22     Patient Active Problem List   Diagnosis   • Peripheral neuropathy   • Essential hypertension   • Crohn's colitis, with rectal bleeding (CMS/HCC)   • Major depressive disorder with single episode, in partial remission (CMS/HCC)   • Macular degeneration of both eyes   • GERD without esophagitis   • Cervicalgia   • Spinal stenosis in cervical region   • Chronic right-sided low back pain without sciatica   • Osteoarthritis of toe joint, right   • PFO (patent foramen ovale)   • Ulcerative colitis (CMS/HCC)   • Body mass index (BMI) of 25.0 to 29.9   • History of stroke with residual deficit   • Mixed hyperlipidemia   • Multiple lacunar infarcts   • Paroxysmal atrial fibrillation (CMS/HCC)   • Chronic bilateral low back pain   • Acute alteration in mental status   • Fracture of proximal end of right humerus   • Acute respiratory distress   • Lewy body dementia       Therapy Treatment    Rehabilitation Treatment Summary     Row Name 19 1508 19 1030          Treatment Time/Intention    Discipline  physical therapist  -CD  speech language pathologist  -LR     Document Type  progress note/recertification   -CD  therapy note (daily note)  -LR     Subjective Information  complains of;pain PRIMARILY LOW BACK PAIN,   -CD  no complaints  -LR     Mode of Treatment  physical therapy  -CD  speech-language pathology  -LR     Patient/Family Observations  PT IN FOWLERS UPON ARRIVAL ON RA AND WITH R UE IN SLING, PERIPHERAL NERVE CATHETER INTACT. SON PRESENT.   -CD  --     Care Plan Review  care plan/treatment goals reviewed;patient/other agree to care plan  -CD  care plan/treatment goals reviewed;evaluation/treatment results reviewed  -LR     Care Plan Review, Other Participant(s)  son  -CD  --     Therapy Frequency (PT Clinical Impression)  daily  -CD  --     Therapy Frequency (Swallow)  --  5 days per week  -LR     Patient Effort  good  -CD  good  -LR     Comment  --  Pt's son present.   -LR     Existing Precautions/Restrictions  fall R UE NWB IN SLING DUE TO HUMERUS FRACTURE.   -CD  --     Recorded by [CD] Gogo Thibodeaux, PT 08/02/19 1544 [LR] aKren Veronica MS CCC-SLP 08/02/19 1254     Row Name 08/02/19 1508             Vital Signs    Pre Systolic BP Rehab  -- VSS. NSG CLEARED FOR TREATMENT.   -CD      Recorded by [CD] Gogo Thibodeaux, PT 08/02/19 1549      Row Name 08/02/19 1508             Cognitive Assessment/Intervention- PT/OT    Orientation Status (Cognition)  oriented x 3  -CD      Follows Commands (Cognition)  follows one step commands;over 90% accuracy;verbal cues/prompting required  -CD      Safety Deficit (Cognitive)  awareness of need for assistance;insight into deficits/self awareness;safety precautions awareness;safety precautions follow-through/compliance  -CD      Personal Safety Interventions  fall prevention program maintained;gait belt;muscle strengthening facilitated;nonskid shoes/slippers when out of bed;supervised activity  -CD      Recorded by [CD] Gogo Thibodeaux, PT 08/02/19 1549      Row Name 08/02/19 1508             Safety Issues, Functional Mobility    Safety Issues Affecting Function (Mobility)   insight into deficits/self awareness;safety precaution awareness;sequencing abilities  -CD      Impairments Affecting Function (Mobility)  balance;cognition;endurance/activity tolerance;pain;coordination  -CD      Recorded by [CD] Gogo Thibodeaux, PT 08/02/19 1549      Row Name 08/02/19 1508             Bed Mobility Assessment/Treatment    Scooting/Bridging [place] (Bed Mobility)  minimum assist (75% patient effort)  -CD      Supine-Sit [place] (Bed Mobility)  moderate assist (50% patient effort)  -CD      Bed Mobility, Safety Issues  decreased use of arms for pushing/pulling;decreased use of legs for bridging/pushing  -CD      Assistive Device (Bed Mobility)  draw sheet;head of bed elevated;bed rails  -CD      Recorded by [CD] Gogo Thibodeaux, PT 08/02/19 1549      Row Name 08/02/19 1508             Transfer Assessment/Treatment    Transfer Assessment/Treatment  sit-stand transfer;stand-sit transfer  -CD      Comment (Transfers)  CUES FOR HAND PLACEMENT., SLING ADJUSTED  AT NECK FOR PROPER FIT PRIOR TO STANDING,   -CD      Recorded by [CD] Gogo Thibodeaux, PT 08/02/19 1549      Row Name 08/02/19 1508             Sit-Stand Transfer    Sit-Stand [place] (Transfers)  verbal cues;minimum assist (75% patient effort);2 person assist  -CD      Assistive Device (Sit-Stand Transfers)  -- VIA L UE SUPPORT AND GAIT BELT.   -CD      Recorded by [CD] Gogo Thibodeaux, PT 08/02/19 1549      Row Name 08/02/19 1508             Stand-Sit Transfer    Stand-Sit [place] (Transfers)  minimum assist (75% patient effort);2 person assist;verbal cues  -CD      Recorded by [CD] Gogo Thibodeaux, PT 08/02/19 1549      Row Name 08/02/19 1508             Gait/Stairs Assessment/Training    10389 - Gait Training Minutes   20  -CD      Gait/Stairs Assessment/Training  gait/ambulation independence;gait/ambulation assistive device;distance ambulated;gait pattern  -CD      [place] Level (Gait)  moderate assist (50% patient  effort);2 person assist;verbal cues PLUS ASSIST FOR NERVE CATHETER.   -CD      Assistive Device (Gait)  -- VIA L UE SUPPORT, GAIT BELT. R UE IN SLING.   -CD      Distance in Feet (Gait)  110  -CD      Pattern (Gait)  step-to;step-through PROGRESSED FROM STEP TO TO STEP THROUGH WITH CUES.   -CD      Deviations/Abnormal Patterns (Gait)  huber decreased;stride length decreased  -CD      Bilateral Gait Deviations  forward flexed posture  -CD      Comment (Gait/Stairs)  FOLLOWED WITH RECLINER. NEEDS CUES TO INCREASE STEP LENGTH ESPECIALLY ON R AND CUES FOR UPRIGHT POSTURE.   -CD      Recorded by [CD] Gogo Thibodeaux, PT 08/02/19 1549      Row Name 08/02/19 1508             Motor Skills Assessment/Interventions    Additional Documentation  Balance (Group);Balance Interventions (Group);Therapeutic Exercise (Group);Therapeutic Exercise Interventions (Group)  -CD      Recorded by [CD] Gogo Thibodeaux, PT 08/02/19 1549      Row Name 08/02/19 1508             Therapeutic Exercise    54224 - PT Therapeutic Exercise Minutes  5  -CD      Recorded by [CD] Gogo Thibodeaux, PT 08/02/19 1549      Row Name 08/02/19 1508             Therapeutic Exercise    Lower Extremity (Therapeutic Exercise)  LAQ (long arc quad), bilateral;marching while seated;heel slides, bilateral AP'S   -CD      Exercise Type (Therapeutic Exercise)  AROM (active range of motion) RECIPROCAL   -CD      Position (Therapeutic Exercise)  seated  -CD      Sets/Reps (Therapeutic Exercise)  1 SET OF 10 REPS.   -CD      Recorded by [CD] Gogo Thibodeaux, PT 08/02/19 1549      Row Name 08/02/19 1508             Static Standing Balance    Level of [place] (Supported Standing, Static Balance)  contact guard assist;2 person assist  -CD      Assistive Device Utilized (Supported Standing, Static Balance)  -- L UE SUPPORT AND  GAIT BELT.   -CD      Recorded by [CD] Gogo Thibodeaux, PT 08/02/19 1549      Row Name 08/02/19 1508             Dynamic Standing Balance    Level of  [place], Reaches Outside Midline (Standing, Dynamic Balance)  moderate assist, 50 to 74% patient effort;2 person assist  -CD      Assistive Device Utilized (Supported Standing, Dynamic Balance)  -- L UE SUPPORT AND GAIT BELT.   -CD      Comment, Reaches Outside Midline (Standing, Dynamic Balance)  GAIT IN [place].   -CD      Recorded by [CD] Gogo Thibodeaux, PT 08/02/19 1549      Row Name 08/02/19 1508             Positioning and Restraints    Pre-Treatment Position  in bed  -CD      Post Treatment Position  chair  -CD      In Chair  reclined;call light within reach;encouraged to call for assist;exit alarm on;with family/caregiver;notified nsg;legs elevated;RUE elevated PILLOWS UNDER B UE, R UE IN SLING, LOOSENED AT NECK .   -CD      Recorded by [CD] Gogo Thibodeaux, PT 08/02/19 1549      Row Name 08/02/19 1508             Pain Scale: Numbers Pre/Post-Treatment    Pain Scale: Numbers, Pretreatment  2/10  -CD      Pain Scale: Numbers, Post-Treatment  2/10  -CD      Pain Location  back  -CD      Recorded by [CD] Gogo Thibodeaux, PT 08/02/19 1549      Row Name                Wound 07/23/19 2130 Right anterior toe pressure injury    Wound - Properties Group Date first assessed: 07/23/19 [DB] Time first assessed: 2130 [DB] Present On Admission : yes [DB] Side: Right [DB] Orientation: anterior [DB] Location: toe [DB] Type: pressure injury [DB] Stage, Pressure Injury: Stage 1 [DB] Recorded by:  [DB] Henok Marei RN 07/24/19 0229    Row Name                Wound 07/20/19 1649 Left hand skin tear    Wound - Properties Group Date first assessed: 07/20/19 [SW] Time first assessed: 1649 [SW] Present On Admission : yes [SW] Side: Left [SW] Location: hand [SW] Type: skin tear [SW] Recorded by:  [SW] Odilia Joy RN 07/20/19 1649    Row Name                Wound 07/20/19 1648 Right knee abrasion    Wound - Properties Group Date first assessed: 07/20/19 [SW] Time first assessed: 1648 [SW] Present On Admission : yes [SW]  Side: Right [SW] Location: knee [SW] Type: abrasion [SW] Recorded by:  [SW] Odilia Joy RN 07/20/19 1648    Row Name 08/02/19 1508             Plan of Care Review    Plan of Care Reviewed With  patient;son  -CD      Recorded by [CD] Gogo Thibodeaux, PT 08/02/19 1549      Row Name 08/02/19 1508             Outcome Summary/Treatment Plan (PT)    Daily Summary of Progress (PT)  progress toward functional goals is good  -CD      Anticipated Discharge Disposition (PT)  inpatient rehabilitation facility  -CD      Recorded by [CD] Gogo Thibodeaux, PT 08/02/19 1549      Row Name 08/02/19 1030             Outcome Summary/Treatment Plan (SLP)    Daily Summary of Progress (SLP)  progress toward functional goals as expected  -LR      Plan for Continued Treatment (SLP)  Pt seen for dysphagia tx.  Reviewed FEES results with son and educated on dysphagia. Completed dysphagia exercises. Pt required extra time and frequent drinks to achieve swallows for exercises. No s/s of aspiration with trials of honey thick liquids via spoon and pureed. Recommend to continue a Level IV diet with honey thick liquids. Speech to continue to follow for dysphagia.   -LR      Anticipated Dischage Disposition  anticipate therapy at next level of care  -LR      Recorded by [LR] Karen Veronica MS CCC-SLP 08/02/19 7293        User Key  (r) = Recorded By, (t) = Taken By, (c) = Cosigned By    Initials Name Effective Dates Discipline    CD Gogo Thibodeaux, PT 06/19/15 -  PT    LR Karen Veronica MS CCC-SLP 06/22/15 -  SLP    Henok Vega RN 06/16/16 -  Nurse    SW Odilia Joy RN 06/16/16 -  Nurse          Wound 07/20/19 1648 Right knee abrasion (Active)   Dressing Appearance open to air 8/2/2019  8:30 AM   Base scab 8/2/2019  8:30 AM   Periwound intact 8/2/2019  4:05 AM   Periwound Temperature warm 8/2/2019  4:05 AM   Periwound Skin Turgor soft 8/2/2019  4:05 AM   Drainage Amount none 8/2/2019  4:05 AM   Dressing Care, Wound open to  air 8/2/2019  4:05 AM   Periwound Care, Wound dry periwound area maintained 8/2/2019  4:05 AM       Wound 07/20/19 1649 Left hand skin tear (Active)   Dressing Appearance dry;intact 8/2/2019  8:30 AM   Closure Adhesive bandage 8/2/2019  8:30 AM   Base dressing in place, unable to visualize 8/2/2019  8:30 AM   Periwound Temperature warm 8/2/2019  4:05 AM   Periwound Skin Turgor soft 8/2/2019  4:05 AM   Drainage Amount none 8/2/2019  4:05 AM   Dressing Care, Wound dressing reinforced 8/2/2019  4:05 AM       Wound 07/23/19 2130 Right anterior toe pressure injury (Active)   Dressing Appearance open to air 8/2/2019  4:05 AM   Closure Open to air 8/2/2019  4:05 AM   Base red;non-blanchable 8/2/2019  4:05 AM   Red (%), Wound Tissue Color 100 8/2/2019  4:05 AM   Periwound intact;dry 8/2/2019  4:05 AM   Periwound Temperature warm 8/2/2019  4:05 AM   Periwound Skin Turgor soft 8/2/2019  4:05 AM   Drainage Amount none 8/2/2019  4:05 AM   Dressing Care, Wound open to air 8/2/2019  4:05 AM   Periwound Care, Wound dry periwound area maintained 8/2/2019  4:05 AM       Rehab Goal Summary     Row Name 08/02/19 1030             Oral Nutrition/Hydration Goal 2 (SLP)    Oral Nutrition/Hydration Goal 2, SLP  Pt will tolerate trials of soft solids & honey-thick liquids w/o s/sxs aspiration w/ 100% acc w/o cues.  -LR      Time Frame (Oral Nutrition/Hydration Goal 2, SLP)  short term goal (STG);by discharge  -LR      Barriers (Oral Nutrition/Hydration Goal 2, SLP)  Pt given trials of honey thick liquids and pureed via spoon. No overt s/s of aspiration with any trials.   -LR      Progress/Outcomes (Oral Nutrition/Hydration Goal 2, SLP)  continuing progress toward goal  -LR         Lingual Strengthening Goal 1 (SLP)    Activity (Lingual Strengthening Goal 1, SLP)  increase tongue back strength  -LR      Increase Tongue Back Strength  lingual resistance exercises  -LR      [place]/Accuracy (Lingual Strengthening Goal 1, SLP)  with  minimal cues (75-90% accuracy)  -LR      Time Frame (Lingual Strengthening Goal 1, SLP)  short term goal (STG);by discharge  -LR      Progress/Outcomes (Lingual Strengthening Goal 1, SLP)  good progress toward goal  -LR         Pharyngeal Strengthening Exercise Goal 1 (SLP)    Activity (Pharyngeal Strengthening Goal 1, SLP)  increase timing;increase superior movement of the hyolaryngeal complex;increase anterior movement of the hyolaryngeal complex;increase closure at entrance to airway/closure of airway at glottis;increase squeeze/positive pressure generation;increase tongue base retraction  -LR      Increase Superior Movement of the Hyolaryngeal Complex  falsetto;effortful pitch glide (falsetto + pharyngeal squeeze)  -LR      Increase Squeeze/Positive Pressure Generation  hard effortful swallow  -LR      Increase Tongue Base Retraction  nabila;hard effortful swallow  -LR      [place]/Accuracy (Pharyngeal Strengthening Goal 1, SLP)  with minimal cues (75-90% accuracy)  -LR      Time Frame (Pharyngeal Strengthening Goal 1, SLP)  short term goal (STG);by discharge  -LR      Barriers (Pharyngeal Strengthening Goal 1, SLP)  Pt required drinks of honey thick liquids to complete effortful swallow. Pt required max cues to complete nabila.   -LR      Progress/Outcomes (Pharyngeal Strengthening Goal 1, SLP)  continuing progress toward goal  -LR        User Key  (r) = Recorded By, (t) = Taken By, (c) = Cosigned By    Initials Name Provider Type Discipline    Karen Marte MS CCC-SLP Speech and Language Pathologist SLP          Physical Therapy Education     Title: PT OT SLP Therapies (In Progress)     Topic: Physical Therapy (Done)     Point: Mobility training (Done)     Learning Progress Summary           Patient DEISY Lawson VU,NR by CD at 8/2/2019  3:49 PM    Comment:  SEE FLOW SHEET. SON INSTRUCTED IN LE THER EX, POSITIONING FOR COMFORT.,    DEISY Weaver VU by KM at 8/1/2019 10:20 AM    DEISY Weaver D, MARITZA,NR by AA  at 7/31/2019  2:46 PM    Eager, E, NR by AA at 7/30/2019  3:17 PM    Acceptance, E, VU,NR by JUSTIN at 7/29/2019 11:04 AM    Acceptance, E, VU,NR by CD at 7/25/2019  4:42 PM    Comment:  DTR INSTRUCTED IN ROM EXERCISES, POSITIONING FOR COMFORT/ PROTECTION OF R UE, BENEFITS OF OOB ACTIVITY, D/C PLANNING,    Acceptance, E, VU,NR by CD at 7/24/2019 10:36 AM    Comment:  SEE FLOWSHEET.   Family Acceptance, E, VU,NR by CD at 8/2/2019  3:49 PM    Comment:  SEE FLOW SHEET. SON INSTRUCTED IN LE THER EX, POSITIONING FOR COMFORT.,    Eager, E,D, VU,NR by AA at 7/31/2019  2:46 PM    Acceptance, E, VU,NR by CD at 7/25/2019  4:42 PM    Comment:  DTR INSTRUCTED IN ROM EXERCISES, POSITIONING FOR COMFORT/ PROTECTION OF R UE, BENEFITS OF OOB ACTIVITY, D/C PLANNING,                   Point: Home exercise program (Done)     Learning Progress Summary           Patient Acceptance, E, VU,NR by CD at 8/2/2019  3:49 PM    Comment:  SEE FLOW SHEET. SON INSTRUCTED IN LE THER EX, POSITIONING FOR COMFORT.,    Eager, E, VU by TANK at 8/1/2019 10:20 AM    Acceptance, E, VU,NR by JUSTIN at 7/29/2019 11:04 AM    Acceptance, E, VU,NR by CD at 7/25/2019  4:42 PM    Comment:  DTR INSTRUCTED IN ROM EXERCISES, POSITIONING FOR COMFORT/ PROTECTION OF R UE, BENEFITS OF OOB ACTIVITY, D/C PLANNING,    Acceptance, E, VU,NR by CD at 7/24/2019 10:36 AM    Comment:  SEE FLOWSHEET.   Family Acceptance, E, VU,NR by CD at 8/2/2019  3:49 PM    Comment:  SEE FLOW SHEET. SON INSTRUCTED IN LE THER EX, POSITIONING FOR COMFORT.,    Acceptance, E, VU,NR by CD at 7/25/2019  4:42 PM    Comment:  DTR INSTRUCTED IN ROM EXERCISES, POSITIONING FOR COMFORT/ PROTECTION OF R UE, BENEFITS OF OOB ACTIVITY, D/C PLANNING,                   Point: Body mechanics (Done)     Learning Progress Summary           Patient Acceptance, E, VU,NR by CD at 8/2/2019  3:49 PM    Comment:  SEE FLOW SHEET. SON INSTRUCTED IN LE THER EX, POSITIONING FOR COMFORT.,    DEISY Weaver, VU by TANK at 8/1/2019 10:20 AM     Eager, E,D, VU,NR by AA at 7/31/2019  2:46 PM    Eager, E, NR by AA at 7/30/2019  3:17 PM    Acceptance, E, VU,NR by JUSTIN at 7/29/2019 11:04 AM    Acceptance, E, VU,NR by CD at 7/25/2019  4:42 PM    Comment:  DTR INSTRUCTED IN ROM EXERCISES, POSITIONING FOR COMFORT/ PROTECTION OF R UE, BENEFITS OF OOB ACTIVITY, D/C PLANNING,    Acceptance, E, VU,NR by CD at 7/24/2019 10:36 AM    Comment:  SEE FLOWSHEET.   Family Acceptance, E, VU,NR by CD at 8/2/2019  3:49 PM    Comment:  SEE FLOW SHEET. SON INSTRUCTED IN LE THER EX, POSITIONING FOR COMFORT.,    Eager, E,D, VU,NR by AA at 7/31/2019  2:46 PM    Acceptance, E, VU,NR by CD at 7/25/2019  4:42 PM    Comment:  DTR INSTRUCTED IN ROM EXERCISES, POSITIONING FOR COMFORT/ PROTECTION OF R UE, BENEFITS OF OOB ACTIVITY, D/C PLANNING,                   Point: Precautions (Done)     Learning Progress Summary           Patient Acceptance, E, VU,NR by CD at 8/2/2019  3:49 PM    Comment:  SEE FLOW SHEET. SON INSTRUCTED IN LE THER EX, POSITIONING FOR COMFORT.,    Eager, E, VU by TANK at 8/1/2019 10:20 AM    Eager, E,D, VU,NR by AA at 7/31/2019  2:46 PM    Eager, E, NR by BRANDON at 7/30/2019  3:17 PM    Acceptance, E, VU,NR by JUSTIN at 7/29/2019 11:04 AM    Acceptance, E, VU,NR by CD at 7/25/2019  4:42 PM    Comment:  DTR INSTRUCTED IN ROM EXERCISES, POSITIONING FOR COMFORT/ PROTECTION OF R UE, BENEFITS OF OOB ACTIVITY, D/C PLANNING,    Acceptance, E, VU,NR by CD at 7/24/2019 10:36 AM    Comment:  SEE FLOWSHEET.   Family Acceptance, E, VU,NR by CD at 8/2/2019  3:49 PM    Comment:  SEE FLOW SHEET. SON INSTRUCTED IN LE THER EX, POSITIONING FOR COMFORT.,    Eager, E,D, VU,NR by AA at 7/31/2019  2:46 PM    Acceptance, E, VU,NR by NOBLE at 7/25/2019  4:42 PM    Comment:  DTR INSTRUCTED IN ROM EXERCISES, POSITIONING FOR COMFORT/ PROTECTION OF R UE, BENEFITS OF OOB ACTIVITY, D/C PLANNING,                               User Key     Initials Effective Dates Name Provider Type Discipline    CD 06/19/15 -   Gogo Thibodeaux, PT Physical Therapist PT    EJ 11/20/18 -  Nurys Chahal, PT Physical Therapist PT    KM 06/19/15 -  Denise Hdz, PT Physical Therapist PT    AA 04/02/18 -  Mere Ramon, PT Physical Therapist PT                PT Recommendation and Plan  Anticipated Discharge Disposition (PT): inpatient rehabilitation facility  Planned Therapy Interventions (PT Eval): balance training, bed mobility training, gait training, home exercise program, patient/family education, transfer training, strengthening, motor coordination training  Therapy Frequency (PT Clinical Impression): daily  Outcome Summary/Treatment Plan (PT)  Daily Summary of Progress (PT): progress toward functional goals is good  Anticipated Discharge Disposition (PT): inpatient rehabilitation facility  Plan of Care Reviewed With: patient  Progress: improving  Outcome Summary: PT GIVES GOOD EFFORT. AMBULATES WITH MOD ASSIST OF 2 FOR BALANCE WITH CUES FOR INCREASED STEP LENGTH. GOOD PAIN CONTROL WITH NERVE CATHETER.   Outcome Measures     Row Name 08/02/19 1508 08/01/19 1440 08/01/19 1020       How much help from another person do you currently need...    Turning from your back to your side while in flat bed without using bedrails?  2  -CD  --  3  -KM    Moving from lying on back to sitting on the side of a flat bed without bedrails?  2  -CD  --  3  -KM    Moving to and from a bed to a chair (including a wheelchair)?  2  -CD  --  3  -KM    Standing up from a chair using your arms (e.g., wheelchair, bedside chair)?  2  -CD  --  3  -KM    Climbing 3-5 steps with a railing?  2  -CD  --  2  -KM    To walk in hospital room?  2  -CD  --  2  -KM    AM-PAC 6 Clicks Score (PT)  12  -CD  --  16  -KM       How much help from another is currently needed...    Putting on and taking off regular lower body clothing?  --  1  -AN  --    Bathing (including washing, rinsing, and drying)  --  2  -AN  --    Toileting (which includes using toilet bed pan or  urinal)  --  1  -AN  --    Putting on and taking off regular upper body clothing  --  1  -AN  --    Taking care of personal grooming (such as brushing teeth)  --  2  -AN  --    Eating meals  --  2  -AN  --    AM-PAC 6 Clicks Score (OT)  --  9  -AN  --       Functional Assessment    Outcome Measure Options  AM-[facility] 6 Clicks Basic Mobility (PT)  -CD  --  AM-[facility] 6 Clicks Basic Mobility (PT)  -    Row Name 07/31/19 1335             How much help from another person do you currently need...    Turning from your back to your side while in flat bed without using bedrails?  3  -AA      Moving from lying on back to sitting on the side of a flat bed without bedrails?  3  -AA      Moving to and from a bed to a chair (including a wheelchair)?  3  -AA      Standing up from a chair using your arms (e.g., wheelchair, bedside chair)?  3  -AA      Climbing 3-5 steps with a railing?  1  -AA      To walk in hospital room?  2  -AA      AM-[facility] 6 Clicks Score (PT)  15  -AA         Functional Assessment    Outcome Measure Options  -[facility] 6 Clicks Basic Mobility (PT)  -AA        User Key  (r) = Recorded By, (t) = Taken By, (c) = Cosigned By    Initials Name Provider Type    CD Gogo Thibodeaux, PT Physical Therapist    Denise Titus, PT Physical Therapist    Bibiana Barrera, OT Occupational Therapist    Mere Mullins, PT Physical Therapist         Time Calculation:   PT Charges     Row Name 08/02/19 1508             Time Calculation    Start Time  1508  -CD      PT Received On  08/02/19  -CD      PT Goal Re-Cert Due Date  08/03/19  -CD         Time Calculation- PT    Total Timed Code Minutes- PT  25 minute(s)  -CD         Timed Charges    16975 - PT Therapeutic Exercise Minutes  5  -CD      32003 - Gait Training Minutes   20  -CD        User Key  (r) = Recorded By, (t) = Taken By, (c) = Cosigned By    Initials Name Provider Type    Gogo Tinoco, PT Physical Therapist        Therapy Charges for Today     Code  Description Service Date Service Provider Modifiers Qty    07871095778 HC PT THER PROC EA 15 MIN 8/2/2019 Gogo Thibodeaux, PT GP 1    66443390325 HC GAIT TRAINING EA 15 MIN 8/2/2019 Gogo Thibodeaux, PT GP 1    81184254752 HC PT THER SUPP EA 15 MIN 8/2/2019 Gogo Thibodeaux, PT GP 1          PT G-Codes  Outcome Measure Options: AM-PAC 6 Clicks Basic Mobility (PT)  AM-PAC 6 Clicks Score (PT): 12  AM-PAC 6 Clicks Score (OT): 9  Modified Kendrick Scale: 4 - Moderately severe disability.  Unable to walk without assistance, and unable to attend to own bodily needs without assistance.    Gogo Thibodeaux, PT  8/2/2019

## 2019-08-03 NOTE — THERAPY TREATMENT NOTE
Acute Care - Physical Therapy Treatment Note  [facility]     Patient Name: Vargas De  : 1940  MRN: [id number]  Today's Date: 8/3/2019  Onset of Illness/Injury or Date of Surgery: 19  Date of Referral to PT: 19       Admit Date: 2019    Visit Dx:    ICD-10-CM ICD-9-CM   1. Acute alteration in mental status R41.82 780.97   2. Generalized weakness R53.1 780.79   3. Other closed displaced fracture of proximal end of right humerus with routine healing, subsequent encounter S42.291D V54.11   4. Elevated blood pressure reading with diagnosis of hypertension I10 401.9   5. Impaired functional mobility, balance, gait, and endurance Z74.09 V49.89   6. Impaired mobility and ADLs Z74.09 799.89   7. Cognitive communication deficit R41.841 799.52   8. Oropharyngeal dysphagia R13.12 787.22     Patient Active Problem List   Diagnosis   • Peripheral neuropathy   • Essential hypertension   • Crohn's colitis, with rectal bleeding (CMS/HCC)   • Major depressive disorder with single episode, in partial remission (CMS/HCC)   • Macular degeneration of both eyes   • GERD without esophagitis   • Cervicalgia   • Spinal stenosis in cervical region   • Chronic right-sided low back pain without sciatica   • Osteoarthritis of toe joint, right   • PFO (patent foramen ovale)   • Ulcerative colitis (CMS/HCC)   • Body mass index (BMI) of 25.0 to 29.9   • History of stroke with residual deficit   • Mixed hyperlipidemia   • Multiple lacunar infarcts   • Paroxysmal atrial fibrillation (CMS/HCC)   • Chronic bilateral low back pain   • Acute alteration in mental status   • Fracture of proximal end of right humerus   • Acute respiratory distress   • Lewy body dementia       Therapy Treatment    Rehabilitation Treatment Summary     Row Name 19 1610 19 1110          Treatment Time/Intention    Discipline  physical therapist  -CS  speech language pathologist  -AC     Document Type  therapy note (daily note)  -MICKEY   therapy note (daily note)  -AC     Subjective Information  complains of;pain  -CS  complains of;fatigue;numbness  -AC     Mode of Treatment  physical therapy  -CS  --     Patient/Family Observations  Son is present in the room  -CS  Pt alert, cooperative. Reported feeling tired & R arm/shoulder numbness. Pt's family @ bedside.  -AC     Care Plan Review  --  evaluation/treatment results reviewed;care plan/treatment goals reviewed;current/potential barriers reviewed;risks/benefits reviewed;patient/other agree to care plan  -AC     Care Plan Review, Other Participant(s)  --  daughter;family  -AC     Therapy Frequency (Swallow)  --  5 days per week  -AC     Therapy Frequency (SLP SLC)  --  5 days per week  -AC     Patient Effort  good  -CS  good  -AC     Recorded by [CS] Roz Phoenix, PT 08/03/19 1641 [AC] Rose Chavez MS CCC-SLP 08/03/19 1143     Row Name 08/03/19 1610             Cognitive Assessment/Intervention- PT/OT    Orientation Status (Cognition)  oriented to;person;time  -CS      Recorded by [CS] Roz Phoenix, PT 08/03/19 1641      Row Name 08/03/19 1610             Safety Issues, Functional Mobility    Safety Issues Affecting Function (Mobility)  safety precaution awareness;safety precautions follow-through/compliance;insight into deficits/self awareness  -CS      Impairments Affecting Function (Mobility)  balance;cognition;coordination;endurance/activity tolerance;strength  -CS      Recorded by [CS] Roz Phoenix, PT 08/03/19 1641      Row Name 08/03/19 1610             Bed Mobility Assessment/Treatment    Bed Mobility Assessment/Treatment  supine-sit  -CS      Rolling Left [place] (Bed Mobility)  moderate assist (50% patient effort);2 person assist  -CS      Assistive Device (Bed Mobility)  draw sheet;head of bed elevated  -CS      Recorded by [CS] Roz Phoenix, PT 08/03/19 1641      Row Name 08/03/19 1610             Functional Mobility    Functional Mobility- Ind. Level   minimum assist (75% patient effort);2 person assist required  -CS      Functional Mobility-Distance (Feet)  90  -CS      Functional Mobility- Comment  With rest break after 80'  -CS      Recorded by [CS] Roz Phoenix, PT 08/03/19 1641      Row Name 08/03/19 1610             Transfer Assessment/Treatment    Transfer Assessment/Treatment  sit-stand transfer;stand-sit transfer  -CS      Maintains Weight-bearing Status (Transfers)  able to maintain  -CS      Comment (Transfers)  R UE in sling, L UE support, gait belt donned  -CS      Recorded by [CS] Roz Phoenix, PT 08/03/19 1641      Row Name 08/03/19 1610             Chair-Bed Transfer    Chair-Bed [place] (Transfers)  moderate assist (50% patient effort);2 person assist  -CS      Recorded by [CS] Roz Phoenix, PT 08/03/19 1641      Row Name 08/03/19 1610             Sit-Stand Transfer    Sit-Stand [place] (Transfers)  minimum assist (75% patient effort);2 person assist  -CS      Recorded by [CS] Roz Phoenix, PT 08/03/19 1641      Row Name 08/03/19 1610             Stand-Sit Transfer    Stand-Sit [place] (Transfers)  minimum assist (75% patient effort);2 person assist  -CS      Recorded by [CS] Roz Phoenix, PT 08/03/19 1641      Row Name 08/03/19 1610             Gait/Stairs Assessment/Training    50071 - Gait Training Minutes   15  -CS      [place] Level (Gait)  minimum assist (75% patient effort);2 person assist  -CS      Distance in Feet (Gait)  90  -CS      Pattern (Gait)  step-to Required cuing to increase step length  -CS      Recorded by [CS] Roz Phoenix, PT 08/03/19 1641      Row Name 08/03/19 1610             Positioning and Restraints    Pre-Treatment Position  in bed  -CS      Post Treatment Position  chair  -CS      In Chair  reclined;sitting;call light within reach;encouraged to call for assist;with family/caregiver R UE in sling and elevated on pillow  -CS      Recorded by [CS] Alban  Roz, PT 08/03/19 1641      Row Name 08/03/19 1610 08/03/19 1110          Pain Scale: FACES Pre/Post-Treatment    Pain: FACES Scale, Pretreatment  2-->hurts little bit  -CS  2-->hurts little bit  -AC     Pain: FACES Scale, Post-Treatment  2-->hurts little bit  -CS  2-->hurts little bit  -AC     Pre/Post Treatment Pain Comment  R arm  -CS  R arm/shoulder  -AC     Recorded by [CS] Roz Phoenix, PT 08/03/19 1641 [AC] Rose Chavez, MS CCC-SLP 08/03/19 1143     Row Name 08/03/19 1610             Pain Scale 2: Numbers Pre/Post-Treatment    Pain Location 2 - Side  Right  -CS      Pain Location 2 - Orientation  upper  -CS      Pain Location 2  extremity  -CS      Recorded by [CS] Roz Phoenix, PT 08/03/19 1641      Row Name                Wound 07/23/19 2130 Right anterior toe pressure injury    Wound - Properties Group Date first assessed: 07/23/19 [DB] Time first assessed: 2130 [DB] Present On Admission : yes [DB] Side: Right [DB] Orientation: anterior [DB] Location: toe [DB] Type: pressure injury [DB] Stage, Pressure Injury: Stage 1 [DB] Recorded by:  [DB] Henok Marie RN 07/24/19 0229    Row Name                Wound 07/20/19 1649 Left hand skin tear    Wound - Properties Group Date first assessed: 07/20/19 [SW] Time first assessed: 1649 [SW] Present On Admission : yes [SW] Side: Left [SW] Location: hand [SW] Type: skin tear [SW] Recorded by:  [SW] Odilia Joy RN 07/20/19 1649    Row Name                Wound 07/20/19 1648 Right knee abrasion    Wound - Properties Group Date first assessed: 07/20/19 [SW] Time first assessed: 1648 [SW] Present On Admission : yes [SW] Side: Right [SW] Location: knee [SW] Type: abrasion [SW] Recorded by:  [SW] Odilia Joy RN 07/20/19 1648    Row Name 08/03/19 1610             Coping    Observed Emotional State  calm;cooperative  -CS      Verbalized Emotional State  acceptance  -CS      Recorded by [CS] Roz Phoenix, PT 08/03/19 0805       Row Name 08/03/19 1610             Outcome Summary/Treatment Plan (PT)    Daily Summary of Progress (PT)  progress toward functional goals as expected  -CS      Recorded by [CS] Roz Phoenix, PT 08/03/19 1641      Row Name 08/03/19 1110             Outcome Summary/Treatment Plan (SLP)    Daily Summary of Progress (SLP)  progress toward functional goals as expected  -AC      Plan for Continued Treatment (SLP)  Cont dysphagia level IV diet and honey-thick liquids. General aspiration precautions, multiple swallows per bolus. Eat/drink only when alert. Pt/family reported dysfluent speech is back to baseline, but still presenting w/ incr'd thought processing & word finding deficits. Pt would benefit from cont'd SLP services for dysphagia and cog/comm.  -AC      Anticipated Dischage Disposition  anticipate therapy at next level of care  -AC      Recorded by [AC] Rose Chavez, MS CCC-SLP 08/03/19 1143        User Key  (r) = Recorded By, (t) = Taken By, (c) = Cosigned By    Initials Name Effective Dates Discipline    AC Rose Chavez, MS CCC-SLP 07/27/17 -  SLP    DB Henok Marie RN 06/16/16 -  Nurse    Odilia Gordillo RN 06/16/16 -  Nurse    Roz Engel, PT 03/26/19 -  PT          Wound 07/20/19 1648 Right knee abrasion (Active)   Dressing Appearance open to air 8/3/2019 12:00 PM   Base dry;scab 8/3/2019 12:00 PM   Periwound intact 8/3/2019  8:00 AM   Periwound Temperature warm 8/3/2019  8:00 AM   Periwound Skin Turgor soft 8/3/2019  8:00 AM   Drainage Amount none 8/3/2019  8:00 AM       Wound 07/20/19 1649 Left hand skin tear (Active)   Dressing Appearance dried drainage;intact;dry 8/3/2019 12:00 PM   Closure Surface sutures 8/3/2019 12:00 PM   Base dry;scab 8/3/2019 12:00 PM   Periwound pink 8/3/2019  8:00 AM   Periwound Temperature warm 8/3/2019  8:00 AM   Periwound Skin Turgor soft 8/3/2019  8:00 AM   Drainage Amount none 8/3/2019  8:00 AM   Dressing Care, Wound dressing reinforced  8/2/2019 10:00 PM       Wound 07/23/19 2130 Right anterior toe pressure injury (Active)   Dressing Appearance open to air 8/3/2019 12:00 PM   Closure Open to air 8/3/2019 12:00 PM   Base red;blanchable 8/3/2019 12:00 PM   Periwound intact;pink;dry 8/3/2019  8:00 AM   Periwound Temperature warm 8/3/2019  8:00 AM   Periwound Skin Turgor soft 8/3/2019  8:00 AM   Drainage Amount none 8/3/2019  8:00 AM       Rehab Goal Summary     Row Name 08/03/19 1110             Oral Nutrition/Hydration Goal 1 (SLP)    Oral Nutrition/Hydration Goal 1, SLP  LTG: Pt will return to regular diet & thin liquid w/o s/sxs aspiration w/ 100% acc w/o cues.  -AC      Time Frame (Oral Nutrition/Hydration Goal 1, SLP)  by discharge  -AC      Progress/Outcomes (Oral Nutrition/Hydration Goal 1, SLP)  continuing progress toward goal  -AC         Oral Nutrition/Hydration Goal 2 (SLP)    Oral Nutrition/Hydration Goal 2, SLP  Pt will tolerate trials of soft solids & honey-thick liquids w/o s/sxs aspiration w/ 100% acc w/o cues.  -AC      Time Frame (Oral Nutrition/Hydration Goal 2, SLP)  short term goal (STG);by discharge  -AC      Barriers (Oral Nutrition/Hydration Goal 2, SLP)  No overt clinical s/sxs aspiration w/ honey-thick liquid via cup.  -AC      Progress/Outcomes (Oral Nutrition/Hydration Goal 2, SLP)  good progress toward goal  -AC         Oral Nutrition/Hydration Goal (SLP)    Oral Nutrition/Hydration Goal, SLP  Pt will tolerate therapeutic trials of ice between meals & after oral care w/ 100% acc w/o cues.  -AC      Time Frame (Oral Nutrition/Hydration Goal, SLP)  short term goal (STG);by discharge  -AC      Progress/Outcomes (Oral Nutrition/Hydration Goal, SLP)  goal no longer appropriate  -AC         Lingual Strengthening Goal 1 (SLP)    Increase Tongue Back Strength  lingual resistance exercises  -AC      [place]/Accuracy (Lingual Strengthening Goal 1, SLP)  with minimal cues (75-90% accuracy)  -AC      Time Frame (Lingual  Strengthening Goal 1, SLP)  short term goal (STG);by discharge  -AC      Progress/Outcomes (Lingual Strengthening Goal 1, SLP)  goal ongoing  -AC         Pharyngeal Strengthening Exercise Goal 1 (SLP)    Increase Timing  prepping - 3 second prep or suck swallow or 3-step swallow  -AC      Increase Superior Movement of the Hyolaryngeal Complex  falsetto  -AC      Increase Anterior Movement of the Hyolaryngeal Complex  chin tuck against resistance (CTAR)  -AC      Increase Closure at Entrance to Airway/Closure of Airway at Glottis  super-supraglottic swallow  -AC      Increase Squeeze/Positive Pressure Generation  hard effortful swallow  -AC      Increase Tongue Base Retraction  nabila  -AC      [place]/Accuracy (Pharyngeal Strengthening Goal 1, SLP)  with minimal cues (75-90% accuracy)  -AC      Time Frame (Pharyngeal Strengthening Goal 1, SLP)  short term goal (STG);by discharge  -AC      Barriers (Pharyngeal Strengthening Goal 1, SLP)  Falsetto: x5 w/ mod cues (pt u/a to perform effortful pitch glide, so goal revised for falsetto), CTAR x10 w/ mod cues, SSG x5 w/ mod cues. Provided another handout w/ instructions for indpt practice.  -AC      Progress/Outcomes (Pharyngeal Strengthening Goal 1, SLP)  continuing progress toward goal;goal revised this date  -AC         Swallow Compensatory Strategies Goal 1 (SLP)    Activity (Swallow Compensatory Strategies/Techniques Goal 1, SLP)  compensatory strategies;during meal intake;during p.o. trials;small cup sips;small straw sips;alternate food/liquid intake;extra swallow per bolus  -AC      [place]/Accuracy (Swallow Compensatory Strategies/Techniques Goal 1, SLP)  independently (over 90% accuracy)  -AC      Time Frame (Swallow Compensatory Strategies/Techniques Goal 1, SLP)  short term goal (STG);by discharge  -AC      Barriers (Swallow Compensatory Strategies/Techniques Goal 1, SLP)  Pt taking small sips via cup w/o cues.  -AC      Progress/Outcomes (Swallow  Compensatory Strategies/Techniques Goal 1, SLP)  continuing progress toward goal  -AC         Comprehend Questions Goal 1 (SLP)    Improve Ability to Comprehend Questions Goal 1 (SLP)  complex yes/no questions;80%;with minimal cues (75-90%)  -AC      Time Frame (Comprehend Questions Goal 1, SLP)  short term goal (STG);by discharge  -AC      Progress (Ability to Comprehend Questions Goal 1, SLP)  60%;with moderate cues (50-74%)  -AC      Progress/Outcomes (Comprehend Questions Goal 1, SLP)  continuing progress toward goal  -AC         Word Retrieval Skills Goal 1 (SLP)    Improve Word Retrieval Skills By Goal 1 (SLP)  completing functional word finding tasks;80%;with minimal cues (75-90%)  -AC      Time Frame (Word Retrieval Goal 1, SLP)  short term goal (STG);by discharge  -AC      Progress (Word Retrieval Skills Goal 1, SLP)  70%;with minimal cues (75-90%)  -AC      Progress/Outcomes (Word Retrieval Goal 1, SLP)  continuing progress toward goal  -AC         Ability to Construct Phrase and Sentence Level Response Goal 1 (SLP)    Improve Ability to Construct Phrase and Sentence Level Responses By Goal 1 (SLP)  answering question with phrase;constructing a sentence with a key word;80%;with minimal cues (75-90%)  -AC      Time Frame (Phrase and Sentence Level Response Goal 1, SLP)  short term goal (STG);by discharge  -AC      Progress (Construct Phrase and Sentence Level Response Goal 1, SLP)  70%;with minimal cues (75-90%)  -AC      Progress/Outcomes (Phrase and Sentence Level Response Goal 1, SLP)  continuing progress toward goal  -AC         Patient Will Implement Strategies Goal 1 (SLP)    Implement Strategies of Goal 1 (SLP)  using external rate control;80%;with minimal cues (75-90%)  -AC      Time Frame (Strategy Implementation Goal 1, SLP)  short term goal (STG);by discharge  -AC      Progress/Outcomes (Strategy Implementation Goal 1, SLP)  goal no longer appropriate  -AC      Comment (Strategy Implementation  Goal 1, SLP)  Pt/family reported dysflunecies are baseline.  -AC         Orientation Goal 1 (SLP)    Improve Orientation Through Goal 1 (SLP)  demonstrating orientation to month;demonstrating orientation to place;80%;independently (over 90% accuracy)  -AC      Time Frame (Orientation Goal 1, SLP)  short term goal (STG);by discharge  -AC      Progress/Outcomes (Orientation Goal 1, SLP)  goal ongoing  -AC         Additional Goal 1 (SLP)    Additional Goal 1, SLP  LTG: Pt will improve cognitive-communicaiton skills in order to participate in care in hospital setting w/ 100% acc w/o cues.  -AC      Time Frame (Additional Goal 1, SLP)  by discharge  -AC      Progress/Outcomes (Additional Goal 1, SLP)  continuing progress toward goal  -AC        User Key  (r) = Recorded By, (t) = Taken By, (c) = Cosigned By    Initials Name Provider Type Discipline    AC Rose Chavez MS CCC-SLP Speech and Language Pathologist SLP          Physical Therapy Education     Title: PT OT SLP Therapies (In Progress)     Topic: Physical Therapy (Done)     Point: Mobility training (Done)     Learning Progress Summary           Patient Acceptance, E,TB, VU,DU by CS at 8/3/2019  4:41 PM    Comment:  Educated patient on transfer technique using sheet to sit patient up in bed. Educated patient and family on sling.    Acceptance, E, VU,NR by CD at 8/2/2019  3:49 PM    Comment:  SEE FLOW SHEET. SON INSTRUCTED IN LE THER EX, POSITIONING FOR COMFORT.,    Eager, E, VU by KM at 8/1/2019 10:20 AM    Eager, DEISY,D, VU,NR by AA at 7/31/2019  2:46 PM    Eager, E, NR by AA at 7/30/2019  3:17 PM    Acceptance, E, VU,NR by JUSTIN at 7/29/2019 11:04 AM    Acceptance, E, VU,NR by CD at 7/25/2019  4:42 PM    Comment:  DTR INSTRUCTED IN ROM EXERCISES, POSITIONING FOR COMFORT/ PROTECTION OF R UE, BENEFITS OF OOB ACTIVITY, D/C PLANNING,    Acceptance, E, VU,NR by CD at 7/24/2019 10:36 AM    Comment:  SEE FLOWSHEET.   Family Acceptance, E,TB, VU,DU by CS at 8/3/2019  4:41 PM     Comment:  Educated patient on transfer technique using sheet to sit patient up in bed. Educated patient and family on sling.    Acceptance, E, VU,NR by CD at 8/2/2019  3:49 PM    Comment:  SEE FLOW SHEET. SON INSTRUCTED IN LE THER EX, POSITIONING FOR COMFORT.,    Eager, E,D, VU,NR by AA at 7/31/2019  2:46 PM    Acceptance, E, VU,NR by CD at 7/25/2019  4:42 PM    Comment:  DTR INSTRUCTED IN ROM EXERCISES, POSITIONING FOR COMFORT/ PROTECTION OF R UE, BENEFITS OF OOB ACTIVITY, D/C PLANNING,                   Point: Home exercise program (Done)     Learning Progress Summary           Patient Acceptance, E,TB, VU,DU by CS at 8/3/2019  4:41 PM    Comment:  Educated patient on transfer technique using sheet to sit patient up in bed. Educated patient and family on sling.    Acceptance, E, VU,NR by CD at 8/2/2019  3:49 PM    Comment:  SEE FLOW SHEET. SON INSTRUCTED IN LE THER EX, POSITIONING FOR COMFORT.,    Eager, E, VU by KM at 8/1/2019 10:20 AM    Acceptance, E, VU,NR by JUSTIN at 7/29/2019 11:04 AM    Acceptance, E, VU,NR by CD at 7/25/2019  4:42 PM    Comment:  DTR INSTRUCTED IN ROM EXERCISES, POSITIONING FOR COMFORT/ PROTECTION OF R UE, BENEFITS OF OOB ACTIVITY, D/C PLANNING,    Acceptance, E, VU,NR by CD at 7/24/2019 10:36 AM    Comment:  SEE FLOWSHEET.   Family Acceptance, E,TB, VU,DU by CS at 8/3/2019  4:41 PM    Comment:  Educated patient on transfer technique using sheet to sit patient up in bed. Educated patient and family on sling.    Acceptance, E, VU,NR by CD at 8/2/2019  3:49 PM    Comment:  SEE FLOW SHEET. SON INSTRUCTED IN LE THER EX, POSITIONING FOR COMFORT.,    Acceptance, E, VU,NR by CD at 7/25/2019  4:42 PM    Comment:  DTR INSTRUCTED IN ROM EXERCISES, POSITIONING FOR COMFORT/ PROTECTION OF R UE, BENEFITS OF OOB ACTIVITY, D/C PLANNING,                   Point: Body mechanics (Done)     Learning Progress Summary           Patient Acceptance, E,TB, VU,DU by CS at 8/3/2019  4:41 PM    Comment:  Educated  patient on transfer technique using sheet to sit patient up in bed. Educated patient and family on sling.    Acceptance, E, VU,NR by CD at 8/2/2019  3:49 PM    Comment:  SEE FLOW SHEET. SON INSTRUCTED IN LE THER EX, POSITIONING FOR COMFORT.,    Eager, E, VU by KM at 8/1/2019 10:20 AM    Eager, E,D, VU,NR by AA at 7/31/2019  2:46 PM    Eager, E, NR by AA at 7/30/2019  3:17 PM    Acceptance, E, VU,NR by JUSTIN at 7/29/2019 11:04 AM    Acceptance, E, VU,NR by CD at 7/25/2019  4:42 PM    Comment:  DTR INSTRUCTED IN ROM EXERCISES, POSITIONING FOR COMFORT/ PROTECTION OF R UE, BENEFITS OF OOB ACTIVITY, D/C PLANNING,    Acceptance, E, VU,NR by CD at 7/24/2019 10:36 AM    Comment:  SEE FLOWSHEET.   Family Acceptance, E,TB, VU,DU by CS at 8/3/2019  4:41 PM    Comment:  Educated patient on transfer technique using sheet to sit patient up in bed. Educated patient and family on sling.    Acceptance, E, VU,NR by CD at 8/2/2019  3:49 PM    Comment:  SEE FLOW SHEET. SON INSTRUCTED IN LE THER EX, POSITIONING FOR COMFORT.,    Eager, E,D, VU,NR by AA at 7/31/2019  2:46 PM    Acceptance, E, VU,NR by CD at 7/25/2019  4:42 PM    Comment:  DTR INSTRUCTED IN ROM EXERCISES, POSITIONING FOR COMFORT/ PROTECTION OF R UE, BENEFITS OF OOB ACTIVITY, D/C PLANNING,                   Point: Precautions (Done)     Learning Progress Summary           Patient Acceptance, E,TB, VU,DU by CS at 8/3/2019  4:41 PM    Comment:  Educated patient on transfer technique using sheet to sit patient up in bed. Educated patient and family on sling.    Acceptance, E, VU,NR by CD at 8/2/2019  3:49 PM    Comment:  SEE FLOW SHEET. SON INSTRUCTED IN LE THER EX, POSITIONING FOR COMFORT.,    Eager, E, VU by KM at 8/1/2019 10:20 AM    Eager, E,D, VU,NR by AA at 7/31/2019  2:46 PM    Eager, E, NR by AA at 7/30/2019  3:17 PM    AcceptanceDEISY VUNR by JUSTIN at 7/29/2019 11:04 AM    Acceptance, MARITZA OLIVAS NR by NOBLE at 7/25/2019  4:42 PM    Comment:  DTR INSTRUCTED IN ROM EXERCISES,  POSITIONING FOR COMFORT/ PROTECTION OF R UE, BENEFITS OF OOB ACTIVITY, D/C PLANNING,    Acceptance, E, VU,NR by CD at 7/24/2019 10:36 AM    Comment:  SEE FLOWSHEET.   Family Acceptance, E,TB, VU,DU by CS at 8/3/2019  4:41 PM    Comment:  Educated patient on transfer technique using sheet to sit patient up in bed. Educated patient and family on sling.    Acceptance, E, VU,NR by CD at 8/2/2019  3:49 PM    Comment:  SEE FLOW SHEET. SON INSTRUCTED IN LE THER EX, POSITIONING FOR COMFORT.,    Eager, E,D, VU,NR by AA at 7/31/2019  2:46 PM    Acceptance, E, VU,NR by CD at 7/25/2019  4:42 PM    Comment:  DTR INSTRUCTED IN ROM EXERCISES, POSITIONING FOR COMFORT/ PROTECTION OF R UE, BENEFITS OF OOB ACTIVITY, D/C PLANNING,                               User Key     Initials Effective Dates Name Provider Type Discipline    CD 06/19/15 -  Gogo Thibodeaux, PT Physical Therapist PT    EJ 11/20/18 -  Nurys Chahal, PT Physical Therapist PT    KM 06/19/15 -  Denise Hdz, PT Physical Therapist PT    AA 04/02/18 -  Mere Ramon, PT Physical Therapist PT     03/26/19 -  Roz Phoenix, PT Physical Therapist PT                PT Recommendation and Plan     Outcome Summary/Treatment Plan (PT)  Daily Summary of Progress (PT): progress toward functional goals as expected  Plan of Care Reviewed With: patient, family  Progress: improving  Outcome Summary: Patient ambulated 90' with sling on R UE donned and L UE support. Patient required VC to increase stride length with ambulation. Patient required break after 80' and then able to ambulate 10'. Required 2 people for chair follow.   Outcome Measures     Row Name 08/03/19 1610 08/02/19 1508 08/01/19 1440       How much help from another person do you currently need...    Turning from your back to your side while in flat bed without using bedrails?  2  -CS  2  -CD  --    Moving from lying on back to sitting on the side of a flat bed without bedrails?  2  -CS  2  -CD  --     Moving to and from a bed to a chair (including a wheelchair)?  2  -CS  2  -CD  --    Standing up from a chair using your arms (e.g., wheelchair, bedside chair)?  2  -CS  2  -CD  --    Climbing 3-5 steps with a railing?  1  -CS  2  -CD  --    To walk in hospital room?  3  -CS  2  -CD  --    AM-PAC 6 Clicks Score (PT)  12  -CS  12  -CD  --       How much help from another is currently needed...    Putting on and taking off regular lower body clothing?  --  --  1  -AN    Bathing (including washing, rinsing, and drying)  --  --  2  -AN    Toileting (which includes using toilet bed pan or urinal)  --  --  1  -AN    Putting on and taking off regular upper body clothing  --  --  1  -AN    Taking care of personal grooming (such as brushing teeth)  --  --  2  -AN    Eating meals  --  --  2  -AN    AM-PAC 6 Clicks Score (OT)  --  --  9  -AN       Functional Assessment    Outcome Measure Options  AM-PAC 6 Clicks Basic Mobility (PT)  -CS  AM-PAC 6 Clicks Basic Mobility (PT)  -CD  --    Row Name 08/01/19 1020             How much help from another person do you currently need...    Turning from your back to your side while in flat bed without using bedrails?  3  -KM      Moving from lying on back to sitting on the side of a flat bed without bedrails?  3  -KM      Moving to and from a bed to a chair (including a wheelchair)?  3  -KM      Standing up from a chair using your arms (e.g., wheelchair, bedside chair)?  3  -KM      Climbing 3-5 steps with a railing?  2  -KM      To walk in hospital room?  2  -KM      AM-PAC 6 Clicks Score (PT)  16  -KM         Functional Assessment    Outcome Measure Options  AM-PAC 6 Clicks Basic Mobility (PT)  -KM        User Key  (r) = Recorded By, (t) = Taken By, (c) = Cosigned By    Initials Name Provider Type    CD Gogo Thibodeaux, PT Physical Therapist    KM Denise Hdz, PT Physical Therapist    Bibiana Barrera, OT Occupational Therapist    Roz Engel, PT Physical  Therapist         Time Calculation:   PT Charges     Row Name 08/03/19 1610             Time Calculation    Start Time  1610  -CS      PT Received On  08/03/19  -CS         Time Calculation- PT    Total Timed Code Minutes- PT  20 minute(s)  -CS         Timed Charges    45978 - Gait Training Minutes   15  -CS        User Key  (r) = Recorded By, (t) = Taken By, (c) = Cosigned By    Initials Name Provider Type    CS Roz Phoenix, PT Physical Therapist        Therapy Charges for Today     Code Description Service Date Service Provider Modifiers Qty    [id number] HC GAIT TRAINING EA 15 MIN 8/3/2019 Roz Phoenix, PT GP 1          PT G-Codes  Outcome Measure Options: AM-PAC 6 Clicks Basic Mobility (PT)  AM-PAC 6 Clicks Score (PT): 12  AM-PAC 6 Clicks Score (OT): 9  Modified [place] Scale: 4 - Moderately severe disability.  Unable to walk without assistance, and unable to attend to own bodily needs without assistance.    Roz Phoenix, PT  8/3/2019

## 2019-08-03 NOTE — PLAN OF CARE
Problem: Patient Care Overview  Goal: Plan of Care Review  Outcome: Ongoing (interventions implemented as appropriate)   08/03/19 1610   Coping/Psychosocial   Plan of Care Reviewed With patient;family   Plan of Care Review   Progress improving   OTHER   Outcome Summary Patient ambulated 90' with sling on R UE donned and L UE support. Patient required VC to increase stride length with ambulation. Patient required break after 80' and then able to ambulate 10'. Required 2 people for chair follow.

## 2019-08-03 NOTE — PLAN OF CARE
Problem: Patient Care Overview  Goal: Plan of Care Review  Outcome: Ongoing (interventions implemented as appropriate)   08/03/19 0530   Coping/Psychosocial   Plan of Care Reviewed With patient   Plan of Care Review   Progress improving   OTHER   Outcome Summary VSS, pt reported that pain is under control, refused pain medication. No significant issue at this shift, will continue to monitor.        Problem: Pain, Acute (Adult)  Goal: Acceptable Pain Control/Comfort Level  Outcome: Ongoing (interventions implemented as appropriate)   08/03/19 0530   Pain, Acute (Adult)   Acceptable Pain Control/Comfort Level making progress toward outcome

## 2019-08-03 NOTE — PLAN OF CARE
Problem: Patient Care Overview  Goal: Plan of Care Review  Outcome: Ongoing (interventions implemented as appropriate)   08/03/19 1146   Coping/Psychosocial   Plan of Care Reviewed With patient;daughter;family   SLP treatment completed. Will continue to address dysphagia & cognitive-communication in tx. Please see note for further details and recommendations.

## 2019-08-03 NOTE — PLAN OF CARE
Problem: Patient Care Overview  Goal: Plan of Care Review  Outcome: Ongoing (interventions implemented as appropriate)   08/03/19 5963   Coping/Psychosocial   Plan of Care Reviewed With patient;daughter;son   Plan of Care Review   Progress no change   OTHER   Outcome Summary Pt A&O x4, VSS. Anesthesia saw in am & recommended turning off ripivocaine while leaving block in place until tomorrow am so it can be restarted if needed in the evening. RN to notify  with anesthesia if pain becomes uncontrolled with scheduled Tylenol. Pain consistently at 2/10 for shift. Cymbalta & Florastor started. Tolerating modified diet/honey thick liquids. Pt up to chair. OT educated family/patient about appropriate positioning of RUE sling. Will continue to monitor.

## 2019-08-03 NOTE — THERAPY TREATMENT NOTE
Acute Care - Speech Language Pathology Treatment Note  [facility]     Patient Name: Vargas De  : 1940  MRN: [id number]  Today's Date: 8/3/2019         Admit Date: 2019    Visit Dx:      ICD-10-CM ICD-9-CM   1. Acute alteration in mental status R41.82 780.97   2. Generalized weakness R53.1 780.79   3. Other closed displaced fracture of proximal end of right humerus with routine healing, subsequent encounter S42.291D V54.11   4. Elevated blood pressure reading with diagnosis of hypertension I10 401.9   5. Impaired functional mobility, balance, gait, and endurance Z74.09 V49.89   6. Impaired mobility and ADLs Z74.09 799.89   7. Cognitive communication deficit R41.841 799.52   8. Oropharyngeal dysphagia R13.12 787.22     Patient Active Problem List   Diagnosis   • Peripheral neuropathy   • Essential hypertension   • Crohn's colitis, with rectal bleeding (CMS/HCC)   • Major depressive disorder with single episode, in partial remission (CMS/HCC)   • Macular degeneration of both eyes   • GERD without esophagitis   • Cervicalgia   • Spinal stenosis in cervical region   • Chronic right-sided low back pain without sciatica   • Osteoarthritis of toe joint, right   • PFO (patent foramen ovale)   • Ulcerative colitis (CMS/HCC)   • Body mass index (BMI) of 25.0 to 29.9   • History of stroke with residual deficit   • Mixed hyperlipidemia   • Multiple lacunar infarcts   • Paroxysmal atrial fibrillation (CMS/HCC)   • Chronic bilateral low back pain   • Acute alteration in mental status   • Fracture of proximal end of right humerus   • Acute respiratory distress   • Lewy body dementia        Therapy Treatment  Rehabilitation Treatment Summary     Row Name 19 1110             Treatment Time/Intention    Discipline  speech language pathologist  -AC      Document Type  therapy note (daily note)  -AC      Subjective Information  complains of;fatigue;numbness  -AC      Patient/Family Observations  Pt alert,  cooperative. Reported feeling tired & R arm/shoulder numbness. Pt's family @ bedside.  -AC      Care Plan Review  evaluation/treatment results reviewed;care plan/treatment goals reviewed;current/potential barriers reviewed;risks/benefits reviewed;patient/other agree to care plan  -AC      Care Plan Review, Other Participant(s)  daughter;family  -AC      Therapy Frequency (Swallow)  5 days per week  -AC      Therapy Frequency (SLP SLC)  5 days per week  -AC      Patient Effort  good  -AC      Recorded by [AC] Rose Chavez, MS CCC-SLP 08/03/19 1143      Row Name 08/03/19 1110             Pain Scale: FACES Pre/Post-Treatment    Pain: FACES Scale, Pretreatment  2-->hurts little bit  -AC      Pain: FACES Scale, Post-Treatment  2-->hurts little bit  -AC      Pre/Post Treatment Pain Comment  R arm/shoulder  -AC      Recorded by [AC] Rose Chavez, MS CCC-SLP 08/03/19 1143      Row Name                Wound 07/23/19 2130 Right anterior toe pressure injury    Wound - Properties Group Date first assessed: 07/23/19 [DB] Time first assessed: 2130 [DB] Present On Admission : yes [DB] Side: Right [DB] Orientation: anterior [DB] Location: toe [DB] Type: pressure injury [DB] Stage, Pressure Injury: Stage 1 [DB] Recorded by:  [DB] Henok Marie RN 07/24/19 0229    Row Name                Wound 07/20/19 1649 Left hand skin tear    Wound - Properties Group Date first assessed: 07/20/19 [SW] Time first assessed: 1649 [SW] Present On Admission : yes [SW] Side: Left [SW] Location: hand [SW] Type: skin tear [SW] Recorded by:  [SW] Odilia Joy RN 07/20/19 1649    Row Name                Wound 07/20/19 1648 Right knee abrasion    Wound - Properties Group Date first assessed: 07/20/19 [SW] Time first assessed: 1648 [SW] Present On Admission : yes [SW] Side: Right [SW] Location: knee [SW] Type: abrasion [SW] Recorded by:  [SW] Odilia Joy RN 07/20/19 1648    Row Name 08/03/19 1110             Outcome Summary/Treatment  Plan (SLP)    Daily Summary of Progress (SLP)  progress toward functional goals as expected  -AC      Plan for Continued Treatment (SLP)  Cont dysphagia level IV diet and honey-thick liquids. General aspiration precautions, multiple swallows per bolus. Eat/drink only when alert. Pt/family reported dysfluent speech is back to baseline, but still presenting w/ incr'd thought processing & word finding deficits. Pt would benefit from cont'd SLP services for dysphagia and cog/comm.  -AC      Anticipated Dischage Disposition  anticipate therapy at next level of care  -AC      Recorded by [AC] Rose Chavez, MS CCC-SLP 08/03/19 1143        User Key  (r) = Recorded By, (t) = Taken By, (c) = Cosigned By    Initials Name Effective Dates Discipline    AC Rose Chavez, MS CCC-SLP 07/27/17 -  SLP    Henok Vega, RN 06/16/16 -  Nurse    Odilia Gordillo RN 06/16/16 -  Nurse          EDUCATION  The patient has been educated in the following areas:   Cognitive Impairment Communication Impairment Dysphagia (Swallowing Impairment) Oral Care/Hydration Modified Diet Instruction.    SLP Recommendation and Plan  Daily Summary of Progress (SLP): progress toward functional goals as expected     Plan for Continued Treatment (SLP): Cont dysphagia level IV diet and honey-thick liquids. General aspiration precautions, multiple swallows per bolus. Eat/drink only when alert. Pt/family reported dysfluent speech is back to baseline, but still presenting w/ incr'd thought processing & word finding deficits. Pt would benefit from cont'd SLP services for dysphagia and cog/comm.  Anticipated Dischage Disposition: anticipate therapy at next level of care             SLP GOALS     Row Name 08/03/19 1110 08/02/19 1030          Oral Nutrition/Hydration Goal 1 (SLP)    Oral Nutrition/Hydration Goal 1, SLP  LTG: Pt will return to regular diet & thin liquid w/o s/sxs aspiration w/ 100% acc w/o cues.  -AC  --     Time Frame (Oral  Nutrition/Hydration Goal 1, SLP)  by discharge  -AC  --     Progress/Outcomes (Oral Nutrition/Hydration Goal 1, SLP)  continuing progress toward goal  -AC  --        Oral Nutrition/Hydration Goal 2 (SLP)    Oral Nutrition/Hydration Goal 2, SLP  Pt will tolerate trials of soft solids & honey-thick liquids w/o s/sxs aspiration w/ 100% acc w/o cues.  -AC  Pt will tolerate trials of soft solids & honey-thick liquids w/o s/sxs aspiration w/ 100% acc w/o cues.  -LR     Time Frame (Oral Nutrition/Hydration Goal 2, SLP)  short term goal (STG);by discharge  -AC  short term goal (STG);by discharge  -LR     Barriers (Oral Nutrition/Hydration Goal 2, SLP)  No overt clinical s/sxs aspiration w/ honey-thick liquid via cup.  -AC  Pt given trials of honey thick liquids and pureed via spoon. No overt s/s of aspiration with any trials.   -LR     Progress/Outcomes (Oral Nutrition/Hydration Goal 2, SLP)  good progress toward goal  -AC  continuing progress toward goal  -LR        Oral Nutrition/Hydration Goal (SLP)    Oral Nutrition/Hydration Goal, SLP  Pt will tolerate therapeutic trials of ice between meals & after oral care w/ 100% acc w/o cues.  -AC  --     Time Frame (Oral Nutrition/Hydration Goal, SLP)  short term goal (STG);by discharge  -AC  --     Progress/Outcomes (Oral Nutrition/Hydration Goal, SLP)  goal no longer appropriate  -AC  --        Lingual Strengthening Goal 1 (SLP)    Activity (Lingual Strengthening Goal 1, SLP)  --  increase tongue back strength  -LR     Increase Tongue Back Strength  lingual resistance exercises  -AC  lingual resistance exercises  -LR     [place]/Accuracy (Lingual Strengthening Goal 1, SLP)  with minimal cues (75-90% accuracy)  -AC  with minimal cues (75-90% accuracy)  -LR     Time Frame (Lingual Strengthening Goal 1, SLP)  short term goal (STG);by discharge  -AC  short term goal (STG);by discharge  -LR     Progress/Outcomes (Lingual Strengthening Goal 1, SLP)  goal ongoing  -AC  good  progress toward goal  -LR        Pharyngeal Strengthening Exercise Goal 1 (SLP)    Activity (Pharyngeal Strengthening Goal 1, SLP)  --  increase timing;increase superior movement of the hyolaryngeal complex;increase anterior movement of the hyolaryngeal complex;increase closure at entrance to airway/closure of airway at glottis;increase squeeze/positive pressure generation;increase tongue base retraction  -LR     Increase Timing  prepping - 3 second prep or suck swallow or 3-step swallow  -AC  --     Increase Superior Movement of the Hyolaryngeal Complex  falsetto  -AC  falsetto;effortful pitch glide (falsetto + pharyngeal squeeze)  -LR     Increase Anterior Movement of the Hyolaryngeal Complex  chin tuck against resistance (CTAR)  -AC  --     Increase Closure at Entrance to Airway/Closure of Airway at Glottis  super-supraglottic swallow  -AC  --     Increase Squeeze/Positive Pressure Generation  hard effortful swallow  -AC  hard effortful swallow  -LR     Increase Tongue Base Retraction  nabila  -AC  nabila;hard effortful swallow  -LR     [place]/Accuracy (Pharyngeal Strengthening Goal 1, SLP)  with minimal cues (75-90% accuracy)  -AC  with minimal cues (75-90% accuracy)  -LR     Time Frame (Pharyngeal Strengthening Goal 1, SLP)  short term goal (STG);by discharge  -AC  short term goal (STG);by discharge  -LR     Barriers (Pharyngeal Strengthening Goal 1, SLP)  Falsetto: x5 w/ mod cues (pt u/a to perform effortful pitch glide, so goal revised for falsetto), CTAR x10 w/ mod cues, SSG x5 w/ mod cues. Provided another handout w/ instructions for indpt practice.  -AC  Pt required drinks of honey thick liquids to complete effortful swallow. Pt required max cues to complete nabila.   -LR     Progress/Outcomes (Pharyngeal Strengthening Goal 1, SLP)  continuing progress toward goal;goal revised this date  -AC  continuing progress toward goal  -LR        Swallow Compensatory Strategies Goal 1 (SLP)    Activity (Swallow  Compensatory Strategies/Techniques Goal 1, SLP)  compensatory strategies;during meal intake;during p.o. trials;small cup sips;small straw sips;alternate food/liquid intake;extra swallow per bolus  -AC  --     [place]/Accuracy (Swallow Compensatory Strategies/Techniques Goal 1, SLP)  independently (over 90% accuracy)  -AC  --     Time Frame (Swallow Compensatory Strategies/Techniques Goal 1, SLP)  short term goal (STG);by discharge  -AC  --     Barriers (Swallow Compensatory Strategies/Techniques Goal 1, SLP)  Pt taking small sips via cup w/o cues.  -AC  --     Progress/Outcomes (Swallow Compensatory Strategies/Techniques Goal 1, SLP)  continuing progress toward goal  -AC  --        Comprehend Questions Goal 1 (SLP)    Improve Ability to Comprehend Questions Goal 1 (SLP)  complex yes/no questions;80%;with minimal cues (75-90%)  -AC  --     Time Frame (Comprehend Questions Goal 1, SLP)  short term goal (STG);by discharge  -AC  --     Progress (Ability to Comprehend Questions Goal 1, SLP)  60%;with moderate cues (50-74%)  -AC  --     Progress/Outcomes (Comprehend Questions Goal 1, SLP)  continuing progress toward goal  -AC  --        Word Retrieval Skills Goal 1 (SLP)    Improve Word Retrieval Skills By Goal 1 (SLP)  completing functional word finding tasks;80%;with minimal cues (75-90%)  -AC  --     Time Frame (Word Retrieval Goal 1, SLP)  short term goal (STG);by discharge  -AC  --     Progress (Word Retrieval Skills Goal 1, SLP)  70%;with minimal cues (75-90%)  -AC  --     Progress/Outcomes (Word Retrieval Goal 1, SLP)  continuing progress toward goal  -AC  --        Ability to Construct Phrase and Sentence Level Response Goal 1 (SLP)    Improve Ability to Construct Phrase and Sentence Level Responses By Goal 1 (SLP)  answering question with phrase;constructing a sentence with a key word;80%;with minimal cues (75-90%)  -AC  --     Time Frame (Phrase and Sentence Level Response Goal 1, SLP)  short term goal  (STG);by discharge  -AC  --     Progress (Construct Phrase and Sentence Level Response Goal 1, SLP)  70%;with minimal cues (75-90%)  -AC  --     Progress/Outcomes (Phrase and Sentence Level Response Goal 1, SLP)  continuing progress toward goal  -AC  --        Patient Will Implement Strategies Goal 1 (SLP)    Implement Strategies of Goal 1 (SLP)  using external rate control;80%;with minimal cues (75-90%)  -AC  --     Time Frame (Strategy Implementation Goal 1, SLP)  short term goal (STG);by discharge  -AC  --     Progress/Outcomes (Strategy Implementation Goal 1, SLP)  goal no longer appropriate  -AC  --     Comment (Strategy Implementation Goal 1, SLP)  Pt/family reported dysflunecies are baseline.  -AC  --        Orientation Goal 1 (SLP)    Improve Orientation Through Goal 1 (SLP)  demonstrating orientation to month;demonstrating orientation to place;80%;independently (over 90% accuracy)  -AC  --     Time Frame (Orientation Goal 1, SLP)  short term goal (STG);by discharge  -AC  --     Progress/Outcomes (Orientation Goal 1, SLP)  goal ongoing  -AC  --        Additional Goal 1 (SLP)    Additional Goal 1, SLP  LTG: Pt will improve cognitive-communicaiton skills in order to participate in care in hospital setting w/ 100% acc w/o cues.  -AC  --     Time Frame (Additional Goal 1, SLP)  by discharge  -AC  --     Progress/Outcomes (Additional Goal 1, SLP)  continuing progress toward goal  -AC  --       User Key  (r) = Recorded By, (t) = Taken By, (c) = Cosigned By    Initials Name Provider Type    Karen Marte MS CCC-SLP Speech and Language Pathologist    Rose Weinstein MS CCC-SLP Speech and Language Pathologist              Time Calculation:     Time Calculation- SLP     Row Name 08/03/19 1149             Time Calculation- SLP    SLP Start Time  1110  -AC      SLP Received On  08/03/19  -AC        User Key  (r) = Recorded By, (t) = Taken By, (c) = Cosigned By    Initials Name Provider Type    Rose Weinstein  MS CCC-SLP Speech and Language Pathologist          Therapy Charges for Today     Code Description Service Date Service Provider Modifiers Qty    88309051308  ST TREATMENT SWALLOW 2 8/3/2019 Rose Chavez, MS CCC-SLP GN 1    [id number]  ST TREATMENT SPEECH 1 8/3/2019 Rose Chavez, MS MERLIN-SLP GN 1                     Rose Chavez MS CCC-SLP  8/3/2019

## 2019-08-03 NOTE — PROGRESS NOTES
[facility]    Acute pain service Inpatient Progress Note    Patient Name: Vargas De  :  1940  MRN:  [id number]        Acute Pain  Service Inpatient Progress Note:    Analgesia:Good  Pain Score:2/10  LOC: alert and awake  Resp Status: room air  Cardiac: VS stable  Side Effects:None  Catheter Site:clean  Catheter Plan:Catheter to remain Insitu  Comments: Infusion stopped  Will assess in am  If doing well will remove in am

## 2019-08-03 NOTE — PROGRESS NOTES
[facility] Medicine Services  PROGRESS NOTE    Patient Name: Vargas De  : 1940  MRN: [id number]    Date of Admission: 2019  Length of Stay: 11  Primary Care Physician: Saba Arrington MD    Subjective   Subjective     CC:  AMS     HPI:  Patient reports itching this morning.  Has some diarrhea but denies any abdominal pain.  Arm pain controlled with nerve block.  No hallucinations    Review of Systems  Gen- No fevers, chills  CV- No chest pain, palpitations  Resp- No cough, dyspnea  GI- No N/V/D, abd pain    Otherwise ROS is negative except as mentioned in the HPI.    Objective   Objective     Vital Signs:   Temp:  [97.5 °F (36.4 °C)-98.2 °F (36.8 °C)] 97.5 °F (36.4 °C)  Heart Rate:  [80-82] 80  Resp:  [16-18] 18  BP: (129-131)/(77-93) 131/77  Total (NIH Stroke Scale): 9     Physical Exam:  Constitutional: No acute distress, awake, alert  HENT: NCAT, mucous membranes moist  Respiratory: Clear to auscultation bilaterally, respiratory effort normal on room air  Cardiovascular: RRR, II/VI murmurs, no rubs, or gallops, palpable pedal pulses bilaterally  Gastrointestinal: Positive bowel sounds, soft, nontender, nondistended  Musculoskeletal: trace bilateral ankle edema  Psychiatric: Appropriate affect, cooperative  Neurologic: Oriented x 3, strength symmetric in all extremities, Cranial Nerves grossly intact to confrontation, speech clear  Skin: No rashes    Results Reviewed:  I have personally reviewed current lab, radiology, and data and agree.    Results from last 7 days   Lab Units 19  0651 19  0537 19  0647   WBC 10*3/mm3 8.68 6.76 4.45   HEMOGLOBIN g/dL 12.9* 12.4* 8.2*   HEMATOCRIT % 40.5 40.1 26.9*   PLATELETS 10*3/mm3 324 305 182     Results from last 7 days   Lab Units 19  0651 19  0537 19  1801 19  0647  19  0616   SODIUM mmol/L 141 137  --  137  --  137   POTASSIUM mmol/L 3.8 4.1 5.0 3.2*   < > 3.1*   CHLORIDE  mmol/L 101 102  --  108*  --  102   CO2 mmol/L 28.0 25.0  --  21.0*  --  23.0   BUN mg/dL 10 7*  --  5*  --  9   CREATININE mg/dL 0.75* 0.64*  --  0.41*  --  0.62*   GLUCOSE mg/dL 103* 95  --  90  --  100*   CALCIUM mg/dL 9.0 8.9  --  7.7*  --  8.2*   ALT (SGPT) U/L  --  52*  --  41  --  46*   AST (SGOT) U/L  --  45*  --  35  --  39    < > = values in this interval not displayed.     Estimated Creatinine Clearance: 81.5 mL/min (A) (by C-G formula based on SCr of 0.75 mg/dL (L)).    Microbiology Results Abnormal     Procedure Component Value - Date/Time    Blood Culture - Blood, Arm, Left [[id number]] Collected:  07/27/19 0152    Lab Status:  Final result Specimen:  Blood from Arm, Left Updated:  08/01/19 0431     Blood Culture No growth at 5 days    Blood Culture - Blood, Hand, Left [[id number]] Collected:  07/27/19 0147    Lab Status:  Final result Specimen:  Blood from Hand, Left Updated:  08/01/19 0245     Blood Culture No growth at 5 days    Urine Culture - Urine, Urine, Clean Catch [[id number]]  (Normal) Collected:  07/28/19 1430    Lab Status:  Final result Specimen:  Urine, Clean Catch Updated:  07/29/19 1856     Urine Culture No growth    Beta Strep Culture, Throat - Swab, Throat [[id number]]  (Normal) Collected:  07/27/19 1630    Lab Status:  Final result Specimen:  Swab from Throat Updated:  07/29/19 1355     Throat Culture, Beta Strep No Beta Hemolytic Streptococcus Isolated    Narrative:       Group A Strep incidence is low in adults. Positive culture for Beta hemolytic Streptococcus species can reflect colonization and not true infection. Please correlate clinically.    Blood Culture - Blood, Arm, Left [[id number]] Collected:  07/23/19 1334    Lab Status:  Final result Specimen:  Blood from Arm, Left Updated:  07/28/19 1415     Blood Culture No growth at 5 days    Blood Culture - Blood, Hand, Right [[id number]] Collected:  07/23/19 2778    Lab Status:  Final result Specimen:  Blood from Hand, Right Updated:   07/28/19 1415     Blood Culture No growth at 5 days    Rapid Strep A Screen - Swab, Throat [[id number]]  (Normal) Collected:  07/27/19 1630    Lab Status:  Final result Specimen:  Swab from Throat Updated:  07/27/19 1727     Strep A Ag Negative    Narrative:       Test performed by Direct Antigen Testing.          Imaging Results (last 24 hours)     ** No results found for the last 24 hours. **          Results for orders placed during the hospital encounter of 07/23/19   Adult Transthoracic Echo Complete W/ Cont if Necessary Per Protocol    Narrative · Left ventricular systolic function is normal. Estimated EF = 60%.  · Left ventricular diastolic dysfunction (grade I) consistent with   impaired relaxation.  · Left atrial cavity size is borderline dilated.  · There is mild calcification of the aortic valve.          I have reviewed the medications:  Scheduled Meds:    acetaminophen 650 mg Oral Q8H   amLODIPine 2.5 mg Oral Q24H   amoxicillin-clavulanate 1 tablet Oral Q12H   apixaban 5 mg Oral Q12H   atorvastatin 80 mg Oral Nightly   bisoprolol 5 mg Oral Q24H   diclofenac 2 g Topical 4x Daily   gabapentin 300 mg Oral Nightly   lidocaine 2 patch Transdermal Nightly   lisinopril 20 mg Oral Q12H   mesalamine 0.75 g Oral Nightly   pantoprazole 40 mg Oral Q AM   predniSONE 5 mg Oral Daily With Breakfast   sodium chloride 3 mL Intravenous Q12H     Continuous Infusions:    ropivacaine (NAROPIN) 0.2% peripheral nerve cath (moog) 6 mL/hr Last Rate: 6 mL/hr (08/02/19 1607)     PRN Meds:.•  acetaminophen  •  calcium carbonate  •  ipratropium-albuterol  •  loperamide  •  magic mouthwash  •  melatonin  •  phenol  •  potassium chloride  •  potassium chloride  •  [DISCONTINUED] potassium chloride **OR** [DISCONTINUED] potassium chloride **OR** potassium chloride  •  saline  •  sodium chloride  •  sodium chloride      Assessment/Plan   Assessment / Plan     Active Hospital Problems    Diagnosis  POA   • **Acute alteration in mental  status [R41.82]  Yes   • Lewy body dementia [G31.83, F02.80]  Yes   • Acute respiratory distress [R06.03]  Yes   • Fracture of proximal end of right humerus [S42.201A]  Yes   • Paroxysmal atrial fibrillation (CMS/HCC) [I48.0]  Yes   • Mixed hyperlipidemia [E78.2]  Yes   • Multiple lacunar infarcts [I63.81]  Yes   • History of stroke with residual deficit [I69.30]  Not Applicable   • PFO (patent foramen ovale) [Q21.1]  Not Applicable   • GERD without esophagitis [K21.9]  Yes   • Spinal stenosis in cervical region [M48.02]  Yes   • Essential hypertension [I10]  Yes   • Crohn's colitis, with rectal bleeding (CMS/HCC) [K50.111]  Yes      Resolved Hospital Problems   No resolved problems to display.        Brief Hospital Course to date:  Vargas De is a 78 y.o. male with a past medical history significant for ulcerative colitis on chronic prednisone therapy, chronic pain with spinal stenosis of the cervical region, hyperlipidemia, hypertension, PFO, atrial fibrillation ( on Eliquis) mild dementia, and multiple lacunar infarcts who presents with acute change in mental status.  MRI negative for CVA.  EEG negative for seizures.     Sepsis  Acute hypoxic respiratory failure  Likely aspiration   - overall improved; fevers improved and on room air   - Patient was started on zosyn 7/28 - will transition to Augmentin to complete 7 days   - BCx NGTD; strep negative   -Patient with swollen uvula with exudate.  CT scan without contrast shows narrowing of his airway. The scan without contrast not ordered as patient with iodine allergy; ENT eval; cont PEEP if needed; diminished oropharyngeal sensation  - Code status changed to DNR   - Speech eval 7/29 with modified diet; continue to monitor   - Palliative following       AMS  Suspected Lewy body dementia  - improved with discontinuation of Seroquel and narcotics   -  Neurology followed;  etiology likely worsening of lewy body dementia in setting of sleep deprivation, pain as  well as recent fracture   - continue home statin, ASA  - Will resume home gabapentin qHS only - patient states this helped him rest     Antibiotic associated diarrhea  -No concern for C. difficile, no abdominal pain on exam stable.  -We will start probiotic     Dysphasia  - improving;  mental status is improving  - Speech eval 7/29 with diet modifications    Loose stools   - no WBC; no abd pain; monitor and consider c.diff testing      Right Humerus Fracture:  - pain control as needed  - consult to ortho, non-operative management, therapy as tolerated, appreciate assistance   - continue with therapy as above  - Dr. Steinberg follow up in 10 days   - s/p nerve block 7/29; likely discontinuing 8/3; palliative following to assist with pain control      PAF:  - sinus on admission  - [id number] = 5. On Eliquis.      Junctional rhythml; tachycardia  - Resume bisoprolol; cards consult and recommend continued beta blocker   - K replaced; check Mg      Frequent urination   - UA ok; PVR ok -- overall improving      Right wrist pain  -imaging with no acute fracture      History of CVA  - workup in May and CVA felt to be embolic; follows with Dr. Gutierrez and Dr. Maldonado. Has been on Eliquis and ASA discontinued       Chronic steroid:  - on 5 mg daily for Crohn's/lumbar stenosis. Currently being weaned down, to eventually discontinue; will resume home prednisone       HTN:  - stable. Home meds held due fluctuating BP but has been trending up. Continued bisoprolol and amlodipine; lisinopril restarted 8/2         DVT prophylaxis:   Eliquis     Disposition: I expect the patient to be discharged TBD to skilled nursing facility next week.      CODE STATUS:   Code Status and Medical Interventions:   Ordered at: 07/28/19 1018     Limited Support to NOT Include:    Intubation     Level Of Support Discussed With:    Health Care Surrogate    Next of Kin (If No Surrogate)     Code Status:    No CPR     Medical Interventions (Level of Support  Prior to Arrest):    Limited         Electronically signed by Minnie Colbert MD, 08/03/19, 11:10 AM.

## 2019-08-04 NOTE — PROGRESS NOTES
[facility] Medicine Services  PROGRESS NOTE    Patient Name: Vargas De  : 1940  MRN: [id number]    Date of Admission: 2019  Length of Stay: 12  Primary Care Physician: Saba Arrington MD    Subjective   Subjective     CC:  AMS     HPI:  Still with some itching.  Pain controlled without ropivacaine overnight.  Still has some diarrhea but denies any abdominal pain, fevers.  Tolerating p.o. fluids.    Review of Systems  Gen- No fevers, chills  CV- No chest pain, palpitations  Resp- No cough, dyspnea  GI- No N/V/D, abd pain    Otherwise ROS is negative except as mentioned in the HPI.    Objective   Objective     Vital Signs:   Temp:  [97.6 °F (36.4 °C)-98.1 °F (36.7 °C)] 97.9 °F (36.6 °C)  Heart Rate:  [73-84] 76  Resp:  [16-18] 18  BP: ()/(60-87) 92/60  Total (NIH Stroke Scale): 9     Physical Exam:  Constitutional: No acute distress, awake, alert  HENT: NCAT, mucous membranes moist  Respiratory: Clear to auscultation bilaterally, respiratory effort normal on room air  Cardiovascular: RRR, II/VI murmurs, no rubs, or gallops, palpable pedal pulses bilaterally  Gastrointestinal: Positive bowel sounds, soft, nontender, nondistended  Musculoskeletal: trace bilateral ankle edema  Psychiatric: Appropriate affect, cooperative  Neurologic: Oriented x 3, strength symmetric in all extremities, Cranial Nerves grossly intact to confrontation, speech clear  Skin: No rashes    Results Reviewed:  I have personally reviewed current lab, radiology, and data and agree.    Results from last 7 days   Lab Units 19  0651 19  0537 19  0647   WBC 10*3/mm3 8.68 6.76 4.45   HEMOGLOBIN g/dL 12.9* 12.4* 8.2*   HEMATOCRIT % 40.5 40.1 26.9*   PLATELETS 10*3/mm3 324 305 182     Results from last 7 days   Lab Units 19  0651 19  0537 19  1801 19  0647  19  0616   SODIUM mmol/L 141 137  --  137  --  137   POTASSIUM mmol/L 3.8 4.1 5.0 3.2*   < > 3.1*    CHLORIDE mmol/L 101 102  --  108*  --  102   CO2 mmol/L 28.0 25.0  --  21.0*  --  23.0   BUN mg/dL 10 7*  --  5*  --  9   CREATININE mg/dL 0.75* 0.64*  --  0.41*  --  0.62*   GLUCOSE mg/dL 103* 95  --  90  --  100*   CALCIUM mg/dL 9.0 8.9  --  7.7*  --  8.2*   ALT (SGPT) U/L  --  52*  --  41  --  46*   AST (SGOT) U/L  --  45*  --  35  --  39    < > = values in this interval not displayed.     Estimated Creatinine Clearance: 81.5 mL/min (A) (by C-G formula based on SCr of 0.75 mg/dL (L)).    Microbiology Results Abnormal     Procedure Component Value - Date/Time    Blood Culture - Blood, Arm, Left [[id number]] Collected:  07/27/19 0152    Lab Status:  Final result Specimen:  Blood from Arm, Left Updated:  08/01/19 0431     Blood Culture No growth at 5 days    Blood Culture - Blood, Hand, Left [[id number]] Collected:  07/27/19 0147    Lab Status:  Final result Specimen:  Blood from Hand, Left Updated:  08/01/19 0245     Blood Culture No growth at 5 days    Urine Culture - Urine, Urine, Clean Catch [[id number]]  (Normal) Collected:  07/28/19 1430    Lab Status:  Final result Specimen:  Urine, Clean Catch Updated:  07/29/19 1856     Urine Culture No growth    Beta Strep Culture, Throat - Swab, Throat [[id number]]  (Normal) Collected:  07/27/19 1630    Lab Status:  Final result Specimen:  Swab from Throat Updated:  07/29/19 1355     Throat Culture, Beta Strep No Beta Hemolytic Streptococcus Isolated    Narrative:       Group A Strep incidence is low in adults. Positive culture for Beta hemolytic Streptococcus species can reflect colonization and not true infection. Please correlate clinically.    Blood Culture - Blood, Arm, Left [[id number]] Collected:  07/23/19 1334    Lab Status:  Final result Specimen:  Blood from Arm, Left Updated:  07/28/19 1415     Blood Culture No growth at 5 days    Blood Culture - Blood, Hand, Right [[id number]] Collected:  07/23/19 1341    Lab Status:  Final result Specimen:  Blood from Hand, Right  Updated:  07/28/19 1415     Blood Culture No growth at 5 days    Rapid Strep A Screen - Swab, Throat [[id number]]  (Normal) Collected:  07/27/19 1630    Lab Status:  Final result Specimen:  Swab from Throat Updated:  07/27/19 1727     Strep A Ag Negative    Narrative:       Test performed by Direct Antigen Testing.          Imaging Results (last 24 hours)     ** No results found for the last 24 hours. **          Results for orders placed during the hospital encounter of 07/23/19   Adult Transthoracic Echo Complete W/ Cont if Necessary Per Protocol    Narrative · Left ventricular systolic function is normal. Estimated EF = 60%.  · Left ventricular diastolic dysfunction (grade I) consistent with   impaired relaxation.  · Left atrial cavity size is borderline dilated.  · There is mild calcification of the aortic valve.          I have reviewed the medications:  Scheduled Meds:    acetaminophen 650 mg Oral Q8H   amLODIPine 2.5 mg Oral Q24H   apixaban 5 mg Oral Q12H   atorvastatin 80 mg Oral Nightly   bisoprolol 5 mg Oral Q24H   diclofenac 2 g Topical 4x Daily   DULoxetine 20 mg Oral Daily   gabapentin 300 mg Oral Nightly   lidocaine 2 patch Transdermal Nightly   lisinopril 20 mg Oral Q12H   mesalamine 0.75 g Oral Nightly   pantoprazole 40 mg Oral Q AM   predniSONE 5 mg Oral Daily With Breakfast   saccharomyces boulardii 250 mg Oral BID   sodium chloride 3 mL Intravenous Q12H     Continuous Infusions:     PRN Meds:.•  acetaminophen  •  calcium carbonate  •  ipratropium-albuterol  •  loperamide  •  magic mouthwash  •  melatonin  •  phenol  •  potassium chloride  •  potassium chloride  •  [DISCONTINUED] potassium chloride **OR** [DISCONTINUED] potassium chloride **OR** potassium chloride  •  saline  •  sodium chloride  •  sodium chloride      Assessment/Plan   Assessment / Plan     Active Hospital Problems    Diagnosis  POA   • **Acute alteration in mental status [R41.82]  Yes   • Lewy body dementia [G31.83, F02.80]  Yes    • Acute respiratory distress [R06.03]  Yes   • Fracture of proximal end of right humerus [S42.201A]  Yes   • Paroxysmal atrial fibrillation (CMS/HCC) [I48.0]  Yes   • Mixed hyperlipidemia [E78.2]  Yes   • Multiple lacunar infarcts [I63.81]  Yes   • History of stroke with residual deficit [I69.30]  Not Applicable   • PFO (patent foramen ovale) [Q21.1]  Not Applicable   • GERD without esophagitis [K21.9]  Yes   • Spinal stenosis in cervical region [M48.02]  Yes   • Essential hypertension [I10]  Yes   • Crohn's colitis, with rectal bleeding (CMS/HCC) [K50.111]  Yes      Resolved Hospital Problems   No resolved problems to display.        Brief Hospital Course to date:  Vargas De is a 78 y.o. male with a past medical history significant for ulcerative colitis on chronic prednisone therapy, chronic pain with spinal stenosis of the cervical region, hyperlipidemia, hypertension, PFO, atrial fibrillation ( on Eliquis) mild dementia, and multiple lacunar infarcts who presents with acute change in mental status.  MRI negative for CVA.  EEG negative for seizures.     Sepsis  Acute hypoxic respiratory failure  Likely aspiration   - overall improved; fevers improved and on room air   - Patient was started on zosyn 7/28 - will transition to Augmentin to complete 7 days   - BCx NGTD; strep negative   -Patient with swollen uvula with exudate.  CT scan without contrast shows narrowing of his airway. The scan without contrast not ordered as patient with iodine allergy; ENT eval; cont PEEP if needed; diminished oropharyngeal sensation  - Code status changed to DNR   - Speech eval 7/29 with modified diet; continue to monitor   - Palliative following       AMS  Suspected Lewy body dementia  - improved with discontinuation of Seroquel and narcotics   -  Neurology followed;  etiology likely worsening of lewy body dementia in setting of sleep deprivation, pain as well as recent fracture   - continue home statin, ASA  - Will resume  home gabapentin qHS only - patient states this helped him rest     Antibiotic associated diarrhea  -No concern for C. difficile, no abdominal pain on exam stable.  -We will start probiotic     Dysphasia  - improving;  mental status is improving  - Speech eval 7/29 with diet modifications    Loose stools   - no WBC; no abd pain; monitor and consider c.diff testing      Right Humerus Fracture:  - consult to ortho, non-operative management, therapy as tolerated, appreciate assistance   - continue with therapy as above  - Dr. Steinberg follow up in 10 days   -Nerve block discontinued.  Pain controlled on Tylenol.     PAF:  - sinus on admission  - [id number] = 5. On Eliquis.      Junctional rhythml; tachycardia  - Resume bisoprolol; cards consult and recommend continued beta blocker   - K replaced; check Mg      Frequent urination   - UA ok; PVR ok -- overall improving      Right wrist pain  -imaging with no acute fracture      History of CVA  - workup in May and CVA felt to be embolic; follows with Dr. Gutierrez and Dr. Maldonado. Has been on Eliquis and ASA discontinued       Chronic steroid:  - on 5 mg daily for Crohn's/lumbar stenosis. Currently being weaned down, to eventually discontinue; will resume home prednisone       HTN:  - stable. Home meds held due fluctuating BP but has been trending up. Continued bisoprolol and amlodipine; lisinopril restarted 8/2         DVT prophylaxis:   Eliquis     Disposition: I expect the patient to be discharged TBD to skilled nursing facility next week.      CODE STATUS:   Code Status and Medical Interventions:   Ordered at: 07/28/19 1018     Limited Support to NOT Include:    Intubation     Level Of Support Discussed With:    Health Care Surrogate    Next of Kin (If No Surrogate)     Code Status:    No CPR     Medical Interventions (Level of Support Prior to Arrest):    Limited         Electronically signed by Minnie Colbert MD, 08/04/19, 11:51 AM.

## 2019-08-04 NOTE — PROGRESS NOTES
[facility]    Acute pain service Inpatient Progress Note    Patient Name: Vargas De  :  1940  MRN:  [id number]        Acute Pain  Service Inpatient Progress Note:    Analgesia:Excellent  LOC: alert and awake  Resp Status: room air and spontaneous ventilation  Cardiac: VS stable  Side Effects:None  Catheter Site:clean  Cath type: peripheral nerve cath(MOOG pump)  Infusion rate: 6ml/hr  Catheter Plan:RN to remove catheter  Comments: Had good day after infusion stopped . Pain level un changed . Plan is to remove catheter

## 2019-08-04 NOTE — PLAN OF CARE
Problem: Pain, Chronic (Adult)  Goal: Acceptable Pain/Comfort Level and Functional Ability  Outcome: Ongoing (interventions implemented as appropriate)     08/04/19 0426   Pain, Chronic (Adult)   Acceptable Pain/Comfort Level and Functional Ability making progress toward outcome,  en client is very still, his pain level is at a 3, tylenol was given but with physical activity, additional medication would be beneficial

## 2019-08-04 NOTE — THERAPY TREATMENT NOTE
Acute Care - Speech Language Pathology   Swallow Treatment Note [facility]     Patient Name: Vargas De  : 1940  MRN: [id number]  Today's Date: 2019  Onset of Illness/Injury or Date of Surgery: 19            Admit Date: 2019    Visit Dx:      ICD-10-CM ICD-9-CM   1. Acute alteration in mental status R41.82 780.97   2. Generalized weakness R53.1 780.79   3. Other closed displaced fracture of proximal end of right humerus with routine healing, subsequent encounter S42.291D V54.11   4. Elevated blood pressure reading with diagnosis of hypertension I10 401.9   5. Impaired functional mobility, balance, gait, and endurance Z74.09 V49.89   6. Impaired mobility and ADLs Z74.09 799.89   7. Cognitive communication deficit R41.841 799.52   8. Oropharyngeal dysphagia R13.12 787.22     Patient Active Problem List   Diagnosis   • Peripheral neuropathy   • Essential hypertension   • Crohn's colitis, with rectal bleeding (CMS/HCC)   • Major depressive disorder with single episode, in partial remission (CMS/HCC)   • Macular degeneration of both eyes   • GERD without esophagitis   • Cervicalgia   • Spinal stenosis in cervical region   • Chronic right-sided low back pain without sciatica   • Osteoarthritis of toe joint, right   • PFO (patent foramen ovale)   • Ulcerative colitis (CMS/HCC)   • Body mass index (BMI) of 25.0 to 29.9   • History of stroke with residual deficit   • Mixed hyperlipidemia   • Multiple lacunar infarcts   • Paroxysmal atrial fibrillation (CMS/HCC)   • Chronic bilateral low back pain   • Acute alteration in mental status   • Fracture of proximal end of right humerus   • Acute respiratory distress   • Lewy body dementia       Therapy Treatment  Rehabilitation Treatment Summary     Row Name 19 1450 19 1020          Treatment Time/Intention    Discipline  speech language pathologist  -MP  physical therapist  -CS     Document Type  therapy note (daily note)  -MP  therapy note  (daily note)  -CS     Subjective Information  no complaints  -MP  complains of;pain  -CS     Mode of Treatment  individual therapy;speech-language pathology  -MP  physical therapy  -CS     Patient/Family Observations  Son present  -MP  Two sons are in the room  -CS     Care Plan Review  care plan/treatment goals reviewed;patient/other agree to care plan  -MP  --     Care Plan Review, Other Participant(s)  son  -MP  --     Therapy Frequency (Swallow)  5 days per week  -MP  --     Therapy Frequency (SLP SLC)  5 days per week  -MP  --     Patient Effort  good  -MP  good  -CS     Recorded by [MP] Bret Burkett, MS CCC-SLP 08/04/19 1508 [CS] Roz Phoenix, PT 08/04/19 1127     Row Name 08/04/19 1020             Cognitive Assessment/Intervention- PT/OT    Orientation Status (Cognition)  oriented x 3  -CS      Follows Commands (Cognition)  follows one step commands;75-90% accuracy  -CS      Safety Deficit (Cognitive)  ability to follow commands;safety precautions awareness;safety precautions follow-through/compliance  -CS      Recorded by [CS] Roz Phoenix, PT 08/04/19 1127      Row Name 08/04/19 1020             Safety Issues, Functional Mobility    Safety Issues Affecting Function (Mobility)  safety precautions follow-through/compliance;safety precaution awareness  -CS      Impairments Affecting Function (Mobility)  balance;coordination;endurance/activity tolerance;pain  -CS      Recorded by [CS] Roz Phoenix, PT 08/04/19 1127      Row Name 08/04/19 1020             Functional Mobility    Functional Mobility- Ind. Level  minimum assist (75% patient effort);1 person  -CS      Functional Mobility-Distance (Feet)  180  -CS      Functional Mobility-Maintain WBing  able to maintain weight bearing status R UE in sling  -CS      Recorded by [CS] Roz Phoenix, PT 08/04/19 1127      Row Name 08/04/19 1020             Transfer Assessment/Treatment    Transfer Assessment/Treatment  sit-stand  transfer;stand-sit transfer  -CS      Maintains Weight-bearing Status (Transfers)  able to maintain  -CS      Comment (Transfers)  R UE in sling and gait belt donned  -CS      Recorded by [CS] Roz Phoenix, PT 08/04/19 1127      Row Name 08/04/19 1020             Sit-Stand Transfer    Sit-Stand Wright (Transfers)  contact guard  -CS      Assistive Device (Sit-Stand Transfers)  -- R UE in sling and push off with L UE  -CS      Recorded by [CS] Roz Phoenix, PT 08/04/19 1127      Row Name 08/04/19 1020             Stand-Sit Transfer    Stand-Sit Wright (Transfers)  minimum assist (75% patient effort);1 person assist cuing to reach back for chair with L UE  -CS      Recorded by [CS] Roz Phoenix, PT 08/04/19 1127      Row Name 08/04/19 1020             Gait/Stairs Assessment/Training    06550 - Gait Training Minutes   23  -CS      Gait/Stairs Assessment/Training  gait/ambulation independence;distance ambulated  -CS      Wright Level (Gait)  minimum assist (75% patient effort);1 person assist;1 person to manage equipment Son performed chair follow   -CS      Distance in Feet (Gait)  180  -CS      Pattern (Gait)  step-through  -CS      Deviations/Abnormal Patterns (Gait)  stride length decreased;huber decreased Cuing to increase stride length  -CS      Recorded by [CS] Roz Phoenix, PT 08/04/19 1127      Row Name 08/04/19 1020             Motor Skills Assessment/Interventions    Additional Documentation  Therapeutic Exercise (Group);Therapeutic Exercise Interventions (Group)  -CS      Recorded by [CS] Roz Phoenix, PT 08/04/19 1127      Row Name 08/04/19 1020             Therapeutic Exercise    28509 - PT Therapeutic Exercise Minutes  15  -CS      Recorded by [CS] Roz Phoenix, PT 08/04/19 1127      Row Name 08/04/19 1020             Therapeutic Exercise    Lower Extremity (Therapeutic Exercise)  gluteal sets;quad sets, bilateral;marching while seated;LAQ  (long arc quad), bilateral;SAQ (short arc quad), bilateral ankle pumps  -CS      Sets/Reps (Therapeutic Exercise)  15  -CS      Recorded by [CS] Roz Phoenix, PT 08/04/19 1127      Row Name 08/04/19 1020             Positioning and Restraints    Pre-Treatment Position  sitting in chair/recliner  -CS      Post Treatment Position  chair  -CS      In Chair  notified nsg;sitting;call light within reach;encouraged to call for assist;with family/caregiver;RUE elevated RUE in sling  -CS      Recorded by [CS] Roz Phoenix, PT 08/04/19 1127      Row Name 08/04/19 1450             Pain Assessment    Additional Documentation  Pain Scale: FACES Pre/Post-Treatment (Group)  -MP      Recorded by [MP] Bret Burkett, MS CCC-SLP 08/04/19 1508      Row Name 08/04/19 1450 08/04/19 1020          Pain Scale: FACES Pre/Post-Treatment    Pain: FACES Scale, Pretreatment  0-->no hurt  -MP  2-->hurts little bit  -CS     Pain: FACES Scale, Post-Treatment  0-->no hurt  -MP  4-->hurts little more  -CS     Pre/Post Treatment Pain Comment  --  R arm pain and back pain History of spinal stenosis  -CS     Recorded by [MP] Bret Burkett MS CCC-SLP 08/04/19 1508 [CS] Roz Phoenix, PT 08/04/19 1141     Row Name 08/04/19 1020             Pain Scale 2: Numbers Pre/Post-Treatment    Pain Location 2 - Side  Right  -CS      Pain Location 2 - Orientation  upper  -CS      Pain Location 2  extremity  -CS      Pain Intervention(s) 2  Repositioned;Ambulation/increased activity R UE in sling  -CS      Recorded by [CS] Roz Phoenix, PT 08/04/19 1141      Row Name                Wound 07/23/19 2130 Right anterior toe pressure injury    Wound - Properties Group Date first assessed: 07/23/19 [DB] Time first assessed: 2130 [DB] Present On Admission : yes [DB] Side: Right [DB] Orientation: anterior [DB] Location: toe [DB] Type: pressure injury [DB] Stage, Pressure Injury: Stage 1 [DB] Recorded by:  [DB] Henok Marie RN 07/24/19  0229    Row Name                Wound 07/20/19 1649 Left hand skin tear    Wound - Properties Group Date first assessed: 07/20/19 [SW] Time first assessed: 1649 [SW] Present On Admission : yes [SW] Side: Left [SW] Location: hand [SW] Type: skin tear [SW] Recorded by:  [SW] Odilia Joy RN 07/20/19 1649    Row Name                Wound 07/20/19 1648 Right knee abrasion    Wound - Properties Group Date first assessed: 07/20/19 [SW] Time first assessed: 1648 [SW] Present On Admission : yes [SW] Side: Right [SW] Location: knee [SW] Type: abrasion [SW] Recorded by:  [SW] Odilia Joy RN 07/20/19 1648    Row Name 08/04/19 1020             Coping    Observed Emotional State  accepting;calm;cooperative  -CS      Verbalized Emotional State  acceptance  -CS      Recorded by [CS] Roz Phoenix, PT 08/04/19 1141      Row Name 08/04/19 1020             Outcome Summary/Treatment Plan (PT)    Daily Summary of Progress (PT)  progress toward functional goals as expected  -CS      Recorded by [CS] Roz Phoenix, PT 08/04/19 1141      Row Name 08/04/19 1450             Outcome Summary/Treatment Plan (SLP)    Daily Summary of Progress (SLP)  progress toward functional goals as expected  -MP      Plan for Continued Treatment (SLP)  Received call from RN this AM reporting that family had questions re: repeat FEES. Spoke w/ son @ bedside re: plan for repeat instrumental. In agreement to repeat FEES on Tuesday 8/6, which will be 5 days post most recent FEES, allowing adequate/optimal time for improvement. Encouraged pt to continue independent practice of dysphagia exercises until repeat FEES.  -MP      Anticipated Dischage Disposition  inpatient rehabilitation facility;anticipate therapy at next level of care  -MP      Recorded by [MP] Bret Burkett, MS CCC-SLP 08/04/19 1506        User Key  (r) = Recorded By, (t) = Taken By, (c) = Cosigned By    Initials Name Effective Dates Discipline    DB Henok Marie,  RN 06/16/16 -  Nurse    Odilia Gordillo RN 06/16/16 -  Nurse    Bret Willoughby, MS CCC-SLP 06/19/19 -  SLP    Roz Engel, PT 03/26/19 -  PT          Outcome Summary  Outcome Summary/Treatment Plan (SLP)  Daily Summary of Progress (SLP): progress toward functional goals as expected (08/04/19 1450 : Bret Burkett, MS CCC-SLP)  Plan for Continued Treatment (SLP): Received call from RN this AM reporting that family had questions re: repeat FEES. Spoke w/ son @ bedside re: plan for repeat instrumental. In agreement to repeat FEES on Tuesday 8/6, which will be 5 days post most recent FEES, allowing adequate/optimal time for improvement. Encouraged pt to continue independent practice of dysphagia exercises until repeat FEES. (08/04/19 1450 : Bret Burkett, MS CCC-SLP)  Anticipated Dischage Disposition: inpatient rehabilitation facility, anticipate therapy at next level of care (08/04/19 1450 : Bret Burkett, MS CCC-SLP)      SLP GOALS     Row Name 08/04/19 1450 08/03/19 1110 08/02/19 1030       Oral Nutrition/Hydration Goal 1 (SLP)    Oral Nutrition/Hydration Goal 1, SLP  LTG: Pt will return to regular diet & thin liquid w/o s/sxs aspiration w/ 100% acc w/o cues.  -MP  LTG: Pt will return to regular diet & thin liquid w/o s/sxs aspiration w/ 100% acc w/o cues.  -AC  --    Time Frame (Oral Nutrition/Hydration Goal 1, SLP)  by discharge  -MP  by discharge  -AC  --    Barriers (Oral Nutrition/Hydration Goal 1, SLP)  Discussed repeat FEES w/ son & pt @ beside. Will plan for repeat FEES Tuesday 8/6  -MP  --  --    Progress/Outcomes (Oral Nutrition/Hydration Goal 1, SLP)  continuing progress toward goal  -MP  continuing progress toward goal  -AC  --       Oral Nutrition/Hydration Goal 2 (SLP)    Oral Nutrition/Hydration Goal 2, SLP  Pt will tolerate trials of soft solids & honey-thick liquids w/o s/sxs aspiration w/ 100% acc w/o cues.  -MP  Pt will tolerate trials of soft solids & honey-thick  liquids w/o s/sxs aspiration w/ 100% acc w/o cues.  -AC  Pt will tolerate trials of soft solids & honey-thick liquids w/o s/sxs aspiration w/ 100% acc w/o cues.  -LR    Time Frame (Oral Nutrition/Hydration Goal 2, SLP)  short term goal (STG);by discharge  -MP  short term goal (STG);by discharge  -AC  short term goal (STG);by discharge  -LR    Barriers (Oral Nutrition/Hydration Goal 2, SLP)  Pt lethargic, deferred PO trials  -MP  No overt clinical s/sxs aspiration w/ honey-thick liquid via cup.  -AC  Pt given trials of honey thick liquids and pureed via spoon. No overt s/s of aspiration with any trials.   -LR    Progress/Outcomes (Oral Nutrition/Hydration Goal 2, SLP)  goal ongoing  -MP  good progress toward goal  -AC  continuing progress toward goal  -LR       Oral Nutrition/Hydration Goal (SLP)    Oral Nutrition/Hydration Goal, SLP  --  Pt will tolerate therapeutic trials of ice between meals & after oral care w/ 100% acc w/o cues.  -AC  --    Time Frame (Oral Nutrition/Hydration Goal, SLP)  --  short term goal (STG);by discharge  -AC  --    Progress/Outcomes (Oral Nutrition/Hydration Goal, SLP)  --  goal no longer appropriate  -AC  --       Lingual Strengthening Goal 1 (SLP)    Activity (Lingual Strengthening Goal 1, SLP)  increase tongue back strength  -MP  --  increase tongue back strength  -LR    Increase Tongue Back Strength  lingual resistance exercises  -MP  lingual resistance exercises  -AC  lingual resistance exercises  -LR    [place]/Accuracy (Lingual Strengthening Goal 1, SLP)  with minimal cues (75-90% accuracy)  -MP  with minimal cues (75-90% accuracy)  -AC  with minimal cues (75-90% accuracy)  -LR    Time Frame (Lingual Strengthening Goal 1, SLP)  short term goal (STG);by discharge  -MP  short term goal (STG);by discharge  -AC  short term goal (STG);by discharge  -LR    Progress/Outcomes (Lingual Strengthening Goal 1, SLP)  goal ongoing  -MP  goal ongoing  -AC  good progress toward goal  -LR        Pharyngeal Strengthening Exercise Goal 1 (SLP)    Activity (Pharyngeal Strengthening Goal 1, SLP)  increase timing;increase superior movement of the hyolaryngeal complex;increase anterior movement of the hyolaryngeal complex;increase closure at entrance to airway/closure of airway at glottis;increase squeeze/positive pressure generation;increase tongue base retraction  -MP  --  increase timing;increase superior movement of the hyolaryngeal complex;increase anterior movement of the hyolaryngeal complex;increase closure at entrance to airway/closure of airway at glottis;increase squeeze/positive pressure generation;increase tongue base retraction  -LR    Increase Timing  prepping - 3 second prep or suck swallow or 3-step swallow  -MP  prepping - 3 second prep or suck swallow or 3-step swallow  -AC  --    Increase Superior Movement of the Hyolaryngeal Complex  falsetto  -MP  falsetto  -AC  falsetto;effortful pitch glide (falsetto + pharyngeal squeeze)  -LR    Increase Anterior Movement of the Hyolaryngeal Complex  chin tuck against resistance (CTAR)  -MP  chin tuck against resistance (CTAR)  -AC  --    Increase Closure at Entrance to Airway/Closure of Airway at Glottis  super-supraglottic swallow  -MP  super-supraglottic swallow  -AC  --    Increase Squeeze/Positive Pressure Generation  hard effortful swallow  -MP  hard effortful swallow  -AC  hard effortful swallow  -LR    Increase Tongue Base Retraction  nabila  -MP  nabila  -AC  nabila;hard effortful swallow  -LR    [place]/Accuracy (Pharyngeal Strengthening Goal 1, SLP)  with minimal cues (75-90% accuracy)  -MP  with minimal cues (75-90% accuracy)  -AC  with minimal cues (75-90% accuracy)  -LR    Time Frame (Pharyngeal Strengthening Goal 1, SLP)  short term goal (STG);by discharge  -MP  short term goal (STG);by discharge  -AC  short term goal (STG);by discharge  -LR    Barriers (Pharyngeal Strengthening Goal 1, SLP)  Encouraged independent practice  -MP   Falsetto: x5 w/ mod cues (pt u/a to perform effortful pitch glide, so goal revised for falsetto), CTAR x10 w/ mod cues, SSG x5 w/ mod cues. Provided another handout w/ instructions for indpt practice.  -AC  Pt required drinks of honey thick liquids to complete effortful swallow. Pt required max cues to complete nabila.   -LR    Progress/Outcomes (Pharyngeal Strengthening Goal 1, SLP)  continuing progress toward goal  -MP  continuing progress toward goal;goal revised this date  -AC  continuing progress toward goal  -LR       Swallow Compensatory Strategies Goal 1 (SLP)    Activity (Swallow Compensatory Strategies/Techniques Goal 1, SLP)  compensatory strategies;during meal intake;during p.o. trials;small cup sips;small straw sips;alternate food/liquid intake;extra swallow per bolus  -MP  compensatory strategies;during meal intake;during p.o. trials;small cup sips;small straw sips;alternate food/liquid intake;extra swallow per bolus  -AC  --    [place]/Accuracy (Swallow Compensatory Strategies/Techniques Goal 1, SLP)  independently (over 90% accuracy)  -MP  independently (over 90% accuracy)  -AC  --    Time Frame (Swallow Compensatory Strategies/Techniques Goal 1, SLP)  short term goal (STG);by discharge  -MP  short term goal (STG);by discharge  -AC  --    Barriers (Swallow Compensatory Strategies/Techniques Goal 1, SLP)  --  Pt taking small sips via cup w/o cues.  -AC  --    Progress/Outcomes (Swallow Compensatory Strategies/Techniques Goal 1, SLP)  goal ongoing  -MP  continuing progress toward goal  -AC  --       Comprehend Questions Goal 1 (SLP)    Improve Ability to Comprehend Questions Goal 1 (SLP)  complex yes/no questions;80%;with minimal cues (75-90%)  -MP  complex yes/no questions;80%;with minimal cues (75-90%)  -AC  --    Time Frame (Comprehend Questions Goal 1, SLP)  short term goal (STG);by discharge  -MP  short term goal (STG);by discharge  -AC  --    Progress (Ability to Comprehend Questions Goal 1,  SLP)  --  60%;with moderate cues (50-74%)  -AC  --    Progress/Outcomes (Comprehend Questions Goal 1, SLP)  goal ongoing  -MP  continuing progress toward goal  -AC  --       Word Retrieval Skills Goal 1 (SLP)    Improve Word Retrieval Skills By Goal 1 (SLP)  completing functional word finding tasks;80%;with minimal cues (75-90%)  -MP  completing functional word finding tasks;80%;with minimal cues (75-90%)  -AC  --    Time Frame (Word Retrieval Goal 1, SLP)  short term goal (STG);by discharge  -MP  short term goal (STG);by discharge  -AC  --    Progress (Word Retrieval Skills Goal 1, SLP)  --  70%;with minimal cues (75-90%)  -AC  --    Progress/Outcomes (Word Retrieval Goal 1, SLP)  goal ongoing  -MP  continuing progress toward goal  -AC  --       Ability to Construct Phrase and Sentence Level Response Goal 1 (SLP)    Improve Ability to Construct Phrase and Sentence Level Responses By Goal 1 (SLP)  answering question with phrase;constructing a sentence with a key word;80%;with minimal cues (75-90%)  -MP  answering question with phrase;constructing a sentence with a key word;80%;with minimal cues (75-90%)  -AC  --    Time Frame (Phrase and Sentence Level Response Goal 1, SLP)  short term goal (STG);by discharge  -MP  short term goal (STG);by discharge  -AC  --    Progress (Construct Phrase and Sentence Level Response Goal 1, SLP)  --  70%;with minimal cues (75-90%)  -AC  --    Progress/Outcomes (Phrase and Sentence Level Response Goal 1, SLP)  goal ongoing  -MP  continuing progress toward goal  -AC  --       Patient Will Implement Strategies Goal 1 (SLP)    Implement Strategies of Goal 1 (SLP)  --  using external rate control;80%;with minimal cues (75-90%)  -AC  --    Time Frame (Strategy Implementation Goal 1, SLP)  --  short term goal (STG);by discharge  -AC  --    Progress/Outcomes (Strategy Implementation Goal 1, SLP)  --  goal no longer appropriate  -AC  --    Comment (Strategy Implementation Goal 1, SLP)  --   Pt/family reported dysflunecies are baseline.  -AC  --       Orientation Goal 1 (SLP)    Improve Orientation Through Goal 1 (SLP)  demonstrating orientation to month;demonstrating orientation to place;80%;independently (over 90% accuracy)  -MP  demonstrating orientation to month;demonstrating orientation to place;80%;independently (over 90% accuracy)  -AC  --    Time Frame (Orientation Goal 1, SLP)  short term goal (STG);by discharge  -MP  short term goal (STG);by discharge  -AC  --    Progress/Outcomes (Orientation Goal 1, SLP)  goal ongoing  -MP  goal ongoing  -AC  --       Additional Goal 1 (SLP)    Additional Goal 1, SLP  LTG: Pt will improve cognitive-communicaiton skills in order to participate in care in hospital setting w/ 100% acc w/o cues.  -MP  LTG: Pt will improve cognitive-communicaiton skills in order to participate in care in hospital setting w/ 100% acc w/o cues.  -AC  --    Time Frame (Additional Goal 1, SLP)  by discharge  -MP  by discharge  -AC  --    Progress/Outcomes (Additional Goal 1, SLP)  goal ongoing  -MP  continuing progress toward goal  -AC  --      User Key  (r) = Recorded By, (t) = Taken By, (c) = Cosigned By    Initials Name Provider Type    Karen Marte MS CCC-SLP Speech and Language Pathologist    Rose Weinstein MS CCC-SLP Speech and Language Pathologist    Bret Willoughby MS CCC-SLP Speech and Language Pathologist          EDUCATION  The patient has been educated in the following areas:   Cognitive Impairment Communication Impairment Dysphagia (Swallowing Impairment) Modified Diet Instruction.    SLP Recommendation and Plan  Daily Summary of Progress (SLP): progress toward functional goals as expected     Plan for Continued Treatment (SLP): Received call from RN this AM reporting that family had questions re: repeat FEES. Spoke w/ son @ bedside re: plan for repeat instrumental. In agreement to repeat FEES on Tuesday 8/6, which will be 5 days post most recent FEES,  allowing adequate/optimal time for improvement. Encouraged pt to continue independent practice of dysphagia exercises until repeat FEES.  Anticipated Dischage Disposition: inpatient rehabilitation facility, anticipate therapy at next level of care                    Time Calculation:   Time Calculation- SLP     Row Name 08/04/19 1512             Time Calculation- SLP    SLP Start Time  1450  -MP      SLP Received On  08/04/19  -MP        User Key  (r) = Recorded By, (t) = Taken By, (c) = Cosigned By    Initials Name Provider Type    MP Bret Burkett, MS CCC-SLP Speech and Language Pathologist          Therapy Charges for Today     Code Description Service Date Service Provider Modifiers Qty    [id number]  ST SELF CARE/MGMT/TRAIN EA 15 MIN 8/4/2019 Bret Burkett MS CCC-SLP GN 2                 Bret Burkett MS CCC-SLP  8/4/2019

## 2019-08-04 NOTE — THERAPY TREATMENT NOTE
Acute Care - Physical Therapy Treatment Note  [facility]     Patient Name: Vargas De  : 1940  MRN: [id number]  Today's Date: 2019  Onset of Illness/Injury or Date of Surgery: 19  Date of Referral to PT: 19       Admit Date: 2019    Visit Dx:    ICD-10-CM ICD-9-CM   1. Acute alteration in mental status R41.82 780.97   2. Generalized weakness R53.1 780.79   3. Other closed displaced fracture of proximal end of right humerus with routine healing, subsequent encounter S42.291D V54.11   4. Elevated blood pressure reading with diagnosis of hypertension I10 401.9   5. Impaired functional mobility, balance, gait, and endurance Z74.09 V49.89   6. Impaired mobility and ADLs Z74.09 799.89   7. Cognitive communication deficit R41.841 799.52   8. Oropharyngeal dysphagia R13.12 787.22     Patient Active Problem List   Diagnosis   • Peripheral neuropathy   • Essential hypertension   • Crohn's colitis, with rectal bleeding (CMS/HCC)   • Major depressive disorder with single episode, in partial remission (CMS/HCC)   • Macular degeneration of both eyes   • GERD without esophagitis   • Cervicalgia   • Spinal stenosis in cervical region   • Chronic right-sided low back pain without sciatica   • Osteoarthritis of toe joint, right   • PFO (patent foramen ovale)   • Ulcerative colitis (CMS/HCC)   • Body mass index (BMI) of 25.0 to 29.9   • History of stroke with residual deficit   • Mixed hyperlipidemia   • Multiple lacunar infarcts   • Paroxysmal atrial fibrillation (CMS/HCC)   • Chronic bilateral low back pain   • Acute alteration in mental status   • Fracture of proximal end of right humerus   • Acute respiratory distress   • Lewy body dementia       Therapy Treatment    Rehabilitation Treatment Summary     Row Name 19 1020             Treatment Time/Intention    Discipline  physical therapist  -CS      Document Type  therapy note (daily note)  -CS      Subjective Information  complains of;pain   -CS      Mode of Treatment  physical therapy  -CS      Patient/Family Observations  Two sons are in the room  -CS      Patient Effort  good  -CS      Recorded by [CS] Roz Phoenix, PT 08/04/19 1127      Row Name 08/04/19 1020             Cognitive Assessment/Intervention- PT/OT    Orientation Status (Cognition)  oriented x 3  -CS      Follows Commands (Cognition)  follows one step commands;75-90% accuracy  -CS      Safety Deficit (Cognitive)  ability to follow commands;safety precautions awareness;safety precautions follow-through/compliance  -CS      Recorded by [CS] Roz Phoenix, PT 08/04/19 1127      Row Name 08/04/19 1020             Safety Issues, Functional Mobility    Safety Issues Affecting Function (Mobility)  safety precautions follow-through/compliance;safety precaution awareness  -CS      Impairments Affecting Function (Mobility)  balance;coordination;endurance/activity tolerance;pain  -CS      Recorded by [CS] Roz Phoenix, PT 08/04/19 1127      Row Name 08/04/19 1020             Functional Mobility    Functional Mobility- Ind. Level  minimum assist (75% patient effort);1 person  -CS      Functional Mobility-Distance (Feet)  180  -CS      Functional Mobility-Maintain WBing  able to maintain weight bearing status R UE in sling  -CS      Recorded by [CS] Roz Phoenix, PT 08/04/19 1127      Row Name 08/04/19 1020             Transfer Assessment/Treatment    Transfer Assessment/Treatment  sit-stand transfer;stand-sit transfer  -CS      Maintains Weight-bearing Status (Transfers)  able to maintain  -CS      Comment (Transfers)  R UE in sling and gait belt donned  -CS      Recorded by [CS] Roz Phoenix, PT 08/04/19 1127      Row Name 08/04/19 1020             Sit-Stand Transfer    Sit-Stand [place] (Transfers)  contact guard  -CS      Assistive Device (Sit-Stand Transfers)  -- R UE in sling and push off with L UE  -CS      Recorded by [CS] Roz Phoenix, PT  08/04/19 1127      Row Name 08/04/19 1020             Stand-Sit Transfer    Stand-Sit [place] (Transfers)  minimum assist (75% patient effort);1 person assist cuing to reach back for chair with L UE  -CS      Recorded by [CS] Roz Phoenix, PT 08/04/19 1127      Row Name 08/04/19 1020             Gait/Stairs Assessment/Training    87303 - Gait Training Minutes   23  -CS      Gait/Stairs Assessment/Training  gait/ambulation independence;distance ambulated  -CS      [place] Level (Gait)  minimum assist (75% patient effort);1 person assist;1 person to manage equipment Son performed chair follow   -CS      Distance in Feet (Gait)  180  -CS      Pattern (Gait)  step-through  -CS      Deviations/Abnormal Patterns (Gait)  stride length decreased;huber decreased Cuing to increase stride length  -CS      Recorded by [CS] Roz Phoenix, PT 08/04/19 1127      Row Name 08/04/19 1020             Motor Skills Assessment/Interventions    Additional Documentation  Therapeutic Exercise (Group);Therapeutic Exercise Interventions (Group)  -CS      Recorded by [CS] Roz Phoenix, PT 08/04/19 1127      Row Name 08/04/19 1020             Therapeutic Exercise    96168 - PT Therapeutic Exercise Minutes  15  -CS      Recorded by [CS] Roz Phoenix, PT 08/04/19 1127      Row Name 08/04/19 1020             Therapeutic Exercise    Lower Extremity (Therapeutic Exercise)  gluteal sets;quad sets, bilateral;marching while seated;LAQ (long arc quad), bilateral;SAQ (short arc quad), bilateral ankle pumps  -CS      Sets/Reps (Therapeutic Exercise)  15  -CS      Recorded by [CS] Roz Phoenix, PT 08/04/19 1127      Row Name 08/04/19 1020             Positioning and Restraints    Pre-Treatment Position  sitting in chair/recliner  -CS      Post Treatment Position  chair  -CS      In Chair  notified nsg;sitting;call light within reach;encouraged to call for assist;with family/caregiver;RUE elevated RUE in sling   -CS      Recorded by [CS] Roz Phoenix, PT 08/04/19 1127      Row Name 08/04/19 1020             Pain Scale: FACES Pre/Post-Treatment    Pain: FACES Scale, Pretreatment  2-->hurts little bit  -CS      Pain: FACES Scale, Post-Treatment  4-->hurts little more  -CS      Pre/Post Treatment Pain Comment  R arm pain and back pain History of spinal stenosis  -CS      Recorded by [CS] Roz Phoenix, PT 08/04/19 1141      Row Name 08/04/19 1020             Pain Scale 2: Numbers Pre/Post-Treatment    Pain Location 2 - Side  Right  -CS      Pain Location 2 - Orientation  upper  -CS      Pain Location 2  extremity  -CS      Pain Intervention(s) 2  Repositioned;Ambulation/increased activity R UE in sling  -CS      Recorded by [CS] Roz Phoenix, PT 08/04/19 1141      Row Name                Wound 07/23/19 2130 Right anterior toe pressure injury    Wound - Properties Group Date first assessed: 07/23/19 [DB] Time first assessed: 2130 [DB] Present On Admission : yes [DB] Side: Right [DB] Orientation: anterior [DB] Location: toe [DB] Type: pressure injury [DB] Stage, Pressure Injury: Stage 1 [DB] Recorded by:  [DB] Henok Marie RN 07/24/19 0229    Row Name                Wound 07/20/19 1649 Left hand skin tear    Wound - Properties Group Date first assessed: 07/20/19 [SW] Time first assessed: 1649 [SW] Present On Admission : yes [SW] Side: Left [SW] Location: hand [SW] Type: skin tear [SW] Recorded by:  [SW] Odilia Joy RN 07/20/19 1649    Row Name                Wound 07/20/19 1648 Right knee abrasion    Wound - Properties Group Date first assessed: 07/20/19 [SW] Time first assessed: 1648 [SW] Present On Admission : yes [SW] Side: Right [SW] Location: knee [SW] Type: abrasion [SW] Recorded by:  [SW] Odilia Joy RN 07/20/19 1648    Row Name 08/04/19 1020             Coping    Observed Emotional State  accepting;calm;cooperative  -CS      Verbalized Emotional State  acceptance  -CS       Recorded by [CS] Roz Phoenix, PT 08/04/19 1141      Row Name 08/04/19 1020             Outcome Summary/Treatment Plan (PT)    Daily Summary of Progress (PT)  progress toward functional goals as expected  -CS      Recorded by [CS] Roz Phoenix, PT 08/04/19 1141        User Key  (r) = Recorded By, (t) = Taken By, (c) = Cosigned By    Initials Name Effective Dates Discipline    DB Henok Marie RN 06/16/16 -  Nurse    Odilia Gordillo RN 06/16/16 -  Nurse    CS Roz Phoenix, PT 03/26/19 -  PT          Wound 07/20/19 1648 Right knee abrasion (Active)   Dressing Appearance open to air 8/4/2019  6:00 AM   Base scab;dry 8/4/2019  6:00 AM   Periwound intact 8/4/2019  6:00 AM   Periwound Temperature warm 8/4/2019  6:00 AM   Periwound Skin Turgor soft 8/4/2019  6:00 AM   Drainage Amount none 8/4/2019  6:00 AM       Wound 07/20/19 1649 Left hand skin tear (Active)   Dressing Appearance dry;intact;dried drainage 8/4/2019  6:00 AM   Closure Surface sutures;Adhesive bandage 8/4/2019  6:00 AM   Base scab;dry 8/4/2019  6:00 AM   Periwound pink 8/4/2019  6:00 AM   Periwound Temperature warm 8/4/2019  6:00 AM   Periwound Skin Turgor soft 8/4/2019  6:00 AM   Drainage Amount none 8/4/2019  6:00 AM       Wound 07/23/19 2130 Right anterior toe pressure injury (Active)   Dressing Appearance open to air 8/4/2019  2:00 AM   Closure Open to air 8/4/2019  2:00 AM   Base red;blanchable 8/4/2019  6:00 AM   Periwound intact;pink;dry 8/4/2019  6:00 AM   Periwound Temperature warm 8/4/2019  6:00 AM   Periwound Skin Turgor soft 8/4/2019  6:00 AM   Drainage Amount none 8/4/2019  6:00 AM           Physical Therapy Education     Title: PT OT SLP Therapies (In Progress)     Topic: Physical Therapy (Done)     Point: Mobility training (Done)     Learning Progress Summary           Patient Acceptance, E,TB,D, VU,NR by CS at 8/4/2019 10:20 AM    Comment:  Educated patient on safety with sit<> stand and ambulation. Educated  patient to increase stride length with steps.    Acceptance, E,TB, VU,DU by CS at 8/3/2019  4:41 PM    Comment:  Educated patient on transfer technique using sheet to sit patient up in bed. Educated patient and family on sling.    Acceptance, E, VU,NR by CD at 8/2/2019  3:49 PM    Comment:  SEE FLOW SHEET. SON INSTRUCTED IN LE THER EX, POSITIONING FOR COMFORT.,    Eager, E, VU by KM at 8/1/2019 10:20 AM    Eager, E,D, VU,NR by AA at 7/31/2019  2:46 PM    Eager, E, NR by AA at 7/30/2019  3:17 PM    Acceptance, E, VU,NR by JUSTIN at 7/29/2019 11:04 AM    Acceptance, E, VU,NR by CD at 7/25/2019  4:42 PM    Comment:  DTR INSTRUCTED IN ROM EXERCISES, POSITIONING FOR COMFORT/ PROTECTION OF R UE, BENEFITS OF OOB ACTIVITY, D/C PLANNING,    Acceptance, E, VU,NR by CD at 7/24/2019 10:36 AM    Comment:  SEE FLOWSHEET.   Family Acceptance, E,TB,D, VU,NR by CS at 8/4/2019 10:20 AM    Comment:  Educated patient on safety with sit<> stand and ambulation. Educated patient to increase stride length with steps.    Acceptance, E,TB, VU,DU by CS at 8/3/2019  4:41 PM    Comment:  Educated patient on transfer technique using sheet to sit patient up in bed. Educated patient and family on sling.    Acceptance, E, VU,NR by CD at 8/2/2019  3:49 PM    Comment:  SEE FLOW SHEET. SON INSTRUCTED IN LE THER EX, POSITIONING FOR COMFORT.,    Eager, E,D, VU,NR by AA at 7/31/2019  2:46 PM    Acceptance, E, VU,NR by CD at 7/25/2019  4:42 PM    Comment:  DTR INSTRUCTED IN ROM EXERCISES, POSITIONING FOR COMFORT/ PROTECTION OF R UE, BENEFITS OF OOB ACTIVITY, D/C PLANNING,                   Point: Home exercise program (Done)     Learning Progress Summary           Patient Acceptance, E,TB,D, VU,NR by CS at 8/4/2019 10:20 AM    Comment:  Educated patient on safety with sit<> stand and ambulation. Educated patient to increase stride length with steps.    Acceptance, E,TB, VU,DU by CS at 8/3/2019  4:41 PM    Comment:  Educated patient on transfer technique using  sheet to sit patient up in bed. Educated patient and family on sling.    Acceptance, E, VU,NR by CD at 8/2/2019  3:49 PM    Comment:  SEE FLOW SHEET. SON INSTRUCTED IN LE THER EX, POSITIONING FOR COMFORT.,    Eager, E, VU by KM at 8/1/2019 10:20 AM    Acceptance, E, VU,NR by JUSTIN at 7/29/2019 11:04 AM    Acceptance, E, VU,NR by CD at 7/25/2019  4:42 PM    Comment:  DTR INSTRUCTED IN ROM EXERCISES, POSITIONING FOR COMFORT/ PROTECTION OF R UE, BENEFITS OF OOB ACTIVITY, D/C PLANNING,    Acceptance, E, VU,NR by CD at 7/24/2019 10:36 AM    Comment:  SEE FLOWSHEET.   Family Acceptance, E,TB,D, VU,NR by CS at 8/4/2019 10:20 AM    Comment:  Educated patient on safety with sit<> stand and ambulation. Educated patient to increase stride length with steps.    Acceptance, E,TB, VU,DU by CS at 8/3/2019  4:41 PM    Comment:  Educated patient on transfer technique using sheet to sit patient up in bed. Educated patient and family on sling.    Acceptance, E, VU,NR by CD at 8/2/2019  3:49 PM    Comment:  SEE FLOW SHEET. SON INSTRUCTED IN LE THER EX, POSITIONING FOR COMFORT.,    Acceptance, E, VU,NR by CD at 7/25/2019  4:42 PM    Comment:  DTR INSTRUCTED IN ROM EXERCISES, POSITIONING FOR COMFORT/ PROTECTION OF R UE, BENEFITS OF OOB ACTIVITY, D/C PLANNING,                   Point: Body mechanics (Done)     Learning Progress Summary           Patient Acceptance, E,TB,D, VU,NR by CS at 8/4/2019 10:20 AM    Comment:  Educated patient on safety with sit<> stand and ambulation. Educated patient to increase stride length with steps.    Acceptance, E,TB, VU,DU by CS at 8/3/2019  4:41 PM    Comment:  Educated patient on transfer technique using sheet to sit patient up in bed. Educated patient and family on sling.    Acceptance, E, VU,NR by CD at 8/2/2019  3:49 PM    Comment:  SEE FLOW SHEET. SON INSTRUCTED IN LE THER EX, POSITIONING FOR COMFORT.,    Eager, E, VU by KM at 8/1/2019 10:20 AM    Eager, E,D, VU,NR by AA at 7/31/2019  2:46 PM     Eager, E, NR by AA at 7/30/2019  3:17 PM    Acceptance, E, VU,NR by JUSTIN at 7/29/2019 11:04 AM    Acceptance, E, VU,NR by CD at 7/25/2019  4:42 PM    Comment:  DTR INSTRUCTED IN ROM EXERCISES, POSITIONING FOR COMFORT/ PROTECTION OF R UE, BENEFITS OF OOB ACTIVITY, D/C PLANNING,    Acceptance, E, VU,NR by CD at 7/24/2019 10:36 AM    Comment:  SEE FLOWSHEET.   Family Acceptance, E,TB,D, VU,NR by CS at 8/4/2019 10:20 AM    Comment:  Educated patient on safety with sit<> stand and ambulation. Educated patient to increase stride length with steps.    Acceptance, E,TB, VU,DU by CS at 8/3/2019  4:41 PM    Comment:  Educated patient on transfer technique using sheet to sit patient up in bed. Educated patient and family on sling.    Acceptance, E, VU,NR by CD at 8/2/2019  3:49 PM    Comment:  SEE FLOW SHEET. SON INSTRUCTED IN LE THER EX, POSITIONING FOR COMFORT.,    Eager, E,D, VU,NR by AA at 7/31/2019  2:46 PM    Acceptance, E, VU,NR by CD at 7/25/2019  4:42 PM    Comment:  DTR INSTRUCTED IN ROM EXERCISES, POSITIONING FOR COMFORT/ PROTECTION OF R UE, BENEFITS OF OOB ACTIVITY, D/C PLANNING,                   Point: Precautions (Done)     Learning Progress Summary           Patient Acceptance, E,TB,D, VU,NR by CS at 8/4/2019 10:20 AM    Comment:  Educated patient on safety with sit<> stand and ambulation. Educated patient to increase stride length with steps.    Acceptance, E,TB, VU,DU by CS at 8/3/2019  4:41 PM    Comment:  Educated patient on transfer technique using sheet to sit patient up in bed. Educated patient and family on sling.    Acceptance, E, VU,NR by CD at 8/2/2019  3:49 PM    Comment:  SEE FLOW SHEET. SON INSTRUCTED IN LE THER EX, POSITIONING FOR COMFORT.,    Eager, E, VU by TANK at 8/1/2019 10:20 AM    Eager, E,D, VU,NR by AA at 7/31/2019  2:46 PM    Eager, E, NR by AA at 7/30/2019  3:17 PM    Acceptance, MARITZA OLIVAS NR by JUSTIN at 7/29/2019 11:04 AM    AcceptanceDEISY VU, NR by NOBLE at 7/25/2019  4:42 PM    Comment:  FABI  INSTRUCTED IN ROM EXERCISES, POSITIONING FOR COMFORT/ PROTECTION OF R UE, BENEFITS OF OOB ACTIVITY, D/C PLANNING,    Acceptance, E, VU,NR by CD at 7/24/2019 10:36 AM    Comment:  SEE FLOWSHEET.   Family Acceptance, E,TB,D, VU,NR by CS at 8/4/2019 10:20 AM    Comment:  Educated patient on safety with sit<> stand and ambulation. Educated patient to increase stride length with steps.    Acceptance, E,TB, VU,DU by CS at 8/3/2019  4:41 PM    Comment:  Educated patient on transfer technique using sheet to sit patient up in bed. Educated patient and family on sling.    Acceptance, E, VU,NR by CD at 8/2/2019  3:49 PM    Comment:  SEE FLOW SHEET. SON INSTRUCTED IN LE THER EX, POSITIONING FOR COMFORT.,    Eager, E,D, VU,NR by AA at 7/31/2019  2:46 PM    Acceptance, E, VU,NR by CD at 7/25/2019  4:42 PM    Comment:  DTR INSTRUCTED IN ROM EXERCISES, POSITIONING FOR COMFORT/ PROTECTION OF R UE, BENEFITS OF OOB ACTIVITY, D/C PLANNING,                               User Key     Initials Effective Dates Name Provider Type Discipline    CD 06/19/15 -  Gogo Thibodeaux, PT Physical Therapist PT    EJ 11/20/18 -  Nurys Chahal, PT Physical Therapist PT    KM 06/19/15 -  Denise Hdz, PT Physical Therapist PT    AA 04/02/18 -  Mere Ramon, PT Physical Therapist PT     03/26/19 -  Roz Phoenix, PT Physical Therapist PT                PT Recommendation and Plan     Outcome Summary/Treatment Plan (PT)  Daily Summary of Progress (PT): progress toward functional goals as expected  Plan of Care Reviewed With: patient, family  Progress: improving  Outcome Summary: Pt ambulated 180' with min assist x 1 for balance and support of L UE. R UE in sling. Son followed with chair for ambulation. Patient rested after ambulating 90' and then able to ambulate another 90'. Peripheral nerve catheter was removed. Pt was CGA for standing and min assist for sitting in chair. Discussed with pt's sons about ambulating patient to  bathroom on their own, told them to call nursing for the first time for assistance and based on patient's progress can do on their own. Discussed with nursing as well, they will assist first time to asses ability.   Outcome Measures     Row Name 08/04/19 1010 08/03/19 1610 08/02/19 1508       How much help from another person do you currently need...    Turning from your back to your side while in flat bed without using bedrails?  2  -CS  2  -CS  2  -CD    Moving from lying on back to sitting on the side of a flat bed without bedrails?  2  -CS  2  -CS  2  -CD    Moving to and from a bed to a chair (including a wheelchair)?  3  -CS  2  -CS  2  -CD    Standing up from a chair using your arms (e.g., wheelchair, bedside chair)?  3  -CS  2  -CS  2  -CD    Climbing 3-5 steps with a railing?  1  -CS  1  -CS  2  -CD    To walk in hospital room?  3  -CS  3  -CS  2  -CD    AM-PAC 6 Clicks Score (PT)  14  -CS  12  -CS  12  -CD       Functional Assessment    Outcome Measure Options  AM-PAC 6 Clicks Basic Mobility (PT)  -CS  AM-PAC 6 Clicks Basic Mobility (PT)  -CS  AM-PAC 6 Clicks Basic Mobility (PT)  -CD    Row Name 08/01/19 1440             How much help from another is currently needed...    Putting on and taking off regular lower body clothing?  1  -AN      Bathing (including washing, rinsing, and drying)  2  -AN      Toileting (which includes using toilet bed pan or urinal)  1  -AN      Putting on and taking off regular upper body clothing  1  -AN      Taking care of personal grooming (such as brushing teeth)  2  -AN      Eating meals  2  -AN      AM-PAC 6 Clicks Score (OT)  9  -AN        User Key  (r) = Recorded By, (t) = Taken By, (c) = Cosigned By    Initials Name Provider Type    CD Gogo Thibodeaux, PT Physical Therapist    Bibiana Barrera, OT Occupational Therapist    Roz Engel, PT Physical Therapist         Time Calculation:   PT Charges     Row Name 08/04/19 1020 08/04/19 1010          Time Calculation     Start Time  --  1010  -CS     PT Received On  --  08/04/19  -CS        Time Calculation- PT    Total Timed Code Minutes- PT  --  46 minute(s)  -CS        Timed Charges    74179 - PT Therapeutic Exercise Minutes  15  -CS  --     94781 - Gait Training Minutes   23  -CS  --       User Key  (r) = Recorded By, (t) = Taken By, (c) = Cosigned By    Initials Name Provider Type    CS Roz Phoenix, CIRA Physical Therapist        Therapy Charges for Today     Code Description Service Date Service Provider Modifiers Qty    57559625928 HC GAIT TRAINING EA 15 MIN 8/3/2019 Roz Phoenix, PT GP 1    24281320803 HC PT THER PROC EA 15 MIN 8/4/2019 Roz Phoenix, PT GP 1    03781498119 HC GAIT TRAINING EA 15 MIN 8/4/2019 Roz Phoenix, PT GP 2          PT G-Codes  Outcome Measure Options: AM-PAC 6 Clicks Basic Mobility (PT)  AM-PAC 6 Clicks Score (PT): 14  AM-PAC 6 Clicks Score (OT): 9  Modified [place] Scale: 4 - Moderately severe disability.  Unable to walk without assistance, and unable to attend to own bodily needs without assistance.    Roz Phoenix PT  8/4/2019

## 2019-08-04 NOTE — PLAN OF CARE
Problem: Patient Care Overview  Goal: Plan of Care Review  Outcome: Ongoing (interventions implemented as appropriate)  V/S systolic varied in the 90's, dystolic in the 50's. Patient asymptomatic of complications. Dr. Colbert notified. Lisinopril and Amlodipine discontinued, fluids encouraged, will continue to monitor.New order Namenda per MAR. Nerve block discontinued, peripheral vascular checks accordingly; no complications noted. Patient ambulated in the hallway today with therapy, well tolerated. Will continue to monitor.

## 2019-08-04 NOTE — PLAN OF CARE
Problem: Patient Care Overview  Goal: Plan of Care Review  Outcome: Ongoing (interventions implemented as appropriate)   08/04/19 1512   Coping/Psychosocial   Plan of Care Reviewed With patient;son   SLP caregiver instruction completed. Will continue to address dysphagia and cognitive-communication. Please see note for further details and recommendations.

## 2019-08-04 NOTE — PLAN OF CARE
Problem: Patient Care Overview  Goal: Interprofessional Rounds/Family Conf  Outcome: Ongoing (interventions implemented as appropriate)   08/04/19 1130   Interdisciplinary Rounds/Family Conf   Summary Patient sitting up in chair, pain mild, no need for pain med or adjustment, nerve catheter removed today. Patient and son both state he walked in [place]. Looking for rehab and then home. Palliative to continue to follow for support.   Participants nursing

## 2019-08-04 NOTE — PLAN OF CARE
Problem: Patient Care Overview  Goal: Plan of Care Review  Outcome: Ongoing (interventions implemented as appropriate)   08/04/19 1010   Coping/Psychosocial   Plan of Care Reviewed With patient;family   Plan of Care Review   Progress improving   OTHER   Outcome Summary Pt ambulated 180' with min assist x 1 for balance and support of L UE. R UE in sling. Son followed with chair for ambulation. Patient rested after ambulating 90' and then able to ambulate another 90'. Peripheral nerve catheter was removed. Pt was CGA for standing and min assist for sitting in chair. Discussed with pt's sons about ambulating patient to bathroom on their own, told them to call nursing for the first time for assistance and based on patient's progress can do on their own. Discussed with nursing as well, they will assist first time to asses ability.

## 2019-08-05 NOTE — THERAPY TREATMENT NOTE
Acute Care - Physical Therapy Progress Note  [facility]     Patient Name: Vargas De  : 1940  MRN: [id number]  Today's Date: 2019  Onset of Illness/Injury or Date of Surgery: 19  Date of Referral to PT: 19       Admit Date: 2019    Visit Dx:    ICD-10-CM ICD-9-CM   1. Acute alteration in mental status R41.82 780.97   2. Generalized weakness R53.1 780.79   3. Other closed displaced fracture of proximal end of right humerus with routine healing, subsequent encounter S42.291D V54.11   4. Elevated blood pressure reading with diagnosis of hypertension I10 401.9   5. Impaired functional mobility, balance, gait, and endurance Z74.09 V49.89   6. Impaired mobility and ADLs Z74.09 799.89   7. Cognitive communication deficit R41.841 799.52   8. Oropharyngeal dysphagia R13.12 787.22     Patient Active Problem List   Diagnosis   • Peripheral neuropathy   • Essential hypertension   • Crohn's colitis, with rectal bleeding (CMS/HCC)   • Major depressive disorder with single episode, in partial remission (CMS/HCC)   • Macular degeneration of both eyes   • GERD without esophagitis   • Cervicalgia   • Spinal stenosis in cervical region   • Chronic right-sided low back pain without sciatica   • Osteoarthritis of toe joint, right   • PFO (patent foramen ovale)   • Ulcerative colitis (CMS/HCC)   • Body mass index (BMI) of 25.0 to 29.9   • History of stroke with residual deficit   • Mixed hyperlipidemia   • Multiple lacunar infarcts   • Paroxysmal atrial fibrillation (CMS/HCC)   • Chronic bilateral low back pain   • Acute alteration in mental status   • Fracture of proximal end of right humerus   • Acute respiratory distress   • Lewy body dementia       Therapy Treatment    Rehabilitation Treatment Summary     Row Name 19 1014             Treatment Time/Intention    Discipline  physical therapist  -EJ      Document Type  therapy note (daily note)  -EJ      Mode of Treatment  physical therapy  -EJ       Patient Effort  good  -EJ      Existing Precautions/Restrictions  fall RUE NWB Sling 2/2 humerus fx  -EJ      Recorded by [EJ] Nurys Chahal, PT 08/05/19 1149      Row Name 08/05/19 1014             Cognitive Assessment/Intervention- PT/OT    Orientation Status (Cognition)  oriented x 3  -EJ      Follows Commands (Cognition)  follows one step commands;over 90% accuracy  -EJ      Cognitive Function (Cognitive)  safety deficit  -EJ      Safety Deficit (Cognitive)  safety precautions follow-through/compliance;insight into deficits/self awareness  -EJ      Personal Safety Interventions  fall prevention program maintained;gait belt;nonskid shoes/slippers when out of bed  -EJ      Recorded by [EJ] Nurys Chahal, PT 08/05/19 1149      Row Name 08/05/19 1014             Safety Issues, Functional Mobility    Impairments Affecting Function (Mobility)  balance;coordination;endurance/activity tolerance;pain  -EJ      Recorded by [EJ] Nurys Chahal, PT 08/05/19 1149      Row Name 08/05/19 1014             Bed Mobility Assessment/Treatment    Supine-Sit [place] (Bed Mobility)  set up;contact guard  -EJ      Assistive Device (Bed Mobility)  bed rails;head of bed elevated  -EJ      Recorded by [EJ] Nurys Chahal, PT 08/05/19 1149      Row Name 08/05/19 1014             Transfer Assessment/Treatment    Transfer Assessment/Treatment  sit-stand transfer;stand-sit transfer  -EJ      Recorded by [EJ] Nurys Chahal, PT 08/05/19 1149      Row Name 08/05/19 1014             Sit-Stand Transfer    Sit-Stand [place] (Transfers)  contact guard  -EJ      Assistive Device (Sit-Stand Transfers)  -- R UE in sling and push off with L UE  -EJ      Recorded by [EJ] Nurys Chahal, PT 08/05/19 1149      Row Name 08/05/19 1014             Stand-Sit Transfer    Stand-Sit [place] (Transfers)  minimum assist (75% patient effort);1 person assist cuing to reach back for chair with L UE  -EJ      Recorded by  [EJ] Nurys Chahal, PT 08/05/19 1149      Row Name 08/05/19 1014             Gait/Stairs Assessment/Training    73261 - Gait Training Minutes   15  -EJ      Gait/Stairs Assessment/Training  gait/ambulation independence;distance ambulated  -EJ      [place] Level (Gait)  minimum assist (75% patient effort);1 person assist;1 person to manage equipment Son performed chair follow   -EJ      Assistive Device (Gait)  -- LUE support  -EJ      Distance in Feet (Gait)  150', sitting rest due to dizziness, second 150' bout.   -EJ      Pattern (Gait)  step-through  -EJ      Deviations/Abnormal Patterns (Gait)  stride length decreased;huber decreased Cuing to increase stride length  -EJ      Bilateral Gait Deviations  forward flexed posture;heel strike decreased  -EJ      Comment (Gait/Stairs)  followed with chair. Pt cued for upright posture.  -EJ      Recorded by [EJ] Nurys Chahal, PT 08/05/19 1149      Row Name 08/05/19 1014             Therapeutic Exercise    07158 - PT Therapeutic Exercise Minutes  1  -EJ      84574 - PT Therapeutic Activity Minutes  7  -EJ      Recorded by [EJ] Nurys Chahal, PT 08/05/19 1149      Row Name 08/05/19 1014             Therapeutic Exercise    Lower Extremity (Therapeutic Exercise)  SLR (straight leg raise), bilateral  -EJ      Sets/Reps (Therapeutic Exercise)  2  -EJ      Comment (Therapeutic Exercise)  Pt and son agreeable to continue after tx.  -EJ      Recorded by [EJ] Nurys Chahal, PT 08/05/19 1149      Row Name 08/05/19 1014             Positioning and Restraints    Pre-Treatment Position  in bed  -EJ      Post Treatment Position  chair  -EJ      In Chair  notified nsg;reclined;call light within reach;encouraged to call for assist;with family/caregiver;exit alarm on;RUE elevated  -EJ      Recorded by [EJ] Nurys Chahal, PT 08/05/19 1149      Row Name 08/05/19 1014             Pain Scale: Numbers Pre/Post-Treatment    Pain Scale: Numbers, Pretreatment   2/10  -EJ      Pain Scale: Numbers, Post-Treatment  3/10  -EJ      Pain Location - Side  Right  -EJ      Pain Location - Orientation  upper  -EJ      Pain Location  extremity  -EJ      Pain Intervention(s)  Repositioned;Ambulation/increased activity  -EJ      Recorded by [EJ] Nurys Chahal, PT 08/05/19 1149      Row Name                Wound 07/23/19 2130 Right anterior toe pressure injury    Wound - Properties Group Date first assessed: 07/23/19 [DB] Time first assessed: 2130 [DB] Present On Admission : yes [DB] Side: Right [DB] Orientation: anterior [DB] Location: toe [DB] Type: pressure injury [DB] Stage, Pressure Injury: Stage 1 [DB] Recorded by:  [DB] Henok Marie RN 07/24/19 0229    Row Name                Wound 07/20/19 1649 Left hand skin tear    Wound - Properties Group Date first assessed: 07/20/19 [SW] Time first assessed: 1649 [SW] Present On Admission : yes [SW] Side: Left [SW] Location: hand [SW] Type: skin tear [SW] Recorded by:  [SW] Odilia Joy RN 07/20/19 1649    Row Name                Wound 07/20/19 1648 Right knee abrasion    Wound - Properties Group Date first assessed: 07/20/19 [SW] Time first assessed: 1648 [SW] Present On Admission : yes [SW] Side: Right [SW] Location: knee [SW] Type: abrasion [SW] Recorded by:  [SW] Odilia Joy RN 07/20/19 1648    Row Name 08/05/19 1014             Coping    Observed Emotional State  accepting;calm;cooperative  -EJ      Verbalized Emotional State  acceptance  -EJ      Recorded by [EJ] Nurys Chahal, PT 08/05/19 1149      Row Name 08/05/19 1014             Plan of Care Review    Plan of Care Reviewed With  patient;son  -EJ      Recorded by [EJ] Nurys Chahal, PT 08/05/19 1149      Row Name 08/05/19 1014             Outcome Summary/Treatment Plan (PT)    Daily Summary of Progress (PT)  progress toward functional goals is good  -EJ      Anticipated Discharge Disposition (PT)  inpatient rehabilitation facility  -EJ       Recorded by [EJ] Nurys Chahal, PT 08/05/19 1149        User Key  (r) = Recorded By, (t) = Taken By, (c) = Cosigned By    Initials Name Effective Dates Discipline    EJ Nurys Chahal, PT 11/20/18 -  PT    DB Henok Marie, RN 06/16/16 -  Nurse    Odilia Gordillo RN 06/16/16 -  Nurse          Wound 07/20/19 1648 Right knee abrasion (Active)   Dressing Appearance open to air 8/4/2019  8:50 PM   Closure Open to air 8/4/2019  8:50 PM   Base scab;dry;purple 8/4/2019  8:50 PM   Periwound intact 8/4/2019  8:50 PM   Periwound Temperature warm 8/4/2019  8:50 PM   Periwound Skin Turgor soft 8/4/2019  8:50 PM   Drainage Amount none 8/4/2019  8:50 PM   Dressing Care, Wound open to air 8/4/2019  8:50 PM   Periwound Care, Wound dry periwound area maintained 8/4/2019  8:50 PM       Wound 07/20/19 1649 Left hand skin tear (Active)   Dressing Appearance dry;intact;dried drainage 8/4/2019  8:50 PM   Closure Surface sutures 8/4/2019  8:50 PM   Base scab;dry 8/4/2019  8:50 PM   Periwound pink 8/4/2019  8:50 PM   Periwound Temperature warm 8/4/2019  8:50 PM   Periwound Skin Turgor soft 8/4/2019  8:50 PM   Drainage Amount none 8/4/2019  8:50 PM   Dressing Care, Wound dressing reinforced 8/4/2019  8:50 PM   Periwound Care, Wound dry periwound area maintained 8/4/2019  8:50 PM       Wound 07/23/19 2130 Right anterior toe pressure injury (Active)   Dressing Appearance open to air 8/4/2019  8:50 PM   Closure Open to air 8/4/2019  8:50 PM   Base red;blanchable;other (see comments) 8/4/2019  8:50 PM   Red (%), Wound Tissue Color 100 8/4/2019  8:50 PM   Periwound intact;pink;dry 8/4/2019  8:50 PM   Periwound Temperature warm 8/4/2019  8:50 PM   Periwound Skin Turgor soft 8/4/2019  8:50 PM   Drainage Amount none 8/4/2019  8:50 PM   Dressing Care, Wound open to air 8/4/2019  8:50 PM   Periwound Care, Wound dry periwound area maintained 8/4/2019  8:50 PM       Rehab Goal Summary     Row Name 08/05/19 1014             Physical  Therapy Goals    Bed Mobility Goal Selection (PT)  bed mobility, PT goal 1  -EJ      Transfer Goal Selection (PT)  transfer, PT goal 1  -EJ      Gait Training Goal Selection (PT)  gait training, PT goal 1  -EJ         Bed Mobility Goal 1 (PT)    Activity/Assistive Device (Bed Mobility Goal 1, PT)  bed mobility activities, all  -EJ      [place] Level/Cues Needed (Bed Mobility Goal 1, PT)  minimum assist (75% or more patient effort)  -EJ      Time Frame (Bed Mobility Goal 1, PT)  long term goal (LTG);2 weeks  -EJ         Transfer Goal 1 (PT)    Activity/Assistive Device (Transfer Goal 1, PT)  sit-to-stand/stand-to-sit;bed-to-chair/chair-to-bed;cane, quad  -EJ      [place] Level/Cues Needed (Transfer Goal 1, PT)  conditional independence  -EJ      Time Frame (Transfer Goal 1, PT)  long term goal (LTG);2 weeks  -EJ      Progress/Outcome (Transfer Goal 1, PT)  goal revised this date  -EJ         Gait Training Goal 1 (PT)    Activity/Assistive Device (Gait Training Goal 1, PT)  gait (walking locomotion);assistive device use  -EJ      [place] Level (Gait Training Goal 1, PT)  minimum assist (75% or more patient effort)  -EJ      Distance (Gait Goal 1, PT)  400  -EJ      Time Frame (Gait Training Goal 1, PT)  long term goal (LTG);2 weeks  -EJ      Progress/Outcome (Gait Training Goal 1, PT)  progress slower than expected;continuing progress toward goal  -EJ        User Key  (r) = Recorded By, (t) = Taken By, (c) = Cosigned By    Initials Name Provider Type Discipline    Nurys Ny PT Physical Therapist PT          Physical Therapy Education     Title: PT OT SLP Therapies (In Progress)     Topic: Physical Therapy (Done)     Point: Mobility training (Done)     Learning Progress Summary           Patient Acceptance, DEISY, MARITZA,NR by JUSTIN at 8/5/2019 10:14 AM    AcceptanceDEISY,RENEA,TUSHAR, MARITZA,NR by MICKEY at 8/4/2019 10:20 AM    Comment:  Educated patient on safety with sit<> stand and ambulation. Educated patient to  increase stride length with steps.    Acceptance, E,TB, VU,DU by CS at 8/3/2019  4:41 PM    Comment:  Educated patient on transfer technique using sheet to sit patient up in bed. Educated patient and family on sling.    Acceptance, E, VU,NR by CD at 8/2/2019  3:49 PM    Comment:  SEE FLOW SHEET. SON INSTRUCTED IN LE THER EX, POSITIONING FOR COMFORT.,    Eager, E, VU by TANK at 8/1/2019 10:20 AM    Eager, E,D, VU,NR by AA at 7/31/2019  2:46 PM    Eager, E, NR by AA at 7/30/2019  3:17 PM    Acceptance, E, VU,NR by JUSTIN at 7/29/2019 11:04 AM    Acceptance, E, VU,NR by CD at 7/25/2019  4:42 PM    Comment:  DTR INSTRUCTED IN ROM EXERCISES, POSITIONING FOR COMFORT/ PROTECTION OF R UE, BENEFITS OF OOB ACTIVITY, D/C PLANNING,    Acceptance, E, VU,NR by CD at 7/24/2019 10:36 AM    Comment:  SEE FLOWSHEET.   Family Acceptance, E,TB,D, VU,NR by CS at 8/4/2019 10:20 AM    Comment:  Educated patient on safety with sit<> stand and ambulation. Educated patient to increase stride length with steps.    Acceptance, E,TB, VU,DU by CS at 8/3/2019  4:41 PM    Comment:  Educated patient on transfer technique using sheet to sit patient up in bed. Educated patient and family on sling.    Acceptance, E, VU,NR by CD at 8/2/2019  3:49 PM    Comment:  SEE FLOW SHEET. SON INSTRUCTED IN LE THER EX, POSITIONING FOR COMFORT.,    Eager, E,D, VU,NR by AA at 7/31/2019  2:46 PM    Acceptance, E, VU,NR by CD at 7/25/2019  4:42 PM    Comment:  DTR INSTRUCTED IN ROM EXERCISES, POSITIONING FOR COMFORT/ PROTECTION OF R UE, BENEFITS OF OOB ACTIVITY, D/C PLANNING,                   Point: Home exercise program (Done)     Learning Progress Summary           Patient Acceptance, E, VU,NR by JUSTIN at 8/5/2019 10:14 AM    Acceptance, E,TB,D, VU,NR by CS at 8/4/2019 10:20 AM    Comment:  Educated patient on safety with sit<> stand and ambulation. Educated patient to increase stride length with steps.    Acceptance, E,TB, VU,DU by CS at 8/3/2019  4:41 PM    Comment:   Educated patient on transfer technique using sheet to sit patient up in bed. Educated patient and family on sling.    Acceptance, E, VU,NR by CD at 8/2/2019  3:49 PM    Comment:  SEE FLOW SHEET. SON INSTRUCTED IN LE THER EX, POSITIONING FOR COMFORT.,    Eager, E, VU by TANK at 8/1/2019 10:20 AM    Acceptance, E, VU,NR by EJ at 7/29/2019 11:04 AM    Acceptance, E, VU,NR by CD at 7/25/2019  4:42 PM    Comment:  DTR INSTRUCTED IN ROM EXERCISES, POSITIONING FOR COMFORT/ PROTECTION OF R UE, BENEFITS OF OOB ACTIVITY, D/C PLANNING,    Acceptance, E, VU,NR by CD at 7/24/2019 10:36 AM    Comment:  SEE FLOWSHEET.   Family Acceptance, E,TB,D, VU,NR by CS at 8/4/2019 10:20 AM    Comment:  Educated patient on safety with sit<> stand and ambulation. Educated patient to increase stride length with steps.    Acceptance, E,TB, VU,DU by CS at 8/3/2019  4:41 PM    Comment:  Educated patient on transfer technique using sheet to sit patient up in bed. Educated patient and family on sling.    Acceptance, E, VU,NR by CD at 8/2/2019  3:49 PM    Comment:  SEE FLOW SHEET. SON INSTRUCTED IN LE THER EX, POSITIONING FOR COMFORT.,    Acceptance, E, VU,NR by CD at 7/25/2019  4:42 PM    Comment:  DTR INSTRUCTED IN ROM EXERCISES, POSITIONING FOR COMFORT/ PROTECTION OF R UE, BENEFITS OF OOB ACTIVITY, D/C PLANNING,                   Point: Body mechanics (Done)     Learning Progress Summary           Patient Acceptance, E, VU,NR by JUSTIN at 8/5/2019 10:14 AM    Acceptance, E,TB,D, VU,NR by CS at 8/4/2019 10:20 AM    Comment:  Educated patient on safety with sit<> stand and ambulation. Educated patient to increase stride length with steps.    Acceptance, E,TB, VU,DU by CS at 8/3/2019  4:41 PM    Comment:  Educated patient on transfer technique using sheet to sit patient up in bed. Educated patient and family on sling.    Acceptance, E, VU,NR by CD at 8/2/2019  3:49 PM    Comment:  SEE FLOW SHEET. SON INSTRUCTED IN LE THER EX, POSITIONING FOR COMFORT.,     Eager, E, VU by TANK at 8/1/2019 10:20 AM    Eager, E,D, VU,NR by BRANDON at 7/31/2019  2:46 PM    Eager, E, NR by BRANDON at 7/30/2019  3:17 PM    Acceptance, E, VU,NR by JUSTIN at 7/29/2019 11:04 AM    Acceptance, E, VU,NR by CD at 7/25/2019  4:42 PM    Comment:  DTR INSTRUCTED IN ROM EXERCISES, POSITIONING FOR COMFORT/ PROTECTION OF R UE, BENEFITS OF OOB ACTIVITY, D/C PLANNING,    Acceptance, E, VU,NR by CD at 7/24/2019 10:36 AM    Comment:  SEE FLOWSHEET.   Family Acceptance, E,TB,D, VU,NR by CS at 8/4/2019 10:20 AM    Comment:  Educated patient on safety with sit<> stand and ambulation. Educated patient to increase stride length with steps.    Acceptance, E,TB, VU,DU by CS at 8/3/2019  4:41 PM    Comment:  Educated patient on transfer technique using sheet to sit patient up in bed. Educated patient and family on sling.    Acceptance, E, VU,NR by CD at 8/2/2019  3:49 PM    Comment:  SEE FLOW SHEET. SON INSTRUCTED IN LE THER EX, POSITIONING FOR COMFORT.,    Eager, E,D, VU,NR by BRANDON at 7/31/2019  2:46 PM    Acceptance, E, VU,NR by NOBLE at 7/25/2019  4:42 PM    Comment:  DTR INSTRUCTED IN ROM EXERCISES, POSITIONING FOR COMFORT/ PROTECTION OF R UE, BENEFITS OF OOB ACTIVITY, D/C PLANNING,                   Point: Precautions (Done)     Learning Progress Summary           Patient Acceptance, E, VU,NR by JUSTIN at 8/5/2019 10:14 AM    Acceptance, E,TB,D, VU,NR by MICKEY at 8/4/2019 10:20 AM    Comment:  Educated patient on safety with sit<> stand and ambulation. Educated patient to increase stride length with steps.    Acceptance, E,TB, VU,DU by MICKEY at 8/3/2019  4:41 PM    Comment:  Educated patient on transfer technique using sheet to sit patient up in bed. Educated patient and family on sling.    Acceptance, E, VU,NR by CD at 8/2/2019  3:49 PM    Comment:  SEE FLOW SHEET. SON INSTRUCTED IN LE THER EX, POSITIONING FOR COMFORT.,    DEISY Weaver VU by TANK at 8/1/2019 10:20 AM    DEISY Weaver D, VU,NR by AA at 7/31/2019  2:46 PM    DEISY Weaver NR by BRANDON at  7/30/2019  3:17 PM    Acceptance, E, VU,NR by JUSTIN at 7/29/2019 11:04 AM    Acceptance, E, VU,NR by CD at 7/25/2019  4:42 PM    Comment:  DTR INSTRUCTED IN ROM EXERCISES, POSITIONING FOR COMFORT/ PROTECTION OF R UE, BENEFITS OF OOB ACTIVITY, D/C PLANNING,    Acceptance, E, VU,NR by CD at 7/24/2019 10:36 AM    Comment:  SEE FLOWSHEET.   Family Acceptance, E,TB,D, VU,NR by CS at 8/4/2019 10:20 AM    Comment:  Educated patient on safety with sit<> stand and ambulation. Educated patient to increase stride length with steps.    Acceptance, E,TB, VU,DU by MICKEY at 8/3/2019  4:41 PM    Comment:  Educated patient on transfer technique using sheet to sit patient up in bed. Educated patient and family on sling.    Acceptance, E, VU,NR by CD at 8/2/2019  3:49 PM    Comment:  SEE FLOW SHEET. SON INSTRUCTED IN LE THER EX, POSITIONING FOR COMFORT.,    Eager, E,D, VU,NR by BRANDON at 7/31/2019  2:46 PM    Acceptance, E, VU,NR by CD at 7/25/2019  4:42 PM    Comment:  DTR INSTRUCTED IN ROM EXERCISES, POSITIONING FOR COMFORT/ PROTECTION OF R UE, BENEFITS OF OOB ACTIVITY, D/C PLANNING,                               User Key     Initials Effective Dates Name Provider Type Discipline    CD 06/19/15 -  Gogo Thibodeaux, PT Physical Therapist PT    EJ 11/20/18 -  Nurys Chahal, PT Physical Therapist PT    KM 06/19/15 -  Denise Hdz, PT Physical Therapist PT    AA 04/02/18 -  Mere Ramon, PT Physical Therapist PT    MICKEY 03/26/19 -  Roz Phoenix, PT Physical Therapist PT                PT Recommendation and Plan  Anticipated Discharge Disposition (PT): inpatient rehabilitation facility  Therapy Frequency (PT Clinical Impression): daily  Outcome Summary/Treatment Plan (PT)  Daily Summary of Progress (PT): progress toward functional goals is good  Anticipated Discharge Disposition (PT): inpatient rehabilitation facility  Plan of Care Reviewed With: patient, son  Progress: improving  Outcome Summary: Pt ambulates in flower x 150 ft  x2 with sitting rest break due to dizziness. Pt c/o increased pain compared to ambulation without nerve catheter, but tolerates well.  Outcome Measures     Row Name 08/05/19 1014 08/04/19 1010 08/03/19 1610       How much help from another person do you currently need...    Turning from your back to your side while in flat bed without using bedrails?  2  -EJ  2  -CS  2  -CS    Moving from lying on back to sitting on the side of a flat bed without bedrails?  2  -EJ  2  -CS  2  -CS    Moving to and from a bed to a chair (including a wheelchair)?  3  -EJ  3  -CS  2  -CS    Standing up from a chair using your arms (e.g., wheelchair, bedside chair)?  3  -EJ  3  -CS  2  -CS    Climbing 3-5 steps with a railing?  2  -EJ  1  -CS  1  -CS    To walk in hospital room?  3  -EJ  3  -CS  3  -CS    AM-PAC 6 Clicks Score (PT)  15  -EJ  14  -CS  12  -CS       Functional Assessment    Outcome Measure Options  AM-PAC 6 Clicks Basic Mobility (PT)  -EJ  AM-PAC 6 Clicks Basic Mobility (PT)  -CS  AM-PAC 6 Clicks Basic Mobility (PT)  -CS    Row Name 08/02/19 1508             How much help from another person do you currently need...    Turning from your back to your side while in flat bed without using bedrails?  2  -CD      Moving from lying on back to sitting on the side of a flat bed without bedrails?  2  -CD      Moving to and from a bed to a chair (including a wheelchair)?  2  -CD      Standing up from a chair using your arms (e.g., wheelchair, bedside chair)?  2  -CD      Climbing 3-5 steps with a railing?  2  -CD      To walk in hospital room?  2  -CD      AM-PAC 6 Clicks Score (PT)  12  -CD         Functional Assessment    Outcome Measure Options  AM-PAC 6 Clicks Basic Mobility (PT)  -CD        User Key  (r) = Recorded By, (t) = Taken By, (c) = Cosigned By    Initials Name Provider Type    CD Gogo Thibodeaux, PT Physical Therapist    EJ Nurys Chahal, PT Physical Therapist    Roz Engel PT Physical Therapist          Time Calculation:   PT Charges     Row Name 08/05/19 1014             Time Calculation    Start Time  1014  -EJ      PT Received On  08/05/19  -EJ      PT Goal Re-Cert Due Date  08/15/19  -EJ         Time Calculation- PT    Total Timed Code Minutes- PT  23 minute(s)  -EJ         Timed Charges    26562 - PT Therapeutic Exercise Minutes  1  -EJ      13791 - Gait Training Minutes   15  -EJ      67437 - PT Therapeutic Activity Minutes  7  -EJ        User Key  (r) = Recorded By, (t) = Taken By, (c) = Cosigned By    Initials Name Provider Type    EJ Nurys Chahal, PT Physical Therapist        Therapy Charges for Today     Code Description Service Date Service Provider Modifiers Qty    21480048100 HC GAIT TRAINING EA 15 MIN 8/5/2019 Nurys Chahal, PT GP 1    35427573690 HC PT THERAPEUTIC ACT EA 15 MIN 8/5/2019 Nurys Chahal, PT GP 1          PT G-Codes  Outcome Measure Options: AM-PAC 6 Clicks Basic Mobility (PT)  AM-PAC 6 Clicks Score (PT): 15  AM-PAC 6 Clicks Score (OT): 9  Modified Bee Scale: 4 - Moderately severe disability.  Unable to walk without assistance, and unable to attend to own bodily needs without assistance.    Nurys Rodriguez, PT  8/5/2019

## 2019-08-05 NOTE — PLAN OF CARE
Problem: Patient Care Overview  Goal: Plan of Care Review  Outcome: Ongoing (interventions implemented as appropriate)  A & O X 4. BP was low this a.m., Dr. Colbert notified. Will continue to monitor. Patient continues to express pain in RUE, not completely relieved with ice therapy and Diclofenac.OT encourages continued use of ice to relieve swelling. Ambulated in flower today, tolerated well. Family at bedside, will continue to monitor.

## 2019-08-05 NOTE — PLAN OF CARE
Problem: Patient Care Overview  Goal: Plan of Care Review  Outcome: Ongoing (interventions implemented as appropriate)   08/05/19 0598   Coping/Psychosocial   Plan of Care Reviewed With patient;son   Plan of Care Review   Progress improving   OTHER   Outcome Summary A & O x 4, NSR, RA, using incentive spirometer up to 1000, c/o pain in R shoulder that radiaties down the arm 3-4/10 that is not well controlled with current pain regimen of scheduled Tylenol, ice pack and repositioning was used as nonpharmacologic interventions, asked for Melatonin to help him rest, refused SCDs and offloadfing boots, will continue to monitor       Problem: Fall Risk (Adult)  Goal: Identify Related Risk Factors and Signs and Symptoms  Outcome: Ongoing (interventions implemented as appropriate)      Problem: Pain, Chronic (Adult)  Goal: Acceptable Pain/Comfort Level and Functional Ability  Outcome: Ongoing (interventions implemented as appropriate)      Problem: Skin Injury Risk (Adult)  Goal: Skin Health and Integrity  Outcome: Ongoing (interventions implemented as appropriate)      Problem: Palliative Care (Adult)  Goal: Maximized Comfort  Outcome: Ongoing (interventions implemented as appropriate)    Goal: Enhanced Quality of Life  Outcome: Ongoing (interventions implemented as appropriate)      Problem: Confusion, Chronic (Adult)  Goal: Cognitive/Functional Impairments Minimized  Outcome: Ongoing (interventions implemented as appropriate)    Goal: Free from Harm/Injuries  Outcome: Ongoing (interventions implemented as appropriate)      Problem: Pain, Acute (Adult)  Goal: Acceptable Pain Control/Comfort Level  Outcome: Ongoing (interventions implemented as appropriate)

## 2019-08-05 NOTE — PROGRESS NOTES
"Palliative Care Progress Note    Date of Admission: 7/23/2019    Code Status:   Current Code Status     Date Active Code Status Order ID Comments User Context       7/28/2019 10:18 No CPR [id number]  Minnie Colbert MD Inpatient       Questions for Current Code Status     Question Answer Comment    Code Status No CPR     Medical Interventions (Level of Support Prior to Arrest) Limited     Limited Support to NOT Include Intubation     Level Of Support Discussed With Health Care Surrogate      Next of Kin (If No Surrogate)         Subjective:    Patient reports pain to RUE currently \"mild\" 3/10.   Increase pain with movement, ice pack preferable over heat.   Patient unable to appreciate much assistance from diclofenac or lidoderm patches.   No dyspnea, nausea, anxiety.   ++ anorexia   Reviewed current scheduled and prn medications for route, type, dose, and frequency.     •  acetaminophen  •  calcium carbonate  •  ipratropium-albuterol  •  loperamide  •  magic mouthwash  •  melatonin  •  phenol  •  potassium chloride  •  potassium chloride  •  [DISCONTINUED] potassium chloride **OR** [DISCONTINUED] potassium chloride **OR** potassium chloride  •  saline  •  sodium chloride  •  sodium chloride    Objective:  PPS 40% /70 (BP Location: Left arm, Patient Position: Lying)   Pulse 69   Temp 97.5 °F (36.4 °C) (Oral)   Resp 16   Ht 170.2 cm (67\")   Wt 87.1 kg (192 lb 0.3 oz)   SpO2 95%   BMI 30.07 kg/m²    Intake & Output (last day)       08/04 0701 - 08/05 0700 08/05 0701 - 08/06 0700    Urine (mL/kg/hr) 325 (0.2)     Stool      Total Output 325     Net -325           Urine Unmeasured Occurrence  1 x    Stool Unmeasured Occurrence  1 x        Lab Results (last 24 hours)     ** No results found for the last 24 hours. **        Imaging Results (last 24 hours)     ** No results found for the last 24 hours. **          Physical Exam:  Gen: NAD, sitting up in chair  Skin: warm, dry   Eyes: CHANDRA, conjunctiva clear " and moist   HEENT: oropharynx clear, moist  Resp/thorax: even effort, non labored, CTA   CV: RRR   ABD: soft, bowel sounds +, nontender  Ext: no edema, no redness, RUE in immobilizer  Neuro: awake, answers direct questions, no myoclonus       Reviewed labs and diagnostic results.   Lab Results   Component Value Date    HGBA1C 6.30 (H) 07/24/2019     Results from last 7 days   Lab Units 08/03/19  0651   WBC 10*3/mm3 8.68   HEMOGLOBIN g/dL 12.9*   HEMATOCRIT % 40.5   PLATELETS 10*3/mm3 324     Results from last 7 days   Lab Units 08/03/19  0651 08/02/19  0537   SODIUM mmol/L 141 137   POTASSIUM mmol/L 3.8 4.1   CHLORIDE mmol/L 101 102   CO2 mmol/L 28.0 25.0   BUN mg/dL 10 7*   CREATININE mg/dL 0.75* 0.64*   CALCIUM mg/dL 9.0 8.9   BILIRUBIN mg/dL  --  0.6   ALK PHOS U/L  --  93   ALT (SGPT) U/L  --  52*   AST (SGOT) U/L  --  45*   GLUCOSE mg/dL 103* 95       Impression: 79 y.o. male with acute encephalopathy with underlying dementia, acute Right humerus fracture, acute hypoxia respiratory failure    Plan:   RUE pain - reviewed current med regimen with scheduled acetaminophen, diclofenac ointment, lidoderm patches and ice.  Discuss alternate ice with heat to assist with some reduction of swelling.     Dysphagia - speech pathology to repeat FEES.     Goals of care - acute rehab then return home with daughterDenita.     Palliative provide support to patient and family  Time: 40 minutes   > 50% time spent in counseling and education concerning current orders for symptom management with patient and son    Teresita CAMPOKamilla Singleton, APRN  [phone]  08/05/19  3:50 PM

## 2019-08-05 NOTE — PLAN OF CARE
Problem: Patient Care Overview  Goal: Plan of Care Review  Outcome: Ongoing (interventions implemented as appropriate)   08/05/19 1014   Coping/Psychosocial   Plan of Care Reviewed With patient;son   Plan of Care Review   Progress improving   OTHER   Outcome Summary Pt ambulates in flower x 150 ft x2 with sitting rest break due to dizziness. Pt c/o increased pain compared to ambulation without nerve catheter, but tolerates well.

## 2019-08-05 NOTE — PROGRESS NOTES
"                  Clinical Nutrition     Reason for Visit:   Follow-up protocol    Patient Name: Vargas De  YOB: 1940  MRN: [id number]  Date of Encounter: 08/05/19 2:04 PM  Admission date: 7/23/2019    Nutrition Assessment   Assessment     Admission diagnosis  Altered mental status    Additional diagnosis/conditions/procedures  Encephalopathy w/ hallucinations  ?underlying dementia, Lewy Body  R humerus fracture s/p fall, non-operative management  Febrile  Swollen uvula  Dysphagia  Aspiration of secretions  Acute hypoxic respiratory failure    (7/24) SLP FEES, recommend - mechanical soft, no mixed consistencies nectar thick liquids  (7/29) Peripheral nerve block, catheter insertion  (7/29) SLP FEES, recommend - mechanical soft, no mixed consistencies, honey thick liquids  (7/29) Palliative care discussion with family - if able to tolerate food w/ altered consistency then resume oral intake. If unable to tolerate any consistency w/o risk of aspiration, npo vs comfort feeds. If patient is unable to eat safely and diet is continued, possibly have hospice follow d/t likelihood of aspiration pneumonia. Patient and family do not want PEG tube placed.    Additional PMH/procedures:  HTN  HLP  Ulcerative colitis - on chronic prednisone  Gout  Spinal stenosis  CVA  Depression  TIA  Mild dementia    Hemorrhoid surgery  Hernia repair  Knee surgery    Reported/Observed/Food/Nutrition Related History:     Pt reports appetite improving. Reports that he will occasionally consume supplements. Pt reports no current food preferences at this time. Awaiting FEES tomorrow to determine if he can upgrade his diet.     Anthropometrics     Height: 170.2 cm (67\")  Last filed wt: Weight: 87.1 kg (192 lb 0.3 oz) (07/23/19 1218)    BMI: BMI (Calculated): 30.1  Obese Class I: 30-34.9kg/m2    Ideal Body Weight (IBW) (kg): 68.1  Admission wt: 192 lb 0.3 oz  Method obtained: weight from charting on admission 7/23, no method " listed    Labs reviewed   Yes   Results from last 7 days   Lab Units 08/03/19  0651 08/02/19  0537 08/01/19  1801 08/01/19  1418 08/01/19  0647  07/31/19  0616   GLUCOSE mg/dL 103* 95  --   --  90  --  100*   BUN mg/dL 10 7*  --   --  5*  --  9   CREATININE mg/dL 0.75* 0.64*  --   --  0.41*  --  0.62*   SODIUM mmol/L 141 137  --   --  137  --  137   CHLORIDE mmol/L 101 102  --   --  108*  --  102   POTASSIUM mmol/L 3.8 4.1 5.0  --  3.2*   < > 3.1*   MAGNESIUM mg/dL 1.8  --   --  2.0  --   --   --    ALT (SGPT) U/L  --  52*  --   --  41  --  46*    < > = values in this interval not displayed.     Lab Results   Lab Value Date/Time    HGBA1C 6.30 (H) 07/24/2019 0605    HGBA1C 5.50 02/28/2019 0554     Medications reviewed   Pertinent: eliquis, neurontin, protonix, prednisone  PRN: imodium     Current Nutrition Prescription     PO: Diet Dysphagia; IV - Mechanical Soft No Mixed Consistencies; Honey Thick     Oral Nutrition Supplement: Boost pudding with B/L; Magic cup with L/D    Intake: 56% of 4 meals     Nutrition Diagnosis     7/30; updated 8/5  Problem Inadequate oral intake   Etiology Dysphagia, poor appetite   Signs/Symptoms PO Intake: 56% of 4 meals    Status: ongoing     7/30  Problem Swallowing difficulty   Etiology Dysphagia/?aspiration   Signs/Symptoms SLP eval, dys 4   Status: ongoing     Nutrition Intervention   1.  Follow treatment progress, Care plan reviewed  2.  Advise alternate selection, Interview for preferences     Goal:   General: Nutrition support treatment  PO: Increase intake, Advance diet as medically appropriate       Monitoring/Evaluation:   Per protocol, PO intake, Supplement intake, Pertinent labs, Symptoms, POC/GOC, Swallow function    Will Continue to follow per protocol    Catina Reveles RD  Time Spent: 25 minutes

## 2019-08-05 NOTE — PROGRESS NOTES
[facility] Medicine Services  PROGRESS NOTE    Patient Name: Vargas De  : 1940  MRN: [id number]    Date of Admission: 2019  Length of Stay: 13  Primary Care Physician: Saba Arrington MD    Subjective   Subjective     CC:  AMS     HPI:  Doing well this morning.  Pain controlled.  Noted increased pain overnight but this was after he was awoken.  Otherwise slept well.  Diarrhea improved and he has not had a bowel movement since yesterday morning.    Review of Systems  Gen- No fevers, chills  CV- No chest pain, palpitations  Resp- No cough, dyspnea  GI- No N/V/D, abd pain    Otherwise ROS is negative except as mentioned in the HPI.    Objective   Objective     Vital Signs:   Temp:  [97.2 °F (36.2 °C)-98.1 °F (36.7 °C)] 98.1 °F (36.7 °C)  Heart Rate:  [64-76] 64  Resp:  [18] 18  BP: ()/(48-83) 94/48  Total (NIH Stroke Scale): 9     Physical Exam:  Constitutional: No acute distress, awake, alert  HENT: NCAT, mucous membranes moist  Respiratory: Clear to auscultation bilaterally, respiratory effort normal on room air  Cardiovascular: RRR, II/VI murmurs, no rubs, or gallops, palpable pedal pulses bilaterally  Gastrointestinal: Positive bowel sounds, soft, nontender, nondistended  Musculoskeletal: trace bilateral ankle edema  Skin: No rashes    Results Reviewed:  I have personally reviewed current lab, radiology, and data and agree.    Results from last 7 days   Lab Units 19  0651 19  0537 19  0647   WBC 10*3/mm3 8.68 6.76 4.45   HEMOGLOBIN g/dL 12.9* 12.4* 8.2*   HEMATOCRIT % 40.5 40.1 26.9*   PLATELETS 10*3/mm3 324 305 182     Results from last 7 days   Lab Units 19  0651 19  0537 19  1801 19  0647  19  0616   SODIUM mmol/L 141 137  --  137  --  137   POTASSIUM mmol/L 3.8 4.1 5.0 3.2*   < > 3.1*   CHLORIDE mmol/L 101 102  --  108*  --  102   CO2 mmol/L 28.0 25.0  --  21.0*  --  23.0   BUN mg/dL 10 7*  --  5*  --  9    CREATININE mg/dL 0.75* 0.64*  --  0.41*  --  0.62*   GLUCOSE mg/dL 103* 95  --  90  --  100*   CALCIUM mg/dL 9.0 8.9  --  7.7*  --  8.2*   ALT (SGPT) U/L  --  52*  --  41  --  46*   AST (SGOT) U/L  --  45*  --  35  --  39    < > = values in this interval not displayed.     Estimated Creatinine Clearance: 81.5 mL/min (A) (by C-G formula based on SCr of 0.75 mg/dL (L)).    Microbiology Results Abnormal     Procedure Component Value - Date/Time    Blood Culture - Blood, Arm, Left [[id number]] Collected:  07/27/19 0152    Lab Status:  Final result Specimen:  Blood from Arm, Left Updated:  08/01/19 0431     Blood Culture No growth at 5 days    Blood Culture - Blood, Hand, Left [[id number]] Collected:  07/27/19 0147    Lab Status:  Final result Specimen:  Blood from Hand, Left Updated:  08/01/19 0245     Blood Culture No growth at 5 days    Urine Culture - Urine, Urine, Clean Catch [[id number]]  (Normal) Collected:  07/28/19 1430    Lab Status:  Final result Specimen:  Urine, Clean Catch Updated:  07/29/19 1856     Urine Culture No growth    Beta Strep Culture, Throat - Swab, Throat [[id number]]  (Normal) Collected:  07/27/19 1630    Lab Status:  Final result Specimen:  Swab from Throat Updated:  07/29/19 1355     Throat Culture, Beta Strep No Beta Hemolytic Streptococcus Isolated    Narrative:       Group A Strep incidence is low in adults. Positive culture for Beta hemolytic Streptococcus species can reflect colonization and not true infection. Please correlate clinically.    Blood Culture - Blood, Arm, Left [[id number]] Collected:  07/23/19 1334    Lab Status:  Final result Specimen:  Blood from Arm, Left Updated:  07/28/19 1415     Blood Culture No growth at 5 days    Blood Culture - Blood, Hand, Right [[id number]] Collected:  07/23/19 1341    Lab Status:  Final result Specimen:  Blood from Hand, Right Updated:  07/28/19 1415     Blood Culture No growth at 5 days    Rapid Strep A Screen - Swab, Throat [[id number]]   (Normal) Collected:  07/27/19 1630    Lab Status:  Final result Specimen:  Swab from Throat Updated:  07/27/19 1727     Strep A Ag Negative    Narrative:       Test performed by Direct Antigen Testing.          Imaging Results (last 24 hours)     ** No results found for the last 24 hours. **          Results for orders placed during the hospital encounter of 07/23/19   Adult Transthoracic Echo Complete W/ Cont if Necessary Per Protocol    Narrative · Left ventricular systolic function is normal. Estimated EF = 60%.  · Left ventricular diastolic dysfunction (grade I) consistent with   impaired relaxation.  · Left atrial cavity size is borderline dilated.  · There is mild calcification of the aortic valve.          I have reviewed the medications:  Scheduled Meds:    acetaminophen 650 mg Oral Q8H   apixaban 5 mg Oral Q12H   atorvastatin 80 mg Oral Nightly   bisoprolol 5 mg Oral Q24H   diclofenac 2 g Topical 4x Daily   DULoxetine 20 mg Oral Daily   gabapentin 300 mg Oral Nightly   lidocaine 2 patch Transdermal Nightly   memantine 10 mg Oral Q12H   mesalamine 0.75 g Oral Nightly   pantoprazole 40 mg Oral Q AM   predniSONE 5 mg Oral Daily With Breakfast   saccharomyces boulardii 250 mg Oral BID   sodium chloride 3 mL Intravenous Q12H     Continuous Infusions:     PRN Meds:.•  acetaminophen  •  calcium carbonate  •  ipratropium-albuterol  •  loperamide  •  magic mouthwash  •  melatonin  •  phenol  •  potassium chloride  •  potassium chloride  •  [DISCONTINUED] potassium chloride **OR** [DISCONTINUED] potassium chloride **OR** potassium chloride  •  saline  •  sodium chloride  •  sodium chloride      Assessment/Plan   Assessment / Plan     Active Hospital Problems    Diagnosis  POA   • **Acute alteration in mental status [R41.82]  Yes   • Lewy body dementia [G31.83, F02.80]  Yes   • Acute respiratory distress [R06.03]  Yes   • Fracture of proximal end of right humerus [S42.201A]  Yes   • Paroxysmal atrial fibrillation  (CMS/HCC) [I48.0]  Yes   • Mixed hyperlipidemia [E78.2]  Yes   • Multiple lacunar infarcts [I63.81]  Yes   • History of stroke with residual deficit [I69.30]  Not Applicable   • PFO (patent foramen ovale) [Q21.1]  Not Applicable   • GERD without esophagitis [K21.9]  Yes   • Spinal stenosis in cervical region [M48.02]  Yes   • Essential hypertension [I10]  Yes   • Crohn's colitis, with rectal bleeding (CMS/HCC) [K50.111]  Yes      Resolved Hospital Problems   No resolved problems to display.        Brief Hospital Course to date:  Vargas De is a 78 y.o. male with a past medical history significant for ulcerative colitis on chronic prednisone therapy, chronic pain with spinal stenosis of the cervical region, hyperlipidemia, hypertension, PFO, atrial fibrillation ( on Eliquis) mild dementia, and multiple lacunar infarcts who presents with acute change in mental status.  MRI negative for CVA.  EEG negative for seizures.     Sepsis  Acute hypoxic respiratory failure  Likely aspiration   - overall improved; fevers improved and on room air   - Patient was started on zosyn 7/28 - will transition to Augmentin to complete 7 days   - BCx NGTD; strep negative   -Patient with swollen uvula with exudate.  CT scan without contrast shows narrowing of his airway. The scan without contrast not ordered as patient with iodine allergy; ENT eval; cont PEEP if needed; diminished oropharyngeal sensation  - Code status changed to DNR   - Speech eval 7/29 with modified diet; continue to monitor   - Palliative following       AMS  Suspected Lewy body dementia  - improved with discontinuation of Seroquel and narcotics   -  Neurology followed;  etiology likely worsening of lewy body dementia in setting of sleep deprivation, pain as well as recent fracture   - continue home statin, ASA  - Will resume home gabapentin qHS only - patient states this helped him rest     Antibiotic associated diarrhea  -No concern for C. difficile, no abdominal  pain on exam stable.  -We will start probiotic     Dysphasia  - improving;  mental status is improving  - Speech eval 7/29 with diet modifications  -Fees tomorrow.    Loose stools   - no WBC; no abd pain; monitor and consider c.diff testing      Right Humerus Fracture:  - consult to ortho, non-operative management, therapy as tolerated, appreciate assistance   - continue with therapy as above  - Dr. Steinberg follow up in 10 days   -Nerve block discontinued.  Pain controlled on Tylenol.     PAF:  - sinus on admission  - [id number] = 5. On Eliquis.      Junctional rhythml; tachycardia  - Resume bisoprolol; cards consult and recommend continued beta blocker   - K replaced; check Mg      Frequent urination   - UA ok; PVR ok -- overall improving      Right wrist pain  -imaging with no acute fracture      History of CVA  - workup in May and CVA felt to be embolic; follows with Dr. Gutierrez and Dr. Maldonado. Has been on Eliquis and ASA discontinued       Chronic steroid:  - on 5 mg daily for Crohn's/lumbar stenosis. Currently being weaned down, to eventually discontinue; will resume home prednisone       HTN:  -Blood pressure has been on the low side.  Holding amlodipine and lisinopril for now.        DVT prophylaxis:   Eliquis     Disposition: I expect the patient to be discharged [facility] in the next few days.  Has been referred.      CODE STATUS:   Code Status and Medical Interventions:   Ordered at: 07/28/19 1018     Limited Support to NOT Include:    Intubation     Level Of Support Discussed With:    Health Care Surrogate    Next of Kin (If No Surrogate)     Code Status:    No CPR     Medical Interventions (Level of Support Prior to Arrest):    Limited         Electronically signed by Minnie Colbert MD, 08/05/19, 1:43 PM.

## 2019-08-05 NOTE — PLAN OF CARE
Problem: Patient Care Overview  Goal: Interprofessional Rounds/Family Conf  Outcome: Ongoing (interventions implemented as appropriate)  1300 Palliative Team Conference: SRIKANTH Queen RN, CHPN; COCO Morin, DO; SHIMON Gordon RN, CHPN; DAO Singleton, APRN; ANTONY Javier, [facility], [facility]; TONI Carrillo RN   08/05/19 1535   Interdisciplinary Rounds/Family Conf   Summary Pt seen up in recliner following PT walk in [place]; very fatigued, stated R shoulder/arm comfortable despite presence of some pain. Ice pack noted on R upper arm, pt reported the cold seems to help the pain. Pt unable to determine whether topicals help with pain; advised to indicate to staff exactly where his pain is most centered for direct application to site. Son at bedside reported that pt slept well last night, only awake when awakened for care, but did not spontaneously awaken to c/o pain or discomfort. Awaiting discharge/rehab arrangement, pending [facility] response to referral. Palliative following for continuing symptom management and ongoing GOC discussion, support to pt and family.       Problem: Palliative Care (Adult)  Intervention: Minimize Discomfort   08/05/19 1535   Promote Oxygenation/Perfusion   Pain Management Interventions cold applied;pain management plan reviewed with patient/caregiver;other (see comments)  (offered alternate heat application w/ cold; pt declined)     Intervention: Optimize Function   08/05/19 1535   Musculoskeletal Interventions   Fatigue Management paced activity encouraged     Intervention: Support/Optimize Psychosocial Response   08/05/19 1535   Coping/Psychosocial Interventions   Supportive Measures self-reflection promoted;verbalization of feelings encouraged;self-responsibility promoted       Goal: Maximized Comfort  Outcome: Ongoing (interventions implemented as appropriate)   08/05/19 1535   Palliative Care (Adult)   Maximized Comfort making progress toward outcome     Goal: Enhanced Quality of Life  Outcome: Ongoing (interventions  implemented as appropriate)   08/05/19 8668   Palliative Care (Adult)   Enhanced Quality of Life making progress toward outcome

## 2019-08-06 NOTE — THERAPY TREATMENT NOTE
Acute Care - Occupational Therapy Treatment Note  [facility]     Patient Name: Vargas De  : 1940  MRN: [id number]  Today's Date: 2019  Onset of Illness/Injury or Date of Surgery: 19  Date of Referral to OT: 19       Admit Date: 2019       ICD-10-CM ICD-9-CM   1. Acute alteration in mental status R41.82 780.97   2. Generalized weakness R53.1 780.79   3. Other closed displaced fracture of proximal end of right humerus with routine healing, subsequent encounter S42.291D V54.11   4. Elevated blood pressure reading with diagnosis of hypertension I10 401.9   5. Impaired functional mobility, balance, gait, and endurance Z74.09 V49.89   6. Impaired mobility and ADLs Z74.09 799.89   7. Cognitive communication deficit R41.841 799.52   8. Oropharyngeal dysphagia R13.12 787.22     Patient Active Problem List   Diagnosis   • Peripheral neuropathy   • Essential hypertension   • Crohn's colitis, with rectal bleeding (CMS/HCC)   • Major depressive disorder with single episode, in partial remission (CMS/HCC)   • Macular degeneration of both eyes   • GERD without esophagitis   • Cervicalgia   • Spinal stenosis in cervical region   • Chronic right-sided low back pain without sciatica   • Osteoarthritis of toe joint, right   • PFO (patent foramen ovale)   • Ulcerative colitis (CMS/HCC)   • Body mass index (BMI) of 25.0 to 29.9   • History of stroke with residual deficit   • Mixed hyperlipidemia   • Multiple lacunar infarcts   • Paroxysmal atrial fibrillation (CMS/HCC)   • Chronic bilateral low back pain   • Acute alteration in mental status   • Fracture of proximal end of right humerus   • Acute respiratory distress   • Lewy body dementia     Past Medical History:   Diagnosis Date   • Allergic arthritis    • Arthritis    • BPH (benign prostatic hyperplasia)      urology   • BPH/nocturia/microhematuria (work up negative)    • chronic hearing loss left ear    • Crohn's colitis (CMS/HCC)     on  colonoscopy 7/15.2017.  UC-continue Lialda   • Depression     medication, after death of wife 2014   • Elbow pain 7/25/2018   • Gout    • Hearing loss     left ear   • History of stroke 7/23/2018   • Hyperlipidemia    • Hypertension    • Ingrown toenail 03/18/2018   • Low back pain    • Macular degeneration    • Macular degeneration    • Multiple lacunar infarcts     noted on CT 2/2019   • Neck pain    • Rash 1/28/2019   • Rib pain on left side 07/11/2017   • Septic bursitis of elbow, right 7/23/2018   • Spinal stenosis     cervical and lumbosacral spine   • Spinal stenosis-cervical and lumbosacral spine    • Stroke (CMS/HCC)     aspirin, blood pressure conyrol.  Infarct frontal lobe.  R sided weakness, R hand and R foot numbness,dysphasia with stuttering.   • Stroke (CMS/HCC) 02/27/2019    MCA infarct with L facial droop and L sided weakness,dysphasia,dysarthria   • TIA (transient ischemic attack) 2/27/2019   • Ulcerative colitis (CMS/HCC)    • Urinary frequency      Past Surgical History:   Procedure Laterality Date   • CATARACT EXTRACTION     • COLONOSCOPY  07/15/2017   • HEMORRHOIDECTOMY     • HERNIA REPAIR     • KNEE SURGERY     • TONSILLECTOMY         Therapy Treatment    Rehabilitation Treatment Summary     Row Name 08/06/19 1400             Treatment Time/Intention    Discipline  occupational therapist  -AN      Document Type  therapy note (daily note)  -AN      Subjective Information  no complaints  -AN      Mode of Treatment  occupational therapy  -AN      Patient/Family Observations  pt reclined in chair, son present  -AN      Care Plan Review  risks/benefits reviewed;care plan/treatment goals reviewed  -AN      Patient Effort  good  -AN      Treatment Considerations/Comments  R Humeral fx, no R shoulder ROM/WB  -AN      Recorded by [AN] Bibiana Lion OT 08/06/19 1430      Row Name 08/06/19 1400             Cognitive Assessment/Intervention- PT/OT    Affect/Mental Status (Cognitive)  WFL  -AN       Orientation Status (Cognition)  oriented x 3  -AN      Follows Commands (Cognition)  follows one step commands;over 90% accuracy  -AN      Recorded by [AN] Bibiana Lion, OT 08/06/19 1430      Row Name 08/06/19 1400             Bed Mobility Assessment/Treatment    Comment (Bed Mobility)  pt up in chair  -AN      Recorded by [AN] Bibiana Lion, OT 08/06/19 1430      Row Name 08/06/19 1400             Sit-Stand Transfer    Sit-Stand [place] (Transfers)  contact guard  -AN      Recorded by [AN] Bibiana Lion, OT 08/06/19 1430      Row Name 08/06/19 1400             Stand-Sit Transfer    Stand-Sit [place] (Transfers)  contact guard  -AN      Recorded by [AN] Bibiana Lion, OT 08/06/19 1430      Row Name 08/06/19 1400             Upper Body Dressing Assessment/Training    Comment (Upper Body Dressing)  Reviewed sling adjustments for comfort  -AN      Recorded by [AN] Bibiana Lion, OT 08/06/19 1430      Row Name 08/06/19 1400             Therapeutic Exercise    84002 - OT Therapeutic Exercise Minutes  8  -AN      Recorded by [AN] Bibiana Lion, OT 08/06/19 1430      Row Name 08/06/19 1400             Therapeutic Exercise    Upper Extremity Range of Motion (Therapeutic Exercise)  elbow flexion/extension, right;forearm supination/pronation, right;wrist flexion/extension, right  -AN      Hand (Therapeutic Exercise)  finger flexion/extension, right  -AN      Sets/Reps (Therapeutic Exercise)  2/10  -AN      Recorded by [AN] Bibiana Lion, OT 08/06/19 1430      Row Name 08/06/19 1400             Dynamic Sitting Balance    Level of [place], Reaches Outside Midline (Sitting, Dynamic Balance)  supervision  -AN      Sitting Position, Reaches Outside Midline (Sitting, Dynamic Balance)  sitting in chair  -AN      Recorded by [AN] Bibiana Lion, OT 08/06/19 1430      Row Name 08/06/19 1400             Pain Scale: Numbers Pre/Post-Treatment    Pain Location - Side  Right  -AN      Pain Location  shoulder  -AN       Recorded by [AN] Bibiana Lion, OT 08/06/19 1430      Row Name 08/06/19 1400             Pain Scale: FACES Pre/Post-Treatment    Pain: FACES Scale, Pretreatment  2-->hurts little bit  -AN      Pain: FACES Scale, Post-Treatment  2-->hurts little bit  -AN      Recorded by [AN] Bibiana Lion, OT 08/06/19 1430      Row Name                Wound 07/23/19 2130 Right anterior toe pressure injury    Wound - Properties Group Date first assessed: 07/23/19 [DB] Time first assessed: 2130 [DB] Side: Right [DB] Orientation: anterior [DB] Location: toe [DB] Stage, Pressure Injury: Stage 1 [DB] Present On Admission : yes [DB] Type: pressure injury [DB] Recorded by:  [DB] Henok Marie RN 07/24/19 0229    Row Name                Wound 07/20/19 1649 Left hand skin tear    Wound - Properties Group Date first assessed: 07/20/19 [SW] Time first assessed: 1649 [SW] Side: Left [SW] Location: hand [SW] Present On Admission : yes [SW] Type: skin tear [SW] Recorded by:  [SW] Odilia Joy RN 07/20/19 1649    Row Name                Wound 07/20/19 1648 Right knee abrasion    Wound - Properties Group Date first assessed: 07/20/19 [SW] Time first assessed: 1648 [SW] Side: Right [SW] Location: knee [SW] Present On Admission : yes [SW] Type: abrasion [SW] Recorded by:  [SW] Odilia Joy RN 07/20/19 1648    Row Name 08/06/19 1400             Plan of Care Review    Plan of Care Reviewed With  patient;son  -AN      Recorded by [AN] Bibiana Lion, OT 08/06/19 1430      Row Name 08/06/19 1400             Outcome Summary/Treatment Plan (OT)    Daily Summary of Progress (OT)  progress toward functional goals is good  -AN      Anticipated Discharge Disposition (OT)  inpatient rehabilitation facility  -AN      Recorded by [AN] Bibiana Lion, OT 08/06/19 1430        User Key  (r) = Recorded By, (t) = Taken By, (c) = Cosigned By    Initials Name Effective Dates Discipline    AN Bibiana Lion, OT 06/22/15 -  OT    DB Henok Marie  R, RN 06/16/16 -  Nurse    Odilia Gordillo RN 06/16/16 -  Nurse        Wound 07/20/19 1649 Left hand skin tear (Active)   Dressing Appearance dried drainage;open to air 8/6/2019 12:00 PM   Closure Approximated 8/6/2019 12:00 PM   Base scab;dry 8/6/2019 12:00 PM   Periwound dry;intact 8/6/2019 12:00 PM   Periwound Temperature warm 8/6/2019 12:00 PM   Periwound Skin Turgor soft 8/6/2019 12:00 PM   Drainage Amount none 8/6/2019 12:00 PM   Dressing Care, Wound dressing removed;open to air 8/6/2019  8:00 AM   Periwound Care, Wound dry periwound area maintained 8/6/2019  8:00 AM       Wound 07/23/19 2130 Right anterior toe pressure injury (Active)   Dressing Appearance open to air 8/6/2019 12:00 PM   Closure Open to air 8/6/2019 12:00 PM   Base blanchable;red 8/6/2019 12:00 PM   Periwound intact;pink;dry 8/6/2019 12:00 PM   Periwound Temperature warm 8/6/2019 12:00 PM   Periwound Skin Turgor firm 8/6/2019 12:00 PM   Drainage Amount none 8/6/2019 12:00 PM   Dressing Care, Wound open to air 8/6/2019  8:00 AM   Periwound Care, Wound dry periwound area maintained 8/6/2019  8:00 AM       Occupational Therapy Education     Title: PT OT SLP Therapies (Done)     Topic: Occupational Therapy (Done)     Point: ADL training (Done)     Description: Instruct learner(s) on proper safety adaptation and remediation techniques during self care or transfers.   Instruct in proper use of assistive devices.    Learning Progress Summary           Patient Acceptance, E,D, VU,NR by AN at 8/6/2019  2:00 PM    Comment:  Reviewed sling and UE precautions.    Acceptance, E, VU,NR by JUSTIN at 8/6/2019 11:35 AM    Acceptance, E,TB, VU,NR by JUSTIN at 8/6/2019  9:36 AM    Eager, E,TB,D,H, NR,VU by AR at 8/5/2019  5:05 PM    Acceptance, E,D, VU,NR by WANDA at 8/1/2019  2:40 PM    Comment:  Educated on new sling management, reviewed R UE protection.    Acceptance, RAYMOND OLIVAS, MARTIZANR by AN at 7/26/2019  2:55 PM    Comment:  Educated on R UE AROM ex and shoulder  restrictions.    Acceptance, E,D, VU,NR by AN at 7/24/2019  9:02 AM    Comment:  Current deficits, OT role and discharge.   Family Acceptance, E,D, VU,NR by AN at 8/6/2019  2:00 PM    Comment:  Reviewed sling and UE precautions.    Acceptance, E,TB, VU,NR by JUSTIN at 8/6/2019  9:36 AM    Eager, E,TB,D,H, NR,VU by AR at 8/5/2019  5:05 PM    Acceptance, E,H, VU,NR by AN at 7/26/2019  2:55 PM    Comment:  Educated on R UE AROM ex and shoulder restrictions.    Acceptance, E,D, VU,NR by AN at 7/24/2019  9:02 AM    Comment:  Current deficits, OT role and discharge.                   Point: Home exercise program (Done)     Description: Instruct learner(s) on appropriate technique for monitoring, assisting and/or progressing therapeutic exercises/activities.    Learning Progress Summary           Patient Acceptance, E, VU,NR by JUSTIN at 8/6/2019 11:35 AM    Acceptance, E,TB, VU,NR by JUSTIN at 8/6/2019  9:36 AM    Eager, E,TB,D,H, NR,VU by AR at 8/5/2019  5:05 PM    Acceptance, E,D, VU,NR by AN at 8/1/2019  2:40 PM    Comment:  Educated on new sling management, reviewed R UE protection.    Acceptance, D,E, VU,NR by AN at 7/30/2019  2:38 PM    Comment:  Educated on R UE elbow, wrist, hand ROM; protect R UE by elevation and sling tightened while sleeping.    Acceptance, E,H, VU,NR by AN at 7/26/2019  2:55 PM    Comment:  Educated on R UE AROM ex and shoulder restrictions.   Family Acceptance, E,TB, VU,NR by JUSTIN at 8/6/2019  9:36 AM    Eager, E,TB,D,H, NR,VU by AR at 8/5/2019  5:05 PM    Acceptance, D,E, VU,NR by AN at 7/30/2019  2:38 PM    Comment:  Educated on R UE elbow, wrist, hand ROM; protect R UE by elevation and sling tightened while sleeping.    Acceptance, E,H, VU,NR by AN at 7/26/2019  2:55 PM    Comment:  Educated on R UE AROM ex and shoulder restrictions.                   Point: Precautions (Done)     Description: Instruct learner(s) on prescribed precautions during self-care and functional transfers.    Learning Progress  Summary           Patient Acceptance, E, VU,NR by JUSTIN at 8/6/2019 11:35 AM    Acceptance, E,TB, VU,NR by JUSTIN at 8/6/2019  9:36 AM    Eager, E,TB,D,H, NR,VU by AR at 8/5/2019  5:05 PM   Family Acceptance, E,TB, VU,NR by JUSTIN at 8/6/2019  9:36 AM    Eager, E,TB,D,H, NR,VU by AR at 8/5/2019  5:05 PM                   Point: Body mechanics (Done)     Description: Instruct learner(s) on proper positioning and spine alignment during self-care, functional mobility activities and/or exercises.    Learning Progress Summary           Patient Acceptance, E, VU,NR by JUSTIN at 8/6/2019 11:35 AM    Acceptance, E,TB, VU,NR by JUSTIN at 8/6/2019  9:36 AM    Eager, E,TB,D,H, NR,VU by AR at 8/5/2019  5:05 PM   Family Acceptance, E,TB, VU,NR by JUSTIN at 8/6/2019  9:36 AM    Eager, E,TB,D,H, NR,VU by AR at 8/5/2019  5:05 PM                               User Key     Initials Effective Dates Name Provider Type Discipline     11/20/18 -  Nurys Chahal, PT Physical Therapist PT    AN 06/22/15 -  Bibiana Lion, OT Occupational Therapist OT    AR 06/22/15 -  Jennifer Aguilar, OT Occupational Therapist OT                OT Recommendation and Plan  Outcome Summary/Treatment Plan (OT)  Daily Summary of Progress (OT): progress toward functional goals is good  Barriers to Overall Progress (OT): medical changes  Plan for Continued Treatment (OT): continue POC  Anticipated Discharge Disposition (OT): inpatient rehabilitation facility  Therapy Frequency (OT Eval): daily  Daily Summary of Progress (OT): progress toward functional goals is good  Plan of Care Review  Plan of Care Reviewed With: patient, son  Plan of Care Reviewed With: patient, son  Outcome Summary: Pt currently reports less  RUE pain, tolerated elbow, wrist, hand ex on R side. Continue OT.  Outcome Measures     Row Name 08/06/19 1400 08/05/19 1705 08/05/19 1014       How much help from another person do you currently need...    Turning from your back to your side while in flat bed  without using bedrails?  --  --  2  -EJ    Moving from lying on back to sitting on the side of a flat bed without bedrails?  --  --  2  -EJ    Moving to and from a bed to a chair (including a wheelchair)?  --  --  3  -EJ    Standing up from a chair using your arms (e.g., wheelchair, bedside chair)?  --  --  3  -EJ    Climbing 3-5 steps with a railing?  --  --  2  -EJ    To walk in hospital room?  --  --  3  -EJ    AM-PAC 6 Clicks Score (PT)  --  --  15  -EJ       How much help from another is currently needed...    Putting on and taking off regular lower body clothing?  1  -AN  1  -AR  --    Bathing (including washing, rinsing, and drying)  2  -AN  2  -AR  --    Toileting (which includes using toilet bed pan or urinal)  2  -AN  2  -AR  --    Putting on and taking off regular upper body clothing  2  -AN  2  -AR  --    Taking care of personal grooming (such as brushing teeth)  2  -AN  2  -AR  --    Eating meals  2  -AN  2  -AR  --    AM-PAC 6 Clicks Score (OT)  11  -AN  11  -AR  --       Functional Assessment    Outcome Measure Options  --  AM-PAC 6 Clicks Daily Activity (OT)  -AR  AM-PAC 6 Clicks Basic Mobility (PT)  -    Row Name 08/04/19 1010 08/03/19 1610          How much help from another person do you currently need...    Turning from your back to your side while in flat bed without using bedrails?  2  -CS  2  -CS     Moving from lying on back to sitting on the side of a flat bed without bedrails?  2  -CS  2  -CS     Moving to and from a bed to a chair (including a wheelchair)?  3  -CS  2  -CS     Standing up from a chair using your arms (e.g., wheelchair, bedside chair)?  3  -CS  2  -CS     Climbing 3-5 steps with a railing?  1  -CS  1  -CS     To walk in hospital room?  3  -CS  3  -CS     AM-PAC 6 Clicks Score (PT)  14  -CS  12  -CS        Functional Assessment    Outcome Measure Options  AM-PAC 6 Clicks Basic Mobility (PT)  -CS  AM-PAC 6 Clicks Basic Mobility (PT)  -CS       User Key  (r) = Recorded By,  (t) = Taken By, (c) = Cosigned By    Initials Name Provider Type    Nurys Ny PT Physical Therapist    Bibiana Barrera, OT Occupational Therapist    Jennifer Mena, OT Occupational Therapist    Roz Engel, PT Physical Therapist           Time Calculation:   Time Calculation- OT     Row Name 08/06/19 1432 08/06/19 1400 08/06/19 0940       Time Calculation- OT    OT Start Time  1400  -AN  --  --    Total Timed Code Minutes- OT  8 minute(s)  -AN  --  --    OT Received On  08/06/19  -AN  --  --    OT Goal Re-Cert Due Date  08/15/19  -AN  --  --       Timed Charges    57973 - OT Therapeutic Exercise Minutes  --  8  -AN  --    63900 - Gait Training Minutes   --  --  15  -EJ      User Key  (r) = Recorded By, (t) = Taken By, (c) = Cosigned By    Initials Name Provider Type    Nurys Ny PT Physical Therapist    Bibiana Barrera OT Occupational Therapist        Therapy Charges for Today     Code Description Service Date Service Provider Modifiers Qty    [id number]  OT THER PROC EA 15 MIN 8/6/2019 Bibiana Lion OT GO 1               Bibiana Lion OT  8/6/2019

## 2019-08-06 NOTE — THERAPY TREATMENT NOTE
Acute Care - Physical Therapy treatment  [facility]     Patient Name: Vargas De  : 1940  MRN: [id number]  Today's Date: 2019   Onset of Illness/Injury or Date of Surgery: 19  Date of Referral to PT: 19         Admit Date: 2019    Visit Dx:     ICD-10-CM ICD-9-CM   1. Acute alteration in mental status R41.82 780.97   2. Generalized weakness R53.1 780.79   3. Other closed displaced fracture of proximal end of right humerus with routine healing, subsequent encounter S42.291D V54.11   4. Elevated blood pressure reading with diagnosis of hypertension I10 401.9   5. Impaired functional mobility, balance, gait, and endurance Z74.09 V49.89   6. Impaired mobility and ADLs Z74.09 799.89   7. Cognitive communication deficit R41.841 799.52   8. Oropharyngeal dysphagia R13.12 787.22     Patient Active Problem List   Diagnosis   • Peripheral neuropathy   • Essential hypertension   • Crohn's colitis, with rectal bleeding (CMS/HCC)   • Major depressive disorder with single episode, in partial remission (CMS/HCC)   • Macular degeneration of both eyes   • GERD without esophagitis   • Cervicalgia   • Spinal stenosis in cervical region   • Chronic right-sided low back pain without sciatica   • Osteoarthritis of toe joint, right   • PFO (patent foramen ovale)   • Ulcerative colitis (CMS/HCC)   • Body mass index (BMI) of 25.0 to 29.9   • History of stroke with residual deficit   • Mixed hyperlipidemia   • Multiple lacunar infarcts   • Paroxysmal atrial fibrillation (CMS/HCC)   • Chronic bilateral low back pain   • Acute alteration in mental status   • Fracture of proximal end of right humerus   • Acute respiratory distress   • Lewy body dementia     Past Medical History:   Diagnosis Date   • Allergic arthritis    • Arthritis    • BPH (benign prostatic hyperplasia)      urology   • BPH/nocturia/microhematuria (work up negative)    • chronic hearing loss left ear    • Crohn's colitis (CMS/HCC)     on  colonoscopy 7/15.2017.  UC-continue Lialda   • Depression     medication, after death of wife 2014   • Elbow pain 7/25/2018   • Gout    • Hearing loss     left ear   • History of stroke 7/23/2018   • Hyperlipidemia    • Hypertension    • Ingrown toenail 03/18/2018   • Low back pain    • Macular degeneration    • Macular degeneration    • Multiple lacunar infarcts     noted on CT 2/2019   • Neck pain    • Rash 1/28/2019   • Rib pain on left side 07/11/2017   • Septic bursitis of elbow, right 7/23/2018   • Spinal stenosis     cervical and lumbosacral spine   • Spinal stenosis-cervical and lumbosacral spine    • Stroke (CMS/HCC)     aspirin, blood pressure conyrol.  Infarct frontal lobe.  R sided weakness, R hand and R foot numbness,dysphasia with stuttering.   • Stroke (CMS/HCC) 02/27/2019    MCA infarct with L facial droop and L sided weakness,dysphasia,dysarthria   • TIA (transient ischemic attack) 2/27/2019   • Ulcerative colitis (CMS/HCC)    • Urinary frequency      Past Surgical History:   Procedure Laterality Date   • CATARACT EXTRACTION     • COLONOSCOPY  07/15/2017   • HEMORRHOIDECTOMY     • HERNIA REPAIR     • KNEE SURGERY     • TONSILLECTOMY          PT ASSESSMENT (last 12 hours)      Physical Therapy Evaluation     Row Name 08/06/19 1115          PT Evaluation Time/Intention    Document Type  therapy note (daily note)  -EJ     Mode of Treatment  physical therapy  -     Row Name 08/06/19 1115          General Information    Patient Profile Reviewed?  yes  -EJ     Existing Precautions/Restrictions  brace worn when out of bed;non-weight bearing;right UE  -EJ     Row Name 08/06/19 1115          Cognitive Assessment/Intervention- PT/OT    Orientation Status (Cognition)  oriented x 3  -EJ     Row Name 08/06/19 1115          Safety Issues, Functional Mobility    Impairments Affecting Function (Mobility)  pain  -EJ     Row Name 08/06/19 1116          Mobility Assessment/Treatment    Extremity Weight-bearing  Status  right upper extremity  -EJ     Right Upper Extremity (Weight-bearing Status)  non weight-bearing (NWB)  -     Row Name 08/06/19 1116          Bed Mobility Assessment/Treatment    Bed Mobility Assessment/Treatment  supine-sit  -EJ     [place] Level (Bed Mobility)  maximum assist (25% patient effort) physical assist to place hand high on bed rail,  -     Row Name 08/06/19 1116          Transfer Assessment/Treatment    Sit-Stand [place] (Transfers)  contact guard  -     Row Name 08/06/19 1116          Sit-Stand Transfer    Assistive Device (Sit-Stand Transfers)  other (see comments) HHA  -     Row Name 08/06/19 1116          Gait/Stairs Assessment/Training    [place] Level (Gait)  minimum assist (75% patient effort);1 person to manage equipment  -EJ     Assistive Device (Gait)  -- A  -     Distance in Feet (Gait)  65+65, 1 sitting rest break.  -EJ     Deviations/Abnormal Patterns (Gait)  base of support, wide;stride length decreased  -EJ     Bilateral Gait Deviations  forward flexed posture  -EJ     Comment (Gait/Stairs)  pt has slow careful gait, forward flexed posture, c/o spinal stenosis in lumbar spine. Cued for upright posture as able.  -     Row Name 08/06/19 1127          Therapeutic Exercise    Lower Extremity (Therapeutic Exercise)  gluteal sets;quad sets, bilateral  -EJ     Lower Extremity Range of Motion (Therapeutic Exercise)  hip abduction/adduction, bilateral;ankle dorsiflexion/plantar flexion, right  -EJ     Exercise Type (Therapeutic Exercise)  AROM (active range of motion);isotonic contraction, concentric;isometric contraction, static  -EJ     Position (Therapeutic Exercise)  seated  -EJ     Sets/Reps (Therapeutic Exercise)  20 APs, hip add with pillow, hip abd 10 reps.   -     Row Name 08/06/19 1127          Static Sitting Balance    Level of [place] (Unsupported Sitting, Static Balance)  supervision  -EJ     Sitting Position (Unsupported Sitting, Static  Balance)  sitting on edge of bed  -EJ     Row Name 08/06/19 1127          Static Standing Balance    Level of [place] (Supported Standing, Static Balance)  minimal assist, 75% patient effort  -EJ     Row Name 08/06/19 1133          Pain Scale: Numbers Pre/Post-Treatment    Pain Location - Orientation  lower  -EJ     Pain Location  back  -EJ     Pain Intervention(s)  Ambulation/increased activity;Repositioned  -EJ     Row Name 08/06/19 1133          Pain Scale: FACES Pre/Post-Treatment    Pain: FACES Scale, Pretreatment  6-->hurts even more  -EJ     Pain: FACES Scale, Post-Treatment  4-->hurts little more  -EJ     Row Name             Wound 07/23/19 2130 Right anterior toe pressure injury    Wound - Properties Group Date first assessed: 07/23/19  -DB Time first assessed: 2130  -DB Side: Right  -DB Orientation: anterior  -DB Location: toe  -DB Stage, Pressure Injury: Stage 1  -DB Present On Admission : yes  -DB Type: pressure injury  -DB    Row Name             Wound 07/20/19 1649 Left hand skin tear    Wound - Properties Group Date first assessed: 07/20/19  -SW Time first assessed: 1649  -SW Side: Left  -SW Location: hand  -SW Present On Admission : yes  -SW Type: skin tear  -SW    Row Name             Wound 07/20/19 1648 Right knee abrasion    Wound - Properties Group Date first assessed: 07/20/19  -SW Time first assessed: 1648  -SW Side: Right  -SW Location: knee  -SW Present On Admission : yes  -SW Type: abrasion  -SW    Row Name 08/06/19 1133          Plan of Care Review    Plan of Care Reviewed With  patient;son  -EJ     Row Name 08/06/19 1133          Positioning and Restraints    Pre-Treatment Position  in bed  -EJ     Post Treatment Position  chair  -EJ     In Chair  reclined;call light within reach;encouraged to call for assist;exit alarm on;with family/caregiver  -EJ       User Key  (r) = Recorded By, (t) = Taken By, (c) = Cosigned By    Initials Name Provider Type    Nurys Ny PT Physical  Therapist    Henok Vega, RN Registered Nurse    Odilia Gordillo RN Registered Nurse        Physical Therapy Education     Title: PT OT SLP Therapies (Done)     Topic: Physical Therapy (Done)     Point: Mobility training (Done)     Learning Progress Summary           Patient Acceptance, E, VU,NR by JUSTIN at 8/6/2019 11:35 AM    Acceptance, E,TB, VU,NR by JUSTIN at 8/6/2019  9:36 AM    Acceptance, E, VU,NR by JUSTIN at 8/5/2019 10:14 AM    Acceptance, E,TB,D, VU,NR by CS at 8/4/2019 10:20 AM    Comment:  Educated patient on safety with sit<> stand and ambulation. Educated patient to increase stride length with steps.    Acceptance, E,TB, VU,DU by MICKEY at 8/3/2019  4:41 PM    Comment:  Educated patient on transfer technique using sheet to sit patient up in bed. Educated patient and family on sling.    Acceptance, E, VU,NR by CD at 8/2/2019  3:49 PM    Comment:  SEE FLOW SHEET. SON INSTRUCTED IN LE THER EX, POSITIONING FOR COMFORT.,    Eager, E, VU by KM at 8/1/2019 10:20 AM    Eager, E,D, VU,NR by AA at 7/31/2019  2:46 PM    Eager, E, NR by AA at 7/30/2019  3:17 PM    Acceptance, E, VU,NR by JUSTIN at 7/29/2019 11:04 AM    Acceptance, E, VU,NR by CD at 7/25/2019  4:42 PM    Comment:  DTR INSTRUCTED IN ROM EXERCISES, POSITIONING FOR COMFORT/ PROTECTION OF R UE, BENEFITS OF OOB ACTIVITY, D/C PLANNING,    Acceptance, E, VU,NR by CD at 7/24/2019 10:36 AM    Comment:  SEE FLOWSHEET.   Family Acceptance, E,TB, VU,NR by JUSTIN at 8/6/2019  9:36 AM    Acceptance, E,TB,D, VU,NR by CS at 8/4/2019 10:20 AM    Comment:  Educated patient on safety with sit<> stand and ambulation. Educated patient to increase stride length with steps.    Acceptance, E,TB, VU,DU by CS at 8/3/2019  4:41 PM    Comment:  Educated patient on transfer technique using sheet to sit patient up in bed. Educated patient and family on sling.    Acceptance, E, VU,NR by CD at 8/2/2019  3:49 PM    Comment:  SEE FLOW SHEET. SON INSTRUCTED IN LE THER EX, POSITIONING FOR  COMFORT.,    Eager, E,D, VU,NR by AA at 7/31/2019  2:46 PM    Acceptance, E, VU,NR by CD at 7/25/2019  4:42 PM    Comment:  DTR INSTRUCTED IN ROM EXERCISES, POSITIONING FOR COMFORT/ PROTECTION OF R UE, BENEFITS OF OOB ACTIVITY, D/C PLANNING,                   Point: Home exercise program (Done)     Learning Progress Summary           Patient Acceptance, E, VU,NR by EJ at 8/6/2019 11:35 AM    Acceptance, E,TB, VU,NR by EJ at 8/6/2019  9:36 AM    Acceptance, E, VU,NR by EJ at 8/5/2019 10:14 AM    Acceptance, E,TB,D, VU,NR by CS at 8/4/2019 10:20 AM    Comment:  Educated patient on safety with sit<> stand and ambulation. Educated patient to increase stride length with steps.    Acceptance, E,TB, VU,DU by CS at 8/3/2019  4:41 PM    Comment:  Educated patient on transfer technique using sheet to sit patient up in bed. Educated patient and family on sling.    Acceptance, E, VU,NR by CD at 8/2/2019  3:49 PM    Comment:  SEE FLOW SHEET. SON INSTRUCTED IN LE THER EX, POSITIONING FOR COMFORT.,    Eager, E, VU by KM at 8/1/2019 10:20 AM    Acceptance, E, VU,NR by EJ at 7/29/2019 11:04 AM    Acceptance, E, VU,NR by CD at 7/25/2019  4:42 PM    Comment:  DTR INSTRUCTED IN ROM EXERCISES, POSITIONING FOR COMFORT/ PROTECTION OF R UE, BENEFITS OF OOB ACTIVITY, D/C PLANNING,    Acceptance, E, VU,NR by CD at 7/24/2019 10:36 AM    Comment:  SEE FLOWSHEET.   Family Acceptance, E,TB, VU,NR by EJ at 8/6/2019  9:36 AM    Acceptance, E,TB,D, VU,NR by CS at 8/4/2019 10:20 AM    Comment:  Educated patient on safety with sit<> stand and ambulation. Educated patient to increase stride length with steps.    Acceptance, E,TB, VU,DU by CS at 8/3/2019  4:41 PM    Comment:  Educated patient on transfer technique using sheet to sit patient up in bed. Educated patient and family on sling.    Acceptance, E, VU,NR by CD at 8/2/2019  3:49 PM    Comment:  SEE FLOW SHEET. SON INSTRUCTED IN LE THER EX, POSITIONING FOR COMFORT.,    Acceptance, E, MARITZA,NR by CD  at 7/25/2019  4:42 PM    Comment:  DTR INSTRUCTED IN ROM EXERCISES, POSITIONING FOR COMFORT/ PROTECTION OF R UE, BENEFITS OF OOB ACTIVITY, D/C PLANNING,                   Point: Body mechanics (Done)     Learning Progress Summary           Patient Acceptance, E, VU,NR by JUSTIN at 8/6/2019 11:35 AM    Acceptance, E,TB, VU,NR by EJ at 8/6/2019  9:36 AM    Acceptance, E, VU,NR by JUSTIN at 8/5/2019 10:14 AM    Acceptance, E,TB,D, VU,NR by CS at 8/4/2019 10:20 AM    Comment:  Educated patient on safety with sit<> stand and ambulation. Educated patient to increase stride length with steps.    Acceptance, E,TB, VU,DU by MICKEY at 8/3/2019  4:41 PM    Comment:  Educated patient on transfer technique using sheet to sit patient up in bed. Educated patient and family on sling.    Acceptance, E, VU,NR by CD at 8/2/2019  3:49 PM    Comment:  SEE FLOW SHEET. SON INSTRUCTED IN LE THER EX, POSITIONING FOR COMFORT.,    Eager, E, VU by KM at 8/1/2019 10:20 AM    Eager, E,D, VU,NR by AA at 7/31/2019  2:46 PM    Eager, E, NR by AA at 7/30/2019  3:17 PM    Acceptance, E, VU,NR by EJ at 7/29/2019 11:04 AM    Acceptance, E, VU,NR by CD at 7/25/2019  4:42 PM    Comment:  DTR INSTRUCTED IN ROM EXERCISES, POSITIONING FOR COMFORT/ PROTECTION OF R UE, BENEFITS OF OOB ACTIVITY, D/C PLANNING,    Acceptance, E, VU,NR by CD at 7/24/2019 10:36 AM    Comment:  SEE FLOWSHEET.   Family Acceptance, E,TB, VU,NR by JUSTIN at 8/6/2019  9:36 AM    Acceptance, E,TB,D, VU,NR by CS at 8/4/2019 10:20 AM    Comment:  Educated patient on safety with sit<> stand and ambulation. Educated patient to increase stride length with steps.    Acceptance, E,TB, VU,DU by CS at 8/3/2019  4:41 PM    Comment:  Educated patient on transfer technique using sheet to sit patient up in bed. Educated patient and family on sling.    Acceptance, E, VU,NR by CD at 8/2/2019  3:49 PM    Comment:  SEE FLOW SHEET. SON INSTRUCTED IN LE THER EX, POSITIONING FOR COMFORT.,    Meg, DEISY,TUSHAR, MARITZA,NR by AA at  7/31/2019  2:46 PM    Acceptance, E, VU,NR by CD at 7/25/2019  4:42 PM    Comment:  DTR INSTRUCTED IN ROM EXERCISES, POSITIONING FOR COMFORT/ PROTECTION OF R UE, BENEFITS OF OOB ACTIVITY, D/C PLANNING,                   Point: Precautions (Done)     Learning Progress Summary           Patient Acceptance, E, VU,NR by JUSTIN at 8/6/2019 11:35 AM    Acceptance, E,TB, VU,NR by EJ at 8/6/2019  9:36 AM    Acceptance, E, VU,NR by EJ at 8/5/2019 10:14 AM    Acceptance, E,TB,D, VU,NR by CS at 8/4/2019 10:20 AM    Comment:  Educated patient on safety with sit<> stand and ambulation. Educated patient to increase stride length with steps.    Acceptance, E,TB, VU,DU by MICKEY at 8/3/2019  4:41 PM    Comment:  Educated patient on transfer technique using sheet to sit patient up in bed. Educated patient and family on sling.    Acceptance, E, VU,NR by CD at 8/2/2019  3:49 PM    Comment:  SEE FLOW SHEET. SON INSTRUCTED IN LE THER EX, POSITIONING FOR COMFORT.,    Eager, E, VU by KM at 8/1/2019 10:20 AM    Eager, E,D, VU,NR by AA at 7/31/2019  2:46 PM    Eager, E, NR by AA at 7/30/2019  3:17 PM    Acceptance, E, VU,NR by EJ at 7/29/2019 11:04 AM    Acceptance, E, VU,NR by CD at 7/25/2019  4:42 PM    Comment:  DTR INSTRUCTED IN ROM EXERCISES, POSITIONING FOR COMFORT/ PROTECTION OF R UE, BENEFITS OF OOB ACTIVITY, D/C PLANNING,    Acceptance, E, VU,NR by CD at 7/24/2019 10:36 AM    Comment:  SEE FLOWSHEET.   Family Acceptance, E,TB, VU,NR by JUSTIN at 8/6/2019  9:36 AM    Acceptance, E,TB,D, VU,NR by CS at 8/4/2019 10:20 AM    Comment:  Educated patient on safety with sit<> stand and ambulation. Educated patient to increase stride length with steps.    Acceptance, E,TB, VU,DU by MICKEY at 8/3/2019  4:41 PM    Comment:  Educated patient on transfer technique using sheet to sit patient up in bed. Educated patient and family on sling.    Acceptance, E, VU,NR by CD at 8/2/2019  3:49 PM    Comment:  SEE FLOW SHEET. SON INSTRUCTED IN LE THER EX, POSITIONING  FOR COMFORT.,    DEISY Weaver D, VU,NR by AA at 7/31/2019  2:46 PM    DEISY Lawson VU,NR by  at 7/25/2019  4:42 PM    Comment:  DTR INSTRUCTED IN ROM EXERCISES, POSITIONING FOR COMFORT/ PROTECTION OF R UE, BENEFITS OF OOB ACTIVITY, D/C PLANNING,                               User Key     Initials Effective Dates Name Provider Type Discipline    CD 06/19/15 -  Gogo Thibodeaux, PT Physical Therapist PT    EJ 11/20/18 -  Nurys Chahal, PT Physical Therapist PT    KM 06/19/15 -  Denise Hdz, PT Physical Therapist PT    AA 04/02/18 -  Mere Ramon, PT Physical Therapist PT     03/26/19 -  Roz Phoenix, PT Physical Therapist PT              PT Recommendation and Plan  Anticipated Discharge Disposition (PT): inpatient rehabilitation facility  Therapy Frequency (PT Clinical Impression): daily  Outcome Summary/Treatment Plan (PT)  Daily Summary of Progress (PT): progress toward functional goals is good  Anticipated Discharge Disposition (PT): inpatient rehabilitation facility  Plan of Care Reviewed With: patient, son  Progress: improving  Outcome Summary: Pt ambulates two bouts of 65 ft with sitting rest between, MIN A with LUE support. Pt limited by spinal stenosis.  Outcome Measures     Row Name 08/05/19 1705 08/05/19 1014 08/04/19 1010       How much help from another person do you currently need...    Turning from your back to your side while in flat bed without using bedrails?  --  2  -EJ  2  -CS    Moving from lying on back to sitting on the side of a flat bed without bedrails?  --  2  -EJ  2  -CS    Moving to and from a bed to a chair (including a wheelchair)?  --  3  -EJ  3  -CS    Standing up from a chair using your arms (e.g., wheelchair, bedside chair)?  --  3  -EJ  3  -CS    Climbing 3-5 steps with a railing?  --  2  -EJ  1  -CS    To walk in hospital room?  --  3  -EJ  3  -CS    AM-PAC 6 Clicks Score (PT)  --  15  -EJ  14  -CS       How much help from another is currently needed...     Putting on and taking off regular lower body clothing?  1  -AR  --  --    Bathing (including washing, rinsing, and drying)  2  -AR  --  --    Toileting (which includes using toilet bed pan or urinal)  2  -AR  --  --    Putting on and taking off regular upper body clothing  2  -AR  --  --    Taking care of personal grooming (such as brushing teeth)  2  -AR  --  --    Eating meals  2  -AR  --  --    AM-PAC 6 Clicks Score (OT)  11  -AR  --  --       Functional Assessment    Outcome Measure Options  AM-PAC 6 Clicks Daily Activity (OT)  -AR  AM-PAC 6 Clicks Basic Mobility (PT)  -EJ  AM-PAC 6 Clicks Basic Mobility (PT)  -CS    Row Name 08/03/19 1610             How much help from another person do you currently need...    Turning from your back to your side while in flat bed without using bedrails?  2  -CS      Moving from lying on back to sitting on the side of a flat bed without bedrails?  2  -CS      Moving to and from a bed to a chair (including a wheelchair)?  2  -CS      Standing up from a chair using your arms (e.g., wheelchair, bedside chair)?  2  -CS      Climbing 3-5 steps with a railing?  1  -CS      To walk in hospital room?  3  -CS      AM-PAC 6 Clicks Score (PT)  12  -CS         Functional Assessment    Outcome Measure Options  AM-PAC 6 Clicks Basic Mobility (PT)  -CS        User Key  (r) = Recorded By, (t) = Taken By, (c) = Cosigned By    Initials Name Provider Type     Nurys Chahal, PT Physical Therapist    Jennifer Mena, OT Occupational Therapist    Roz Engel, CIRA Physical Therapist         Time Calculation:   PT Charges     Row Name 08/06/19 0940             Time Calculation    Start Time  0936  -      PT Received On  08/06/19  -         Time Calculation- PT    Total Timed Code Minutes- PT  30 minute(s)  -         Timed Charges    01684 - PT Therapeutic Exercise Minutes  5  -EJ      30786 - Gait Training Minutes   15  -EJ      15724 - PT Therapeutic Activity Minutes  10   -EJ        User Key  (r) = Recorded By, (t) = Taken By, (c) = Cosigned By    Initials Name Provider Type    Nurys Ny, PT Physical Therapist        Therapy Charges for Today     Code Description Service Date Service Provider Modifiers Qty    74962013125 HC GAIT TRAINING EA 15 MIN 8/5/2019 Nurys Chahal, PT GP 1    00277348134 HC PT THERAPEUTIC ACT EA 15 MIN 8/5/2019 Nurys Chahal, PT GP 1    41301343381 HC GAIT TRAINING EA 15 MIN 8/6/2019 Nurys Chahal, PT GP 1    37837220395 HC PT THERAPEUTIC ACT EA 15 MIN 8/6/2019 Nurys Chahal, PT GP 1    62760549185 HC PT THER SUPP EA 15 MIN 8/6/2019 Nurys Chahal, PT GP 2          PT G-Codes  Outcome Measure Options: AM-PAC 6 Clicks Basic Mobility (PT)  AM-PAC 6 Clicks Score (PT): 16  AM-PAC 6 Clicks Score (OT): 11  Modified [place] Scale: 4 - Moderately severe disability.  Unable to walk without assistance, and unable to attend to own bodily needs without assistance.      Nurys Rodriguez, PT  8/6/2019

## 2019-08-06 NOTE — PROGRESS NOTES
[facility] Medicine Services  PROGRESS NOTE    Patient Name: Vargas De  : 1940  MRN: [id number]    Date of Admission: 2019  Length of Stay: 14  Primary Care Physician: Saba Arrington MD    Subjective   Subjective     CC:  AMS     HPI:  Having some pain in his right arm.  Rates it as a 4 out of 10.  Diarrhea resolved.    Review of Systems  Gen- No fevers, chills  CV- No chest pain, palpitations  Resp- No cough, dyspnea  GI- No N/V/D, abd pain    Otherwise ROS is negative except as mentioned in the HPI.    Objective   Objective     Vital Signs:   Temp:  [97.5 °F (36.4 °C)-98.1 °F (36.7 °C)] 97.5 °F (36.4 °C)  Heart Rate:  [64-80] 78  Resp:  [16-18] 16  BP: ()/(48-84) 138/84  Total (NIH Stroke Scale): 9     Physical Exam:  Constitutional: No acute distress, awake, alert  HENT: NCAT, mucous membranes moist  Respiratory: Clear to auscultation bilaterally, respiratory effort normal on room air  Cardiovascular: RRR, II/VI murmurs, no rubs, or gallops, palpable pedal pulses bilaterally  Gastrointestinal: Positive bowel sounds, soft, nontender, nondistended  Musculoskeletal: trace bilateral ankle edema  Skin: No rashes    Results Reviewed:  I have personally reviewed current lab, radiology, and data and agree.    Results from last 7 days   Lab Units 19  0651 19  0537 19  0647   WBC 10*3/mm3 8.68 6.76 4.45   HEMOGLOBIN g/dL 12.9* 12.4* 8.2*   HEMATOCRIT % 40.5 40.1 26.9*   PLATELETS 10*3/mm3 324 305 182     Results from last 7 days   Lab Units 19  0651 19  0537 19  1801 19  0647  19  0616   SODIUM mmol/L 141 137  --  137  --  137   POTASSIUM mmol/L 3.8 4.1 5.0 3.2*   < > 3.1*   CHLORIDE mmol/L 101 102  --  108*  --  102   CO2 mmol/L 28.0 25.0  --  21.0*  --  23.0   BUN mg/dL 10 7*  --  5*  --  9   CREATININE mg/dL 0.75* 0.64*  --  0.41*  --  0.62*   GLUCOSE mg/dL 103* 95  --  90  --  100*   CALCIUM mg/dL 9.0 8.9  --  7.7*   --  8.2*   ALT (SGPT) U/L  --  52*  --  41  --  46*   AST (SGOT) U/L  --  45*  --  35  --  39    < > = values in this interval not displayed.     Estimated Creatinine Clearance: 81.5 mL/min (A) (by C-G formula based on SCr of 0.75 mg/dL (L)).    Microbiology Results Abnormal     Procedure Component Value - Date/Time    Blood Culture - Blood, Arm, Left [[id number]] Collected:  07/27/19 0152    Lab Status:  Final result Specimen:  Blood from Arm, Left Updated:  08/01/19 0431     Blood Culture No growth at 5 days    Blood Culture - Blood, Hand, Left [[id number]] Collected:  07/27/19 0147    Lab Status:  Final result Specimen:  Blood from Hand, Left Updated:  08/01/19 0245     Blood Culture No growth at 5 days    Urine Culture - Urine, Urine, Clean Catch [[id number]]  (Normal) Collected:  07/28/19 1430    Lab Status:  Final result Specimen:  Urine, Clean Catch Updated:  07/29/19 1856     Urine Culture No growth    Beta Strep Culture, Throat - Swab, Throat [[id number]]  (Normal) Collected:  07/27/19 1630    Lab Status:  Final result Specimen:  Swab from Throat Updated:  07/29/19 1355     Throat Culture, Beta Strep No Beta Hemolytic Streptococcus Isolated    Narrative:       Group A Strep incidence is low in adults. Positive culture for Beta hemolytic Streptococcus species can reflect colonization and not true infection. Please correlate clinically.    Blood Culture - Blood, Arm, Left [[id number]] Collected:  07/23/19 1334    Lab Status:  Final result Specimen:  Blood from Arm, Left Updated:  07/28/19 1415     Blood Culture No growth at 5 days    Blood Culture - Blood, Hand, Right [[id number]] Collected:  07/23/19 1341    Lab Status:  Final result Specimen:  Blood from Hand, Right Updated:  07/28/19 1415     Blood Culture No growth at 5 days    Rapid Strep A Screen - Swab, Throat [[id number]]  (Normal) Collected:  07/27/19 1630    Lab Status:  Final result Specimen:  Swab from Throat Updated:  07/27/19 1727     Strep A Ag  Negative    Narrative:       Test performed by Direct Antigen Testing.          Imaging Results (last 24 hours)     ** No results found for the last 24 hours. **          Results for orders placed during the hospital encounter of 07/23/19   Adult Transthoracic Echo Complete W/ Cont if Necessary Per Protocol    Narrative · Left ventricular systolic function is normal. Estimated EF = 60%.  · Left ventricular diastolic dysfunction (grade I) consistent with   impaired relaxation.  · Left atrial cavity size is borderline dilated.  · There is mild calcification of the aortic valve.          I have reviewed the medications:  Scheduled Meds:    acetaminophen 650 mg Oral Q8H   apixaban 5 mg Oral Q12H   atorvastatin 80 mg Oral Nightly   bisoprolol 5 mg Oral Q24H   diclofenac 2 g Topical 4x Daily   DULoxetine 20 mg Oral Daily   gabapentin 300 mg Oral Nightly   lidocaine 2 patch Transdermal Nightly   memantine 10 mg Oral Q12H   mesalamine 0.75 g Oral Nightly   pantoprazole 40 mg Oral Q AM   predniSONE 5 mg Oral Daily With Breakfast   saccharomyces boulardii 250 mg Oral BID   sodium chloride 3 mL Intravenous Q12H     Continuous Infusions:     PRN Meds:.•  acetaminophen  •  calcium carbonate  •  ipratropium-albuterol  •  loperamide  •  magic mouthwash  •  melatonin  •  phenol  •  potassium chloride  •  potassium chloride  •  [DISCONTINUED] potassium chloride **OR** [DISCONTINUED] potassium chloride **OR** potassium chloride  •  saline  •  sodium chloride  •  sodium chloride      Assessment/Plan   Assessment / Plan     Active Hospital Problems    Diagnosis  POA   • **Acute alteration in mental status [R41.82]  Yes   • Lewy body dementia [G31.83, F02.80]  Yes   • Acute respiratory distress [R06.03]  Yes   • Fracture of proximal end of right humerus [S42.201A]  Yes   • Paroxysmal atrial fibrillation (CMS/HCC) [I48.0]  Yes   • Mixed hyperlipidemia [E78.2]  Yes   • Multiple lacunar infarcts [I63.81]  Yes   • History of stroke with  residual deficit [I69.30]  Not Applicable   • PFO (patent foramen ovale) [Q21.1]  Not Applicable   • GERD without esophagitis [K21.9]  Yes   • Spinal stenosis in cervical region [M48.02]  Yes   • Essential hypertension [I10]  Yes   • Crohn's colitis, with rectal bleeding (CMS/HCC) [K50.111]  Yes      Resolved Hospital Problems   No resolved problems to display.        Brief Hospital Course to date:  Vargas De is a 78 y.o. male with a past medical history significant for ulcerative colitis on chronic prednisone therapy, chronic pain with spinal stenosis of the cervical region, hyperlipidemia, hypertension, PFO, atrial fibrillation ( on Eliquis) mild dementia, and multiple lacunar infarcts who presents with acute change in mental status.  MRI negative for CVA.  EEG negative for seizures.     Sepsis  Acute hypoxic respiratory failure  Likely aspiration   - overall improved; fevers improved and on room air   - Patient was started on zosyn 7/28 - will transition to Augmentin to complete 7 days   - BCx NGTD; strep negative   -Patient with swollen uvula with exudate.  CT scan without contrast shows narrowing of his airway. The scan without contrast not ordered as patient with iodine allergy; ENT eval; cont PEEP if needed; diminished oropharyngeal sensation  - Code status changed to DNR   - Speech eval 7/29 with modified diet; continue to monitor   - Palliative following       AMS  Suspected Lewy body dementia  - improved with discontinuation of Seroquel and narcotics   -  Neurology followed;  etiology likely worsening of lewy body dementia in setting of sleep deprivation, pain as well as recent fracture   - continue home statin, ASA  - Will resume home gabapentin qHS only - patient states this helped him rest     Antibiotic associated diarrhea  -No concern for C. difficile, no abdominal pain on exam stable.  -Diarrhea resolved off antibiotics     Dysphasia  - improving;  mental status is improving  - Speech eval 7/29  with diet modifications  -Fees tomorrow.    Loose stools   - no WBC; no abd pain; monitor and consider c.diff testing      Right Humerus Fracture:  - consult to ortho, non-operative management, therapy as tolerated, appreciate assistance   - continue with therapy as above  - Dr. Steinberg follow up in 10 days   -Nerve block discontinued.  Pain controlled on Tylenol.     PAF:  - sinus on admission  - [id number] = 5. On Eliquis.      Junctional rhythml; tachycardia  - Resume bisoprolol; cards consult and recommend continued beta blocker   - K replaced; check Mg      Frequent urination   - UA ok; PVR ok -- overall improving      Right wrist pain  -imaging with no acute fracture      History of CVA  - workup in May and CVA felt to be embolic; follows with Dr. Gutierrez and Dr. Maldonado. Has been on Eliquis and ASA discontinued       Chronic steroid:  - on 5 mg daily for Crohn's/lumbar stenosis. Currently being weaned down, to eventually discontinue; will resume home prednisone       HTN:  -Blood pressure has been on the low side.  Holding amlodipine and lisinopril for now.        DVT prophylaxis:   Eliquis     Disposition: I expect the patient to be discharged [facility] in the next few days.  Has been referred.      CODE STATUS:   Code Status and Medical Interventions:   Ordered at: 07/28/19 1018     Limited Support to NOT Include:    Intubation     Level Of Support Discussed With:    Health Care Surrogate    Next of Kin (If No Surrogate)     Code Status:    No CPR     Medical Interventions (Level of Support Prior to Arrest):    Limited         Electronically signed by Minnie Colbert MD, 08/06/19, 10:46 AM.

## 2019-08-06 NOTE — MBS/VFSS/FEES
Acute Care - Speech Language Pathology   Swallow Initial Evaluation [facility]   Fiberoptic Endoscopic Evaluation of Swallowing (FEES)     Patient Name: Vargas De  : 1940  MRN: [id number]  Today's Date: 2019  Onset of Illness/Injury or Date of Surgery: 19            Admit Date: 2019    Visit Dx:     ICD-10-CM ICD-9-CM   1. Acute alteration in mental status R41.82 780.97   2. Generalized weakness R53.1 780.79   3. Other closed displaced fracture of proximal end of right humerus with routine healing, subsequent encounter S42.291D V54.11   4. Elevated blood pressure reading with diagnosis of hypertension I10 401.9   5. Impaired functional mobility, balance, gait, and endurance Z74.09 V49.89   6. Impaired mobility and ADLs Z74.09 799.89   7. Cognitive communication deficit R41.841 799.52   8. Oropharyngeal dysphagia R13.12 787.22     Patient Active Problem List   Diagnosis   • Peripheral neuropathy   • Essential hypertension   • Crohn's colitis, with rectal bleeding (CMS/HCC)   • Major depressive disorder with single episode, in partial remission (CMS/HCC)   • Macular degeneration of both eyes   • GERD without esophagitis   • Cervicalgia   • Spinal stenosis in cervical region   • Chronic right-sided low back pain without sciatica   • Osteoarthritis of toe joint, right   • PFO (patent foramen ovale)   • Ulcerative colitis (CMS/HCC)   • Body mass index (BMI) of 25.0 to 29.9   • History of stroke with residual deficit   • Mixed hyperlipidemia   • Multiple lacunar infarcts   • Paroxysmal atrial fibrillation (CMS/HCC)   • Chronic bilateral low back pain   • Acute alteration in mental status   • Fracture of proximal end of right humerus   • Acute respiratory distress   • Lewy body dementia     Past Medical History:   Diagnosis Date   • Allergic arthritis    • Arthritis    • BPH (benign prostatic hyperplasia)      urology   • BPH/nocturia/microhematuria (work up negative)    • chronic hearing  loss left ear    • Crohn's colitis (CMS/HCC)     on colonoscopy 7/15.2017.  [facility]-continue Lialda   • Depression     medication, after death of wife 2014   • Elbow pain 7/25/2018   • Gout    • Hearing loss     left ear   • History of stroke 7/23/2018   • Hyperlipidemia    • Hypertension    • Ingrown toenail 03/18/2018   • Low back pain    • Macular degeneration    • Macular degeneration    • Multiple lacunar infarcts     noted on CT 2/2019   • Neck pain    • Rash 1/28/2019   • Rib pain on left side 07/11/2017   • Septic bursitis of elbow, right 7/23/2018   • Spinal stenosis     cervical and lumbosacral spine   • Spinal stenosis-cervical and lumbosacral spine    • Stroke (CMS/HCC)     aspirin, blood pressure conyrol.  Infarct frontal lobe.  R sided weakness, R hand and R foot numbness,dysphasia with stuttering.   • Stroke (CMS/HCC) 02/27/2019    MCA infarct with L facial droop and L sided weakness,dysphasia,dysarthria   • TIA (transient ischemic attack) 2/27/2019   • Ulcerative colitis (CMS/HCC)    • Urinary frequency      Past Surgical History:   Procedure Laterality Date   • CATARACT EXTRACTION     • COLONOSCOPY  07/15/2017   • HEMORRHOIDECTOMY     • HERNIA REPAIR     • KNEE SURGERY     • TONSILLECTOMY          SWALLOW EVALUATION (last 72 hours)      SLP Adult Swallow Evaluation     Row Name 08/06/19 1420                   Rehab Evaluation    Document Type  re-evaluation  -MP        Subjective Information  no complaints  -MP        Patient Observations  alert;cooperative  -MP        Patient/Family Observations  Son present  -MP        Patient Effort  good  -MP           General Information    Patient Profile Reviewed  yes  -MP        Pertinent History Of Current Problem  Adm 7/23 w/ acute alteration in mental status. . MRI negative. Thought to be encephalopathic w/ concern for Lewy-body dz per neuro. Hx mult CVAs involving BG, thalamus, brainstem. Most recent in 3/2019. hx recent fall w/ R arm fx, GERD, dementia, OA.  Pt has been on lvl IV diet & HTL since most recent FEES 7/31.  -MP        Current Method of Nutrition  mechanical soft, no mixed consistencies;honey-thick liquids  -MP        Precautions/Limitations, Hearing  WFL;for purposes of eval  -MP        Prior Level of Function-Communication  other (see comments);cognitive-linguistic impairment ? Lewy body dementia @ baseline  -MP        Prior Level of Function-Swallowing  -- hx dysphagia w/ last CVA, resolved in rehab  -MP        Plans/Goals Discussed with  patient and family;agreed upon  -MP        Barriers to Rehab  medically complex;previous functional deficit  -MP        Patient's Goals for Discharge  return to regular diet  -MP        Family Goals for Discharge  patient able to return to regular diet  -MP           Pain Assessment    Additional Documentation  Pain Scale: FACES Pre/Post-Treatment (Group)  -MP           Pain Scale: FACES Pre/Post-Treatment    Pain: FACES Scale, Pretreatment  2-->hurts little bit  -MP        Pain: FACES Scale, Post-Treatment  2-->hurts little bit  -MP           Fiberoptic Endoscopic Evaluation of Swallowing (FEES)    Risks/Benefits Reviewed  risks/benefits explained;patient;family;agreed to eval  -MP        Nasal Entry  right:  -MP        Special Considerations  Scope #[id number]  -MP           Anatomy and Physiology    Anatomic Considerations  no anatomic structural deviation  -MP        Velopharyngeal  WFL  -MP        Base of Tongue  symmetrical  -MP        Epiglottis  omega shape  -MP        Laryngeal Function Breathing  symmetrical  -MP        Laryngeal Function Phonation  symmetrical  -MP        Laryngeal Function to Breath Hold  incomplete closure  -MP        Secretion Rating Scale (Mohan et al. 1996)  0- normal, no visible secretions  -MP        Ice Chips  DNA  -MP        Spontaneous Swallow  frequency reduced  -MP        Sensory  sensed scope  -MP        Utensils Used  Spoon;Cup;Straw  -MP        Consistencies Trialed  thin  liquids;pudding/puree;Regular textures  -MP           FEES Interpretation    Oral Phase  prolonged manipulation;with solids only  -MP           Initiation of Pharyngeal Swallow    Initiation of Pharyngeal Swallow  bolus in pyriform sinuses  -MP        Pharyngeal Phase  impaired pharyngeal phase of swallowing  -MP        Penetration During the Swallow  thin liquids;secondary to delayed swallow initiation or mistiming;secondary to reduced laryngeal elevation;secondary to reduced vestibular closure  -MP        Rosenbek's Scale  thin:;5-->Level 5  -MP        Residue  pudding/puree;regular Textures;secondary to reduced base of tongue retraction;secondary to reduced posterior pharyngeal wall stripping;secondary to reduced laryngeal elevation;secondary to reduced hyolaryngeal excursion  -MP        Response to Residue  cleared residue;with liquid wash;other (see comments) or alternating consistencies  -MP        Attempted Compensatory Maneuvers  chin tuck;alternate liquids/solids  -MP        Response to Attempted Compensatory Maneuvers  prevented penetration;reduced residue  -MP        FEES Summary  Pt presents w/ mild-moderate pharyngeal dysphagia. Deep penetration to the level of the true vocal folds w/ thin liquid. Did not sense but was able to clear w/ cued cough. Penetration eliminated w/ small cup sip + chin tuck. Moderate diffuse pharyngeal residue w/ pudding & solid that cleared w/ liquid wash or alternating consistencies. Recommend: soft, whole diet (discussed w/ family/pt avoiding mixed consistencies as much as possible), & thin liquid via small single cup sips + chin tuck. No straws. Meds whole or crushed in puree. SLP will continue to follow for tx.  -MP           Clinical Impression    SLP Swallowing Diagnosis  mild-moderate;pharyngeal dysfunction  -MP        Functional Impact  risk of aspiration/pneumonia  -MP        Rehab Potential/Prognosis, Swallowing  good, to achieve stated therapy goals  -MP         Swallow Criteria for Skilled Therapeutic Interventions Met  demonstrates skilled criteria  -MP           Recommendations    Therapy Frequency (Swallow)  5 days per week  -MP        Predicted Duration Therapy Intervention (Days)  until discharge  -MP        SLP Diet Recommendation  soft textures;whole;other (see comments);thin liquids avoid mixed consistencies as much as able  -MP        Recommended Precautions and Strategies  upright posture during/after eating;small bites of food and sips of liquid;no straw;chin Tuck;other (see comments) drain mixed consistencies  -MP        SLP Rec. for Method of Medication Administration  meds whole;meds crushed;with pudding or applesauce  -MP        Monitor for Signs of Aspiration  yes;notify SLP if any concerns  -MP        Anticipated Dischage Disposition  inpatient rehabilitation facility;anticipate therapy at next level of care  -MP          User Key  (r) = Recorded By, (t) = Taken By, (c) = Cosigned By    Initials Name Effective Dates    Bret Willoughby MS CCC-SLP 06/19/19 -           EDUCATION  The patient has been educated in the following areas:   Dysphagia (Swallowing Impairment) Modified Diet Instruction.    SLP Recommendation and Plan  SLP Swallowing Diagnosis: mild-moderate, pharyngeal dysfunction  SLP Diet Recommendation: soft textures, whole, other (see comments), thin liquids(avoid mixed consistencies as much as able)  Recommended Precautions and Strategies: upright posture during/after eating, small bites of food and sips of liquid, no straw, chin Tuck, other (see comments)(drain mixed consistencies)  SLP Rec. for Method of Medication Administration: meds whole, meds crushed, with pudding or applesauce     Monitor for Signs of Aspiration: yes, notify SLP if any concerns     Swallow Criteria for Skilled Therapeutic Interventions Met: demonstrates skilled criteria  Anticipated Dischage Disposition: inpatient rehabilitation facility, anticipate therapy at next  level of care  Rehab Potential/Prognosis, Swallowing: good, to achieve stated therapy goals  Therapy Frequency (Swallow): 5 days per week  Predicted Duration Therapy Intervention (Days): until discharge       Plan of Care Reviewed With: patient, son  Plan of Care Review  Plan of Care Reviewed With: patient, son  Daily Summary of Progress (SLP): progress toward functional goals as expected  Plan for Continued Treatment (SLP): Received call from RN this AM reporting that family had questions re: repeat FEES. Spoke w/ son @ bedside re: plan for repeat instrumental. In agreement to repeat FEES on Tuesday 8/6, which will be 5 days post most recent FEES, allowing adequate/optimal time for improvement. Encouraged pt to continue independent practice of dysphagia exercises until repeat FEES.    SLP GOALS     Row Name 08/06/19 1420 08/04/19 1450          Oral Nutrition/Hydration Goal 1 (SLP)    Oral Nutrition/Hydration Goal 1, SLP  LTG: Pt will tolerate soft diet & thin liquid via small cup sips & chin tuck w/ no overt s/sxs aspiration or distress w/ 100% acc and no cues  -MP  LTG: Pt will return to regular diet & thin liquid w/o s/sxs aspiration w/ 100% acc w/o cues.  -MP     Time Frame (Oral Nutrition/Hydration Goal 1, SLP)  by discharge  -MP  by discharge  -MP     Barriers (Oral Nutrition/Hydration Goal 1, SLP)  --  Discussed repeat FEES w/ son & pt @ beside. Will plan for repeat FEES Tuesday 8/6  -MP     Progress/Outcomes (Oral Nutrition/Hydration Goal 1, SLP)  --  continuing progress toward goal  -MP        Oral Nutrition/Hydration Goal 2 (SLP)    Oral Nutrition/Hydration Goal 2, SLP  Pt will tolerate soft solids & thin liquids via small single cups sips + chin tuck w/ no overt s/sxs aspiration or distress w/ 100% acc and no cues  -MP  Pt will tolerate trials of soft solids & honey-thick liquids w/o s/sxs aspiration w/ 100% acc w/o cues.  -MP     Time Frame (Oral Nutrition/Hydration Goal 2, SLP)  short term goal (STG);by  discharge  -MP  short term goal (STG);by discharge  -MP     Barriers (Oral Nutrition/Hydration Goal 2, SLP)  --  Pt lethargic, deferred PO trials  -MP     Progress/Outcomes (Oral Nutrition/Hydration Goal 2, SLP)  --  goal ongoing  -MP        Lingual Strengthening Goal 1 (SLP)    Activity (Lingual Strengthening Goal 1, SLP)  increase tongue back strength  -MP  increase tongue back strength  -MP     Increase Tongue Back Strength  lingual resistance exercises  -MP  lingual resistance exercises  -MP     [place]/Accuracy (Lingual Strengthening Goal 1, SLP)  with minimal cues (75-90% accuracy)  -MP  with minimal cues (75-90% accuracy)  -MP     Time Frame (Lingual Strengthening Goal 1, SLP)  short term goal (STG);by discharge  -MP  short term goal (STG);by discharge  -MP     Progress/Outcomes (Lingual Strengthening Goal 1, SLP)  goal no longer appropriate  -MP  goal ongoing  -MP        Pharyngeal Strengthening Exercise Goal 1 (SLP)    Activity (Pharyngeal Strengthening Goal 1, SLP)  increase timing;increase superior movement of the hyolaryngeal complex;increase anterior movement of the hyolaryngeal complex;increase closure at entrance to airway/closure of airway at glottis;increase squeeze/positive pressure generation;increase tongue base retraction  -MP  increase timing;increase superior movement of the hyolaryngeal complex;increase anterior movement of the hyolaryngeal complex;increase closure at entrance to airway/closure of airway at glottis;increase squeeze/positive pressure generation;increase tongue base retraction  -MP     Increase Timing  prepping - 3 second prep or suck swallow or 3-step swallow  -MP  prepping - 3 second prep or suck swallow or 3-step swallow  -MP     Increase Superior Movement of the Hyolaryngeal Complex  effortful pitch glide (falsetto + pharyngeal squeeze);Mendelsohn;falsetto  -MP  falsetto  -MP     Increase Anterior Movement of the Hyolaryngeal Complex  chin tuck against resistance  (CTAR)  -MP  chin tuck against resistance (CTAR)  -MP     Increase Closure at Entrance to Airway/Closure of Airway at Glottis  super-supraglottic swallow  -MP  super-supraglottic swallow  -MP     Increase Squeeze/Positive Pressure Generation  hard effortful swallow  -MP  hard effortful swallow  -MP     Increase Tongue Base Retraction  nabila  -MP  nabila  -MP     [place]/Accuracy (Pharyngeal Strengthening Goal 1, SLP)  with minimal cues (75-90% accuracy)  -MP  with minimal cues (75-90% accuracy)  -MP     Time Frame (Pharyngeal Strengthening Goal 1, SLP)  short term goal (STG);by discharge  -MP  short term goal (STG);by discharge  -MP     Barriers (Pharyngeal Strengthening Goal 1, SLP)  --  Encouraged independent practice  -MP     Progress/Outcomes (Pharyngeal Strengthening Goal 1, SLP)  --  continuing progress toward goal  -MP        Swallow Management Recall Goal 1 (SLP)    Activity (Swallow Management Recall Goal 1, SLP)  recall of;compensatory swallow strategies/techniques  -MP  --     [place]/Accuracy (Swallow Management Recall Goal 1, SLP)  with minimal cues (75-90% accuracy)  -MP  --     Time Frame (Swallow Management Recall Goal 1, SLP)  short term goal (STG);by discharge  -MP  --        Swallow Compensatory Strategies Goal 1 (SLP)    Activity (Swallow Compensatory Strategies/Techniques Goal 1, SLP)  compensatory strategies;small cup sips;chin tuck posture;alternate food/liquid intake;during p.o. trials;during meal intake  -MP  compensatory strategies;during meal intake;during p.o. trials;small cup sips;small straw sips;alternate food/liquid intake;extra swallow per bolus  -MP     [place]/Accuracy (Swallow Compensatory Strategies/Techniques Goal 1, SLP)  with minimal cues (75-90% accuracy)  -MP  independently (over 90% accuracy)  -MP     Time Frame (Swallow Compensatory Strategies/Techniques Goal 1, SLP)  short term goal (STG);by discharge  -MP  short term goal (STG);by discharge  -MP      Progress/Outcomes (Swallow Compensatory Strategies/Techniques Goal 1, SLP)  --  goal ongoing  -MP        Comprehend Questions Goal 1 (SLP)    Improve Ability to Comprehend Questions Goal 1 (SLP)  --  complex yes/no questions;80%;with minimal cues (75-90%)  -MP     Time Frame (Comprehend Questions Goal 1, SLP)  --  short term goal (STG);by discharge  -MP     Progress/Outcomes (Comprehend Questions Goal 1, SLP)  --  goal ongoing  -MP        Word Retrieval Skills Goal 1 (SLP)    Improve Word Retrieval Skills By Goal 1 (SLP)  --  completing functional word finding tasks;80%;with minimal cues (75-90%)  -MP     Time Frame (Word Retrieval Goal 1, SLP)  --  short term goal (STG);by discharge  -MP     Progress/Outcomes (Word Retrieval Goal 1, SLP)  --  goal ongoing  -MP        Ability to Construct Phrase and Sentence Level Response Goal 1 (SLP)    Improve Ability to Construct Phrase and Sentence Level Responses By Goal 1 (SLP)  --  answering question with phrase;constructing a sentence with a key word;80%;with minimal cues (75-90%)  -MP     Time Frame (Phrase and Sentence Level Response Goal 1, SLP)  --  short term goal (STG);by discharge  -MP     Progress/Outcomes (Phrase and Sentence Level Response Goal 1, SLP)  --  goal ongoing  -MP        Orientation Goal 1 (SLP)    Improve Orientation Through Goal 1 (SLP)  --  demonstrating orientation to month;demonstrating orientation to place;80%;independently (over 90% accuracy)  -MP     Time Frame (Orientation Goal 1, SLP)  --  short term goal (STG);by discharge  -MP     Progress/Outcomes (Orientation Goal 1, SLP)  --  goal ongoing  -MP        Additional Goal 1 (SLP)    Additional Goal 1, SLP  --  LTG: Pt will improve cognitive-communicaiton skills in order to participate in care in hospital setting w/ 100% acc w/o cues.  -MP     Time Frame (Additional Goal 1, SLP)  --  by discharge  -MP     Progress/Outcomes (Additional Goal 1, SLP)  --  goal ongoing  -MP       User Key  (r) =  Recorded By, (t) = Taken By, (c) = Cosigned By    Initials Name Provider Type    Bret Willoughby MS CCC-SLP Speech and Language Pathologist             Time Calculation:   Time Calculation- SLP     Row Name 08/06/19 1602             Time Calculation- SLP    SLP Start Time  1420  -MP      SLP Received On  08/06/19  -        User Key  (r) = Recorded By, (t) = Taken By, (c) = Cosigned By    Initials Name Provider Type    Bret Willoughby MS CCC-SLP Speech and Language Pathologist          Therapy Charges for Today     Code Description Service Date Service Provider Modifiers Qty    94076000483 HC ST FIBEROPTIC ENDO EVAL SWALL 6 8/6/2019 Bret Burkett MS CCC-SLP GN 1               Bret Burkett MS CCC-CRIS  8/6/2019

## 2019-08-06 NOTE — PLAN OF CARE
Problem: Patient Care Overview  Goal: Plan of Care Review  Outcome: Ongoing (interventions implemented as appropriate)   08/06/19 0144   Coping/Psychosocial   Plan of Care Reviewed With patient   Plan of Care Review   Progress improving   OTHER   Outcome Summary Pt ambulates two bouts of 65 ft with sitting rest between, MIN A with LUE support. Pt limited by spinal stenosis.

## 2019-08-06 NOTE — PLAN OF CARE
Problem: Patient Care Overview  Goal: Plan of Care Review  Outcome: Ongoing (interventions implemented as appropriate)   08/06/19 1400   Coping/Psychosocial   Plan of Care Reviewed With patient;son   OTHER   Outcome Summary Pt currently reports less RUE pain, tolerated elbow, wrist, hand ex on R side. Continue OT.

## 2019-08-06 NOTE — THERAPY PROGRESS REPORT/RE-CERT
Acute Care - Occupational Therapy Treatment Note  [facility]     Patient Name: Vargas De  : 1940  MRN: [id number]  Today's Date: 2019  Onset of Illness/Injury or Date of Surgery: 19  Date of Referral to OT: 19       Admit Date: 2019       ICD-10-CM ICD-9-CM   1. Acute alteration in mental status R41.82 780.97   2. Generalized weakness R53.1 780.79   3. Other closed displaced fracture of proximal end of right humerus with routine healing, subsequent encounter S42.291D V54.11   4. Elevated blood pressure reading with diagnosis of hypertension I10 401.9   5. Impaired functional mobility, balance, gait, and endurance Z74.09 V49.89   6. Impaired mobility and ADLs Z74.09 799.89   7. Cognitive communication deficit R41.841 799.52   8. Oropharyngeal dysphagia R13.12 787.22     Patient Active Problem List   Diagnosis   • Peripheral neuropathy   • Essential hypertension   • Crohn's colitis, with rectal bleeding (CMS/HCC)   • Major depressive disorder with single episode, in partial remission (CMS/HCC)   • Macular degeneration of both eyes   • GERD without esophagitis   • Cervicalgia   • Spinal stenosis in cervical region   • Chronic right-sided low back pain without sciatica   • Osteoarthritis of toe joint, right   • PFO (patent foramen ovale)   • Ulcerative colitis (CMS/HCC)   • Body mass index (BMI) of 25.0 to 29.9   • History of stroke with residual deficit   • Mixed hyperlipidemia   • Multiple lacunar infarcts   • Paroxysmal atrial fibrillation (CMS/HCC)   • Chronic bilateral low back pain   • Acute alteration in mental status   • Fracture of proximal end of right humerus   • Acute respiratory distress   • Lewy body dementia     Past Medical History:   Diagnosis Date   • Allergic arthritis    • Arthritis    • BPH (benign prostatic hyperplasia)      urology   • BPH/nocturia/microhematuria (work up negative)    • chronic hearing loss left ear    • Crohn's colitis (CMS/HCC)     on  colonoscopy 7/15.2017.  UC-continue Lialda   • Depression     medication, after death of wife 2014   • Elbow pain 7/25/2018   • Gout    • Hearing loss     left ear   • History of stroke 7/23/2018   • Hyperlipidemia    • Hypertension    • Ingrown toenail 03/18/2018   • Low back pain    • Macular degeneration    • Macular degeneration    • Multiple lacunar infarcts     noted on CT 2/2019   • Neck pain    • Rash 1/28/2019   • Rib pain on left side 07/11/2017   • Septic bursitis of elbow, right 7/23/2018   • Spinal stenosis     cervical and lumbosacral spine   • Spinal stenosis-cervical and lumbosacral spine    • Stroke (CMS/HCC)     aspirin, blood pressure conyrol.  Infarct frontal lobe.  R sided weakness, R hand and R foot numbness,dysphasia with stuttering.   • Stroke (CMS/HCC) 02/27/2019    MCA infarct with L facial droop and L sided weakness,dysphasia,dysarthria   • TIA (transient ischemic attack) 2/27/2019   • Ulcerative colitis (CMS/HCC)    • Urinary frequency      Past Surgical History:   Procedure Laterality Date   • CATARACT EXTRACTION     • COLONOSCOPY  07/15/2017   • HEMORRHOIDECTOMY     • HERNIA REPAIR     • KNEE SURGERY     • TONSILLECTOMY         Therapy Treatment    Rehabilitation Treatment Summary     Row Name 08/05/19 1705 08/05/19 1014          Treatment Time/Intention    Discipline  occupational therapist  -AR  physical therapist  -EJ     Document Type  progress note/recertification  -AR  therapy note (daily note)  -EJ     Subjective Information  complains of;pain  -AR  --     Mode of Treatment  occupational therapy  -AR  physical therapy  -EJ     Patient/Family Observations  pt supine, family at bedside  -AR  --     Patient Effort  good  -AR  good  -EJ     Existing Precautions/Restrictions  fall;right;shoulder;non-weight bearing;other (see comments) Srinivasan Nichols II, non-op mgmt of FX  -AR  fall RUE NWB Sling 2/2 humerus fx  -EJ     Equipment Issued to Patient  -- gel coldpack, educated RN on PRN use  for pain  -AR2  --     Recorded by [AR] Jennifer Aguilar, OT 08/05/19 2034  [AR2] Jennifer Aguilar, OT 08/05/19 2039 [EJ] Nurys Chahal, PT 08/05/19 1149     Row Name 08/05/19 1705 08/05/19 1014          Cognitive Assessment/Intervention- PT/OT    Affect/Mental Status (Cognitive)  WFL  -AR  --     Orientation Status (Cognition)  --  oriented x 3  -EJ     Follows Commands (Cognition)  follows one step commands;75-90% accuracy  -AR  follows one step commands;over 90% accuracy  -EJ     Cognitive Function (Cognitive)  memory deficit;safety deficit  -AR  safety deficit  -EJ     Memory Deficit (Cognitive)  moderate deficit  -AR  --     Safety Deficit (Cognitive)  moderate deficit  -AR  safety precautions follow-through/compliance;insight into deficits/self awareness  -EJ     Personal Safety Interventions  fall prevention program maintained  -AR  fall prevention program maintained;gait belt;nonskid shoes/slippers when out of bed  -EJ     Recorded by [AR] Jennifer Aguilar, OT 08/05/19 2034 [EJ] Nurys Chahal, PT 08/05/19 1149     Row Name 08/05/19 1014             Safety Issues, Functional Mobility    Impairments Affecting Function (Mobility)  balance;coordination;endurance/activity tolerance;pain  -EJ      Recorded by [EJ] Nurys Chahal, PT 08/05/19 1149      Row Name 08/05/19 1705             Mobility Assessment/Intervention    Extremity Weight-bearing Status  right upper extremity  -AR      Right Upper Extremity (Weight-bearing Status)  non weight-bearing (NWB)  -AR      Recorded by [AR] Jennifer Aguilar, OT 08/05/19 2034      Row Name 08/05/19 1705 08/05/19 1014          Bed Mobility Assessment/Treatment    Supine-Sit [place] (Bed Mobility)  --  set up;contact guard  -EJ     Assistive Device (Bed Mobility)  --  bed rails;head of bed elevated  -EJ     Comment (Bed Mobility)  Educated pt and family on importance of maintaining NWB RUE AAT, reviewed safe sleeping positions  -AR  --      Recorded by [AR] Jennifer Aguilar, OT 08/05/19 2034 [EJ] Nurys Chahal, PT 08/05/19 1149     Row Name 08/05/19 1014             Transfer Assessment/Treatment    Transfer Assessment/Treatment  sit-stand transfer;stand-sit transfer  -EJ      Recorded by [EJ] Nurys Chahal, PT 08/05/19 1149      Row Name 08/05/19 1014             Sit-Stand Transfer    Sit-Stand [place] (Transfers)  contact guard  -EJ      Assistive Device (Sit-Stand Transfers)  -- R UE in sling and push off with L UE  -EJ      Recorded by [EJ] Nurys Chahal, PT 08/05/19 1149      Row Name 08/05/19 1014             Stand-Sit Transfer    Stand-Sit [place] (Transfers)  minimum assist (75% patient effort);1 person assist cuing to reach back for chair with L UE  -EJ      Recorded by [EJ] Nurys Chahal, PT 08/05/19 1149      Row Name 08/05/19 1014             Gait/Stairs Assessment/Training    12291 - Gait Training Minutes   15  -EJ      Gait/Stairs Assessment/Training  gait/ambulation independence;distance ambulated  -EJ      [place] Level (Gait)  minimum assist (75% patient effort);1 person assist;1 person to manage equipment Son performed chair follow   -EJ      Assistive Device (Gait)  -- LUE support  -EJ      Distance in Feet (Gait)  150', sitting rest due to dizziness, second 150' bout.   -EJ      Pattern (Gait)  step-through  -EJ      Deviations/Abnormal Patterns (Gait)  stride length decreased;huber decreased Cuing to increase stride length  -EJ      Bilateral Gait Deviations  forward flexed posture;heel strike decreased  -EJ      Comment (Gait/Stairs)  followed with chair. Pt cued for upright posture.  -EJ      Recorded by [EJ] Nurys Chahal, PT 08/05/19 1149      Row Name 08/05/19 1705             ADL Assessment/Intervention    03584 - OT Self Care/Mgmt Minutes  28  -AR      BADL Assessment/Intervention  bathing;upper body dressing  -AR      Recorded by [AR] Jennifer Aguilar, OT 08/05/19 2034      Row  Name 08/05/19 1705             Bathing Assessment/Intervention    Comment (Bathing)  Educated pt and family on right shoudler precautions, right axilla care to maintain  -AR      Recorded by [AR] Jennifer Aguilar, OT 08/05/19 2034      Row Name 08/05/19 1705             Upper Body Dressing Assessment/Training    Upper Body Dressing [place] Level  doff;don;dependent (less than 25% patient effort) RUE sling  -AR      Upper Body Dressing Position  supine  -AR      Comment (Upper Body Dressing)  Educated pt and family on right shoulder precautions, ADL retraining to maintain, sling management including application/wear schedule and proper fit. Issued handouts w/pictures and card with internet link to video on sling application.    -AR      Recorded by [AR] Jennifer Aguilar, OT 08/05/19 2034      Row Name 08/05/19 1705 08/05/19 1014          Therapeutic Exercise    10064 - PT Therapeutic Exercise Minutes  --  1  -EJ     39774 - PT Therapeutic Activity Minutes  --  7  -EJ     61800 - OT Therapeutic Exercise Minutes  15  -AR  --     Recorded by [AR] Jennifer Aguilar, OT 08/05/19 2034 [EJ] Nurys Chahal, PT 08/05/19 1149     Row Name 08/05/19 1705 08/05/19 1014          Therapeutic Exercise    Upper Extremity Range of Motion (Therapeutic Exercise)  elbow flexion/extension, right;forearm supination/pronation, right;wrist flexion/extension, right scapular retractions- bilat  -AR  --     Hand (Therapeutic Exercise)  finger flexion/extension, right  -AR  --     Lower Extremity (Therapeutic Exercise)  --  SLR (straight leg raise), bilateral  -EJ     Exercise Type (Therapeutic Exercise)  AROM (active range of motion);AAROM (active assistive range of motion)  -AR  --     Position (Therapeutic Exercise)  supine  -AR  --     Sets/Reps (Therapeutic Exercise)  1/15  -AR  2  -EJ     Comment (Therapeutic Exercise)  Issued and reviewed written RUE HEP per physician guidelines  -AR  Pt and son agreeable to continue  after tx.  -EJ     Recorded by [AR] Jennifer Aguilar, OT 08/05/19 2034 [EJ] Nurys Chahal, PT 08/05/19 1149     Row Name 08/05/19 1705 08/05/19 1014          Positioning and Restraints    Pre-Treatment Position  in bed  -AR  in bed  -EJ     Post Treatment Position  bed  -AR  chair  -EJ     In Bed  supine;call light within reach;encouraged to call for assist;exit alarm on;with family/caregiver;with brace cold pack applied R shoulder  -AR  --     In Chair  --  notified nsg;reclined;call light within reach;encouraged to call for assist;with family/caregiver;exit alarm on;RUE elevated  -EJ     Recorded by [AR] Jennifer Aguilar, OT 08/05/19 2034 [EJ] Nurys Chahal, PT 08/05/19 1149     Row Name 08/05/19 1705 08/05/19 1014          Pain Scale: Numbers Pre/Post-Treatment    Pain Scale: Numbers, Pretreatment  --  2/10  -EJ     Pain Scale: Numbers, Post-Treatment  --  3/10  -EJ     Pain Location - Side  Right  -AR  Right  -EJ     Pain Location - Orientation  anterior  -AR  upper  -EJ     Pain Location  shoulder  -AR  extremity  -EJ     Pain Intervention(s)  Cold applied;Repositioned  -AR  Repositioned;Ambulation/increased activity  -EJ     Recorded by [AR] Jennifer Augilar, OT 08/05/19 2034 [EJ] Nurys Chahal, PT 08/05/19 1149     Row Name 08/05/19 1705             Pain Scale: FACES Pre/Post-Treatment    Pain: FACES Scale, Pretreatment  2-->hurts little bit  -AR      Pain: FACES Scale, Post-Treatment  2-->hurts little bit  -AR      Recorded by [AR] Jennifer Aguilar, OT 08/05/19 2034      Row Name 08/05/19 1705             Orthotic/Prosthetic Management    Orthosis Location  upper extremity orthosis  -AR      Recorded by [AR] Jennifer Aguilar, OT 08/05/19 2034      Row Name 08/05/19 1705             Upper Extremity Orthosis Management    Type (Upper Extremity Orthosis)  Srinivasan Ultra II sling  -AR      Functional Design (Upper Extremity Orthosis)  static orthosis  -AR      Therapeutic Indications  (Upper Extremity Orthosis)  stabilization and support  -AR      Wearing Schedule (Upper Extremity Orthosis)  remove for exercise;remove for hygiene/bathing  -AR      Orthosis Training (Upper Extremity Orthosis)  patient and caregiver;activity limitations/precautions;purpose/goals of orthosis;sleeping precautions;wearing schedule;able to verbalize training  -AR      Recorded by [AR] Jennifer Aguilar, OT 08/05/19 2034      Row Name                Wound 07/23/19 2130 Right anterior toe pressure injury    Wound - Properties Group Date first assessed: 07/23/19 [DB] Time first assessed: 2130 [DB] Present On Admission : yes [DB] Side: Right [DB] Orientation: anterior [DB] Location: toe [DB] Type: pressure injury [DB] Stage, Pressure Injury: Stage 1 [DB] Recorded by:  [DB] Henok Marie RN 07/24/19 0229    Row Name                Wound 07/20/19 1649 Left hand skin tear    Wound - Properties Group Date first assessed: 07/20/19 [SW] Time first assessed: 1649 [SW] Present On Admission : yes [SW] Side: Left [SW] Location: hand [SW] Type: skin tear [SW] Recorded by:  [SW] Odilia Joy RN 07/20/19 1649    Row Name                Wound 07/20/19 1648 Right knee abrasion    Wound - Properties Group Date first assessed: 07/20/19 [SW] Time first assessed: 1648 [SW] Present On Admission : yes [SW] Side: Right [SW] Location: knee [SW] Type: abrasion [SW] Recorded by:  [SW] Odilia Joy RN 07/20/19 1648    Row Name 08/05/19 1014             Coping    Observed Emotional State  accepting;calm;cooperative  -EJ      Verbalized Emotional State  acceptance  -EJ      Recorded by [EJ] Nurys Chahal, PT 08/05/19 1149      Row Name 08/05/19 1705 08/05/19 1014          Plan of Care Review    Plan of Care Reviewed With  patient;family  -AR  patient;son  -EJ     Recorded by [AR] Jennifer Aguilar, CODY 08/05/19 2034 [EJ] Nurys Chahal, PT 08/05/19 1149     Row Name 08/05/19 1705             Outcome Summary/Treatment  Plan (OT)    Daily Summary of Progress (OT)  progress toward functional goals as expected  -AR      Anticipated Discharge Disposition (OT)  inpatient rehabilitation facility  -AR      Recorded by [AR] Jennifer Aguilar, OT 08/05/19 2034      Row Name 08/05/19 1014             Outcome Summary/Treatment Plan (PT)    Daily Summary of Progress (PT)  progress toward functional goals is good  -EJ      Anticipated Discharge Disposition (PT)  inpatient rehabilitation facility  -EJ      Recorded by [EJ] Nurys Chahal, PT 08/05/19 1149        User Key  (r) = Recorded By, (t) = Taken By, (c) = Cosigned By    Initials Name Effective Dates Discipline    EJ Nurys Chahal, PT 11/20/18 -  PT    AR Jennifer Aguilar, OT 06/22/15 -  OT    DB Henok Marie RN 06/16/16 -  Nurse    SW Odilia Joy RN 06/16/16 -  Nurse        Wound 07/20/19 1648 Right knee abrasion (Active)   Dressing Appearance open to air 8/5/2019 12:00 PM   Closure Open to air 8/5/2019 12:00 PM   Base scab;dry;purple 8/5/2019 12:00 PM   Periwound intact 8/5/2019 12:00 PM   Periwound Temperature warm 8/5/2019 12:00 PM   Periwound Skin Turgor soft 8/5/2019 12:00 PM   Drainage Amount none 8/5/2019 12:00 PM   Dressing Care, Wound open to air 8/5/2019 12:00 PM   Periwound Care, Wound dry periwound area maintained 8/5/2019 12:00 PM       Wound 07/20/19 1649 Left hand skin tear (Active)   Dressing Appearance dry;intact;dried drainage 8/5/2019 12:00 PM   Closure Surface sutures 8/5/2019 12:00 PM   Base scab;dry 8/5/2019 12:00 PM   Periwound pink 8/5/2019 12:00 PM   Periwound Temperature warm 8/5/2019 12:00 PM   Periwound Skin Turgor soft 8/5/2019 12:00 PM   Drainage Amount none 8/5/2019 12:00 PM   Dressing Care, Wound dressing reinforced 8/4/2019  8:50 PM   Periwound Care, Wound dry periwound area maintained 8/5/2019 12:00 PM       Wound 07/23/19 6844 Right anterior toe pressure injury (Active)   Dressing Appearance open to air 8/5/2019  8:00 AM    Closure Open to air 8/5/2019  8:00 AM   Base blanchable;red 8/5/2019  8:00 AM   Red (%), Wound Tissue Color 100 8/4/2019  8:50 PM   Periwound intact;pink;dry 8/5/2019  8:00 AM   Periwound Temperature warm 8/5/2019  8:00 AM   Periwound Skin Turgor soft 8/5/2019  8:00 AM   Drainage Amount none 8/5/2019  8:00 AM   Dressing Care, Wound open to air 8/5/2019  8:00 AM   Periwound Care, Wound dry periwound area maintained 8/5/2019  8:00 AM     Rehab Goal Summary     Row Name 08/05/19 1705 08/05/19 1014          Physical Therapy Goals    Bed Mobility Goal Selection (PT)  --  bed mobility, PT goal 1  -EJ     Transfer Goal Selection (PT)  --  transfer, PT goal 1  -EJ     Gait Training Goal Selection (PT)  --  gait training, PT goal 1  -EJ        Bed Mobility Goal 1 (PT)    Activity/Assistive Device (Bed Mobility Goal 1, PT)  --  bed mobility activities, all  -EJ     [place] Level/Cues Needed (Bed Mobility Goal 1, PT)  --  minimum assist (75% or more patient effort)  -EJ     Time Frame (Bed Mobility Goal 1, PT)  --  long term goal (LTG);2 weeks  -EJ        Transfer Goal 1 (PT)    Activity/Assistive Device (Transfer Goal 1, PT)  --  sit-to-stand/stand-to-sit;bed-to-chair/chair-to-bed;cane, quad  -EJ     [place] Level/Cues Needed (Transfer Goal 1, PT)  --  conditional independence  -EJ     Time Frame (Transfer Goal 1, PT)  --  long term goal (LTG);2 weeks  -EJ     Progress/Outcome (Transfer Goal 1, PT)  --  goal revised this date  -EJ        Gait Training Goal 1 (PT)    Activity/Assistive Device (Gait Training Goal 1, PT)  --  gait (walking locomotion);assistive device use  -EJ     [place] Level (Gait Training Goal 1, PT)  --  minimum assist (75% or more patient effort)  -EJ     Distance (Gait Goal 1, PT)  --  400  -EJ     Time Frame (Gait Training Goal 1, PT)  --  long term goal (LTG);2 weeks  -EJ     Progress/Outcome (Gait Training Goal 1, PT)  --  progress slower than expected;continuing progress toward goal   -EJ        Occupational Therapy Goals    ROM Goal Selection (OT)  ROM, OT goal 1  -AR  --     Precaution Goal Selection (OT)  precaution, OT goal 1  -AR  --     Additional Documentation  Precaution Goal Selection (OT) (Row);ROM Goal Selection (OT) (Row)  -AR  --        Transfer Goal 1 (OT)    Progress/Outcome (Transfer Goal 1, OT)  goal ongoing  -AR  --        Grooming Goal 1 (OT)    Progress/Outcome (Grooming Goal 1, OT)  goal ongoing  -AR  --        ROM Goal 1 (OT)    ROM Goal 1 (OT)  Pt and family will complete RUE HEP within physician guidelines with supervision  -AR  --     Time Frame (ROM Goal 1, OT)  short term goal (STG);5 days  -AR  --     Progress/Outcome (ROM Goal 1, OT)  goal ongoing  -AR  --        Balance Goal 1 (OT)    Progress/Outcomes (Balance Goal 1, OT)  goal ongoing  -AR  --        Precaution Goal 1 (OT)    Activity (Precaution Goal 1, OT)  during ADLs R shoulder precautions  -AR  --     [place] Level/Cues Needed (Precautions Goal 1, OT)  with minimum;verbal cues/redirection  -AR  --     Time Frame (Precaution Goal 1, OT)  short term goal (STG);5 days  -AR  --     Progress/Outcome (Precaution Goal 1, OT)  goal ongoing  -AR  --       User Key  (r) = Recorded By, (t) = Taken By, (c) = Cosigned By    Initials Name Provider Type Discipline    Nurys Ny, PT Physical Therapist PT    Jennifer Mena, OT Occupational Therapist OT        Occupational Therapy Education     Title: PT OT SLP Therapies (Done)     Topic: Occupational Therapy (Done)     Point: ADL training (Done)     Description: Instruct learner(s) on proper safety adaptation and remediation techniques during self care or transfers.   Instruct in proper use of assistive devices.    Learning Progress Summary           Patient Eager, E,TB,D,H, NR,VU by AR at 8/5/2019  5:05 PM    Acceptance, E,D, VU,NR by AN at 8/1/2019  2:40 PM    Comment:  Educated on new sling management, reviewed R UE protection.    Acceptance, E,H,  VU,NR by AN at 7/26/2019  2:55 PM    Comment:  Educated on R UE AROM ex and shoulder restrictions.    Acceptance, E,D, VU,NR by AN at 7/24/2019  9:02 AM    Comment:  Current deficits, OT role and discharge.   Family Eager, E,TB,D,H, NR,VU by AR at 8/5/2019  5:05 PM    Acceptance, E,H, VU,NR by AN at 7/26/2019  2:55 PM    Comment:  Educated on R UE AROM ex and shoulder restrictions.    Acceptance, E,D, VU,NR by AN at 7/24/2019  9:02 AM    Comment:  Current deficits, OT role and discharge.                   Point: Home exercise program (Done)     Description: Instruct learner(s) on appropriate technique for monitoring, assisting and/or progressing therapeutic exercises/activities.    Learning Progress Summary           Patient Eager, E,TB,D,H, NR,VU by AR at 8/5/2019  5:05 PM    Acceptance, E,D, VU,NR by AN at 8/1/2019  2:40 PM    Comment:  Educated on new sling management, reviewed R UE protection.    Acceptance, D,E, VU,NR by AN at 7/30/2019  2:38 PM    Comment:  Educated on R UE elbow, wrist, hand ROM; protect R UE by elevation and sling tightened while sleeping.    Acceptance, E,H, VU,NR by AN at 7/26/2019  2:55 PM    Comment:  Educated on R UE AROM ex and shoulder restrictions.   Family Eager, E,TB,D,H, NR,VU by AR at 8/5/2019  5:05 PM    Acceptance, D,E, VU,NR by AN at 7/30/2019  2:38 PM    Comment:  Educated on R UE elbow, wrist, hand ROM; protect R UE by elevation and sling tightened while sleeping.    Acceptance, E,H, VU,NR by AN at 7/26/2019  2:55 PM    Comment:  Educated on R UE AROM ex and shoulder restrictions.                   Point: Precautions (Done)     Description: Instruct learner(s) on prescribed precautions during self-care and functional transfers.    Learning Progress Summary           Patient Eager, E,TB,D,H, NR,VU by AR at 8/5/2019  5:05 PM   Family Eager, E,TB,D,H, NR,VU by AR at 8/5/2019  5:05 PM                   Point: Body mechanics (Done)     Description: Instruct learner(s) on proper  positioning and spine alignment during self-care, functional mobility activities and/or exercises.    Learning Progress Summary           Patient Eager, E,TB,D,H, NR,VU by AR at 8/5/2019  5:05 PM   Family Meg, E,TB,D,H, NR,VU by AR at 8/5/2019  5:05 PM                               User Key     Initials Effective Dates Name Provider Type Discipline     06/22/15 -  Bibiana Lion OT Occupational Therapist OT    AR 06/22/15 -  Jennifer Aguilar OT Occupational Therapist OT                OT Recommendation and Plan  Outcome Summary/Treatment Plan (OT)  Daily Summary of Progress (OT): progress toward functional goals as expected  Anticipated Discharge Disposition (OT): inpatient rehabilitation facility  Daily Summary of Progress (OT): progress toward functional goals as expected  Plan of Care Review  Plan of Care Reviewed With: patient, family  Plan of Care Reviewed With: patient, family  Outcome Summary: [place] has been DC, pt reports mild anterior shoulder pain. OT educated pt and family on right shoulder precautions, ADL retraining to maintain, sling management and RUE HEP. Handouts issued as well as cold pack. Recommend IPR.   Outcome Measures     Row Name 08/05/19 1705 08/05/19 1014 08/04/19 1010       How much help from another person do you currently need...    Turning from your back to your side while in flat bed without using bedrails?  --  2  -EJ  2  -CS    Moving from lying on back to sitting on the side of a flat bed without bedrails?  --  2  -EJ  2  -CS    Moving to and from a bed to a chair (including a wheelchair)?  --  3  -EJ  3  -CS    Standing up from a chair using your arms (e.g., wheelchair, bedside chair)?  --  3  -EJ  3  -CS    Climbing 3-5 steps with a railing?  --  2  -EJ  1  -CS    To walk in hospital room?  --  3  -EJ  3  -CS    AM-PAC 6 Clicks Score (PT)  --  15  -EJ  14  -CS       How much help from another is currently needed...    Putting on and taking off regular lower body  clothing?  1  -AR  --  --    Bathing (including washing, rinsing, and drying)  2  -AR  --  --    Toileting (which includes using toilet bed pan or urinal)  2  -AR  --  --    Putting on and taking off regular upper body clothing  2  -AR  --  --    Taking care of personal grooming (such as brushing teeth)  2  -AR  --  --    Eating meals  2  -AR  --  --    AM-PAC 6 Clicks Score (OT)  11  -AR  --  --       Functional Assessment    Outcome Measure Options  AM-PAC 6 Clicks Daily Activity (OT)  -AR  AM-PAC 6 Clicks Basic Mobility (PT)  -EJ  AM-PAC 6 Clicks Basic Mobility (PT)  -CS    Row Name 08/03/19 1610             How much help from another person do you currently need...    Turning from your back to your side while in flat bed without using bedrails?  2  -CS      Moving from lying on back to sitting on the side of a flat bed without bedrails?  2  -CS      Moving to and from a bed to a chair (including a wheelchair)?  2  -CS      Standing up from a chair using your arms (e.g., wheelchair, bedside chair)?  2  -CS      Climbing 3-5 steps with a railing?  1  -CS      To walk in hospital room?  3  -CS      AM-PAC 6 Clicks Score (PT)  12  -CS         Functional Assessment    Outcome Measure Options  AM-PAC 6 Clicks Basic Mobility (PT)  -CS        User Key  (r) = Recorded By, (t) = Taken By, (c) = Cosigned By    Initials Name Provider Type    Nurys Ny, PT Physical Therapist    Jennifer Mena, OT Occupational Therapist    Roz Engel, CIRA Physical Therapist           Time Calculation:   Time Calculation- OT     Row Name 08/05/19 1705 08/05/19 1014          Time Calculation- OT    OT Start Time  1705  -AR  --     Total Timed Code Minutes- OT  43 minute(s)  -AR  --     OT Received On  08/05/19  -AR  --     OT Goal Re-Cert Due Date  08/15/19  -AR  --        Timed Charges    45155 - OT Therapeutic Exercise Minutes  15  -AR  --     19300 - Gait Training Minutes   --  15  -EJ     10452 - OT Self  Care/Mgmt Minutes  28  -AR  --       User Key  (r) = Recorded By, (t) = Taken By, (c) = Cosigned By    Initials Name Provider Type    Nurys Ny PT Physical Therapist    Jennifer Mena OT Occupational Therapist        Therapy Charges for Today     Code Description Service Date Service Provider Modifiers Qty    03019592431  OT THER PROC EA 15 MIN 8/5/2019 Jennifer Aguilar, OT GO 1    [id number]  OT SELF CARE/MGMT/TRAIN EA 15 MIN 8/5/2019 Jennifer Aguilar OT GO 2               Jennifer Aguilar OT  8/5/2019

## 2019-08-06 NOTE — PLAN OF CARE
Problem: Patient Care Overview  Goal: Plan of Care Review  Outcome: Ongoing (interventions implemented as appropriate)   08/06/19 0390   Coping/Psychosocial   Plan of Care Reviewed With patient;daughter;son   Plan of Care Review   Progress improving   OTHER   Outcome Summary Pt ambulated with PT today; anticipated discharge to [facility], CM folllowing.Diet changes made, see orders; otherwise no other significant changes this shift.  Ice packs applied to shoulder as well as diclofenac gel to manage pain; pt has no complaints or concerns at this time.

## 2019-08-06 NOTE — PLAN OF CARE
Problem: Patient Care Overview  Goal: Interprofessional Rounds/Family Conf  Outcome: Ongoing (interventions implemented as appropriate)  1300 Palliative Team Conference: SRIKANTH Queen RN, CHPN; COCO Morin DO; ANA MARÍA Moncada; ANTONY Javier, [facility], [facility]; TONI Bowles RN, CHPN; CAROL Ruffin MDiv.   08/06/19 1522   Interdisciplinary Rounds/Family Conf   Summary Pt seen by CAROL Ruffin MDiv. Reported fatigued following work with therapy, walking in [place], but symptom well managed on current regimen. Arranged for [facility] at least weekly while inpatient. Awaiting determination for rehab destination.

## 2019-08-06 NOTE — PROGRESS NOTES
Continued Stay Note  [facility]     Patient Name: Vargas De  MRN: [id number]  Today's Date: 8/6/2019    Admit Date: 7/23/2019    Discharge Plan     Row Name 08/06/19 1122       Plan    Plan Comments  Per Maria with [facility] Pt has been denied acute rehab by [facility]. A peer to peer has been arranged. Per [facility] they hope to arrange it today but it might not be until tomorrow before 10 am before it is completed. BILL has updated family and drKamilla         Discharge Codes    No documentation.       Expected Discharge Date and Time     Expected Discharge Date Expected Discharge Time    Aug 7, 2019             Maris Martell RN

## 2019-08-06 NOTE — PLAN OF CARE
Problem: Patient Care Overview  Goal: Plan of Care Review  Outcome: Ongoing (interventions implemented as appropriate)   08/05/19 5537   Coping/Psychosocial   Plan of Care Reviewed With patient;family   Plan of Care Review   Progress improving   OTHER   Outcome Summary [facility] has been DC, pt reports mild anterior shoulder pain. OT educated pt and family on right shoulder precautions, ADL retraining to maintain, sling management and RUE HEP. Handouts issued as well as cold pack. Recommend IPR.

## 2019-08-07 NOTE — THERAPY TREATMENT NOTE
Acute Care - Speech Language Pathology   Swallow Treatment Note [facility]     Patient Name: Vargas De  : 1940  MRN: [id number]  Today's Date: 2019  Onset of Illness/Injury or Date of Surgery: 19            Admit Date: 2019    Visit Dx:      ICD-10-CM ICD-9-CM   1. Acute alteration in mental status R41.82 780.97   2. Generalized weakness R53.1 780.79   3. Other closed displaced fracture of proximal end of right humerus with routine healing, subsequent encounter S42.291D V54.11   4. Elevated blood pressure reading with diagnosis of hypertension I10 401.9   5. Impaired functional mobility, balance, gait, and endurance Z74.09 V49.89   6. Impaired mobility and ADLs Z74.09 799.89   7. Cognitive communication deficit R41.841 799.52   8. Oropharyngeal dysphagia R13.12 787.22     Patient Active Problem List   Diagnosis   • Peripheral neuropathy   • Essential hypertension   • Crohn's colitis, with rectal bleeding (CMS/HCC)   • Major depressive disorder with single episode, in partial remission (CMS/HCC)   • Macular degeneration of both eyes   • GERD without esophagitis   • Cervicalgia   • Spinal stenosis in cervical region   • Chronic right-sided low back pain without sciatica   • Osteoarthritis of toe joint, right   • PFO (patent foramen ovale)   • Ulcerative colitis (CMS/HCC)   • Body mass index (BMI) of 25.0 to 29.9   • History of stroke with residual deficit   • Mixed hyperlipidemia   • Multiple lacunar infarcts   • Paroxysmal atrial fibrillation (CMS/HCC)   • Chronic bilateral low back pain   • Acute alteration in mental status   • Fracture of proximal end of right humerus   • Acute respiratory distress   • Lewy body dementia       Therapy Treatment  Rehabilitation Treatment Summary     Row Name 19 1115             Treatment Time/Intention    Discipline  speech language pathologist  -MP      Document Type  therapy note (daily note)  -MP      Subjective Information  no complaints  -MP       Mode of Treatment  individual therapy;speech-language pathology  -MP      Patient/Family Observations  Family present  -MP      Care Plan Review  care plan/treatment goals reviewed;patient/other agree to care plan  -MP      Care Plan Review, Other Participant(s)  son;daughter  -MP      Therapy Frequency (Swallow)  5 days per week  -MP      Therapy Frequency (SLP SLC)  5 days per week  -MP      Patient Effort  good  -MP      Recorded by [MP] Bret Burkett MS CCC-SLP 08/07/19 1128      Row Name 08/07/19 1115             Pain Assessment    Additional Documentation  Pain Scale: FACES Pre/Post-Treatment (Group)  -MP      Recorded by [MP] Bret Burkett MS CCC-SLP 08/07/19 1128      Row Name 08/07/19 1115             Pain Scale: FACES Pre/Post-Treatment    Pain: FACES Scale, Pretreatment  2-->hurts little bit  -MP      Pain: FACES Scale, Post-Treatment  2-->hurts little bit  -MP      Recorded by [MP] Bret Burkett MS CCC-SLP 08/07/19 1128      Row Name                Wound 07/23/19 2130 Right anterior toe pressure injury    Wound - Properties Group Date first assessed: 07/23/19 [DB] Time first assessed: 2130 [DB] Side: Right [DB] Orientation: anterior [DB] Location: toe [DB] Stage, Pressure Injury: Stage 1 [DB] Present On Admission : yes [DB] Type: pressure injury [DB] Recorded by:  [DB] Henok Marie RN 07/24/19 0229    Row Name                Wound 07/20/19 1649 Left hand skin tear    Wound - Properties Group Date first assessed: 07/20/19 [SW] Time first assessed: 1649 [SW] Side: Left [SW] Location: hand [SW] Present On Admission : yes [SW] Type: skin tear [SW] Recorded by:  [SW] Odilia Joy RN 07/20/19 1649    Row Name                Wound 07/20/19 1648 Right knee abrasion    Wound - Properties Group Date first assessed: 07/20/19 [SW] Time first assessed: 1648 [SW] Side: Right [SW] Location: knee [SW] Present On Admission : yes [SW] Type: abrasion [SW] Recorded by:  [SW] Odilia Joy  RN 07/20/19 1648    Row Name 08/07/19 1115             Outcome Summary/Treatment Plan (SLP)    Daily Summary of Progress (SLP)  progress toward functional goals is good  -MP      Plan for Continued Treatment (SLP)  Reviewed yesterday's FEES results & recommendations w/ pt & family. Pt able to recall use of chin tuck & demonstrate. Provided handouts & info re: diet recommendations to family. Reviewed dysphagia exercises & encouraged independent practice. SLP will continue to follow for tx. Pt will need continued SLP servies @ next level of care.  -MP      Anticipated Dischage Disposition  inpatient rehabilitation facility;anticipate therapy at next level of care  -MP      Recorded by [MP] Bret Burkett, MS CCC-SLP 08/07/19 1128        User Key  (r) = Recorded By, (t) = Taken By, (c) = Cosigned By    Initials Name Effective Dates Discipline    DB Henok Marie RN 06/16/16 -  Nurse    Odilia Gordillo RN 06/16/16 -  Nurse    Bret Willoughby, MS CCC-SLP 06/19/19 -  SLP          Outcome Summary  Outcome Summary/Treatment Plan (SLP)  Daily Summary of Progress (SLP): progress toward functional goals is good (08/07/19 1115 : Bret Burkett, MS CCC-SLP)  Plan for Continued Treatment (SLP): Reviewed yesterday's FEES results & recommendations w/ pt & family. Pt able to recall use of chin tuck & demonstrate. Provided handouts & info re: diet recommendations to family. Reviewed dysphagia exercises & encouraged independent practice. SLP will continue to follow for tx. Pt will need continued SLP servies @ next level of care. (08/07/19 1115 : Bret Burkett, MS CCC-SLP)  Anticipated Dischage Disposition: inpatient rehabilitation facility, anticipate therapy at next level of care (08/07/19 1115 : Bret Bukrett, MS CCC-SLP)      SLP GOALS     Row Name 08/07/19 1115 08/06/19 1420 08/04/19 1450       Oral Nutrition/Hydration Goal 1 (SLP)    Oral Nutrition/Hydration Goal 1, SLP  LTG: Pt will tolerate soft diet &  thin liquid via small cup sips & chin tuck w/ no overt s/sxs aspiration or distress w/ 100% acc and no cues  -MP  LTG: Pt will tolerate soft diet & thin liquid via small cup sips & chin tuck w/ no overt s/sxs aspiration or distress w/ 100% acc and no cues  -MP  LTG: Pt will return to regular diet & thin liquid w/o s/sxs aspiration w/ 100% acc w/o cues.  -MP    Time Frame (Oral Nutrition/Hydration Goal 1, SLP)  by discharge  -MP  by discharge  -MP  by discharge  -MP    Barriers (Oral Nutrition/Hydration Goal 1, SLP)  Pt able to recall need for chin tuck  -MP  --  Discussed repeat FEES w/ son & pt @ beside. Will plan for repeat FEES Tuesday 8/6  -MP    Progress/Outcomes (Oral Nutrition/Hydration Goal 1, SLP)  continuing progress toward goal  -MP  --  continuing progress toward goal  -MP       Oral Nutrition/Hydration Goal 2 (SLP)    Oral Nutrition/Hydration Goal 2, SLP  Pt will tolerate soft solids & thin liquids via small single cups sips + chin tuck w/ no overt s/sxs aspiration or distress w/ 100% acc and no cues  -MP  Pt will tolerate soft solids & thin liquids via small single cups sips + chin tuck w/ no overt s/sxs aspiration or distress w/ 100% acc and no cues  -MP  Pt will tolerate trials of soft solids & honey-thick liquids w/o s/sxs aspiration w/ 100% acc w/o cues.  -MP    Time Frame (Oral Nutrition/Hydration Goal 2, SLP)  short term goal (STG);by discharge  -MP  short term goal (STG);by discharge  -MP  short term goal (STG);by discharge  -MP    Barriers (Oral Nutrition/Hydration Goal 2, SLP)  Pt able to recall chin tuck & no straws  -MP  --  Pt lethargic, deferred PO trials  -MP    Progress/Outcomes (Oral Nutrition/Hydration Goal 2, SLP)  continuing progress toward goal  -MP  --  goal ongoing  -MP       Lingual Strengthening Goal 1 (SLP)    Activity (Lingual Strengthening Goal 1, SLP)  --  increase tongue back strength  -MP  increase tongue back strength  -MP    Increase Tongue Back Strength  --  lingual  resistance exercises  -MP  lingual resistance exercises  -MP    [place]/Accuracy (Lingual Strengthening Goal 1, SLP)  --  with minimal cues (75-90% accuracy)  -MP  with minimal cues (75-90% accuracy)  -MP    Time Frame (Lingual Strengthening Goal 1, SLP)  --  short term goal (STG);by discharge  -MP  short term goal (STG);by discharge  -MP    Progress/Outcomes (Lingual Strengthening Goal 1, SLP)  --  goal no longer appropriate  -MP  goal ongoing  -MP       Pharyngeal Strengthening Exercise Goal 1 (SLP)    Activity (Pharyngeal Strengthening Goal 1, SLP)  increase timing;increase superior movement of the hyolaryngeal complex;increase anterior movement of the hyolaryngeal complex;increase closure at entrance to airway/closure of airway at glottis;increase squeeze/positive pressure generation;increase tongue base retraction  -MP  increase timing;increase superior movement of the hyolaryngeal complex;increase anterior movement of the hyolaryngeal complex;increase closure at entrance to airway/closure of airway at glottis;increase squeeze/positive pressure generation;increase tongue base retraction  -MP  increase timing;increase superior movement of the hyolaryngeal complex;increase anterior movement of the hyolaryngeal complex;increase closure at entrance to airway/closure of airway at glottis;increase squeeze/positive pressure generation;increase tongue base retraction  -MP    Increase Timing  prepping - 3 second prep or suck swallow or 3-step swallow  -MP  prepping - 3 second prep or suck swallow or 3-step swallow  -MP  prepping - 3 second prep or suck swallow or 3-step swallow  -MP    Increase Superior Movement of the Hyolaryngeal Complex  effortful pitch glide (falsetto + pharyngeal squeeze);Mendelsohn;falsetto  -MP  effortful pitch glide (falsetto + pharyngeal squeeze);Mendelsohn;falsetto  -MP  falsetto  -MP    Increase Anterior Movement of the Hyolaryngeal Complex  chin tuck against resistance (CTAR)  -MP  chin  tuck against resistance (CTAR)  -MP  chin tuck against resistance (CTAR)  -MP    Increase Closure at Entrance to Airway/Closure of Airway at Glottis  super-supraglottic swallow  -MP  super-supraglottic swallow  -MP  super-supraglottic swallow  -MP    Increase Squeeze/Positive Pressure Generation  hard effortful swallow  -MP  hard effortful swallow  -MP  hard effortful swallow  -MP    Increase Tongue Base Retraction  nabila  -MP  nabila  -MP  nabila  -MP    [place]/Accuracy (Pharyngeal Strengthening Goal 1, SLP)  with minimal cues (75-90% accuracy)  -MP  with minimal cues (75-90% accuracy)  -MP  with minimal cues (75-90% accuracy)  -MP    Time Frame (Pharyngeal Strengthening Goal 1, SLP)  short term goal (STG);by discharge  -MP  short term goal (STG);by discharge  -MP  short term goal (STG);by discharge  -MP    Barriers (Pharyngeal Strengthening Goal 1, SLP)  Provided new handout of exercises, reviewed, & instructed on independent practice  -MP  --  Encouraged independent practice  -MP    Progress/Outcomes (Pharyngeal Strengthening Goal 1, SLP)  continuing progress toward goal  -MP  --  continuing progress toward goal  -MP       Swallow Management Recall Goal 1 (SLP)    Activity (Swallow Management Recall Goal 1, SLP)  recall of;compensatory swallow strategies/techniques  -MP  recall of;compensatory swallow strategies/techniques  -MP  --    [place]/Accuracy (Swallow Management Recall Goal 1, SLP)  with minimal cues (75-90% accuracy)  -MP  with minimal cues (75-90% accuracy)  -MP  --    Time Frame (Swallow Management Recall Goal 1, SLP)  short term goal (STG);by discharge  -MP  short term goal (STG);by discharge  -MP  --    Barriers (Swallow Management Recall Goal 1, SLP)  Pt able to recall need for chin tuck  -MP  --  --    Progress/Outcomes (Swallow Management Recall Goal 1, SLP)  good progress toward goal  -MP  --  --       Swallow Compensatory Strategies Goal 1 (SLP)    Activity (Swallow Compensatory  Strategies/Techniques Goal 1, SLP)  compensatory strategies;small cup sips;chin tuck posture;alternate food/liquid intake;during p.o. trials;during meal intake  -MP  compensatory strategies;small cup sips;chin tuck posture;alternate food/liquid intake;during p.o. trials;during meal intake  -MP  compensatory strategies;during meal intake;during p.o. trials;small cup sips;small straw sips;alternate food/liquid intake;extra swallow per bolus  -MP    [place]/Accuracy (Swallow Compensatory Strategies/Techniques Goal 1, SLP)  with minimal cues (75-90% accuracy)  -MP  with minimal cues (75-90% accuracy)  -MP  independently (over 90% accuracy)  -MP    Time Frame (Swallow Compensatory Strategies/Techniques Goal 1, SLP)  short term goal (STG);by discharge  -MP  short term goal (STG);by discharge  -MP  short term goal (STG);by discharge  -MP    Barriers (Swallow Compensatory Strategies/Techniques Goal 1, SLP)  Reviewed strategies and rationale w/ pt & family  -MP  --  --    Progress/Outcomes (Swallow Compensatory Strategies/Techniques Goal 1, SLP)  good progress toward goal  -MP  --  goal ongoing  -MP       Comprehend Questions Goal 1 (SLP)    Improve Ability to Comprehend Questions Goal 1 (SLP)  complex yes/no questions;80%;with minimal cues (75-90%)  -MP  --  complex yes/no questions;80%;with minimal cues (75-90%)  -MP    Time Frame (Comprehend Questions Goal 1, SLP)  short term goal (STG);by discharge  -MP  --  short term goal (STG);by discharge  -MP    Progress/Outcomes (Comprehend Questions Goal 1, SLP)  goal ongoing  -MP  --  goal ongoing  -MP       Word Retrieval Skills Goal 1 (SLP)    Improve Word Retrieval Skills By Goal 1 (SLP)  completing functional word finding tasks;80%;with minimal cues (75-90%)  -MP  --  completing functional word finding tasks;80%;with minimal cues (75-90%)  -MP    Time Frame (Word Retrieval Goal 1, SLP)  short term goal (STG);by discharge  -MP  --  short term goal (STG);by discharge  -MP     Progress/Outcomes (Word Retrieval Goal 1, SLP)  goal ongoing  -MP  --  goal ongoing  -MP       Ability to Construct Phrase and Sentence Level Response Goal 1 (SLP)    Improve Ability to Construct Phrase and Sentence Level Responses By Goal 1 (SLP)  answering question with phrase;constructing a sentence with a key word;80%;with minimal cues (75-90%)  -MP  --  answering question with phrase;constructing a sentence with a key word;80%;with minimal cues (75-90%)  -MP    Time Frame (Phrase and Sentence Level Response Goal 1, SLP)  short term goal (STG);by discharge  -MP  --  short term goal (STG);by discharge  -MP    Progress/Outcomes (Phrase and Sentence Level Response Goal 1, SLP)  goal ongoing  -MP  --  goal ongoing  -MP       Orientation Goal 1 (SLP)    Improve Orientation Through Goal 1 (SLP)  demonstrating orientation to month;demonstrating orientation to place;80%;independently (over 90% accuracy)  -MP  --  demonstrating orientation to month;demonstrating orientation to place;80%;independently (over 90% accuracy)  -MP    Time Frame (Orientation Goal 1, SLP)  short term goal (STG);by discharge  -MP  --  short term goal (STG);by discharge  -MP    Progress/Outcomes (Orientation Goal 1, SLP)  goal ongoing  -MP  --  goal ongoing  -MP       Additional Goal 1 (SLP)    Additional Goal 1, SLP  LTG: Pt will improve cognitive-communicaiton skills in order to participate in care in hospital setting w/ 100% acc w/o cues.  -MP  --  LTG: Pt will improve cognitive-communicaiton skills in order to participate in care in hospital setting w/ 100% acc w/o cues.  -MP    Time Frame (Additional Goal 1, SLP)  by discharge  -MP  --  by discharge  -MP    Progress/Outcomes (Additional Goal 1, SLP)  goal ongoing  -MP  --  goal ongoing  -MP      User Key  (r) = Recorded By, (t) = Taken By, (c) = Cosigned By    Initials Name Provider Type    Bret Willoughby MS CCC-SLP Speech and Language Pathologist          EDUCATION  The patient has  been educated in the following areas:   Dysphagia (Swallowing Impairment) Modified Diet Instruction.    SLP Recommendation and Plan  Daily Summary of Progress (SLP): progress toward functional goals is good     Plan for Continued Treatment (SLP): Reviewed yesterday's FEES results & recommendations w/ pt & family. Pt able to recall use of chin tuck & demonstrate. Provided handouts & info re: diet recommendations to family. Reviewed dysphagia exercises & encouraged independent practice. SLP will continue to follow for tx. Pt will need continued SLP servies @ next level of care.  Anticipated Dischage Disposition: inpatient rehabilitation facility, anticipate therapy at next level of care                    Time Calculation:   Time Calculation- SLP     Row Name 08/07/19 1131             Time Calculation- SLP    SLP Start Time  1115  -MP      SLP Received On  08/07/19  -MP        User Key  (r) = Recorded By, (t) = Taken By, (c) = Cosigned By    Initials Name Provider Type    Bret Willoughby MS CCC-SLP Speech and Language Pathologist          Therapy Charges for Today     Code Description Service Date Service Provider Modifiers Qty    81109307407 HC ST FIBEROPTIC ENDO EVAL SWALL 6 8/6/2019 Bret Burkett MS CCC-SLP GN 1    [id number] HC ST TREATMENT SWALLOW 2 8/7/2019 Bret Burkett MS CCC-SLP GN 1                 Bret Burkett MS CCC-SLP  8/7/2019

## 2019-08-07 NOTE — PLAN OF CARE
Problem: Patient Care Overview  Goal: Plan of Care Review  Outcome: Ongoing (interventions implemented as appropriate)   08/07/19 6727   Coping/Psychosocial   Plan of Care Reviewed With patient   OTHER   Outcome Summary Pt. with pain and fatigue this date, min assist for mobility, min to mod for standing. Continue POC.

## 2019-08-07 NOTE — THERAPY TREATMENT NOTE
Acute Care - Occupational Therapy Treatment Note  [facility]     Patient Name: Vargas De  : 1940  MRN: [id number]  Today's Date: 2019  Onset of Illness/Injury or Date of Surgery: 19  Date of Referral to OT: 19       Admit Date: 2019       ICD-10-CM ICD-9-CM   1. Acute alteration in mental status R41.82 780.97   2. Generalized weakness R53.1 780.79   3. Other closed displaced fracture of proximal end of right humerus with routine healing, subsequent encounter S42.291D V54.11   4. Elevated blood pressure reading with diagnosis of hypertension I10 401.9   5. Impaired functional mobility, balance, gait, and endurance Z74.09 V49.89   6. Impaired mobility and ADLs Z74.09 799.89   7. Cognitive communication deficit R41.841 799.52   8. Oropharyngeal dysphagia R13.12 787.22     Patient Active Problem List   Diagnosis   • Peripheral neuropathy   • Essential hypertension   • Crohn's colitis, with rectal bleeding (CMS/HCC)   • Major depressive disorder with single episode, in partial remission (CMS/HCC)   • Macular degeneration of both eyes   • GERD without esophagitis   • Cervicalgia   • Spinal stenosis in cervical region   • Chronic right-sided low back pain without sciatica   • Osteoarthritis of toe joint, right   • PFO (patent foramen ovale)   • Ulcerative colitis (CMS/HCC)   • Body mass index (BMI) of 25.0 to 29.9   • History of stroke with residual deficit   • Mixed hyperlipidemia   • Multiple lacunar infarcts   • Paroxysmal atrial fibrillation (CMS/HCC)   • Chronic bilateral low back pain   • Acute alteration in mental status   • Fracture of proximal end of right humerus   • Acute respiratory distress   • Lewy body dementia     Past Medical History:   Diagnosis Date   • Allergic arthritis    • Arthritis    • BPH (benign prostatic hyperplasia)      urology   • BPH/nocturia/microhematuria (work up negative)    • chronic hearing loss left ear    • Crohn's colitis (CMS/HCC)     on  colonoscopy 7/15.2017.  [facility]-continue Lialda   • Depression     medication, after death of wife 2014   • Elbow pain 7/25/2018   • Gout    • Hearing loss     left ear   • History of stroke 7/23/2018   • Hyperlipidemia    • Hypertension    • Ingrown toenail 03/18/2018   • Low back pain    • Macular degeneration    • Macular degeneration    • Multiple lacunar infarcts     noted on CT 2/2019   • Neck pain    • Rash 1/28/2019   • Rib pain on left side 07/11/2017   • Septic bursitis of elbow, right 7/23/2018   • Spinal stenosis     cervical and lumbosacral spine   • Spinal stenosis-cervical and lumbosacral spine    • Stroke (CMS/HCC)     aspirin, blood pressure conyrol.  Infarct frontal lobe.  R sided weakness, R hand and R foot numbness,dysphasia with stuttering.   • Stroke (CMS/HCC) 02/27/2019    MCA infarct with L facial droop and L sided weakness,dysphasia,dysarthria   • TIA (transient ischemic attack) 2/27/2019   • Ulcerative colitis (CMS/HCC)    • Urinary frequency      Past Surgical History:   Procedure Laterality Date   • CATARACT EXTRACTION     • COLONOSCOPY  07/15/2017   • HEMORRHOIDECTOMY     • HERNIA REPAIR     • KNEE SURGERY     • TONSILLECTOMY         Therapy Treatment    Rehabilitation Treatment Summary     Row Name 08/07/19 1315 08/07/19 1115          Treatment Time/Intention    Discipline  occupational therapist  -AN  speech language pathologist  -MP     Document Type  therapy note (daily note)  -AN  therapy note (daily note)  -MP     Subjective Information  no complaints  -AN  no complaints  -MP     Mode of Treatment  occupational therapy  -AN  individual therapy;speech-language pathology  -MP     Patient/Family Observations  pt. in bed Fowlers, sling on R UE, ice pack; Granddaughter present  -AN  Family present  -MP     Care Plan Review  care plan/treatment goals reviewed  -AN  care plan/treatment goals reviewed;patient/other agree to care plan  -MP     Care Plan Review, Other Participant(s)  --   son;daughter  -MP     Therapy Frequency (Swallow)  --  5 days per week  -MP     Therapy Frequency (SLP SLC)  --  5 days per week  -MP     Patient Effort  good  -AN  good  -MP     Comment  pt with c/o shoulder pain, agreeable to get OOB  -AN  --     Existing Precautions/Restrictions  fall;brace worn when out of bed  (Significant)  NWB R UE, sling at all times  -AN  --     Recorded by [AN] Bibiana Lion, OT 08/07/19 1400 [MP] Bret Burkett MS CCC-SLP 08/07/19 1128     Row Name 08/07/19 1315             Cognitive Assessment/Intervention- PT/OT    Affect/Mental Status (Cognitive)  flat/blunted affect  -AN      Orientation Status (Cognition)  oriented x 3  -AN      Follows Commands (Cognition)  follows one step commands;over 90% accuracy;verbal cues/prompting required  -AN      Recorded by [AN] Bibiana Lion, OT 08/07/19 1400      Row Name 08/07/19 1315             Bed Mobility Assessment/Treatment    Supine-Sit [place] (Bed Mobility)  minimum assist (75% patient effort)  -AN      Assistive Device (Bed Mobility)  draw sheet  -AN      Recorded by [AN] Bibiana Lion, OT 08/07/19 1400      Row Name 08/07/19 1315             Functional Mobility    Functional Mobility- Ind. Level  minimum assist (75% patient effort)  -AN      Functional Mobility-Distance (Feet)  25  -AN      Functional Mobility- Comment  having difficulty extending knees , extending trunk  -AN      Recorded by [AN] Bibiana Lion, OT 08/07/19 1400      Row Name 08/07/19 1315             Transfer Assessment/Treatment    Transfer Assessment/Treatment  sit-stand transfer;stand-sit transfer  -AN      Recorded by [AN] Bibiana Lion, OT 08/07/19 1400      Row Name 08/07/19 1315             Sit-Stand Transfer    Sit-Stand [place] (Transfers)  verbal cues;minimum assist (75% patient effort)  -AN      Recorded by [AN] Bibiana Lion OT 08/07/19 1400      Row Name 08/07/19 1315             Stand-Sit Transfer    Stand-Sit [place] (Transfers)   verbal cues;minimum assist (75% patient effort)  -AN      Recorded by [AN] Bibiana Lion, OT 08/07/19 1400      Row Name 08/07/19 1315             ADL Assessment/Intervention    BADL Assessment/Intervention  grooming  -AN      Recorded by [AN] Bibiana Lion, OT 08/07/19 1400      Row Name 08/07/19 1315             Lower Body Dressing Assessment/Training    Comment (Lower Body Dressing)  pt declined  -AN      Recorded by [AN] Bibiana Lion, OT 08/07/19 1402      Row Name 08/07/19 1315             Grooming Assessment/Training    [place] Level (Grooming)  oral care regimen;supervision;set up  -AN      Comment (Grooming)  pt stood ~ 1.5 mins, increased posterior lean, sat on BSC to complete task  -AN      Recorded by [AN] Bibiana Lion, OT 08/07/19 1400      Row Name 08/07/19 1315             Toileting Assessment/Training    [place] Level (Toileting)  minimum assist (75% patient effort)  -AN      Assistive Devices (Toileting)  urinal  -AN      Toileting Position  supported sitting  -AN      Recorded by [AN] Bibiana Lion, OT 08/07/19 1400      Row Name 08/07/19 1315             Therapeutic Exercise    22084 - OT Therapeutic Activity Minutes  25  -AN      Recorded by [AN] Bbiiana Lion, OT 08/07/19 1400      Row Name 08/07/19 1315             Therapeutic Exercise    Upper Extremity Range of Motion (Therapeutic Exercise)  elbow flexion/extension, right;forearm supination/pronation, right;wrist flexion/extension, right  -AN      Recorded by [AN] Bibiana Lion, OT 08/07/19 1400      Row Name 08/07/19 1315             Static Sitting Balance    Level of [place] (Unsupported Sitting, Static Balance)  supervision  -AN      Sitting Position (Unsupported Sitting, Static Balance)  sitting in chair  -AN      Recorded by [AN] Bibiana Lion, OT 08/07/19 1400      Row Name 08/07/19 1315             Static Standing Balance    Level of [place] (Supported Standing, Static Balance)  minimal assist, 75% patient  effort  -AN      Recorded by [AN] Bibiana Lion, OT 08/07/19 1400      Row Name 08/07/19 1315             Dynamic Standing Balance    Level of [place], Reaches Outside Midline (Standing, Dynamic Balance)  moderate assist, 50 to 74% patient effort  -AN      Time Able to Maintain Position, Reaches Outside Midline (Standing, Dynamic Balance)  30 to 45 seconds  -AN      Comment, Reaches Outside Midline (Standing, Dynamic Balance)  attempts to complete grooming in standing, pt with posterior lean, difficulty extending knees and trunk  -AN      Recorded by [AN] Bibiana Lion, OT 08/07/19 1400      Row Name 08/07/19 1315             Positioning and Restraints    Pre-Treatment Position  in bed  -AN      Post Treatment Position  chair  -AN      In Chair  reclined;call light within reach;encouraged to call for assist;with family/caregiver;RUE elevated;waffle cushion  -AN      Recorded by [AN] Bibiana Lion, OT 08/07/19 1400      Row Name 08/07/19 1115             Pain Assessment    Additional Documentation  Pain Scale: FACES Pre/Post-Treatment (Group)  -MP      Recorded by [MP] Bret Burkett MS CCC-SLP 08/07/19 1128      Row Name 08/07/19 1315             Pain Scale: Numbers Pre/Post-Treatment    Pain Location - Side  Right  -AN      Pain Location  shoulder  -AN      Pain Intervention(s)  Repositioned notified RN  -AN      Recorded by [AN] Bibiana Lion, OT 08/07/19 1400      Row Name 08/07/19 1315 08/07/19 1115          Pain Scale: FACES Pre/Post-Treatment    Pain: FACES Scale, Pretreatment  4-->hurts little more  -AN  2-->hurts little bit  -MP     Pain: FACES Scale, Post-Treatment  4-->hurts little more  -AN  2-->hurts little bit  -MP     Recorded by [AN] Bibiana Lion, OT 08/07/19 1400 [MP] Bret Burkett, MS CCC-SLP 08/07/19 1128     Row Name                Wound 07/23/19 2130 Right anterior toe pressure injury    Wound - Properties Group Date first assessed: 07/23/19 [DB] Time first assessed: 2130 [DB]  Side: Right [DB] Orientation: anterior [DB] Location: toe [DB] Stage, Pressure Injury: Stage 1 [DB] Present On Admission : yes [DB] Type: pressure injury [DB] Recorded by:  [DB] Henok Marie RN 07/24/19 0229    Row Name                Wound 07/20/19 1649 Left hand skin tear    Wound - Properties Group Date first assessed: 07/20/19 [SW] Time first assessed: 1649 [SW] Side: Left [SW] Location: hand [SW] Present On Admission : yes [SW] Type: skin tear [SW] Recorded by:  [SW] Odilia Joy RN 07/20/19 1649    Row Name                Wound 07/20/19 1648 Right knee abrasion    Wound - Properties Group Date first assessed: 07/20/19 [SW] Time first assessed: 1648 [SW] Side: Right [SW] Location: knee [SW] Present On Admission : yes [SW] Type: abrasion [SW] Recorded by:  [SW] Odilia Joy RN 07/20/19 1648    Row Name 08/07/19 1315             Plan of Care Review    Plan of Care Reviewed With  patient  -AN      Recorded by [AN] Bibiana Lion, OT 08/07/19 1400      Row Name 08/07/19 1315             Outcome Summary/Treatment Plan (OT)    Daily Summary of Progress (OT)  progress toward functional goals is gradual  -AN      Recorded by [AN] Bibiana Lion, OT 08/07/19 1400      Row Name 08/07/19 1115             Outcome Summary/Treatment Plan (SLP)    Daily Summary of Progress (SLP)  progress toward functional goals is good  -MP      Plan for Continued Treatment (SLP)  Reviewed yesterday's FEES results & recommendations w/ pt & family. Pt able to recall use of chin tuck & demonstrate. Provided handouts & info re: diet recommendations to family. Reviewed dysphagia exercises & encouraged independent practice. SLP will continue to follow for tx. Pt will need continued SLP servies @ next level of care.  -MP      Anticipated Dischage Disposition  inpatient rehabilitation facility;anticipate therapy at next level of care  -MP      Recorded by [MP] Bret Burkett, MS CCC-SLP 08/07/19 1128        User Key  (r) =  Recorded By, (t) = Taken By, (c) = Cosigned By    Initials Name Effective Dates Discipline    AN Bibiana Lion, OT 06/22/15 -  OT    Henok Vega RN 06/16/16 -  Nurse    Odilia Gordillo RN 06/16/16 -  Nurse    Bret Willoughby, MS CCC-SLP 06/19/19 -  SLP        Wound 07/20/19 1648 Right knee abrasion (Active)   Dressing Appearance open to air 8/7/2019  8:20 AM   Closure Open to air 8/7/2019  8:20 AM   Base scab;dry;purple 8/7/2019  8:20 AM   Periwound intact 8/7/2019  8:20 AM   Periwound Temperature warm 8/7/2019  8:20 AM   Periwound Skin Turgor soft 8/7/2019  8:20 AM   Drainage Amount none 8/7/2019  8:20 AM   Dressing Care, Wound open to air 8/7/2019  8:20 AM   Periwound Care, Wound dry periwound area maintained 8/7/2019  8:20 AM       Wound 07/20/19 1649 Left hand skin tear (Active)   Dressing Appearance dry;intact;dried drainage 8/7/2019  8:20 AM   Closure Surface sutures 8/7/2019  8:20 AM   Base scab;dry 8/7/2019  8:20 AM   Periwound pink 8/7/2019  8:20 AM   Periwound Temperature warm 8/7/2019  8:20 AM   Periwound Skin Turgor soft 8/7/2019  8:20 AM   Drainage Amount none 8/7/2019  8:20 AM   Dressing Care, Wound open to air 8/7/2019  8:20 AM   Periwound Care, Wound dry periwound area maintained 8/7/2019  8:20 AM       Wound 07/23/19 2130 Right anterior toe pressure injury (Active)   Dressing Appearance open to air 8/7/2019  8:20 AM   Closure Open to air 8/7/2019  8:20 AM   Base red;blanchable;other (see comments) 8/7/2019  8:20 AM   Periwound intact;pink;dry 8/7/2019  8:20 AM   Periwound Temperature warm 8/7/2019  8:20 AM   Periwound Skin Turgor soft 8/7/2019  8:20 AM   Drainage Amount none 8/7/2019  8:20 AM   Dressing Care, Wound open to air 8/7/2019  8:20 AM   Periwound Care, Wound dry periwound area maintained 8/7/2019  8:20 AM     Rehab Goal Summary     Row Name 08/07/19 1115             Oral Nutrition/Hydration Goal 1 (SLP)    Oral Nutrition/Hydration Goal 1, SLP  LTG: Pt will tolerate  soft diet & thin liquid via small cup sips & chin tuck w/ no overt s/sxs aspiration or distress w/ 100% acc and no cues  -MP      Time Frame (Oral Nutrition/Hydration Goal 1, SLP)  by discharge  -MP      Barriers (Oral Nutrition/Hydration Goal 1, SLP)  Pt able to recall need for chin tuck  -MP      Progress/Outcomes (Oral Nutrition/Hydration Goal 1, SLP)  continuing progress toward goal  -MP         Oral Nutrition/Hydration Goal 2 (SLP)    Oral Nutrition/Hydration Goal 2, SLP  Pt will tolerate soft solids & thin liquids via small single cups sips + chin tuck w/ no overt s/sxs aspiration or distress w/ 100% acc and no cues  -MP      Time Frame (Oral Nutrition/Hydration Goal 2, SLP)  short term goal (STG);by discharge  -MP      Barriers (Oral Nutrition/Hydration Goal 2, SLP)  Pt able to recall chin tuck & no straws  -MP      Progress/Outcomes (Oral Nutrition/Hydration Goal 2, SLP)  continuing progress toward goal  -MP         Pharyngeal Strengthening Exercise Goal 1 (SLP)    Activity (Pharyngeal Strengthening Goal 1, SLP)  increase timing;increase superior movement of the hyolaryngeal complex;increase anterior movement of the hyolaryngeal complex;increase closure at entrance to airway/closure of airway at glottis;increase squeeze/positive pressure generation;increase tongue base retraction  -MP      Increase Timing  prepping - 3 second prep or suck swallow or 3-step swallow  -MP      Increase Superior Movement of the Hyolaryngeal Complex  effortful pitch glide (falsetto + pharyngeal squeeze);Mendelsohn;falsetto  -MP      Increase Anterior Movement of the Hyolaryngeal Complex  chin tuck against resistance (CTAR)  -MP      Increase Closure at Entrance to Airway/Closure of Airway at Glottis  super-supraglottic swallow  -MP      Increase Squeeze/Positive Pressure Generation  hard effortful swallow  -MP      Increase Tongue Base Retraction  nabila  -MP      [place]/Accuracy (Pharyngeal Strengthening Goal 1, SLP)  with  minimal cues (75-90% accuracy)  -MP      Time Frame (Pharyngeal Strengthening Goal 1, SLP)  short term goal (STG);by discharge  -MP      Barriers (Pharyngeal Strengthening Goal 1, SLP)  Provided new handout of exercises, reviewed, & instructed on independent practice  -MP      Progress/Outcomes (Pharyngeal Strengthening Goal 1, SLP)  continuing progress toward goal  -MP         Swallow Management Recall Goal 1 (SLP)    Activity (Swallow Management Recall Goal 1, SLP)  recall of;compensatory swallow strategies/techniques  -MP      [place]/Accuracy (Swallow Management Recall Goal 1, SLP)  with minimal cues (75-90% accuracy)  -MP      Time Frame (Swallow Management Recall Goal 1, SLP)  short term goal (STG);by discharge  -MP      Barriers (Swallow Management Recall Goal 1, SLP)  Pt able to recall need for chin tuck  -MP      Progress/Outcomes (Swallow Management Recall Goal 1, SLP)  good progress toward goal  -MP         Swallow Compensatory Strategies Goal 1 (SLP)    Activity (Swallow Compensatory Strategies/Techniques Goal 1, SLP)  compensatory strategies;small cup sips;chin tuck posture;alternate food/liquid intake;during p.o. trials;during meal intake  -MP      [place]/Accuracy (Swallow Compensatory Strategies/Techniques Goal 1, SLP)  with minimal cues (75-90% accuracy)  -MP      Time Frame (Swallow Compensatory Strategies/Techniques Goal 1, SLP)  short term goal (STG);by discharge  -MP      Barriers (Swallow Compensatory Strategies/Techniques Goal 1, SLP)  Reviewed strategies and rationale w/ pt & family  -MP      Progress/Outcomes (Swallow Compensatory Strategies/Techniques Goal 1, SLP)  good progress toward goal  -MP         Comprehend Questions Goal 1 (SLP)    Improve Ability to Comprehend Questions Goal 1 (SLP)  complex yes/no questions;80%;with minimal cues (75-90%)  -MP      Time Frame (Comprehend Questions Goal 1, SLP)  short term goal (STG);by discharge  -MP      Progress/Outcomes (Comprehend  Questions Goal 1, SLP)  goal ongoing  -MP         Word Retrieval Skills Goal 1 (SLP)    Improve Word Retrieval Skills By Goal 1 (SLP)  completing functional word finding tasks;80%;with minimal cues (75-90%)  -MP      Time Frame (Word Retrieval Goal 1, SLP)  short term goal (STG);by discharge  -MP      Progress/Outcomes (Word Retrieval Goal 1, SLP)  goal ongoing  -MP         Ability to Construct Phrase and Sentence Level Response Goal 1 (SLP)    Improve Ability to Construct Phrase and Sentence Level Responses By Goal 1 (SLP)  answering question with phrase;constructing a sentence with a key word;80%;with minimal cues (75-90%)  -MP      Time Frame (Phrase and Sentence Level Response Goal 1, SLP)  short term goal (STG);by discharge  -MP      Progress/Outcomes (Phrase and Sentence Level Response Goal 1, SLP)  goal ongoing  -MP         Orientation Goal 1 (SLP)    Improve Orientation Through Goal 1 (SLP)  demonstrating orientation to month;demonstrating orientation to place;80%;independently (over 90% accuracy)  -MP      Time Frame (Orientation Goal 1, SLP)  short term goal (STG);by discharge  -MP      Progress/Outcomes (Orientation Goal 1, SLP)  goal ongoing  -MP         Additional Goal 1 (SLP)    Additional Goal 1, SLP  LTG: Pt will improve cognitive-communicaiton skills in order to participate in care in hospital setting w/ 100% acc w/o cues.  -MP      Time Frame (Additional Goal 1, SLP)  by discharge  -MP      Progress/Outcomes (Additional Goal 1, SLP)  goal ongoing  -MP        User Key  (r) = Recorded By, (t) = Taken By, (c) = Cosigned By    Initials Name Provider Type Discipline    Bret Willoughby MS CCC-SLP Speech and Language Pathologist SLP        Occupational Therapy Education     Title: PT OT SLP Therapies (Done)     Topic: Occupational Therapy (Done)     Point: ADL training (Done)     Description: Instruct learner(s) on proper safety adaptation and remediation techniques during self care or transfers.    Instruct in proper use of assistive devices.    Learning Progress Summary           Patient Acceptance, E, VU,NR by AN at 8/7/2019  1:15 PM    Comment:  ADL retraining with increased standing.    Acceptance, E,D, VU,NR by WANDA at 8/6/2019  2:00 PM    Comment:  Reviewed sling and UE precautions.    Acceptance, E, VU,NR by JUSTIN at 8/6/2019 11:35 AM    Acceptance, E,TB, VU,NR by JUSTIN at 8/6/2019  9:36 AM    Eager, E,TB,D,H, NR,VU by AR at 8/5/2019  5:05 PM    Acceptance, E,D, VU,NR by AN at 8/1/2019  2:40 PM    Comment:  Educated on new sling management, reviewed R UE protection.    Acceptance, E,H, VU,NR by AN at 7/26/2019  2:55 PM    Comment:  Educated on R UE AROM ex and shoulder restrictions.    Acceptance, E,D, VU,NR by AN at 7/24/2019  9:02 AM    Comment:  Current deficits, OT role and discharge.   Family Acceptance, E, VU,NR by WANAD at 8/7/2019  1:15 PM    Comment:  ADL retraining with increased standing.    Acceptance, E,D, VU,NR by AN at 8/6/2019  2:00 PM    Comment:  Reviewed sling and UE precautions.    Acceptance, E,TB, VU,NR by JUSTIN at 8/6/2019  9:36 AM    Eager, E,TB,D,H, NR,VU by AR at 8/5/2019  5:05 PM    Acceptance, E,H, VU,NR by AN at 7/26/2019  2:55 PM    Comment:  Educated on R UE AROM ex and shoulder restrictions.    Acceptance, E,D, VU,NR by AN at 7/24/2019  9:02 AM    Comment:  Current deficits, OT role and discharge.                   Point: Home exercise program (Done)     Description: Instruct learner(s) on appropriate technique for monitoring, assisting and/or progressing therapeutic exercises/activities.    Learning Progress Summary           Patient Acceptance, E, VU,NR by JUSTIN at 8/6/2019 11:35 AM    Acceptance, E,TB, VU,NR by JUSTIN at 8/6/2019  9:36 AM    Eager, E,TB,D,H, NR,VU by AR at 8/5/2019  5:05 PM    Acceptance, TUSHAR OLIVAS VU,NR by AN at 8/1/2019  2:40 PM    Comment:  Educated on new sling management, reviewed R UE protection.    Acceptance, DEISY FINLEY VU,NR by AN at 7/30/2019  2:38 PM    Comment:  Educated  on R UE elbow, wrist, hand ROM; protect R UE by elevation and sling tightened while sleeping.    Acceptance, E,H, VU,NR by AN at 7/26/2019  2:55 PM    Comment:  Educated on R UE AROM ex and shoulder restrictions.   Family Acceptance, E,TB, VU,NR by JUSTIN at 8/6/2019  9:36 AM    Eager, E,TB,D,H, NR,VU by AR at 8/5/2019  5:05 PM    Acceptance, D,E, VU,NR by AN at 7/30/2019  2:38 PM    Comment:  Educated on R UE elbow, wrist, hand ROM; protect R UE by elevation and sling tightened while sleeping.    Acceptance, E,H, VU,NR by AN at 7/26/2019  2:55 PM    Comment:  Educated on R UE AROM ex and shoulder restrictions.                   Point: Precautions (Done)     Description: Instruct learner(s) on prescribed precautions during self-care and functional transfers.    Learning Progress Summary           Patient Acceptance, E, VU,NR by JUSTIN at 8/6/2019 11:35 AM    Acceptance, E,TB, VU,NR by JUSTIN at 8/6/2019  9:36 AM    Eager, E,TB,D,H, NR,VU by AR at 8/5/2019  5:05 PM   Family Acceptance, E,TB, VU,NR by JUSTIN at 8/6/2019  9:36 AM    Eager, E,TB,D,H, NR,VU by AR at 8/5/2019  5:05 PM                   Point: Body mechanics (Done)     Description: Instruct learner(s) on proper positioning and spine alignment during self-care, functional mobility activities and/or exercises.    Learning Progress Summary           Patient Acceptance, E, VU,NR by WANDA at 8/7/2019  1:15 PM    Comment:  ADL retraining with increased standing.    Acceptance, E, VU,NR by JUSTIN at 8/6/2019 11:35 AM    Acceptance, E,TB, VU,NR by JUSTIN at 8/6/2019  9:36 AM    Eager, E,TB,D,H, NR,VU by AR at 8/5/2019  5:05 PM   Family Acceptance, E, VU,NR by AN at 8/7/2019  1:15 PM    Comment:  ADL retraining with increased standing.    Acceptance, E,TB, VU,NR by JUSTIN at 8/6/2019  9:36 AM    DEISY Weaver,RENEA,TUSHAR,H, RASHAUN,MARITZA by AR at 8/5/2019  5:05 PM                               User Key     Initials Effective Dates Name Provider Type Discipline    JUSTIN 11/20/18 -  Nurys Chahal, PT Physical  Therapist PT    AN 06/22/15 -  Bibiana Lion OT Occupational Therapist OT    AR 06/22/15 -  Jennifer Aguilar OT Occupational Therapist OT                OT Recommendation and Plan  Outcome Summary/Treatment Plan (OT)  Daily Summary of Progress (OT): progress toward functional goals is gradual  Barriers to Overall Progress (OT): medical changes  Plan for Continued Treatment (OT): continue POC  Anticipated Discharge Disposition (OT): inpatient rehabilitation facility  Therapy Frequency (OT Eval): daily  Daily Summary of Progress (OT): progress toward functional goals is gradual  Plan of Care Review  Plan of Care Reviewed With: patient  Plan of Care Reviewed With: patient  Outcome Summary: Pt. with pain and fatigue this date, min assist for mobility, min to mod for standing. Continue POC.  Outcome Measures     Row Name 08/07/19 1315 08/06/19 1400 08/05/19 1705       How much help from another is currently needed...    Putting on and taking off regular lower body clothing?  1  -AN  1  -AN  1  -AR    Bathing (including washing, rinsing, and drying)  2  -AN  2  -AN  2  -AR    Toileting (which includes using toilet bed pan or urinal)  2  -AN  2  -AN  2  -AR    Putting on and taking off regular upper body clothing  2  -AN  2  -AN  2  -AR    Taking care of personal grooming (such as brushing teeth)  2  -AN  2  -AN  2  -AR    Eating meals  2  -AN  2  -AN  2  -AR    AM-PAC 6 Clicks Score (OT)  11  -AN  11  -AN  11  -AR       Functional Assessment    Outcome Measure Options  --  --  AM-PAC 6 Clicks Daily Activity (OT)  -AR    Row Name 08/05/19 1014             How much help from another person do you currently need...    Turning from your back to your side while in flat bed without using bedrails?  2  -EJ      Moving from lying on back to sitting on the side of a flat bed without bedrails?  2  -EJ      Moving to and from a bed to a chair (including a wheelchair)?  3  -EJ      Standing up from a chair using your arms  (e.g., wheelchair, bedside chair)?  3  -EJ      Climbing 3-5 steps with a railing?  2  -EJ      To walk in hospital room?  3  -EJ      AM-PAC 6 Clicks Score (PT)  15  -EJ         Functional Assessment    Outcome Measure Options  AM-PAC 6 Clicks Basic Mobility (PT)  -EJ        User Key  (r) = Recorded By, (t) = Taken By, (c) = Cosigned By    Initials Name Provider Type    Nurys Ny, PT Physical Therapist    Bibiana Barrera, OT Occupational Therapist    Jennifer Mena, OT Occupational Therapist           Time Calculation:   Time Calculation- OT     Row Name 08/07/19 1402 08/07/19 1315          Time Calculation- OT    OT Start Time  1315  -AN  --     Total Timed Code Minutes- OT  25 minute(s)  -AN  --     OT Received On  08/07/19  -AN  --     OT Goal Re-Cert Due Date  08/15/19  -AN  --        Timed Charges    00235 - OT Therapeutic Activity Minutes  --  25  -AN       User Key  (r) = Recorded By, (t) = Taken By, (c) = Cosigned By    Initials Name Provider Type    Bibiana Barrera, OT Occupational Therapist        Therapy Charges for Today     Code Description Service Date Service Provider Modifiers Qty    45185762041 HC OT THER PROC EA 15 MIN 8/6/2019 Bibiana Lion OT GO 1    14010420502 HC OT THERAPEUTIC ACT EA 15 MIN 8/7/2019 Bibiana Lion OT GO 2    12211420018 HC OT THER SUPP EA 15 MIN 8/7/2019 Bibiana Lion OT GO 2               Bibiana Lion OT  8/7/2019

## 2019-08-07 NOTE — PROGRESS NOTES
Continued Stay Note  [facility]     Patient Name: Vargas De  MRN: [id number]  Today's Date: 8/7/2019    Admit Date: 7/23/2019    Discharge Plan    No documentation.  Spoke with Dr. Colbert.  She has completed peer to peer with [facility].  Paul has denied peer to peer review for acute rehab at this time.  Family would like to proceed with family appeal.  Agatha from [facility] will deliver Authorization of representation form to family at bedside.  Daughter Denita will call [facility] today to obtain reference number so that appeal can be initiated.       Discharge Codes    No documentation.       Expected Discharge Date and Time     Expected Discharge Date Expected Discharge Time    Aug 8, 2019             Olesya Barton RN

## 2019-08-07 NOTE — PLAN OF CARE
Problem: Patient Care Overview  Goal: Plan of Care Review  Outcome: Ongoing (interventions implemented as appropriate)   08/06/19 1738 08/07/19 1616 08/07/19 1819   Coping/Psychosocial   Plan of Care Reviewed With --  patient --    Plan of Care Review   Progress improving --  --    OTHER   Outcome Summary --  --  pt is resting comfortably. pts family says he seems tired and more unresponsive, BP is low, 90sbp, but VSS. will continue to monitor      Goal: Individualization and Mutuality  Outcome: Ongoing (interventions implemented as appropriate)    Goal: Discharge Needs Assessment  Outcome: Ongoing (interventions implemented as appropriate)   07/24/19 0916   Disability   Equipment Currently Used at Home rollator;walker, standard;shower chair;commode   Discharge Needs Assessment   Readmission Within the Last 30 Days no previous admission in last 30 days   Concerns to be Addressed discharge planning   Patient/Family Anticipates Transition to inpatient rehabilitation facility   Patient/Family Anticipated Services at Transition ;rehabilitation services   Transportation Anticipated family or friend will provide   Anticipated Changes Related to Illness inability to care for self   Outpatient/Agency/Support Group Needs skilled nursing facility     Goal: Interprofessional Rounds/Family Conf  Outcome: Ongoing (interventions implemented as appropriate)   08/04/19 1130 08/06/19 1522   Interdisciplinary Rounds/Family Conf   Summary --  Pt seen by CAROL Ruffin MDiv. Reported fatigued following work with therapy, walking in flower, but symptom well managed on current regimen. Arranged for [employer] [facility] at least weekly while inpatient. Awaiting determination for rehab destination.   Participants nursing --        Problem: Fall Risk (Adult)  Intervention: Monitor/Assist with Self Care   08/05/19 0800 08/07/19 1700   Activity   Activity Assistance Provided --  assistance, 2 people   Daily Care Interventions   Self-Care  Promotion independence encouraged --      Intervention: Reduce Risk/Promote Restraint Free Environment   19   Safety Management   Environmental Safety Modification assistive device/personal items within reach;clutter free environment maintained;room organization consistent   Safety Promotion/Fall Prevention activity supervised;fall prevention program maintained;safety round/check completed;nonskid shoes/slippers when out of bed   Prevent  Drop/Fall   Safety/Security Measures chair alarm set;family to remain at bedside     Intervention: Review Medications/Identify Contributors to Fall Risk   19   Safety Management   Medication Review/Management medications reviewed;high risk medications identified       Goal: Identify Related Risk Factors and Signs and Symptoms  Outcome: Ongoing (interventions implemented as appropriate)   19 0523   Fall Risk (Adult)   Related Risk Factors (Fall Risk) age-related changes;fatigue/slow reaction;fear of falling;gait/mobility problems;history of falls;impaired vision;sensory deficits;sleep pattern alteration;environment unfamiliar   Signs and Symptoms (Fall Risk) presence of risk factors       Problem: Pain, Chronic (Adult)  Intervention: Manage Persistent Pain Analgesia   19 0820 19   Promote Effective Bowel Elimination   Bowel Intervention privacy promoted --    Safety Management   Medication Review/Management --  medications reviewed;high risk medications identified     Intervention: Support Psychosocial Response to Persistent Pain   19 1220 19 0820   Coping/Psychosocial Interventions   Supportive Measures --  active listening utilized;positive reinforcement provided;verbalization of feelings encouraged;self-care encouraged   Psychosocial Support   Diversional Activities --  television   Coping Strategies   Complementary Therapy massage therapy  ([id number], Post neck/ANCELMO feet/ANCELMO LL, SC 1, in recliner.) --       Intervention: Mutually Develop/Implement Persistent Pain Management Plan   08/06/19 2000 08/07/19 1400   Promote Oxygenation/Perfusion   Pain Management Interventions --  see MAR;care clustered   Cognitive Interventions   Sensory Stimulation Regulation care clustered --        Goal: Acceptable Pain/Comfort Level and Functional Ability  Outcome: Ongoing (interventions implemented as appropriate)   08/05/19 0523   Pain, Chronic (Adult)   Acceptable Pain/Comfort Level and Functional Ability making progress toward outcome       Problem: Skin Injury Risk (Adult)  Intervention: Promote/Optimize Nutrition   08/07/19 0820   Nutrition Interventions   Oral Nutrition Promotion social interaction promoted;safe use of adaptive equipment encouraged     Intervention: Prevent/Manage Excess Moisture   08/06/19 0900 08/07/19 1818   Skin Interventions   Skin Protection --  adhesive use limited;incontinence pads utilized;tubing/devices free from skin contact   Hygiene Care   Perineal Care absorbent pad changed --    Bathing/Skin Care bath, complete;dressed/undressed;linen changed;shampoo --      Intervention: Maintain Head of Bed Elevation Less Than 30 Degrees as Tolerated   08/07/19 1500   Positioning   Head of Bed (HOB) HOB elevated     Intervention: Prevent/Minimize Shear/Friction Injuries   08/07/19 1818   Skin Interventions   Pressure Reduction Devices pressure-redistributing mattress utilized   Positioning   Positioning/Transfer Devices in use;pillows     Intervention: Prevent or Minimize Pressure   08/07/19 1818   Skin Interventions   Pressure Reduction Techniques frequent weight shift encouraged;weight shift assistance provided;rest period provided between sit times   Positioning   Body Position other (see comments)  (chair)       Goal: Skin Health and Integrity  Outcome: Ongoing (interventions implemented as appropriate)   08/05/19 0523   Skin Injury Risk (Adult)   Skin Health and Integrity making progress toward outcome        Problem: Palliative Care (Adult)  Intervention: Minimize Discomfort   08/01/19 1220 08/07/19 1400   Promote Oxygenation/Perfusion   Pain Management Interventions --  see MAR;care clustered   Coping Strategies   Complementary Therapy massage therapy  ([id number], Post neck/ANCELMO feet/ANCELMO LL, [place] 1, in recliner.) --      Intervention: Optimize Function   08/06/19 2000 08/07/19 0820   Cognitive Interventions   Sensory Stimulation Regulation care clustered --    Musculoskeletal Interventions   Fatigue Management --  activity schedule adjusted   Pain/Comfort/Sleep Interventions   Sleep/Rest Enhancement --  awakenings minimized;regular sleep/rest pattern promoted     Intervention: Promote Informed Decision Making and Goal Setting   08/05/19 0523   Coping/Psychosocial Interventions   Life Transition/Adjustment palliative care discussed     Intervention: Support/Optimize Psychosocial Response   08/05/19 0800 08/07/19 0820   Coping/Psychosocial Interventions   Supportive Measures --  active listening utilized;positive reinforcement provided;verbalization of feelings encouraged;self-care encouraged   Grieving Process Facilitation family/significant other support facilitated --    Psychosocial Support   Family/Support System Care --  involvement promoted;self-care encouraged       Goal: Maximized Comfort  Outcome: Ongoing (interventions implemented as appropriate)   08/05/19 1535   Palliative Care (Adult)   Maximized Comfort making progress toward outcome     Goal: Enhanced Quality of Life  Outcome: Ongoing (interventions implemented as appropriate)   08/05/19 1535   Palliative Care (Adult)   Enhanced Quality of Life making progress toward outcome       Problem: Confusion, Chronic (Adult)  Intervention: Determine Neuro/Cognitive Baseline, Prevent/Manage Decline   08/07/19 0820   Pain/Comfort/Sleep Interventions   Sleep/Rest Enhancement awakenings minimized;regular sleep/rest pattern promoted     Intervention: Monitor/Assist  with Self Care   19 0819 1700   Activity   Activity Assistance Provided --  assistance, 2 people   Daily Care Interventions   Self-Care Promotion independence encouraged --      Intervention: Reduce Risk/Promote Restraint Free Environment   19   Safety Management   Environmental Safety Modification assistive device/personal items within reach;clutter free environment maintained;room organization consistent   Safety Promotion/Fall Prevention activity supervised;fall prevention program maintained;safety round/check completed;nonskid shoes/slippers when out of bed   Prevent  Drop/Fall   Safety/Security Measures chair alarm set;family to remain at bedside     Intervention: Optimize Communication   19   Promote Effective Communication   Communication Enhancement Strategies call light answered in person     Intervention: Promote Familiarity/Consistency   19   Coping/Psychosocial Interventions   Environment Familiarity/Consistency daily routine followed     Intervention: Provide Frequent Orientation/Reorientation   19   Cognitive Interventions   Reorientation Measures familiar social contact encouraged;family pictures in room --    Sensory Stimulation Regulation --  care clustered       Goal: Cognitive/Functional Impairments Minimized  Outcome: Ongoing (interventions implemented as appropriate)   19   Confusion, Chronic (Adult)   Cognitive/Functional Impairments Minimized making progress toward outcome     Goal: Free from Harm/Injuries  Outcome: Ongoing (interventions implemented as appropriate)   19   Confusion, Chronic (Adult)   Free from Harm/Injuries making progress toward outcome       Problem: Pain, Acute (Adult)  Intervention: Monitor and Manage Analgesia   19   Promote Effective Bowel Elimination   Bowel Intervention privacy promoted --    Safety Management   Medication Review/Management --   medications reviewed;high risk medications identified     Intervention: Mutually Develop/Implement Acute Pain Management Plan   08/01/19 1220 08/06/19 2000 08/07/19 1400   Promote Oxygenation/Perfusion   Pain Management Interventions --  --  see MAR;care clustered   Cognitive Interventions   Sensory Stimulation Regulation --  care clustered --    Coping Strategies   Complementary Therapy massage therapy  ([id number], Post neck/ANCELMO feet/ANCELMO LL, [place] 1, in recliner.) --  --      Intervention: Support/Optimize Psychosocial Response to Acute Pain   08/07/19 0820   Coping/Psychosocial Interventions   Supportive Measures active listening utilized;positive reinforcement provided;verbalization of feelings encouraged;self-care encouraged   Psychosocial Support   Diversional Activities television   Family/Support System Care involvement promoted;self-care encouraged   Interventions   Trust Relationship/Rapport care explained;choices provided;questions answered;questions encouraged;thoughts/feelings acknowledged;empathic listening provided       Goal: Acceptable Pain Control/Comfort Level  Outcome: Ongoing (interventions implemented as appropriate)   08/05/19 0523   Pain, Acute (Adult)   Acceptable Pain Control/Comfort Level making progress toward outcome

## 2019-08-07 NOTE — PROGRESS NOTES
Continued Stay Note  [facility]     Patient Name: Vargas De  MRN: [id number]  Today's Date: 8/7/2019    Admit Date: 7/23/2019    Discharge Plan     Row Name 08/07/19 1629       Plan    Plan  Inpatient rehab    Patient/Family in Agreement with Plan  yes    Plan Comments  Spoke with daughter Denita at bedside.  She has talked with [employer] rep. on the phone.  They have provided reference number [id number] for family appeal.  Clinicals to [facility] and , hospital med list, most recent PT, OT , and SLP, and most recent MD notes have been printed, and authorization of representation from have been faxed to [facility] appeals department at [phone].  They can be contacted at [phone].  Explained to family that appeals process can now take up to three days to process.  Case management will continue to follow along with Paul rushing [facility].      Final Discharge Disposition Code  62 - inpatient rehab facility        Discharge Codes    No documentation.       Expected Discharge Date and Time     Expected Discharge Date Expected Discharge Time    Aug 8, 2019             Olesya Barton RN

## 2019-08-07 NOTE — PROGRESS NOTES
[facility] Medicine Services  PROGRESS NOTE    Patient Name: Vargas De  : 1940  MRN: [id number]    Date of Admission: 2019  Length of Stay: 15  Primary Care Physician: Saba Arrington MD    Subjective   Subjective     CC:  AMS     HPI:  No complaints this morning.  Ambulating with help.    Review of Systems  Gen- No fevers, chills  CV- No chest pain, palpitations  Resp- No cough, dyspnea  GI- No N/V/D, abd pain    Otherwise ROS is negative except as mentioned in the HPI.    Objective   Objective     Vital Signs:   Temp:  [97.6 °F (36.4 °C)-98 °F (36.7 °C)] 97.8 °F (36.6 °C)  Heart Rate:  [70-78] 78  Resp:  [16-18] 18  BP: ()/(54-81) 131/76  Total (NIH Stroke Scale): 9     Physical Exam:  Constitutional: No acute distress, awake, alert  HENT: NCAT, mucous membranes moist  Respiratory: Clear to auscultation bilaterally, respiratory effort normal on room air  Cardiovascular: RRR, II/VI murmurs, no rubs, or gallops, palpable pedal pulses bilaterally  Gastrointestinal: Positive bowel sounds, soft, nontender, nondistended  Musculoskeletal: trace bilateral ankle edema  Skin: No rashes    Results Reviewed:  I have personally reviewed current lab, radiology, and data and agree.    Results from last 7 days   Lab Units 19  0651 19  0537 19  0647   WBC 10*3/mm3 8.68 6.76 4.45   HEMOGLOBIN g/dL 12.9* 12.4* 8.2*   HEMATOCRIT % 40.5 40.1 26.9*   PLATELETS 10*3/mm3 324 305 182     Results from last 7 days   Lab Units 19  0651 19  0537 19  1801 19  0647   SODIUM mmol/L 141 137  --  137   POTASSIUM mmol/L 3.8 4.1 5.0 3.2*   CHLORIDE mmol/L 101 102  --  108*   CO2 mmol/L 28.0 25.0  --  21.0*   BUN mg/dL 10 7*  --  5*   CREATININE mg/dL 0.75* 0.64*  --  0.41*   GLUCOSE mg/dL 103* 95  --  90   CALCIUM mg/dL 9.0 8.9  --  7.7*   ALT (SGPT) U/L  --  52*  --  41   AST (SGOT) U/L  --  45*  --  35     Estimated Creatinine Clearance: 81.5 mL/min (A)  (by C-G formula based on SCr of 0.75 mg/dL (L)).    Microbiology Results Abnormal     Procedure Component Value - Date/Time    Blood Culture - Blood, Arm, Left [[id number]] Collected:  07/27/19 0152    Lab Status:  Final result Specimen:  Blood from Arm, Left Updated:  08/01/19 0431     Blood Culture No growth at 5 days    Blood Culture - Blood, Hand, Left [[id number]] Collected:  07/27/19 0147    Lab Status:  Final result Specimen:  Blood from Hand, Left Updated:  08/01/19 0245     Blood Culture No growth at 5 days    Urine Culture - Urine, Urine, Clean Catch [[id number]]  (Normal) Collected:  07/28/19 1430    Lab Status:  Final result Specimen:  Urine, Clean Catch Updated:  07/29/19 1856     Urine Culture No growth    Beta Strep Culture, Throat - Swab, Throat [[id number]]  (Normal) Collected:  07/27/19 1630    Lab Status:  Final result Specimen:  Swab from Throat Updated:  07/29/19 1355     Throat Culture, Beta Strep No Beta Hemolytic Streptococcus Isolated    Narrative:       Group A Strep incidence is low in adults. Positive culture for Beta hemolytic Streptococcus species can reflect colonization and not true infection. Please correlate clinically.    Blood Culture - Blood, Arm, Left [[id number]] Collected:  07/23/19 1334    Lab Status:  Final result Specimen:  Blood from Arm, Left Updated:  07/28/19 1415     Blood Culture No growth at 5 days    Blood Culture - Blood, Hand, Right [[id number]] Collected:  07/23/19 1341    Lab Status:  Final result Specimen:  Blood from Hand, Right Updated:  07/28/19 1415     Blood Culture No growth at 5 days    Rapid Strep A Screen - Swab, Throat [[id number]]  (Normal) Collected:  07/27/19 1630    Lab Status:  Final result Specimen:  Swab from Throat Updated:  07/27/19 1727     Strep A Ag Negative    Narrative:       Test performed by Direct Antigen Testing.          Imaging Results (last 24 hours)     Procedure Component Value Units Date/Time    Fiberoptic Endo (fees) [[id number]]  Resulted:  08/06/19 1457     Updated:  08/06/19 1457    Narrative:       This procedure was auto-finalized with no dictation required.          Results for orders placed during the hospital encounter of 07/23/19   Adult Transthoracic Echo Complete W/ Cont if Necessary Per Protocol    Narrative · Left ventricular systolic function is normal. Estimated EF = 60%.  · Left ventricular diastolic dysfunction (grade I) consistent with   impaired relaxation.  · Left atrial cavity size is borderline dilated.  · There is mild calcification of the aortic valve.          I have reviewed the medications:  Scheduled Meds:    acetaminophen 650 mg Oral Q8H   apixaban 5 mg Oral Q12H   atorvastatin 80 mg Oral Nightly   bisoprolol 5 mg Oral Q24H   diclofenac 2 g Topical 4x Daily   DULoxetine 20 mg Oral Daily   gabapentin 300 mg Oral Nightly   lidocaine 2 patch Transdermal Nightly   memantine 10 mg Oral Q12H   mesalamine 0.75 g Oral Nightly   pantoprazole 40 mg Oral Q AM   predniSONE 5 mg Oral Daily With Breakfast   saccharomyces boulardii 250 mg Oral BID   sodium chloride 3 mL Intravenous Q12H     Continuous Infusions:     PRN Meds:.•  acetaminophen  •  calcium carbonate  •  ipratropium-albuterol  •  loperamide  •  magic mouthwash  •  melatonin  •  phenol  •  potassium chloride  •  potassium chloride  •  [DISCONTINUED] potassium chloride **OR** [DISCONTINUED] potassium chloride **OR** potassium chloride  •  saline  •  sodium chloride  •  sodium chloride      Assessment/Plan   Assessment / Plan     Active Hospital Problems    Diagnosis  POA   • **Acute alteration in mental status [R41.82]  Yes   • Lewy body dementia [G31.83, F02.80]  Yes   • Acute respiratory distress [R06.03]  Yes   • Fracture of proximal end of right humerus [S42.201A]  Yes   • Paroxysmal atrial fibrillation (CMS/HCC) [I48.0]  Yes   • Mixed hyperlipidemia [E78.2]  Yes   • Multiple lacunar infarcts [I63.81]  Yes   • History of stroke with residual deficit [I69.30]  Not  Applicable   • PFO (patent foramen ovale) [Q21.1]  Not Applicable   • GERD without esophagitis [K21.9]  Yes   • Spinal stenosis in cervical region [M48.02]  Yes   • Essential hypertension [I10]  Yes   • Crohn's colitis, with rectal bleeding (CMS/HCC) [K50.111]  Yes      Resolved Hospital Problems   No resolved problems to display.        Brief Hospital Course to date:  Vargas De is a 78 y.o. male with a past medical history significant for ulcerative colitis on chronic prednisone therapy, chronic pain with spinal stenosis of the cervical region, hyperlipidemia, hypertension, PFO, atrial fibrillation ( on Eliquis) mild dementia, and multiple lacunar infarcts who presents with acute change in mental status.  MRI negative for CVA.  EEG negative for seizures.     Sepsis  Acute hypoxic respiratory failure  Likely aspiration   - overall improved; fevers improved and on room air   - Patient was started on zosyn 7/28 - will transition to Augmentin to complete 7 days   - BCx NGTD; strep negative   -Patient with swollen uvula with exudate.  CT scan without contrast shows narrowing of his airway. The scan without contrast not ordered as patient with iodine allergy; ENT eval; cont PEEP if needed; diminished oropharyngeal sensation  - Code status changed to DNR   - Speech eval 7/29 with modified diet; continue to monitor   - Palliative following       AMS  Suspected Lewy body dementia  - improved with discontinuation of Seroquel and narcotics   -  Neurology followed;  etiology likely worsening of lewy body dementia in setting of sleep deprivation, pain as well as recent fracture   - continue home statin, ASA  - Will resume home gabapentin qHS only - patient states this helped him rest     Antibiotic associated diarrhea  -No concern for C. difficile, no abdominal pain on exam stable.  -Diarrhea resolved off antibiotics     Dysphasia  - improving;  mental status is improving  - Speech eval 7/29 with diet modifications  -Fees  tomorrow.    Loose stools   - no WBC; no abd pain; monitor and consider c.diff testing      Right Humerus Fracture:  - consult to ortho, non-operative management, therapy as tolerated, appreciate assistance   - continue with therapy as above  - Dr. Steinberg follow up in 10 days   -Nerve block discontinued.  Pain controlled on Tylenol.     PAF:  - sinus on admission  - [id number] = 5. On Eliquis.      Junctional rhythml; tachycardia  - Resume bisoprolol; cards consult and recommend continued beta blocker   - K replaced; check Mg      Frequent urination   - UA ok; PVR ok -- overall improving      Right wrist pain  -imaging with no acute fracture      History of CVA  - workup in May and CVA felt to be embolic; follows with Dr. Gutierrez and Dr. Maldonado. Has been on Eliquis and ASA discontinued       Chronic steroid:  - on 5 mg daily for Crohn's/lumbar stenosis. Currently being weaned down, to eventually discontinue; will resume home prednisone       HTN:  -Blood pressure has been on the low side.  Holding amlodipine and lisinopril for now.        DVT prophylaxis:   Eliquis     Disposition: [employer] denying acute rehab at [facility].  Will have [id number] today.      CODE STATUS:   Code Status and Medical Interventions:   Ordered at: 07/28/19 1018     Limited Support to NOT Include:    Intubation     Level Of Support Discussed With:    Health Care Surrogate    Next of Kin (If No Surrogate)     Code Status:    No CPR     Medical Interventions (Level of Support Prior to Arrest):    Limited         Electronically signed by Minnie Colbert MD, 08/07/19, 9:22 AM.

## 2019-08-07 NOTE — PROGRESS NOTES
"Palliative Care Consult Note    Patient Name: Vargas De   : 1940  Sex: male    Date of Admission: 2019    Subjective:  Vargas De is a 78 y.o. male with a past medical history significant for ulcerative colitis on chronic prednisone therapy, chronic pain with spinal stenosis of the cervical region, hyperlipidemia, hypertension, PFO, atrial fibrillation ( on Eliquis) mild dementia, and multiple lacunar infarcts admitted to acute care on 2109 with acute change in mental status.  MRI negative for CVA.  EEG negative for seizures.    On visit today he is sitting up in bed eating chicken nuggets which were brought in by family.  Granddaughter is at bedside.     Patient reported pain was brought to a comfortable level within 48 hours after initial assessment: improved.     ROS:  Review of Systems   HENT: Positive for voice change.    Musculoskeletal: Positive for arthralgias and myalgias.   Neurological: Positive for weakness.   All other systems reviewed and are negative.      Reviewed current scheduled and prn medications for route, type, dose and frequency.     •  acetaminophen  •  calcium carbonate  •  ipratropium-albuterol  •  loperamide  •  magic mouthwash  •  melatonin  •  phenol  •  potassium chloride  •  potassium chloride  •  [DISCONTINUED] potassium chloride **OR** [DISCONTINUED] potassium chloride **OR** potassium chloride  •  saline  •  sodium chloride  •  sodium chloride    Objective:   /76 (BP Location: Left arm, Patient Position: Lying)   Pulse 78   Temp 97.8 °F (36.6 °C) (Oral)   Resp 18   Ht 170.2 cm (67\")   Wt 87.1 kg (192 lb 0.3 oz)   SpO2 97%   BMI 30.07 kg/m²    Intake & Output (last day)        07 -  0700  07 -  0700    P.O. 480     Total Intake(mL/kg) 480 (5.5)     Urine (mL/kg/hr) 250 (0.1)     Total Output 250     Net +230           Stool Unmeasured Occurrence 1 x         Lab Results (last 24 hours)     ** No results found for the " last 24 hours. **        Imaging Results (last 24 hours)     Procedure Component Value Units Date/Time    Fiberoptic Endo (fees) [[id number]] Resulted:  08/06/19 1457     Updated:  08/06/19 1457    Narrative:       This procedure was auto-finalized with no dictation required.        Physical Exam   Constitutional: He is oriented to person, place, and time. He appears well-developed and well-nourished.   HENT:   Head: Normocephalic.   Eyes: Conjunctivae are normal. Pupils are equal, round, and reactive to light.   Neck: Neck supple.   Cardiovascular: Normal rate and intact distal pulses.   Pulmonary/Chest: Effort normal and breath sounds normal.   Room air    Abdominal: Soft. Bowel sounds are normal.   Genitourinary:   Genitourinary Comments: deferred   Musculoskeletal: Normal range of motion.   Neurological: He is alert and oriented to person, place, and time.   Skin: Skin is warm and dry. Capillary refill takes 2 to 3 seconds.   Psychiatric: He has a normal mood and affect.   Nursing note and vitals reviewed.          Acute alteration in mental status    Essential hypertension    Crohn's colitis, with rectal bleeding (CMS/HCC)    GERD without esophagitis    Spinal stenosis in cervical region    PFO (patent foramen ovale)    History of stroke with residual deficit    Mixed hyperlipidemia    Multiple lacunar infarcts    Paroxysmal atrial fibrillation (CMS/HCC)    Fracture of proximal end of right humerus    Acute respiratory distress    Lewy body dementia      Assessment/Plan:   79 y.o. male with acute encephalopathy with underlying dementia, acute Right humerus fracture, acute hypoxia respiratory failure; RUE pain; acute hypoxic respiratory failure likely aspiration; lewy body dementia. continue apixaban post fx humerus.     Pain: Tylenol 650 mg q 8 hrs scheduled and q 6 hr PRN , gabapentin 300 mg BID, Lidoderm aptch    Dyspnea: prednisone 5 mg q day, NCO2, duonebs    Nausea/Vomiting:  Anxiety/Agitation:  Confusion/Delerium:  Depression: cymbalta 20 mg q day   Bowel/bladder:   Nutrition:  Speech eval 7/29 with diet modifications; speech pathology to repeat FEES.  Sleep: me;atonin 5 mg q HS   ADLs:  2) HLD:  lipitor   3) HTN: Zybeta 5 mg q 24 hrs   4)Dementia: memantine 10 mg BID;   5) GERD: protonix EC 40 mg q day     Reports pain is improving with tylenol and ice. No new issues on thsi visit.     Goals of care - acute rehab then return home with daughterDenita.    Total Visit Time:  45   Face to Face Time:  30     ANA MARÍA Savage  [phone]  08/07/19  11:05 AM

## 2019-08-07 NOTE — THERAPY TREATMENT NOTE
Patient Name: Vargas De  : 1940    MRN: [id number]                              Today's Date: 2019       Admit Date: 2019    Visit Dx:     ICD-10-CM ICD-9-CM   1. Acute alteration in mental status R41.82 780.97   2. Generalized weakness R53.1 780.79   3. Other closed displaced fracture of proximal end of right humerus with routine healing, subsequent encounter S42.291D V54.11   4. Elevated blood pressure reading with diagnosis of hypertension I10 401.9   5. Impaired functional mobility, balance, gait, and endurance Z74.09 V49.89   6. Impaired mobility and ADLs Z74.09 799.89   7. Cognitive communication deficit R41.841 799.52   8. Oropharyngeal dysphagia R13.12 787.22     Patient Active Problem List   Diagnosis   • Peripheral neuropathy   • Essential hypertension   • Crohn's colitis, with rectal bleeding (CMS/HCC)   • Major depressive disorder with single episode, in partial remission (CMS/HCC)   • Macular degeneration of both eyes   • GERD without esophagitis   • Cervicalgia   • Spinal stenosis in cervical region   • Chronic right-sided low back pain without sciatica   • Osteoarthritis of toe joint, right   • PFO (patent foramen ovale)   • Ulcerative colitis (CMS/HCC)   • Body mass index (BMI) of 25.0 to 29.9   • History of stroke with residual deficit   • Mixed hyperlipidemia   • Multiple lacunar infarcts   • Paroxysmal atrial fibrillation (CMS/HCC)   • Chronic bilateral low back pain   • Acute alteration in mental status   • Fracture of proximal end of right humerus   • Acute respiratory distress   • Lewy body dementia     Past Medical History:   Diagnosis Date   • Allergic arthritis    • Arthritis    • BPH (benign prostatic hyperplasia)      urology   • BPH/nocturia/microhematuria (work up negative)    • chronic hearing loss left ear    • Crohn's colitis (CMS/HCC)     on colonoscopy 7/15.2017.  UC-continue Lialda   • Depression     medication, after death of wife    • Elbow pain  7/25/2018   • Gout    • Hearing loss     left ear   • History of stroke 7/23/2018   • Hyperlipidemia    • Hypertension    • Ingrown toenail 03/18/2018   • Low back pain    • Macular degeneration    • Macular degeneration    • Multiple lacunar infarcts     noted on CT 2/2019   • Neck pain    • Rash 1/28/2019   • Rib pain on left side 07/11/2017   • Septic bursitis of elbow, right 7/23/2018   • Spinal stenosis     cervical and lumbosacral spine   • Spinal stenosis-cervical and lumbosacral spine    • Stroke (CMS/[facility])     aspirin, blood pressure conyrol.  Infarct frontal lobe.  R sided weakness, R hand and R foot numbness,dysphasia with stuttering.   • Stroke (CMS/HCC) 02/27/2019    MCA infarct with L facial droop and L sided weakness,dysphasia,dysarthria   • TIA (transient ischemic attack) 2/27/2019   • Ulcerative colitis (CMS/[facility])    • Urinary frequency      Past Surgical History:   Procedure Laterality Date   • CATARACT EXTRACTION     • COLONOSCOPY  07/15/2017   • HEMORRHOIDECTOMY     • HERNIA REPAIR     • KNEE SURGERY     • TONSILLECTOMY       General Information     Row Name 08/07/19 1530 08/07/19 1315       PT Evaluation Time/Intention    Subjective Information  --  no complaints  -AN    Document Type  therapy note (daily note)  -EJ  therapy note (daily note)  -AN    Mode of Treatment  physical therapy  -EJ  occupational therapy  -AN    Patient Effort  --  good  -AN    Comment  --  pt with c/o shoulder pain, agreeable to get OOB  -AN    Row Name 08/07/19 1115          PT Evaluation Time/Intention    Subjective Information  no complaints  -MP     Document Type  therapy note (daily note)  -MP     Mode of Treatment  individual therapy;speech-language pathology  -MP     Patient Effort  good  -MP     Row Name 08/07/19 1530 08/07/19 1315       General Information    Patient Profile Reviewed?  yes  -EJ  --    Patient/Family Observations  --  pt. in bed Fowlers, sling on R UE, ice pack; Granddaughter present  -AN     Existing Precautions/Restrictions  --  fall;brace worn when out of bed  (Significant)  NWB R UE, sling at all times  -AN    Row Name 08/07/19 1115          General Information    Patient/Family Observations  Family present  -     Row Name 08/07/19 1315          Cognitive Assessment/Intervention- PT/OT    Affect/Mental Status (Cognitive)  flat/blunted affect  -AN     Orientation Status (Cognition)  oriented x 3  -AN     Follows Commands (Cognition)  follows one step commands;over 90% accuracy;verbal cues/prompting required  -AN       User Key  (r) = Recorded By, (t) = Taken By, (c) = Cosigned By    Initials Name Provider Type    EJ Nurys Chahal, PT Physical Therapist    Bibiana Barrera, OT Occupational Therapist    Bret Willoughby MS CCC-SLP Speech and Language Pathologist        Mobility     Row Name 08/07/19 1605 08/07/19 1315       Bed Mobility Assessment/Treatment    Supine-Sit [place] (Bed Mobility)  --  minimum assist (75% patient effort)  -AN    Assistive Device (Bed Mobility)  --  draw sheet  -AN    Comment (Bed Mobility)  UI  -EJ  --    Row Name 08/07/19 1315          Transfer Assessment/Treatment    Transfer Assessment/Treatment  sit-stand transfer;stand-sit transfer  -AN     Stand-Sit [place] (Transfers)  verbal cues;minimum assist (75% patient effort)  -AN     Row Name 08/07/19 1605 08/07/19 1315       Sit-Stand Transfer    Sit-Stand [place] (Transfers)  minimum assist (75% patient effort);2 person assist  -EJ  verbal cues;minimum assist (75% patient effort)  -AN    Row Name 08/07/19 1605 08/07/19 1530       Gait/Stairs Assessment/Training    00203 - Gait Training Minutes   --  18  -EJ    [place] Level (Gait)  minimum assist (75% patient effort);other (see comments);independent +CGA  -EJ  --    Distance in Feet (Gait)  25+25  -EJ  --    Deviations/Abnormal Patterns (Gait)  antalgic;stride length decreased  -EJ  --    Bilateral Gait Deviations  forward flexed  posture;heel strike decreased  -EJ  --    Comment (Gait/Stairs)  PT ambulates with forward flexed posture, step length decreased, step height decreased (almost festernating pattern). Pt improves with cueing for higher and longer steps. Cued for upright posture, limited by back pain. Pt c/o shoulder pain with initial standing. Trial of quad cane performed with increased back pain per pt report.  -  --    [facility] Name 08/07/19 1605          Mobility Assessment/Intervention    Extremity Weight-bearing Status  right upper extremity  -EJ     Right Upper Extremity (Weight-bearing Status)  non weight-bearing (NWB)  -EJ       User Key  (r) = Recorded By, (t) = Taken By, (c) = Cosigned By    Initials Name Provider Type    EJ Nurys Chahal, PT Physical Therapist    Bibiana Barrera, OT Occupational Therapist        Obj/Interventions     [facility] Name 08/07/19 1609 08/07/19 1315       Therapeutic Exercise    Upper Extremity Range of Motion (Therapeutic Exercise)  --  elbow flexion/extension, right;forearm supination/pronation, right;wrist flexion/extension, right  -AN    Lower Extremity (Therapeutic Exercise)  LAQ (long arc quad), right;marching while seated  -EJ  --    Sets/Reps (Therapeutic Exercise)  10  -EJ  --    [facility] Name 08/07/19 1315          Static Sitting Balance    Level of [place] (Unsupported Sitting, Static Balance)  supervision  -AN     Sitting Position (Unsupported Sitting, Static Balance)  sitting in chair  -AN     [facility] Name 08/07/19 1315          Static Standing Balance    Level of [place] (Supported Standing, Static Balance)  minimal assist, 75% patient effort  -AN     Row Name 08/07/19 1315          Dynamic Standing Balance    Level of [place], Reaches Outside Midline (Standing, Dynamic Balance)  moderate assist, 50 to 74% patient effort  -AN     Time Able to Maintain Position, Reaches Outside Midline (Standing, Dynamic Balance)  30 to 45 seconds  -AN     Comment, Reaches Outside Midline  (Standing, Dynamic Balance)  attempts to complete grooming in standing, pt with posterior lean, difficulty extending knees and trunk  -AN       User Key  (r) = Recorded By, (t) = Taken By, (c) = Cosigned By    Initials Name Provider Type    Nurys Ny, PT Physical Therapist    Bibiana Barrera, OT Occupational Therapist        Goals/Plan    No documentation.       Clinical Impression     Row Name 08/07/19 1612 08/07/19 1315       Pain Scale: Numbers Pre/Post-Treatment    Pain Scale: Numbers, Pretreatment  5/10  -EJ  --    Pain Scale: Numbers, Post-Treatment  5/10  -EJ  --    Pain Location - Side  Right  -EJ  Right  -AN    Pain Location - Orientation  lower  -EJ  --    Pain Location  shoulder  -EJ  shoulder  -AN    Pain Intervention(s)  Medication (See MAR);Ambulation/increased activity;Repositioned  -EJ  Repositioned notified RN  -AN    Row Name 08/07/19 1612 08/07/19 1315       Pain Scale: FACES Pre/Post-Treatment    Pain: FACES Scale, Pretreatment  6-->hurts even more  -EJ  4-->hurts little more  -AN    Pain: FACES Scale, Post-Treatment  6-->hurts even more  -EJ  4-->hurts little more  -AN    Pre/Post Treatment Pain Comment  back  -EJ  --    Row Name 08/07/19 1115          Pain Scale: FACES Pre/Post-Treatment    Pain: FACES Scale, Pretreatment  2-->hurts little bit  -MP     Pain: FACES Scale, Post-Treatment  2-->hurts little bit  -MP     Row Name 08/07/19 1616 08/07/19 1612       Plan of Care Review    Plan of Care Reviewed With  patient  -EJ  patient;daughter;grandchild(orion)  -EJ    Row Name 08/07/19 1315 08/07/19 1131       Plan of Care Review    Plan of Care Reviewed With  patient  -AN  patient;family  -MP    Row Name 08/07/19 0820          Plan of Care Review    Plan of Care Reviewed With  patient;family  -TG     Row Name 08/07/19 1612 08/07/19 1315       Positioning and Restraints    Pre-Treatment Position  sitting in chair/recliner  -EJ  in bed  -AN    Post Treatment Position  chair  -EJ  chair   -AN    In Chair  reclined;with family/caregiver;encouraged to call for assist  -EJ  reclined;call light within reach;encouraged to call for assist;with family/caregiver;RUE elevated;waffle cushion  -AN      User Key  (r) = Recorded By, (t) = Taken By, (c) = Cosigned By    Initials Name Provider Type    Nurys Ny, PT Physical Therapist    Bibiana Barrera, OT Occupational Therapist    Ange Pemberton RN Registered Nurse    Bret Willoughby MS CCC-SLP Speech and Language Pathologist        Outcome Measures     Row Name 08/07/19 1614          How much help from another person do you currently need...    Turning from your back to your side while in flat bed without using bedrails?  2  -EJ     Moving from lying on back to sitting on the side of a flat bed without bedrails?  2  -EJ     Moving to and from a bed to a chair (including a wheelchair)?  3  -EJ     Standing up from a chair using your arms (e.g., wheelchair, bedside chair)?  3  -EJ     Climbing 3-5 steps with a railing?  2  -EJ     To walk in hospital room?  3  -EJ     AM-PAC 6 Clicks Score (PT)  15  -EJ     Row Name 08/07/19 1614          Functional Assessment    Outcome Measure Options  AM-PAC 6 Clicks Basic Mobility (PT)  -EJ       User Key  (r) = Recorded By, (t) = Taken By, (c) = Cosigned By    Initials Name Provider Type    Nurys Ny, PT Physical Therapist        Physical Therapy Education     Title: PT OT SLP Therapies (Done)     Topic: Physical Therapy (Done)     Point: Mobility training (Done)     Learning Progress Summary           Patient Acceptance, E,TB, VU,NR by JUSTIN at 8/7/2019  3:30 PM    Acceptance, E, VU,NR by JUSTIN at 8/6/2019 11:35 AM    Acceptance, E,TB, VU,NR by JUSTIN at 8/6/2019  9:36 AM    Acceptance, E, VU,NR by JUSTIN at 8/5/2019 10:14 AM    Acceptance, E,TB,D, VU,NR by  at 8/4/2019 10:20 AM    Comment:  Educated patient on safety with sit<> stand and ambulation. Educated patient to increase stride length with  steps.    Acceptance, E,TB, VU,DU by CS at 8/3/2019  4:41 PM    Comment:  Educated patient on transfer technique using sheet to sit patient up in bed. Educated patient and family on sling.    Acceptance, E, VU,NR by CD at 8/2/2019  3:49 PM    Comment:  SEE FLOW SHEET. SON INSTRUCTED IN LE THER EX, POSITIONING FOR COMFORT.,    Eager, E, VU by TANK at 8/1/2019 10:20 AM    Eager, E,D, VU,NR by AA at 7/31/2019  2:46 PM    Eager, E, NR by AA at 7/30/2019  3:17 PM    Acceptance, E, VU,NR by JUSTIN at 7/29/2019 11:04 AM    Acceptance, E, VU,NR by CD at 7/25/2019  4:42 PM    Comment:  DTR INSTRUCTED IN ROM EXERCISES, POSITIONING FOR COMFORT/ PROTECTION OF R UE, BENEFITS OF OOB ACTIVITY, D/C PLANNING,    Acceptance, E, VU,NR by CD at 7/24/2019 10:36 AM    Comment:  SEE FLOWSHEET.   Family Acceptance, E,TB, VU,NR by JUSTIN at 8/7/2019  3:30 PM    Acceptance, E,TB, VU,NR by JUSTIN at 8/6/2019  9:36 AM    Acceptance, E,TB,D, VU,NR by MICKEY at 8/4/2019 10:20 AM    Comment:  Educated patient on safety with sit<> stand and ambulation. Educated patient to increase stride length with steps.    Acceptance, E,TB, VU,DU by CS at 8/3/2019  4:41 PM    Comment:  Educated patient on transfer technique using sheet to sit patient up in bed. Educated patient and family on sling.    Acceptance, E, VU,NR by CD at 8/2/2019  3:49 PM    Comment:  SEE FLOW SHEET. SON INSTRUCTED IN LE THER EX, POSITIONING FOR COMFORT.,    Eager, E,D, VU,NR by AA at 7/31/2019  2:46 PM    Acceptance, E, VU,NR by CD at 7/25/2019  4:42 PM    Comment:  DTR INSTRUCTED IN ROM EXERCISES, POSITIONING FOR COMFORT/ PROTECTION OF R UE, BENEFITS OF OOB ACTIVITY, D/C PLANNING,                   Point: Home exercise program (Done)     Learning Progress Summary           Patient Acceptance, E,TB, VU,NR by JUSTIN at 8/7/2019  3:30 PM    Acceptance, MARITZA OLIVASNR by JUSTIN at 8/6/2019 11:35 AM    Acceptance, RENEA OLIVAS VU, NR by JUSTIN at 8/6/2019  9:36 AM    Acceptance, MARITZA OLIVAS NR by JUSTIN at 8/5/2019 10:14 AM    Acceptance,  E,TB,D, VU,NR by CS at 8/4/2019 10:20 AM    Comment:  Educated patient on safety with sit<> stand and ambulation. Educated patient to increase stride length with steps.    Acceptance, E,TB, VU,DU by CS at 8/3/2019  4:41 PM    Comment:  Educated patient on transfer technique using sheet to sit patient up in bed. Educated patient and family on sling.    Acceptance, E, VU,NR by CD at 8/2/2019  3:49 PM    Comment:  SEE FLOW SHEET. SON INSTRUCTED IN LE THER EX, POSITIONING FOR COMFORT.,    Eager, E, VU by KM at 8/1/2019 10:20 AM    Acceptance, E, VU,NR by EJ at 7/29/2019 11:04 AM    Acceptance, E, VU,NR by CD at 7/25/2019  4:42 PM    Comment:  DTR INSTRUCTED IN ROM EXERCISES, POSITIONING FOR COMFORT/ PROTECTION OF R UE, BENEFITS OF OOB ACTIVITY, D/C PLANNING,    Acceptance, E, VU,NR by CD at 7/24/2019 10:36 AM    Comment:  SEE FLOWSHEET.   Family Acceptance, E,TB, VU,NR by EJ at 8/7/2019  3:30 PM    Acceptance, E,TB, VU,NR by EJ at 8/6/2019  9:36 AM    Acceptance, E,TB,D, VU,NR by CS at 8/4/2019 10:20 AM    Comment:  Educated patient on safety with sit<> stand and ambulation. Educated patient to increase stride length with steps.    Acceptance, E,TB, VU,DU by CS at 8/3/2019  4:41 PM    Comment:  Educated patient on transfer technique using sheet to sit patient up in bed. Educated patient and family on sling.    Acceptance, E, VU,NR by CD at 8/2/2019  3:49 PM    Comment:  SEE FLOW SHEET. SON INSTRUCTED IN LE THER EX, POSITIONING FOR COMFORT.,    Acceptance, E, VU,NR by CD at 7/25/2019  4:42 PM    Comment:  DTR INSTRUCTED IN ROM EXERCISES, POSITIONING FOR COMFORT/ PROTECTION OF R UE, BENEFITS OF OOB ACTIVITY, D/C PLANNING,                   Point: Body mechanics (Done)     Learning Progress Summary           Patient Acceptance, E,TB, VU,NR by EJ at 8/7/2019  3:30 PM    Acceptance, E, VU,NR by EJ at 8/6/2019 11:35 AM    Acceptance, RENEA OLIVAS VU,NR by JUSTIN at 8/6/2019  9:36 AM    Acceptance, MARITZA OLIVAS NR by JUSTIN at 8/5/2019 10:14 AM     Acceptance, E,TB,D, VU,NR by CS at 8/4/2019 10:20 AM    Comment:  Educated patient on safety with sit<> stand and ambulation. Educated patient to increase stride length with steps.    Acceptance, E,TB, VU,DU by CS at 8/3/2019  4:41 PM    Comment:  Educated patient on transfer technique using sheet to sit patient up in bed. Educated patient and family on sling.    Acceptance, E, VU,NR by CD at 8/2/2019  3:49 PM    Comment:  SEE FLOW SHEET. SON INSTRUCTED IN LE THER EX, POSITIONING FOR COMFORT.,    Eager, E, VU by KM at 8/1/2019 10:20 AM    Eager, E,D, VU,NR by AA at 7/31/2019  2:46 PM    Eager, E, NR by AA at 7/30/2019  3:17 PM    Acceptance, E, VU,NR by JUSTIN at 7/29/2019 11:04 AM    Acceptance, E, VU,NR by CD at 7/25/2019  4:42 PM    Comment:  DTR INSTRUCTED IN ROM EXERCISES, POSITIONING FOR COMFORT/ PROTECTION OF R UE, BENEFITS OF OOB ACTIVITY, D/C PLANNING,    Acceptance, E, VU,NR by CD at 7/24/2019 10:36 AM    Comment:  SEE FLOWSHEET.   Family Acceptance, E,TB, VU,NR by JUSTIN at 8/7/2019  3:30 PM    Acceptance, E,TB, VU,NR by JUSTIN at 8/6/2019  9:36 AM    Acceptance, E,TB,D, VU,NR by MICKEY at 8/4/2019 10:20 AM    Comment:  Educated patient on safety with sit<> stand and ambulation. Educated patient to increase stride length with steps.    Acceptance, E,TB, VU,DU by CS at 8/3/2019  4:41 PM    Comment:  Educated patient on transfer technique using sheet to sit patient up in bed. Educated patient and family on sling.    Acceptance, E, VU,NR by CD at 8/2/2019  3:49 PM    Comment:  SEE FLOW SHEET. SON INSTRUCTED IN LE THER EX, POSITIONING FOR COMFORT.,    Eager, E,D, VU,NR by AA at 7/31/2019  2:46 PM    Acceptance, E, VU,NR by CD at 7/25/2019  4:42 PM    Comment:  DTR INSTRUCTED IN ROM EXERCISES, POSITIONING FOR COMFORT/ PROTECTION OF R UE, BENEFITS OF OOB ACTIVITY, D/C PLANNING,                   Point: Precautions (Done)     Learning Progress Summary           Patient Acceptance, E,TB, VU,NR by JUSTIN at 8/7/2019  3:30 PM     Acceptance, E, VU,NR by EJ at 8/6/2019 11:35 AM    Acceptance, E,TB, VU,NR by EJ at 8/6/2019  9:36 AM    Acceptance, E, VU,NR by EJ at 8/5/2019 10:14 AM    Acceptance, E,TB,D, VU,NR by CS at 8/4/2019 10:20 AM    Comment:  Educated patient on safety with sit<> stand and ambulation. Educated patient to increase stride length with steps.    Acceptance, E,TB, VU,DU by CS at 8/3/2019  4:41 PM    Comment:  Educated patient on transfer technique using sheet to sit patient up in bed. Educated patient and family on sling.    Acceptance, E, VU,NR by CD at 8/2/2019  3:49 PM    Comment:  SEE FLOW SHEET. SON INSTRUCTED IN LE THER EX, POSITIONING FOR COMFORT.,    Eager, E, VU by KM at 8/1/2019 10:20 AM    Eager, E,D, VU,NR by AA at 7/31/2019  2:46 PM    Eager, E, NR by AA at 7/30/2019  3:17 PM    Acceptance, E, VU,NR by EJ at 7/29/2019 11:04 AM    Acceptance, E, VU,NR by CD at 7/25/2019  4:42 PM    Comment:  DTR INSTRUCTED IN ROM EXERCISES, POSITIONING FOR COMFORT/ PROTECTION OF R UE, BENEFITS OF OOB ACTIVITY, D/C PLANNING,    Acceptance, E, VU,NR by CD at 7/24/2019 10:36 AM    Comment:  SEE FLOWSHEET.   Family Acceptance, E,TB, VU,NR by EJ at 8/7/2019  3:30 PM    Acceptance, E,TB, VU,NR by EJ at 8/6/2019  9:36 AM    Acceptance, E,TB,D, VU,NR by CS at 8/4/2019 10:20 AM    Comment:  Educated patient on safety with sit<> stand and ambulation. Educated patient to increase stride length with steps.    Acceptance, E,TB, VU,DU by CS at 8/3/2019  4:41 PM    Comment:  Educated patient on transfer technique using sheet to sit patient up in bed. Educated patient and family on sling.    Acceptance, E, VU,NR by CD at 8/2/2019  3:49 PM    Comment:  SEE FLOW SHEET. SON INSTRUCTED IN LE THER EX, POSITIONING FOR COMFORT.,    Meg, TUSHAR OLIVAS VU,NR by AA at 7/31/2019  2:46 PM    Renny, MARITZA OLIVASNR by CD at 7/25/2019  4:42 PM    Comment:  DTR INSTRUCTED IN ROM EXERCISES, POSITIONING FOR COMFORT/ PROTECTION OF R UE, BENEFITS OF OOB ACTIVITY, D/C  PLANNING,                               User Key     Initials Effective Dates Name Provider Type Discipline    CD 06/19/15 -  Gogo Thibodeaux, PT Physical Therapist PT    EJ 11/20/18 -  Nurys Chahal, PT Physical Therapist PT    KM 06/19/15 -  Denise Hdz, PT Physical Therapist PT    AA 04/02/18 -  Mere Ramon, PT Physical Therapist PT    CS 03/26/19 -  Roz Phoenix PT Physical Therapist PT              PT Recommendation and Plan     Outcome Summary/Treatment Plan (PT)  Daily Summary of Progress (PT): progress toward functional goals is good  Anticipated Discharge Disposition (PT): inpatient rehabilitation facility  Plan of Care Reviewed With: patient  Progress: improving  Outcome Summary: Pt limited by spinal stenosis and shoulder pain. Pt c/o these pains with ambulation and t/f.      Time Calculation:   PT Charges     Row Name 08/07/19 1530             Time Calculation    Start Time  1530  -EJ      PT Received On  08/07/19  -EJ         Time Calculation- PT    Total Timed Code Minutes- PT  18 minute(s)  -EJ         Timed Charges    34144 - Gait Training Minutes   18  -EJ        User Key  (r) = Recorded By, (t) = Taken By, (c) = Cosigned By    Initials Name Provider Type    EJ Nurys Chahal, PT Physical Therapist        Therapy Charges for Today     Code Description Service Date Service Provider Modifiers Qty    13076405749 HC GAIT TRAINING EA 15 MIN 8/6/2019 Nurys Chahal, PT GP 1    15483697726 HC PT THERAPEUTIC ACT EA 15 MIN 8/6/2019 Nurys Chahal, PT GP 1    01194849332 HC PT THER SUPP EA 15 MIN 8/6/2019 Nurys Chahal, PT GP 2    69982358647 HC GAIT TRAINING EA 15 MIN 8/7/2019 Nurys Chahal, PT GP 1    55900797156 HC PT THER SUPP EA 15 MIN 8/7/2019 Nurys Chahal, PT GP 1          PT G-Codes  Outcome Measure Options: AM-PAC 6 Clicks Basic Mobility (PT)  AM-PAC 6 Clicks Score (PT): 15  AM-PAC 6 Clicks Score (OT): 11  Modified Kendrick Scale: 4 - Moderately  severe disability.  Unable to walk without assistance, and unable to attend to own bodily needs without assistance.    Nurys Rodriguez, PT  8/7/2019

## 2019-08-07 NOTE — DISCHARGE PLACEMENT REQUEST
"Olesya Barton  at [facility]   [phone]  Clinicals for Family Appeal with case reference number [id number]    Vargas De (79 y.o. Male)     Date of Birth Social Security Number Address Home Phone MRN    1940  [id number] [facility] [address] [phone] [id number]    Hinduism Marital Status          Latter-day        Admission Date Admission Type Admitting Provider Attending Provider Department, Room/Bed    19 Emergency Minnie Colbert MD [place], Kathryn L, MD [facility] 3E, S346/1    Discharge Date Discharge Disposition Discharge Destination                       Attending Provider:  Minnie Colbert MD    Allergies:  Donepezil, Iodine, Sulfa Antibiotics    Isolation:  None   Infection:  None   Code Status:  No CPR    Ht:  170.2 cm (67\")   Wt:  87.1 kg (192 lb 0.3 oz)    Admission Cmt:  None   Principal Problem:  Acute alteration in mental status [R41.82]                 Active Insurance as of 2019     Primary Coverage     Payor Plan Insurance Group Employer/Plan Group    HUMANA MEDICARE REPLACEMENT HUMANA MEDICARE REPL [id number]     Payor Plan Address Payor Plan Phone Number Payor Plan Fax Number Effective Dates    [address] [address] [phone]  2018 - None Entered    Formerly [facility] [id number]       Subscriber Name Subscriber Birth Date Member ID       VARGAS DE 1940 [id number]                 Emergency Contacts      (Rel.) Home Phone Work Phone Mobile Phone    WoodsDenita malave (Daughter) -- -- [phone]    KEVIN DE (Son) -- -- [phone]    DIANA GARRIDO (Daughter) -- -- [phone]               History & Physical      Traci Lewis DO at 2019  9:57 PM              [facility] Medicine Services  HISTORY AND PHYSICAL    Patient Name: Vargas De  : 1940  MRN: [id number]  Primary Care Physician: Saba Arrington MD  Date of admission: " 7/23/2019      Subjective   Subjective     Chief Complaint:  AMS    HPI:  Vargas De is a 78 y.o. male with a past medical history significant for ulcerative colitis on chronic prednisone therapy, chronic pain with spinal stenosis of the cervical region, hyperlipidemia, hypertension, PFO, atrial fibrillation ( on Eliquis) mild dementia, and multiple lacunar infarcts who presents with acute change in mental status. Last known normal Saturday. HPI limited secondary to patient disposition. Son and daughter at bedside provide history. Patient apparently had a mechanical fall on Saturday in which he sustained a fracture of proximal right humerus. He was splinted and sent home with hydrocodone Q 6 hours plus a referral to follow up with orthopedics. Son states patient then became increasingly confused over the next two days. They suspected polypharmacy and held the hydrocodone and ultram. Patient continued to worsen however and had auditory and visual hallucinations, garbled speech, left sided facial droop, and profound global weakness. Family was concerned about another stroke and called EMS. Patient apparently presented in a similar fashion when he had an acute CVA back in March. There are no current complaints of fever, cough, congestion, chest pain, N/V/D, or dysuria.    Records reviewed indicate patient was admitted to this facility 2/27/2019-3/5/2019 after being evaluated for AMS. He was found to have acute CVA.  MRI found multiple chronic lacunar infarcts right basal ganglia and occipital lobes.  He was started on Eliquis plus low dose ASA and high intensity statin. Also had Lisinopril increased and Norvasc added. Patient was eventually discharged to [facility] with with plans to follow up with cardiology. Family reports a return to baseline function and mentation at discharge.     Emergency Department Evaluation; vitals stable. WBC 11.9. Chemistry and hematology otherwise stable. CXR, UA clear. CT head  without acute changes. CTA chest negative.        Review of Systems   Unable to perform ROS: Mental status change          Otherwise complete ROS reviewed and is negative except as mentioned in the HPI.    Personal History     Past Medical History:   Diagnosis Date   • Allergic arthritis    • Arthritis    • BPH (benign prostatic hyperplasia)     [facility]   • BPH/nocturia/microhematuria (work up negative)    • chronic hearing loss left ear    • Crohn's colitis (CMS/HCC)     on colonoscopy 7/15.2017.  [facility]-continue Lialda   • Depression     medication, after death of wife 2014   • Elbow pain 7/25/2018   • Gout    • Hearing loss     left ear   • History of stroke 7/23/2018   • Hyperlipidemia    • Hypertension    • Ingrown toenail 03/18/2018   • Low back pain    • Macular degeneration    • Macular degeneration    • Multiple lacunar infarcts     noted on CT 2/2019   • Neck pain    • Rash 1/28/2019   • Rib pain on left side 07/11/2017   • Septic bursitis of elbow, right 7/23/2018   • Spinal stenosis     cervical and lumbosacral spine   • Spinal stenosis-cervical and lumbosacral spine    • Stroke (CMS/HCC)     aspirin, blood pressure conyrol.  Infarct frontal lobe.  R sided weakness, R hand and R foot numbness,dysphasia with stuttering.   • Stroke (CMS/HCC) 02/27/2019    MCA infarct with L facial droop and L sided weakness,dysphasia,dysarthria   • TIA (transient ischemic attack) 2/27/2019   • Ulcerative colitis (CMS/HCC)    • Urinary frequency        Past Surgical History:   Procedure Laterality Date   • CATARACT EXTRACTION     • COLONOSCOPY  07/15/2017   • HEMORRHOIDECTOMY     • HERNIA REPAIR     • KNEE SURGERY     • TONSILLECTOMY         Family History: family history includes Alzheimer's disease in his father; Arthritis in his father and other; Cancer in his brother, mother, and other; Diabetes in his maternal aunt and other; Hypertension in his other; Stroke in his maternal cousin. Otherwise pertinent FHx was reviewed  and unremarkable.     Social History:  reports that he has never smoked. He has never used smokeless tobacco. He reports that he drinks alcohol. He reports that he does not use drugs.  Social History     Social History Narrative   • Not on file       Medications:    Available home medication information reviewed.  Medications Prior to Admission   Medication Sig Dispense Refill Last Dose   • amLODIPine (NORVASC) 2.5 MG tablet Take 1 tablet by mouth Daily. 30 tablet 11 7/23/2019 at Unknown time   • apixaban (ELIQUIS) 5 MG tablet tablet Take 1 tablet by mouth Every 12 (Twelve) Hours. 60 tablet 11 7/23/2019 at Unknown time   • APRISO 0.375 g 24 hr capsule Take 0.75 g by mouth Every Night. 2 caps (0.750g total) nightly   7/22/2019 at Unknown time   • atorvastatin (LIPITOR) 80 MG tablet Take 1 tablet by mouth Every Night. 30 tablet 11 7/22/2019 at Unknown time   • bisoprolol (ZEBeta) 5 MG tablet Take 5 mg by mouth Every Evening.   7/22/2019 at Unknown time   • MELENDEZ PO Take 2 capsules by mouth 2 (Two) Times a Day.   7/23/2019 at Unknown time   • Cholecalciferol (VITAMIN D3) 5000 units capsule capsule Take 5,000 Units by mouth Daily.   7/23/2019 at Unknown time   • doxylamine (UNISOM) 25 MG tablet Take 1 tablet by mouth Every Night. 25mg    7/22/2019 at Unknown time   • DULoxetine (CYMBALTA) 20 MG capsule Take 1 capsule by mouth Daily. 30 capsule 11 7/23/2019 at Unknown time   • fluticasone (FLONASE) 50 MCG/ACT nasal spray 2 sprays into the nostril(s) as directed by provider Daily As Needed for Rhinitis.   Past Month at Unknown time   • gabapentin (NEURONTIN) 300 MG capsule Take 300 mg by mouth every night at bedtime.   7/22/2019 at Unknown time   • HYDROcodone-acetaminophen (NORCO) 5-325 MG per tablet Take 1 tablet by mouth Every 6 (Six) Hours As Needed (pain). 15 tablet 0 7/22/2019 at 1100   • lisinopril (PRINIVIL,ZESTRIL) 20 MG tablet Take 1 tablet by mouth 2 (Two) Times a Day. 180 tablet 1 7/23/2019 at Unknown time    • memantine (NAMENDA XR) 28 MG capsule sustained-release 24 hr extended release capsule Take 1 capsule by mouth Daily. 30 capsule 11 7/23/2019 at Unknown time   • Multiple Vitamins-Minerals (PRESERVISION AREDS PO) Take 1 capsule by mouth 2 (Two) Times a Day.   7/23/2019 at Unknown time   • omeprazole (priLOSEC) 20 MG capsule Take 20 mg by mouth 2 (Two) Times a Day.   7/23/2019 at Unknown time   • predniSONE (DELTASONE) 5 MG tablet Take 5 mg by mouth Daily. (Trying to wean pt from using.  Recently reduced to 1 tab daily drom 2 tabs daily 7/23/19)   7/23/2019 at Unknown time   • vitamin B-12 (CYANOCOBALAMIN) 500 MCG tablet Take 500 mcg by mouth Daily.   7/23/2019 at Unknown time   • Zinc 50 MG capsule Take 50 mg by mouth Daily.   7/23/2019 at Unknown time   • acetaminophen (TYLENOL) 325 MG tablet Take 2 tablets by mouth Every 4 (Four) Hours As Needed for Mild Pain  or Fever (Temperature greater than or equal to 37 C).   Unknown at Unknown time   • dicyclomine (BENTYL) 20 MG tablet Take 20 mg by mouth 2 (Two) Times a Day As Needed.   Unknown at Unknown time   • loratadine (CLARITIN) 10 MG tablet Take 10 mg by mouth As Needed for Allergies.   Unknown at Unknown time       Allergies   Allergen Reactions   • Donepezil Other (See Comments)     nightmares   • Iodine    • Sulfa Antibiotics        Objective   Objective     Vital Signs:   Temp:  [99.7 °F (37.6 °C)] 99.7 °F (37.6 °C)  Heart Rate:  [81-95] 90  Resp:  [18-20] 18  BP: (119-159)/(63-89) 136/86        Physical Exam   Constitutional: Awake, alert, but disoriented and confused  Eyes: PERRLA, sclerae anicteric, no conjunctival injection  HENT: NCAT, mucous membranes moist  Neck: Supple, no thyromegaly, no lymphadenopathy, trachea midline  Respiratory: Clear to auscultation bilaterally, nonlabored respirations   Cardiovascular: RRR, no murmurs, rubs, or gallops, palpable pedal pulses bilaterally  Gastrointestinal: Positive bowel sounds, soft, nontender,  nondistended  Musculoskeletal: No bilateral ankle edema, no clubbing or cyanosis to extremities  Right arm splinted. Multiple bruises and lacerations  Psychiatric: Appropriate affect, cooperative  Neurologic: Oriented x 3, strength symmetric in all extremities, Cranial Nerves grossly intact to confrontation, speech clear  Skin: No rashes      Results Reviewed:  I have personally reviewed current lab, radiology, and data and agree.    Results from last 7 days   Lab Units 07/23/19  1251   WBC 10*3/mm3 11.89*   HEMOGLOBIN g/dL 13.3   HEMATOCRIT % 41.1   PLATELETS 10*3/mm3 141     Results from last 7 days   Lab Units 07/23/19  1251   SODIUM mmol/L 134*   POTASSIUM mmol/L 4.3   CHLORIDE mmol/L 97*   CO2 mmol/L 28.0   BUN mg/dL 21   CREATININE mg/dL 0.89   GLUCOSE mg/dL 137*   CALCIUM mg/dL 8.9   ALT (SGPT) U/L 22   AST (SGOT) U/L 23   TROPONIN T ng/mL <0.010   LACTATE mmol/L 1.4   PROCALCITONIN ng/mL 0.22     Estimated Creatinine Clearance: 74.4 mL/min (by C-G formula based on SCr of 0.89 mg/dL).  Brief Urine Lab Results  (Last result in the past 365 days)      Color   Clarity   Blood   Leuk Est   Nitrite   Protein   CREAT   Urine HCG        07/23/19 1322 Yellow Clear Small (1+) Negative Negative 30 mg/dL (1+)             Imaging Results (last 24 hours)     Procedure Component Value Units Date/Time    CT Angiogram Chest With Contrast [[id number]] Collected:  07/23/19 1741     Updated:  07/23/19 1745    Narrative:       EXAMINATION: CT ANGIOGRAM CHEST W CONTRAST-      INDICATION: shortness of breath and AMS w/ hx of fall and right humerus  fxr- r/o PE      TECHNIQUE: CT angiogram chest with intravenous contrast line. Protocol.  2-D reconstructions performed     The radiation dose reduction device was turned on for each scan per the  ALARA (As Low as Reasonably Achievable) protocol.     COMPARISON: NONE     FINDINGS: Thyroid is homogeneous in attenuation. No bulky medicinal  adenopathy. Central airways are patent. Esophagus  in normal course with  patulous appearance throughout. Patent nonaneurysmal thoracic aorta with  patent great vessel origins demonstrating tortuosity and minimal  atherosclerotic involvement of the thoracoabdominal aortic junction  without irregularity or dissection. Satisfactory opacification of the  pulmonary arterial tree without filling defect to suggest pulmonary  embolus. Cardiac size within normal limits without pericardial effusion.  Coronary calcifications are noted within the left coronary predominance  along with aortic valvular calcifications.     Extended lung windows demonstrate minimal biapical pleural-parenchymal  scarring and Central bronchiectasis along with subsegmental atelectasis  in the dependent portions. Right-sided fat-containing Bochdalek hernia  with minimal mass effect on the right lower lobe adjacent to this.  Calcified granuloma right lower lung without focal consolidation or  opacification otherwise. No pleural effusion. Degenerative changes of  the spine with abnormal cortical irregularities of known right humeral  fracture and adjacent soft tissue edema otherwise no acute osseous  findings. Visual is portions of the upper abdomen demonstrate simple  appearing left renal cortical cyst partially imaged.       Impression:       1. Acute fracture of the right humerus proximally with surrounding soft  tissue edema.  2. No PE.  3. No acute intrathoracic findings with minimal background chronic  changes.          CT Head Without Contrast [[id number]] Collected:  07/23/19 1733     Updated:  07/23/19 1735    Narrative:       EXAMINATION: CT HEAD WO CONTRAST-      INDICATION: AMS w/ hx of fall      TECHNIQUE: CT head without intravenous contrast     The radiation dose reduction device was turned on for each scan per the  ALARA (As Low as Reasonably Achievable) protocol.     COMPARISON: MRI dated 02/27/2019     FINDINGS: Midline structures are symmetric without evidence of mass,  mass effect or  midline shift. Ventricles and sulci are mildly prominent  towards the vertex along with low-attenuation changes in the  periventricular and deep white matter of advanced chronic small vessel  ischemic disease however no intra-axial hemorrhage or extra-axial fluid  collection with continued prominence of the subarachnoid spaces similar  to prior MRI of likely cerebral atrophy component. Globes and orbits  unremarkable. Visualized paranasal sinuses and mastoid air cells grossly  clear well-pneumatized. Calvarium intact.             Impression:       No acute intracranial abnormality with senescent changes and  advanced chronic small vessel ischemic disease again noted in the  periventricular and deep white matter evidenced by low-attenuation  without acute hemorrhage          XR Chest 1 View [[id number]] Collected:  07/23/19 1517     Updated:  07/23/19 1541    Narrative:       EXAMINATION: XR CHEST 1 VW- 07/23/2019      INDICATION: Weak/Dizzy/AMS triage protocol      COMPARISON: 02/27/2019     FINDINGS: Heart and pulmonary vasculature appear normal in size. Lung  volumes appear relatively low, a little decreased from the prior study,  and significantly decreased from 02/04/2019 exam. This likely accounts  for slight blunting of lateral costophrenic angles. No new pulmonary  parenchymal disease, evidence of edema or pneumothorax is seen.       Impression:       Mildly decreased lung volumes, otherwise stable exam.     D:  07/23/2019  E:  07/23/2019              Results for orders placed during the hospital encounter of 02/27/19   Adult Transesophageal Echo (REJI) W/ Cont if Necessary Per Protocol    Narrative · Left ventricular systolic function is normal. Estimated EF appears to be   in the range of 56 - 60%.  · Left atrial cavity size is moderately dilated. The left atrial appendage   was visualized through multiple planes. Left atrial appendage was found to   be multilobar in nature. Doppler interrogation shows normal  flow within   the left atrial appendage. No evidence of a left atrial appendage thrombus   was present  · Patent foramen ovale present. Saline test results are positive for right   to left atrial level shunt  · Mild to moderate aortic valve regurgitation is present  · There is mild (grade 2) plaque in the aortic arch present. There is mild   (grade 2) plaque in the descending aorta present.          Assessment/Plan   Assessment / Plan     Active Hospital Problems    Diagnosis POA   • **Acute alteration in mental status [R41.82] Yes     Priority: High   • Fracture of proximal end of right humerus [S42.201A] Yes     Priority: High   • Paroxysmal atrial fibrillation (CMS/HCC) [I48.0] Yes     Priority: Medium   • Benign essential hypertension [I10] Yes     Priority: Medium   • Crohn's colitis, with rectal bleeding (CMS/HCC) [K50.111] Yes     Priority: Medium   • Mixed hyperlipidemia [E78.2] Yes     Priority: Low   • Multiple lacunar infarcts [I63.81] Yes     Priority: Low   • PFO (patent foramen ovale) [Q21.1] Not Applicable     Priority: Low   • GERD without esophagitis [K21.9] Yes     Priority: Low         AMS:  - suspect CVA/TIA. History of multiple infarcts. History of PFO and A. Fib; acute pain and narcotics likely contributing   - obtain MRI brain  - consult to neurology  - continue home statin, ASA  - PT/O/SLP treat and eval  - am labs    Right Humerus Fracture:  - pain control as needed  - consult to ortho, appreciate input  - continue with therapy as above    PAF:  - stable and rate controlled.   - [id number] = 5. On Eliquis. Will change to therapeutic lovenox ( failed bed side swallow) and possibly need for surgery pending ortho eval     History of CVA  - workup in May and CVA felt to be embolic; follows with Dr. Gutierrez and Dr. Maldonado. Has been on Eliquis and ASA discontinued      Chronic steroid:  - on 5 mg daily for Crohn's/lumbar stenosis. Currently being weaned down, to eventually discontinue     HTN:  -  stable. Continue home meds      DVT prophylaxis:  therapeutic lovenox    CODE STATUS:  Full code  Code Status and Medical Interventions:   Ordered at: 07/23/19 4532     Level Of Support Discussed With:    Patient     Code Status:    CPR     Medical Interventions (Level of Support Prior to Arrest):    Full       Admission Status:  I believe this patient meets OBSERVATION status, however if further evaluation or treatment plans warrant, status may change.  Based upon current information, I predict patient's care encounter to be less than or equal to 2 midnights.        Electronically signed by Mayo Fitch PA-C, 07/23/19, 9:58 PM.      Brief Attending Admission Attestation     I have seen and examined the patient, performing an independent face-to-face diagnostic evaluation with plan of care reviewed and developed with the advanced practice clinician (APC).      Brief Summary Statement:   Vargas De is a 78 y.o. male ulcerative colitis, CVA, atrial fibrillation who presents today with confusion, weakness, difficulty swallowing.  Patient had a mechanical fall over the weekend and presented to the ER where he was diagnosed with a right humerus fracture.  Patient was to call Ortho today for outpatient evaluation.  Family states yesterday he became extremely confused.  They thought it may be related to the pain medication was taking and discontinued this.  Confusion may have improved some however he remained extremely weak with garbled speech similar symptoms that he experienced in May.  EMS was called and patient was brought to the ER.  Currently daughter states his mentation is improved but still has moments of disorientation.  Patient states he is extremely weak and this is been rapidly progressive over the last few days.     Remainder of detailed HPI is as noted above and has been reviewed and/or edited by me for completeness.      Attending Physical Exam:  Constitutional: Awake, alert  Eyes: PERRLA, sclerae  anicteric, no conjunctival injection  HENT: NCAT, mucous membranes moist  Neck: Supple, no thyromegaly, no lymphadenopathy, trachea midline  Respiratory: Clear to auscultation bilaterally, nonlabored respirations   Cardiovascular: RRR, no murmurs, rubs, or gallops, palpable pedal pulses bilaterally  Gastrointestinal: Positive bowel sounds, soft, nontender, nondistended  Musculoskeletal: No bilateral ankle edema, no clubbing or cyanosis to extremities; bruising of right upper arm - in a sling   Psychiatric: Appropriate affect, cooperative  Neurologic: Oriented x 3, diffuse weakness with bilateral lower legs 3-4/5; Cranial Nerves grossly intact to confrontation, garbled speech   Skin: No rashes    Brief Assessment/Plan :  See above for further detailed assessment and plan developed with APC which I have reviewed and/or edited for completeness.      Electronically signed by Traci Lewis DO, 07/23/19, 11:05 PM.               Electronically signed by Traci Lewis DO at 7/23/2019 11:11 PM       Hospital Medications (active)       Dose Frequency Start End    acetaminophen (TYLENOL) 160 MG/5ML solution 650 mg 650 mg Every 6 Hours PRN 7/30/2019     Sig - Route: Take 20.3 mL by mouth Every 6 (Six) Hours As Needed for Mild Pain . - Oral    acetaminophen (TYLENOL) tablet 650 mg 650 mg Every 8 Hours Scheduled 7/31/2019     Sig - Route: Take 2 tablets by mouth Every 8 (Eight) Hours. - Oral    Notes to Pharmacy: Max 24-hour Tylenol dosing is 3000 mg    apixaban (ELIQUIS) tablet 5 mg 5 mg Every 12 Hours Scheduled 7/29/2019     Sig - Route: Take 1 tablet by mouth Every 12 (Twelve) Hours. - Oral    atorvastatin (LIPITOR) tablet 80 mg 80 mg Nightly 7/29/2019     Sig - Route: Take 2 tablets by mouth Every Night. - Oral    bisoprolol (ZEBeta) tablet 5 mg 5 mg Every 24 Hours Scheduled 8/1/2019     Sig - Route: Take 1 tablet by mouth Daily. - Oral    calcium carbonate (TUMS) chewable tablet 500 mg (200 mg elemental) 1 tablet 3  Times Daily PRN 7/31/2019     Sig - Route: Chew 500 mg 3 (Three) Times a Day As Needed for Indigestion or Heartburn. - Oral    diclofenac (VOLTAREN) 1 % gel 2 g 2 g 4 Times Daily 7/28/2019     Sig - Route: Apply 2 g topically to the appropriate area as directed 4 (Four) Times a Day. - Topical    DULoxetine (CYMBALTA) DR capsule 20 mg 20 mg Daily 8/3/2019     Sig - Route: Take 1 capsule by mouth Daily. - Oral    gabapentin (NEURONTIN) capsule 300 mg 300 mg Nightly 8/2/2019     Sig - Route: Take 1 capsule by mouth Every Night. - Oral    ipratropium-albuterol (DUO-NEB) nebulizer solution 3 mL 3 mL Every 4 Hours PRN 8/2/2019     Sig - Route: Take 3 mL by nebulization Every 4 (Four) Hours As Needed for Shortness of Air. - Nebulization    lidocaine (LIDODERM) 5 % 2 patch 2 patch Nightly 7/28/2019     Sig - Route: Place 2 patches on the skin as directed by provider Every Night. - Transdermal    loperamide (IMODIUM) capsule 2 mg 2 mg 4 Times Daily PRN 7/31/2019     Sig - Route: Take 1 capsule by mouth 4 (Four) Times a Day As Needed for Diarrhea. - Oral    magic mouthwash oral supsension 5 mL 5 mL Every 4 Hours PRN 7/25/2019     Sig - Route: Swish and spit 5 mL Every 4 (Four) Hours As Needed for Mouth Pain. - Swish & Spit    melatonin tablet 5 mg 5 mg Nightly PRN 7/30/2019     Sig - Route: Take 1 tablet by mouth At Night As Needed for Sleep. - Oral    memantine (NAMENDA) tablet 10 mg 10 mg Every 12 Hours Scheduled 8/4/2019     Sig - Route: Take 1 tablet by mouth Every 12 (Twelve) Hours. - Oral    mesalamine (APRISO) 24 hr capsule 0.75 g(patient supplied med) 0.75 g Nightly 7/29/2019     Sig - Route: Take 2 capsules by mouth Every Night. - Oral    pantoprazole (PROTONIX) EC tablet 40 mg 40 mg Every Early Morning 8/1/2019     Sig - Route: Take 1 tablet by mouth Every Morning. - Oral    phenol (CHLORASEPTIC) 1.4 % liquid 2 spray 2 spray Every 2 Hours PRN 7/25/2019     Sig - Route: Apply 2 sprays to the mouth or throat Every 2  "(Two) Hours As Needed for Sore Throat. - Mouth/Throat    potassium chloride (KLOR-CON) packet 40 mEq 40 mEq As Needed 2019     Sig - Route: Take 40 mEq by mouth As Needed (Potassium Replacement, See Admin Instructions). - Oral    potassium chloride (MICRO-K) CR capsule 40 mEq 40 mEq As Needed 2019     Sig - Route: Take 4 capsules by mouth As Needed (Potassium Replacement.  See Admin Instructions). - Oral    potassium chloride 10 mEq in 100 mL IVPB 10 mEq Every 1 Hour PRN 2019     Sig - Route: Infuse 100 mL into a venous catheter Every 1 (One) Hour As Needed (Potassium Replacement - See Admin Instructions). - Intravenous    Linked Group 1:  \"Or\" Linked Group Details        predniSONE (DELTASONE) tablet 5 mg 5 mg Daily With Breakfast 2019     Sig - Route: Take 0.5 tablets by mouth Daily With Breakfast. - Oral    saccharomyces boulardii (FLORASTOR) capsule 250 mg 250 mg 2 Times Daily 8/3/2019     Sig - Route: Take 1 capsule by mouth 2 (Two) Times a Day. - Oral    saline (AYR) nasal gel  Every 1 Hour PRN 2019     Sig - Route: Apply  topically to the appropriate area as directed Every 1 (One) Hour As Needed (nose irriation). - Topical    sodium chloride 0.9 % flush 10 mL 10 mL As Needed 2019     Sig - Route: Infuse 10 mL into a venous catheter As Needed for Line Care. - Intravenous    Cosign for Ordering: Accepted by Alberto Maldonado MD on 2019  8:33 PM    sodium chloride 0.9 % flush 3 mL 3 mL Every 12 Hours Scheduled 2019     Sig - Route: Infuse 3 mL into a venous catheter Every 12 (Twelve) Hours. - Intravenous    sodium chloride 0.9 % flush 3-10 mL 3-10 mL As Needed 2019     Sig - Route: Infuse 3-10 mL into a venous catheter As Needed for Line Care. - Intravenous             Physician Progress Notes (most recent note)      Isadora Garcia APRN at 2019 11:03 AM          Palliative Care Consult Note    Patient Name: Vargas De   : 1940  Sex: male    Date " "of Admission: 7/23/2019    Subjective:  Vargas De is a 78 y.o. male with a past medical history significant for ulcerative colitis on chronic prednisone therapy, chronic pain with spinal stenosis of the cervical region, hyperlipidemia, hypertension, PFO, atrial fibrillation ( on Eliquis) mild dementia, and multiple lacunar infarcts admitted to acute care on 07/23/2109 with acute change in mental status.  MRI negative for CVA.  EEG negative for seizures.    On visit today he is sitting up in bed eating chicken nuggets which were brought in by family.  Granddaughter is at bedside.     Patient reported pain was brought to a comfortable level within 48 hours after initial assessment: improved.     ROS:  Review of Systems   HENT: Positive for voice change.    Musculoskeletal: Positive for arthralgias and myalgias.   Neurological: Positive for weakness.   All other systems reviewed and are negative.      Reviewed current scheduled and prn medications for route, type, dose and frequency.     •  acetaminophen  •  calcium carbonate  •  ipratropium-albuterol  •  loperamide  •  magic mouthwash  •  melatonin  •  phenol  •  potassium chloride  •  potassium chloride  •  [DISCONTINUED] potassium chloride **OR** [DISCONTINUED] potassium chloride **OR** potassium chloride  •  saline  •  sodium chloride  •  sodium chloride    Objective:   /76 (BP Location: Left arm, Patient Position: Lying)   Pulse 78   Temp 97.8 °F (36.6 °C) (Oral)   Resp 18   Ht 170.2 cm (67\")   Wt 87.1 kg (192 lb 0.3 oz)   SpO2 97%   BMI 30.07 kg/m²     Intake & Output (last day)       08/06 0701 - 08/07 0700 08/07 0701 - 08/08 0700    P.O. 480     Total Intake(mL/kg) 480 (5.5)     Urine (mL/kg/hr) 250 (0.1)     Total Output 250     Net +230           Stool Unmeasured Occurrence 1 x         Lab Results (last 24 hours)     ** No results found for the last 24 hours. **        Imaging Results (last 24 hours)     Procedure Component Value Units " Date/Time    Fiberoptic Endo (fees) [[id number]] Resulted:  08/06/19 1457     Updated:  08/06/19 1457    Narrative:       This procedure was auto-finalized with no dictation required.        Physical Exam   Constitutional: He is oriented to person, place, and time. He appears well-developed and well-nourished.   HENT:   Head: Normocephalic.   Eyes: Conjunctivae are normal. Pupils are equal, round, and reactive to light.   Neck: Neck supple.   Cardiovascular: Normal rate and intact distal pulses.   Pulmonary/Chest: Effort normal and breath sounds normal.   Room air    Abdominal: Soft. Bowel sounds are normal.   Genitourinary:   Genitourinary Comments: deferred   Musculoskeletal: Normal range of motion.   Neurological: He is alert and oriented to person, place, and time.   Skin: Skin is warm and dry. Capillary refill takes 2 to 3 seconds.   Psychiatric: He has a normal mood and affect.   Nursing note and vitals reviewed.          Acute alteration in mental status    Essential hypertension    Crohn's colitis, with rectal bleeding (CMS/HCC)    GERD without esophagitis    Spinal stenosis in cervical region    PFO (patent foramen ovale)    History of stroke with residual deficit    Mixed hyperlipidemia    Multiple lacunar infarcts    Paroxysmal atrial fibrillation (CMS/HCC)    Fracture of proximal end of right humerus    Acute respiratory distress    Lewy body dementia      Assessment/Plan:   79 y.o. male with acute encephalopathy with underlying dementia, acute Right humerus fracture, acute hypoxia respiratory failure; RUE pain; acute hypoxic respiratory failure likely aspiration; lewy body dementia. continue apixaban post fx humerus.     Pain: Tylenol 650 mg q 8 hrs scheduled and q 6 hr PRN , gabapentin 300 mg BID, Lidoderm aptch    Dyspnea: prednisone 5 mg q day, NCO2, duonebs   Nausea/Vomiting:  Anxiety/Agitation:  Confusion/Delerium:  Depression: cymbalta 20 mg q day   Bowel/bladder:   Nutrition:  Speech eval 7/29  with diet modifications; speech pathology to repeat FEES.  Sleep: me;atonin 5 mg q HS   ADLs:  2) HLD:  lipitor   3) HTN: Zybeta 5 mg q 24 hrs   4)Dementia: memantine 10 mg BID;   5) GERD: protonix EC 40 mg q day     Reports pain is improving with tylenol and ice. No new issues on thsi visit.     Goals of care - acute rehab then return home with daughter, Denita.    Total Visit Time:  45   Face to Face Time:  30     ANA MARÍA Savage  [phone]  19  11:05 AM    Electronically signed by Isadora Garcia APRN at 2019 12:01 PM          Physical Therapy Notes (most recent note)      Nurys Chahal, PT at 2019 11:42 AM  Version 1 of 1         Acute Care - Physical Therapy treatment  [facility]     Patient Name: Vargas De  : 1940  MRN: [id number]  Today's Date: 2019   Onset of Illness/Injury or Date of Surgery: 19  Date of Referral to PT: 19         Admit Date: 2019    Visit Dx:     ICD-10-CM ICD-9-CM   1. Acute alteration in mental status R41.82 780.97   2. Generalized weakness R53.1 780.79   3. Other closed displaced fracture of proximal end of right humerus with routine healing, subsequent encounter S42.291D V54.11   4. Elevated blood pressure reading with diagnosis of hypertension I10 401.9   5. Impaired functional mobility, balance, gait, and endurance Z74.09 V49.89   6. Impaired mobility and ADLs Z74.09 799.89   7. Cognitive communication deficit R41.841 799.52   8. Oropharyngeal dysphagia R13.12 787.22     Patient Active Problem List   Diagnosis   • Peripheral neuropathy   • Essential hypertension   • Crohn's colitis, with rectal bleeding (CMS/HCC)   • Major depressive disorder with single episode, in partial remission (CMS/HCC)   • Macular degeneration of both eyes   • GERD without esophagitis   • Cervicalgia   • Spinal stenosis in cervical region   • Chronic right-sided low back pain without sciatica   • Osteoarthritis of toe joint, right   • PFO  (patent foramen ovale)   • Ulcerative colitis (CMS/HCC)   • Body mass index (BMI) of 25.0 to 29.9   • History of stroke with residual deficit   • Mixed hyperlipidemia   • Multiple lacunar infarcts   • Paroxysmal atrial fibrillation (CMS/HCC)   • Chronic bilateral low back pain   • Acute alteration in mental status   • Fracture of proximal end of right humerus   • Acute respiratory distress   • Lewy body dementia     Past Medical History:   Diagnosis Date   • Allergic arthritis    • Arthritis    • BPH (benign prostatic hyperplasia)      urology   • BPH/nocturia/microhematuria (work up negative)    • chronic hearing loss left ear    • Crohn's colitis (CMS/HCC)     on colonoscopy 7/15.2017.  [facility]-continue Lialda   • Depression     medication, after death of wife 2014   • Elbow pain 7/25/2018   • Gout    • Hearing loss     left ear   • History of stroke 7/23/2018   • Hyperlipidemia    • Hypertension    • Ingrown toenail 03/18/2018   • Low back pain    • Macular degeneration    • Macular degeneration    • Multiple lacunar infarcts     noted on CT 2/2019   • Neck pain    • Rash 1/28/2019   • Rib pain on left side 07/11/2017   • Septic bursitis of elbow, right 7/23/2018   • Spinal stenosis     cervical and lumbosacral spine   • Spinal stenosis-cervical and lumbosacral spine    • Stroke (CMS/HCC)     aspirin, blood pressure conyrol.  Infarct frontal lobe.  R sided weakness, R hand and R foot numbness,dysphasia with stuttering.   • Stroke (CMS/HCC) 02/27/2019    MCA infarct with L facial droop and L sided weakness,dysphasia,dysarthria   • TIA (transient ischemic attack) 2/27/2019   • Ulcerative colitis (CMS/HCC)    • Urinary frequency      Past Surgical History:   Procedure Laterality Date   • CATARACT EXTRACTION     • COLONOSCOPY  07/15/2017   • HEMORRHOIDECTOMY     • HERNIA REPAIR     • KNEE SURGERY     • TONSILLECTOMY          PT ASSESSMENT (last 12 hours)      Physical Therapy Evaluation     Row Name 08/06/19 5751           PT Evaluation Time/Intention    Document Type  therapy note (daily note)  -EJ     Mode of Treatment  physical therapy  -     Row Name 08/06/19 1115          General Information    Patient Profile Reviewed?  yes  -EJ     Existing Precautions/Restrictions  brace worn when out of bed;non-weight bearing;right UE  -     Row Name 08/06/19 1115          Cognitive Assessment/Intervention- PT/OT    Orientation Status (Cognition)  oriented x 3  -     Row Name 08/06/19 1115          Safety Issues, Functional Mobility    Impairments Affecting Function (Mobility)  pain  -[facility] Name 08/06/19 1116          Mobility Assessment/Treatment    Extremity Weight-bearing Status  right upper extremity  -     Right Upper Extremity (Weight-bearing Status)  non weight-bearing (NWB)  -[facility] Name 08/06/19 1116          Bed Mobility Assessment/Treatment    Bed Mobility Assessment/Treatment  supine-sit  -     Armstrong Level (Bed Mobility)  maximum assist (25% patient effort) physical assist to place hand high on bed rail,  -[facility] Name 08/06/19 1116          Transfer Assessment/Treatment    Sit-Stand Armstrong (Transfers)  contact guard  -[facility] Name 08/06/19 1116          Sit-Stand Transfer    Assistive Device (Sit-Stand Transfers)  other (see comments) HHA  -[facility] Name 08/06/19 1116          Gait/Stairs Assessment/Training    Armstrong Level (Gait)  minimum assist (75% patient effort);1 person to manage equipment  -     Assistive Device (Gait)  -- A  -     Distance in Feet (Gait)  65+65, 1 sitting rest break.  -EJ     Deviations/Abnormal Patterns (Gait)  base of support, wide;stride length decreased  -EJ     Bilateral Gait Deviations  forward flexed posture  -EJ     Comment (Gait/Stairs)  pt has slow careful gait, forward flexed posture, c/o spinal stenosis in lumbar spine. Cued for upright posture as able.  -     Row Name 08/06/19 1127          Therapeutic Exercise    Lower Extremity  (Therapeutic Exercise)  gluteal sets;quad sets, bilateral  -EJ     Lower Extremity Range of Motion (Therapeutic Exercise)  hip abduction/adduction, bilateral;ankle dorsiflexion/plantar flexion, right  -EJ     Exercise Type (Therapeutic Exercise)  AROM (active range of motion);isotonic contraction, concentric;isometric contraction, static  -EJ     Position (Therapeutic Exercise)  seated  -EJ     Sets/Reps (Therapeutic Exercise)  20 APs, hip add with pillow, hip abd 10 reps.   -     Row Name 08/06/19 1127          Static Sitting Balance    Level of [place] (Unsupported Sitting, Static Balance)  supervision  -EJ     Sitting Position (Unsupported Sitting, Static Balance)  sitting on edge of bed  -     Row Name 08/06/19 1127          Static Standing Balance    Level of [place] (Supported Standing, Static Balance)  minimal assist, 75% patient effort  -     Row Name 08/06/19 1133          Pain Scale: Numbers Pre/Post-Treatment    Pain Location - Orientation  lower  -EJ     Pain Location  back  -EJ     Pain Intervention(s)  Ambulation/increased activity;Repositioned  -EJ     Row Name 08/06/19 1133          Pain Scale: FACES Pre/Post-Treatment    Pain: FACES Scale, Pretreatment  6-->hurts even more  -EJ     Pain: FACES Scale, Post-Treatment  4-->hurts little more  -EJ     Row Name             Wound 07/23/19 2130 Right anterior toe pressure injury    Wound - Properties Group Date first assessed: 07/23/19  -DB Time first assessed: 2130  -DB Side: Right  -DB Orientation: anterior  -DB Location: toe  -DB Stage, Pressure Injury: Stage 1  -DB Present On Admission : yes  -DB Type: pressure injury  -DB    Row Name             Wound 07/20/19 1649 Left hand skin tear    Wound - Properties Group Date first assessed: 07/20/19  -SW Time first assessed: 1649  -SW Side: Left  -SW Location: hand  -SW Present On Admission : yes  -SW Type: skin tear  -SW    Row Name             Wound 07/20/19 1648 Right knee abrasion    Wound  - Properties Group Date first assessed: 07/20/19  -SW Time first assessed: 1648  -SW Side: Right  -SW Location: knee  -SW Present On Admission : yes  -SW Type: abrasion  -SW    Row Name 08/06/19 1133          Plan of Care Review    Plan of Care Reviewed With  patient;son  -JUSTIN     Row Name 08/06/19 1133          Positioning and Restraints    Pre-Treatment Position  in bed  -EJ     Post Treatment Position  chair  -EJ     In Chair  reclined;call light within reach;encouraged to call for assist;exit alarm on;with family/caregiver  -EJ       User Key  (r) = Recorded By, (t) = Taken By, (c) = Cosigned By    Initials Name Provider Type    Nurys Ny PT Physical Therapist    Henok Vega RN Registered Nurse    Odilia Gordillo RN Registered Nurse        Physical Therapy Education     Title: PT OT SLP Therapies (Done)     Topic: Physical Therapy (Done)     Point: Mobility training (Done)     Learning Progress Summary           Patient Acceptance, E, VU,NR by JUSTIN at 8/6/2019 11:35 AM    Acceptance, E,TB, VU,NR by JUSTIN at 8/6/2019  9:36 AM    Acceptance, E, VU,NR by JUSTIN at 8/5/2019 10:14 AM    Acceptance, E,TB,D, VU,NR by CS at 8/4/2019 10:20 AM    Comment:  Educated patient on safety with sit<> stand and ambulation. Educated patient to increase stride length with steps.    Acceptance, E,TB, VU,DU by CS at 8/3/2019  4:41 PM    Comment:  Educated patient on transfer technique using sheet to sit patient up in bed. Educated patient and family on sling.    Acceptance, E, VU,NR by CD at 8/2/2019  3:49 PM    Comment:  SEE FLOW SHEET. SON INSTRUCTED IN LE THER EX, POSITIONING FOR COMFORT.,    Eager, E, VU by TANK at 8/1/2019 10:20 AM    Eager, E,D, VU,NR by AA at 7/31/2019  2:46 PM    Eager, E, NR by AA at 7/30/2019  3:17 PM    Acceptance, E, VU,NR by EJ at 7/29/2019 11:04 AM    Acceptance, E, VU,NR by CD at 7/25/2019  4:42 PM    Comment:  DTR INSTRUCTED IN ROM EXERCISES, POSITIONING FOR COMFORT/ PROTECTION OF R  UE, BENEFITS OF OOB ACTIVITY, D/C PLANNING,    Acceptance, E, VU,NR by CD at 7/24/2019 10:36 AM    Comment:  SEE FLOWSHEET.   Family Acceptance, E,TB, VU,NR by JUSTIN at 8/6/2019  9:36 AM    Acceptance, E,TB,D, VU,NR by CS at 8/4/2019 10:20 AM    Comment:  Educated patient on safety with sit<> stand and ambulation. Educated patient to increase stride length with steps.    Acceptance, E,TB, VU,DU by CS at 8/3/2019  4:41 PM    Comment:  Educated patient on transfer technique using sheet to sit patient up in bed. Educated patient and family on sling.    Acceptance, E, VU,NR by CD at 8/2/2019  3:49 PM    Comment:  SEE FLOW SHEET. SON INSTRUCTED IN LE THER EX, POSITIONING FOR COMFORT.,    Eager, E,D, VU,NR by BRANDON at 7/31/2019  2:46 PM    Acceptance, E, VU,NR by CD at 7/25/2019  4:42 PM    Comment:  DTR INSTRUCTED IN ROM EXERCISES, POSITIONING FOR COMFORT/ PROTECTION OF R UE, BENEFITS OF OOB ACTIVITY, D/C PLANNING,                   Point: Home exercise program (Done)     Learning Progress Summary           Patient Acceptance, E, VU,NR by JUSTIN at 8/6/2019 11:35 AM    Acceptance, E,TB, VU,NR by JUSTIN at 8/6/2019  9:36 AM    Acceptance, E, VU,NR by JUSTIN at 8/5/2019 10:14 AM    Acceptance, E,TB,D, VU,NR by CS at 8/4/2019 10:20 AM    Comment:  Educated patient on safety with sit<> stand and ambulation. Educated patient to increase stride length with steps.    Acceptance, E,TB, VU,DU by CS at 8/3/2019  4:41 PM    Comment:  Educated patient on transfer technique using sheet to sit patient up in bed. Educated patient and family on sling.    Acceptance, E, VU,NR by CD at 8/2/2019  3:49 PM    Comment:  SEE FLOW SHEET. SON INSTRUCTED IN LE THER EX, POSITIONING FOR COMFORT.,    Eager, E, VU by KM at 8/1/2019 10:20 AM    Acceptance, E, VU,NR by JUSTIN at 7/29/2019 11:04 AM    Acceptance, E, VU,NR by CD at 7/25/2019  4:42 PM    Comment:  DTR INSTRUCTED IN ROM EXERCISES, POSITIONING FOR COMFORT/ PROTECTION OF R UE, BENEFITS OF OOB ACTIVITY, D/C  PLANNING,    Acceptance, E, VU,NR by CD at 7/24/2019 10:36 AM    Comment:  SEE FLOWSHEET.   Family Acceptance, E,TB, VU,NR by JUSTIN at 8/6/2019  9:36 AM    Acceptance, E,TB,D, VU,NR by CS at 8/4/2019 10:20 AM    Comment:  Educated patient on safety with sit<> stand and ambulation. Educated patient to increase stride length with steps.    Acceptance, E,TB, VU,DU by CS at 8/3/2019  4:41 PM    Comment:  Educated patient on transfer technique using sheet to sit patient up in bed. Educated patient and family on sling.    Acceptance, E, VU,NR by CD at 8/2/2019  3:49 PM    Comment:  SEE FLOW SHEET. SON INSTRUCTED IN LE THER EX, POSITIONING FOR COMFORT.,    Acceptance, E, VU,NR by CD at 7/25/2019  4:42 PM    Comment:  DTR INSTRUCTED IN ROM EXERCISES, POSITIONING FOR COMFORT/ PROTECTION OF R UE, BENEFITS OF OOB ACTIVITY, D/C PLANNING,                   Point: Body mechanics (Done)     Learning Progress Summary           Patient Acceptance, E, VU,NR by JUSTIN at 8/6/2019 11:35 AM    Acceptance, E,TB, VU,NR by JUSTIN at 8/6/2019  9:36 AM    Acceptance, E, VU,NR by JUSTIN at 8/5/2019 10:14 AM    Acceptance, E,TB,D, VU,NR by MICKEY at 8/4/2019 10:20 AM    Comment:  Educated patient on safety with sit<> stand and ambulation. Educated patient to increase stride length with steps.    Acceptance, E,TB, VU,DU by MICKEY at 8/3/2019  4:41 PM    Comment:  Educated patient on transfer technique using sheet to sit patient up in bed. Educated patient and family on sling.    Acceptance, E, VU,NR by CD at 8/2/2019  3:49 PM    Comment:  SEE FLOW SHEET. SON INSTRUCTED IN LE THER EX, POSITIONING FOR COMFORT.,    Eager, E, VU by TANK at 8/1/2019 10:20 AM    Eager, E,D, VU,NR by AA at 7/31/2019  2:46 PM    Eager, E, NR by AA at 7/30/2019  3:17 PM    Acceptance, E, VU,NR by EJ at 7/29/2019 11:04 AM    Acceptance, E, VU,NR by CD at 7/25/2019  4:42 PM    Comment:  DTR INSTRUCTED IN ROM EXERCISES, POSITIONING FOR COMFORT/ PROTECTION OF R UE, BENEFITS OF OOB ACTIVITY, D/C  PLANNING,    Acceptance, E, VU,NR by CD at 7/24/2019 10:36 AM    Comment:  SEE FLOWSHEET.   Family Acceptance, E,TB, VU,NR by EJ at 8/6/2019  9:36 AM    Acceptance, E,TB,D, VU,NR by CS at 8/4/2019 10:20 AM    Comment:  Educated patient on safety with sit<> stand and ambulation. Educated patient to increase stride length with steps.    Acceptance, E,TB, VU,DU by CS at 8/3/2019  4:41 PM    Comment:  Educated patient on transfer technique using sheet to sit patient up in bed. Educated patient and family on sling.    Acceptance, E, VU,NR by CD at 8/2/2019  3:49 PM    Comment:  SEE FLOW SHEET. SON INSTRUCTED IN LE THER EX, POSITIONING FOR COMFORT.,    Eager, E,D, VU,NR by AA at 7/31/2019  2:46 PM    Acceptance, E, VU,NR by CD at 7/25/2019  4:42 PM    Comment:  DTR INSTRUCTED IN ROM EXERCISES, POSITIONING FOR COMFORT/ PROTECTION OF R UE, BENEFITS OF OOB ACTIVITY, D/C PLANNING,                   Point: Precautions (Done)     Learning Progress Summary           Patient Acceptance, E, VU,NR by JUSTIN at 8/6/2019 11:35 AM    Acceptance, E,TB, VU,NR by JUSTIN at 8/6/2019  9:36 AM    Acceptance, E, VU,NR by JUSTIN at 8/5/2019 10:14 AM    Acceptance, E,TB,D, VU,NR by CS at 8/4/2019 10:20 AM    Comment:  Educated patient on safety with sit<> stand and ambulation. Educated patient to increase stride length with steps.    Acceptance, E,TB, VU,DU by CS at 8/3/2019  4:41 PM    Comment:  Educated patient on transfer technique using sheet to sit patient up in bed. Educated patient and family on sling.    Acceptance, E, VU,NR by CD at 8/2/2019  3:49 PM    Comment:  SEE FLOW SHEET. SON INSTRUCTED IN LE THER EX, POSITIONING FOR COMFORT.,    Eager, E, VU by KM at 8/1/2019 10:20 AM    Eager, E,D, VU,NR by AA at 7/31/2019  2:46 PM    Eager, E, NR by AA at 7/30/2019  3:17 PM    Acceptance, E, VU,NR by JUSTIN at 7/29/2019 11:04 AM    AcceptanceDEISY VU, NR by NOBLE at 7/25/2019  4:42 PM    Comment:  DTR INSTRUCTED IN ROM EXERCISES, POSITIONING FOR COMFORT/  PROTECTION OF R UE, BENEFITS OF OOB ACTIVITY, D/C PLANNING,    Acceptance, E, VU,NR by CD at 7/24/2019 10:36 AM    Comment:  SEE FLOWSHEET.   Family Acceptance, E,TB, VU,NR by JUSTIN at 8/6/2019  9:36 AM    Acceptance, E,TB,D, VU,NR by CS at 8/4/2019 10:20 AM    Comment:  Educated patient on safety with sit<> stand and ambulation. Educated patient to increase stride length with steps.    Acceptance, E,TB, VU,DU by CS at 8/3/2019  4:41 PM    Comment:  Educated patient on transfer technique using sheet to sit patient up in bed. Educated patient and family on sling.    Acceptance, E, VU,NR by CD at 8/2/2019  3:49 PM    Comment:  SEE FLOW SHEET. SON INSTRUCTED IN LE THER EX, POSITIONING FOR COMFORT.,    Eager, E,D, VU,NR by AA at 7/31/2019  2:46 PM    Acceptance, E, VU,NR by CD at 7/25/2019  4:42 PM    Comment:  DTR INSTRUCTED IN ROM EXERCISES, POSITIONING FOR COMFORT/ PROTECTION OF R UE, BENEFITS OF OOB ACTIVITY, D/C PLANNING,                               User Key     Initials Effective Dates Name Provider Type Discipline    CD 06/19/15 -  Gogo Thibodeaux, PT Physical Therapist PT    EJ 11/20/18 -  Nurys Chahal, PT Physical Therapist PT    KM 06/19/15 -  Denise Hdz, PT Physical Therapist PT    AA 04/02/18 -  Mere Ramon, PT Physical Therapist PT     03/26/19 -  Roz Phoenix, PT Physical Therapist PT              PT Recommendation and Plan  Anticipated Discharge Disposition (PT): inpatient rehabilitation facility  Therapy Frequency (PT Clinical Impression): daily  Outcome Summary/Treatment Plan (PT)  Daily Summary of Progress (PT): progress toward functional goals is good  Anticipated Discharge Disposition (PT): inpatient rehabilitation facility  Plan of Care Reviewed With: patient, son  Progress: improving  Outcome Summary: Pt ambulates two bouts of 65 ft with sitting rest between, MIN A with LUE support. Pt limited by spinal stenosis.  Outcome Measures     Row Name 08/05/19 1705 08/05/19 1014  08/04/19 1010       How much help from another person do you currently need...    Turning from your back to your side while in flat bed without using bedrails?  --  2  -EJ  2  -CS    Moving from lying on back to sitting on the side of a flat bed without bedrails?  --  2  -EJ  2  -CS    Moving to and from a bed to a chair (including a wheelchair)?  --  3  -EJ  3  -CS    Standing up from a chair using your arms (e.g., wheelchair, bedside chair)?  --  3  -EJ  3  -CS    Climbing 3-5 steps with a railing?  --  2  -EJ  1  -CS    To walk in hospital room?  --  3  -EJ  3  -CS    AM-PAC 6 Clicks Score (PT)  --  15  -EJ  14  -CS       How much help from another is currently needed...    Putting on and taking off regular lower body clothing?  1  -AR  --  --    Bathing (including washing, rinsing, and drying)  2  -AR  --  --    Toileting (which includes using toilet bed pan or urinal)  2  -AR  --  --    Putting on and taking off regular upper body clothing  2  -AR  --  --    Taking care of personal grooming (such as brushing teeth)  2  -AR  --  --    Eating meals  2  -AR  --  --    AM-PAC 6 Clicks Score (OT)  11  -AR  --  --       Functional Assessment    Outcome Measure Options  AM-PAC 6 Clicks Daily Activity (OT)  -AR  AM-PAC 6 Clicks Basic Mobility (PT)  -EJ  AM-PAC 6 Clicks Basic Mobility (PT)  -CS    Row Name 08/03/19 1610             How much help from another person do you currently need...    Turning from your back to your side while in flat bed without using bedrails?  2  -CS      Moving from lying on back to sitting on the side of a flat bed without bedrails?  2  -CS      Moving to and from a bed to a chair (including a wheelchair)?  2  -CS      Standing up from a chair using your arms (e.g., wheelchair, bedside chair)?  2  -CS      Climbing 3-5 steps with a railing?  1  -CS      To walk in hospital room?  3  -CS      AM-PAC 6 Clicks Score (PT)  12  -CS         Functional Assessment    Outcome Measure Options  AM-PAC  6 Clicks Basic Mobility (PT)  -CS        User Key  (r) = Recorded By, (t) = Taken By, (c) = Cosigned By    Initials Name Provider Type    EJ Nurys Chahal, PT Physical Therapist    Jennifer Mena, OT Occupational Therapist    Roz Engel, PT Physical Therapist         Time Calculation:   PT Charges     Row Name 08/06/19 0940             Time Calculation    Start Time  0936  -EJ      PT Received On  08/06/19  -EJ         Time Calculation- PT    Total Timed Code Minutes- PT  30 minute(s)  -EJ         Timed Charges    21007 - PT Therapeutic Exercise Minutes  5  -EJ      82121 - Gait Training Minutes   15  -EJ      65368 - PT Therapeutic Activity Minutes  10  -EJ        User Key  (r) = Recorded By, (t) = Taken By, (c) = Cosigned By    Initials Name Provider Type    EJ Nurys Chahal PT Physical Therapist        Therapy Charges for Today     Code Description Service Date Service Provider Modifiers Qty    15363268854 HC GAIT TRAINING EA 15 MIN 8/5/2019 Nurys Chahal, PT GP 1    99919536875 HC PT THERAPEUTIC ACT EA 15 MIN 8/5/2019 Nurys Chahal, PT GP 1    25592254977 HC GAIT TRAINING EA 15 MIN 8/6/2019 Nurys Chahal, PT GP 1    52215603647 HC PT THERAPEUTIC ACT EA 15 MIN 8/6/2019 Nurys Chahal, PT GP 1    97615362081 HC PT THER SUPP EA 15 MIN 8/6/2019 Nurys Chahal, PT GP 2          PT G-Codes  Outcome Measure Options: AM-PAC 6 Clicks Basic Mobility (PT)  AM-PAC 6 Clicks Score (PT): 16  AM-PAC 6 Clicks Score (OT): 11  Modified Kendrick Scale: 4 - Moderately severe disability.  Unable to walk without assistance, and unable to attend to own bodily needs without assistance.      Nurys Rodriguez PT  8/6/2019         Electronically signed by Nurys Chahal PT at 8/6/2019 11:43 AM          Occupational Therapy Notes (most recent note)      Bibiana Lion, OT at 8/7/2019  2:01 PM          Problem: Patient Care Overview  Goal: Plan of Care Review  Outcome: Ongoing  (interventions implemented as appropriate)   19 1315   Coping/Psychosocial   Plan of Care Reviewed With patient   OTHER   Outcome Summary Pt. with pain and fatigue this date, min assist for mobility, min to mod for standing. Continue POC.           Electronically signed by Bibiana Lion OT at 2019  2:01 PM          Speech Language Pathology Notes (most recent note)      Bret Burkett MS CCC-SLP at 2019 11:32 AM          Acute Care - Speech Language Pathology   Swallow Treatment Note JOSELUIS Altman     Patient Name: Vargas De  : 1940  MRN: [id number]  Today's Date: 2019  Onset of Illness/Injury or Date of Surgery: 19            Admit Date: 2019    Visit Dx:      ICD-10-CM ICD-9-CM   1. Acute alteration in mental status R41.82 780.97   2. Generalized weakness R53.1 780.79   3. Other closed displaced fracture of proximal end of right humerus with routine healing, subsequent encounter S42.291D V54.11   4. Elevated blood pressure reading with diagnosis of hypertension I10 401.9   5. Impaired functional mobility, balance, gait, and endurance Z74.09 V49.89   6. Impaired mobility and ADLs Z74.09 799.89   7. Cognitive communication deficit R41.841 799.52   8. Oropharyngeal dysphagia R13.12 787.22     Patient Active Problem List   Diagnosis   • Peripheral neuropathy   • Essential hypertension   • Crohn's colitis, with rectal bleeding (CMS/HCC)   • Major depressive disorder with single episode, in partial remission (CMS/HCC)   • Macular degeneration of both eyes   • GERD without esophagitis   • Cervicalgia   • Spinal stenosis in cervical region   • Chronic right-sided low back pain without sciatica   • Osteoarthritis of toe joint, right   • PFO (patent foramen ovale)   • Ulcerative colitis (CMS/HCC)   • Body mass index (BMI) of 25.0 to 29.9   • History of stroke with residual deficit   • Mixed hyperlipidemia   • Multiple lacunar infarcts   • Paroxysmal atrial fibrillation (CMS/HCC)    • Chronic bilateral low back pain   • Acute alteration in mental status   • Fracture of proximal end of right humerus   • Acute respiratory distress   • Lewy body dementia       Therapy Treatment  Rehabilitation Treatment Summary     Row Name 08/07/19 1115             Treatment Time/Intention    Discipline  speech language pathologist  -MP      Document Type  therapy note (daily note)  -MP      Subjective Information  no complaints  -MP      Mode of Treatment  individual therapy;speech-language pathology  -MP      Patient/Family Observations  Family present  -MP      Care Plan Review  care plan/treatment goals reviewed;patient/other agree to care plan  -MP      Care Plan Review, Other Participant(s)  son;daughter  -MP      Therapy Frequency (Swallow)  5 days per week  -MP      Therapy Frequency (SLP SLC)  5 days per week  -MP      Patient Effort  good  -MP      Recorded by [MP] Bret Burkett MS CCC-SLP 08/07/19 1128      Row Name 08/07/19 1115             Pain Assessment    Additional Documentation  Pain Scale: FACES Pre/Post-Treatment (Group)  -MP      Recorded by [MP] Bret Burkett MS CCC-SLP 08/07/19 1128      Row Name 08/07/19 1115             Pain Scale: FACES Pre/Post-Treatment    Pain: FACES Scale, Pretreatment  2-->hurts little bit  -MP      Pain: FACES Scale, Post-Treatment  2-->hurts little bit  -MP      Recorded by [MP] Bret Burkett MS CCC-SLP 08/07/19 1128      Row Name                Wound 07/23/19 2130 Right anterior toe pressure injury    Wound - Properties Group Date first assessed: 07/23/19 [DB] Time first assessed: 2130 [DB] Side: Right [DB] Orientation: anterior [DB] Location: toe [DB] Stage, Pressure Injury: Stage 1 [DB] Present On Admission : yes [DB] Type: pressure injury [DB] Recorded by:  [DB] Henok Marie RN 07/24/19 0229    Row Name                Wound 07/20/19 1649 Left hand skin tear    Wound - Properties Group Date first assessed: 07/20/19 [SW] Time first assessed:  1649 [SW] Side: Left [SW] Location: hand [SW] Present On Admission : yes [SW] Type: skin tear [SW] Recorded by:  [SW] Odilia Joy RN 07/20/19 1649    Row Name                Wound 07/20/19 1648 Right knee abrasion    Wound - Properties Group Date first assessed: 07/20/19 [SW] Time first assessed: 1648 [SW] Side: Right [SW] Location: knee [SW] Present On Admission : yes [SW] Type: abrasion [SW] Recorded by:  [SW] Odilia Joy RN 07/20/19 1648    Row Name 08/07/19 1115             Outcome Summary/Treatment Plan (SLP)    Daily Summary of Progress (SLP)  progress toward functional goals is good  -MP      Plan for Continued Treatment (SLP)  Reviewed yesterday's FEES results & recommendations w/ pt & family. Pt able to recall use of chin tuck & demonstrate. Provided handouts & info re: diet recommendations to family. Reviewed dysphagia exercises & encouraged independent practice. SLP will continue to follow for tx. Pt will need continued SLP servies @ next level of care.  -MP      Anticipated Dischage Disposition  inpatient rehabilitation facility;anticipate therapy at next level of care  -MP      Recorded by [MP] Bret Burkett MS CCC-SLP 08/07/19 1128        User Key  (r) = Recorded By, (t) = Taken By, (c) = Cosigned By    Initials Name Effective Dates Discipline    DB Henok Marie RN 06/16/16 -  Nurse    Odilia Gordillo RN 06/16/16 -  Nurse    Bret Willoughby MS CCC-SLP 06/19/19 -  SLP          Outcome Summary  Outcome Summary/Treatment Plan (SLP)  Daily Summary of Progress (SLP): progress toward functional goals is good (08/07/19 1115 : Bret Burkett, MS CCC-SLP)  Plan for Continued Treatment (SLP): Reviewed yesterday's FEES results & recommendations w/ pt & family. Pt able to recall use of chin tuck & demonstrate. Provided handouts & info re: diet recommendations to family. Reviewed dysphagia exercises & encouraged independent practice. SLP will continue to follow for tx. Pt  will need continued SLP servies @ next level of care. (08/07/19 1115 : Bret Burkett, MS CCC-SLP)  Anticipated Dischage Disposition: inpatient rehabilitation facility, anticipate therapy at next level of care (08/07/19 1115 : Bret Burkett, MS CCC-SLP)      SLP GOALS     Row Name 08/07/19 1115 08/06/19 1420 08/04/19 1450       Oral Nutrition/Hydration Goal 1 (SLP)    Oral Nutrition/Hydration Goal 1, SLP  LTG: Pt will tolerate soft diet & thin liquid via small cup sips & chin tuck w/ no overt s/sxs aspiration or distress w/ 100% acc and no cues  -MP  LTG: Pt will tolerate soft diet & thin liquid via small cup sips & chin tuck w/ no overt s/sxs aspiration or distress w/ 100% acc and no cues  -MP  LTG: Pt will return to regular diet & thin liquid w/o s/sxs aspiration w/ 100% acc w/o cues.  -MP    Time Frame (Oral Nutrition/Hydration Goal 1, SLP)  by discharge  -MP  by discharge  -MP  by discharge  -MP    Barriers (Oral Nutrition/Hydration Goal 1, SLP)  Pt able to recall need for chin tuck  -MP  --  Discussed repeat FEES w/ son & pt @ beside. Will plan for repeat FEES Tuesday 8/6  -MP    Progress/Outcomes (Oral Nutrition/Hydration Goal 1, SLP)  continuing progress toward goal  -MP  --  continuing progress toward goal  -MP       Oral Nutrition/Hydration Goal 2 (SLP)    Oral Nutrition/Hydration Goal 2, SLP  Pt will tolerate soft solids & thin liquids via small single cups sips + chin tuck w/ no overt s/sxs aspiration or distress w/ 100% acc and no cues  -MP  Pt will tolerate soft solids & thin liquids via small single cups sips + chin tuck w/ no overt s/sxs aspiration or distress w/ 100% acc and no cues  -MP  Pt will tolerate trials of soft solids & honey-thick liquids w/o s/sxs aspiration w/ 100% acc w/o cues.  -MP    Time Frame (Oral Nutrition/Hydration Goal 2, SLP)  short term goal (STG);by discharge  -MP  short term goal (STG);by discharge  -MP  short term goal (STG);by discharge  -MP    Barriers (Oral  Nutrition/Hydration Goal 2, SLP)  Pt able to recall chin tuck & no straws  -MP  --  Pt lethargic, deferred PO trials  -MP    Progress/Outcomes (Oral Nutrition/Hydration Goal 2, SLP)  continuing progress toward goal  -MP  --  goal ongoing  -MP       Lingual Strengthening Goal 1 (SLP)    Activity (Lingual Strengthening Goal 1, SLP)  --  increase tongue back strength  -MP  increase tongue back strength  -MP    Increase Tongue Back Strength  --  lingual resistance exercises  -MP  lingual resistance exercises  -MP    [place]/Accuracy (Lingual Strengthening Goal 1, SLP)  --  with minimal cues (75-90% accuracy)  -MP  with minimal cues (75-90% accuracy)  -MP    Time Frame (Lingual Strengthening Goal 1, SLP)  --  short term goal (STG);by discharge  -MP  short term goal (STG);by discharge  -MP    Progress/Outcomes (Lingual Strengthening Goal 1, SLP)  --  goal no longer appropriate  -MP  goal ongoing  -MP       Pharyngeal Strengthening Exercise Goal 1 (SLP)    Activity (Pharyngeal Strengthening Goal 1, SLP)  increase timing;increase superior movement of the hyolaryngeal complex;increase anterior movement of the hyolaryngeal complex;increase closure at entrance to airway/closure of airway at glottis;increase squeeze/positive pressure generation;increase tongue base retraction  -MP  increase timing;increase superior movement of the hyolaryngeal complex;increase anterior movement of the hyolaryngeal complex;increase closure at entrance to airway/closure of airway at glottis;increase squeeze/positive pressure generation;increase tongue base retraction  -MP  increase timing;increase superior movement of the hyolaryngeal complex;increase anterior movement of the hyolaryngeal complex;increase closure at entrance to airway/closure of airway at glottis;increase squeeze/positive pressure generation;increase tongue base retraction  -MP    Increase Timing  prepping - 3 second prep or suck swallow or 3-step swallow  -MP  prepping - 3  second prep or suck swallow or 3-step swallow  -MP  prepping - 3 second prep or suck swallow or 3-step swallow  -MP    Increase Superior Movement of the Hyolaryngeal Complex  effortful pitch glide (falsetto + pharyngeal squeeze);Mendelsohn;falsetto  -MP  effortful pitch glide (falsetto + pharyngeal squeeze);Mendelsohn;falsetto  -MP  falsetto  -MP    Increase Anterior Movement of the Hyolaryngeal Complex  chin tuck against resistance (CTAR)  -MP  chin tuck against resistance (CTAR)  -MP  chin tuck against resistance (CTAR)  -MP    Increase Closure at Entrance to Airway/Closure of Airway at Glottis  super-supraglottic swallow  -MP  super-supraglottic swallow  -MP  super-supraglottic swallow  -MP    Increase Squeeze/Positive Pressure Generation  hard effortful swallow  -MP  hard effortful swallow  -MP  hard effortful swallow  -MP    Increase Tongue Base Retraction  nabila  -MP  nabila  -MP  nabila  -MP    [place]/Accuracy (Pharyngeal Strengthening Goal 1, SLP)  with minimal cues (75-90% accuracy)  -MP  with minimal cues (75-90% accuracy)  -MP  with minimal cues (75-90% accuracy)  -MP    Time Frame (Pharyngeal Strengthening Goal 1, SLP)  short term goal (STG);by discharge  -MP  short term goal (STG);by discharge  -MP  short term goal (STG);by discharge  -MP    Barriers (Pharyngeal Strengthening Goal 1, SLP)  Provided new handout of exercises, reviewed, & instructed on independent practice  -MP  --  Encouraged independent practice  -MP    Progress/Outcomes (Pharyngeal Strengthening Goal 1, SLP)  continuing progress toward goal  -MP  --  continuing progress toward goal  -MP       Swallow Management Recall Goal 1 (SLP)    Activity (Swallow Management Recall Goal 1, SLP)  recall of;compensatory swallow strategies/techniques  -MP  recall of;compensatory swallow strategies/techniques  -MP  --    [place]/Accuracy (Swallow Management Recall Goal 1, SLP)  with minimal cues (75-90% accuracy)  -MP  with minimal cues  (75-90% accuracy)  -MP  --    Time Frame (Swallow Management Recall Goal 1, SLP)  short term goal (STG);by discharge  -MP  short term goal (STG);by discharge  -MP  --    Barriers (Swallow Management Recall Goal 1, SLP)  Pt able to recall need for chin tuck  -MP  --  --    Progress/Outcomes (Swallow Management Recall Goal 1, SLP)  good progress toward goal  -MP  --  --       Swallow Compensatory Strategies Goal 1 (SLP)    Activity (Swallow Compensatory Strategies/Techniques Goal 1, SLP)  compensatory strategies;small cup sips;chin tuck posture;alternate food/liquid intake;during p.o. trials;during meal intake  -MP  compensatory strategies;small cup sips;chin tuck posture;alternate food/liquid intake;during p.o. trials;during meal intake  -MP  compensatory strategies;during meal intake;during p.o. trials;small cup sips;small straw sips;alternate food/liquid intake;extra swallow per bolus  -MP    [place]/Accuracy (Swallow Compensatory Strategies/Techniques Goal 1, SLP)  with minimal cues (75-90% accuracy)  -MP  with minimal cues (75-90% accuracy)  -MP  independently (over 90% accuracy)  -MP    Time Frame (Swallow Compensatory Strategies/Techniques Goal 1, SLP)  short term goal (STG);by discharge  -MP  short term goal (STG);by discharge  -MP  short term goal (STG);by discharge  -MP    Barriers (Swallow Compensatory Strategies/Techniques Goal 1, SLP)  Reviewed strategies and rationale w/ pt & family  -MP  --  --    Progress/Outcomes (Swallow Compensatory Strategies/Techniques Goal 1, SLP)  good progress toward goal  -MP  --  goal ongoing  -MP       Comprehend Questions Goal 1 (SLP)    Improve Ability to Comprehend Questions Goal 1 (SLP)  complex yes/no questions;80%;with minimal cues (75-90%)  -MP  --  complex yes/no questions;80%;with minimal cues (75-90%)  -MP    Time Frame (Comprehend Questions Goal 1, SLP)  short term goal (STG);by discharge  -MP  --  short term goal (STG);by discharge  -MP    Progress/Outcomes  (Comprehend Questions Goal 1, SLP)  goal ongoing  -MP  --  goal ongoing  -MP       Word Retrieval Skills Goal 1 (SLP)    Improve Word Retrieval Skills By Goal 1 (SLP)  completing functional word finding tasks;80%;with minimal cues (75-90%)  -MP  --  completing functional word finding tasks;80%;with minimal cues (75-90%)  -MP    Time Frame (Word Retrieval Goal 1, SLP)  short term goal (STG);by discharge  -MP  --  short term goal (STG);by discharge  -MP    Progress/Outcomes (Word Retrieval Goal 1, SLP)  goal ongoing  -MP  --  goal ongoing  -MP       Ability to Construct Phrase and Sentence Level Response Goal 1 (SLP)    Improve Ability to Construct Phrase and Sentence Level Responses By Goal 1 (SLP)  answering question with phrase;constructing a sentence with a key word;80%;with minimal cues (75-90%)  -MP  --  answering question with phrase;constructing a sentence with a key word;80%;with minimal cues (75-90%)  -MP    Time Frame (Phrase and Sentence Level Response Goal 1, SLP)  short term goal (STG);by discharge  -MP  --  short term goal (STG);by discharge  -MP    Progress/Outcomes (Phrase and Sentence Level Response Goal 1, SLP)  goal ongoing  -MP  --  goal ongoing  -MP       Orientation Goal 1 (SLP)    Improve Orientation Through Goal 1 (SLP)  demonstrating orientation to month;demonstrating orientation to place;80%;independently (over 90% accuracy)  -MP  --  demonstrating orientation to month;demonstrating orientation to place;80%;independently (over 90% accuracy)  -MP    Time Frame (Orientation Goal 1, SLP)  short term goal (STG);by discharge  -MP  --  short term goal (STG);by discharge  -MP    Progress/Outcomes (Orientation Goal 1, SLP)  goal ongoing  -MP  --  goal ongoing  -MP       Additional Goal 1 (SLP)    Additional Goal 1, SLP  LTG: Pt will improve cognitive-communicaiton skills in order to participate in care in hospital setting w/ 100% acc w/o cues.  -MP  --  LTG: Pt will improve cognitive-communicaiton  skills in order to participate in care in hospital setting w/ 100% acc w/o cues.  -MP    Time Frame (Additional Goal 1, SLP)  by discharge  -MP  --  by discharge  -MP    Progress/Outcomes (Additional Goal 1, SLP)  goal ongoing  -MP  --  goal ongoing  -MP      User Key  (r) = Recorded By, (t) = Taken By, (c) = Cosigned By    Initials Name Provider Type    Bret Willoughby MS CCC-SLP Speech and Language Pathologist          EDUCATION  The patient has been educated in the following areas:   Dysphagia (Swallowing Impairment) Modified Diet Instruction.    SLP Recommendation and Plan  Daily Summary of Progress (SLP): progress toward functional goals is good     Plan for Continued Treatment (SLP): Reviewed yesterday's FEES results & recommendations w/ pt & family. Pt able to recall use of chin tuck & demonstrate. Provided handouts & info re: diet recommendations to family. Reviewed dysphagia exercises & encouraged independent practice. SLP will continue to follow for tx. Pt will need continued SLP servies @ next level of care.  Anticipated Dischage Disposition: inpatient rehabilitation facility, anticipate therapy at next level of care                    Time Calculation:   Time Calculation- SLP     Row Name 08/07/19 1131             Time Calculation- SLP    SLP Start Time  1115  -MP      SLP Received On  08/07/19  -MP        User Key  (r) = Recorded By, (t) = Taken By, (c) = Cosigned By    Initials Name Provider Type    Bret Willoughby MS CCC-SLP Speech and Language Pathologist          Therapy Charges for Today     Code Description Service Date Service Provider Modifiers Qty    51720593709 HC ST FIBEROPTIC ENDO EVAL SWALL 6 8/6/2019 Bret Burkett MS CCC-SLP GN 1    84133187067 HC ST TREATMENT SWALLOW 2 8/7/2019 Bret Burkett MS CCC-CRIS GN 1                 MS SONIYA Strauss  8/7/2019    Electronically signed by Bret Burkett MS CCC-SLP at 8/7/2019 11:32 AM

## 2019-08-07 NOTE — PLAN OF CARE
Problem: Patient Care Overview  Goal: Plan of Care Review  Outcome: Ongoing (interventions implemented as appropriate)   08/07/19 1131   Coping/Psychosocial   Plan of Care Reviewed With patient;family   SLP treatment completed. Will continue to address dysphagia & cognitive-communication. Please see note for further details and recommendations.

## 2019-08-07 NOTE — PLAN OF CARE
Problem: Patient Care Overview  Goal: Plan of Care Review  Outcome: Ongoing (interventions implemented as appropriate)   08/07/19 7785   Coping/Psychosocial   Plan of Care Reviewed With patient   OTHER   Outcome Summary Pt limited by spinal stenosis and shoulder pain. Pt c/o these pains with ambulation and t/f.

## 2019-08-08 NOTE — PLAN OF CARE
Problem: Patient Care Overview  Goal: Plan of Care Review  Outcome: Ongoing (interventions implemented as appropriate)   08/08/19 1217   Coping/Psychosocial   Plan of Care Reviewed With son   Plan of Care Review   Progress no change   SLP caregiver instruction completed. Will continue to address dysphagia and communication. Pt too lethargic to participate in tx today, so talked with family and provided education on dysphagia/strategies. He is tolerating his diet well. Trays have been coming up with thickened liquids, so put in note to dietary that pt is on thins.  Please see note for further details and recommendations.

## 2019-08-08 NOTE — THERAPY TREATMENT NOTE
Acute Care - Speech Language Pathology   Swallow Caregiver [facility]  [place]     Patient Name: Vargas De  : 1940  MRN: [id number]  Today's Date: 2019  Onset of Illness/Injury or Date of Surgery: 19            Admit Date: 2019    Visit Dx:      ICD-10-CM ICD-9-CM   1. Acute alteration in mental status R41.82 780.97   2. Generalized weakness R53.1 780.79   3. Other closed displaced fracture of proximal end of right humerus with routine healing, subsequent encounter S42.291D V54.11   4. Elevated blood pressure reading with diagnosis of hypertension I10 401.9   5. Impaired functional mobility, balance, gait, and endurance Z74.09 V49.89   6. Impaired mobility and ADLs Z74.09 799.89   7. Cognitive communication deficit R41.841 799.52   8. Oropharyngeal dysphagia R13.12 787.22     Patient Active Problem List   Diagnosis   • Peripheral neuropathy   • Essential hypertension   • Crohn's colitis, with rectal bleeding (CMS/HCC)   • Major depressive disorder with single episode, in partial remission (CMS/HCC)   • Macular degeneration of both eyes   • GERD without esophagitis   • Cervicalgia   • Spinal stenosis in cervical region   • Chronic right-sided low back pain without sciatica   • Osteoarthritis of toe joint, right   • PFO (patent foramen ovale)   • Ulcerative colitis (CMS/HCC)   • Body mass index (BMI) of 25.0 to 29.9   • History of stroke with residual deficit   • Mixed hyperlipidemia   • Multiple lacunar infarcts   • Paroxysmal atrial fibrillation (CMS/HCC)   • Chronic bilateral low back pain   • Acute alteration in mental status   • Fracture of proximal end of right humerus   • Acute respiratory distress   • Lewy body dementia       Therapy Treatment  Rehabilitation Treatment Summary     Row Name 19 1200             Treatment Time/Intention    Discipline  speech language pathologist  -AW      Document Type  other (see comments) caregiver instruction  -AW      Subjective  Information  fatigue  -AW      Mode of Treatment  speech-language pathology  -AW      Patient/Family Observations  pt in chair, barely able to stay awake. Spoke with son in law  -AW      Care Plan Review  care plan/treatment goals reviewed  -AW      Care Plan Review, Other Participant(s)  son;daughter  -AW      Patient Effort  other (see comments) pt in chair falling asleep, had finished with PT  -AW      Recorded by [AW] Darling Kan, MS CCC-SLP 08/08/19 1215      Row Name                Wound 07/23/19 2130 Right anterior toe pressure injury    Wound - Properties Group Date first assessed: 07/23/19 [DB] Time first assessed: 2130 [DB] Side: Right [DB] Orientation: anterior [DB] Location: toe [DB] Stage, Pressure Injury: Stage 1 [DB] Present On Admission : yes [DB] Type: pressure injury [DB] Recorded by:  [DB] Henok Marie RN 07/24/19 0229    Row Name                Wound 07/20/19 1649 Left hand skin tear    Wound - Properties Group Date first assessed: 07/20/19 [SW] Time first assessed: 1649 [SW] Side: Left [SW] Location: hand [SW] Present On Admission : yes [SW] Type: skin tear [SW] Recorded by:  [SW] Odilia Joy RN 07/20/19 1649    Row Name                [REMOVED] Wound 07/20/19 1648 Right knee    Wound - Properties Group Date first assessed: 07/20/19 [SW] Time first assessed: 1648 [SW] Side: Right [SW] Location: knee [SW] Resolution Date: 08/08/19 [LC] Resolution Time: 1157 [LC] Wound Outcome: Healed [LC] Present On Admission : yes [SW] Type: abrasion [SW] Recorded by:  [CLAUDIA] Karen Tse RN 08/08/19 1157 [SW] Odilia Joy RN 07/20/19 1648      User Key  (r) = Recorded By, (t) = Taken By, (c) = Cosigned By    Initials Name Effective Dates Discipline    AW Darling Kan, MS CCC-SLP 06/22/15 -  SLP    Henok Vega RN 06/16/16 -  Nurse    Odilia Gordillo RN 06/16/16 -  Nurse    Karen Lewis RN 06/16/16 -  Nurse          Outcome Summary         SLP GOALS     Row Name  08/08/19 1200 08/07/19 1115 08/06/19 1420       Oral Nutrition/Hydration Goal 1 (SLP)    Oral Nutrition/Hydration Goal 1, SLP  --  LTG: Pt will tolerate soft diet & thin liquid via small cup sips & chin tuck w/ no overt s/sxs aspiration or distress w/ 100% acc and no cues  -MP  LTG: Pt will tolerate soft diet & thin liquid via small cup sips & chin tuck w/ no overt s/sxs aspiration or distress w/ 100% acc and no cues  -MP    Time Frame (Oral Nutrition/Hydration Goal 1, SLP)  --  by discharge  -MP  by discharge  -MP    Barriers (Oral Nutrition/Hydration Goal 1, SLP)  Pt able to recall need for chin tuck per family; they are not having to remind him  -AW  Pt able to recall need for chin tuck  -MP  --    Progress/Outcomes (Oral Nutrition/Hydration Goal 1, SLP)  continuing progress toward goal  -AW  continuing progress toward goal  -MP  --       Oral Nutrition/Hydration Goal 2 (SLP)    Oral Nutrition/Hydration Goal 2, SLP  Pt will tolerate soft solids & thin liquids via small single cups sips + chin tuck w/ no overt s/sxs aspiration or distress w/ 100% acc and no cues  -AW  Pt will tolerate soft solids & thin liquids via small single cups sips + chin tuck w/ no overt s/sxs aspiration or distress w/ 100% acc and no cues  -MP  Pt will tolerate soft solids & thin liquids via small single cups sips + chin tuck w/ no overt s/sxs aspiration or distress w/ 100% acc and no cues  -MP    Time Frame (Oral Nutrition/Hydration Goal 2, SLP)  --  short term goal (STG);by discharge  -MP  short term goal (STG);by discharge  -MP    Barriers (Oral Nutrition/Hydration Goal 2, SLP)  --  Pt able to recall chin tuck & no straws  -MP  --    Progress/Outcomes (Oral Nutrition/Hydration Goal 2, SLP)  continuing progress toward goal RN And family report tolerating well  -AW  continuing progress toward goal  -MP  --       Lingual Strengthening Goal 1 (SLP)    Activity (Lingual Strengthening Goal 1, SLP)  --  --  increase tongue back strength  -MP     Increase Tongue Back Strength  --  --  lingual resistance exercises  -MP    [place]/Accuracy (Lingual Strengthening Goal 1, SLP)  --  --  with minimal cues (75-90% accuracy)  -MP    Time Frame (Lingual Strengthening Goal 1, SLP)  --  --  short term goal (STG);by discharge  -MP    Progress/Outcomes (Lingual Strengthening Goal 1, SLP)  --  --  goal no longer appropriate  -MP       Pharyngeal Strengthening Exercise Goal 1 (SLP)    Activity (Pharyngeal Strengthening Goal 1, SLP)  --  increase timing;increase superior movement of the hyolaryngeal complex;increase anterior movement of the hyolaryngeal complex;increase closure at entrance to airway/closure of airway at glottis;increase squeeze/positive pressure generation;increase tongue base retraction  -MP  increase timing;increase superior movement of the hyolaryngeal complex;increase anterior movement of the hyolaryngeal complex;increase closure at entrance to airway/closure of airway at glottis;increase squeeze/positive pressure generation;increase tongue base retraction  -MP    Increase Timing  --  prepping - 3 second prep or suck swallow or 3-step swallow  -MP  prepping - 3 second prep or suck swallow or 3-step swallow  -MP    Increase Superior Movement of the Hyolaryngeal Complex  --  effortful pitch glide (falsetto + pharyngeal squeeze);Mendelsohn;falsetto  -MP  effortful pitch glide (falsetto + pharyngeal squeeze);Mendelsohn;falsetto  -MP    Increase Anterior Movement of the Hyolaryngeal Complex  --  chin tuck against resistance (CTAR)  -MP  chin tuck against resistance (CTAR)  -MP    Increase Closure at Entrance to Airway/Closure of Airway at Glottis  --  super-supraglottic swallow  -MP  super-supraglottic swallow  -MP    Increase Squeeze/Positive Pressure Generation  --  hard effortful swallow  -MP  hard effortful swallow  -MP    Increase Tongue Base Retraction  --  nabila  -MP  nabila  -MP    [place]/Accuracy (Pharyngeal Strengthening Goal 1, SLP)   --  with minimal cues (75-90% accuracy)  -MP  with minimal cues (75-90% accuracy)  -MP    Time Frame (Pharyngeal Strengthening Goal 1, SLP)  --  short term goal (STG);by discharge  -MP  short term goal (STG);by discharge  -MP    Barriers (Pharyngeal Strengthening Goal 1, SLP)  --  Provided new handout of exercises, reviewed, & instructed on independent practice  -MP  --    Progress/Outcomes (Pharyngeal Strengthening Goal 1, SLP)  --  continuing progress toward goal  -MP  --       Swallow Management Recall Goal 1 (SLP)    Activity (Swallow Management Recall Goal 1, SLP)  --  recall of;compensatory swallow strategies/techniques  -MP  recall of;compensatory swallow strategies/techniques  -MP    [place]/Accuracy (Swallow Management Recall Goal 1, SLP)  --  with minimal cues (75-90% accuracy)  -MP  with minimal cues (75-90% accuracy)  -MP    Time Frame (Swallow Management Recall Goal 1, SLP)  --  short term goal (STG);by discharge  -MP  short term goal (STG);by discharge  -MP    Barriers (Swallow Management Recall Goal 1, SLP)  --  Pt able to recall need for chin tuck  -MP  --    Progress/Outcomes (Swallow Management Recall Goal 1, SLP)  --  good progress toward goal  -MP  --       Swallow Compensatory Strategies Goal 1 (SLP)    Activity (Swallow Compensatory Strategies/Techniques Goal 1, SLP)  --  compensatory strategies;small cup sips;chin tuck posture;alternate food/liquid intake;during p.o. trials;during meal intake  -MP  compensatory strategies;small cup sips;chin tuck posture;alternate food/liquid intake;during p.o. trials;during meal intake  -MP    [place]/Accuracy (Swallow Compensatory Strategies/Techniques Goal 1, SLP)  --  with minimal cues (75-90% accuracy)  -MP  with minimal cues (75-90% accuracy)  -MP    Time Frame (Swallow Compensatory Strategies/Techniques Goal 1, SLP)  --  short term goal (STG);by discharge  -MP  short term goal (STG);by discharge  -MP    Barriers (Swallow Compensatory  Strategies/Techniques Goal 1, SLP)  --  Reviewed strategies and rationale w/ pt & family  -MP  --    Progress/Outcomes (Swallow Compensatory Strategies/Techniques Goal 1, SLP)  --  good progress toward goal  -MP  --       Comprehend Questions Goal 1 (SLP)    Improve Ability to Comprehend Questions Goal 1 (SLP)  --  complex yes/no questions;80%;with minimal cues (75-90%)  -MP  --    Time Frame (Comprehend Questions Goal 1, SLP)  --  short term goal (STG);by discharge  -MP  --    Progress/Outcomes (Comprehend Questions Goal 1, SLP)  --  goal ongoing  -MP  --       Word Retrieval Skills Goal 1 (SLP)    Improve Word Retrieval Skills By Goal 1 (SLP)  --  completing functional word finding tasks;80%;with minimal cues (75-90%)  -MP  --    Time Frame (Word Retrieval Goal 1, SLP)  --  short term goal (STG);by discharge  -MP  --    Progress/Outcomes (Word Retrieval Goal 1, SLP)  --  goal ongoing  -MP  --       Ability to Construct Phrase and Sentence Level Response Goal 1 (SLP)    Improve Ability to Construct Phrase and Sentence Level Responses By Goal 1 (SLP)  --  answering question with phrase;constructing a sentence with a key word;80%;with minimal cues (75-90%)  -MP  --    Time Frame (Phrase and Sentence Level Response Goal 1, SLP)  --  short term goal (STG);by discharge  -MP  --    Progress/Outcomes (Phrase and Sentence Level Response Goal 1, SLP)  --  goal ongoing  -MP  --       Orientation Goal 1 ([facility])    Improve Orientation Through Goal 1 (SLP)  --  demonstrating orientation to month;demonstrating orientation to place;80%;independently (over 90% accuracy)  -MP  --    Time Frame (Orientation Goal 1, SLP)  --  short term goal (STG);by discharge  -MP  --    Progress/Outcomes (Orientation Goal 1, SLP)  --  goal ongoing  -MP  --       Additional Goal 1 (SLP)    Additional Goal 1, SLP  --  LTG: Pt will improve cognitive-communicaiton skills in order to participate in care in hospital setting w/ 100% acc w/o cues.  -MP  --     Time Frame (Additional Goal 1, SLP)  --  by discharge  -MP  --    Progress/Outcomes (Additional Goal 1, SLP)  --  goal ongoing  -MP  --      User Key  (r) = Recorded By, (t) = Taken By, (c) = Cosigned By    Initials Name Provider Type    Darling Acosta, MS CCC-SLP Speech and Language Pathologist    Bret Willoughby MS CCC-SLP Speech and Language Pathologist          EDUCATION  The patient has been educated in the following areas:   Dysphagia (Swallowing Impairment).    SLP Recommendation and Plan                                Time Calculation:   Time Calculation- SLP     Row Name 08/08/19 1218             Time Calculation- SLP    SLP Start Time  1100  -AW      TCU Minutes- SLP  25 min  -      SLP Received On  08/08/19  -        User Key  (r) = Recorded By, (t) = Taken By, (c) = Cosigned By    Initials Name Provider Type    Darling Acosta MS CCC-SLP Speech and Language Pathologist          Therapy Charges for Today     Code Description Service Date Service Provider Modifiers Qty    [id number] HC ST SELF CARE/MGMT/TRAIN EA 15 MIN 8/8/2019 Darling Kan MS CCC-SLP GN 2                 Darling Kan MS CCC-SLP  8/8/2019

## 2019-08-08 NOTE — PLAN OF CARE
Problem: Patient Care Overview  Goal: Plan of Care Review  Outcome: Ongoing (interventions implemented as appropriate)   08/08/19 2781   Coping/Psychosocial   Plan of Care Reviewed With patient;family   Plan of Care Review   Progress no change   OTHER   Outcome Summary Patient up in chair most of afternoon, grand daughter at bedside. Pt continues to have mild pain to RUE--keeping arm supported on pillow and in sling. Pleasant and cooperative with POC. Anticipate d/c when rehab facility can be found for patient.

## 2019-08-08 NOTE — PROGRESS NOTES
"Palliative Care Progress Note    Date of Admission: 7/23/2019    Code Status:   Current Code Status     Date Active Code Status Order ID Comments User Context       7/28/2019 10:18 No CPR [id number]  Minnie Colbert MD Inpatient       Questions for Current Code Status     Question Answer Comment    Code Status No CPR     Medical Interventions (Level of Support Prior to Arrest) Limited     Limited Support to NOT Include Intubation     Level Of Support Discussed With Health Care Surrogate      Next of Kin (If No Surrogate)         Subjective:  Patient is being fed cream of wheat by daughter.   Reports that patient's chronic back pain has become more noticeable over the last couple of days per daughter report.   Chronic pain of lower back and extends into BLE, patient usually takes prn gabapentin in day time if pain is worse.   Patient appears more tired.  Daughter described episode of being disoriented and hypotensive after walking yesterday.   Reviewed current scheduled and prn medications for route, type, dose, and frequency.     •  acetaminophen  •  calcium carbonate  •  gabapentin  •  ipratropium-albuterol  •  loperamide  •  magic mouthwash  •  melatonin  •  phenol  •  potassium chloride  •  potassium chloride  •  [DISCONTINUED] potassium chloride **OR** [DISCONTINUED] potassium chloride **OR** potassium chloride  •  saline  •  sodium chloride  •  sodium chloride    Objective:  PPS 30% /66 (BP Location: Left arm, Patient Position: Sitting)   Pulse 68   Temp 97.6 °F (36.4 °C) (Oral)   Resp 16   Ht 170.2 cm (67\")   Wt 87.1 kg (192 lb 0.3 oz)   SpO2 94%   BMI 30.07 kg/m²    Intake & Output (last day)       08/07 0701 - 08/08 0700 08/08 0701 - 08/09 0700    P.O. 240 360    Total Intake(mL/kg) 240 (2.8) 360 (4.1)    Urine (mL/kg/hr) 1100 (0.5) 300 (0.4)    Total Output 1100 300    Net -860 +60              Lab Results (last 24 hours)     Procedure Component Value Units Date/Time    POC Glucose Once " [[id number]]  (Normal) Collected:  08/07/19 1616    Specimen:  Blood Updated:  08/07/19 1618     Glucose 114 mg/dL         Imaging Results (last 24 hours)     ** No results found for the last 24 hours. **          Physical Exam:  Gen: NAD, resting in bed, then up in chair with therapy  Skin: warm, dry   Eyes: CHANDRA, conjunctiva clear and moist   Resp/thorax: even effort, non labored, CTA   CV: RRR   ABD: soft, bowel sounds +, nontender  Ext: no edema, no redness   Neuro: awake, follows commands, minimal replies to questions, no myoclonus       Reviewed labs and diagnostic results.   Lab Results   Component Value Date    HGBA1C 6.30 (H) 07/24/2019     Results from last 7 days   Lab Units 08/03/19  0651   WBC 10*3/mm3 8.68   HEMOGLOBIN g/dL 12.9*   HEMATOCRIT % 40.5   PLATELETS 10*3/mm3 324     Results from last 7 days   Lab Units 08/03/19  0651 08/02/19  0537   SODIUM mmol/L 141 137   POTASSIUM mmol/L 3.8 4.1   CHLORIDE mmol/L 101 102   CO2 mmol/L 28.0 25.0   BUN mg/dL 10 7*   CREATININE mg/dL 0.75* 0.64*   CALCIUM mg/dL 9.0 8.9   BILIRUBIN mg/dL  --  0.6   ALK PHOS U/L  --  93   ALT (SGPT) U/L  --  52*   AST (SGOT) U/L  --  45*   GLUCOSE mg/dL 103* 95       Impression: 79 y.o. male with A fib, prior multi ischemic stroke, acute encephalopathy with underlying dementia, acute Right humerus fracture, acute hypoxia respiratory failure    Plan:   Right arm pain, chronic lower back pain - scheduled acetaminophen to qid, continue diclofenac, lidoderm, ice/heat applications prn    Goals of care discussion - patient appears primarily tired and weak.   Patient's daughter, Denita, and son in law agree.  Patient was able to participate in therapy at [facility] prior to this admission. Family is hopeful that he would be able to return to more functional status with plan to eventually return to daughter's home.    Started discussion about overall decline and possibility of patient recovering to a more dependent functional level.    Palliative provide support to patient and family    Time: 60 minutes   > 50% time spent in counseling and education concerning current orders for symptom management with daughter, Denita    Teresita Singleton, APRN  [phone]  08/08/19  3:14 PM

## 2019-08-08 NOTE — PROGRESS NOTES
Continued Stay Note  [facility]     Patient Name: Vargas De  MRN: [id number]  Today's Date: 8/8/2019    Admit Date: 7/23/2019    Discharge Plan     Row Name 08/08/19 1246       Plan    Plan  Inpatient rehab    Patient/Family in Agreement with Plan  yes    Plan Comments  Spoke with Agatha at [facility] [phone] (hospital liasion).  She has talked with [facility] family [email or web address] today and confirmed that family appeal has been initiated.  Case reference number is [id number].  [facility] now has up to three days to review family claim for acute rehab.  They will contact zana Barger when final decision has been made for approval or denial of claim, as well as [facility].  Case management will follow up with family and [facility] for claim status.  [facility] contact number [phone].      Final Discharge Disposition Code  62 - inpatient rehab facility        Discharge Codes    No documentation.       Expected Discharge Date and Time     Expected Discharge Date Expected Discharge Time    Aug 12, 2019             Olesya Barton RN

## 2019-08-08 NOTE — PLAN OF CARE
Problem: Palliative Care (Adult)  Intervention: Support/Optimize Psychosocial Response   08/08/19 1001   Coping/Psychosocial Interventions   Supportive Measures active listening utilized;positive reinforcement provided;other (see comments)  (symptom assessment)   Psychosocial Support   Family/Support System Care support provided   Visit at bedside with patient, patient's dtr, son-in-law, and grandtr.  Pt with increased pain this morning, dtr asking about increase in acetaminophen, possibly adding dose of gabapentin in morning - discussed with DAO GOTTI who will address with patient and family.  Palliative Care continues to follow for support and assist with symptom management as well as plan of care.

## 2019-08-08 NOTE — PROGRESS NOTES
[facility] Medicine Services  PROGRESS NOTE    Patient Name: Vargas De  : 1940  MRN: [id number]    Date of Admission: 2019  Length of Stay: 16  Primary Care Physician: Saba Arrington MD    Subjective   Subjective     CC:  Hospital follow up for AMS     HPI:  Up in chair, dozing intermittently.  Family at bedside.  No overnight issues or complaints.      Review of Systems  Gen- No fevers, chills  CV- No chest pain, palpitations  Resp- No cough, dyspnea  GI- No N/V/D, abd pain    Otherwise ROS is negative except as mentioned in the HPI.    Objective   Objective     Vital Signs:   Temp:  [97.8 °F (36.6 °C)-98.3 °F (36.8 °C)] 97.8 °F (36.6 °C)  Heart Rate:  [65-80] 65  Resp:  [18] 18  BP: ()/(52-92) 94/56  Total (NIH Stroke Scale): 9     Physical Exam:  Constitutional: No acute distress, dozing intermittently, family at bedside, in chair  HENT: NCAT, mucous membranes moist  Respiratory: Clear to auscultation bilaterally, respiratory effort normal on room air  Cardiovascular: RRR, II/VI murmurs, no rubs, or gallops, palpable pedal pulses bilaterally  Gastrointestinal: Positive bowel sounds, soft, nontender, nondistended  Musculoskeletal: trace bilateral ankle edema  Skin: No rashes to exposed skin    Results Reviewed:  I have personally reviewed current lab, radiology, and data and agree.    Results from last 7 days   Lab Units 19  0651 19  0537   WBC 10*3/mm3 8.68 6.76   HEMOGLOBIN g/dL 12.9* 12.4*   HEMATOCRIT % 40.5 40.1   PLATELETS 10*3/mm3 324 305     Results from last 7 days   Lab Units 19  0651 19  0537 19  1801   SODIUM mmol/L 141 137  --    POTASSIUM mmol/L 3.8 4.1 5.0   CHLORIDE mmol/L 101 102  --    CO2 mmol/L 28.0 25.0  --    BUN mg/dL 10 7*  --    CREATININE mg/dL 0.75* 0.64*  --    GLUCOSE mg/dL 103* 95  --    CALCIUM mg/dL 9.0 8.9  --    ALT (SGPT) U/L  --  52*  --    AST (SGOT) U/L  --  45*  --      Estimated Creatinine  Clearance: 81.5 mL/min (A) (by C-G formula based on SCr of 0.75 mg/dL (L)).    Microbiology Results Abnormal     Procedure Component Value - Date/Time    Blood Culture - Blood, Arm, Left [[id number]] Collected:  07/27/19 0152    Lab Status:  Final result Specimen:  Blood from Arm, Left Updated:  08/01/19 0431     Blood Culture No growth at 5 days    Blood Culture - Blood, Hand, Left [[id number]] Collected:  07/27/19 0147    Lab Status:  Final result Specimen:  Blood from Hand, Left Updated:  08/01/19 0245     Blood Culture No growth at 5 days    Urine Culture - Urine, Urine, Clean Catch [[id number]]  (Normal) Collected:  07/28/19 1430    Lab Status:  Final result Specimen:  Urine, Clean Catch Updated:  07/29/19 1856     Urine Culture No growth    Beta Strep Culture, Throat - Swab, Throat [[id number]]  (Normal) Collected:  07/27/19 1630    Lab Status:  Final result Specimen:  Swab from Throat Updated:  07/29/19 1355     Throat Culture, Beta Strep No Beta Hemolytic Streptococcus Isolated    Narrative:       Group A Strep incidence is low in adults. Positive culture for Beta hemolytic Streptococcus species can reflect colonization and not true infection. Please correlate clinically.    Blood Culture - Blood, Arm, Left [[id number]] Collected:  07/23/19 1334    Lab Status:  Final result Specimen:  Blood from Arm, Left Updated:  07/28/19 1415     Blood Culture No growth at 5 days    Blood Culture - Blood, Hand, Right [[id number]] Collected:  07/23/19 1341    Lab Status:  Final result Specimen:  Blood from Hand, Right Updated:  07/28/19 1415     Blood Culture No growth at 5 days    Rapid Strep A Screen - Swab, Throat [[id number]]  (Normal) Collected:  07/27/19 1630    Lab Status:  Final result Specimen:  Swab from Throat Updated:  07/27/19 1727     Strep A Ag Negative    Narrative:       Test performed by Direct Antigen Testing.          Imaging Results (last 24 hours)     ** No results found for the last 24 hours. **           Results for orders placed during the hospital encounter of 07/23/19   Adult Transthoracic Echo Complete W/ Cont if Necessary Per Protocol    Narrative · Left ventricular systolic function is normal. Estimated EF = 60%.  · Left ventricular diastolic dysfunction (grade I) consistent with   impaired relaxation.  · Left atrial cavity size is borderline dilated.  · There is mild calcification of the aortic valve.          I have reviewed the medications:  Scheduled Meds:    acetaminophen 650 mg Oral Q8H   apixaban 5 mg Oral Q12H   atorvastatin 80 mg Oral Nightly   bisoprolol 5 mg Oral Q24H   diclofenac 2 g Topical 4x Daily   DULoxetine 20 mg Oral Daily   gabapentin 300 mg Oral Nightly   lidocaine 2 patch Transdermal Nightly   memantine 10 mg Oral Q12H   mesalamine 0.75 g Oral Nightly   pantoprazole 40 mg Oral Q AM   predniSONE 5 mg Oral Daily With Breakfast   saccharomyces boulardii 250 mg Oral BID   sodium chloride 3 mL Intravenous Q12H   sodium chloride 75 mL/hr Intravenous Once     Continuous Infusions:     PRN Meds:.•  acetaminophen  •  calcium carbonate  •  ipratropium-albuterol  •  loperamide  •  magic mouthwash  •  melatonin  •  phenol  •  potassium chloride  •  potassium chloride  •  [DISCONTINUED] potassium chloride **OR** [DISCONTINUED] potassium chloride **OR** potassium chloride  •  saline  •  sodium chloride  •  sodium chloride      Assessment/Plan   Assessment / Plan     Active Hospital Problems    Diagnosis  POA   • **Acute alteration in mental status [R41.82]  Yes   • Lewy body dementia [G31.83, F02.80]  Yes   • Acute respiratory distress [R06.03]  Yes   • Fracture of proximal end of right humerus [S42.201A]  Yes   • Paroxysmal atrial fibrillation (CMS/HCC) [I48.0]  Yes   • Mixed hyperlipidemia [E78.2]  Yes   • Multiple lacunar infarcts [I63.81]  Yes   • History of stroke with residual deficit [I69.30]  Not Applicable   • PFO (patent foramen ovale) [Q21.1]  Not Applicable   • GERD without  esophagitis [K21.9]  Yes   • Spinal stenosis in cervical region [M48.02]  Yes   • Essential hypertension [I10]  Yes   • Crohn's colitis, with rectal bleeding (CMS/HCC) [K50.111]  Yes      Resolved Hospital Problems   No resolved problems to display.        Brief Hospital Course to date:  Vargas De is a 78 y.o. male with a past medical history significant for ulcerative colitis on chronic prednisone therapy, chronic pain with spinal stenosis of the cervical region, hyperlipidemia, hypertension, PFO, atrial fibrillation ( on Eliquis) mild dementia, and multiple lacunar infarcts who presents with acute change in mental status.  MRI negative for CVA.  EEG negative for seizures.     Sepsis  Acute hypoxic respiratory failure  Likely aspiration   - overall improved; fevers improved and on room air   - Patient was started on zosyn 7/28 - transitioned to Augmentin to complete 7 days   - BCx NGTD; strep negative   -Patient with swollen uvula with exudate.  CT scan without contrast shows narrowing of his airway. The scan without contrast not ordered as patient with iodine allergy; ENT eval; cont PEEP if needed; diminished oropharyngeal sensation  - Code status changed to DNR   - Speech eval 7/29 with modified diet; continue to monitor   - Palliative following       AMS  Suspected Lewy body dementia  - improved with discontinuation of Seroquel and narcotics   -  Neurology followed;  etiology likely worsening of lewy body dementia in setting of sleep deprivation, pain as well as recent fracture   - continue home statin, ASA  - Will resume home gabapentin qHS only - patient states this helped him rest     Orthostatic Hypotension  - noted with therapy this morning.    - will give one liter of normal saline  - reassess orthostatic blood pressures again tonight.      Antibiotic associated diarrhea  - No concern for C. difficile, no abdominal pain on exam stable.  - Diarrhea resolved off  antibiotics     Dysphasia  - improving;  mental status is improving  - Speech eval 7/29 with diet modifications    Loose stools   - no WBC; no abd pain; monitor and consider c.diff testing      Right Humerus Fracture:  - consult to ortho, non-operative management, therapy as tolerated, appreciate assistance   - continue with therapy as above  - Dr. Steinberg follow up in 10 days   -Nerve block discontinued.  Pain controlled on Tylenol.     PAF:  - sinus on admission  - [id number] = 5. On Eliquis.      Junctional rhythml; tachycardia  - Resumed bisoprolol; cards consult and recommend continued beta blocker   - K replaced; check Mg      Frequent urination   - UA ok; PVR ok -- overall improving      Right wrist pain  -imaging with no acute fracture      History of CVA  - workup in May and CVA felt to be embolic; follows with Dr. Gutierrez and Dr. Maldonado. Has been on Eliquis and ASA discontinued       Chronic steroid:  - on 5 mg daily for Crohn's/lumbar stenosis. Currently being weaned down, to eventually discontinue; will resume home prednisone       HTN:  -Blood pressure has been on the low side.  Holding amlodipine and lisinopril for now.        DVT prophylaxis:   Eliquis     Disposition: [employer] denying acute rehab at [facility].  Family has filed an appeal.        CODE STATUS:   Code Status and Medical Interventions:   Ordered at: 07/28/19 1018     Limited Support to NOT Include:    Intubation     Level Of Support Discussed With:    Health Care Surrogate    Next of Kin (If No Surrogate)     Code Status:    No CPR     Medical Interventions (Level of Support Prior to Arrest):    Limited         Electronically signed by ANA MARÍA Esposito, 08/08/19, 2:06 PM.

## 2019-08-08 NOTE — PLAN OF CARE
Problem: Patient Care Overview  Goal: Plan of Care Review  Outcome: Ongoing (interventions implemented as appropriate)   08/08/19 3528   OTHER   Outcome Summary Pt rested well overnight. Family at bedside. VSS, did not wake pt per his request at the beginning of the shift. Awaiting placement for rehab. Will continue to monitor.

## 2019-08-08 NOTE — THERAPY TREATMENT NOTE
Patient Name: Vargas De  : 1940    MRN: [id number]                              Today's Date: 2019       Admit Date: 2019    Visit Dx:     ICD-10-CM ICD-9-CM   1. Acute alteration in mental status R41.82 780.97   2. Generalized weakness R53.1 780.79   3. Other closed displaced fracture of proximal end of right humerus with routine healing, subsequent encounter S42.291D V54.11   4. Elevated blood pressure reading with diagnosis of hypertension I10 401.9   5. Impaired functional mobility, balance, gait, and endurance Z74.09 V49.89   6. Impaired mobility and ADLs Z74.09 799.89   7. Cognitive communication deficit R41.841 799.52   8. Oropharyngeal dysphagia R13.12 787.22     Patient Active Problem List   Diagnosis   • Peripheral neuropathy   • Essential hypertension   • Crohn's colitis, with rectal bleeding (CMS/HCC)   • Major depressive disorder with single episode, in partial remission (CMS/HCC)   • Macular degeneration of both eyes   • GERD without esophagitis   • Cervicalgia   • Spinal stenosis in cervical region   • Chronic right-sided low back pain without sciatica   • Osteoarthritis of toe joint, right   • PFO (patent foramen ovale)   • Ulcerative colitis (CMS/HCC)   • Body mass index (BMI) of 25.0 to 29.9   • History of stroke with residual deficit   • Mixed hyperlipidemia   • Multiple lacunar infarcts   • Paroxysmal atrial fibrillation (CMS/HCC)   • Chronic bilateral low back pain   • Acute alteration in mental status   • Fracture of proximal end of right humerus   • Acute respiratory distress   • Lewy body dementia     Past Medical History:   Diagnosis Date   • Allergic arthritis    • Arthritis    • BPH (benign prostatic hyperplasia)      urology   • BPH/nocturia/microhematuria (work up negative)    • chronic hearing loss left ear    • Crohn's colitis (CMS/HCC)     on colonoscopy 7/15.2017.  UC-continue Lialda   • Depression     medication, after death of wife    • Elbow pain  7/25/2018   • Gout    • Hearing loss     left ear   • History of stroke 7/23/2018   • Hyperlipidemia    • Hypertension    • Ingrown toenail 03/18/2018   • Low back pain    • Macular degeneration    • Macular degeneration    • Multiple lacunar infarcts     noted on CT 2/2019   • Neck pain    • Rash 1/28/2019   • Rib pain on left side 07/11/2017   • Septic bursitis of elbow, right 7/23/2018   • Spinal stenosis     cervical and lumbosacral spine   • Spinal stenosis-cervical and lumbosacral spine    • Stroke (CMS/HCC)     aspirin, blood pressure conyrol.  Infarct frontal lobe.  R sided weakness, R hand and R foot numbness,dysphasia with stuttering.   • Stroke (CMS/HCC) 02/27/2019    MCA infarct with L facial droop and L sided weakness,dysphasia,dysarthria   • TIA (transient ischemic attack) 2/27/2019   • Ulcerative colitis (CMS/HCC)    • Urinary frequency      Past Surgical History:   Procedure Laterality Date   • CATARACT EXTRACTION     • COLONOSCOPY  07/15/2017   • HEMORRHOIDECTOMY     • HERNIA REPAIR     • KNEE SURGERY     • TONSILLECTOMY       General Information     Row Name 08/08/19 1200 08/08/19 1030       PT Evaluation Time/Intention    Subjective Information  fatigue  -AW  --    Document Type  other (see comments) caregiver instruction  -AW  therapy note (daily note)  -MB    Mode of Treatment  speech-language pathology  -AW  physical therapy  -MB    Patient Effort  other (see comments) pt in chair falling asleep, had finished with PT  -AW  --    Row Name 08/08/19 1200 08/08/19 1030       General Information    Patient/Family Observations  pt in chair, barely able to stay awake. Spoke with son in law  -AW  --    Existing Precautions/Restrictions  --  fall;non-weight bearing;brace worn when out of bed NWB RUE, sling at all times  -MB    Row Name 08/08/19 1030          Cognitive Assessment/Intervention- PT/OT    Orientation Status (Cognition)  oriented x 3  -MB     Personal Safety Interventions  fall prevention  program maintained;gait belt;muscle strengthening facilitated;nonskid shoes/slippers when out of bed  -MB     Row Name 08/08/19 1030          Safety Issues, Functional Mobility    Safety Issues Affecting Function (Mobility)  safety precaution awareness;safety precautions follow-through/compliance  -MB     Impairments Affecting Function (Mobility)  endurance/activity tolerance;pain  -MB       User Key  (r) = Recorded By, (t) = Taken By, (c) = Cosigned By    Initials Name Provider Type    Darling Acosta, MS CCC-SLP Speech and Language Pathologist    Swapna Caraballo, PT Physical Therapist        Mobility     Row Name 08/08/19 1310          Bed Mobility Assessment/Treatment    Bed Mobility Assessment/Treatment  supine-sit  -MB     Supine-Sit [place] (Bed Mobility)  minimum assist (75% patient effort);verbal cues;nonverbal cues (demo/gesture)  -MB     Sit-Supine [place] (Bed Mobility)  not tested  -MB     Assistive Device (Bed Mobility)  bed rails;draw sheet;head of bed elevated  -MB     Row Name 08/08/19 1310          Transfer Assessment/Treatment    Comment (Transfers)  RUE sling donned upon arrival.  VCs for safe hand placement and upright  posture in standing.  Orthostatics taken; pt. denied dizziness throughout.  -MB     Row Name 08/08/19 1310          Sit-Stand Transfer    Sit-Stand [place] (Transfers)  minimum assist (75% patient effort);2 person assist;verbal cues  -MB     Assistive Device (Sit-Stand Transfers)  -- LUE suppport  -MB     Row Name 08/08/19 1310 08/08/19 1030       Gait/Stairs Assessment/Training    24680 - Gait Training Minutes   --  10  -MB    [place] Level (Gait)  minimum assist (75% patient effort);verbal cues  -MB  --    Assistive Device (Gait)  -- LUE support  -MB  --    Bilateral Gait Deviations  forward flexed posture  -MB  --    Comment (Gait/Stairs)  Pt. marched in place in front of chair due to low BP x approx 2 min, w/ VCs for upright posture.  Pt. denied  dizziness, c/o increased lumbar pain.    -MB  --    Row Name 08/08/19 1310          Mobility Assessment/Intervention    Extremity Weight-bearing Status  right lower extremity  -MB     Right Upper Extremity (Weight-bearing Status)  non weight-bearing (NWB)  (Significant)   -MB       User Key  (r) = Recorded By, (t) = Taken By, (c) = Cosigned By    Initials Name Provider Type    MB Swapna Pastrana, PT Physical Therapist        Obj/Interventions     Row Name 08/08/19 1030          Therapeutic Exercise    Lower Extremity (Therapeutic Exercise)  LAQ (long arc quad), bilateral;marching while seated  -MB     Lower Extremity Range of Motion (Therapeutic Exercise)  ankle dorsiflexion/plantar flexion, bilateral  -MB     Exercise Type (Therapeutic Exercise)  AROM (active range of motion)  -MB     Position (Therapeutic Exercise)  seated  -MB     Sets/Reps (Therapeutic Exercise)  1/10   -MB     Row Name 08/08/19 1030          Static Sitting Balance    Level of [place] (Unsupported Sitting, Static Balance)  supervision  -MB     Sitting Position (Unsupported Sitting, Static Balance)  sitting on edge of bed  -MB     Time Able to Maintain Position (Unsupported Sitting, Static Balance)  4 to 5 minutes  -MB     Row Name 08/08/19 1030          Static Standing Balance    Level of [place] (Supported Standing, Static Balance)  contact guard assist  -MB     Time Able to Maintain Position (Supported Standing, Static Balance)  4 to 5 minutes  -MB     Assistive Device Utilized (Supported Standing, Static Balance)  -- LUE support  -MB     Row Name 08/08/19 1030          Dynamic Standing Balance    Level of [place], Reaches Outside Midline (Standing, Dynamic Balance)  minimal assist, 75% patient effort  -MB     Time Able to Maintain Position, Reaches Outside Midline (Standing, Dynamic Balance)  1 to 2 minutes  -MB     Assistive Device Utilized (Supported Standing, Dynamic Balance)  -- LUE support  -MB       User Key  (r) =  Recorded By, (t) = Taken By, (c) = Cosigned By    Initials Name Provider Type    Swapna Caraballo, PT Physical Therapist        Goals/Plan    No documentation.       Clinical Impression     Row Name 08/08/19 1318          Pain Scale: Numbers Pre/Post-Treatment    Pain Location - Orientation  lower  -MB     Pain Location  back  -MB     Pain Intervention(s)  Ambulation/increased activity;Repositioned  -MB     Row Name 08/08/19 1318          Pain Scale: FACES Pre/Post-Treatment    Pain: FACES Scale, Pretreatment  2-->hurts little bit  -MB     Pain: FACES Scale, Post-Treatment  4-->hurts little more  -MB     Row Name 08/08/19 1321 08/08/19 1318       Plan of Care Review    Plan of Care Reviewed With  patient;daughter;family  -MB  patient;daughter;family  -MB    Row Name 08/08/19 1217 08/08/19 1158       Plan of Care Review    Plan of Care Reviewed With  son  -AW  patient;family  -LC    Row Name 08/08/19 1000 08/08/19 0744       Plan of Care Review    Plan of Care Reviewed With  patient;family  -LC  patient;family  -LC    Row Name 08/08/19 1318          Vital Signs    Pre Systolic BP Rehab  115  -MB     Pre Treatment Diastolic BP  56  -MB     Intra Systolic BP Rehab  105  -MB     Intra Treatment Diastolic BP  61  -MB     Post Systolic BP Rehab  91  -MB     Post Treatment Diastolic BP  52  -MB     Pre SpO2 (%)  97  -MB     O2 Delivery Pre Treatment  room air  -MB     Intra SpO2 (%)  97  -MB     O2 Delivery Intra Treatment  room air  -MB     Post SpO2 (%)  95  -MB     O2 Delivery Post Treatment  room air  -MB     Pre Patient Position  Supine  -MB     Intra Patient Position  Sitting  -MB     Post Patient Position  Standing  -MB     Row Name 08/08/19 1318          Positioning and Restraints    Pre-Treatment Position  in bed  -MB     Post Treatment Position  chair  -MB     In Chair  notified nsg;reclined;call light within reach;encouraged to call for assist;exit alarm on;with family/caregiver;RUE elevated;legs  elevated;heels elevated  -MB       User Key  (r) = Recorded By, (t) = Taken By, (c) = Cosigned By    Initials Name Provider Type    AW Darling Kan, MS CCC-SLP Speech and Language Pathologist    Swapna Caraballo, PT Physical Therapist    Karen Lewis, RN Registered Nurse        Outcome Measures     Row Name 08/08/19 1030          How much help from another person do you currently need...    Turning from your back to your side while in flat bed without using bedrails?  3  -MB     Moving from lying on back to sitting on the side of a flat bed without bedrails?  3  -MB     Moving to and from a bed to a chair (including a wheelchair)?  3  -MB     Standing up from a chair using your arms (e.g., wheelchair, bedside chair)?  3  -MB     Climbing 3-5 steps with a railing?  2  -MB     To walk in hospital room?  3  -MB     AM-PAC 6 Clicks Score (PT)  17  -MB     Row Name 08/08/19 1030          Functional Assessment    Outcome Measure Options  AM-PAC 6 Clicks Basic Mobility (PT)  -MB       User Key  (r) = Recorded By, (t) = Taken By, (c) = Cosigned By    Initials Name Provider Type    Swapna Caraballo, PT Physical Therapist        Physical Therapy Education     Title: PT OT SLP Therapies (Done)     Topic: Physical Therapy (Done)     Point: Mobility training (Done)     Learning Progress Summary           Patient Acceptance, E,TB, VU,NR by JUSTIN at 8/7/2019  3:30 PM    Acceptance, E, VU,NR by JUSTIN at 8/6/2019 11:35 AM    Acceptance, E,TB, VU,NR by JUSTIN at 8/6/2019  9:36 AM    Acceptance, E, VU,NR by JUSTIN at 8/5/2019 10:14 AM    Acceptance, E,TB,D, VU,NR by MICKEY at 8/4/2019 10:20 AM    Comment:  Educated patient on safety with sit<> stand and ambulation. Educated patient to increase stride length with steps.    Acceptance, E,TB, VU,DU by MICKEY at 8/3/2019  4:41 PM    Comment:  Educated patient on transfer technique using sheet to sit patient up in bed. Educated patient and family on sling.    Acceptance, E, VU,NR by CD at  8/2/2019  3:49 PM    Comment:  SEE FLOW SHEET. SON INSTRUCTED IN LE THER EX, POSITIONING FOR COMFORT.,    Eager, E, VU by KM at 8/1/2019 10:20 AM    Eager, E,D, VU,NR by AA at 7/31/2019  2:46 PM    Eager, E, NR by AA at 7/30/2019  3:17 PM    Acceptance, E, VU,NR by EJ at 7/29/2019 11:04 AM    Acceptance, E, VU,NR by CD at 7/25/2019  4:42 PM    Comment:  DTR INSTRUCTED IN ROM EXERCISES, POSITIONING FOR COMFORT/ PROTECTION OF R UE, BENEFITS OF OOB ACTIVITY, D/C PLANNING,    Acceptance, E, VU,NR by CD at 7/24/2019 10:36 AM    Comment:  SEE FLOWSHEET.   Family Acceptance, E,TB, VU,NR by EJ at 8/7/2019  3:30 PM    Acceptance, E,TB, VU,NR by EJ at 8/6/2019  9:36 AM    Acceptance, E,TB,D, VU,NR by CS at 8/4/2019 10:20 AM    Comment:  Educated patient on safety with sit<> stand and ambulation. Educated patient to increase stride length with steps.    Acceptance, E,TB, VU,DU by CS at 8/3/2019  4:41 PM    Comment:  Educated patient on transfer technique using sheet to sit patient up in bed. Educated patient and family on sling.    Acceptance, E, VU,NR by CD at 8/2/2019  3:49 PM    Comment:  SEE FLOW SHEET. SON INSTRUCTED IN LE THER EX, POSITIONING FOR COMFORT.,    Eager, E,D, VU,NR by AA at 7/31/2019  2:46 PM    Acceptance, E, VU,NR by CD at 7/25/2019  4:42 PM    Comment:  DTR INSTRUCTED IN ROM EXERCISES, POSITIONING FOR COMFORT/ PROTECTION OF R UE, BENEFITS OF OOB ACTIVITY, D/C PLANNING,                   Point: Home exercise program (Done)     Learning Progress Summary           Patient Acceptance, E,TB, VU,NR by EJ at 8/7/2019  3:30 PM    Acceptance, E, VU,NR by EJ at 8/6/2019 11:35 AM    Acceptance, E,TB, VU,NR by EJ at 8/6/2019  9:36 AM    Acceptance, E, VU,NR by EJ at 8/5/2019 10:14 AM    Acceptance, E,RENEA,TUSHAR, MARITZA,NR by CS at 8/4/2019 10:20 AM    Comment:  Educated patient on safety with sit<> stand and ambulation. Educated patient to increase stride length with steps.    Acceptance, DEISY,RENEA, MARITZA,DU by CS at 8/3/2019  4:41 PM     Comment:  Educated patient on transfer technique using sheet to sit patient up in bed. Educated patient and family on sling.    Acceptance, E, VU,NR by CD at 8/2/2019  3:49 PM    Comment:  SEE FLOW SHEET. SON INSTRUCTED IN LE THER EX, POSITIONING FOR COMFORT.,    Eager, E, VU by KM at 8/1/2019 10:20 AM    Acceptance, E, VU,NR by EJ at 7/29/2019 11:04 AM    Acceptance, E, VU,NR by CD at 7/25/2019  4:42 PM    Comment:  DTR INSTRUCTED IN ROM EXERCISES, POSITIONING FOR COMFORT/ PROTECTION OF R UE, BENEFITS OF OOB ACTIVITY, D/C PLANNING,    Acceptance, E, VU,NR by CD at 7/24/2019 10:36 AM    Comment:  SEE FLOWSHEET.   Family Acceptance, E,TB, VU,NR by EJ at 8/7/2019  3:30 PM    Acceptance, E,TB, VU,NR by EJ at 8/6/2019  9:36 AM    Acceptance, E,TB,D, VU,NR by CS at 8/4/2019 10:20 AM    Comment:  Educated patient on safety with sit<> stand and ambulation. Educated patient to increase stride length with steps.    Acceptance, E,TB, VU,DU by CS at 8/3/2019  4:41 PM    Comment:  Educated patient on transfer technique using sheet to sit patient up in bed. Educated patient and family on sling.    Acceptance, E, VU,NR by CD at 8/2/2019  3:49 PM    Comment:  SEE FLOW SHEET. SON INSTRUCTED IN LE THER EX, POSITIONING FOR COMFORT.,    Acceptance, E, VU,NR by CD at 7/25/2019  4:42 PM    Comment:  DTR INSTRUCTED IN ROM EXERCISES, POSITIONING FOR COMFORT/ PROTECTION OF R UE, BENEFITS OF OOB ACTIVITY, D/C PLANNING,                   Point: Body mechanics (Done)     Learning Progress Summary           Patient Acceptance, E,TB, VU,NR by EJ at 8/7/2019  3:30 PM    Acceptance, E, VU,NR by EJ at 8/6/2019 11:35 AM    Acceptance, E,TB, VU,NR by EJ at 8/6/2019  9:36 AM    Acceptance, E, VU,NR by EJ at 8/5/2019 10:14 AM    Acceptance, E,TB,D, VU,NR by CS at 8/4/2019 10:20 AM    Comment:  Educated patient on safety with sit<> stand and ambulation. Educated patient to increase stride length with steps.    Acceptance, E,TB, VU,DU by CS at  8/3/2019  4:41 PM    Comment:  Educated patient on transfer technique using sheet to sit patient up in bed. Educated patient and family on sling.    Acceptance, E, VU,NR by CD at 8/2/2019  3:49 PM    Comment:  SEE FLOW SHEET. SON INSTRUCTED IN LE THER EX, POSITIONING FOR COMFORT.,    Eager, E, VU by KM at 8/1/2019 10:20 AM    Eager, E,D, VU,NR by AA at 7/31/2019  2:46 PM    Eager, E, NR by AA at 7/30/2019  3:17 PM    Acceptance, E, VU,NR by EJ at 7/29/2019 11:04 AM    Acceptance, E, VU,NR by CD at 7/25/2019  4:42 PM    Comment:  DTR INSTRUCTED IN ROM EXERCISES, POSITIONING FOR COMFORT/ PROTECTION OF R UE, BENEFITS OF OOB ACTIVITY, D/C PLANNING,    Acceptance, E, VU,NR by CD at 7/24/2019 10:36 AM    Comment:  SEE FLOWSHEET.   Family Acceptance, E,TB, VU,NR by EJ at 8/7/2019  3:30 PM    Acceptance, E,TB, VU,NR by EJ at 8/6/2019  9:36 AM    Acceptance, E,TB,D, VU,NR by CS at 8/4/2019 10:20 AM    Comment:  Educated patient on safety with sit<> stand and ambulation. Educated patient to increase stride length with steps.    Acceptance, E,TB, VU,DU by CS at 8/3/2019  4:41 PM    Comment:  Educated patient on transfer technique using sheet to sit patient up in bed. Educated patient and family on sling.    Acceptance, E, VU,NR by CD at 8/2/2019  3:49 PM    Comment:  SEE FLOW SHEET. SON INSTRUCTED IN LE THER EX, POSITIONING FOR COMFORT.,    Eager, E,D, VU,NR by AA at 7/31/2019  2:46 PM    Acceptance, E, VU,NR by CD at 7/25/2019  4:42 PM    Comment:  DTR INSTRUCTED IN ROM EXERCISES, POSITIONING FOR COMFORT/ PROTECTION OF R UE, BENEFITS OF OOB ACTIVITY, D/C PLANNING,                   Point: Precautions (Done)     Learning Progress Summary           Patient Acceptance, E,TB, VU,NR by JUSTIN at 8/7/2019  3:30 PM    Acceptance, E, VU,NR by EJ at 8/6/2019 11:35 AM    Acceptance, RENEA OLIVAS VU, NR by JUSTIN at 8/6/2019  9:36 AM    Acceptance, MARITZA OLIVAS NR by JUSTIN at 8/5/2019 10:14 AM    Acceptance, RENEA OLIVAS D, VU, NR by MICKEY at 8/4/2019 10:20 AM    Comment:   Educated patient on safety with sit<> stand and ambulation. Educated patient to increase stride length with steps.    Acceptance, E,TB, VU,DU by CS at 8/3/2019  4:41 PM    Comment:  Educated patient on transfer technique using sheet to sit patient up in bed. Educated patient and family on sling.    Acceptance, E, VU,NR by CD at 8/2/2019  3:49 PM    Comment:  SEE FLOW SHEET. SON INSTRUCTED IN LE THER EX, POSITIONING FOR COMFORT.,    Eager, E, VU by TANK at 8/1/2019 10:20 AM    Eager, E,D, VU,NR by AA at 7/31/2019  2:46 PM    Eager, E, NR by AA at 7/30/2019  3:17 PM    Acceptance, E, VU,NR by JUSTIN at 7/29/2019 11:04 AM    Acceptance, E, VU,NR by CD at 7/25/2019  4:42 PM    Comment:  DTR INSTRUCTED IN ROM EXERCISES, POSITIONING FOR COMFORT/ PROTECTION OF R UE, BENEFITS OF OOB ACTIVITY, D/C PLANNING,    Acceptance, E, VU,NR by CD at 7/24/2019 10:36 AM    Comment:  SEE FLOWSHEET.   Family Acceptance, E,TB, VU,NR by JUSTIN at 8/7/2019  3:30 PM    Acceptance, E,TB, VU,NR by JUSTIN at 8/6/2019  9:36 AM    Acceptance, E,TB,D, VU,NR by MICKEY at 8/4/2019 10:20 AM    Comment:  Educated patient on safety with sit<> stand and ambulation. Educated patient to increase stride length with steps.    Acceptance, E,TB, VU,DU by MICKEY at 8/3/2019  4:41 PM    Comment:  Educated patient on transfer technique using sheet to sit patient up in bed. Educated patient and family on sling.    Acceptance, E, VU,NR by CD at 8/2/2019  3:49 PM    Comment:  SEE FLOW SHEET. SON INSTRUCTED IN LE THER EX, POSITIONING FOR COMFORT.,    Eager, E,D, VU,NR by AA at 7/31/2019  2:46 PM    Acceptance, E, VU,NR by CD at 7/25/2019  4:42 PM    Comment:  DTR INSTRUCTED IN ROM EXERCISES, POSITIONING FOR COMFORT/ PROTECTION OF R UE, BENEFITS OF OOB ACTIVITY, D/C PLANNING,                               User Key     Initials Effective Dates Name Provider Type Discipline    CD 06/19/15 -  Gogo Thibodeaux, PT Physical Therapist PT    EJ 11/20/18 -  Nurys Chahal, PT Physical Therapist  PT    KM 06/19/15 -  Denise Hdz, PT Physical Therapist PT    AA 04/02/18 -  Mere Ramon, PT Physical Therapist PT    CS 03/26/19 -  Roz Phoenix PT Physical Therapist PT              PT Recommendation and Plan     Outcome Summary/Treatment Plan (PT)  Daily Summary of Progress (PT): progress toward functional goals as expected  Anticipated Discharge Disposition (PT): inpatient rehabilitation facility  Plan of Care Reviewed With: patient, daughter, family  Progress: improving  Outcome Summary: Pt. demo good effort w/ mobility; however, limited by asymptomatic orthostatic hypotension (91/52 in standing).  Pt. performed bed mobility w/ min A, transfers w/ min A, and marched in place in front of chair x 2 minutes.  Will continue to progress mobility as medically appropriate.      Time Calculation:   PT Charges     Row Name 08/08/19 1030             Time Calculation    Start Time  1030  -MB      PT Received On  08/08/19  -MB      PT Goal Re-Cert Due Date  08/15/19  -MB         Time Calculation- PT    Total Timed Code Minutes- PT  25 minute(s)  -MB         Timed Charges    71968 - PT Therapeutic Exercise Minutes  15  -MB      26365 - Gait Training Minutes   10  -MB        User Key  (r) = Recorded By, (t) = Taken By, (c) = Cosigned By    Initials Name Provider Type    Swapna Caraballo, PT Physical Therapist        Therapy Charges for Today     Code Description Service Date Service Provider Modifiers Qty    99257596672 HC PT THER PROC EA 15 MIN 8/8/2019 Swapna Pastrana, PT GP 1    40062183371 HC GAIT TRAINING EA 15 MIN 8/8/2019 Swapna Pastrana, PT GP 1    33868138018 HC PT THER SUPP EA 15 MIN 8/8/2019 Swapna Pastrana, PT GP 1          PT G-Codes  Outcome Measure Options: AM-PAC 6 Clicks Basic Mobility (PT)  AM-PAC 6 Clicks Score (PT): 17  AM-PAC 6 Clicks Score (OT): 11  Modified [place] Scale: 4 - Moderately severe disability.  Unable to walk without assistance, and unable to attend to  own bodily needs without assistance.    Swapna Pastrana, PT  8/8/2019

## 2019-08-08 NOTE — PLAN OF CARE
Problem: Patient Care Overview  Goal: Interprofessional Rounds/Family Conf   08/08/19 1345   Interdisciplinary Rounds/Family Conf   Summary Palliative Care Interdisciplinary Rounds - Palliative Care Team members present: COCO Morin DO; DAO Singleton APRN; SHIMON Gordon RN, [facility]; CLIFTON Javier [facility], [facility]; CAROL Ruffin MDiv, BC   Participants advanced practice nurse;nursing;pastoral care;physician;social work/services

## 2019-08-08 NOTE — PLAN OF CARE
Problem: Fall Risk (Adult)  Goal: Identify Related Risk Factors and Signs and Symptoms  Outcome: Ongoing (interventions implemented as appropriate)   08/08/19 8829   Fall Risk (Adult)   Related Risk Factors (Fall Risk) age-related changes;fatigue/slow reaction;gait/mobility problems;environment unfamiliar;slippery/uneven surfaces   Signs and Symptoms (Fall Risk) presence of risk factors

## 2019-08-08 NOTE — PLAN OF CARE
Problem: Patient Care Overview  Goal: Plan of Care Review  Outcome: Ongoing (interventions implemented as appropriate)   08/08/19 1321   Coping/Psychosocial   Plan of Care Reviewed With patient;daughter;family   Plan of Care Review   Progress improving   OTHER   Outcome Summary Pt. demo good effort w/ mobility; however, limited by asymptomatic orthostatic hypotension (91/52 in standing). Pt. performed bed mobility w/ min A, transfers w/ min A, and marched in place in front of chair x 2 minutes. Will continue to progress mobility as medically appropriate.

## 2019-08-08 NOTE — PLAN OF CARE
Problem: Patient Care Overview  Goal: Individualization and Mutuality  Outcome: Ongoing (interventions implemented as appropriate)   08/08/19 1653   Individualization   Patient Specific Preferences none   Patient Specific Goals (Include Timeframe) pain adequately controlled while admitted   Patient Specific Interventions monitor, assess, treat symptoms of pain for patient's comfort     Goal: Discharge Needs Assessment  Outcome: Ongoing (interventions implemented as appropriate)   08/08/19 1653   Discharge Needs Assessment   Discharge Coordination/Progress Awaiting appeal on insurance for patient to go to inpatient rehab     Goal: Interprofessional Rounds/Family Conf  Outcome: Ongoing (interventions implemented as appropriate)   08/08/19 1653   Interdisciplinary Rounds/Family Conf   Participants

## 2019-08-09 NOTE — PROGRESS NOTES
"                  Clinical Nutrition     Reason for Visit:   Follow-up protocol    Patient Name: Vargas De  YOB: 1940  MRN: [id number]  Date of Encounter: 08/09/19 10:34 AM  Admission date: 7/23/2019    Nutrition Assessment   Assessment     Admission diagnosis  Altered mental status    Additional diagnosis/conditions/procedures  Encephalopathy w/ hallucinations  ?underlying dementia, Lewy Body  R humerus fracture s/p fall, non-operative management  Febrile  Swollen uvula  Dysphagia  Aspiration of secretions  Acute hypoxic respiratory failure    (7/24) SLP FEES, recommend - mechanical soft, no mixed consistencies nectar thick liquids  (7/29) Peripheral nerve block, catheter insertion  (7/29) SLP FEES, recommend - mechanical soft, no mixed consistencies, honey thick liquids  (7/29) Palliative care discussion with family - if able to tolerate food w/ altered consistency then resume oral intake. If unable to tolerate any consistency w/o risk of aspiration, npo vs comfort feeds. If patient is unable to eat safely and diet is continued, possibly have hospice follow d/t likelihood of aspiration pneumonia. Patient and family do not want PEG tube placed.  (8/6) s/p FEES; SLP rec soft texture, whole foods, thins    Additional PMH/PSH:  HTN  HLP  Ulcerative colitis - on chronic prednisone  Gout  Spinal stenosis  CVA  Depression  TIA  Mild dementia  Hemorrhoid surgery  Hernia repair  Knee surgery    Reported/Observed/Food/Nutrition Related History:     Pt tolerating diet modification upgrade well at this time. Pleased to be receiving thins. Pt reports his intake continues to improve. Plan to change supplement to Ensure HP at this time. No further food preferences noted.     Anthropometrics     Height: 170.2 cm (67\")  Last filed wt: Weight: 87.1 kg (192 lb 0.3 oz) (07/23/19 1218)    BMI: BMI (Calculated): 30.1  Obese Class I: 30-34.9kg/m2    Ideal Body Weight (IBW) (kg): 68.1  Admission wt: 192 lb 0.3 " oz  Method obtained: weight from charting on admission 7/23, no method listed    Labs reviewed   Yes   Results from last 7 days   Lab Units 08/03/19  0651   GLUCOSE mg/dL 103*   BUN mg/dL 10   CREATININE mg/dL 0.75*   SODIUM mmol/L 141   CHLORIDE mmol/L 101   POTASSIUM mmol/L 3.8   MAGNESIUM mg/dL 1.8     Lab Results   Lab Value Date/Time    HGBA1C 6.30 (H) 07/24/2019 0605    HGBA1C 5.50 02/28/2019 0554     Medications reviewed   Pertinent: eliquis, neurontin, protonix, prednisone, florastor   PRN: imodium     Current Nutrition Prescription     PO: Diet Soft Texture; Whole Foods; Thin     Oral Nutrition Supplement: Boost pudding with B/L; Magic cup with L/D    Intake: 65% of 5 meals     Nutrition Diagnosis     7/30; updated 8/5, 8/9  Problem Inadequate oral intake   Etiology Dysphagia, poor appetite   Signs/Symptoms PO Intake: 65% of 5 meals    Status: ongoing, improving     7/30; updated 8/9  Problem Swallowing difficulty   Etiology Dysphagia/?aspiration   Signs/Symptoms S/p FEES- SLP upgraded pt to soft textures, whole foods, thin liquids (8/6)   Status: resolved.     Nutrition Intervention   1.  Follow treatment progress, Care plan reviewed  2.  Interview for preferences   3. Adjusted supplement-  D/c magic cup and boost pudding. Order Boost Plus TID     Goal:   General: Nutrition support treatment  PO: Increase intake, Continue positive trend, Advance diet as medically appropriate     Monitoring/Evaluation:   Per protocol, PO intake, Supplement intake, Pertinent labs, Symptoms    Will Continue to follow per protocol    Catina Reveles RD  Time Spent: 25 minutes

## 2019-08-09 NOTE — THERAPY TREATMENT NOTE
Patient Name: Vargas De  : 1940    MRN: [id number]                              Today's Date: 2019       Admit Date: 2019    Visit Dx:     ICD-10-CM ICD-9-CM   1. Acute alteration in mental status R41.82 780.97   2. Generalized weakness R53.1 780.79   3. Other closed displaced fracture of proximal end of right humerus with routine healing, subsequent encounter S42.291D V54.11   4. Elevated blood pressure reading with diagnosis of hypertension I10 401.9   5. Impaired functional mobility, balance, gait, and endurance Z74.09 V49.89   6. Impaired mobility and ADLs Z74.09 799.89   7. Cognitive communication deficit R41.841 799.52   8. Oropharyngeal dysphagia R13.12 787.22     Patient Active Problem List   Diagnosis   • Peripheral neuropathy   • Essential hypertension   • Crohn's colitis, with rectal bleeding (CMS/HCC)   • Major depressive disorder with single episode, in partial remission (CMS/HCC)   • Macular degeneration of both eyes   • GERD without esophagitis   • Cervicalgia   • Spinal stenosis in cervical region   • Chronic right-sided low back pain without sciatica   • Osteoarthritis of toe joint, right   • PFO (patent foramen ovale)   • Ulcerative colitis (CMS/HCC)   • Body mass index (BMI) of 25.0 to 29.9   • History of stroke with residual deficit   • Mixed hyperlipidemia   • Multiple lacunar infarcts   • Paroxysmal atrial fibrillation (CMS/HCC)   • Chronic bilateral low back pain   • Acute alteration in mental status   • Fracture of proximal end of right humerus   • Acute respiratory distress   • Lewy body dementia     Past Medical History:   Diagnosis Date   • Allergic arthritis    • Arthritis    • BPH (benign prostatic hyperplasia)      urology   • BPH/nocturia/microhematuria (work up negative)    • chronic hearing loss left ear    • Crohn's colitis (CMS/HCC)     on colonoscopy 7/15.2017.  UC-continue Lialda   • Depression     medication, after death of wife    • Elbow pain  7/25/2018   • Gout    • Hearing loss     left ear   • History of stroke 7/23/2018   • Hyperlipidemia    • Hypertension    • Ingrown toenail 03/18/2018   • Low back pain    • Macular degeneration    • Macular degeneration    • Multiple lacunar infarcts     noted on CT 2/2019   • Neck pain    • Rash 1/28/2019   • Rib pain on left side 07/11/2017   • Septic bursitis of elbow, right 7/23/2018   • Spinal stenosis     cervical and lumbosacral spine   • Spinal stenosis-cervical and lumbosacral spine    • Stroke (CMS/[facility])     aspirin, blood pressure conyrol.  Infarct frontal lobe.  R sided weakness, R hand and R foot numbness,dysphasia with stuttering.   • Stroke (CMS/HCC) 02/27/2019    MCA infarct with L facial droop and L sided weakness,dysphasia,dysarthria   • TIA (transient ischemic attack) 2/27/2019   • Ulcerative colitis (CMS/[facility])    • Urinary frequency      Past Surgical History:   Procedure Laterality Date   • CATARACT EXTRACTION     • COLONOSCOPY  07/15/2017   • HEMORRHOIDECTOMY     • HERNIA REPAIR     • KNEE SURGERY     • TONSILLECTOMY       General Information     Row Name 08/09/19 1347 08/09/19 1038       PT Evaluation Time/Intention    Subjective Information  --  complains of;fatigue;pain  -AN    Document Type  therapy note (daily note)  -AA  therapy note (daily note)  -AN    Mode of Treatment  physical therapy  -AA  occupational therapy  -AN    Patient Effort  --  good  -AN    Comment  --  pt continues to c/o numbness in digits, sling adjusted and continued education with family on ROM ex provided.  -AN    Row Name 08/09/19 1347 08/09/19 1038       General Information    Patient Profile Reviewed?  yes  -AA  --    Patient/Family Observations  --  pt in bed, family present  -AN    Existing Precautions/Restrictions  --  fall  -AN    Row Name 08/09/19 1347 08/09/19 1038       Cognitive Assessment/Intervention- PT/OT    Affect/Mental Status (Cognitive)  --  WFL  -AN    Orientation Status (Cognition)  oriented x 3   -AA  oriented x 3  -AN    Follows Commands (Cognition)  --  follows one step commands;over 90% accuracy;delayed response/completion  -AN    Personal Safety Interventions  fall prevention program maintained;nonskid shoes/slippers when out of bed;muscle strengthening facilitated;gait belt  -AA  fall prevention program maintained  -AN    Row Name 08/09/19 1347          Safety Issues, Functional Mobility    Safety Issues Affecting Function (Mobility)  insight into deficits/self awareness;safety precautions follow-through/compliance;sequencing abilities  -AA     Impairments Affecting Function (Mobility)  balance;coordination;endurance/activity tolerance;pain;strength  -AA       User Key  (r) = Recorded By, (t) = Taken By, (c) = Cosigned By    Initials Name Provider Type    AN Bibiana Lion, OT Occupational Therapist    AA Mere Ramon, PT Physical Therapist        Mobility     Row Name 08/09/19 1348 08/09/19 1038       Bed Mobility Assessment/Treatment    Supine-Sit [place] (Bed Mobility)  --  minimum assist (75% patient effort)  -AN    Comment (Bed Mobility)  pt UI  -AA  --    Row Name 08/09/19 1348 08/09/19 1038       Transfer Assessment/Treatment    Transfer Assessment/Treatment  --  bed-chair transfer  -AN    Comment (Transfers)  VC for push off and reaching back.  Pt reports no dizziness.  Sling on RUE  -AA  --    Stand-Sit [place] (Transfers)  --  verbal cues;contact guard  -AN    Row Name 08/09/19 1038          Bed-Chair Transfer    Bed-Chair [place] (Transfers)  verbal cues;minimum assist (75% patient effort)  -AN     Row Name 08/09/19 1348 08/09/19 1038       Sit-Stand Transfer    Sit-Stand [place] (Transfers)  minimum assist (75% patient effort);1 person assist;1 person to manage equipment  -AA  minimum assist (75% patient effort)  -AN    Assistive Device (Sit-Stand Transfers)  other (see comments) A  -AA  --    Row Name 08/09/19 1356 08/09/19 1348       Gait/Stairs Assessment/Training     97578 - Gait Training Minutes   25  -AA  --    [place] Level (Gait)  --  minimum assist (75% patient effort);2 person assist  -AA    Assistive Device (Gait)  --  other (see comments) HHA LUE  -AA    Distance in Feet (Gait)  --  150x2  -AA    Deviations/Abnormal Patterns (Gait)  --  antalgic;base of support, wide;stride length decreased;other (see comments) increased trunk sway  -AA    Bilateral Gait Deviations  --  forward flexed posture  -AA    Comment (Gait/Stairs)  --  Pt ambulated with HHA NAHIDE, min A of 2, chair follow 150 feet x2.  Pt reports no dizziness or pain with sling RUE.  Pt ambulates with increased trunk sway B with cues for proper stepping to decrease fall risk.  Pt required cues for keeping eyes open with pt report being sleepy.    -AA      User Key  (r) = Recorded By, (t) = Taken By, (c) = Cosigned By    Initials Name Provider Type    AN Bibiana Lion, OT Occupational Therapist    AA Mere Ramon, PT Physical Therapist        Obj/Interventions     Row Name 08/09/19 1038          Therapeutic Exercise    Upper Extremity Range of Motion (Therapeutic Exercise)  shoulder horizontal abduction/adduction, left;elbow flexion/extension, bilateral;shoulder internal/external rotation, left;wrist flexion/extension, bilateral  -AN     Exercise Type (Therapeutic Exercise)  AROM (active range of motion);AAROM (active assistive range of motion)  -AN     Comment (Therapeutic Exercise)  no R shoulder, pt pain with elbow ROM R , AAROM R  -AN     Row Name 08/09/19 1351 08/09/19 1038       Static Sitting Balance    Level of [place] (Unsupported Sitting, Static Balance)  supervision  -AA  supervision  -AN    Sitting Position (Unsupported Sitting, Static Balance)  sitting in chair  -AA  sitting on edge of bed  -AN    Time Able to Maintain Position (Unsupported Sitting, Static Balance)  more than 5 minutes  -AA  --    Row Name 08/09/19 1351 08/09/19 1038       Dynamic Sitting Balance    Level of  [place], Reaches Outside Midline (Sitting, Dynamic Balance)  contact guard assist  -AA  supervision  -AN    Sitting Position, Reaches Outside Midline (Sitting, Dynamic Balance)  sitting in chair  -AA  sitting on edge of bed  -AN    Comment, Reaches Outside Midline (Sitting, Dynamic Balance)  --  scooting  -AN    Row Name 08/09/19 1351 08/09/19 1038       Static Standing Balance    Level of [place] (Supported Standing, Static Balance)  minimal assist, 75% patient effort  -AA  contact guard assist  -AN    Time Able to Maintain Position (Supported Standing, Static Balance)  4 to 5 minutes  -AA  --    Assistive Device Utilized (Supported Standing, Static Balance)  other (see comments) [facility]  [facility]  --    Row Name 08/09/19 1351 08/09/19 1038       Dynamic Standing Balance    Level of [place], Reaches Outside Midline (Standing, Dynamic Balance)  minimal assist, 75% patient effort;2 person assist  -AA  minimal assist, 75% patient effort  -AN    Time Able to Maintain Position, Reaches Outside Midline (Standing, Dynamic Balance)  3 to 4 minutes  -AA  --    Assistive Device Utilized (Supported Standing, Dynamic Balance)  other (see comments) [facility]  [facility]  --      User Key  (r) = Recorded By, (t) = Taken By, (c) = Cosigned By    Initials Name Provider Type    AN Bibiana Lion, OT Occupational Therapist    AA Mere Ramon, PT Physical Therapist        Goals/Plan    No documentation.       Clinical Impression     Row Name 08/09/19 1352          Pain Assessment    Additional Documentation  Pain Scale: Numbers Pre/Post-Treatment (Group)  -AA     Row Name 08/09/19 1352 08/09/19 1038       Pain Scale: Numbers Pre/Post-Treatment    Pain Scale: Numbers, Pretreatment  4/10  -AA  --    Pain Scale: Numbers, Post-Treatment  4/10  -AA  --    Pain Location - Side  Right  -AA  Right  -AN    Pain Location - Orientation  generalized  -  --    Pain Location  shoulder  -AA  shoulder  -AN    Pain Intervention(s)   Repositioned;Ambulation/increased activity  -AA  --    Row Name 08/09/19 1038          Pain Scale: FACES Pre/Post-Treatment    Pain: FACES Scale, Pretreatment  4-->hurts little more  -AN     Pain: FACES Scale, Post-Treatment  4-->hurts little more  -AN     Row Name 08/09/19 1355 08/09/19 1352       Plan of Care Review    Plan of Care Reviewed With  patient;family  -AA  patient;family  -AA    Row Name 08/09/19 1200 08/09/19 1038       Plan of Care Review    Plan of Care Reviewed With  patient;family  -SB  patient;family  -AN    Row Name 08/09/19 1000 08/09/19 0800       Plan of Care Review    Plan of Care Reviewed With  patient;family  -SB  patient;family  -SB    Row Name 08/09/19 1352          Physical Therapy Clinical Impression    Criteria for Skilled Interventions Met (PT Clinical Impression)  yes  -AA     Rehab Potential (PT Clinical Summary)  good, to achieve stated therapy goals  -AA     Row Name 08/09/19 1352 08/09/19 1038       Vital Signs    Pre Systolic BP Rehab  --  110  -AN    Pre Treatment Diastolic BP  --  53  -AN    Intra Systolic BP Rehab  --  116  -AN    Intra Treatment Diastolic BP  --  66  -AN    Pre Patient Position  Sitting  -AA  --    Intra Patient Position  Standing  -AA  --    Post Patient Position  Sitting  -AA  --    [facility] Name 08/09/19 1352          Positioning and Restraints    Pre-Treatment Position  sitting in chair/recliner  -AA     Post Treatment Position  chair  -AA     In Chair  notified nsg;exit alarm on;waffle cushion;legs elevated;with family/caregiver;on mechanical lift sling;RLE elevated;with brace;call light within reach;encouraged to call for assist  -AA       User Key  (r) = Recorded By, (t) = Taken By, (c) = Cosigned By    Initials Name Provider Type    AN Bibiana Lion, OT Occupational Therapist    Mere Mullins, PT Physical Therapist    SB Suzan Courtney, RN Registered Nurse        Outcome Measures     [facility] Name 08/09/19 0800          How much help from another  person do you currently need...    Turning from your back to your side while in flat bed without using bedrails?  3  -SB     Moving from lying on back to sitting on the side of a flat bed without bedrails?  3  -SB     Moving to and from a bed to a chair (including a wheelchair)?  3  -SB     Standing up from a chair using your arms (e.g., wheelchair, bedside chair)?  3  -SB     Climbing 3-5 steps with a railing?  2  -SB     To walk in hospital room?  2  -SB     AM-PAC 6 Clicks Score (PT)  16  -SB     Row Name 08/09/19 1038          Modified [place]rick Scale    Modified [place] Scale  4 - Moderately severe disability.  Unable to walk without assistance, and unable to attend to own bodily needs without assistance.  -AN       User Key  (r) = Recorded By, (t) = Taken By, (c) = Cosigned By    Initials Name Provider Type    Bibiana Barrera OT Occupational Therapist    Suzan Lu RN Registered Nurse        Physical Therapy Education     Title: PT OT SLP Therapies (Done)     Topic: Physical Therapy (Done)     Point: Mobility training (Done)     Learning Progress Summary           Patient Eager, E,D, VU,NR by AA at 8/9/2019  1:54 PM    Eager, E, VU by KINGSLEY at 8/9/2019  2:38 AM    Acceptance, E,TB, VU,NR by JUSTIN at 8/7/2019  3:30 PM    Acceptance, E, VU,NR by JUSTIN at 8/6/2019 11:35 AM    Acceptance, E,TB, VU,NR by JUSTIN at 8/6/2019  9:36 AM    Acceptance, E, VU,NR by EJ at 8/5/2019 10:14 AM    Acceptance, E,TB,D, VU,NR by MICKEY at 8/4/2019 10:20 AM    Comment:  Educated patient on safety with sit<> stand and ambulation. Educated patient to increase stride length with steps.    Acceptance, E,TB, VU,DU by CS at 8/3/2019  4:41 PM    Comment:  Educated patient on transfer technique using sheet to sit patient up in bed. Educated patient and family on sling.    Acceptance, E, VU,NR by CD at 8/2/2019  3:49 PM    Comment:  SEE FLOW SHEET. SON INSTRUCTED IN LE THER EX, POSITIONING FOR COMFORT.,    DEISY Weaver VU by KM at 8/1/2019 10:20 AM     Eager, E,D, VU,NR by AA at 7/31/2019  2:46 PM    Eager, E, NR by AA at 7/30/2019  3:17 PM    Acceptance, E, VU,NR by JUSTIN at 7/29/2019 11:04 AM    Acceptance, E, VU,NR by CD at 7/25/2019  4:42 PM    Comment:  DTR INSTRUCTED IN ROM EXERCISES, POSITIONING FOR COMFORT/ PROTECTION OF R UE, BENEFITS OF OOB ACTIVITY, D/C PLANNING,    Acceptance, E, VU,NR by CD at 7/24/2019 10:36 AM    Comment:  SEE FLOWSHEET.   Family Eager, E,D, VU,NR by AA at 8/9/2019  1:54 PM    Acceptance, E,TB, VU,NR by JUSTIN at 8/7/2019  3:30 PM    Acceptance, E,TB, VU,NR by JUSTIN at 8/6/2019  9:36 AM    Acceptance, E,TB,D, VU,NR by MICKEY at 8/4/2019 10:20 AM    Comment:  Educated patient on safety with sit<> stand and ambulation. Educated patient to increase stride length with steps.    Acceptance, E,TB, VU,DU by CS at 8/3/2019  4:41 PM    Comment:  Educated patient on transfer technique using sheet to sit patient up in bed. Educated patient and family on sling.    Acceptance, E, VU,NR by CD at 8/2/2019  3:49 PM    Comment:  SEE FLOW SHEET. SON INSTRUCTED IN LE THER EX, POSITIONING FOR COMFORT.,    Eager, E,D, VU,NR by AA at 7/31/2019  2:46 PM    Acceptance, E, VU,NR by CD at 7/25/2019  4:42 PM    Comment:  DTR INSTRUCTED IN ROM EXERCISES, POSITIONING FOR COMFORT/ PROTECTION OF R UE, BENEFITS OF OOB ACTIVITY, D/C PLANNING,                   Point: Home exercise program (Done)     Learning Progress Summary           Patient Eager, E, VU by KINGSLEY at 8/9/2019  2:38 AM    Acceptance, E,TB, VU,NR by JUSTIN at 8/7/2019  3:30 PM    Acceptance, E, VU,NR by JUSTIN at 8/6/2019 11:35 AM    Acceptance, E,TB, VU,NR by JUSTIN at 8/6/2019  9:36 AM    Acceptance, E, VU,NR by JUSTIN at 8/5/2019 10:14 AM    Acceptance, E,RENEA,TUSHAR, MARITZA,NR by CS at 8/4/2019 10:20 AM    Comment:  Educated patient on safety with sit<> stand and ambulation. Educated patient to increase stride length with steps.    Acceptance, E,RENEA, MARITZA,DU by CS at 8/3/2019  4:41 PM    Comment:  Educated patient on transfer technique  using sheet to sit patient up in bed. Educated patient and family on sling.    Acceptance, E, VU,NR by CD at 8/2/2019  3:49 PM    Comment:  SEE FLOW SHEET. SON INSTRUCTED IN LE THER EX, POSITIONING FOR COMFORT.,    Eager, E, VU by KM at 8/1/2019 10:20 AM    Acceptance, E, VU,NR by EJ at 7/29/2019 11:04 AM    Acceptance, E, VU,NR by CD at 7/25/2019  4:42 PM    Comment:  DTR INSTRUCTED IN ROM EXERCISES, POSITIONING FOR COMFORT/ PROTECTION OF R UE, BENEFITS OF OOB ACTIVITY, D/C PLANNING,    Acceptance, E, VU,NR by CD at 7/24/2019 10:36 AM    Comment:  SEE FLOWSHEET.   Family Acceptance, E,TB, VU,NR by EJ at 8/7/2019  3:30 PM    Acceptance, E,TB, VU,NR by EJ at 8/6/2019  9:36 AM    Acceptance, E,TB,D, VU,NR by CS at 8/4/2019 10:20 AM    Comment:  Educated patient on safety with sit<> stand and ambulation. Educated patient to increase stride length with steps.    Acceptance, E,TB, VU,DU by CS at 8/3/2019  4:41 PM    Comment:  Educated patient on transfer technique using sheet to sit patient up in bed. Educated patient and family on sling.    Acceptance, E, VU,NR by CD at 8/2/2019  3:49 PM    Comment:  SEE FLOW SHEET. SON INSTRUCTED IN LE THER EX, POSITIONING FOR COMFORT.,    Acceptance, E, VU,NR by CD at 7/25/2019  4:42 PM    Comment:  DTR INSTRUCTED IN ROM EXERCISES, POSITIONING FOR COMFORT/ PROTECTION OF R UE, BENEFITS OF OOB ACTIVITY, D/C PLANNING,                   Point: Body mechanics (Done)     Learning Progress Summary           Patient Eager, E,D, VU,NR by AA at 8/9/2019  1:54 PM    Eager, E, VU by CJ at 8/9/2019  2:38 AM    Acceptance, E,TB, VU,NR by EJ at 8/7/2019  3:30 PM    Acceptance, E, VU,NR by EJ at 8/6/2019 11:35 AM    Acceptance, E,TB, VU,NR by EJ at 8/6/2019  9:36 AM    Acceptance, E, VU,NR by EJ at 8/5/2019 10:14 AM    Acceptance, DEISY,RENEA,TUSHAR, MARITZA,NR by MICKEY at 8/4/2019 10:20 AM    Comment:  Educated patient on safety with sit<> stand and ambulation. Educated patient to increase stride length with steps.     Acceptance, E,TB, VU,DU by CS at 8/3/2019  4:41 PM    Comment:  Educated patient on transfer technique using sheet to sit patient up in bed. Educated patient and family on sling.    Acceptance, E, VU,NR by CD at 8/2/2019  3:49 PM    Comment:  SEE FLOW SHEET. SON INSTRUCTED IN LE THER EX, POSITIONING FOR COMFORT.,    Eager, E, VU by TANK at 8/1/2019 10:20 AM    Eager, E,D, VU,NR by AA at 7/31/2019  2:46 PM    Eager, E, NR by AA at 7/30/2019  3:17 PM    Acceptance, E, VU,NR by JUSTIN at 7/29/2019 11:04 AM    Acceptance, E, VU,NR by CD at 7/25/2019  4:42 PM    Comment:  DTR INSTRUCTED IN ROM EXERCISES, POSITIONING FOR COMFORT/ PROTECTION OF R UE, BENEFITS OF OOB ACTIVITY, D/C PLANNING,    Acceptance, E, VU,NR by CD at 7/24/2019 10:36 AM    Comment:  SEE FLOWSHEET.   Family Eager, E,D, VU,NR by AA at 8/9/2019  1:54 PM    Acceptance, E,TB, VU,NR by JUSTIN at 8/7/2019  3:30 PM    Acceptance, E,TB, VU,NR by JUSTIN at 8/6/2019  9:36 AM    Acceptance, E,TB,D, VU,NR by MICKEY at 8/4/2019 10:20 AM    Comment:  Educated patient on safety with sit<> stand and ambulation. Educated patient to increase stride length with steps.    Acceptance, E,TB, VU,DU by CS at 8/3/2019  4:41 PM    Comment:  Educated patient on transfer technique using sheet to sit patient up in bed. Educated patient and family on sling.    Acceptance, E, VU,NR by CD at 8/2/2019  3:49 PM    Comment:  SEE FLOW SHEET. SON INSTRUCTED IN LE THER EX, POSITIONING FOR COMFORT.,    Eager, E,D, VU,NR by AA at 7/31/2019  2:46 PM    Acceptance, E, VU,NR by CD at 7/25/2019  4:42 PM    Comment:  DTR INSTRUCTED IN ROM EXERCISES, POSITIONING FOR COMFORT/ PROTECTION OF R UE, BENEFITS OF OOB ACTIVITY, D/C PLANNING,                   Point: Precautions (Done)     Learning Progress Summary           Patient Eager, E,D, VU,NR by BRANDON at 8/9/2019  1:54 PM    DEISY Weaver VU by KINGSLEY at 8/9/2019  2:38 AM    DEISY Lawson TB, VU, NR by JUSTIN at 8/7/2019  3:30 PM    DEISY Lawson VU, NR by JUSTIN at 8/6/2019 11:35 AM     Acceptance, E,TB, VU,NR by EJ at 8/6/2019  9:36 AM    Acceptance, E, VU,NR by EJ at 8/5/2019 10:14 AM    Acceptance, E,TB,D, VU,NR by CS at 8/4/2019 10:20 AM    Comment:  Educated patient on safety with sit<> stand and ambulation. Educated patient to increase stride length with steps.    Acceptance, E,TB, VU,DU by CS at 8/3/2019  4:41 PM    Comment:  Educated patient on transfer technique using sheet to sit patient up in bed. Educated patient and family on sling.    Acceptance, E, VU,NR by CD at 8/2/2019  3:49 PM    Comment:  SEE FLOW SHEET. SON INSTRUCTED IN LE THER EX, POSITIONING FOR COMFORT.,    Eager, E, VU by KM at 8/1/2019 10:20 AM    Eager, E,D, VU,NR by AA at 7/31/2019  2:46 PM    Eager, E, NR by AA at 7/30/2019  3:17 PM    Acceptance, E, VU,NR by EJ at 7/29/2019 11:04 AM    Acceptance, E, VU,NR by CD at 7/25/2019  4:42 PM    Comment:  DTR INSTRUCTED IN ROM EXERCISES, POSITIONING FOR COMFORT/ PROTECTION OF R UE, BENEFITS OF OOB ACTIVITY, D/C PLANNING,    Acceptance, E, VU,NR by CD at 7/24/2019 10:36 AM    Comment:  SEE FLOWSHEET.   Family Eager, E,D, VU,NR by AA at 8/9/2019  1:54 PM    Acceptance, E,TB, VU,NR by JUSTIN at 8/7/2019  3:30 PM    Acceptance, E,TB, VU,NR by EJ at 8/6/2019  9:36 AM    Acceptance, E,TB,D, VU,NR by MICKEY at 8/4/2019 10:20 AM    Comment:  Educated patient on safety with sit<> stand and ambulation. Educated patient to increase stride length with steps.    Acceptance, E,TB, VU,DU by MICKEY at 8/3/2019  4:41 PM    Comment:  Educated patient on transfer technique using sheet to sit patient up in bed. Educated patient and family on sling.    Acceptance, E, VU,NR by CD at 8/2/2019  3:49 PM    Comment:  SEE FLOW SHEET. SON INSTRUCTED IN LE THER EX, POSITIONING FOR COMFORT.,    DEISY Weaver D, VU,NR by AA at 7/31/2019  2:46 PM    Renny, MARITZA OLIVASNR by CD at 7/25/2019  4:42 PM    Comment:  DTR INSTRUCTED IN ROM EXERCISES, POSITIONING FOR COMFORT/ PROTECTION OF R UE, BENEFITS OF OOB ACTIVITY, D/C  PLANNING,                               User Key     Initials Effective Dates Name Provider Type Discipline    CD 06/19/15 -  Gogo Thibodeaux, PT Physical Therapist PT    EJ 11/20/18 -  Nurys Chahal, PT Physical Therapist PT    KM 06/19/15 -  Denise Hdz, PT Physical Therapist PT    AA 04/02/18 -  Mere Ramon, PT Physical Therapist PT    CJ 09/12/18 -  Daksha Chahal RN Registered Nurse Nurse    CS 03/26/19 -  Roz Phoenix PT Physical Therapist PT              PT Recommendation and Plan     Outcome Summary/Treatment Plan (PT)  Daily Summary of Progress (PT): progress toward functional goals as expected  Anticipated Discharge Disposition (PT): inpatient rehabilitation facility  Plan of Care Reviewed With: patient, family  Progress: improving  Outcome Summary: Pt ambulated 150 feet x2 with chair follow, min A of 2, and LUE HHA.  Pt require cues for proper stepping and sequencing.  Transfer min A.  No dizziness with standing or ambulation.  Sling on RUE during all mobility.  Recommend inpatient rehab upon DC     Time Calculation:   PT Charges     Row Name 08/09/19 1356             Time Calculation    Start Time  1305  -AA      PT Received On  08/09/19  -AA      PT Goal Re-Cert Due Date  08/15/19  -AA         Time Calculation- PT    Total Timed Code Minutes- PT  25 minute(s)  -AA         Timed Charges    82823 - Gait Training Minutes   25  -AA        User Key  (r) = Recorded By, (t) = Taken By, (c) = Cosigned By    Initials Name Provider Type    AA Mere Ramon, PT Physical Therapist        Therapy Charges for Today     Code Description Service Date Service Provider Modifiers Qty    [id number] HC GAIT TRAINING EA 15 MIN 8/9/2019 Mere Ramon, PT GP 2          PT G-Codes  Outcome Measure Options: AM-PAC 6 Clicks Basic Mobility (PT)  AM-PAC 6 Clicks Score (PT): 16  AM-PAC 6 Clicks Score (OT): 12  Modified [place] Scale: 4 - Moderately severe disability.  Unable to walk without assistance,  and unable to attend to own bodily needs without assistance.    Mere Ramon, PT  8/9/2019

## 2019-08-09 NOTE — PLAN OF CARE
Problem: Fall Risk (Adult)  Goal: Identify Related Risk Factors and Signs and Symptoms  Outcome: Ongoing (interventions implemented as appropriate)   08/08/19 1652   Fall Risk (Adult)   Related Risk Factors (Fall Risk) age-related changes;fatigue/slow reaction;gait/mobility problems;environment unfamiliar;slippery/uneven surfaces   Signs and Symptoms (Fall Risk) presence of risk factors       Problem: Pain, Chronic (Adult)  Goal: Acceptable Pain/Comfort Level and Functional Ability  Outcome: Ongoing (interventions implemented as appropriate)   08/09/19 0241   Pain, Chronic (Adult)   Acceptable Pain/Comfort Level and Functional Ability making progress toward outcome       Problem: Skin Injury Risk (Adult)  Goal: Skin Health and Integrity  Outcome: Ongoing (interventions implemented as appropriate)   08/09/19 0241   Skin Injury Risk (Adult)   Skin Health and Integrity making progress toward outcome       Problem: Confusion, Chronic (Adult)  Goal: Cognitive/Functional Impairments Minimized  Outcome: Ongoing (interventions implemented as appropriate)   08/09/19 0241   Confusion, Chronic (Adult)   Cognitive/Functional Impairments Minimized making progress toward outcome     Goal: Free from Harm/Injuries  Outcome: Outcome(s) achieved Date Met: 08/09/19 08/05/19 0523   Confusion, Chronic (Adult)   Free from Harm/Injuries making progress toward outcome       Problem: Pain, Acute (Adult)  Goal: Acceptable Pain Control/Comfort Level  Outcome: Ongoing (interventions implemented as appropriate)   08/05/19 0523   Pain, Acute (Adult)   Acceptable Pain Control/Comfort Level making progress toward outcome

## 2019-08-09 NOTE — PROGRESS NOTES
Continued Stay Note  [facility]     Patient Name: Vargas De  MRN: [id number]  Today's Date: 8/9/2019    Admit Date: 7/23/2019    Discharge Plan     Row Name 08/09/19 1108       Plan    Plan  Inpatient rehab    Patient/Family in Agreement with Plan  yes    Plan Comments  Spoke with Jasbir at [facility] Family appeals line.  He has verified that he has received all clinicals including appointment of representative form on 8/7 with reference number [id number].  Appointment of representative form has been refaxed today to number [phone], and [facility] has been updated.  They state that form can take 24-48 hours to populate into system, but they have documented in patient chart that it has been refaxed along with case management number [id number] for reference.  Call number is [phone] for future referrals to this conversation.  Case management will continue to follow for discharge planning and pending family appeal.      Final Discharge Disposition Code  62 - inpatient rehab facility        Discharge Codes    No documentation.       Expected Discharge Date and Time     Expected Discharge Date Expected Discharge Time    Aug 12, 2019             Olesya Barton RN

## 2019-08-09 NOTE — THERAPY TREATMENT NOTE
Acute Care - Occupational Therapy Treatment Note  [facility]     Patient Name: Vargas De  : 1940  MRN: [id number]  Today's Date: 2019  Onset of Illness/Injury or Date of Surgery: 19  Date of Referral to OT: 19       Admit Date: 2019       ICD-10-CM ICD-9-CM   1. Acute alteration in mental status R41.82 780.97   2. Generalized weakness R53.1 780.79   3. Other closed displaced fracture of proximal end of right humerus with routine healing, subsequent encounter S42.291D V54.11   4. Elevated blood pressure reading with diagnosis of hypertension I10 401.9   5. Impaired functional mobility, balance, gait, and endurance Z74.09 V49.89   6. Impaired mobility and ADLs Z74.09 799.89   7. Cognitive communication deficit R41.841 799.52   8. Oropharyngeal dysphagia R13.12 787.22     Patient Active Problem List   Diagnosis   • Peripheral neuropathy   • Essential hypertension   • Crohn's colitis, with rectal bleeding (CMS/HCC)   • Major depressive disorder with single episode, in partial remission (CMS/HCC)   • Macular degeneration of both eyes   • GERD without esophagitis   • Cervicalgia   • Spinal stenosis in cervical region   • Chronic right-sided low back pain without sciatica   • Osteoarthritis of toe joint, right   • PFO (patent foramen ovale)   • Ulcerative colitis (CMS/HCC)   • Body mass index (BMI) of 25.0 to 29.9   • History of stroke with residual deficit   • Mixed hyperlipidemia   • Multiple lacunar infarcts   • Paroxysmal atrial fibrillation (CMS/HCC)   • Chronic bilateral low back pain   • Acute alteration in mental status   • Fracture of proximal end of right humerus   • Acute respiratory distress   • Lewy body dementia     Past Medical History:   Diagnosis Date   • Allergic arthritis    • Arthritis    • BPH (benign prostatic hyperplasia)      urology   • BPH/nocturia/microhematuria (work up negative)    • chronic hearing loss left ear    • Crohn's colitis (CMS/HCC)     on  colonoscopy 7/15.2017.  [facility]-continue Lialda   • Depression     medication, after death of wife 2014   • Elbow pain 7/25/2018   • Gout    • Hearing loss     left ear   • History of stroke 7/23/2018   • Hyperlipidemia    • Hypertension    • Ingrown toenail 03/18/2018   • Low back pain    • Macular degeneration    • Macular degeneration    • Multiple lacunar infarcts     noted on CT 2/2019   • Neck pain    • Rash 1/28/2019   • Rib pain on left side 07/11/2017   • Septic bursitis of elbow, right 7/23/2018   • Spinal stenosis     cervical and lumbosacral spine   • Spinal stenosis-cervical and lumbosacral spine    • Stroke (CMS/[facility])     aspirin, blood pressure conyrol.  Infarct frontal lobe.  R sided weakness, R hand and R foot numbness,dysphasia with stuttering.   • Stroke (CMS/HCC) 02/27/2019    MCA infarct with L facial droop and L sided weakness,dysphasia,dysarthria   • TIA (transient ischemic attack) 2/27/2019   • Ulcerative colitis (CMS/[facility])    • Urinary frequency      Past Surgical History:   Procedure Laterality Date   • CATARACT EXTRACTION     • COLONOSCOPY  07/15/2017   • HEMORRHOIDECTOMY     • HERNIA REPAIR     • KNEE SURGERY     • TONSILLECTOMY         Therapy Treatment    Rehabilitation Treatment Summary     Row Name 08/09/19 1038             Treatment Time/Intention    Discipline  occupational therapist  -AN      Document Type  therapy note (daily note)  -AN      Subjective Information  complains of;fatigue;pain  -AN      Mode of Treatment  occupational therapy  -AN      Patient/Family Observations  pt in bed, family present  -AN      Care Plan Review  care plan/treatment goals reviewed;risks/benefits reviewed  -AN      Patient Effort  good  -AN      Comment  pt continues to c/o numbness in digits, sling adjusted and continued education with family on ROM ex provided.  -AN      Existing Precautions/Restrictions  fall  -AN      Treatment Considerations/Comments  R humeral fx, no shoulder ROM R  -AN       Recorded by [AN] Bibiana Lion, OT 08/09/19 1121      Row Name 08/09/19 1038             Vital Signs    Pre Systolic BP Rehab  110  -AN      Pre Treatment Diastolic BP  53  -AN      Intra Systolic BP Rehab  116  -AN2      Intra Treatment Diastolic BP  66  -AN2      Recorded by [AN] Bibiana Lion, OT 08/09/19 1121  [AN2] Bibiana Lion, OT 08/09/19 1126      Row Name 08/09/19 1038             Cognitive Assessment/Intervention- PT/OT    Affect/Mental Status (Cognitive)  WFL  -AN      Orientation Status (Cognition)  oriented x 3  -AN      Follows Commands (Cognition)  follows one step commands;over 90% accuracy;delayed response/completion  -AN      Personal Safety Interventions  fall prevention program maintained  -AN      Recorded by [AN] Bibiana Lion, OT 08/09/19 1126      Row Name 08/09/19 1038             Bed Mobility Assessment/Treatment    Supine-Sit [place] (Bed Mobility)  minimum assist (75% patient effort)  -AN      Recorded by [AN] Bibiana Lion, OT 08/09/19 1126      Row Name 08/09/19 1038             Functional Mobility    Functional Mobility- Ind. Level  minimum assist (75% patient effort);verbal cues required;2 person assist required  -AN      Functional Mobility-Distance (Feet)  10  -AN      Functional Mobility- Comment  shuffling steps, unsteady  -AN      Recorded by [AN] Bibiana Lion, OT 08/09/19 1126      Row Name 08/09/19 1038             Transfer Assessment/Treatment    Transfer Assessment/Treatment  bed-chair transfer  -AN      Recorded by [AN] Bibiana Lion, OT 08/09/19 1126      Row Name 08/09/19 1038             Bed-Chair Transfer    Bed-Chair [place] (Transfers)  verbal cues;minimum assist (75% patient effort)  -AN      Recorded by [AN] Bibiana Lion, OT 08/09/19 1126      Row Name 08/09/19 1038             Sit-Stand Transfer    Sit-Stand [place] (Transfers)  minimum assist (75% patient effort)  -AN      Recorded by [AN] Bibiana Lion, OT 08/09/19 1126      Row Name  08/09/19 1038             Stand-Sit Transfer    Stand-Sit [place] (Transfers)  verbal cues;contact guard  -AN      Recorded by [AN] Bibiana Lion OT 08/09/19 1126      Row Name 08/09/19 1038             Lower Body Dressing Assessment/Training    Lower Body Dressing [place] Level  don;pants/bottoms;maximum assist (25% patient effort)  -AN      Comment (Lower Body Dressing)  pt able to assist with pull up L LE  -AN      Recorded by [AN] Bibiana Lion, OT 08/09/19 1126      Row Name 08/09/19 1038             Therapeutic Exercise    06710 - OT Therapeutic Exercise Minutes  8  -AN      26018 - OT Therapeutic Activity Minutes  31  -AN      Recorded by [AN] Bibiana Lion, OT 08/09/19 1126      Row Name 08/09/19 1038             Therapeutic Exercise    Upper Extremity Range of Motion (Therapeutic Exercise)  shoulder horizontal abduction/adduction, left;elbow flexion/extension, bilateral;shoulder internal/external rotation, left;wrist flexion/extension, bilateral  -AN      Exercise Type (Therapeutic Exercise)  AROM (active range of motion);AAROM (active assistive range of motion)  -AN      Comment (Therapeutic Exercise)  no R shoulder, pt pain with elbow ROM R , AAROM R  -AN      Recorded by [AN] Bibiana Lion OT 08/09/19 1126      Row Name 08/09/19 1038             Static Sitting Balance    Level of [place] (Unsupported Sitting, Static Balance)  supervision  -AN      Sitting Position (Unsupported Sitting, Static Balance)  sitting on edge of bed  -AN      Recorded by [AN] Bibiana Lion OT 08/09/19 1126      Row Name 08/09/19 1038             Dynamic Sitting Balance    Level of [place], Reaches Outside Midline (Sitting, Dynamic Balance)  supervision  -AN      Sitting Position, Reaches Outside Midline (Sitting, Dynamic Balance)  sitting on edge of bed  -AN      Comment, Reaches Outside Midline (Sitting, Dynamic Balance)  scooting  -AN      Recorded by [AN] Bibiana Lion OT 08/09/19 1126      Row  Name 08/09/19 1038             Static Standing Balance    Level of [place] (Supported Standing, Static Balance)  contact guard assist  -AN      Recorded by [AN] Bibiana Lion, OT 08/09/19 1126      Row Name 08/09/19 1038             Dynamic Standing Balance    Level of [place], Reaches Outside Midline (Standing, Dynamic Balance)  minimal assist, 75% patient effort  -AN      Recorded by [AN] Bibiana Lion, OT 08/09/19 1126      Row Name 08/09/19 1038             Pain Scale: Numbers Pre/Post-Treatment    Pain Location - Side  Right  -AN      Pain Location  shoulder  -AN      Recorded by [AN] Bibiana Lion, OT 08/09/19 1126      Row Name 08/09/19 1038             Pain Scale: FACES Pre/Post-Treatment    Pain: FACES Scale, Pretreatment  4-->hurts little more  -AN      Pain: FACES Scale, Post-Treatment  4-->hurts little more  -AN      Recorded by [AN] Bibiana Lion, OT 08/09/19 1126      Row Name 08/09/19 1038             Upper Extremity Orthosis Management    Orthosis Training (Upper Extremity Orthosis)  patient and caregiver;orthosis adjustment;skin inspection/precautions;wearing schedule  -AN      Adjustment Needed/Outcome (Upper Extremity Orthosis)  adjustment completed;reddened area on skin noted;other (see comments);positioning strategies implemented numbness ulnar nerve digits, pressure radial  -AN      Recorded by [AN] Bibiana Lion, OT 08/09/19 1126      Row Name                Wound 07/20/19 1649 Left hand skin tear    Wound - Properties Group Date first assessed: 07/20/19 [SW] Time first assessed: 1649 [SW] Side: Left [SW] Location: hand [SW] Present On Admission : yes [SW] Type: skin tear [SW] Recorded by:  [SW] Odilia Joy RN 07/20/19 1649    Row Name 08/09/19 1038             Plan of Care Review    Plan of Care Reviewed With  patient;family  -AN      Recorded by [AN] Bibiana Lion, OT 08/09/19 1126      Row Name 08/09/19 1038             Outcome Summary/Treatment Plan (OT)    Daily  Summary of Progress (OT)  progress toward functional goals as expected  -AN      Anticipated Discharge Disposition (OT)  inpatient rehabilitation facility  -AN      Recorded by [AN] Bibiana Lion OT 08/09/19 1126        User Key  (r) = Recorded By, (t) = Taken By, (c) = Cosigned By    Initials Name Effective Dates Discipline    AN Bibiana Lion OT 06/22/15 -  OT    Odilia Gordillo RN 06/16/16 -  Nurse        Wound 07/20/19 8023 Left hand skin tear (Active)   Dressing Appearance dry;intact 8/9/2019  8:00 AM   Closure Open to air 8/9/2019  8:00 AM   Base scab 8/8/2019  8:00 PM   Periwound pink 8/8/2019  8:00 PM   Periwound Temperature warm 8/8/2019  8:00 PM   Periwound Skin Turgor soft 8/8/2019  8:00 PM   Edges irregular 8/8/2019  8:00 PM   Drainage Amount none 8/8/2019  8:00 PM   Dressing Care, Wound open to air 8/9/2019  8:00 AM   Periwound Care, Wound dry periwound area maintained 8/8/2019  8:00 PM       Occupational Therapy Education     Title: PT OT SLP Therapies (Done)     Topic: Occupational Therapy (Done)     Point: ADL training (Done)     Description: Instruct learner(s) on proper safety adaptation and remediation techniques during self care or transfers.   Instruct in proper use of assistive devices.    Learning Progress Summary           Patient Acceptance, E,D, DU,VU,NR by WANDA at 8/9/2019 10:38 AM    Comment:  Reviewed sling adjustments, ROM ex, skin inspections, sensory questions, support for R UE and restricitons.    Eager, E, VU by KINGSLEY at 8/9/2019  2:38 AM    Acceptance, E,TB, VU,NR by JUSTIN at 8/7/2019  3:30 PM    Acceptance, E, VU,NR by WANDA at 8/7/2019  1:15 PM    Comment:  ADL retraining with increased standing.    Acceptance, E,D, VU,NR by WANDA at 8/6/2019  2:00 PM    Comment:  Reviewed sling and UE precautions.    Acceptance, E, VU,NR by JUSTIN at 8/6/2019 11:35 AM    Acceptance, E,TB, VU,NR by JUSTIN at 8/6/2019  9:36 AM    DEISY Weaver TB, D,H, RASHAUN,MARITZA by AR at 8/5/2019  5:05 PM    DEISY Lawson D, VU,RASHAUN by  WANDA at 8/1/2019  2:40 PM    Comment:  Educated on new sling management, reviewed R UE protection.    Acceptance, E,H, VU,NR by AN at 7/26/2019  2:55 PM    Comment:  Educated on R UE AROM ex and shoulder restrictions.    Acceptance, E,D, VU,NR by AN at 7/24/2019  9:02 AM    Comment:  Current deficits, OT role and discharge.   Family Acceptance, E,D, DU,VU,NR by WANDA at 8/9/2019 10:38 AM    Comment:  Reviewed sling adjustments, ROM ex, skin inspections, sensory questions, support for R UE and restricitons.    Acceptance, E,TB, VU,NR by JUSTIN at 8/7/2019  3:30 PM    Acceptance, E, VU,NR by WANDA at 8/7/2019  1:15 PM    Comment:  ADL retraining with increased standing.    Acceptance, E,D, VU,NR by WANDA at 8/6/2019  2:00 PM    Comment:  Reviewed sling and UE precautions.    Acceptance, E,TB, VU,NR by JUSTIN at 8/6/2019  9:36 AM    Eager, E,TB,D,H, NR,VU by AR at 8/5/2019  5:05 PM    Acceptance, E,H, VU,NR by AN at 7/26/2019  2:55 PM    Comment:  Educated on R UE AROM ex and shoulder restrictions.    Acceptance, E,D, VU,NR by AN at 7/24/2019  9:02 AM    Comment:  Current deficits, OT role and discharge.                   Point: Home exercise program (Done)     Description: Instruct learner(s) on appropriate technique for monitoring, assisting and/or progressing therapeutic exercises/activities.    Learning Progress Summary           Patient Acceptance, E,D, DU,VU,NR by WANDA at 8/9/2019 10:38 AM    Comment:  Reviewed sling adjustments, ROM ex, skin inspections, sensory questions, support for R UE and restricitons.    Eager, E, VU by KINGSLEY at 8/9/2019  2:38 AM    Acceptance, E,TB, VU,NR by JUSTIN at 8/7/2019  3:30 PM    Acceptance, E, VU,NR by JUSTIN at 8/6/2019 11:35 AM    Acceptance, E,TB, VU,NR by JUSTIN at 8/6/2019  9:36 AM    Eager, E,TB,D,H, NR,MARITZA by AR at 8/5/2019  5:05 PM    Acceptance, TUSHAR OLIVAS VU,RASHAUN by AN at 8/1/2019  2:40 PM    Comment:  Educated on new sling management, reviewed R UE protection.    Renny, DEISY FINLEY VU,RASHAUN by AN at 7/30/2019  2:38  PM    Comment:  Educated on R UE elbow, wrist, hand ROM; protect R UE by elevation and sling tightened while sleeping.    Acceptance, E,H, VU,NR by AN at 7/26/2019  2:55 PM    Comment:  Educated on R UE AROM ex and shoulder restrictions.   Family Acceptance, E,D, DU,VU,NR by WANDA at 8/9/2019 10:38 AM    Comment:  Reviewed sling adjustments, ROM ex, skin inspections, sensory questions, support for R UE and restricitons.    Acceptance, E,TB, VU,NR by JUSTIN at 8/7/2019  3:30 PM    Acceptance, E,TB, VU,NR by JUSTIN at 8/6/2019  9:36 AM    Eager, E,TB,D,H, NR,VU by AR at 8/5/2019  5:05 PM    Acceptance, D,E, VU,NR by AN at 7/30/2019  2:38 PM    Comment:  Educated on R UE elbow, wrist, hand ROM; protect R UE by elevation and sling tightened while sleeping.    Acceptance, E,H, VU,NR by AN at 7/26/2019  2:55 PM    Comment:  Educated on R UE AROM ex and shoulder restrictions.                   Point: Precautions (Done)     Description: Instruct learner(s) on prescribed precautions during self-care and functional transfers.    Learning Progress Summary           Patient Eager, E, VU by KINGSLEY at 8/9/2019  2:38 AM    Acceptance, E,TB, VU,NR by JUSTIN at 8/7/2019  3:30 PM    Acceptance, E, VU,NR by JUSTIN at 8/6/2019 11:35 AM    Acceptance, E,TB, VU,NR by JUSTIN at 8/6/2019  9:36 AM    Eager, E,TB,D,H, NR,VU by AR at 8/5/2019  5:05 PM   Family Acceptance, E,TB, VU,NR by JUSTIN at 8/7/2019  3:30 PM    Acceptance, E,TB, VU,NR by JUSTIN at 8/6/2019  9:36 AM    Eager, E,TB,D,H, NR,VU by AR at 8/5/2019  5:05 PM                   Point: Body mechanics (Done)     Description: Instruct learner(s) on proper positioning and spine alignment during self-care, functional mobility activities and/or exercises.    Learning Progress Summary           Patient Eager, E, VU by KINGSLEY at 8/9/2019  2:38 AM    AcceptanceDEISY TB, MARITZA,NR by JUSTIN at 8/7/2019  3:30 PM    AcceptanceDEISY VU,NR by WANDA at 8/7/2019  1:15 PM    Comment:  ADL retraining with increased standing.    Acceptance, MARITZA OLIVAS,NR  by JUSTIN at 8/6/2019 11:35 AM    Acceptance, E,TB, VU,NR by JUSTIN at 8/6/2019  9:36 AM    Eager, E,TB,D,H, NR,VU by AR at 8/5/2019  5:05 PM   Family Acceptance, E,TB, VU,NR by JUSTIN at 8/7/2019  3:30 PM    Acceptance, E, VU,NR by AN at 8/7/2019  1:15 PM    Comment:  ADL retraining with increased standing.    Acceptance, E,TB, VU,NR by JUSTIN at 8/6/2019  9:36 AM    Eager, E,TB,D,H, NR,VU by AR at 8/5/2019  5:05 PM                               User Key     Initials Effective Dates Name Provider Type Discipline     11/20/18 -  Nurys Chahal, PT Physical Therapist PT    AN 06/22/15 -  Bibiana Lion, OT Occupational Therapist OT    AR 06/22/15 -  Jennifer Aguilar, OT Occupational Therapist OT     09/12/18 -  Daksha Chahal, RN Registered Nurse Nurse                OT Recommendation and Plan  Outcome Summary/Treatment Plan (OT)  Daily Summary of Progress (OT): progress toward functional goals as expected  Barriers to Overall Progress (OT): medical changes  Plan for Continued Treatment (OT): continue POC  Anticipated Discharge Disposition (OT): inpatient rehabilitation facility  Therapy Frequency (OT Eval): daily  Daily Summary of Progress (OT): progress toward functional goals as expected  Plan of Care Review  Plan of Care Reviewed With: patient, family  Plan of Care Reviewed With: patient, family  Outcome Summary: Adjusted sling, removed abdominal support due to increased c/o numbness in digits and appearance of pressure on anterior of R forearm. Reviewed ROM of elbow, wrist, hand with family/nursing, support for R UE with sling adjustments and skin checks. Pt reports increased comfort with changes, family verbalizes understanding of changes and R UE restricitions. Pt willing to do all tasks this date despite c/o fatigue and pain. Rest breaks during session.  Outcome Measures     Row Name 08/09/19 1038 08/08/19 1030 08/07/19 1314       How much help from another person do you currently need...    Turning from your  back to your side while in flat bed without using bedrails?  --  3  -MB  --    Moving from lying on back to sitting on the side of a flat bed without bedrails?  --  3  -MB  --    Moving to and from a bed to a chair (including a wheelchair)?  --  3  -MB  --    Standing up from a chair using your arms (e.g., wheelchair, bedside chair)?  --  3  -MB  --    Climbing 3-5 steps with a railing?  --  2  -MB  --    To walk in hospital room?  --  3  -MB  --    AM-PAC 6 Clicks Score (PT)  --  17  -MB  --       How much help from another is currently needed...    Putting on and taking off regular lower body clothing?  2  -AN  --  1  -AN    Bathing (including washing, rinsing, and drying)  2  -AN  --  2  -AN    Toileting (which includes using toilet bed pan or urinal)  2  -AN  --  2  -AN    Putting on and taking off regular upper body clothing  2  -AN  --  2  -AN    Taking care of personal grooming (such as brushing teeth)  2  -AN  --  2  -AN    Eating meals  2  -AN  --  2  -AN    AM-PAC 6 Clicks Score (OT)  12  -AN  --  11  -AN       Modified Ashe Scale    Modified Kendrick Scale  4 - Moderately severe disability.  Unable to walk without assistance, and unable to attend to own bodily needs without assistance.  -AN  --  --       Functional Assessment    Outcome Measure Options  --  AM-PAC 6 Clicks Basic Mobility (PT)  -MB  --    Row Name 08/06/19 1400             How much help from another is currently needed...    Putting on and taking off regular lower body clothing?  1  -AN      Bathing (including washing, rinsing, and drying)  2  -AN      Toileting (which includes using toilet bed pan or urinal)  2  -AN      Putting on and taking off regular upper body clothing  2  -AN      Taking care of personal grooming (such as brushing teeth)  2  -AN      Eating meals  2  -AN      AM-PAC 6 Clicks Score (OT)  11  -AN        User Key  (r) = Recorded By, (t) = Taken By, (c) = Cosigned By    Initials Name Provider Type    AN Bibiana Lion  OT Occupational Therapist    Swapna Caraballo, PT Physical Therapist           Time Calculation:   Time Calculation- OT     Row Name 08/09/19 1134 08/09/19 1038          Time Calculation- OT    OT Start Time  1038  -AN  --     Total Timed Code Minutes- OT  39 minute(s)  -AN  --     OT Received On  08/09/19  -AN  --     OT Goal Re-Cert Due Date  08/15/19  -AN  --        Timed Charges    32944 - OT Therapeutic Exercise Minutes  --  8  -AN     94222 - OT Therapeutic Activity Minutes  --  31  -AN       User Key  (r) = Recorded By, (t) = Taken By, (c) = Cosigned By    Initials Name Provider Type    AN Bibiana Lion OT Occupational Therapist        Therapy Charges for Today     Code Description Service Date Service Provider Modifiers Qty    12482540398 HC OT THER PROC EA 15 MIN 8/9/2019 Bibiana Lion OT GO 1    74155452845 HC OT THERAPEUTIC ACT EA 15 MIN 8/9/2019 Bibiana Lion OT GO 2               Bibiana Lion OT  8/9/2019

## 2019-08-09 NOTE — PROGRESS NOTES
[facility] Medicine Services  PROGRESS NOTE    Patient Name: Vargas De  : 1940  MRN: [id number]    Date of Admission: 2019  Length of Stay: 17  Primary Care Physician: Saba Arrington MD    Subjective   Subjective     CC:  Hospital follow up for AMS     HPI:  In bed with family and nursing assistant at bedside.  Getting his hair washed.  He is alert and conversant this morning.  States that he slept well.  He had some abdominal cramping this morning prior to a decent bowel movement.  Reports that his right arm is hurting and he is complaining of some numbness in his right hand that extends into his fingers.  He reports that all of his fingers are numb but grabs the fourth and fifth digits.         Review of Systems  Gen- No fevers, chills  CV- No chest pain, palpitations  Resp- No cough, dyspnea  GI- No N/V/D, abd pain    Otherwise ROS is negative except as mentioned in the HPI.    Objective   Objective     Vital Signs:   Temp:  [97.6 °F (36.4 °C)-98 °F (36.7 °C)] 98 °F (36.7 °C)  Heart Rate:  [65-77] 67  Resp:  [16-18] 16  BP: ()/(56-93) 110/63  Total (NIH Stroke Scale): 9     Physical Exam:  Constitutional: No acute distress, Awake, alert, in bed getting bathed, family at bedside  HENT: NCAT, mucous membranes moist  Respiratory: Clear to auscultation bilaterally, respiratory effort normal on room air  Cardiovascular: RRR, II/VI murmurs, no rubs, or gallops, palpable pedal pulses bilaterally  Gastrointestinal: Positive bowel sounds, soft, nontender, nondistended  Musculoskeletal: no lower extremity edema noted, equal  in hands, numbness of the fingers (4th and 5th greater than 2nd and 3rd)  Skin: No rashes to exposed skin    Results Reviewed:  I have personally reviewed current lab, radiology, and data and agree.    Results from last 7 days   Lab Units 19  0651   WBC 10*3/mm3 8.68   HEMOGLOBIN g/dL 12.9*   HEMATOCRIT % 40.5   PLATELETS 10*3/mm3 324      Results from last 7 days   Lab Units 08/03/19  0651   SODIUM mmol/L 141   POTASSIUM mmol/L 3.8   CHLORIDE mmol/L 101   CO2 mmol/L 28.0   BUN mg/dL 10   CREATININE mg/dL 0.75*   GLUCOSE mg/dL 103*   CALCIUM mg/dL 9.0     Estimated Creatinine Clearance: 81.5 mL/min (A) (by C-G formula based on SCr of 0.75 mg/dL (L)).    Microbiology Results Abnormal     Procedure Component Value - Date/Time    Blood Culture - Blood, Arm, Left [[id number]] Collected:  07/27/19 0152    Lab Status:  Final result Specimen:  Blood from Arm, Left Updated:  08/01/19 0431     Blood Culture No growth at 5 days    Blood Culture - Blood, Hand, Left [[id number]] Collected:  07/27/19 0147    Lab Status:  Final result Specimen:  Blood from Hand, Left Updated:  08/01/19 0245     Blood Culture No growth at 5 days    Urine Culture - Urine, Urine, Clean Catch [[id number]]  (Normal) Collected:  07/28/19 1430    Lab Status:  Final result Specimen:  Urine, Clean Catch Updated:  07/29/19 1856     Urine Culture No growth    Beta Strep Culture, Throat - Swab, Throat [[id number]]  (Normal) Collected:  07/27/19 1630    Lab Status:  Final result Specimen:  Swab from Throat Updated:  07/29/19 1355     Throat Culture, Beta Strep No Beta Hemolytic Streptococcus Isolated    Narrative:       Group A Strep incidence is low in adults. Positive culture for Beta hemolytic Streptococcus species can reflect colonization and not true infection. Please correlate clinically.    Blood Culture - Blood, Arm, Left [[id number]] Collected:  07/23/19 1334    Lab Status:  Final result Specimen:  Blood from Arm, Left Updated:  07/28/19 1415     Blood Culture No growth at 5 days    Blood Culture - Blood, Hand, Right [[id number]] Collected:  07/23/19 1341    Lab Status:  Final result Specimen:  Blood from Hand, Right Updated:  07/28/19 1415     Blood Culture No growth at 5 days    Rapid Strep A Screen - Swab, Throat [[id number]]  (Normal) Collected:  07/27/19 1630    Lab Status:  Final  result Specimen:  Swab from Throat Updated:  07/27/19 1727     Strep A Ag Negative    Narrative:       Test performed by Direct Antigen Testing.          Imaging Results (last 24 hours)     ** No results found for the last 24 hours. **          Results for orders placed during the hospital encounter of 07/23/19   Adult Transthoracic Echo Complete W/ Cont if Necessary Per Protocol    Narrative · Left ventricular systolic function is normal. Estimated EF = 60%.  · Left ventricular diastolic dysfunction (grade I) consistent with   impaired relaxation.  · Left atrial cavity size is borderline dilated.  · There is mild calcification of the aortic valve.          I have reviewed the medications:  Scheduled Meds:    acetaminophen 650 mg Oral Q6H   apixaban 5 mg Oral Q12H   atorvastatin 80 mg Oral Nightly   bisoprolol 5 mg Oral Q24H   diclofenac 2 g Topical 4x Daily   DULoxetine 20 mg Oral Daily   gabapentin 300 mg Oral Nightly   lidocaine 2 patch Transdermal Nightly   memantine 10 mg Oral Q12H   mesalamine 0.75 g Oral Nightly   nystatin  Topical Q12H   pantoprazole 40 mg Oral Q AM   predniSONE 5 mg Oral Daily With Breakfast   saccharomyces boulardii 250 mg Oral BID   sodium chloride 3 mL Intravenous Q12H     Continuous Infusions:     PRN Meds:.•  acetaminophen  •  calcium carbonate  •  gabapentin  •  ipratropium-albuterol  •  loperamide  •  magic mouthwash  •  melatonin  •  phenol  •  potassium chloride  •  potassium chloride  •  [DISCONTINUED] potassium chloride **OR** [DISCONTINUED] potassium chloride **OR** potassium chloride  •  saline  •  sodium chloride  •  sodium chloride      Assessment/Plan   Assessment / Plan     Active Hospital Problems    Diagnosis  POA   • **Acute alteration in mental status [R41.82]  Yes   • Lewy body dementia [G31.83, F02.80]  Yes   • Acute respiratory distress [R06.03]  Yes   • Fracture of proximal end of right humerus [S42.201A]  Yes   • Paroxysmal atrial fibrillation (CMS/HCC) [I48.0]   Yes   • Mixed hyperlipidemia [E78.2]  Yes   • Multiple lacunar infarcts [I63.81]  Yes   • History of stroke with residual deficit [I69.30]  Not Applicable   • PFO (patent foramen ovale) [Q21.1]  Not Applicable   • GERD without esophagitis [K21.9]  Yes   • Spinal stenosis in cervical region [M48.02]  Yes   • Essential hypertension [I10]  Yes   • Crohn's colitis, with rectal bleeding (CMS/HCC) [K50.111]  Yes      Resolved Hospital Problems   No resolved problems to display.        Brief Hospital Course to date:  Vargas De is a 78 y.o. male with a past medical history significant for ulcerative colitis on chronic prednisone therapy, chronic pain with spinal stenosis of the cervical region, hyperlipidemia, hypertension, PFO, atrial fibrillation ( on Eliquis) mild dementia, and multiple lacunar infarcts who presents with acute change in mental status.  MRI negative for CVA.  EEG negative for seizures.     Sepsis  Acute hypoxic respiratory failure  Likely aspiration   - overall improved; fevers improved and on room air   - Patient was started on zosyn 7/28 - transitioned to Augmentin to complete 7 days   - BCx NGTD; strep negative   -Patient with swollen uvula with exudate.  CT scan without contrast shows narrowing of his airway. The scan without contrast not ordered as patient with iodine allergy; ENT eval; cont PEEP if needed; diminished oropharyngeal sensation  - Code status changed to DNR   - Speech eval 7/29 with modified diet; continue to monitor   - Palliative following       AMS  Suspected Lewy body dementia  - improved with discontinuation of Seroquel and narcotics   -  Neurology followed;  etiology likely worsening of lewy body dementia in setting of sleep deprivation, pain as well as recent fracture   - continue home statin, ASA  - Will resume home gabapentin qHS only - patient states this helped him rest     Orthostatic Hypotension  - noted with therapy this morning.    - will give one liter of normal  saline  - reassess orthostatic blood pressures again tonight.      Antibiotic associated diarrhea  - No concern for C. difficile, no abdominal pain on exam stable.  - Diarrhea resolved off antibiotics     Dysphasia  - improving;  mental status is improving  - Speech eval 7/29 with diet modifications    Loose stools   - no WBC; no abd pain; monitor and consider c.diff testing      Right Humerus Fracture:  - consult to ortho, non-operative management, therapy as tolerated, appreciate assistance   - continue with therapy as above  - Dr. Steinberg follow up in 10 days   -Nerve block discontinued.  Pain controlled on Tylenol.  - complaining of numbness of his fingers this morning.  This is likely due to ulnar nerve pressure around his elbow from the sling.  I have asked nursing staff to give him a break from the sling and perform some simple range of motion to see if it improves.       PAF:  - sinus on admission  - [id number] = 5. On Eliquis.      Junctional rhythml; tachycardia  - Resumed bisoprolol; cards consult and recommend continued beta blocker   - K replaced; check Mg      Frequent urination   - UA ok; PVR ok -- overall improving      Right wrist pain  -imaging with no acute fracture      History of CVA  - workup in May and CVA felt to be embolic; follows with Dr. Gutierrez and Dr. Maldonado. Has been on Eliquis and ASA discontinued       Chronic steroid:  - on 5 mg daily for Crohn's/lumbar stenosis. Currently being weaned down, to eventually discontinue; will resume home prednisone       HTN:  -Blood pressure has been on the low side.  Holding amlodipine and lisinopril for now.        DVT prophylaxis:   Eliquis     Disposition: [employer] denying acute rehab at [facility].  Family has filed an appeal.        CODE STATUS:   Code Status and Medical Interventions:   Ordered at: 07/28/19 1018     Limited Support to NOT Include:    Intubation     Level Of Support Discussed With:    Health Care Surrogate    Next of Kin (If No  Surrogate)     Code Status:    No CPR     Medical Interventions (Level of Support Prior to Arrest):    Limited         Electronically signed by ANA MARÍA Esposito, 08/09/19, 10:48 AM.

## 2019-08-09 NOTE — PLAN OF CARE
Problem: Patient Care Overview  Goal: Plan of Care Review  Outcome: Ongoing (interventions implemented as appropriate)   08/09/19 9286   Coping/Psychosocial   Plan of Care Reviewed With patient   Plan of Care Review   Progress improving   OTHER   Outcome Summary VSS, pt UTC most of the day. Sling to RUE, pain control with tylenol adequate. Pt is palliative care. Pending tx to rehab.      Goal: Individualization and Mutuality  Outcome: Ongoing (interventions implemented as appropriate)    Goal: Discharge Needs Assessment  Outcome: Ongoing (interventions implemented as appropriate)    Goal: Interprofessional Rounds/Family Conf  Outcome: Ongoing (interventions implemented as appropriate)      Problem: Fall Risk (Adult)  Goal: Identify Related Risk Factors and Signs and Symptoms  Outcome: Ongoing (interventions implemented as appropriate)      Problem: Pain, Chronic (Adult)  Goal: Acceptable Pain/Comfort Level and Functional Ability  Outcome: Ongoing (interventions implemented as appropriate)      Problem: Skin Injury Risk (Adult)  Goal: Skin Health and Integrity  Outcome: Ongoing (interventions implemented as appropriate)      Problem: Palliative Care (Adult)  Goal: Maximized Comfort  Outcome: Ongoing (interventions implemented as appropriate)    Goal: Enhanced Quality of Life  Outcome: Ongoing (interventions implemented as appropriate)      Problem: Confusion, Chronic (Adult)  Goal: Cognitive/Functional Impairments Minimized  Outcome: Ongoing (interventions implemented as appropriate)      Problem: Pain, Acute (Adult)  Goal: Acceptable Pain Control/Comfort Level  Outcome: Ongoing (interventions implemented as appropriate)

## 2019-08-09 NOTE — PLAN OF CARE
Problem: Patient Care Overview  Goal: Interprofessional Rounds/Family Conf  Outcome: Ongoing (interventions implemented as appropriate)   08/09/19 1300   Interdisciplinary Rounds/Family Conf   Summary Patient awake, sitting up in a chair, states his pain is tolerable, working with PT/OT/Speech, and CM working on placement. Issues with hypotension. Patient asking for a different diet. Nurse follow up with speech. Palliative to continue to follow for support.   Participants advanced practice nurse;nursing;physician;social work/services

## 2019-08-09 NOTE — PLAN OF CARE
Problem: Patient Care Overview  Goal: Plan of Care Review  Outcome: Ongoing (interventions implemented as appropriate)   08/09/19 7212   Coping/Psychosocial   Plan of Care Reviewed With patient;family   SLP treatment completed. Will continue to address dysphagia. Please see note for further details and recommendations.

## 2019-08-09 NOTE — PLAN OF CARE
Problem: Patient Care Overview  Goal: Plan of Care Review  Outcome: Ongoing (interventions implemented as appropriate)   08/09/19 4799   Coping/Psychosocial   Plan of Care Reviewed With patient;family   Plan of Care Review   Progress improving   OTHER   Outcome Summary Pt ambulated 150 feet x2 with chair follow, min A of 2, and LUE HHA. Pt require cues for proper stepping and sequencing. Transfer min A. No dizziness with standing or ambulation. Sling on RUE during all mobility. Recommend inpatient rehab upon DC

## 2019-08-09 NOTE — PLAN OF CARE
Problem: Patient Care Overview  Goal: Plan of Care Review  Outcome: Ongoing (interventions implemented as appropriate)   08/09/19 1038   Coping/Psychosocial   Plan of Care Reviewed With patient;family   Plan of Care Review   Progress improving   OTHER   Outcome Summary Adjusted sling, removed abdominal support due to increased c/o numbness in digits and appearance of pressure on anterior of R forearm. Reviewed ROM of elbow, wrist, hand with family/nursing, support for R UE with sling adjustments and skin checks. Pt reports increased comfort with changes, family verbalizes understanding of changes and R UE restricitions. Pt willing to do all tasks this date despite c/o fatigue and pain. Rest breaks during session.

## 2019-08-09 NOTE — THERAPY TREATMENT NOTE
Acute Care - Speech Language Pathology   Swallow Progress Note [facility]     Patient Name: Vargas De  : 1940  MRN: [id number]  Today's Date: 2019  Onset of Illness/Injury or Date of Surgery: 19            Admit Date: 2019    Visit Dx:      ICD-10-CM ICD-9-CM   1. Acute alteration in mental status R41.82 780.97   2. Generalized weakness R53.1 780.79   3. Other closed displaced fracture of proximal end of right humerus with routine healing, subsequent encounter S42.291D V54.11   4. Elevated blood pressure reading with diagnosis of hypertension I10 401.9   5. Impaired functional mobility, balance, gait, and endurance Z74.09 V49.89   6. Impaired mobility and ADLs Z74.09 799.89   7. Cognitive communication deficit R41.841 799.52   8. Oropharyngeal dysphagia R13.12 787.22     Patient Active Problem List   Diagnosis   • Peripheral neuropathy   • Essential hypertension   • Crohn's colitis, with rectal bleeding (CMS/HCC)   • Major depressive disorder with single episode, in partial remission (CMS/HCC)   • Macular degeneration of both eyes   • GERD without esophagitis   • Cervicalgia   • Spinal stenosis in cervical region   • Chronic right-sided low back pain without sciatica   • Osteoarthritis of toe joint, right   • PFO (patent foramen ovale)   • Ulcerative colitis (CMS/HCC)   • Body mass index (BMI) of 25.0 to 29.9   • History of stroke with residual deficit   • Mixed hyperlipidemia   • Multiple lacunar infarcts   • Paroxysmal atrial fibrillation (CMS/HCC)   • Chronic bilateral low back pain   • Acute alteration in mental status   • Fracture of proximal end of right humerus   • Acute respiratory distress   • Lewy body dementia       Therapy Treatment  Rehabilitation Treatment Summary     Row Name 19 1500 19 1038          Treatment Time/Intention    Discipline  speech language pathologist  -LR  occupational therapist  -AN     Document Type  therapy note (daily note)  -LR  therapy  note (daily note)  -AN     Subjective Information  no complaints  -LR  complains of;fatigue;pain  -AN     Mode of Treatment  speech-language pathology  -LR  occupational therapy  -AN     Patient/Family Observations  Pt's family present. Pt sitting in chair.  -LR  pt in bed, family present  -AN     Care Plan Review  evaluation/treatment results reviewed;care plan/treatment goals reviewed  -LR  care plan/treatment goals reviewed;risks/benefits reviewed  -AN     Therapy Frequency (Swallow)  5 days per week  -LR  --     Patient Effort  good  -LR  good  -AN     Comment  --  pt continues to c/o numbness in digits, sling adjusted and continued education with family on ROM ex provided.  -AN     Existing Precautions/Restrictions  --  fall  -AN     Treatment Considerations/Comments  --  R humeral fx, no shoulder ROM R  -AN     Recorded by [LR] Karen Veronica MS CCC-SLP 08/09/19 1519 [AN] Bibiana Lion, OT 08/09/19 1121     Row Name 08/09/19 1038             Vital Signs    Pre Systolic BP Rehab  110  -AN      Pre Treatment Diastolic BP  53  -AN      Intra Systolic BP Rehab  116  -AN2      Intra Treatment Diastolic BP  66  -AN2      Recorded by [AN] Bibiana Lion, OT 08/09/19 1121  [AN2] Bibiana Lion, OT 08/09/19 1126      Row Name 08/09/19 1038             Cognitive Assessment/Intervention- PT/OT    Affect/Mental Status (Cognitive)  WFL  -AN      Orientation Status (Cognition)  oriented x 3  -AN      Follows Commands (Cognition)  follows one step commands;over 90% accuracy;delayed response/completion  -AN      Personal Safety Interventions  fall prevention program maintained  -AN      Recorded by [AN] Bibiana Lion OT 08/09/19 1126      Row Name 08/09/19 1038             Bed Mobility Assessment/Treatment    Supine-Sit [place] (Bed Mobility)  minimum assist (75% patient effort)  -AN      Recorded by [AN] Bibiana Lion OT 08/09/19 1126      Row Name 08/09/19 1038             Functional Mobility    Functional  Mobility- Ind. Level  minimum assist (75% patient effort);verbal cues required;2 person assist required  -AN      Functional Mobility-Distance (Feet)  10  -AN      Functional Mobility- Comment  shuffling steps, unsteady  -AN      Recorded by [AN] Bibiana Lion, OT 08/09/19 1126      Row Name 08/09/19 1038             Transfer Assessment/Treatment    Transfer Assessment/Treatment  bed-chair transfer  -AN      Recorded by [AN] Bibiana Lion, OT 08/09/19 1126      Row Name 08/09/19 1038             Bed-Chair Transfer    Bed-Chair [place] (Transfers)  verbal cues;minimum assist (75% patient effort)  -AN      Recorded by [AN] Bibiana Lion, OT 08/09/19 1126      Row Name 08/09/19 1038             Sit-Stand Transfer    Sit-Stand [place] (Transfers)  minimum assist (75% patient effort)  -AN      Recorded by [AN] Bibiana Lion, OT 08/09/19 1126      Row Name 08/09/19 1038             Stand-Sit Transfer    Stand-Sit [place] (Transfers)  verbal cues;contact guard  -AN      Recorded by [AN] Bibiana Lion, OT 08/09/19 1126      Row Name 08/09/19 1038             Lower Body Dressing Assessment/Training    Lower Body Dressing [place] Level  don;pants/bottoms;maximum assist (25% patient effort)  -AN      Comment (Lower Body Dressing)  pt able to assist with pull up L LE  -AN      Recorded by [AN] Bibiana Lion, OT 08/09/19 1126      Row Name 08/09/19 1038             Therapeutic Exercise    75439 - OT Therapeutic Exercise Minutes  8  -AN      71776 - OT Therapeutic Activity Minutes  31  -AN      Recorded by [AN] Bibiana Lion, OT 08/09/19 1126      Row Name 08/09/19 1038             Therapeutic Exercise    Upper Extremity Range of Motion (Therapeutic Exercise)  shoulder horizontal abduction/adduction, left;elbow flexion/extension, bilateral;shoulder internal/external rotation, left;wrist flexion/extension, bilateral  -AN      Exercise Type (Therapeutic Exercise)  AROM (active range of motion);AAROM  (active assistive range of motion)  -AN      Comment (Therapeutic Exercise)  no R shoulder, pt pain with elbow ROM R , AAROM R  -AN      Recorded by [AN] Bibiana Lion, OT 08/09/19 1126      Row Name 08/09/19 1038             Static Sitting Balance    Level of [place] (Unsupported Sitting, Static Balance)  supervision  -AN      Sitting Position (Unsupported Sitting, Static Balance)  sitting on edge of bed  -AN      Recorded by [AN] Bibiana Lion, OT 08/09/19 1126      Row Name 08/09/19 1038             Dynamic Sitting Balance    Level of [place], Reaches Outside Midline (Sitting, Dynamic Balance)  supervision  -AN      Sitting Position, Reaches Outside Midline (Sitting, Dynamic Balance)  sitting on edge of bed  -AN      Comment, Reaches Outside Midline (Sitting, Dynamic Balance)  scooting  -AN      Recorded by [AN] Bibiana Lion, OT 08/09/19 1126      Row Name 08/09/19 1038             Static Standing Balance    Level of [place] (Supported Standing, Static Balance)  contact guard assist  -AN      Recorded by [AN] Bibiana Lion, OT 08/09/19 1126      Row Name 08/09/19 1038             Dynamic Standing Balance    Level of [place], Reaches Outside Midline (Standing, Dynamic Balance)  minimal assist, 75% patient effort  -AN      Recorded by [AN] Bibiana Lion, OT 08/09/19 1126      Row Name 08/09/19 1038             Pain Scale: Numbers Pre/Post-Treatment    Pain Location - Side  Right  -AN      Pain Location  shoulder  -AN      Recorded by [AN] Bibiana Lion, OT 08/09/19 1126      Row Name 08/09/19 1038             Pain Scale: FACES Pre/Post-Treatment    Pain: FACES Scale, Pretreatment  4-->hurts little more  -AN      Pain: FACES Scale, Post-Treatment  4-->hurts little more  -AN      Recorded by [AN] Bibiana Lion, OT 08/09/19 1126      Row Name 08/09/19 1038             Upper Extremity Orthosis Management    Orthosis Training (Upper Extremity Orthosis)  patient and caregiver;orthosis  adjustment;skin inspection/precautions;wearing schedule  -AN      Adjustment Needed/Outcome (Upper Extremity Orthosis)  adjustment completed;reddened area on skin noted;other (see comments);positioning strategies implemented numbness ulnar nerve digits, pressure radial  -AN      Recorded by [AN] Bibiana Lion, OT 08/09/19 1126      Row Name                Wound 07/20/19 1649 Left hand skin tear    Wound - Properties Group Date first assessed: 07/20/19 [SW] Time first assessed: 1649 [SW] Side: Left [SW] Location: hand [SW] Present On Admission : yes [SW] Type: skin tear [SW] Recorded by:  [SW] Odilia Joy RN 07/20/19 1649    Row Name 08/09/19 1038             Plan of Care Review    Plan of Care Reviewed With  patient;family  -AN      Recorded by [AN] Bibiana Lion, OT 08/09/19 1126      Row Name 08/09/19 1038             Outcome Summary/Treatment Plan (OT)    Daily Summary of Progress (OT)  progress toward functional goals as expected  -AN      Anticipated Discharge Disposition (OT)  inpatient rehabilitation facility  -AN      Recorded by [AN] Bibiana Lion, OT 08/09/19 1126      Row Name 08/09/19 1500             Outcome Summary/Treatment Plan (SLP)    Daily Summary of Progress (SLP)  progress toward functional goals is good  -LR      Plan for Continued Treatment (SLP)  Pt seen for dysphagia tx. Pt's RN reported pt is tolerating diet well without s/s of aspiration. RN reported pt was using chin tuck. Good participation in dysphagia exercises. Pt also given trials of thin liquids. Pt independently used chin tuck and took small sips. Continue dyspahgia tx.   -LR      Recorded by [LR] Karen Veronica MS CCC-SLP 08/09/19 4878        User Key  (r) = Recorded By, (t) = Taken By, (c) = Cosigned By    Initials Name Effective Dates Discipline    Bibiana Barrera OT 06/22/15 -  OT    Karen Marte MS CCC-SLP 06/22/15 -  SLP    Odilia Gordillo RN 06/16/16 -  Nurse          Outcome Summary  Outcome  Summary/Treatment Plan (SLP)  Daily Summary of Progress (SLP): progress toward functional goals is good (08/09/19 1500 : Karen Veronica, MS CCC-SLP)  Plan for Continued Treatment (SLP): Pt seen for dysphagia tx. Pt's RN reported pt is tolerating diet well without s/s of aspiration. RN reported pt was using chin tuck. Good participation in dysphagia exercises. Pt also given trials of thin liquids. Pt independently used chin tuck and took small sips. Continue dyspahgia tx.  (08/09/19 1500 : Karen Veronica, [facility]-SLP)      SLP GOALS     Row Name 08/09/19 1500 08/08/19 1200 08/07/19 1115       Oral Nutrition/Hydration Goal 1 (SLP)    Oral Nutrition/Hydration Goal 1, SLP  --  --  LTG: Pt will tolerate soft diet & thin liquid via small cup sips & chin tuck w/ no overt s/sxs aspiration or distress w/ 100% acc and no cues  -MP    Time Frame (Oral Nutrition/Hydration Goal 1, SLP)  --  --  by discharge  -MP    Barriers (Oral Nutrition/Hydration Goal 1, SLP)  --  Pt able to recall need for chin tuck per family; they are not having to remind him  -AW  Pt able to recall need for chin tuck  -MP    Progress/Outcomes (Oral Nutrition/Hydration Goal 1, SLP)  --  continuing progress toward goal  -AW  continuing progress toward goal  -MP       Oral Nutrition/Hydration Goal 2 (SLP)    Oral Nutrition/Hydration Goal 2, SLP  --  Pt will tolerate soft solids & thin liquids via small single cups sips + chin tuck w/ no overt s/sxs aspiration or distress w/ 100% acc and no cues  -AW  Pt will tolerate soft solids & thin liquids via small single cups sips + chin tuck w/ no overt s/sxs aspiration or distress w/ 100% acc and no cues  -MP    Time Frame (Oral Nutrition/Hydration Goal 2, SLP)  --  --  short term goal (STG);by discharge  -MP    Barriers (Oral Nutrition/Hydration Goal 2, SLP)  --  --  Pt able to recall chin tuck & no straws  -MP    Progress/Outcomes (Oral Nutrition/Hydration Goal 2, SLP)  --  continuing progress toward goal RN And  family report tolerating well  -AW  continuing progress toward goal  -MP       Lingual Strengthening Goal 1 (SLP)    Activity (Lingual Strengthening Goal 1, SLP)  increase tongue back strength  -LR  --  --    Increase Tongue Back Strength  lingual resistance exercises  -LR  --  --    [place]/Accuracy (Lingual Strengthening Goal 1, SLP)  with minimal cues (75-90% accuracy)  -LR  --  --    Time Frame (Lingual Strengthening Goal 1, SLP)  short term goal (STG);by discharge  -LR  --  --    Progress/Outcomes (Lingual Strengthening Goal 1, SLP)  good progress toward goal  -LR  --  --       Pharyngeal Strengthening Exercise Goal 1 (SLP)    Activity (Pharyngeal Strengthening Goal 1, SLP)  increase timing;increase superior movement of the hyolaryngeal complex;increase anterior movement of the hyolaryngeal complex;increase closure at entrance to airway/closure of airway at glottis;increase squeeze/positive pressure generation;increase tongue base retraction  -LR  --  increase timing;increase superior movement of the hyolaryngeal complex;increase anterior movement of the hyolaryngeal complex;increase closure at entrance to airway/closure of airway at glottis;increase squeeze/positive pressure generation;increase tongue base retraction  -MP    Increase Timing  prepping - 3 second prep or suck swallow or 3-step swallow  -LR  --  prepping - 3 second prep or suck swallow or 3-step swallow  -MP    Increase Superior Movement of the Hyolaryngeal Complex  effortful pitch glide (falsetto + pharyngeal squeeze);M[place]elsohn;falsetto  -LR  --  effortful pitch glide (falsetto + pharyngeal squeeze);Mendelsohn;falsetto  -MP    Increase Anterior Movement of the Hyolaryngeal Complex  --  --  chin tuck against resistance (CTAR)  -MP    Increase Closure at Entrance to Airway/Closure of Airway at Glottis  --  --  super-supraglottic swallow  -MP    Increase Squeeze/Positive Pressure Generation  hard effortful swallow  -LR  --  hard effortful  swallow  -MP    Increase Tongue Base Retraction  nabila  -LR  --  nabila  -MP    [place]/Accuracy (Pharyngeal Strengthening Goal 1, SLP)  with minimal cues (75-90% accuracy)  -LR  --  with minimal cues (75-90% accuracy)  -MP    Time Frame (Pharyngeal Strengthening Goal 1, SLP)  short term goal (STG);by discharge  -LR  --  short term goal (STG);by discharge  -MP    Barriers (Pharyngeal Strengthening Goal 1, SLP)  Pt completed effortful swallows x 5, nabila x 5, 3 second prep x 5 and tongue base retracation x 10  -LR  --  Provided new handout of exercises, reviewed, & instructed on independent practice  -MP    Progress/Outcomes (Pharyngeal Strengthening Goal 1, SLP)  continuing progress toward goal  -LR  --  continuing progress toward goal  -MP       Swallow Management Recall Goal 1 (SLP)    Activity (Swallow Management Recall Goal 1, SLP)  recall of;compensatory swallow strategies/techniques  -LR  --  recall of;compensatory swallow strategies/techniques  -MP    [place]/Accuracy (Swallow Management Recall Goal 1, SLP)  with minimal cues (75-90% accuracy)  -LR  --  with minimal cues (75-90% accuracy)  -MP    Time Frame (Swallow Management Recall Goal 1, SLP)  short term goal (STG);by discharge  -LR  --  short term goal (STG);by discharge  -MP    Barriers (Swallow Management Recall Goal 1, SLP)  Pt able to recall need for chin tuck  -LR  --  Pt able to recall need for chin tuck  -MP    Progress/Outcomes (Swallow Management Recall Goal 1, SLP)  good progress toward goal  -LR  --  good progress toward goal  -MP       Swallow Compensatory Strategies Goal 1 (SLP)    Activity (Swallow Compensatory Strategies/Techniques Goal 1, SLP)  --  --  compensatory strategies;small cup sips;chin tuck posture;alternate food/liquid intake;during p.o. trials;during meal intake  -MP    [place]/Accuracy (Swallow Compensatory Strategies/Techniques Goal 1, SLP)  --  --  with minimal cues (75-90% accuracy)  -MP    Time Frame  (Swallow Compensatory Strategies/Techniques Goal 1, SLP)  --  --  short term goal (STG);by discharge  -MP    Barriers (Swallow Compensatory Strategies/Techniques Goal 1, SLP)  --  --  Reviewed strategies and rationale w/ pt & family  -MP    Progress/Outcomes (Swallow Compensatory Strategies/Techniques Goal 1, SLP)  --  --  good progress toward goal  -MP       Comprehend Questions Goal 1 (SLP)    Improve Ability to Comprehend Questions Goal 1 (SLP)  --  --  complex yes/no questions;80%;with minimal cues (75-90%)  -MP    Time Frame (Comprehend Questions Goal 1, SLP)  --  --  short term goal (STG);by discharge  -MP    Progress/Outcomes (Comprehend Questions Goal 1, SLP)  --  --  goal ongoing  -MP       Word Retrieval Skills Goal 1 (SLP)    Improve Word Retrieval Skills By Goal 1 (SLP)  --  --  completing functional word finding tasks;80%;with minimal cues (75-90%)  -MP    Time Frame (Word Retrieval Goal 1, SLP)  --  --  short term goal (STG);by discharge  -MP    Progress/Outcomes (Word Retrieval Goal 1, SLP)  --  --  goal ongoing  -MP       Ability to Construct Phrase and Sentence Level Response Goal 1 (SLP)    Improve Ability to Construct Phrase and Sentence Level Responses By Goal 1 (SLP)  --  --  answering question with phrase;constructing a sentence with a key word;80%;with minimal cues (75-90%)  -MP    Time Frame (Phrase and Sentence Level Response Goal 1, SLP)  --  --  short term goal (STG);by discharge  -MP    Progress/Outcomes (Phrase and Sentence Level Response Goal 1, SLP)  --  --  goal ongoing  -MP       Orientation Goal 1 ([facility])    Improve Orientation Through Goal 1 (SLP)  --  --  demonstrating orientation to month;demonstrating orientation to place;80%;independently (over 90% accuracy)  -MP    Time Frame (Orientation Goal 1, SLP)  --  --  short term goal (STG);by discharge  -MP    Progress/Outcomes (Orientation Goal 1, SLP)  --  --  goal ongoing  -MP       Additional Goal 1 (SLP)    Additional Goal 1, SLP   --  --  LTG: Pt will improve cognitive-communicaiton skills in order to participate in care in hospital setting w/ 100% acc w/o cues.  -MP    Time Frame (Additional Goal 1, SLP)  --  --  by discharge  -MP    Progress/Outcomes (Additional Goal 1, SLP)  --  --  goal ongoing  -MP      User Key  (r) = Recorded By, (t) = Taken By, (c) = Cosigned By    Initials Name Provider Type    Karen Marte MS CCC-SLP Speech and Language Pathologist    Darling Acosta, MS CCC-SLP Speech and Language Pathologist    Bret Willoughby, MS CCC-SLP Speech and Language Pathologist          EDUCATION  The patient has been educated in the following areas:   Dysphagia (Swallowing Impairment).    SLP Recommendation and Plan  Daily Summary of Progress (SLP): progress toward functional goals is good     Plan for Continued Treatment (SLP): Pt seen for dysphagia tx. Pt's RN reported pt is tolerating diet well without s/s of aspiration. RN reported pt was using chin tuck. Good participation in dysphagia exercises. Pt also given trials of thin liquids. Pt independently used chin tuck and took small sips. Continue dyspahgia tx.                        Time Calculation:   Time Calculation- SLP     Row Name 08/09/19 1523             Time Calculation- SLP    SLP Start Time  1430  -      SLP Received On  08/09/19  -        User Key  (r) = Recorded By, (t) = Taken By, (c) = Cosigned By    Initials Name Provider Type    Karen Marte MS CCC-SLP Speech and Language Pathologist          Therapy Charges for Today     Code Description Service Date Service Provider Modifiers Qty    [id number] [facility] TREATMENT SWALLOW 3 8/9/2019 Karen Veronica MS CCC-SLP GN 1                 Karen Veronica MS CCC-SLP  8/9/2019

## 2019-08-10 NOTE — THERAPY TREATMENT NOTE
Patient Name: Vargas De  : 1940    MRN: [id number]                              Today's Date: 8/10/2019       Admit Date: 2019    Visit Dx:     ICD-10-CM ICD-9-CM   1. Acute alteration in mental status R41.82 780.97   2. Generalized weakness R53.1 780.79   3. Other closed displaced fracture of proximal end of right humerus with routine healing, subsequent encounter S42.291D V54.11   4. Elevated blood pressure reading with diagnosis of hypertension I10 401.9   5. Impaired functional mobility, balance, gait, and endurance Z74.09 V49.89   6. Impaired mobility and ADLs Z74.09 799.89   7. Cognitive communication deficit R41.841 799.52   8. Oropharyngeal dysphagia R13.12 787.22     Patient Active Problem List   Diagnosis   • Peripheral neuropathy   • Essential hypertension   • Crohn's colitis, with rectal bleeding (CMS/HCC)   • Major depressive disorder with single episode, in partial remission (CMS/HCC)   • Macular degeneration of both eyes   • GERD without esophagitis   • Cervicalgia   • Spinal stenosis in cervical region   • Chronic right-sided low back pain without sciatica   • Osteoarthritis of toe joint, right   • PFO (patent foramen ovale)   • Ulcerative colitis (CMS/HCC)   • Body mass index (BMI) of 25.0 to 29.9   • History of stroke with residual deficit   • Mixed hyperlipidemia   • Multiple lacunar infarcts   • Paroxysmal atrial fibrillation (CMS/HCC)   • Chronic bilateral low back pain   • Acute alteration in mental status   • Fracture of proximal end of right humerus   • Acute respiratory distress   • Lewy body dementia     Past Medical History:   Diagnosis Date   • Allergic arthritis    • Arthritis    • BPH (benign prostatic hyperplasia)      urology   • BPH/nocturia/microhematuria (work up negative)    • chronic hearing loss left ear    • Crohn's colitis (CMS/HCC)     on colonoscopy 7/15.2017.  [facility]-continue Lialda   • Depression     medication, after death of wife    • Elbow pain  7/25/2018   • Gout    • Hearing loss     left ear   • History of stroke 7/23/2018   • Hyperlipidemia    • Hypertension    • Ingrown toenail 03/18/2018   • Low back pain    • Macular degeneration    • Macular degeneration    • Multiple lacunar infarcts     noted on CT 2/2019   • Neck pain    • Rash 1/28/2019   • Rib pain on left side 07/11/2017   • Septic bursitis of elbow, right 7/23/2018   • Spinal stenosis     cervical and lumbosacral spine   • Spinal stenosis-cervical and lumbosacral spine    • Stroke (CMS/HCC)     aspirin, blood pressure conyrol.  Infarct frontal lobe.  R sided weakness, R hand and R foot numbness,dysphasia with stuttering.   • Stroke (CMS/HCC) 02/27/2019    MCA infarct with L facial droop and L sided weakness,dysphasia,dysarthria   • TIA (transient ischemic attack) 2/27/2019   • Ulcerative colitis (CMS/[facility])    • Urinary frequency      Past Surgical History:   Procedure Laterality Date   • CATARACT EXTRACTION     • COLONOSCOPY  07/15/2017   • HEMORRHOIDECTOMY     • HERNIA REPAIR     • KNEE SURGERY     • TONSILLECTOMY       General Information     Row Name 08/10/19 1541          PT Evaluation Time/Intention    Document Type  therapy note (daily note)  -AA     Mode of Treatment  physical therapy  -AA     Row Name 08/10/19 1541          General Information    Patient Profile Reviewed?  yes  -AA     Row Name 08/10/19 1541          Cognitive Assessment/Intervention- PT/OT    Orientation Status (Cognition)  oriented x 3  -AA     Personal Safety Interventions  gait belt;fall prevention program maintained;nonskid shoes/slippers when out of bed  -AA     Row Name 08/10/19 1541          Safety Issues, Functional Mobility    Safety Issues Affecting Function (Mobility)  insight into deficits/self awareness;safety precaution awareness;safety precautions follow-through/compliance;sequencing abilities  -AA     Impairments Affecting Function (Mobility)  balance;coordination;endurance/activity tolerance;motor  control;pain;strength  -AA       User Key  (r) = Recorded By, (t) = Taken By, (c) = Cosigned By    Initials Name Provider Type    AA Mere Ramon, PT Physical Therapist        Mobility     Row Name 08/10/19 1542          Bed Mobility Assessment/Treatment    Bed Mobility Assessment/Treatment  supine-sit  -AA     Scooting/Bridging [place] (Bed Mobility)  minimum assist (75% patient effort)  -AA     Supine-Sit [place] (Bed Mobility)  minimum assist (75% patient effort)  -AA     Assistive Device (Bed Mobility)  bed rails;head of bed elevated  -AA     Row Name 08/10/19 1542          Transfer Assessment/Treatment    Comment (Transfers)  Pt reports dizziness upon standing, symptoms of orthostatic hypotension, BP drop to 93/48.    -     Row Name 08/10/19 1542          Sit-Stand Transfer    Sit-Stand [place] (Transfers)  minimum assist (75% patient effort);2 person assist  -     Assistive Device (Sit-Stand Transfers)  -- LUE support  -     Row Name 08/10/19 1551 08/10/19 1542       Gait/Stairs Assessment/Training    20917 - Gait Training Minutes   12  -AA  --    [place] Level (Gait)  --  minimum assist (75% patient effort);2 person assist;1 person to manage equipment  -    Assistive Device (Gait)  --  other (see comments) LUE support  -    Distance in Feet (Gait)  --  8, 40  -AA    Deviations/Abnormal Patterns (Gait)  --  bilateral deviations;antalgic;festinating/shuffling;huber decreased;steppage;stride length decreased  -    Bilateral Gait Deviations  --  forward flexed posture;knee buckling, bilateral  -AA    Comment (Gait/Stairs)  --  Pt ambulated with min A of 2, LUE support, chair follow with continued reports of dizziness, difficulty with vision, knee buckle B, weakness, and short step length B.  Nurse notified  -    Row Name 08/10/19 1542          Mobility Assessment/Intervention    Extremity Weight-bearing Status  right lower extremity  -AA     Right Upper Extremity  (Weight-bearing Status)  non weight-bearing (NWB)  -AA       User Key  (r) = Recorded By, (t) = Taken By, (c) = Cosigned By    Initials Name Provider Type    Mere Mullins PT Physical Therapist        Obj/Interventions     Row Name 08/10/19 1545          Static Sitting Balance    Level of [place] (Unsupported Sitting, Static Balance)  contact guard assist  -AA     Sitting Position (Unsupported Sitting, Static Balance)  sitting on edge of bed  -AA     Time Able to Maintain Position (Unsupported Sitting, Static Balance)  more than 5 minutes  -AA     Row Name 08/10/19 1545          Dynamic Sitting Balance    Level of [place], Reaches Outside Midline (Sitting, Dynamic Balance)  minimal assist, 75% patient effort  -AA     Sitting Position, Reaches Outside Midline (Sitting, Dynamic Balance)  sitting on edge of bed  -AA     [facility] Name 08/10/19 1545          Static Standing Balance    Level of [place] (Supported Standing, Static Balance)  minimal assist, 75% patient effort  -AA     Time Able to Maintain Position (Supported Standing, Static Balance)  3 to 4 minutes  -     Assistive Device Utilized (Supported Standing, Static Balance)  -- LUE support  -AA     Row Name 08/10/19 1545          Dynamic Standing Balance    Level of [place], Reaches Outside Midline (Standing, Dynamic Balance)  minimal assist, 75% patient effort;2 person assist  -AA     Time Able to Maintain Position, Reaches Outside Midline (Standing, Dynamic Balance)  45 to 60 seconds  -     Assistive Device Utilized (Supported Standing, Dynamic Balance)  other (see comments) LUE support  -AA       User Key  (r) = Recorded By, (t) = Taken By, (c) = Cosigned By    Initials Name Provider Type    Mere Mullins PT Physical Therapist        Goals/Plan    No documentation.       Clinical Impression     Row Name 08/10/19 1546          Pain Assessment    Additional Documentation  Pain Scale: Numbers Pre/Post-Treatment (Group)  -     Row  Name 08/10/19 1546          Pain Scale: Numbers Pre/Post-Treatment    Pain Scale: Numbers, Pretreatment  4/10  -AA     Pain Scale: Numbers, Post-Treatment  4/10  -AA     Pain Location - Side  Right  -AA     Pain Location - Orientation  generalized  -AA     Pain Location  shoulder  -AA     Pain Intervention(s)  Repositioned  -AA     Row Name 08/10/19 1548 08/10/19 1546       Plan of Care Review    Plan of Care Reviewed With  patient;daughter  -AA  patient;daughter  -AA    Row Name 08/10/19 0800          Plan of Care Review    Plan of Care Reviewed With  patient  -CC     Row Name 08/10/19 1546          Vital Signs    Pre Systolic BP Rehab  115  -AA     Pre Treatment Diastolic BP  64  -AA     Intra Systolic BP Rehab  93  -AA     Intra Treatment Diastolic BP  48  -AA     Post Systolic BP Rehab  107  -AA     Post Treatment Diastolic BP  67  -AA     Pretreatment Heart Rate (beats/min)  80  -AA     Intratreatment Heart Rate (beats/min)  94  -AA     Posttreatment Heart Rate (beats/min)  82  -AA     Pre SpO2 (%)  98  -AA     O2 Delivery Pre Treatment  room air  -AA     Intra SpO2 (%)  96  -AA     O2 Delivery Intra Treatment  room air  -AA     Post SpO2 (%)  96  -AA     O2 Delivery Post Treatment  room air  -AA     Pre Patient Position  Supine  -AA     Intra Patient Position  Standing  -AA     Post Patient Position  Sitting  -AA     Row Name 08/10/19 1546          Positioning and Restraints    Pre-Treatment Position  in bed  -AA     Post Treatment Position  chair  -AA     In Chair  notified nsg;exit alarm on;waffle cushion;legs elevated;reclined;with family/caregiver;RUE elevated;call light within reach;encouraged to call for assist  -AA       User Key  (r) = Recorded By, (t) = Taken By, (c) = Cosigned By    Initials Name Provider Type    CC Roz Jim, RN Registered Nurse    AA Mere Ramon, PT Physical Therapist        Outcome Measures     Row Name 08/10/19 1550          How much help from another person do you  currently need...    Turning from your back to your side while in flat bed without using bedrails?  3  -AA     Moving from lying on back to sitting on the side of a flat bed without bedrails?  3  -AA     Moving to and from a bed to a chair (including a wheelchair)?  3  -AA     Standing up from a chair using your arms (e.g., wheelchair, bedside chair)?  3  -AA     Climbing 3-5 steps with a railing?  2  -AA     To walk in hospital room?  2  -AA     AM-PAC 6 Clicks Score (PT)  16  -AA     Row Name 08/10/19 1550          Functional Assessment    Outcome Measure Options  AM-PAC 6 Clicks Basic Mobility (PT)  -AA       User Key  (r) = Recorded By, (t) = Taken By, (c) = Cosigned By    Initials Name Provider Type    Mere Mullins, PT Physical Therapist        Physical Therapy Education     Title: PT OT SLP Therapies (In Progress)     Topic: Physical Therapy (In Progress)     Point: Mobility training (In Progress)     Learning Progress Summary           Patient Eager, E, NR by BRANDON at 8/10/2019  3:48 PM    Eager, E,D, VU,NR by BRANDON at 8/9/2019  1:54 PM    Eager, E, VU by KINGSLEY at 8/9/2019  2:38 AM    Acceptance, E,TB, VU,NR by JUSTIN at 8/7/2019  3:30 PM    Acceptance, E, VU,NR by JUSTIN at 8/6/2019 11:35 AM    Acceptance, E,TB, VU,NR by JUSTIN at 8/6/2019  9:36 AM    Acceptance, E, VU,NR by JUSTIN at 8/5/2019 10:14 AM    Acceptance, E,TB,D, VU,NR by MICKEY at 8/4/2019 10:20 AM    Comment:  Educated patient on safety with sit<> stand and ambulation. Educated patient to increase stride length with steps.    Acceptance, E,TB, VU,DU by CS at 8/3/2019  4:41 PM    Comment:  Educated patient on transfer technique using sheet to sit patient up in bed. Educated patient and family on sling.    Acceptance, E, VU,NR by CD at 8/2/2019  3:49 PM    Comment:  SEE FLOW SHEET. SON INSTRUCTED IN LE THER EX, POSITIONING FOR COMFORT.,    DEISY Weaver, VU by KM at 8/1/2019 10:20 AM    DEISY Weaver D, MARITZA,NR by AA at 7/31/2019  2:46 PM    DEISY Weaver NR by BRANDON at 7/30/2019  3:17 PM     Acceptance, E, VU,NR by JUSTIN at 7/29/2019 11:04 AM    Acceptance, E, VU,NR by CD at 7/25/2019  4:42 PM    Comment:  DTR INSTRUCTED IN ROM EXERCISES, POSITIONING FOR COMFORT/ PROTECTION OF R UE, BENEFITS OF OOB ACTIVITY, D/C PLANNING,    Acceptance, E, VU,NR by CD at 7/24/2019 10:36 AM    Comment:  SEE FLOWSHEET.   Family Eager, E, NR by AA at 8/10/2019  3:48 PM    Eager, E,D, VU,NR by AA at 8/9/2019  1:54 PM    Acceptance, E,TB, VU,NR by JUSTIN at 8/7/2019  3:30 PM    Acceptance, E,TB, VU,NR by JUSTIN at 8/6/2019  9:36 AM    Acceptance, E,TB,D, VU,NR by MICKEY at 8/4/2019 10:20 AM    Comment:  Educated patient on safety with sit<> stand and ambulation. Educated patient to increase stride length with steps.    Acceptance, E,TB, VU,DU by CS at 8/3/2019  4:41 PM    Comment:  Educated patient on transfer technique using sheet to sit patient up in bed. Educated patient and family on sling.    Acceptance, E, VU,NR by CD at 8/2/2019  3:49 PM    Comment:  SEE FLOW SHEET. SON INSTRUCTED IN LE THER EX, POSITIONING FOR COMFORT.,    Eager, E,D, VU,NR by AA at 7/31/2019  2:46 PM    Acceptance, E, VU,NR by CD at 7/25/2019  4:42 PM    Comment:  DTR INSTRUCTED IN ROM EXERCISES, POSITIONING FOR COMFORT/ PROTECTION OF R UE, BENEFITS OF OOB ACTIVITY, D/C PLANNING,                   Point: Home exercise program (Done)     Learning Progress Summary           Patient Eager, E, VU by KINGSLEY at 8/9/2019  2:38 AM    Acceptance, E,TB, VU,NR by JUSTIN at 8/7/2019  3:30 PM    Acceptance, E, VU,NR by JUSTIN at 8/6/2019 11:35 AM    Acceptance, E,TB, VU,NR by JUSTIN at 8/6/2019  9:36 AM    Acceptance, E, VU,NR by JUSTIN at 8/5/2019 10:14 AM    Acceptance, E,TB,D, VU,NR by CS at 8/4/2019 10:20 AM    Comment:  Educated patient on safety with sit<> stand and ambulation. Educated patient to increase stride length with steps.    Acceptance, E,TB, VU,DU by CS at 8/3/2019  4:41 PM    Comment:  Educated patient on transfer technique using sheet to sit patient up in bed. Educated  patient and family on sling.    Acceptance, E, VU,NR by CD at 8/2/2019  3:49 PM    Comment:  SEE FLOW SHEET. SON INSTRUCTED IN LE THER EX, POSITIONING FOR COMFORT.,    Eager, E, VU by TANK at 8/1/2019 10:20 AM    Acceptance, E, VU,NR by EJ at 7/29/2019 11:04 AM    Acceptance, E, VU,NR by CD at 7/25/2019  4:42 PM    Comment:  DTR INSTRUCTED IN ROM EXERCISES, POSITIONING FOR COMFORT/ PROTECTION OF R UE, BENEFITS OF OOB ACTIVITY, D/C PLANNING,    Acceptance, E, VU,NR by CD at 7/24/2019 10:36 AM    Comment:  SEE FLOWSHEET.   Family Acceptance, E,TB, VU,NR by JUSTIN at 8/7/2019  3:30 PM    Acceptance, E,TB, VU,NR by EJ at 8/6/2019  9:36 AM    Acceptance, E,TB,D, VU,NR by CS at 8/4/2019 10:20 AM    Comment:  Educated patient on safety with sit<> stand and ambulation. Educated patient to increase stride length with steps.    Acceptance, E,TB, VU,DU by CS at 8/3/2019  4:41 PM    Comment:  Educated patient on transfer technique using sheet to sit patient up in bed. Educated patient and family on sling.    Acceptance, E, VU,NR by CD at 8/2/2019  3:49 PM    Comment:  SEE FLOW SHEET. SON INSTRUCTED IN LE THER EX, POSITIONING FOR COMFORT.,    Acceptance, E, VU,NR by CD at 7/25/2019  4:42 PM    Comment:  DTR INSTRUCTED IN ROM EXERCISES, POSITIONING FOR COMFORT/ PROTECTION OF R UE, BENEFITS OF OOB ACTIVITY, D/C PLANNING,                   Point: Body mechanics (In Progress)     Learning Progress Summary           Patient Eager, E, NR by AA at 8/10/2019  3:48 PM    Eager, E,D, VU,NR by AA at 8/9/2019  1:54 PM    Eager, E, VU by KINGSLEY at 8/9/2019  2:38 AM    Acceptance, E,TB, VU,NR by EJ at 8/7/2019  3:30 PM    Acceptance, E, VU,NR by EJ at 8/6/2019 11:35 AM    Acceptance, E,TB, VU,NR by EJ at 8/6/2019  9:36 AM    Acceptance, E, MARITZA,NR by JUSTIN at 8/5/2019 10:14 AM    Acceptance, E,RENEA,TUSHAR, MARITZA,NR by MICKEY at 8/4/2019 10:20 AM    Comment:  Educated patient on safety with sit<> stand and ambulation. Educated patient to increase stride length with steps.     Acceptance, E,TB, VU,DU by CS at 8/3/2019  4:41 PM    Comment:  Educated patient on transfer technique using sheet to sit patient up in bed. Educated patient and family on sling.    Acceptance, E, VU,NR by CD at 8/2/2019  3:49 PM    Comment:  SEE FLOW SHEET. SON INSTRUCTED IN LE THER EX, POSITIONING FOR COMFORT.,    Eager, E, VU by KM at 8/1/2019 10:20 AM    Eager, E,D, VU,NR by AA at 7/31/2019  2:46 PM    Eager, E, NR by AA at 7/30/2019  3:17 PM    Acceptance, E, VU,NR by EJ at 7/29/2019 11:04 AM    Acceptance, E, VU,NR by CD at 7/25/2019  4:42 PM    Comment:  DTR INSTRUCTED IN ROM EXERCISES, POSITIONING FOR COMFORT/ PROTECTION OF R UE, BENEFITS OF OOB ACTIVITY, D/C PLANNING,    Acceptance, E, VU,NR by CD at 7/24/2019 10:36 AM    Comment:  SEE FLOWSHEET.   Family Eager, E, NR by AA at 8/10/2019  3:48 PM    Eager, E,D, VU,NR by AA at 8/9/2019  1:54 PM    Acceptance, E,TB, VU,NR by EJ at 8/7/2019  3:30 PM    Acceptance, E,TB, VU,NR by EJ at 8/6/2019  9:36 AM    Acceptance, E,TB,D, VU,NR by CS at 8/4/2019 10:20 AM    Comment:  Educated patient on safety with sit<> stand and ambulation. Educated patient to increase stride length with steps.    Acceptance, E,TB, VU,DU by CS at 8/3/2019  4:41 PM    Comment:  Educated patient on transfer technique using sheet to sit patient up in bed. Educated patient and family on sling.    Acceptance, E, VU,NR by CD at 8/2/2019  3:49 PM    Comment:  SEE FLOW SHEET. SON INSTRUCTED IN LE THER EX, POSITIONING FOR COMFORT.,    Eager, E,D, VU,NR by AA at 7/31/2019  2:46 PM    Acceptance, E, VU,NR by CD at 7/25/2019  4:42 PM    Comment:  DTR INSTRUCTED IN ROM EXERCISES, POSITIONING FOR COMFORT/ PROTECTION OF R UE, BENEFITS OF OOB ACTIVITY, D/C PLANNING,                   Point: Precautions (In Progress)     Learning Progress Summary           Patient DEISY Weaver NR by BRANDON at 8/10/2019  3:48 PM    DEISY Weaver D, VU,RASHAUN by BRANDON at 8/9/2019  1:54 PM    DEISY Weaver VU by KINGSLEY at 8/9/2019  2:38 AM     Acceptance, E,TB, VU,NR by EJ at 8/7/2019  3:30 PM    Acceptance, E, VU,NR by EJ at 8/6/2019 11:35 AM    Acceptance, E,TB, VU,NR by EJ at 8/6/2019  9:36 AM    Acceptance, E, VU,NR by EJ at 8/5/2019 10:14 AM    Acceptance, E,TB,D, VU,NR by CS at 8/4/2019 10:20 AM    Comment:  Educated patient on safety with sit<> stand and ambulation. Educated patient to increase stride length with steps.    Acceptance, E,TB, VU,DU by CS at 8/3/2019  4:41 PM    Comment:  Educated patient on transfer technique using sheet to sit patient up in bed. Educated patient and family on sling.    Acceptance, E, VU,NR by CD at 8/2/2019  3:49 PM    Comment:  SEE FLOW SHEET. SON INSTRUCTED IN LE THER EX, POSITIONING FOR COMFORT.,    Eager, E, VU by KM at 8/1/2019 10:20 AM    Eager, E,D, VU,NR by AA at 7/31/2019  2:46 PM    Eager, E, NR by AA at 7/30/2019  3:17 PM    Acceptance, E, VU,NR by EJ at 7/29/2019 11:04 AM    Acceptance, E, VU,NR by CD at 7/25/2019  4:42 PM    Comment:  DTR INSTRUCTED IN ROM EXERCISES, POSITIONING FOR COMFORT/ PROTECTION OF R UE, BENEFITS OF OOB ACTIVITY, D/C PLANNING,    Acceptance, E, VU,NR by CD at 7/24/2019 10:36 AM    Comment:  SEE FLOWSHEET.   Family Eager, E, NR by AA at 8/10/2019  3:48 PM    Eager, E,D, VU,NR by AA at 8/9/2019  1:54 PM    Acceptance, E,TB, VU,NR by EJ at 8/7/2019  3:30 PM    Acceptance, E,TB, VU,NR by EJ at 8/6/2019  9:36 AM    Acceptance, E,TB,D, VU,NR by CS at 8/4/2019 10:20 AM    Comment:  Educated patient on safety with sit<> stand and ambulation. Educated patient to increase stride length with steps.    Acceptance, E,TB, VU,DU by CS at 8/3/2019  4:41 PM    Comment:  Educated patient on transfer technique using sheet to sit patient up in bed. Educated patient and family on sling.    Acceptance, MARITZA OLIVAS,NR by CD at 8/2/2019  3:49 PM    Comment:  SEE FLOW SHEET. SON INSTRUCTED IN LE THER EX, POSITIONING FOR COMFORT.,    DEISY Weaver D, VU,NR by AA at 7/31/2019  2:46 PM    Acceptance, MARITZA OLIVAS,NR by CD at  7/25/2019  4:42 PM    Comment:  DTR INSTRUCTED IN ROM EXERCISES, POSITIONING FOR COMFORT/ PROTECTION OF R UE, BENEFITS OF OOB ACTIVITY, D/C PLANNING,                               User Key     Initials Effective Dates Name Provider Type Discipline    CD 06/19/15 -  Gogo Thibodeaux, PT Physical Therapist PT    EJ 11/20/18 -  Nurys Chahal, PT Physical Therapist PT    KM 06/19/15 -  Denise Hdz, PT Physical Therapist PT    AA 04/02/18 -  Mere Ramon, PT Physical Therapist PT    CJ 09/12/18 -  Daksha Chahal RN Registered Nurse Nurse     03/26/19 -  Roz Phoenix, PT Physical Therapist PT              PT Recommendation and Plan     Outcome Summary/Treatment Plan (PT)  Daily Summary of Progress (PT): progress toward functional goals as expected  Anticipated Discharge Disposition (PT): inpatient rehabilitation facility  Plan of Care Reviewed With: patient, daughter  Progress: declining  Outcome Summary: Pt presents with S&S of orthostatic BP.  Dizziness and decreased vision with postural sway in standing and BP drop to 93/48.  Pt ambulate 8 feet and 40 feet with B knee buckle, slow pattern, short step lengths and max fatigue.  Pt reports vision deficits constant and RN notiifed.  RN notified of BP changes and pt change in mobility.  Sling RUE during all mobility.  Recommend DC to inpatient rehab.  Pt remains motivated to participate     Time Calculation:   PT Charges     Row Name 08/10/19 1558             Time Calculation    Start Time  1506  -AA      PT Received On  08/10/19  -      PT Goal Re-Cert Due Date  08/15/19  -         Time Calculation- PT    Total Timed Code Minutes- PT  41 minute(s)  -AA         Timed Charges    49564 - Gait Training Minutes   12  -AA      05110 - PT Therapeutic Activity Minutes  29  -AA        User Key  (r) = Recorded By, (t) = Taken By, (c) = Cosigned By    Initials Name Provider Type    AA Mere Ramon, PT Physical Therapist        Therapy Charges for  Today     Code Description Service Date Service Provider Modifiers Qty    34575429682 HC GAIT TRAINING EA 15 MIN 8/9/2019 Mere Ramon, PT GP 2    74048682179 HC GAIT TRAINING EA 15 MIN 8/10/2019 Meer Ramon, PT GP 1    76767471261 HC PT THERAPEUTIC ACT EA 15 MIN 8/10/2019 Mere Ramon, PT GP 2          PT G-Codes  Outcome Measure Options: AM-PAC 6 Clicks Basic Mobility (PT)  AM-PAC 6 Clicks Score (PT): 16  AM-PAC 6 Clicks Score (OT): 12  Modified Kendrick Scale: 4 - Moderately severe disability.  Unable to walk without assistance, and unable to attend to own bodily needs without assistance.    Mere Ramon PT  8/10/2019

## 2019-08-10 NOTE — PLAN OF CARE
Problem: Patient Care Overview  Goal: Plan of Care Review  Outcome: Ongoing (interventions implemented as appropriate)   08/10/19 1004   Coping/Psychosocial   Plan of Care Reviewed With patient;daughter   Plan of Care Review   Progress declining   OTHER   Outcome Summary Pt presents with S&S of orthostatic BP. Dizziness and decreased vision with postural sway in standing and BP drop to 93/48. Pt ambulate 8 feet and 40 feet with B knee buckle, slow pattern, short step lengths and max fatigue. Pt reports vision deficits constant and RN notiifed. RN notified of BP changes and pt change in mobility. Sling RUE during all mobility. Recommend DC to inpatient rehab. Pt remains motivated to participate

## 2019-08-11 NOTE — PLAN OF CARE
Problem: Patient Care Overview  Goal: Plan of Care Review  Outcome: Ongoing (interventions implemented as appropriate)   08/11/19 8011   OTHER   Outcome Summary WOC nurse for possible fissure in perineum. Pt with yeast dermatitis and folliculitis inner legs and scrotal area. Will order Micotin cream, patient has nystatin powder ordered however this is difficult to put on the inner posterior thighs and under scrotum. There is a small open area under the scrotum. This may be from yeast dermatitis. WOC nurse to follow up as needed   .

## 2019-08-11 NOTE — THERAPY TREATMENT NOTE
Patient Name: Vargas De  : 1940    MRN: [id number]                              Today's Date: 2019       Admit Date: 2019    Visit Dx:     ICD-10-CM ICD-9-CM   1. Acute alteration in mental status R41.82 780.97   2. Generalized weakness R53.1 780.79   3. Other closed displaced fracture of proximal end of right humerus with routine healing, subsequent encounter S42.291D V54.11   4. Elevated blood pressure reading with diagnosis of hypertension I10 401.9   5. Impaired functional mobility, balance, gait, and endurance Z74.09 V49.89   6. Impaired mobility and ADLs Z74.09 799.89   7. Cognitive communication deficit R41.841 799.52   8. Oropharyngeal dysphagia R13.12 787.22     Patient Active Problem List   Diagnosis   • Peripheral neuropathy   • Essential hypertension   • Crohn's colitis, with rectal bleeding (CMS/HCC)   • Major depressive disorder with single episode, in partial remission (CMS/HCC)   • Macular degeneration of both eyes   • GERD without esophagitis   • Cervicalgia   • Spinal stenosis in cervical region   • Chronic right-sided low back pain without sciatica   • Osteoarthritis of toe joint, right   • PFO (patent foramen ovale)   • Ulcerative colitis (CMS/HCC)   • Body mass index (BMI) of 25.0 to 29.9   • History of stroke with residual deficit   • Mixed hyperlipidemia   • Multiple lacunar infarcts   • Paroxysmal atrial fibrillation (CMS/HCC)   • Chronic bilateral low back pain   • Acute alteration in mental status   • Fracture of proximal end of right humerus   • Acute respiratory distress   • Lewy body dementia     Past Medical History:   Diagnosis Date   • Allergic arthritis    • Arthritis    • BPH (benign prostatic hyperplasia)      urology   • BPH/nocturia/microhematuria (work up negative)    • chronic hearing loss left ear    • Crohn's colitis (CMS/HCC)     on colonoscopy 7/15.2017.  [facility]-continue Lialda   • Depression     medication, after death of wife    • Elbow pain  7/25/2018   • Gout    • Hearing loss     left ear   • History of stroke 7/23/2018   • Hyperlipidemia    • Hypertension    • Ingrown toenail 03/18/2018   • Low back pain    • Macular degeneration    • Macular degeneration    • Multiple lacunar infarcts     noted on CT 2/2019   • Neck pain    • Rash 1/28/2019   • Rib pain on left side 07/11/2017   • Septic bursitis of elbow, right 7/23/2018   • Spinal stenosis     cervical and lumbosacral spine   • Spinal stenosis-cervical and lumbosacral spine    • Stroke (CMS/HCC)     aspirin, blood pressure conyrol.  Infarct frontal lobe.  R sided weakness, R hand and R foot numbness,dysphasia with stuttering.   • Stroke (CMS/HCC) 02/27/2019    MCA infarct with L facial droop and L sided weakness,dysphasia,dysarthria   • TIA (transient ischemic attack) 2/27/2019   • Ulcerative colitis (CMS/[facility])    • Urinary frequency      Past Surgical History:   Procedure Laterality Date   • CATARACT EXTRACTION     • COLONOSCOPY  07/15/2017   • HEMORRHOIDECTOMY     • HERNIA REPAIR     • KNEE SURGERY     • TONSILLECTOMY       General Information     Row Name 08/11/19 1547          PT Evaluation Time/Intention    Document Type  therapy note (daily note)  -AA     Row Name 08/11/19 1547          General Information    Patient Profile Reviewed?  yes  -AA     Existing Precautions/Restrictions  fall;non-weight bearing;other (see comments) NWB RUE  -AA     Barriers to Rehab  medically complex  -AA     Row Name 08/11/19 1547          Cognitive Assessment/Intervention- PT/OT    Orientation Status (Cognition)  oriented x 3  -AA     Personal Safety Interventions  gait belt;fall prevention program maintained;nonskid shoes/slippers when out of bed;muscle strengthening facilitated  -AA     Row Name 08/11/19 1547          Safety Issues, Functional Mobility    Safety Issues Affecting Function (Mobility)  insight into deficits/self awareness;safety precaution awareness;safety precautions  follow-through/compliance;sequencing abilities  -AA     Impairments Affecting Function (Mobility)  balance;coordination;endurance/activity tolerance;motor control;pain;strength  -AA       User Key  (r) = Recorded By, (t) = Taken By, (c) = Cosigned By    Initials Name Provider Type    Mere Mullins, PT Physical Therapist        Mobility     Row Name 08/11/19 1548          Bed Mobility Assessment/Treatment    Comment (Bed Mobility)  pt UIC  -AA     Row Name 08/11/19 1548          Transfer Assessment/Treatment    Comment (Transfers)  no dizziness, pt was given fluids last night and reports feeling better  -AA     Row Name 08/11/19 1548          Sit-Stand Transfer    Sit-Stand [place] (Transfers)  minimum assist (75% patient effort);1 person assist;1 person to manage equipment  -AA     Assistive Device (Sit-Stand Transfers)  other (see comments) LUE support  -AA     Row Name 08/11/19 1554 08/11/19 1548       Gait/Stairs Assessment/Training    54989 - Gait Training Minutes   14  -AA  --    [place] Level (Gait)  --  minimum assist (75% patient effort);1 person assist;1 person to manage equipment  -AA    Assistive Device (Gait)  --  other (see comments) LUE support  -AA    Distance in Feet (Gait)  --  70x2  -AA    Deviations/Abnormal Patterns (Gait)  --  bilateral deviations;antalgic;festinating/shuffling;huber decreased;steppage;stride length decreased  -AA    Comment (Gait/Stairs)  --  pt ambulated with min A of 1 with chair follow.  Pt require VC for step length, height, and posture.  Pt reports improved vision this date.  Sling RUE during gait  -AA    Row Name 08/11/19 1548          Mobility Assessment/Intervention    Extremity Weight-bearing Status  right upper extremity  -AA     Right Upper Extremity (Weight-bearing Status)  non weight-bearing (NWB)  -AA       User Key  (r) = Recorded By, (t) = Taken By, (c) = Cosigned By    Initials Name Provider Type    Mere Mullins PT Physical Therapist         Obj/Interventions     Row Name 08/11/19 1551          Therapeutic Exercise    Lower Extremity (Therapeutic Exercise)  gluteal sets;heel slides, bilateral;LAQ (long arc quad), bilateral;marching while seated;quad sets, bilateral;SLR (straight leg raise), bilateral  -AA     Lower Extremity Range of Motion (Therapeutic Exercise)  hip abduction/adduction, bilateral;ankle dorsiflexion/plantar flexion, bilateral  -AA     Exercise Type (Therapeutic Exercise)  AROM (active range of motion)  -AA     Sets/Reps (Therapeutic Exercise)  12  -AA     Row Name 08/11/19 1551          Static Sitting Balance    Level of [place] (Unsupported Sitting, Static Balance)  supervision  -AA     Sitting Position (Unsupported Sitting, Static Balance)  sitting in chair  -AA     Time Able to Maintain Position (Unsupported Sitting, Static Balance)  more than 5 minutes  -AA     Row Name 08/11/19 1551          Dynamic Sitting Balance    Level of [place], Reaches Outside Midline (Sitting, Dynamic Balance)  minimal assist, 75% patient effort  -AA     Sitting Position, Reaches Outside Midline (Sitting, Dynamic Balance)  sitting in chair  -AA     Row Name 08/11/19 1551          Static Standing Balance    Level of [place] (Supported Standing, Static Balance)  minimal assist, 75% patient effort  -AA     Time Able to Maintain Position (Supported Standing, Static Balance)  3 to 4 minutes  -AA     Assistive Device Utilized (Supported Standing, Static Balance)  other (see comments) LUE support  -AA     Row Name 08/11/19 1551          Dynamic Standing Balance    Level of [place], Reaches Outside Midline (Standing, Dynamic Balance)  minimal assist, 75% patient effort;1 person to manage equipment;1 person assist  -AA     Time Able to Maintain Position, Reaches Outside Midline (Standing, Dynamic Balance)  1 to 2 minutes  -AA     Assistive Device Utilized (Supported Standing, Dynamic Balance)  other (see comments) LUE support  -AA        User Key  (r) = Recorded By, (t) = Taken By, (c) = Cosigned By    Initials Name Provider Type    Mere Mullins, PT Physical Therapist        Goals/Plan    No documentation.       Clinical Impression     Row Name 08/11/19 1552          Pain Assessment    Additional Documentation  Pain Scale: Numbers Pre/Post-Treatment (Group)  -AA     Row Name 08/11/19 1552          Pain Scale: Numbers Pre/Post-Treatment    Pain Scale: Numbers, Pretreatment  3/10  -AA     Pain Scale: Numbers, Post-Treatment  4/10  -AA     Pain Location - Side  Right  -AA     Pain Location - Orientation  generalized  -AA     Pain Location  shoulder  -AA     Pain Intervention(s)  Repositioned;Ambulation/increased activity  -AA     Row Name 08/11/19 1552 08/11/19 0800       Plan of Care Review    Plan of Care Reviewed With  patient;family  -  patient;son  -CC    Row Name 08/11/19 0627          Plan of Care Review    Plan of Care Reviewed With  patient  -WA     [facility] Name 08/11/19 1552          Positioning and Restraints    Pre-Treatment Position  sitting in chair/recliner  -AA     Post Treatment Position  chair  -AA     In Chair  notified nsg;exit alarm on;waffle cushion;legs elevated;reclined;with family/caregiver;call light within reach;encouraged to call for assist  -AA       User Key  (r) = Recorded By, (t) = Taken By, (c) = Cosigned By    Initials Name Provider Type    Roz Smart RN Registered Nurse    Alex Steele RN Registered Nurse    Mere Mullins, PT Physical Therapist        Outcome Measures     Row Name 08/11/19 1552          How much help from another person do you currently need...    Turning from your back to your side while in flat bed without using bedrails?  3  -AA     Moving from lying on back to sitting on the side of a flat bed without bedrails?  3  -AA     Moving to and from a bed to a chair (including a wheelchair)?  3  -AA     Standing up from a chair using your arms (e.g., wheelchair, bedside  chair)?  3  -AA     Climbing 3-5 steps with a railing?  2  -AA     To walk in hospital room?  2  -AA     AM-PAC 6 Clicks Score (PT)  16  -AA     Row Name 08/11/19 1553          Functional Assessment    Outcome Measure Options  AM-PAC 6 Clicks Basic Mobility (PT)  -AA       User Key  (r) = Recorded By, (t) = Taken By, (c) = Cosigned By    Initials Name Provider Type    Mere Mullins, PT Physical Therapist        Physical Therapy Education     Title: PT OT SLP Therapies (Done)     Topic: Physical Therapy (Done)     Point: Mobility training (Done)     Learning Progress Summary           Patient Eager, E,D, NR,VU by BRANDON at 8/11/2019  3:53 PM    Eager, E, NR by BRANDON at 8/10/2019  3:48 PM    Eager, E,D, VU,NR by BRANDON at 8/9/2019  1:54 PM    Eager, E, VU by KINGSLEY at 8/9/2019  2:38 AM    Acceptance, E,TB, VU,NR by JUSTIN at 8/7/2019  3:30 PM    Acceptance, E, VU,NR by EJ at 8/6/2019 11:35 AM    Acceptance, E,TB, VU,NR by EJ at 8/6/2019  9:36 AM    Acceptance, E, VU,NR by EJ at 8/5/2019 10:14 AM    Acceptance, E,TB,D, VU,NR by MICKEY at 8/4/2019 10:20 AM    Comment:  Educated patient on safety with sit<> stand and ambulation. Educated patient to increase stride length with steps.    Acceptance, E,TB, VU,DU by CS at 8/3/2019  4:41 PM    Comment:  Educated patient on transfer technique using sheet to sit patient up in bed. Educated patient and family on sling.    Acceptance, E, VU,NR by CD at 8/2/2019  3:49 PM    Comment:  SEE FLOW SHEET. SON INSTRUCTED IN LE THER EX, POSITIONING FOR COMFORT.,    Eager, E, VU by TANK at 8/1/2019 10:20 AM    Eager, E,D, VU,NR by BRANDON at 7/31/2019  2:46 PM    Eager, E, NR by BRANDON at 7/30/2019  3:17 PM    Acceptance, E, VU,NR by EJ at 7/29/2019 11:04 AM    Acceptance, E, VU,NR by CD at 7/25/2019  4:42 PM    Comment:  DTR INSTRUCTED IN ROM EXERCISES, POSITIONING FOR COMFORT/ PROTECTION OF R UE, BENEFITS OF OOB ACTIVITY, D/C PLANNING,    Acceptance, DEISY, MARITZA,NR by CD at 7/24/2019 10:36 AM    Comment:  SEE FLOWSHEET.    Family Eager, E,D, NR,VU by AA at 8/11/2019  3:53 PM    Eager, E, NR by AA at 8/10/2019  3:48 PM    Eager, E,D, VU,NR by AA at 8/9/2019  1:54 PM    Acceptance, E,TB, VU,NR by JUSTIN at 8/7/2019  3:30 PM    Acceptance, E,TB, VU,NR by JUSTIN at 8/6/2019  9:36 AM    Acceptance, E,TB,D, VU,NR by CS at 8/4/2019 10:20 AM    Comment:  Educated patient on safety with sit<> stand and ambulation. Educated patient to increase stride length with steps.    Acceptance, E,TB, VU,DU by MCIKEY at 8/3/2019  4:41 PM    Comment:  Educated patient on transfer technique using sheet to sit patient up in bed. Educated patient and family on sling.    Acceptance, E, VU,NR by CD at 8/2/2019  3:49 PM    Comment:  SEE FLOW SHEET. SON INSTRUCTED IN LE THER EX, POSITIONING FOR COMFORT.,    Eager, E,D, VU,NR by AA at 7/31/2019  2:46 PM    Acceptance, E, VU,NR by CD at 7/25/2019  4:42 PM    Comment:  DTR INSTRUCTED IN ROM EXERCISES, POSITIONING FOR COMFORT/ PROTECTION OF R UE, BENEFITS OF OOB ACTIVITY, D/C PLANNING,                   Point: Home exercise program (Done)     Learning Progress Summary           Patient Eager, E,D, NR,VU by BRANDON at 8/11/2019  3:53 PM    Eager, E, VU by KINGSLEY at 8/9/2019  2:38 AM    Acceptance, E,TB, VU,NR by JUSTIN at 8/7/2019  3:30 PM    Acceptance, E, VU,NR by JUSTIN at 8/6/2019 11:35 AM    Acceptance, E,TB, VU,NR by JUSTIN at 8/6/2019  9:36 AM    Acceptance, E, VU,NR by JUSTIN at 8/5/2019 10:14 AM    Acceptance, E,TB,D, VU,NR by MICKEY at 8/4/2019 10:20 AM    Comment:  Educated patient on safety with sit<> stand and ambulation. Educated patient to increase stride length with steps.    Acceptance, E,TB, VU,DU by CS at 8/3/2019  4:41 PM    Comment:  Educated patient on transfer technique using sheet to sit patient up in bed. Educated patient and family on sling.    Acceptance, MARITZA OLIVAS,NR by CD at 8/2/2019  3:49 PM    Comment:  SEE FLOW SHEET. SON INSTRUCTED IN LE THER EX, POSITIONING FOR COMFORT.,    DEISY Weaver, VU by KM at 8/1/2019 10:20 AM    Acceptance,  E, VU,NR by JUSTIN at 7/29/2019 11:04 AM    Acceptance, E, VU,NR by CD at 7/25/2019  4:42 PM    Comment:  DTR INSTRUCTED IN ROM EXERCISES, POSITIONING FOR COMFORT/ PROTECTION OF R UE, BENEFITS OF OOB ACTIVITY, D/C PLANNING,    Acceptance, E, VU,NR by CD at 7/24/2019 10:36 AM    Comment:  SEE FLOWSHEET.   Family Eager, E,D, NR,VU by BRANDON at 8/11/2019  3:53 PM    Acceptance, E,TB, VU,NR by JUSTIN at 8/7/2019  3:30 PM    Acceptance, E,TB, VU,NR by JUSTIN at 8/6/2019  9:36 AM    Acceptance, E,TB,D, VU,NR by MICKEY at 8/4/2019 10:20 AM    Comment:  Educated patient on safety with sit<> stand and ambulation. Educated patient to increase stride length with steps.    Acceptance, E,TB, VU,DU by CS at 8/3/2019  4:41 PM    Comment:  Educated patient on transfer technique using sheet to sit patient up in bed. Educated patient and family on sling.    Acceptance, E, VU,NR by CD at 8/2/2019  3:49 PM    Comment:  SEE FLOW SHEET. SON INSTRUCTED IN LE THER EX, POSITIONING FOR COMFORT.,    Acceptance, E, VU,NR by CD at 7/25/2019  4:42 PM    Comment:  DTR INSTRUCTED IN ROM EXERCISES, POSITIONING FOR COMFORT/ PROTECTION OF R UE, BENEFITS OF OOB ACTIVITY, D/C PLANNING,                   Point: Body mechanics (Done)     Learning Progress Summary           Patient Eager, E,D, NR,VU by BRANDON at 8/11/2019  3:53 PM    Eager, E, NR by BRANDON at 8/10/2019  3:48 PM    Eager, E,D, VU,NR by BRANDON at 8/9/2019  1:54 PM    Eager, E, VU by KINGSLEY at 8/9/2019  2:38 AM    Acceptance, E,TB, VU,NR by JUSTIN at 8/7/2019  3:30 PM    Acceptance, E, VU,NR by JUSTIN at 8/6/2019 11:35 AM    Acceptance, E,TB, VU,NR by JUSTIN at 8/6/2019  9:36 AM    Acceptance, E, VU,NR by JUSTIN at 8/5/2019 10:14 AM    Acceptance, E,TB,TUSHAR, MARITZA,NR by CS at 8/4/2019 10:20 AM    Comment:  Educated patient on safety with sit<> stand and ambulation. Educated patient to increase stride length with steps.    Acceptance, DEISY,RENEA, MARITZA,DU by CS at 8/3/2019  4:41 PM    Comment:  Educated patient on transfer technique using sheet to sit  patient up in bed. Educated patient and family on sling.    Acceptance, E, VU,NR by CD at 8/2/2019  3:49 PM    Comment:  SEE FLOW SHEET. SON INSTRUCTED IN LE THER EX, POSITIONING FOR COMFORT.,    Eager, E, VU by TANK at 8/1/2019 10:20 AM    Eager, E,D, VU,NR by AA at 7/31/2019  2:46 PM    Eager, E, NR by AA at 7/30/2019  3:17 PM    Acceptance, E, VU,NR by EJ at 7/29/2019 11:04 AM    Acceptance, E, VU,NR by CD at 7/25/2019  4:42 PM    Comment:  DTR INSTRUCTED IN ROM EXERCISES, POSITIONING FOR COMFORT/ PROTECTION OF R UE, BENEFITS OF OOB ACTIVITY, D/C PLANNING,    Acceptance, E, VU,NR by CD at 7/24/2019 10:36 AM    Comment:  SEE FLOWSHEET.   Family Eager, E,D, NR,VU by AA at 8/11/2019  3:53 PM    Eager, E, NR by AA at 8/10/2019  3:48 PM    Eager, E,D, VU,NR by AA at 8/9/2019  1:54 PM    Acceptance, E,TB, VU,NR by JUSTIN at 8/7/2019  3:30 PM    Acceptance, E,TB, VU,NR by JUSTIN at 8/6/2019  9:36 AM    Acceptance, E,TB,D, VU,NR by MICKEY at 8/4/2019 10:20 AM    Comment:  Educated patient on safety with sit<> stand and ambulation. Educated patient to increase stride length with steps.    Acceptance, E,TB, VU,DU by CS at 8/3/2019  4:41 PM    Comment:  Educated patient on transfer technique using sheet to sit patient up in bed. Educated patient and family on sling.    Acceptance, E, VU,NR by CD at 8/2/2019  3:49 PM    Comment:  SEE FLOW SHEET. SON INSTRUCTED IN LE THER EX, POSITIONING FOR COMFORT.,    Eager, E,D, VU,NR by AA at 7/31/2019  2:46 PM    Acceptance, E, VU,NR by CD at 7/25/2019  4:42 PM    Comment:  DTR INSTRUCTED IN ROM EXERCISES, POSITIONING FOR COMFORT/ PROTECTION OF R UE, BENEFITS OF OOB ACTIVITY, D/C PLANNING,                   Point: Precautions (Done)     Learning Progress Summary           Patient DEISY Weaver D, RASHAUN,VU by BRANDON at 8/11/2019  3:53 PM    DEISY Weaver NR by BRANDON at 8/10/2019  3:48 PM    DEISY Weaver D, MARITZA,NR by BRANDON at 8/9/2019  1:54 PM    DEISY Weaver VU by KINGSLEY at 8/9/2019  2:38 AM    DEISY Lawson TB, VU,RASHAUN by JUSTIN at  8/7/2019  3:30 PM    Acceptance, E, VU,NR by EJ at 8/6/2019 11:35 AM    Acceptance, E,TB, VU,NR by EJ at 8/6/2019  9:36 AM    Acceptance, E, VU,NR by EJ at 8/5/2019 10:14 AM    Acceptance, E,TB,D, VU,NR by CS at 8/4/2019 10:20 AM    Comment:  Educated patient on safety with sit<> stand and ambulation. Educated patient to increase stride length with steps.    Acceptance, E,TB, VU,DU by CS at 8/3/2019  4:41 PM    Comment:  Educated patient on transfer technique using sheet to sit patient up in bed. Educated patient and family on sling.    Acceptance, E, VU,NR by CD at 8/2/2019  3:49 PM    Comment:  SEE FLOW SHEET. SON INSTRUCTED IN LE THER EX, POSITIONING FOR COMFORT.,    Eager, E, VU by KM at 8/1/2019 10:20 AM    Eager, E,D, VU,NR by AA at 7/31/2019  2:46 PM    Eager, E, NR by AA at 7/30/2019  3:17 PM    Acceptance, E, VU,NR by EJ at 7/29/2019 11:04 AM    Acceptance, E, VU,NR by CD at 7/25/2019  4:42 PM    Comment:  DTR INSTRUCTED IN ROM EXERCISES, POSITIONING FOR COMFORT/ PROTECTION OF R UE, BENEFITS OF OOB ACTIVITY, D/C PLANNING,    Acceptance, E, VU,NR by CD at 7/24/2019 10:36 AM    Comment:  SEE FLOWSHEET.   Family Eager, E,D, NR,VU by AA at 8/11/2019  3:53 PM    Eager, E, NR by AA at 8/10/2019  3:48 PM    Eager, E,D, VU,NR by AA at 8/9/2019  1:54 PM    Acceptance, E,TB, VU,NR by EJ at 8/7/2019  3:30 PM    Acceptance, E,TB, VU,NR by EJ at 8/6/2019  9:36 AM    Acceptance, E,TB,D, VU,NR by MICKEY at 8/4/2019 10:20 AM    Comment:  Educated patient on safety with sit<> stand and ambulation. Educated patient to increase stride length with steps.    Acceptance, E,TB, VU,DU by CS at 8/3/2019  4:41 PM    Comment:  Educated patient on transfer technique using sheet to sit patient up in bed. Educated patient and family on sling.    Acceptance, E, MARITZA,NR by CD at 8/2/2019  3:49 PM    Comment:  SEE FLOW SHEET. SON INSTRUCTED IN LE THER EX, POSITIONING FOR COMFORT.,    DEISY Weaver D, VU,NR by AA at 7/31/2019  2:46 PM    Acceptance,  MARITZA OLIVAS,NR by CD at 7/25/2019  4:42 PM    Comment:  DTR INSTRUCTED IN ROM EXERCISES, POSITIONING FOR COMFORT/ PROTECTION OF R UE, BENEFITS OF OOB ACTIVITY, D/C PLANNING,                               User Key     Initials Effective Dates Name Provider Type Discipline    CD 06/19/15 -  Gogo Thibodeaux, PT Physical Therapist PT    EJ 11/20/18 -  Nurys Chahal, PT Physical Therapist PT    KM 06/19/15 -  Denise Hdz, PT Physical Therapist PT    AA 04/02/18 -  Mere Ramon, PT Physical Therapist PT    CJ 09/12/18 -  Daksha Chahal RN Registered Nurse Nurse     03/26/19 -  Roz Phoenix, PT Physical Therapist PT              PT Recommendation and Plan     Outcome Summary/Treatment Plan (PT)  Daily Summary of Progress (PT): progress toward functional goals as expected  Anticipated Discharge Disposition (PT): inpatient rehabilitation facility  Plan of Care Reviewed With: patient, family  Progress: declining  Outcome Summary: Pt presents with S&S of orthostatic BP.  Dizziness and decreased vision with postural sway in standing and BP drop to 93/48.  Pt ambulate 8 feet and 40 feet with B knee buckle, slow pattern, short step lengths and max fatigue.  Pt reports vision deficits constant and RN notiifed.  RN notified of BP changes and pt change in mobility.  Sling RUE during all mobility.  Recommend DC to inpatient rehab.  Pt remains motivated to participate     Time Calculation:   PT Charges     Row Name 08/11/19 1554             Time Calculation    Start Time  1502  -AA      PT Received On  08/11/19 -AA      PT Goal Re-Cert Due Date  08/15/19  -AA         Time Calculation- PT    Total Timed Code Minutes- PT  40 minute(s)  -AA         Timed Charges    47330 - PT Therapeutic Exercise Minutes  15  -AA      30713 - Gait Training Minutes   14  -AA      40376 - PT Therapeutic Activity Minutes  11 -AA        User Key  (r) = Recorded By, (t) = Taken By, (c) = Cosigned By    Initials Name Provider Type     Mere Mullins, PT Physical Therapist        Therapy Charges for Today     Code Description Service Date Service Provider Modifiers Qty    27213677379 HC GAIT TRAINING EA 15 MIN 8/10/2019 Mere Ramon, PT GP 1    64928808846 HC PT THERAPEUTIC ACT EA 15 MIN 8/10/2019 Mere Ramon L, PT GP 2    57043582392 HC PT THER PROC EA 15 MIN 8/11/2019 Mere Ramon, PT GP 1    35518954568 HC GAIT TRAINING EA 15 MIN 8/11/2019 Mere Ramon, PT GP 1    68072780083 HC PT THERAPEUTIC ACT EA 15 MIN 8/11/2019 Mere Ramon, PT GP 1          PT G-Codes  Outcome Measure Options: AM-PAC 6 Clicks Basic Mobility (PT)  AM-PAC 6 Clicks Score (PT): 16  AM-PAC 6 Clicks Score (OT): 12  Modified Kendrick Scale: 4 - Moderately severe disability.  Unable to walk without assistance, and unable to attend to own bodily needs without assistance.    Mere Ramon PT  8/11/2019

## 2019-08-11 NOTE — PROGRESS NOTES
[facility] Medicine Services  PROGRESS NOTE    Patient Name: Vargas De  : 1940  MRN: [id number]    Date of Admission: 2019  Length of Stay: 19  Primary Care Physician: Saba Arrington MD    Subjective   Subjective     CC:f/u for AMS    HPI: No acute events overnight, patient slept well, does continue to endorse some right shoulder pain, denies any nausea, vomiting    Review of Systems  Gen- No fevers, chills  CV- No chest pain, palpitations  Resp- No cough, dyspnea  GI- No N/V/D, abd pain    Otherwise ROS is negative except as mentioned in the HPI.    Objective   Objective     Vital Signs:   Temp:  [97.1 °F (36.2 °C)-98.7 °F (37.1 °C)] 97.8 °F (36.6 °C)  Heart Rate:  [67-76] 76  Resp:  [16-18] 18  BP: ()/(69-77) 131/73  Total (NIH Stroke Scale): 9     Physical Exam:  Constitutional: No acute distress, awake, alert  HENT: NCAT, mucous membranes moist  Respiratory: Clear to auscultation bilaterally, respiratory effort normal   Cardiovascular: RRR, no murmurs, rubs, or gallops, palpable pedal pulses bilaterally  Gastrointestinal: Positive bowel sounds, soft, nontender, nondistended  Musculoskeletal: No bilateral ankle edema, right shoulder in sling  Psychiatric: Appropriate affect, cooperative  Neurologic: Oriented x 3, no focal deficits.    Skin: No rashes    Results Reviewed:  I have personally reviewed current lab, radiology, and data and agree.    Results from last 7 days   Lab Units 19  0716   WBC 10*3/mm3 7.72   HEMOGLOBIN g/dL 12.9*   HEMATOCRIT % 40.9   PLATELETS 10*3/mm3 361     Results from last 7 days   Lab Units 19  0716   SODIUM mmol/L 138   POTASSIUM mmol/L 3.6   CHLORIDE mmol/L 104   CO2 mmol/L 25.0   BUN mg/dL 7*   CREATININE mg/dL 0.62*   GLUCOSE mg/dL 97   CALCIUM mg/dL 8.8     Estimated Creatinine Clearance: 81.5 mL/min (A) (by C-G formula based on SCr of 0.62 mg/dL (L)).    Microbiology Results Abnormal     Procedure Component Value -  Date/Time    Blood Culture - Blood, Arm, Left [[id number]] Collected:  07/27/19 0152    Lab Status:  Final result Specimen:  Blood from Arm, Left Updated:  08/01/19 0431     Blood Culture No growth at 5 days    Blood Culture - Blood, Hand, Left [[id number]] Collected:  07/27/19 0147    Lab Status:  Final result Specimen:  Blood from Hand, Left Updated:  08/01/19 0245     Blood Culture No growth at 5 days    Urine Culture - Urine, Urine, Clean Catch [[id number]]  (Normal) Collected:  07/28/19 1430    Lab Status:  Final result Specimen:  Urine, Clean Catch Updated:  07/29/19 1856     Urine Culture No growth    Beta Strep Culture, Throat - Swab, Throat [[id number]]  (Normal) Collected:  07/27/19 1630    Lab Status:  Final result Specimen:  Swab from Throat Updated:  07/29/19 1355     Throat Culture, Beta Strep No Beta Hemolytic Streptococcus Isolated    Narrative:       Group A Strep incidence is low in adults. Positive culture for Beta hemolytic Streptococcus species can reflect colonization and not true infection. Please correlate clinically.    Blood Culture - Blood, Arm, Left [[id number]] Collected:  07/23/19 1334    Lab Status:  Final result Specimen:  Blood from Arm, Left Updated:  07/28/19 1415     Blood Culture No growth at 5 days    Blood Culture - Blood, Hand, Right [[id number]] Collected:  07/23/19 1341    Lab Status:  Final result Specimen:  Blood from Hand, Right Updated:  07/28/19 1415     Blood Culture No growth at 5 days    Rapid Strep A Screen - Swab, Throat [[id number]]  (Normal) Collected:  07/27/19 1630    Lab Status:  Final result Specimen:  Swab from Throat Updated:  07/27/19 1727     Strep A Ag Negative    Narrative:       Test performed by Direct Antigen Testing.          Imaging Results (last 24 hours)     ** No results found for the last 24 hours. **          Results for orders placed during the hospital encounter of 07/23/19   Adult Transthoracic Echo Complete W/ Cont if Necessary Per Protocol     Narrative · Left ventricular systolic function is normal. Estimated EF = 60%.  · Left ventricular diastolic dysfunction (grade I) consistent with   impaired relaxation.  · Left atrial cavity size is borderline dilated.  · There is mild calcification of the aortic valve.          I have reviewed the medications:  Scheduled Meds:    acetaminophen 650 mg Oral Q6H   apixaban 5 mg Oral Q12H   artificial tears  Both Eyes Nightly   atorvastatin 80 mg Oral Nightly   bisoprolol 5 mg Oral Q24H   diclofenac 2 g Topical 4x Daily   DULoxetine 20 mg Oral Daily   gabapentin 300 mg Oral Nightly   lidocaine 2 patch Transdermal Nightly   memantine 10 mg Oral Q12H   mesalamine 0.75 g Oral Nightly   nystatin  Topical Q12H   pantoprazole 40 mg Oral Q AM   predniSONE 5 mg Oral Daily With Breakfast   saccharomyces boulardii 250 mg Oral BID   sodium chloride 500 mL Intravenous Once   sodium chloride 3 mL Intravenous Q12H     Continuous Infusions:   PRN Meds:.•  acetaminophen  •  calcium carbonate  •  gabapentin  •  ipratropium-albuterol  •  loperamide  •  magic mouthwash  •  melatonin  •  phenol  •  polyvinyl alcohol  •  potassium chloride  •  potassium chloride  •  [DISCONTINUED] potassium chloride **OR** [DISCONTINUED] potassium chloride **OR** potassium chloride  •  saline  •  sodium chloride  •  sodium chloride      Assessment/Plan   Assessment / Plan     Active Hospital Problems    Diagnosis  POA   • **Acute alteration in mental status [R41.82]  Yes   • Lewy body dementia [G31.83, F02.80]  Yes   • Acute respiratory distress [R06.03]  Yes   • Fracture of proximal end of right humerus [S42.201A]  Yes   • Paroxysmal atrial fibrillation (CMS/HCC) [I48.0]  Yes   • Mixed hyperlipidemia [E78.2]  Yes   • Multiple lacunar infarcts [I63.81]  Yes   • History of stroke with residual deficit [I69.30]  Not Applicable   • PFO (patent foramen ovale) [Q21.1]  Not Applicable   • GERD without esophagitis [K21.9]  Yes   • Spinal stenosis in cervical  region [M48.02]  Yes   • Essential hypertension [I10]  Yes   • Crohn's colitis, with rectal bleeding (CMS/HCC) [K50.111]  Yes      Resolved Hospital Problems   No resolved problems to display.        Brief Hospital Course to date:  Vargas De is a 78 y.o. male with a past medical history significant for ulcerative colitis on chronic prednisone therapy, chronic pain with spinal stenosis of the cervical region, hyperlipidemia, hypertension, PFO, atrial fibrillation ( on Eliquis) mild dementia, and multiple lacunar infarcts who presents with acute change in mental status.  MRI negative for CVA.  EEG negative for seizures.  He was also found to have acute hypoxic respiratory failure was likely secondary to aspiration, he is status post Zosyn started 7/28 later transitioned to Augmentin for which she has completed 7 days course.  Blood cultures no growth to date.  Of note patient was found to have swollen uvula with exudate, CT scan showed narrowing of his airway ENT evaluated, recommend to continue PEEP if needed    Plan  -Acute hypoxic failure likely secondary to aspiration, resolved patient is completed his antibiotics course, overall minimally improved, SLP has evaluated  -Acute encephalopathy with suspected superimposed Lewy body dementia; worsened with sleep deprivation, hospital for improved after discontinuation of Seroquel and narcotics, neurology has since signed off, continue home gabapentin nightly only.  -Orthostatic hypotension, continue normal saline boluses as needed, will reassess.  -Diarrhea/loose stools, since improved, continue to follow  -Right humerus fracture, nonoperative management continue therapy as tolerated follow-up with Dr. Steinberg  -Tachycardia, likely junctional rhythm, cardiology was consulted pulmonary resume home bisoprolol, continue to monitor and replete electrolytes  -Paroxysmal atrial fibrillation, chads 2 vas= 5 continue Eliquis  -History of CVA, with residual weakness,  etiology was likely embolic followed by neurology and cardiology, continue Eliquis, statin.  -Hypertension, BP currently stable, consider resuming home amlodipine and lisinopril.  -Continue prednisone 5 mg daily for Crohn's/lumbar stenosis.  -Palliative care following to help with goals of care discussions.    DVT Prophylaxis: Eliquis    Disposition: TBD, needs acute rehab, however insurance has denied this, family currently has followed appeal.  CM following    CODE STATUS:   Code Status and Medical Interventions:   Ordered at: 07/28/19 1018     Limited Support to NOT Include:    Intubation     Level Of Support Discussed With:    Health Care Surrogate    Next of Kin (If No Surrogate)     Code Status:    No CPR     Medical Interventions (Level of Support Prior to Arrest):    Limited       Electronically signed by Bia Lundberg MD, 08/11/19, 8:40 AM.

## 2019-08-11 NOTE — PLAN OF CARE
Problem: Patient Care Overview  Goal: Plan of Care Review  Outcome: Ongoing (interventions implemented as appropriate)   08/11/19 0110   Coping/Psychosocial   Plan of Care Reviewed With patient;son   Plan of Care Review   Progress no change   OTHER   Outcome Summary pt is comfortable. pain controlled to tolerable level with tylenol, voltaren gel, and lidoderm patches. awaiting to see if pt will be approved for [facility]

## 2019-08-11 NOTE — PLAN OF CARE
Problem: Patient Care Overview  Goal: Plan of Care Review  Outcome: Ongoing (interventions implemented as appropriate)   08/11/19 0627   Coping/Psychosocial   Plan of Care Reviewed With patient   OTHER   Outcome Summary uneventful shift, pain controlled well tylenol and topical. bp stable, 2Lnc while sleeping, up to bathroom, moved well. small amount bleeding with BM, WOC to see today for fissure.       Problem: Fall Risk (Adult)  Goal: Absence of Fall  Outcome: Ongoing (interventions implemented as appropriate)   08/11/19 0627   Fall Risk (Adult)   Absence of Fall making progress toward outcome

## 2019-08-12 NOTE — PLAN OF CARE
Problem: Patient Care Overview  Goal: Plan of Care Review  Outcome: Ongoing (interventions implemented as appropriate)   08/12/19 7130   Coping/Psychosocial   Plan of Care Reviewed With patient   Plan of Care Review   Progress no change   OTHER   Outcome Summary Pt a/o x 4. VSS. Minimal complaints of pain this shift. Plan is to discharge with placement. Will continue to monitor.

## 2019-08-12 NOTE — THERAPY TREATMENT NOTE
Patient Name: Vargas De  : 1940    MRN: [id number]                              Today's Date: 2019       Admit Date: 2019    Visit Dx:     ICD-10-CM ICD-9-CM   1. Acute alteration in mental status R41.82 780.97   2. Generalized weakness R53.1 780.79   3. Other closed displaced fracture of proximal end of right humerus with routine healing, subsequent encounter S42.291D V54.11   4. Elevated blood pressure reading with diagnosis of hypertension I10 401.9   5. Impaired functional mobility, balance, gait, and endurance Z74.09 V49.89   6. Impaired mobility and ADLs Z74.09 799.89   7. Cognitive communication deficit R41.841 799.52   8. Oropharyngeal dysphagia R13.12 787.22     Patient Active Problem List   Diagnosis   • Peripheral neuropathy   • Essential hypertension   • Crohn's colitis, with rectal bleeding (CMS/HCC)   • Major depressive disorder with single episode, in partial remission (CMS/HCC)   • Macular degeneration of both eyes   • GERD without esophagitis   • Cervicalgia   • Spinal stenosis in cervical region   • Chronic right-sided low back pain without sciatica   • Osteoarthritis of toe joint, right   • PFO (patent foramen ovale)   • Ulcerative colitis (CMS/HCC)   • Body mass index (BMI) of 25.0 to 29.9   • History of stroke with residual deficit   • Mixed hyperlipidemia   • Multiple lacunar infarcts   • Paroxysmal atrial fibrillation (CMS/HCC)   • Chronic bilateral low back pain   • Acute alteration in mental status   • Fracture of proximal end of right humerus   • Acute respiratory distress   • Lewy body dementia     Past Medical History:   Diagnosis Date   • Allergic arthritis    • Arthritis    • BPH (benign prostatic hyperplasia)      urology   • BPH/nocturia/microhematuria (work up negative)    • chronic hearing loss left ear    • Crohn's colitis (CMS/HCC)     on colonoscopy 7/15.2017.  UC-continue Lialda   • Depression     medication, after death of wife    • Elbow pain  7/25/2018   • Gout    • Hearing loss     left ear   • History of stroke 7/23/2018   • Hyperlipidemia    • Hypertension    • Ingrown toenail 03/18/2018   • Low back pain    • Macular degeneration    • Macular degeneration    • Multiple lacunar infarcts     noted on CT 2/2019   • Neck pain    • Rash 1/28/2019   • Rib pain on left side 07/11/2017   • Septic bursitis of elbow, right 7/23/2018   • Spinal stenosis     cervical and lumbosacral spine   • Spinal stenosis-cervical and lumbosacral spine    • Stroke (CMS/HCC)     aspirin, blood pressure conyrol.  Infarct frontal lobe.  R sided weakness, R hand and R foot numbness,dysphasia with stuttering.   • Stroke (CMS/HCC) 02/27/2019    MCA infarct with L facial droop and L sided weakness,dysphasia,dysarthria   • TIA (transient ischemic attack) 2/27/2019   • Ulcerative colitis (CMS/HCC)    • Urinary frequency      Past Surgical History:   Procedure Laterality Date   • CATARACT EXTRACTION     • COLONOSCOPY  07/15/2017   • HEMORRHOIDECTOMY     • HERNIA REPAIR     • KNEE SURGERY     • TONSILLECTOMY       General Information     Row Name 08/12/19 1310          PT Evaluation Time/Intention    Document Type  therapy note (daily note)  -AA     Mode of Treatment  physical therapy  -AA     Row Name 08/12/19 1310          General Information    Patient Profile Reviewed?  yes  -AA     Row Name 08/12/19 1310          Safety Issues, Functional Mobility    Safety Issues Affecting Function (Mobility)  insight into deficits/self awareness;safety precaution awareness;safety precautions follow-through/compliance;sequencing abilities  -AA     Impairments Affecting Function (Mobility)  balance;coordination;endurance/activity tolerance;motor control;pain;strength  -AA       User Key  (r) = Recorded By, (t) = Taken By, (c) = Cosigned By    Initials Name Provider Type    Mere Mullins, PT Physical Therapist        Mobility     Row Name 08/12/19 1413          Bed Mobility Assessment/Treatment     Bed Mobility Assessment/Treatment  scooting/bridging;supine-sit  -AA     Scooting/Bridging Lucas (Bed Mobility)  contact guard  -AA     Supine-Sit [place] (Bed Mobility)  minimum assist (75% patient effort)  -AA     Assistive Device (Bed Mobility)  bed rails;head of bed elevated  -AA     Row Name 08/12/19 1413          Transfer Assessment/Treatment    Comment (Transfers)  no dizziness, VC for hand placement.  STSx5 reps for training and strengthening  -AA     Row Name 08/12/19 1413          Sit-Stand Transfer    Sit-Stand Lucas (Transfers)  minimum assist (75% patient effort)  -AA     Assistive Device (Sit-Stand Transfers)  other (see comments) LUE HHA  -AA     Row Name 08/12/19 1422 08/12/19 1413       Gait/Stairs Assessment/Training    93511 - Gait Training Minutes   16  -AA  --    Lucas Level (Gait)  --  minimum assist (75% patient effort);1 person assist;1 person to manage equipment  -AA    Assistive Device (Gait)  --  other (see comments) LUE HHA  -AA    Distance in Feet (Gait)  --  75x2  -AA    Deviations/Abnormal Patterns (Gait)  --  bilateral deviations;antalgic;festinating/shuffling;huber decreased;steppage;stride length decreased  -AA    Bilateral Gait Deviations  --  forward flexed posture;knee buckling, bilateral  -AA    Comment (Gait/Stairs)  --  pt ambulated with LUE HHA and chair follow  Decreased B knee buckle this date.  No dizziness reported.  VC for huber and step length B.  Sling RUE.    -AA    Row Name 08/12/19 1413          Mobility Assessment/Intervention    Extremity Weight-bearing Status  right upper extremity  -AA     Right Upper Extremity (Weight-bearing Status)  non weight-bearing (NWB)  -AA       User Key  (r) = Recorded By, (t) = Taken By, (c) = Cosigned By    Initials Name Provider Type    Mere Mullins PT Physical Therapist        Obj/Interventions     Row Name 08/12/19 1416          Therapeutic Exercise    Lower Extremity (Therapeutic Exercise)   gluteal sets;heel slides, bilateral;LAQ (long arc quad), bilateral;marching while seated;quad sets, bilateral;SLR (straight leg raise), bilateral  -AA     Lower Extremity Range of Motion (Therapeutic Exercise)  hip abduction/adduction, bilateral;ankle dorsiflexion/plantar flexion, bilateral  -AA     Exercise Type (Therapeutic Exercise)  AROM (active range of motion)  -AA     Position (Therapeutic Exercise)  seated  -AA     Sets/Reps (Therapeutic Exercise)  12  -AA     Row Name 08/12/19 1416          Static Sitting Balance    Level of [place] (Unsupported Sitting, Static Balance)  supervision  -AA     Sitting Position (Unsupported Sitting, Static Balance)  sitting on edge of bed  -AA     Time Able to Maintain Position (Unsupported Sitting, Static Balance)  more than 5 minutes  -AA     Row Name 08/12/19 1416          Dynamic Sitting Balance    Level of [place], Reaches Outside Midline (Sitting, Dynamic Balance)  minimal assist, 75% patient effort  -AA     Sitting Position, Reaches Outside Midline (Sitting, Dynamic Balance)  sitting on edge of bed  -AA     Row Name 08/12/19 1416          Static Standing Balance    Level of [place] (Supported Standing, Static Balance)  minimal assist, 75% patient effort  -AA     Time Able to Maintain Position (Supported Standing, Static Balance)  3 to 4 minutes  -AA     Assistive Device Utilized (Supported Standing, Static Balance)  other (see comments) DANUTA SINGH  -     Row Name 08/12/19 1416          Dynamic Standing Balance    Level of [place], Reaches Outside Midline (Standing, Dynamic Balance)  minimal assist, 75% patient effort;1 person assist;1 person to manage equipment  -AA     Time Able to Maintain Position, Reaches Outside Midline (Standing, Dynamic Balance)  1 to 2 minutes  -AA     Assistive Device Utilized (Supported Standing, Dynamic Balance)  other (see comments) DANUTA CLOVER  [facility]       User Key  (r) = Recorded By, (t) = Taken By, (c) = Cosigned By     Initials Name Provider Type    Mere Mullins L, PT Physical Therapist        Goals/Plan     Row Name 08/12/19 1419          Bed Mobility Goal 1 (PT)    Progress/Outcomes (Bed Mobility Goal 1, PT)  goal met  -AA     Row Name 08/12/19 1419          Transfer Goal 1 (PT)    Progress/Outcome (Transfer Goal 1, PT)  good progress toward goal  -AA       User Key  (r) = Recorded By, (t) = Taken By, (c) = Cosigned By    Initials Name Provider Type    Mere Mullins L, PT Physical Therapist        Clinical Impression     Row Name 08/12/19 1417          Pain Assessment    Additional Documentation  Pain Scale: Numbers Pre/Post-Treatment (Group)  -AA     Row Name 08/12/19 1417          Pain Scale: Numbers Pre/Post-Treatment    Pain Scale: Numbers, Pretreatment  7/10  -AA     Pain Scale: Numbers, Post-Treatment  7/10  -AA     Pain Location - Side  Right  -AA     Pain Location - Orientation  generalized  -AA     Pain Location  shoulder  -AA     Pain Intervention(s)  Cold pack;Cold applied;Ambulation/increased activity;Repositioned;Elevated  -AA     Row Name 08/12/19 1420 08/12/19 1417       Plan of Care Review    Plan of Care Reviewed With  patient;family  -AA  patient;family  -AA    Row Name 08/12/19 0815 08/12/19 0340       Plan of Care Review    Plan of Care Reviewed With  patient;family  -JS  patient  -JSA    Row Name 08/12/19 1417          Vital Signs    Pre Systolic BP Rehab  133  -AA     Pre Treatment Diastolic BP  72  -AA     Post Systolic BP Rehab  112  -AA     Post Treatment Diastolic BP  69  -AA     Row Name 08/12/19 1417          Positioning and Restraints    Pre-Treatment Position  in bed  -AA     Post Treatment Position  chair  -AA     In Chair  notified nsg;exit alarm on;waffle cushion;legs elevated;reclined;with family/caregiver;call light within reach;RUE elevated;encouraged to call for assist  -AA       User Key  (r) = Recorded By, (t) = Taken By, (c) = Cosigned By    Initials Name Provider Type    JUANITA Jurado  Lissy VELASQUEZ, RN Registered Nurse    Mere Mullins, PT Physical Therapist    Demetar Monge RN Registered Nurse        Outcome Measures     Row Name 08/12/19 1421          How much help from another person do you currently need...    Turning from your back to your side while in flat bed without using bedrails?  3  -AA     Moving from lying on back to sitting on the side of a flat bed without bedrails?  3  -AA     Moving to and from a bed to a chair (including a wheelchair)?  3  -AA     Standing up from a chair using your arms (e.g., wheelchair, bedside chair)?  3  -AA     Climbing 3-5 steps with a railing?  2  -AA     To walk in hospital room?  3  -AA     AM-PAC 6 Clicks Score (PT)  17  -AA       User Key  (r) = Recorded By, (t) = Taken By, (c) = Cosigned By    Initials Name Provider Type    Mere Mullins, PT Physical Therapist        Physical Therapy Education     Title: PT OT SLP Therapies (Done)     Topic: Physical Therapy (Done)     Point: Mobility training (Done)     Learning Progress Summary           Patient Eager, E,D, VU,DU,NR by BRANDON at 8/12/2019  2:19 PM    Eager, E,D, NR,VU by BRANDON at 8/11/2019  3:53 PM    Eager, E, NR by BRANDON at 8/10/2019  3:48 PM    Eager, E,D, VU,NR by BRANDON at 8/9/2019  1:54 PM    Eager, E, VU by KINGSLEY at 8/9/2019  2:38 AM    Acceptance, E,TB, VU,NR by JUSTIN at 8/7/2019  3:30 PM    Acceptance, E, VU,NR by JUSTIN at 8/6/2019 11:35 AM    Acceptance, E,TB, VU,NR by JUSTIN at 8/6/2019  9:36 AM    Acceptance, E, VU,NR by JUSTIN at 8/5/2019 10:14 AM    Acceptance, E,TB,D, VU,NR by MICKEY at 8/4/2019 10:20 AM    Comment:  Educated patient on safety with sit<> stand and ambulation. Educated patient to increase stride length with steps.    Acceptance, E,TB, VU,DU by MICKEY at 8/3/2019  4:41 PM    Comment:  Educated patient on transfer technique using sheet to sit patient up in bed. Educated patient and family on sling.    Acceptance, E, VU,NR by CD at 8/2/2019  3:49 PM    Comment:  SEE FLOW SHEET. SON INSTRUCTED IN LE  THER EX, POSITIONING FOR COMFORT.,    Eager, E, VU by KM at 8/1/2019 10:20 AM    Eager, E,D, VU,NR by AA at 7/31/2019  2:46 PM    Eager, E, NR by AA at 7/30/2019  3:17 PM    Acceptance, E, VU,NR by EJ at 7/29/2019 11:04 AM    Acceptance, E, VU,NR by CD at 7/25/2019  4:42 PM    Comment:  DTR INSTRUCTED IN ROM EXERCISES, POSITIONING FOR COMFORT/ PROTECTION OF R UE, BENEFITS OF OOB ACTIVITY, D/C PLANNING,    Acceptance, E, VU,NR by CD at 7/24/2019 10:36 AM    Comment:  SEE FLOWSHEET.   Family Eager, E,D, VU,DU,NR by AA at 8/12/2019  2:19 PM    Eager, E,D, NR,VU by AA at 8/11/2019  3:53 PM    Eager, E, NR by AA at 8/10/2019  3:48 PM    Eager, E,D, VU,NR by AA at 8/9/2019  1:54 PM    Acceptance, E,TB, VU,NR by EJ at 8/7/2019  3:30 PM    Acceptance, E,TB, VU,NR by EJ at 8/6/2019  9:36 AM    Acceptance, E,TB,D, VU,NR by CS at 8/4/2019 10:20 AM    Comment:  Educated patient on safety with sit<> stand and ambulation. Educated patient to increase stride length with steps.    Acceptance, E,TB, VU,DU by CS at 8/3/2019  4:41 PM    Comment:  Educated patient on transfer technique using sheet to sit patient up in bed. Educated patient and family on sling.    Acceptance, E, VU,NR by CD at 8/2/2019  3:49 PM    Comment:  SEE FLOW SHEET. SON INSTRUCTED IN LE THER EX, POSITIONING FOR COMFORT.,    Eager, E,D, VU,NR by AA at 7/31/2019  2:46 PM    Acceptance, E, VU,NR by CD at 7/25/2019  4:42 PM    Comment:  DTR INSTRUCTED IN ROM EXERCISES, POSITIONING FOR COMFORT/ PROTECTION OF R UE, BENEFITS OF OOB ACTIVITY, D/C PLANNING,                   Point: Home exercise program (Done)     Learning Progress Summary           Patient Eager, E,D, VU,DU,NR by BRANDON at 8/12/2019  2:19 PM    DEISY Weaver D, NR,VU by BRANDON at 8/11/2019  3:53 PM    DEISY Weaver VU by KINGSLEY at 8/9/2019  2:38 AM    Acceptance, RENEA OLIVAS VU,NR by JUSTIN at 8/7/2019  3:30 PM    Acceptance, MARITZA OLIVAS,NR by JUSTIN at 8/6/2019 11:35 AM    Acceptance, RENEA OLIVAS VU,NR by JUSTIN at 8/6/2019  9:36 AM    Acceptance, DEISY  VU,NR by EJ at 8/5/2019 10:14 AM    Acceptance, E,TB,D, VU,NR by CS at 8/4/2019 10:20 AM    Comment:  Educated patient on safety with sit<> stand and ambulation. Educated patient to increase stride length with steps.    Acceptance, E,TB, VU,DU by CS at 8/3/2019  4:41 PM    Comment:  Educated patient on transfer technique using sheet to sit patient up in bed. Educated patient and family on sling.    Acceptance, E, VU,NR by CD at 8/2/2019  3:49 PM    Comment:  SEE FLOW SHEET. SON INSTRUCTED IN LE THER EX, POSITIONING FOR COMFORT.,    Eager, E, VU by KM at 8/1/2019 10:20 AM    Acceptance, E, VU,NR by EJ at 7/29/2019 11:04 AM    Acceptance, E, VU,NR by CD at 7/25/2019  4:42 PM    Comment:  DTR INSTRUCTED IN ROM EXERCISES, POSITIONING FOR COMFORT/ PROTECTION OF R UE, BENEFITS OF OOB ACTIVITY, D/C PLANNING,    Acceptance, E, VU,NR by CD at 7/24/2019 10:36 AM    Comment:  SEE FLOWSHEET.   Family Eager, E,D, VU,DU,NR by AA at 8/12/2019  2:19 PM    Eager, E,D, NR,VU by AA at 8/11/2019  3:53 PM    Acceptance, E,TB, VU,NR by JUSTIN at 8/7/2019  3:30 PM    Acceptance, E,TB, VU,NR by JUSTIN at 8/6/2019  9:36 AM    Acceptance, E,TB,D, VU,NR by MICKEY at 8/4/2019 10:20 AM    Comment:  Educated patient on safety with sit<> stand and ambulation. Educated patient to increase stride length with steps.    Acceptance, E,TB, VU,DU by CS at 8/3/2019  4:41 PM    Comment:  Educated patient on transfer technique using sheet to sit patient up in bed. Educated patient and family on sling.    Acceptance, E, VU,NR by CD at 8/2/2019  3:49 PM    Comment:  SEE FLOW SHEET. SON INSTRUCTED IN LE THER EX, POSITIONING FOR COMFORT.,    Acceptance, E, VU,NR by CD at 7/25/2019  4:42 PM    Comment:  DTR INSTRUCTED IN ROM EXERCISES, POSITIONING FOR COMFORT/ PROTECTION OF R UE, BENEFITS OF OOB ACTIVITY, D/C PLANNING,                   Point: Body mechanics (Done)     Learning Progress Summary           Patient Meg, TUSHAR OLIVAS, MARITZA,DU,NR by AA at 8/12/2019  2:19 PM    Meg,  E,D, NR,VU by AA at 8/11/2019  3:53 PM    Eager, E, NR by AA at 8/10/2019  3:48 PM    Eager, E,D, VU,NR by AA at 8/9/2019  1:54 PM    Eager, E, VU by KINGSLEY at 8/9/2019  2:38 AM    Acceptance, E,TB, VU,NR by EJ at 8/7/2019  3:30 PM    Acceptance, E, VU,NR by EJ at 8/6/2019 11:35 AM    Acceptance, E,TB, VU,NR by EJ at 8/6/2019  9:36 AM    Acceptance, E, VU,NR by EJ at 8/5/2019 10:14 AM    Acceptance, E,TB,D, VU,NR by MICKEY at 8/4/2019 10:20 AM    Comment:  Educated patient on safety with sit<> stand and ambulation. Educated patient to increase stride length with steps.    Acceptance, E,TB, VU,DU by CS at 8/3/2019  4:41 PM    Comment:  Educated patient on transfer technique using sheet to sit patient up in bed. Educated patient and family on sling.    Acceptance, E, VU,NR by CD at 8/2/2019  3:49 PM    Comment:  SEE FLOW SHEET. SON INSTRUCTED IN LE THER EX, POSITIONING FOR COMFORT.,    Eager, E, VU by TANK at 8/1/2019 10:20 AM    Eager, E,D, VU,NR by AA at 7/31/2019  2:46 PM    Eager, E, NR by AA at 7/30/2019  3:17 PM    Acceptance, E, VU,NR by EJ at 7/29/2019 11:04 AM    Acceptance, E, VU,NR by CD at 7/25/2019  4:42 PM    Comment:  DTR INSTRUCTED IN ROM EXERCISES, POSITIONING FOR COMFORT/ PROTECTION OF R UE, BENEFITS OF OOB ACTIVITY, D/C PLANNING,    Acceptance, E, VU,NR by CD at 7/24/2019 10:36 AM    Comment:  SEE FLOWSHEET.   Family Eager, E,D, VU,DU,NR by AA at 8/12/2019  2:19 PM    Eager, E,D, NR,VU by AA at 8/11/2019  3:53 PM    Eager, E, NR by AA at 8/10/2019  3:48 PM    Eager, E,D, VU,NR by AA at 8/9/2019  1:54 PM    Acceptance, E,TB, VU,NR by JUSTIN at 8/7/2019  3:30 PM    Acceptance, E,TB, VU,NR by JUSTIN at 8/6/2019  9:36 AM    Acceptance, E,TB,D, VU,NR by CS at 8/4/2019 10:20 AM    Comment:  Educated patient on safety with sit<> stand and ambulation. Educated patient to increase stride length with steps.    Acceptance, E,TB, VU,DU by CS at 8/3/2019  4:41 PM    Comment:  Educated patient on transfer technique using sheet to  sit patient up in bed. Educated patient and family on sling.    Acceptance, E, VU,NR by CD at 8/2/2019  3:49 PM    Comment:  SEE FLOW SHEET. SON INSTRUCTED IN LE THER EX, POSITIONING FOR COMFORT.,    Eager, E,D, VU,NR by AA at 7/31/2019  2:46 PM    Acceptance, E, VU,NR by CD at 7/25/2019  4:42 PM    Comment:  DTR INSTRUCTED IN ROM EXERCISES, POSITIONING FOR COMFORT/ PROTECTION OF R UE, BENEFITS OF OOB ACTIVITY, D/C PLANNING,                   Point: Precautions (Done)     Learning Progress Summary           Patient Eager, E,D, VU,DU,NR by AA at 8/12/2019  2:19 PM    Eager, E,D, NR,VU by AA at 8/11/2019  3:53 PM    Eager, E, NR by AA at 8/10/2019  3:48 PM    Eager, E,D, VU,NR by AA at 8/9/2019  1:54 PM    Eager, E, VU by CJ at 8/9/2019  2:38 AM    Acceptance, E,TB, VU,NR by EJ at 8/7/2019  3:30 PM    Acceptance, E, VU,NR by EJ at 8/6/2019 11:35 AM    Acceptance, E,TB, VU,NR by EJ at 8/6/2019  9:36 AM    Acceptance, E, VU,NR by EJ at 8/5/2019 10:14 AM    Acceptance, E,TB,D, VU,NR by CS at 8/4/2019 10:20 AM    Comment:  Educated patient on safety with sit<> stand and ambulation. Educated patient to increase stride length with steps.    Acceptance, E,TB, VU,DU by CS at 8/3/2019  4:41 PM    Comment:  Educated patient on transfer technique using sheet to sit patient up in bed. Educated patient and family on sling.    Acceptance, E, VU,NR by CD at 8/2/2019  3:49 PM    Comment:  SEE FLOW SHEET. SON INSTRUCTED IN LE THER EX, POSITIONING FOR COMFORT.,    Eager, E, VU by KM at 8/1/2019 10:20 AM    Eager, E,D, VU,NR by AA at 7/31/2019  2:46 PM    Eager, E, NR by AA at 7/30/2019  3:17 PM    Acceptance, E, VU,NR by EJ at 7/29/2019 11:04 AM    DEISY Lawson VU, NR by CD at 7/25/2019  4:42 PM    Comment:  DTR INSTRUCTED IN ROM EXERCISES, POSITIONING FOR COMFORT/ PROTECTION OF R UE, BENEFITS OF OOB ACTIVITY, D/C PLANNING,    DEISY Lawson VU, NR by CD at 7/24/2019 10:36 AM    Comment:  SEE FLOWSHEET.   Family DEISY Weaver D, VU, DUNR by  AA at 8/12/2019  2:19 PM    Eager, E,D, NR,VU by AA at 8/11/2019  3:53 PM    Eager, E, NR by AA at 8/10/2019  3:48 PM    Eager, E,D, VU,NR by AA at 8/9/2019  1:54 PM    Acceptance, E,TB, VU,NR by JUSTIN at 8/7/2019  3:30 PM    Acceptance, E,TB, VU,NR by JUSTIN at 8/6/2019  9:36 AM    Acceptance, E,TB,D, VU,NR by MICKEY at 8/4/2019 10:20 AM    Comment:  Educated patient on safety with sit<> stand and ambulation. Educated patient to increase stride length with steps.    Acceptance, E,TB, VU,DU by MICKEY at 8/3/2019  4:41 PM    Comment:  Educated patient on transfer technique using sheet to sit patient up in bed. Educated patient and family on sling.    Acceptance, E, VU,NR by CD at 8/2/2019  3:49 PM    Comment:  SEE FLOW SHEET. SON INSTRUCTED IN LE THER EX, POSITIONING FOR COMFORT.,    Eager, E,D, VU,NR by AA at 7/31/2019  2:46 PM    Acceptance, E, VU,NR by CD at 7/25/2019  4:42 PM    Comment:  DTR INSTRUCTED IN ROM EXERCISES, POSITIONING FOR COMFORT/ PROTECTION OF R UE, BENEFITS OF OOB ACTIVITY, D/C PLANNING,                               User Key     Initials Effective Dates Name Provider Type Discipline    CD 06/19/15 -  Gogo Thibodeaux, PT Physical Therapist PT    EJ 11/20/18 -  Nurys Chahal, PT Physical Therapist PT    KM 06/19/15 -  Denise Hdz, PT Physical Therapist PT    AA 04/02/18 -  Mere Ramon, PT Physical Therapist PT    CJ 09/12/18 -  Daksha Chahal, RN Registered Nurse Nurse     03/26/19 -  Roz Phoenix PT Physical Therapist PT              PT Recommendation and Plan     Outcome Summary/Treatment Plan (PT)  Daily Summary of Progress (PT): progress toward functional goals as expected  Anticipated Discharge Disposition (PT): inpatient rehabilitation facility  Plan of Care Reviewed With: patient, family  Progress: improving  Outcome Summary: Pt presents with increased shoulder pain R.  No dizziness reported with VSS.  Pt perform bed mobility with min A.  Transfer CGA to min A.  Gait 75'x2  with DANUTA SINGH VC for BLE step length, posture, and huber with chair follow.  Plan to DC to [facility] tomorrow     Time Calculation:   PT Charges     Row Name 08/12/19 1422             Time Calculation    Start Time  1310  -AA      PT Received On  08/12/19  -AA      PT Goal Re-Cert Due Date  08/15/19  -AA         Time Calculation- PT    Total Timed Code Minutes- PT  39 minute(s)  -AA         Timed Charges    67257 - PT Therapeutic Exercise Minutes  15  -AA      50126 - Gait Training Minutes   16  -AA      15636 - PT Therapeutic Activity Minutes  8  -AA        User Key  (r) = Recorded By, (t) = Taken By, (c) = Cosigned By    Initials Name Provider Type    AA Mere Ramon, PT Physical Therapist        Therapy Charges for Today     Code Description Service Date Service Provider Modifiers Qty    75251281787 HC PT THER PROC EA 15 MIN 8/11/2019 Mere Ramon, PT GP 1    38906322164 HC GAIT TRAINING EA 15 MIN 8/11/2019 Mere Ramon, PT GP 1    20474165010 HC PT THERAPEUTIC ACT EA 15 MIN 8/11/2019 Mere Ramon, PT GP 1    87464578439 HC PT THER PROC EA 15 MIN 8/12/2019 Mere Ramon, PT GP 1    17469690692 HC GAIT TRAINING EA 15 MIN 8/12/2019 Mere Ramon, PT GP 1    52227942684 HC PT THERAPEUTIC ACT EA 15 MIN 8/12/2019 Mere Ramon, PT GP 1          PT G-Codes  Outcome Measure Options: AM-PAC 6 Clicks Basic Mobility (PT)  AM-PAC 6 Clicks Score (PT): 17  AM-PAC 6 Clicks Score (OT): 12  Modified [place] Scale: 4 - Moderately severe disability.  Unable to walk without assistance, and unable to attend to own bodily needs without assistance.    Mere Ramon PT  8/12/2019

## 2019-08-12 NOTE — PLAN OF CARE
Problem: Patient Care Overview  Goal: Plan of Care Review  Outcome: Ongoing (interventions implemented as appropriate)   08/12/19 1420   Coping/Psychosocial   Plan of Care Reviewed With patient;family   Plan of Care Review   Progress improving   OTHER   Outcome Summary Pt presents with increased shoulder pain R. No dizziness reported with VSS. Pt perform bed mobility with min A. Transfer CGA to min A. Gait 75'x2 with DANUTA SINGH, VC for BLE step length, posture, and huber with chair follow. Plan to DC to [facility] tomorrow

## 2019-08-12 NOTE — PROGRESS NOTES
Continued Stay Note  [facility]     Patient Name: Vargas De  MRN: [id number]  Today's Date: 8/12/2019    Admit Date: 7/23/2019    Discharge Plan     Row Name 08/12/19 1703       Plan    Plan  Inpatient rehab at [facility]    Patient/Family in Agreement with Plan  yes    Plan Comments  Spoke with Maria at [facility].  They have been notified of family [facility] approval.   Facility has bed for patient for tomorrow 8/13 on the Med unit.  Family will transport patient in private vehicle.  Case management will continue to follow for discharge planning.      Final Discharge Disposition Code  62 - inpatient rehab facility        Discharge Codes    No documentation.       Expected Discharge Date and Time     Expected Discharge Date Expected Discharge Time    Aug 13, 2019             Olesya Barton RN

## 2019-08-12 NOTE — PROGRESS NOTES
[facility] Medicine Services  PROGRESS NOTE    Patient Name: Vargas De  : 1940  MRN: [id number]    Date of Admission: 2019  Length of Stay: 20  Primary Care Physician: Saba Arrington MD    Subjective   Subjective     CC:f/u AMS    HPI: No acute events overnight, patient slept decently okay, continues to have right shoulder pain.    Review of Systems  Gen- No fevers, chills  CV- No chest pain, palpitations  Resp- No cough, dyspnea  GI- No N/V/D, abd pain  Otherwise ROS is negative except as mentioned in the HPI.    Objective   Objective     Vital Signs:   Temp:  [97.4 °F (36.3 °C)-98.3 °F (36.8 °C)] 97.4 °F (36.3 °C)  Heart Rate:  [74-78] 74  Resp:  [16-18] 18  BP: (109-147)/(69-93) 147/93  Total (NIH Stroke Scale): 9     Physical Exam:  Constitutional: No acute distress, awake, alert  HENT: NCAT, mucous membranes moist  Respiratory: Clear to auscultation bilaterally, respiratory effort normal   Cardiovascular: RRR, no murmurs, rubs, or gallops, palpable pedal pulses bilaterally  Gastrointestinal: Positive bowel sounds, soft, nontender, nondistended  Musculoskeletal: No bilateral ankle edema, right arm in sling  Psychiatric: Appropriate affect, cooperative  Neurologic: Oriented x 3, no focal deficits   skin: No rashes    Results Reviewed:  I have personally reviewed current lab, radiology, and data and agree.    Results from last 7 days   Lab Units 19  0716   WBC 10*3/mm3 7.72   HEMOGLOBIN g/dL 12.9*   HEMATOCRIT % 40.9   PLATELETS 10*3/mm3 361     Results from last 7 days   Lab Units 19  0716   SODIUM mmol/L 138   POTASSIUM mmol/L 3.6   CHLORIDE mmol/L 104   CO2 mmol/L 25.0   BUN mg/dL 7*   CREATININE mg/dL 0.62*   GLUCOSE mg/dL 97   CALCIUM mg/dL 8.8     Estimated Creatinine Clearance: 81.5 mL/min (A) (by C-G formula based on SCr of 0.62 mg/dL (L)).    Microbiology Results Abnormal     Procedure Component Value - Date/Time    Blood Culture - Blood, Arm,  Left [[id number]] Collected:  07/27/19 0152    Lab Status:  Final result Specimen:  Blood from Arm, Left Updated:  08/01/19 0431     Blood Culture No growth at 5 days    Blood Culture - Blood, Hand, Left [[id number]] Collected:  07/27/19 0147    Lab Status:  Final result Specimen:  Blood from Hand, Left Updated:  08/01/19 0245     Blood Culture No growth at 5 days    Urine Culture - Urine, Urine, Clean Catch [[id number]]  (Normal) Collected:  07/28/19 1430    Lab Status:  Final result Specimen:  Urine, Clean Catch Updated:  07/29/19 1856     Urine Culture No growth    Beta Strep Culture, Throat - Swab, Throat [[id number]]  (Normal) Collected:  07/27/19 1630    Lab Status:  Final result Specimen:  Swab from Throat Updated:  07/29/19 1355     Throat Culture, Beta Strep No Beta Hemolytic Streptococcus Isolated    Narrative:       Group A Strep incidence is low in adults. Positive culture for Beta hemolytic Streptococcus species can reflect colonization and not true infection. Please correlate clinically.    Blood Culture - Blood, Arm, Left [[id number]] Collected:  07/23/19 1334    Lab Status:  Final result Specimen:  Blood from Arm, Left Updated:  07/28/19 1415     Blood Culture No growth at 5 days    Blood Culture - Blood, Hand, Right [[id number]] Collected:  07/23/19 1341    Lab Status:  Final result Specimen:  Blood from Hand, Right Updated:  07/28/19 1415     Blood Culture No growth at 5 days    Rapid Strep A Screen - Swab, Throat [[id number]]  (Normal) Collected:  07/27/19 1630    Lab Status:  Final result Specimen:  Swab from Throat Updated:  07/27/19 1727     Strep A Ag Negative    Narrative:       Test performed by Direct Antigen Testing.          Imaging Results (last 24 hours)     ** No results found for the last 24 hours. **          Results for orders placed during the hospital encounter of 07/23/19   Adult Transthoracic Echo Complete W/ Cont if Necessary Per Protocol    Narrative · Left ventricular systolic  function is normal. Estimated EF = 60%.  · Left ventricular diastolic dysfunction (grade I) consistent with   impaired relaxation.  · Left atrial cavity size is borderline dilated.  · There is mild calcification of the aortic valve.          I have reviewed the medications:  Scheduled Meds:    acetaminophen 650 mg Oral Q6H   apixaban 5 mg Oral Q12H   artificial tears  Both Eyes Nightly   atorvastatin 80 mg Oral Nightly   bisoprolol 5 mg Oral Q24H   diclofenac 2 g Topical 4x Daily   DULoxetine 20 mg Oral Daily   gabapentin 300 mg Oral Nightly   lidocaine 2 patch Transdermal Nightly   memantine 10 mg Oral Q12H   mesalamine 0.75 g Oral Nightly   miconazole  Topical BID   multivitamins-minerals 1 capsule Oral BID   nystatin  Topical Q12H   pantoprazole 40 mg Oral Q AM   predniSONE 5 mg Oral Daily With Breakfast   saccharomyces boulardii 250 mg Oral BID   sodium chloride 3 mL Intravenous Q12H     Continuous Infusions:   PRN Meds:.•  acetaminophen  •  calcium carbonate  •  gabapentin  •  hydrOXYzine  •  ipratropium-albuterol  •  loperamide  •  magic mouthwash  •  melatonin  •  phenol  •  polyvinyl alcohol  •  potassium chloride  •  potassium chloride  •  [DISCONTINUED] potassium chloride **OR** [DISCONTINUED] potassium chloride **OR** potassium chloride  •  saline  •  sodium chloride  •  sodium chloride      Assessment/Plan   Assessment / Plan     Active Hospital Problems    Diagnosis  POA   • **Acute alteration in mental status [R41.82]  Yes   • Lewy body dementia [G31.83, F02.80]  Yes   • Acute respiratory distress [R06.03]  Yes   • Fracture of proximal end of right humerus [S42.201A]  Yes   • Paroxysmal atrial fibrillation (CMS/HCC) [I48.0]  Yes   • Mixed hyperlipidemia [E78.2]  Yes   • Multiple lacunar infarcts [I63.81]  Yes   • History of stroke with residual deficit [I69.30]  Not Applicable   • PFO (patent foramen ovale) [Q21.1]  Not Applicable   • GERD without esophagitis [K21.9]  Yes   • Spinal stenosis in  cervical region [M48.02]  Yes   • Essential hypertension [I10]  Yes   • Crohn's colitis, with rectal bleeding (CMS/HCC) [K50.111]  Yes      Resolved Hospital Problems   No resolved problems to display.        Brief Hospital Course to date:  Vargas De is a 78 y.o. male with a past medical history significant for ulcerative colitis on chronic prednisone therapy, chronic pain with spinal stenosis of the cervical region, hyperlipidemia, hypertension, PFO, atrial fibrillation ( on Eliquis) mild dementia, and multiple lacunar infarcts who presents with acute change in mental status.  MRI negative for CVA.  EEG negative for seizures.  He was also found to have acute hypoxic respiratory failure was likely secondary to aspiration, he is status post Zosyn started 7/28 later transitioned to Augmentin for which she has completed 7 days course.  Blood cultures no growth to date.  Of note patient was found to have swollen uvula with exudate, CT scan showed narrowing of his airway ENT evaluated, recommend to continue PEEP if needed     Plan  -Acute hypoxic failure likely secondary to aspiration, resolved, patient has completed his antibiotics course, overall improved, SLP has evaluated  -Acute encephalopathy with suspected superimposed Lewy body dementia; worsened with sleep deprivation, this has since improved after discontinuation of Seroquel and narcotics, neurology has since signed off, continue home gabapentin nightly only.  -Orthostatic hypotension, continue normal saline boluses as needed, will reassess.  -Diarrhea/loose stools, improved, continue to follow  -Right humerus fracture, nonoperative management continue therapy as tolerated follow-up with Dr. Steinberg, continue sling  -Tachycardia, likely junctional rhythm, cardiology was consulted rec to resume home bisoprolol, continue to monitor and replete electrolytes  -Paroxysmal atrial fibrillation, chads 2 vas= 5 continue Eliquis  -History of CVA, with residual  weakness, etiology was likely embolic followed by neurology and cardiology, continue Eliquis, statin.  -Hypertension, BP currently stable, consider resuming home amlodipine and lisinopril if orthostasis is improved  -Continue prednisone 5 mg daily for Crohn's/lumbar stenosis.  -Palliative care following to help with goals of care discussions.     DVT Prophylaxis: Eliquis     Disposition: TBD, needs acute rehab, however insurance has denied this, family currently has filed an appeal.  CM following     CODE STATUS:   Code Status and Medical Interventions:   Ordered at: 07/28/19 1018     Limited Support to NOT Include:    Intubation     Level Of Support Discussed With:    Health Care Surrogate    Next of Kin (If No Surrogate)     Code Status:    No CPR     Medical Interventions (Level of Support Prior to Arrest):    Limited         Electronically signed by Bia Lundberg MD, 08/12/19, 9:09 AM.

## 2019-08-12 NOTE — PLAN OF CARE
Problem: Patient Care Overview  Goal: Interprofessional Rounds/Family Conf  Outcome: Ongoing (interventions implemented as appropriate)  Palliative Team Attendance 1300. Blayne MURCIA, Brandee Moran RN CHPN, , Sherry Javier LCSW, Raulito Ruffin MDIV   08/12/19 1300   Interdisciplinary Rounds/Family Conf   Summary pt is planning to go to [facility] tomorrow. Pt seen by palliative care  Raulito Ruffin MDIV today

## 2019-08-13 NOTE — THERAPY TREATMENT NOTE
Patient Name: Vargas De  : 1940    MRN: [id number]                              Today's Date: 2019       Admit Date: 2019    Visit Dx:     ICD-10-CM ICD-9-CM   1. Acute alteration in mental status R41.82 780.97   2. Generalized weakness R53.1 780.79   3. Other closed displaced fracture of proximal end of right humerus with routine healing, subsequent encounter S42.291D V54.11   4. Elevated blood pressure reading with diagnosis of hypertension I10 401.9   5. Impaired functional mobility, balance, gait, and endurance Z74.09 V49.89   6. Impaired mobility and ADLs Z74.09 799.89   7. Cognitive communication deficit R41.841 799.52   8. Oropharyngeal dysphagia R13.12 787.22     Patient Active Problem List   Diagnosis   • Peripheral neuropathy   • Essential hypertension   • Crohn's colitis, with rectal bleeding (CMS/HCC)   • Major depressive disorder with single episode, in partial remission (CMS/HCC)   • Macular degeneration of both eyes   • GERD without esophagitis   • Cervicalgia   • Spinal stenosis in cervical region   • Chronic right-sided low back pain without sciatica   • Osteoarthritis of toe joint, right   • PFO (patent foramen ovale)   • Ulcerative colitis (CMS/HCC)   • Body mass index (BMI) of 25.0 to 29.9   • History of stroke with residual deficit   • Mixed hyperlipidemia   • Multiple lacunar infarcts   • Paroxysmal atrial fibrillation (CMS/HCC)   • Chronic bilateral low back pain   • Acute alteration in mental status   • Fracture of proximal end of right humerus   • Acute respiratory distress   • Lewy body dementia     Past Medical History:   Diagnosis Date   • Allergic arthritis    • Arthritis    • BPH (benign prostatic hyperplasia)      urology   • BPH/nocturia/microhematuria (work up negative)    • chronic hearing loss left ear    • Crohn's colitis (CMS/HCC)     on colonoscopy 7/15.2017.  UC-continue Lialda   • Depression     medication, after death of wife    • Elbow pain  7/25/2018   • Gout    • Hearing loss     left ear   • History of stroke 7/23/2018   • Hyperlipidemia    • Hypertension    • Ingrown toenail 03/18/2018   • Low back pain    • Macular degeneration    • Macular degeneration    • Multiple lacunar infarcts     noted on CT 2/2019   • Neck pain    • Rash 1/28/2019   • Rib pain on left side 07/11/2017   • Septic bursitis of elbow, right 7/23/2018   • Spinal stenosis     cervical and lumbosacral spine   • Spinal stenosis-cervical and lumbosacral spine    • Stroke (CMS/[facility])     aspirin, blood pressure conyrol.  Infarct frontal lobe.  R sided weakness, R hand and R foot numbness,dysphasia with stuttering.   • Stroke (CMS/HCC) 02/27/2019    MCA infarct with L facial droop and L sided weakness,dysphasia,dysarthria   • TIA (transient ischemic attack) 2/27/2019   • Ulcerative colitis (CMS/[facility])    • Urinary frequency      Past Surgical History:   Procedure Laterality Date   • CATARACT EXTRACTION     • COLONOSCOPY  07/15/2017   • HEMORRHOIDECTOMY     • HERNIA REPAIR     • KNEE SURGERY     • TONSILLECTOMY       General Information     Row Name 08/13/19 0921          PT Evaluation Time/Intention    Document Type  therapy note (daily note)  -AS     Mode of Treatment  physical therapy  -AS     Row Name 08/13/19 0921          General Information    Patient Profile Reviewed?  yes  -AS     Existing Precautions/Restrictions  fall;non-weight bearing NWB RUE  -AS     Barriers to Rehab  medically complex  -AS     Row Name 08/13/19 0921          Cognitive Assessment/Intervention- PT/OT    Orientation Status (Cognition)  oriented x 3  -AS     Personal Safety Interventions  gait belt;fall prevention program maintained;nonskid shoes/slippers when out of bed  -AS     Row Name 08/13/19 0921          Safety Issues, Functional Mobility    Safety Issues Affecting Function (Mobility)  insight into deficits/self awareness;safety precaution awareness;safety precautions follow-through/compliance;sequencing  abilities  -AS     Impairments Affecting Function (Mobility)  balance;coordination;endurance/activity tolerance;motor control;pain;strength  -AS       User Key  (r) = Recorded By, (t) = Taken By, (c) = Cosigned By    Initials Name Provider Type    AS Darling Epstein PTA Physical Therapy Assistant        Mobility     Row Name 08/13/19 0923          Bed Mobility Assessment/Treatment    Supine-Sit [place] (Bed Mobility)  verbal cues;minimum assist (75% patient effort)  -AS     Sit-Supine [place] (Bed Mobility)  not tested  -AS     Assistive Device (Bed Mobility)  bed rails;head of bed elevated  -AS     Comment (Bed Mobility)  verbal cues for sequencing, assist at trunk to reach EOB  -AS     Row Name 08/13/19 0923          Bed-Chair Transfer    Bed-Chair [place] (Transfers)  verbal cues;minimum assist (75% patient effort)  -AS     Assistive Device (Bed-Chair Transfers)  -- L UE support and gait belt  -AS     Row Name 08/13/19 0923          Sit-Stand Transfer    Sit-Stand [place] (Transfers)  verbal cues;minimum assist (75% patient effort)  -AS     Assistive Device (Sit-Stand Transfers)  -- LUE support and gait belt  -AS     Row Name 08/13/19 0923          Gait/Stairs Assessment/Training    [place] Level (Gait)  verbal cues;minimum assist (75% patient effort)  -AS     Assistive Device (Gait)  -- B UE support and gait belt  -AS     Distance in Feet (Gait)  75  -AS     Deviations/Abnormal Patterns (Gait)  bilateral deviations;antalgic;festinating/shuffling;huber decreased;steppage;stride length decreased  -AS     Comment (Gait/Stairs)  patient ambulated 75 feet with min assist x1 and HHA on the left, distance limited by weakness and right shoulder pain( sling RUE). Tech followed with chair for safety.  -AS     Row Name 08/13/19 0923          Mobility Assessment/Intervention    Right Upper Extremity (Weight-bearing Status)  non weight-bearing (NWB)  -AS       User Key  (r) = Recorded By, (t)  = Taken By, (c) = Cosigned By    Initials Name Provider Type    AS Darling Epstein PTA Physical Therapy Assistant        Obj/Interventions     Row Name 08/13/19 0929          Therapeutic Exercise    Lower Extremity (Therapeutic Exercise)  marching while seated;LAQ (long arc quad), bilateral  -AS     Lower Extremity Range of Motion (Therapeutic Exercise)  ankle dorsiflexion/plantar flexion, bilateral  -AS     Exercise Type (Therapeutic Exercise)  AROM (active range of motion)  -AS     Position (Therapeutic Exercise)  seated  -AS     Sets/Reps (Therapeutic Exercise)  1/10  -AS       User Key  (r) = Recorded By, (t) = Taken By, (c) = Cosigned By    Initials Name Provider Type    AS Darling Epstein PTA Physical Therapy Assistant        Goals/Plan    No documentation.       Clinical Impression     Row Name 08/13/19 0930          Pain Assessment    Additional Documentation  Pain Scale: FACES Pre/Post-Treatment (Group)  -AS     [facility] Name 08/13/19 0930          Pain Scale: Numbers Pre/Post-Treatment    Pain Scale: Numbers, Pretreatment  2/10  -AS     Pain Scale: Numbers, Post-Treatment  2/10  -AS     Pain Location - Side  Right  -AS     Pain Location - Orientation  upper  -AS     Pain Location  shoulder  -AS     Pain Intervention(s)  Medication (See MAR);Ambulation/increased activity;Repositioned  -AS     Row Name 08/13/19 0931 08/13/19 0815       Plan of Care Review    Plan of Care Reviewed With  patient  -AS  patient;daughter  -JS      User Key  (r) = Recorded By, (t) = Taken By, (c) = Cosigned By    Initials Name Provider Type    AS Darling Epstein PTA Physical Therapy Assistant    Lissy Goff RN Registered Nurse        Outcome Measures     Row Name 08/13/19 0931          How much help from another person do you currently need...    Turning from your back to your side while in flat bed without using bedrails?  3  -AS     Moving from lying on back to sitting on the side of a flat bed without  bedrails?  3  -AS     Moving to and from a bed to a chair (including a wheelchair)?  3  -AS     Standing up from a chair using your arms (e.g., wheelchair, bedside chair)?  3  -AS     Climbing 3-5 steps with a railing?  2  -AS     To walk in hospital room?  3  -AS     AM-PAC 6 Clicks Score (PT)  17  -AS     Row Name 08/13/19 0931          Functional Assessment    Outcome Measure Options  AM-PAC 6 Clicks Basic Mobility (PT)  -AS       User Key  (r) = Recorded By, (t) = Taken By, (c) = Cosigned By    Initials Name Provider Type    AS Darling Epstein, TREVOR Physical Therapy Assistant        Physical Therapy Education     Title: PT OT SLP Therapies (In Progress)     Topic: Physical Therapy (In Progress)     Point: Mobility training (In Progress)     Learning Progress Summary           Patient Acceptance, E, NR by AS at 8/13/2019  9:30 AM    Eager, E,D, VU,DU,NR by AA at 8/12/2019  2:19 PM    Eager, E,D, NR,VU by AA at 8/11/2019  3:53 PM    Eager, E, NR by AA at 8/10/2019  3:48 PM    Eager, E,D, VU,NR by BRANDON at 8/9/2019  1:54 PM    Eager, E, VU by KINGSLEY at 8/9/2019  2:38 AM    Acceptance, E,TB, VU,NR by JUSTIN at 8/7/2019  3:30 PM    Acceptance, E, VU,NR by JUSTIN at 8/6/2019 11:35 AM    Acceptance, E,TB, VU,NR by JUSTIN at 8/6/2019  9:36 AM    Acceptance, E, VU,NR by JUSTIN at 8/5/2019 10:14 AM    Acceptance, E,TB,D, VU,NR by MICKEY at 8/4/2019 10:20 AM    Comment:  Educated patient on safety with sit<> stand and ambulation. Educated patient to increase stride length with steps.    Acceptance, E,TB, VU,DU by MICKEY at 8/3/2019  4:41 PM    Comment:  Educated patient on transfer technique using sheet to sit patient up in bed. Educated patient and family on sling.    Acceptance, E, VU,NR by CD at 8/2/2019  3:49 PM    Comment:  SEE FLOW SHEET. SON INSTRUCTED IN LE THER EX, POSITIONING FOR COMFORT.,    DEISY Weaver VU by TANK at 8/1/2019 10:20 AM    DEISY Weaver D, VU,NR by AA at 7/31/2019  2:46 PM    DEISY Weaver NR by AA at 7/30/2019  3:17 PM    DEISY Lawson  VU,NR by JUSTIN at 7/29/2019 11:04 AM    Acceptance, E, VU,NR by CD at 7/25/2019  4:42 PM    Comment:  DTR INSTRUCTED IN ROM EXERCISES, POSITIONING FOR COMFORT/ PROTECTION OF R UE, BENEFITS OF OOB ACTIVITY, D/C PLANNING,    Acceptance, E, VU,NR by CD at 7/24/2019 10:36 AM    Comment:  SEE FLOWSHEET.   Family Eager, E,D, VU,DU,NR by AA at 8/12/2019  2:19 PM    Eager, E,D, NR,VU by AA at 8/11/2019  3:53 PM    Eager, E, NR by AA at 8/10/2019  3:48 PM    Eager, E,D, VU,NR by AA at 8/9/2019  1:54 PM    Acceptance, E,TB, VU,NR by JUSTIN at 8/7/2019  3:30 PM    Acceptance, E,TB, VU,NR by EJ at 8/6/2019  9:36 AM    Acceptance, E,TB,D, VU,NR by MICKEY at 8/4/2019 10:20 AM    Comment:  Educated patient on safety with sit<> stand and ambulation. Educated patient to increase stride length with steps.    Acceptance, E,TB, VU,DU by CS at 8/3/2019  4:41 PM    Comment:  Educated patient on transfer technique using sheet to sit patient up in bed. Educated patient and family on sling.    Acceptance, E, VU,NR by CD at 8/2/2019  3:49 PM    Comment:  SEE FLOW SHEET. SON INSTRUCTED IN LE THER EX, POSITIONING FOR COMFORT.,    Eager, E,D, VU,NR by AA at 7/31/2019  2:46 PM    Acceptance, E, VU,NR by CD at 7/25/2019  4:42 PM    Comment:  DTR INSTRUCTED IN ROM EXERCISES, POSITIONING FOR COMFORT/ PROTECTION OF R UE, BENEFITS OF OOB ACTIVITY, D/C PLANNING,                   Point: Home exercise program (In Progress)     Learning Progress Summary           Patient Acceptance, E, NR by AS at 8/13/2019  9:30 AM    Eager, E,D, VU,DU,NR by AA at 8/12/2019  2:19 PM    Eager, E,D, NR,VU by BRANDON at 8/11/2019  3:53 PM    Eager, E, VU by KINGSLEY at 8/9/2019  2:38 AM    Acceptance, E,TB, VU,NR by JUSTIN at 8/7/2019  3:30 PM    Acceptance, E, VU,NR by JUSTIN at 8/6/2019 11:35 AM    Acceptance, ETB, VU,NR by JUSTIN at 8/6/2019  9:36 AM    Acceptance, E, VU,NR by JUSTIN at 8/5/2019 10:14 AM    Acceptance, RENEA OLIVAS D, VU,NR by MICKEY at 8/4/2019 10:20 AM    Comment:  Educated patient on safety with  sit<> stand and ambulation. Educated patient to increase stride length with steps.    Acceptance, E,TB, VU,DU by CS at 8/3/2019  4:41 PM    Comment:  Educated patient on transfer technique using sheet to sit patient up in bed. Educated patient and family on sling.    Acceptance, E, VU,NR by CD at 8/2/2019  3:49 PM    Comment:  SEE FLOW SHEET. SON INSTRUCTED IN LE THER EX, POSITIONING FOR COMFORT.,    Eager, E, VU by KM at 8/1/2019 10:20 AM    Acceptance, E, VU,NR by EJ at 7/29/2019 11:04 AM    Acceptance, E, VU,NR by CD at 7/25/2019  4:42 PM    Comment:  DTR INSTRUCTED IN ROM EXERCISES, POSITIONING FOR COMFORT/ PROTECTION OF R UE, BENEFITS OF OOB ACTIVITY, D/C PLANNING,    Acceptance, E, VU,NR by CD at 7/24/2019 10:36 AM    Comment:  SEE FLOWSHEET.   Family Eager, E,D, VU,DU,NR by AA at 8/12/2019  2:19 PM    Eager, E,D, NR,VU by AA at 8/11/2019  3:53 PM    Acceptance, E,TB, VU,NR by EJ at 8/7/2019  3:30 PM    Acceptance, E,TB, VU,NR by EJ at 8/6/2019  9:36 AM    Acceptance, E,TB,D, VU,NR by CS at 8/4/2019 10:20 AM    Comment:  Educated patient on safety with sit<> stand and ambulation. Educated patient to increase stride length with steps.    Acceptance, E,TB, VU,DU by CS at 8/3/2019  4:41 PM    Comment:  Educated patient on transfer technique using sheet to sit patient up in bed. Educated patient and family on sling.    Acceptance, E, VU,NR by CD at 8/2/2019  3:49 PM    Comment:  SEE FLOW SHEET. SON INSTRUCTED IN LE THER EX, POSITIONING FOR COMFORT.,    Acceptance, E, VU,NR by CD at 7/25/2019  4:42 PM    Comment:  DTR INSTRUCTED IN ROM EXERCISES, POSITIONING FOR COMFORT/ PROTECTION OF R UE, BENEFITS OF OOB ACTIVITY, D/C PLANNING,                   Point: Body mechanics (In Progress)     Learning Progress Summary           Patient Acceptance, E, NR by AS at 8/13/2019  9:30 AM    DEISY Weaver D, VU, DU, NR by AA at 8/12/2019  2:19 PM    DEISY Weaver D, NR,MARITZA by AA at 8/11/2019  3:53 PM    DEISY Weaver NR by BRANDON at 8/10/2019  3:48  PM    Eager, E,D, VU,NR by AA at 8/9/2019  1:54 PM    Eager, E, VU by CJ at 8/9/2019  2:38 AM    Acceptance, E,TB, VU,NR by EJ at 8/7/2019  3:30 PM    Acceptance, E, VU,NR by EJ at 8/6/2019 11:35 AM    Acceptance, E,TB, VU,NR by EJ at 8/6/2019  9:36 AM    Acceptance, E, VU,NR by EJ at 8/5/2019 10:14 AM    Acceptance, E,TB,D, VU,NR by CS at 8/4/2019 10:20 AM    Comment:  Educated patient on safety with sit<> stand and ambulation. Educated patient to increase stride length with steps.    Acceptance, E,TB, VU,DU by CS at 8/3/2019  4:41 PM    Comment:  Educated patient on transfer technique using sheet to sit patient up in bed. Educated patient and family on sling.    Acceptance, E, VU,NR by CD at 8/2/2019  3:49 PM    Comment:  SEE FLOW SHEET. SON INSTRUCTED IN LE THER EX, POSITIONING FOR COMFORT.,    Eager, E, VU by KM at 8/1/2019 10:20 AM    Eager, E,D, VU,NR by AA at 7/31/2019  2:46 PM    Eager, E, NR by AA at 7/30/2019  3:17 PM    Acceptance, E, VU,NR by EJ at 7/29/2019 11:04 AM    Acceptance, E, VU,NR by CD at 7/25/2019  4:42 PM    Comment:  DTR INSTRUCTED IN ROM EXERCISES, POSITIONING FOR COMFORT/ PROTECTION OF R UE, BENEFITS OF OOB ACTIVITY, D/C PLANNING,    Acceptance, E, VU,NR by CD at 7/24/2019 10:36 AM    Comment:  SEE FLOWSHEET.   Family Eager, E,D, VU,DU,NR by AA at 8/12/2019  2:19 PM    Eager, E,D, NR,VU by AA at 8/11/2019  3:53 PM    Eager, E, NR by AA at 8/10/2019  3:48 PM    Eager, E,D, VU,NR by AA at 8/9/2019  1:54 PM    Acceptance, E,TB, VU,NR by EJ at 8/7/2019  3:30 PM    Acceptance, E,TB, VU,NR by JUSTIN at 8/6/2019  9:36 AM    Acceptance, E,TB,D, VU,NR by CS at 8/4/2019 10:20 AM    Comment:  Educated patient on safety with sit<> stand and ambulation. Educated patient to increase stride length with steps.    Acceptance, E,TB, VU,DU by CS at 8/3/2019  4:41 PM    Comment:  Educated patient on transfer technique using sheet to sit patient up in bed. Educated patient and family on sling.    Acceptance, E,  VU,NR by CD at 8/2/2019  3:49 PM    Comment:  SEE FLOW SHEET. SON INSTRUCTED IN LE THER EX, POSITIONING FOR COMFORT.,    Eager, E,D, VU,NR by AA at 7/31/2019  2:46 PM    Acceptance, E, VU,NR by CD at 7/25/2019  4:42 PM    Comment:  DTR INSTRUCTED IN ROM EXERCISES, POSITIONING FOR COMFORT/ PROTECTION OF R UE, BENEFITS OF OOB ACTIVITY, D/C PLANNING,                   Point: Precautions (In Progress)     Learning Progress Summary           Patient Acceptance, E, NR by AS at 8/13/2019  9:30 AM    Eager, E,D, VU,DU,NR by AA at 8/12/2019  2:19 PM    Eager, E,D, NR,VU by AA at 8/11/2019  3:53 PM    Eager, E, NR by AA at 8/10/2019  3:48 PM    Eager, E,D, VU,NR by AA at 8/9/2019  1:54 PM    Eager, E, VU by CJ at 8/9/2019  2:38 AM    Acceptance, E,TB, VU,NR by EJ at 8/7/2019  3:30 PM    Acceptance, E, VU,NR by EJ at 8/6/2019 11:35 AM    Acceptance, E,TB, VU,NR by EJ at 8/6/2019  9:36 AM    Acceptance, E, VU,NR by EJ at 8/5/2019 10:14 AM    Acceptance, E,TB,D, VU,NR by CS at 8/4/2019 10:20 AM    Comment:  Educated patient on safety with sit<> stand and ambulation. Educated patient to increase stride length with steps.    Acceptance, E,TB, VU,DU by CS at 8/3/2019  4:41 PM    Comment:  Educated patient on transfer technique using sheet to sit patient up in bed. Educated patient and family on sling.    Acceptance, E, VU,NR by CD at 8/2/2019  3:49 PM    Comment:  SEE FLOW SHEET. SON INSTRUCTED IN LE THER EX, POSITIONING FOR COMFORT.,    Eager, E, VU by KM at 8/1/2019 10:20 AM    Eager, E,D, VU,NR by AA at 7/31/2019  2:46 PM    Eager, E, NR by AA at 7/30/2019  3:17 PM    Acceptance, E, VU,NR by JUSTIN at 7/29/2019 11:04 AM    AcceptanceDEISY VU, NR by CD at 7/25/2019  4:42 PM    Comment:  DTR INSTRUCTED IN ROM EXERCISES, POSITIONING FOR COMFORT/ PROTECTION OF R UE, BENEFITS OF OOB ACTIVITY, D/C PLANNING,    DEISY Lawson VU, NR by CD at 7/24/2019 10:36 AM    Comment:  SEE FLOWSHEET.   Family Meg, TUSHAR OLIVAS VU, DU,NR by BRANDON at 8/12/2019  2:19  PM    Eager, E,D, NR,VU by AA at 8/11/2019  3:53 PM    Eager, E, NR by AA at 8/10/2019  3:48 PM    Eager, E,D, VU,NR by AA at 8/9/2019  1:54 PM    Acceptance, E,TB, VU,NR by JUSTIN at 8/7/2019  3:30 PM    Acceptance, E,TB, VU,NR by EJ at 8/6/2019  9:36 AM    Acceptance, E,TB,D, VU,NR by CS at 8/4/2019 10:20 AM    Comment:  Educated patient on safety with sit<> stand and ambulation. Educated patient to increase stride length with steps.    Acceptance, E,TB, VU,DU by MICKEY at 8/3/2019  4:41 PM    Comment:  Educated patient on transfer technique using sheet to sit patient up in bed. Educated patient and family on sling.    Acceptance, E, VU,NR by CD at 8/2/2019  3:49 PM    Comment:  SEE FLOW SHEET. SON INSTRUCTED IN LE THER EX, POSITIONING FOR COMFORT.,    Eager, E,D, VU,NR by AA at 7/31/2019  2:46 PM    Acceptance, E, VU,NR by CD at 7/25/2019  4:42 PM    Comment:  DTR INSTRUCTED IN ROM EXERCISES, POSITIONING FOR COMFORT/ PROTECTION OF R UE, BENEFITS OF OOB ACTIVITY, D/C PLANNING,                               User Key     Initials Effective Dates Name Provider Type Discipline    CD 06/19/15 -  Gogo Thibodeaux, PT Physical Therapist PT    EJ 11/20/18 -  Nurys Chahal, PT Physical Therapist PT    KM 06/19/15 -  Denise Hdz, PT Physical Therapist PT    AS 06/22/15 -  Darling Epstein, TREVOR Physical Therapy Assistant PT    AA 04/02/18 -  Mere Ramon, PT Physical Therapist PT    CJ 09/12/18 -  Daksha Chahal, RN Registered Nurse Nurse     03/26/19 -  Roz Phoenix, PT Physical Therapist PT              PT Recommendation and Plan     Plan of Care Reviewed With: patient  Progress: improving  Outcome Summary: patient ambulated 75 feet with min assist x1 and HHA on the left, distance limited by weakness and right shoulder pain( sling RUE). Tech followed with chair for safety.     Time Calculation:   PT Charges     Row Name 08/13/19 0931             Time Calculation    Start Time  0810  -AS      PT  Received On  08/13/19  -AS      PT Goal Re-Cert Due Date  08/15/19  -AS        User Key  (r) = Recorded By, (t) = Taken By, (c) = Cosigned By    Initials Name Provider Type    AS Darling Epstein PTA Physical Therapy Assistant        Therapy Charges for Today     Code Description Service Date Service Provider Modifiers Qty    51123657877 HC GAIT TRAINING EA 15 MIN 8/13/2019 Darling Epstein, TREVOR GP 1    53153011866 HC PT THER PROC EA 15 MIN 8/13/2019 Darling Epstein PTA GP 1    54373674563 HC PT THER SUPP EA 15 MIN 8/13/2019 Darling Epstein PTA GP 2          PT G-Codes  Outcome Measure Options: AM-PAC 6 Clicks Basic Mobility (PT)  AM-PAC 6 Clicks Score (PT): 17  AM-PAC 6 Clicks Score (OT): 12  Modified Kendrick Scale: 4 - Moderately severe disability.  Unable to walk without assistance, and unable to attend to own bodily needs without assistance.    Darling Epstein PTA  8/13/2019

## 2019-08-13 NOTE — DISCHARGE SUMMARY
[facility] Medicine Services  DISCHARGE SUMMARY    Patient Name: Vargas De  : 1940  MRN: [id number]    Date of Admission: 2019  Date of Discharge:  2019  Primary Care Physician: Saba Arrington MD    Consults     Date and Time Order Name Status Description    2019 1353 Inpatient Cardiology Consult Completed     2019 0701 Inpatient ENT Consult Completed     2019 0651 Inpatient Palliative Care MD Consult Completed     2019 2157 Inpatient Orthopedic Surgery Consult Completed     2019 214 Inpatient Neurology Consult Stroke Completed           Hospital Course     Presenting Problem:   Acute alteration in mental status [R41.82]  Acute respiratory distress [R06.03]    Active Hospital Problems    Diagnosis  POA   • **Acute alteration in mental status [R41.82]  Yes   • Lewy body dementia [G31.83, F02.80]  Yes   • Acute respiratory distress [R06.03]  Yes   • Fracture of proximal end of right humerus [S42.201A]  Yes   • Paroxysmal atrial fibrillation (CMS/HCC) [I48.0]  Yes   • Mixed hyperlipidemia [E78.2]  Yes   • Multiple lacunar infarcts [I63.81]  Yes   • History of stroke with residual deficit [I69.30]  Not Applicable   • PFO (patent foramen ovale) [Q21.1]  Not Applicable   • GERD without esophagitis [K21.9]  Yes   • Spinal stenosis in cervical region [M48.02]  Yes   • Essential hypertension [I10]  Yes   • Crohn's colitis, with rectal bleeding (CMS/HCC) [K50.111]  Yes      Resolved Hospital Problems   No resolved problems to display.          Hospital Course:  Vargas De is a 79 y.o. male with a past medical history significant for ulcerative colitis on chronic prednisone, chronic pain with spinal stenosis of the cervical region, HLD, HTN, PFO, atrial fibrillation on Eliquis, mild dementia, and multiple lacunar infarcts who presented to [facility] with acute changes in his mental status.  In the emergency department, MRI was negative for CVA.  EEG  negative for seizures.  He was found to have acute hypoxic respiratory failure likely secondary to aspiration.  He was admitted to hospital medicine services for further evaluation and treatment.  He was placed on IV Zosyn and later transitioned to Augmentin for which she completed a total of 7 days.  Of note, the patient was found to have a swollen uvula with exudate.  CT scan showed narrowing of his airway.  ENT evaluated and recommended to continue PEEP if needed.  This has gradually improved.  His hypoxic respiratory failure has resolved with treatment and antibiotics.  During his admission, patient began complaining of right shoulder pain.  He had a right shoulder X ray which showed a closed right proximal humerus fracture.  He was seen in consultation by Dr. Steinberg and he recommended medical management with no surgical intervention.  The patient has been placed in a sling and should be in a nonweightbearing to his right upper extremity.  She was seen by cardiology during his hospitalization  due to some tachycardia and junctional rhythm.  He is also been noted to have some orthostatic hypotension has required some normal saline boluses.  This should be monitored closely at the rehab facility and his medication should be changed accordingly.  The patient is now medically stable will be discharged to [facility] for further rehab.  He will follow-up with his PCP once discharged from rehab.  He will follow-up with Dr. Steinberg in 2 weeks and will keep his scheduled follow-up appointment with his cardiologist at the end of this month.      Discharge Follow Up Recommendations for labs/diagnostics:  Follow-up with PCP once discharged from rehab  Follow-up with Dr. Steinberg in 2 weeks  Keep scheduled follow-up appointment with Dr. Maldonado  Nonweightbearing to right upper extremity.  Use sling.    Day of Discharge     HPI:   Resting comfortably in chair with family at bedside.  No overnight issues.  Ready for  discharge.    Review of Systems  Gen- No fevers, chills  CV- No chest pain, palpitations  Resp- No cough, dyspnea  GI- No N/V/D, abd pain      Otherwise ROS is negative except as mentioned in the HPI.    Vital Signs:   Temp:  [97.4 °F (36.3 °C)-98.7 °F (37.1 °C)] 97.7 °F (36.5 °C)  Heart Rate:  [70-78] 78  Resp:  [14-18] 16  BP: (112-154)/(64-86) 154/86     Physical Exam:  Constitutional: No acute distress, awake, alert, up in chair, family at bedside.   HENT: NCAT, mucous membranes moist  Respiratory: Clear to auscultation bilaterally, respiratory effort normal on room air  Cardiovascular: RRR, no murmurs, rubs, or gallops, palpable pedal pulses bilaterally  Gastrointestinal: Positive bowel sounds, soft, nontender, nondistended  Musculoskeletal: No bilateral ankle edema, right arm in sling. Wiggles fingers.   Psychiatric: Appropriate affect, cooperative  Neurologic: Oriented x 3, strength symmetric in all extremities, Cranial Nerves grossly intact to confrontation, speech clear  Skin: No rashes to exposed skin      Pertinent  and/or Most Recent Results     Results from last 7 days   Lab Units 08/11/19  0716   WBC 10*3/mm3 7.72   HEMOGLOBIN g/dL 12.9*   HEMATOCRIT % 40.9   PLATELETS 10*3/mm3 361   SODIUM mmol/L 138   POTASSIUM mmol/L 3.6   CHLORIDE mmol/L 104   CO2 mmol/L 25.0   BUN mg/dL 7*   CREATININE mg/dL 0.62*   GLUCOSE mg/dL 97   CALCIUM mg/dL 8.8           Invalid input(s): PROT, LABALBU        Invalid input(s): TG, LDLCALC, LDLREALC        Brief Urine Lab Results  (Last result in the past 365 days)      Color   Clarity   Blood   Leuk Est   Nitrite   Protein   CREAT   Urine HCG        08/01/19 0622 Yellow Clear Negative Negative Negative Negative               Microbiology Results Abnormal     Procedure Component Value - Date/Time    Blood Culture - Blood, Arm, Left [[id number]] Collected:  07/27/19 0152    Lab Status:  Final result Specimen:  Blood from Arm, Left Updated:  08/01/19 0431     Blood Culture No  growth at 5 days    Blood Culture - Blood, Hand, Left [[id number]] Collected:  07/27/19 0147    Lab Status:  Final result Specimen:  Blood from Hand, Left Updated:  08/01/19 0245     Blood Culture No growth at 5 days    Urine Culture - Urine, Urine, Clean Catch [[id number]]  (Normal) Collected:  07/28/19 1430    Lab Status:  Final result Specimen:  Urine, Clean Catch Updated:  07/29/19 1856     Urine Culture No growth    Beta Strep Culture, Throat - Swab, Throat [[id number]]  (Normal) Collected:  07/27/19 1630    Lab Status:  Final result Specimen:  Swab from Throat Updated:  07/29/19 1355     Throat Culture, Beta Strep No Beta Hemolytic Streptococcus Isolated    Narrative:       Group A Strep incidence is low in adults. Positive culture for Beta hemolytic Streptococcus species can reflect colonization and not true infection. Please correlate clinically.    Blood Culture - Blood, Arm, Left [[id number]] Collected:  07/23/19 1334    Lab Status:  Final result Specimen:  Blood from Arm, Left Updated:  07/28/19 1415     Blood Culture No growth at 5 days    Blood Culture - Blood, Hand, Right [[id number]] Collected:  07/23/19 1341    Lab Status:  Final result Specimen:  Blood from Hand, Right Updated:  07/28/19 1415     Blood Culture No growth at 5 days    Rapid Strep A Screen - Swab, Throat [[id number]]  (Normal) Collected:  07/27/19 1630    Lab Status:  Final result Specimen:  Swab from Throat Updated:  07/27/19 1727     Strep A Ag Negative    Narrative:       Test performed by Direct Antigen Testing.          Imaging Results (all)     Procedure Component Value Units Date/Time    XR Shoulder 2+ View Right [[id number]] Collected:  08/12/19 1324     Updated:  08/12/19 1736    Narrative:       EXAMINATION: XR SHOULDER 2+ VW, RIGHT-08/12/2019:      INDICATION: Humerus fracture; R41.82-Altered mental status, unspecified;  R53.1-Weakness; S42.291D-Other displaced fracture of upper end of right  humerus, subsequent encounter for  fracture with routine healing;  I10-Essential (primary) hypertension; Z74.09-Other reduced mobility;  Z74.09-Other reduced mobility; R41.841-Cognitive communication deficit;  R13.12-Dysphagia, oropharyngeal phase.      COMPARISON: 07/20/2019.     FINDINGS: Multipart fracture of acute/subacute etiology with angular  cortical margins at the surgical neck of the humerus with minimal  increase if any and displacement of multiple part fracture fragments in  similar alignment overall to prior comparison with background  degenerative changes of the glenohumeral joint and acromioclavicular  joint.           Impression:       [id number] prominent spacing of known acute/subacute  surgical neck fracture right humerus without humeral head displacement  or new fracture identified.     D:  08/12/2019  E:  08/12/2019     This report was finalized on 8/12/2019 5:33 PM by Dr. Rosalio Dey.       Fiberoptic Endo (fees) [[id number]] Resulted:  08/06/19 1457     Updated:  08/06/19 1457    Narrative:       This procedure was auto-finalized with no dictation required.    CT Soft Tissue Neck Without Contrast [[id number]] Collected:  07/27/19 1516     Updated:  07/29/19 5861    Narrative:       EXAMINATION: CT NECK SOFT TISSUE WO CONTRAST - 07/27/2019     INDICATION: R41.82-Altered mental status, unspecified; R53.1-Weakness;  S42.291D-Other displaced fracture of upper end of right humerus,  subsequent encounter for fracture with routine healing; I10-Essential  (primary) hypertension; Z74.09-Other reduced mobility; R41.841-Cognitive  communication deficit; R13.12-Dysphagia, oropharyngeal phase.     TECHNIQUE: Unenhanced CT imaging of the neck was obtained. Additional  coronal and sagittal reformatted images were also obtained for review.     The radiation dose reduction device was turned on for each scan per the  ALARA (As Low as Reasonably Achievable) protocol.     COMPARISON:  CT of the neck without intravenous contrast was  performed,  CT angiogram of the neck 02/27/2019.     FINDINGS:      Visualized intracranial structures do not demonstrate acute abnormality.  Extensive degenerative changes to the cervical spine are identified  including multilevel posterior disc osteophyte complexes most pronounced  at C2-C3 and C6-C7. Grade 1 anterior listhesis of C3 on C4 and C5 on C6  are identified. Multilevel facet arthropathy is also present. Visualized  skull base appears intact. The sella turcica does not demonstrate  abnormality. The visualized orbital structures now demonstrate acute  abnormality. The visualized paranasal sinuses are predominantly clear.  Small mucous retention cyst in the left maxillary sinus suggested. The  visualized upper lungs are clear.     Mild asymmetry to the left uvula identified without obvious mass. The  piriform sinuses are predominantly symmetric. No evidence of  retroperitoneal abscess or fluid collection as visualized. Lack of  intravenous contrast does limit evaluation of the underlying structures  of the neck, particularly the soft palate and oropharynx. Thyroid is  unremarkable. Scattered calcified atherosclerotic disease of the  coronary arteries and its branches. No evidence of anterior posterior  chain adenopathy.       Impression:          Mild asymmetry to the left aspect of the uvula without evidence of mass  as visualized. If there is concern for oropharyngeal lesion, consider  direct visualization for further evaluation.     No evidence of lymphadenopathy involving the visualized head or neck  regions.     DICTATED:   07/27/2019  EDITED/ls :   07/27/2019      This report was finalized on 7/29/2019 5:47 PM by Dr. Demetrius Solo MD.       Fiberoptic Endo (fees) [[id number]] Resulted:  07/29/19 1548     Updated:  07/29/19 1548    Narrative:       This procedure was auto-finalized with no dictation required.    XR Chest 1 View [[id number]] Collected:  07/28/19 0713     Updated:  07/28/19 0715     Narrative:       CR Chest 1 Vw    SIGNS AND SYMPTOMS:  hypoxia;Altered mental status, unspecified;Weakness;Other displaced fracture of upper end of right humerus, subsequent encounter for fracture with routine healing;Essential (primary) hypertension;Other reduced mobility;Other reduced mobility;Cognitive  communication deficit;Dysphagia, oropharyngeal phase  hypoxia     COMPARISONS:  Chest radiograph 7/26/2017    FINDINGS:    A portable AP view of the chest was obtained  Fully upright.    There is linear opacity adjacent to the right hilum with an appearance suggestive of subsegmental atelectasis. This is also evident within the left lower lobe. The left costophrenic angle is indistinct, and a small left pleural effusion is suspected. The  lungs are otherwise clear. There is a moderate size esophageal hiatal hernia. Descending aorta is tortuous and calcified. Cardiac silhouette is normal in size. No pneumothorax is identified. Proximal right humeral fracture again noted. There are multiple  old healed left rib fractures.      Impression:         1.  Scattered foci of subsegmental atelectasis within the lungs.  2.  Blunting of the left costophrenic angle and small meniscus is suggestive of either pleural thickening or trace left pleural effusion.  3.  Known proximal right humeral fracture displays no evidence of fracture healing. There are healed left rib fractures    Signer Name: Parish Jessica MD   Signed: 7/28/2019 7:13 AM   Workstation Name: [id number]       MRI Brain Without Contrast [[id number]] Collected:  07/27/19 0102     Updated:  07/27/19 0104    Narrative:       MRI Brain WO 7/27/2019    INDICATION:   Altered mental status with worsening generalized weakness for 3 weeks. Benign essential hypertension. Reduced mobility. Cognitive communication deficit and dysphagia. Dementia.    TECHNIQUE:   MRI of the brain without contrast.    COMPARISON:    7/23/2019    FINDINGS:  There is generalized enlargement  of the ventricles and sulci characteristic of atrophy. There is advanced periventricular microvascular white matter ischemic change. Old lacunar infarcts are seen in the bilateral basal ganglia, bilateral thalami and  brainstem. There is no midline shift. No intra or extra-axial fluid collections. Diffusion-weighted sequences demonstrate no acute abnormality. Polyps or retention cysts are seen in the left maxillary sinus and there is mild mucosal thickening in the  ethmoid air cells. There is minimal fluid in the mastoid air cells.      Impression:       Generalized atrophy and periventricular microvascular white matter ischemic change. Old lacunar infarcts as noted above. No acute intracranial abnormality.    Signer Name: Manuel Samano MD   Signed: 7/27/2019 1:02 AM   Workstation Name: [id number]       XR Chest 1 View [[id number]] Collected:  07/27/19 0018     Updated:  07/27/19 0020    Narrative:       Chest 1 view 7/26/2019    INDICATION: Dyspnea, shortness of breath today and altered mental status with generalized weakness. Benign essential hypertension.    FINDINGS: The heart is normal in size. There is poor inspiratory result with bibasilar discoid atelectasis. The lungs are otherwise clear. There is blunting of the costophrenic angles bilaterally. No pneumothorax.      Impression:       No active pulmonary disease.    Signer Name: Manuel Samano MD   Signed: 7/27/2019 12:18 AM   Workstation Name: [id number]       XR Wrist 3+ View Right [[id number]] Collected:  07/26/19 1428     Updated:  07/26/19 1703    Narrative:       EXAMINATION: XR WRIST 3+ VW RIGHT- 07/26/2019      INDICATION: wrist pain, right arm trauma; R41.82-Altered mental status,  unspecified; R53.1-Weakness; S42.291D-Other displaced fracture of upper  end of right humerus, subsequent encounter for fracture with routine  healing; I10-Essential (primary) hypertension; Z74.09-Other reduced  mobility; R41.841-Cognitive communication deficit;  R13.12-Dysphagia,  oropharyngeal phase      COMPARISON: NONE     FINDINGS: 3 views of the right wrist reveal extensive degenerative  changes seen within the carpal bones. Narrowing of the radiocarpal  joint. Degenerative changes seen within the first carpometacarpal joint.  The cortex is intact. Joint spaces are preserved.           Impression:       No acute fracture or dislocation with extensive degenerative  changes seen within the carpal bones and radiocarpal joint.     D:  07/26/2019  E:  07/26/2019     This report was finalized on 7/26/2019 4:59 PM by Dr. Denise Hobson MD.       CT Head Without Contrast [[id number]] Collected:  07/23/19 1733     Updated:  07/25/19 1453    Narrative:       EXAMINATION: CT HEAD WO CONTRAST-      INDICATION: AMS with history of fall.      TECHNIQUE: CT head without intravenous contrast.     The radiation dose reduction device was turned on for each scan per the  ALARA (As Low as Reasonably Achievable) protocol.     COMPARISON: MRI dated 02/27/2019.     FINDINGS: Midline structures are symmetric without evidence of mass,  mass effect or midline shift. Ventricles and sulci are mildly prominent  towards the vertex along with low-attenuation changes in the  periventricular and deep white matter of advanced chronic small vessel  ischemic disease, however, no intraaxial hemorrhage or extraaxial fluid  collection with continued prominence of the subarachnoid spaces similar  to prior MRI of likely cerebral atrophy component. Globes and orbits are  unremarkable. Visualized paranasal sinuses and mastoid air cells grossly  clear well-pneumatized. Calvarium intact.       Impression:       No acute intracranial abnormality with senescent changes and  advanced chronic small vessel ischemic disease again noted in the  periventricular and deep white matter evidenced by low-attenuation  without acute hemorrhage.     D:  07/23/2019  E:  07/24/2019        This report was finalized on 7/25/2019  2:50 PM by Dr. Rosalio Dey.       CT Angiogram Chest With Contrast [[id number]] Collected:  07/23/19 1741     Updated:  07/25/19 7959    Narrative:       EXAMINATION: CT ANGIOGRAM CHEST W CONTRAST- 07/23/2019      INDICATION: shortness of breath and AMS w/ hx of fall and right humerus  fxr- r/o PE      TECHNIQUE: CT angiogram chest with intravenous contrast following PE  protocol. 2-D reconstructions performed.     The radiation dose reduction device was turned on for each scan per the  ALARA (As Low as Reasonably Achievable) protocol.     COMPARISON: NONE     FINDINGS: Thyroid is homogeneous in attenuation. No bulky mediastinal  adenopathy. Central airways are patent. Esophagus in normal course with  patulous appearance throughout. Patent nonaneurysmal thoracic aorta with  patent great vessel origins demonstrating tortuosity and minimal  atherosclerotic involvement of the thoracoabdominal aorto junction  without irregularity or dissection. Satisfactory opacification of the  pulmonary arterial tree without filling defect to suggest pulmonary  embolus. Cardiac size within normal limits and without pericardial  effusion. Coronary calcifications are noted within the left coronary  predominance along with aortic valvular calcifications.     Extended lung windows demonstrate minimal biapical pleural-parenchymal  scarring and central bronchiectasis along with subsegmental atelectasis  in the dependent portions. Right-sided fat-containing Bochdalek hernia  with minimal mass effect in the right lower lobe adjacent to this.  Calcified granuloma right lower lung without focal consolidation or  opacification otherwise. No pleural effusion. Degenerative changes of  the spine with abnormal cortical irregularity of known right humeral  fracture and adjacent soft tissue edema otherwise no acute osseous  findings. Visualized portions of the upper abdomen demonstrate simple  appearing left renal cortical cyst partially imaged.        Impression:       1. Acute fracture of the right humerus proximally with surrounding soft  tissue edema.  2. No PE.  3. No acute intrathoracic findings with minimal background chronic  changes.     D:  07/23/2019  E:  07/24/2019     This report was finalized on 7/25/2019 2:50 PM by Dr. Rosalio Dey.       Fiberoptic Endo (fees) [[id number]] Resulted:  07/24/19 1546     Updated:  07/24/19 1546    Narrative:       This procedure was auto-finalized with no dictation required.    MRI Brain Without Contrast [[id number]] Collected:  07/24/19 0012     Updated:  07/24/19 0014    Narrative:       MRI Brain WO    INDICATION:   78-year-old male with altered mental status and worsening weakness for a few weeks.    TECHNIQUE:   MRI of the brain without contrast.    COMPARISON:    Brain MRI 2/27/2019    FINDINGS:  No evidence of restricted diffusion to suggest acute infarction. No intracranial hemorrhage, mass lesion, or abnormal extra-axial fluid collection. No midline shift or focal mass effect. Ventricular system is normal in size and configuration. Moderate  generalized atrophy. Advanced chronic small vessel disease throughout the supratentorial white matter. Multiple remote lacunar infarcts in the bilateral basal ganglia, bilateral thalami, and brainstem. Visualized paranasal sinuses and mastoid air cells  are clear. Intracranial vasculature demonstrates normal flow voids.      Impression:         1. No acute intracranial abnormality.  2. Moderate age-related atrophy with advanced chronic small vessel disease.  3. Multiple remote lacunar infarcts throughout the bilateral basal ganglia, bilateral thalami, and brainstem.    Signer Name: Rao Carney MD   Signed: 7/24/2019 12:12 AM   Workstation Name: [id number]       XR Chest 1 View [[id number]] Collected:  07/23/19 1517     Updated:  07/23/19 2225    Narrative:       EXAMINATION: XR CHEST 1 VW- 07/23/2019      INDICATION: Weak/Dizzy/AMS triage protocol      COMPARISON:  02/27/2019     FINDINGS: Heart and pulmonary vasculature appear normal in size. Lung  volumes appear relatively low, a little decreased from the prior study,  and significantly decreased from 02/04/2019 exam. This likely accounts  for slight blunting of the lateral costophrenic angles. No new pulmonary  parenchymal disease, evidence of edema or pneumothorax is seen.       Impression:       Mildly decreased lung volumes, otherwise stable exam.     D:  07/23/2019  E:  07/23/2019     This report was finalized on 7/23/2019 10:22 PM by DR. Jensen Markham MD.             Results for orders placed during the hospital encounter of 02/27/19   Duplex Venous Lower Extremity - Bilateral CAR    Narrative · Normal bilateral lower extremity venous duplex scan.          Results for orders placed during the hospital encounter of 02/27/19   Duplex Venous Lower Extremity - Bilateral CAR    Narrative · Normal bilateral lower extremity venous duplex scan.          Results for orders placed during the hospital encounter of 07/23/19   Adult Transthoracic Echo Complete W/ Cont if Necessary Per Protocol    Narrative · Left ventricular systolic function is normal. Estimated EF = 60%.  · Left ventricular diastolic dysfunction (grade I) consistent with   impaired relaxation.  · Left atrial cavity size is borderline dilated.  · There is mild calcification of the aortic valve.            Discharge Details        Discharge Medications      New Medications      Instructions Start Date   artificial tears ointment ophthalmic ointment   Both Eyes, Nightly      diclofenac 1 % gel gel  Commonly known as:  VOLTAREN   2 g, Topical, 4 Times Daily, Apply to right shoulder      lidocaine 5 %  Commonly known as:  LIDODERM   2 patches, Transdermal, Nightly, Remove & Discard patch within 12 hours- apply to right upper extremity      magic mouthwash   5 mL, Swish & Spit, Every 4 Hours PRN      saccharomyces boulardii 250 MG capsule  Commonly known as:  FLORASTOR    250 mg, Oral, 2 Times Daily         Continue These Medications      Instructions Start Date   acetaminophen 325 MG tablet  Commonly known as:  TYLENOL   650 mg, Oral, Every 4 Hours PRN      apixaban 5 MG tablet tablet  Commonly known as:  ELIQUIS   5 mg, Oral, Every 12 Hours Scheduled      APRISO 0.375 g 24 hr capsule  Generic drug:  mesalamine   0.75 g, Oral, Nightly, 2 caps (0.750g total) nightly      atorvastatin 80 MG tablet  Commonly known as:  LIPITOR   80 mg, Oral, Nightly      bisoprolol 5 MG tablet  Commonly known as:  ZEBeta   5 mg, Oral, Every Evening      MELENDEZ PO   2 capsules, Oral, 2 Times Daily      DULoxetine 20 MG capsule  Commonly known as:  CYMBALTA   20 mg, Oral, Daily      fluticasone 50 MCG/ACT nasal spray  Commonly known as:  FLONASE   2 sprays, Nasal, Daily PRN      gabapentin 300 MG capsule  Commonly known as:  NEURONTIN   300 mg, Oral, Every Night at Bedtime      memantine 28 MG capsule sustained-release 24 hr extended release capsule  Commonly known as:  NAMENDA XR   28 mg, Oral, Daily      omeprazole 20 MG capsule  Commonly known as:  priLOSEC   20 mg, Oral, 2 Times Daily      predniSONE 5 MG tablet  Commonly known as:  DELTASONE   5 mg, Oral, Daily, (Trying to wean pt from using.  Recently reduced to 1 tab daily drom 2 tabs daily 7/23/19)      PRESERVISION AREDS PO   1 capsule, Oral, 2 Times Daily      vitamin B-12 500 MCG tablet  Commonly known as:  CYANOCOBALAMIN   500 mcg, Oral, Daily      vitamin D3 5000 units capsule capsule   5,000 Units, Oral, Daily      Zinc 50 MG capsule   50 mg, Oral, Daily         Stop These Medications    amLODIPine 2.5 MG tablet  Commonly known as:  NORVASC     dicyclomine 20 MG tablet  Commonly known as:  BENTYL     doxylamine 25 MG tablet  Commonly known as:  UNISOM     HYDROcodone-acetaminophen 5-325 MG per tablet  Commonly known as:  NORCO     lisinopril 20 MG tablet  Commonly known as:  PRINIVIL,ZESTRIL     loratadine 10 MG tablet  Commonly known as:   CLARITIN            Allergies   Allergen Reactions   • Donepezil Other (See Comments)     nightmares   • Iodine    • Sulfa Antibiotics          Discharge Disposition:  Rehab Facility or Unit (DC - External)    Discharge Diet:  Diet Order   Procedures   • Diet Soft Texture; [employer]; Thin         Discharge Activity:   Activity Instructions     Other Activity Instructions      Activity Instructions: Non-weight bearing to RUE.  Continue with sling.            CODE STATUS:    Code Status and Medical Interventions:   Ordered at: 07/28/19 1018     Limited Support to NOT Include:    Intubation     Level Of Support Discussed With:    Health Care Surrogate    Next of Kin (If No Surrogate)     Code Status:    No CPR     Medical Interventions (Level of Support Prior to Arrest):    Limited         Future Appointments   Date Time Provider Department Center   8/27/2019 11:30 AM Taj Maldonado MD [facility] None   9/6/2019 12:45 PM Saba Arrington MD [facility]   10/15/2019  1:00 PM Rafaela Gutierrez MD [facility] None       Additional Instructions for the Follow-ups that You Need to Schedule     Discharge Follow-up with PCP   As directed       Currently Documented PCP:    Saba Arrington MD    PCP Phone Number:    [phone]     Follow Up Details:  once discharged from rehab.         Discharge Follow-up with Specified Provider: 2 Weeks   As directed      Follow Up:  2 Weeks         Discharge Follow-up with Specified Provider: Dr. Steinberg; 2 Weeks   As directed      To:  Dr. Steinberg    Follow Up:  2 Weeks         Discharge Follow-up with Specified Provider: Keep scheduled appt with Dr. Maldonado   As directed      To:  Keep scheduled appt with Dr. Maldonado               Time Spent on Discharge:  45 minutes    Electronically signed by ANA MARÍA Esposito, 08/13/19, 10:32 AM.

## 2019-08-13 NOTE — PLAN OF CARE
Problem: Patient Care Overview  Goal: Interprofessional Rounds/Family Conf  Outcome: Ongoing (interventions implemented as appropriate)   08/13/19 1153   Interdisciplinary Rounds/Family Conf   Summary Patient is dressed and is leaving today to go to [facility]. He wants to go home. Daughter here packing up his this things. I gave her bags to pack up his personal items. Goal is rehab and then home.   Participants advanced practice nurse;nursing;physician;social work/services

## 2019-08-13 NOTE — PROGRESS NOTES
Case Management Discharge Note    Final Note: Plan to transfer to [facility] medicine unit today, 8-13. [facility] him and all agrees with transfer. Call report to [phone]. Jacki @ [phone]    Destination - Selection Complete      Service Provider Request Status Selected Services Address Phone Number Fax Number    [facility] Selected Inpatient Rehabilitation [address] [id number] [phone]      Durable Medical Equipment      No service has been selected for the patient.      Dialysis/Infusion      No service has been selected for the patient.      Home Medical Care      No service has been selected for the patient.      Therapy      No service has been selected for the patient.      Community Resources      No service has been selected for the patient.             Final Discharge Disposition Code: 62 - inpatient rehab facility

## 2019-08-13 NOTE — PLAN OF CARE
Problem: Patient Care Overview  Goal: Plan of Care Review  Outcome: Ongoing (interventions implemented as appropriate)   08/13/19 5135   Plan of Care Review   Progress improving   OTHER   Outcome Summary VSS, pain better controlled , Plan to go to [facility] today        Problem: Fall Risk (Adult)  Goal: Identify Related Risk Factors and Signs and Symptoms  Outcome: Ongoing (interventions implemented as appropriate)    Goal: Absence of Fall  Outcome: Ongoing (interventions implemented as appropriate)      Problem: Pain, Chronic (Adult)  Goal: Acceptable Pain/Comfort Level and Functional Ability  Outcome: Ongoing (interventions implemented as appropriate)      Problem: Skin Injury Risk (Adult)  Goal: Skin Health and Integrity  Outcome: Ongoing (interventions implemented as appropriate)      Problem: Palliative Care (Adult)  Goal: Maximized Comfort  Outcome: Ongoing (interventions implemented as appropriate)    Goal: Enhanced Quality of Life  Outcome: Ongoing (interventions implemented as appropriate)      Problem: Pain, Acute (Adult)  Goal: Acceptable Pain Control/Comfort Level  Outcome: Ongoing (interventions implemented as appropriate)

## 2019-08-13 NOTE — PROGRESS NOTES
Orthopedic Daily Progress Note      CC: right shoulder pain, controlled    Pain well controlled  General: no fevers, chills  Abdomen: denies nausea, vomiting, or diarrhea    No other complaints    Physical Exam:  I have reviewed the vital signs.  Temp:  [97.4 °F (36.3 °C)-98.7 °F (37.1 °C)] 97.4 °F (36.3 °C)  Heart Rate:  [70-78] 78  Resp:  [14-18] 14  BP: (112-133)/(64-81) 133/81    Objective  General Appearance:    Alert, cooperative, no distress  Extremities: No clubbing, cyanosis, or edema to lower extremities, RUE finger flexion/extension intact  Pulses:  2+ in distal surgical extremity  Skin: bruising resolving      Results Review:    I have reviewed the labs, radiology results and diagnostic studies:reviewed    Results from last 7 days   Lab Units 08/11/19  0716   WBC 10*3/mm3 7.72   HEMOGLOBIN g/dL 12.9*   PLATELETS 10*3/mm3 361     Results from last 7 days   Lab Units 08/11/19  0716   SODIUM mmol/L 138   POTASSIUM mmol/L 3.6   CO2 mmol/L 25.0   CREATININE mg/dL 0.62*   GLUCOSE mg/dL 97       I have reviewed the medications.    Assessment/Problem List   S/p Right Proximal Humerus Fracture    Plan  Continue Non op treatment, FELICIA SEGOVIA  Patient to follow up in Dr. Steinberg's office in 2 weeks  Patient to be DC today to rehabilitation        Hermilo Tran MD  08/13/19  8:21 AM

## 2019-08-13 NOTE — PLAN OF CARE
Problem: Patient Care Overview  Goal: Plan of Care Review  Outcome: Ongoing (interventions implemented as appropriate)   08/13/19 2387   Coping/Psychosocial   Plan of Care Reviewed With patient;family   SLP treatment completed. Will continue to address oropharyngeal dysphagia and cognitive-communication deficit during treatment. Pt will need continued SLP services at next level of care. Please see note for further details and recommendations.

## 2019-08-13 NOTE — PROGRESS NOTES
Palliative Care Consult Note    Patient Name: Vargas De   : 1940  Sex: male    Date of Admission: 2019    Subjective:  Vargas De is a 78 y.o. male with a past medical history significant for ulcerative colitis on chronic prednisone therapy, chronic pain with spinal stenosis of the cervical region, hyperlipidemia, hypertension, PFO, atrial fibrillation ( on Eliquis) mild dementia, and multiple lacunar infarcts admitted to acute care on   with DX of acute hypoxic respiratory failure likely secondary to aspiration. Repeat brain MRI this admission shows extensive old white matter change and sequelae of strokes but no acute abnormality. During hospital stay he continued to have visual hallucinations much more prominent than usual occurrence, but has had sleep deprivation. Also oropharyngeal dysphagia with multiple episodes of aspiration with increased secretions and hypoxia; swollen uvula with exudate noted.  Encephalopathy with visual hallucinations and underlying mild dementia with concern for underlying Lewy body disease per neurology assessment.     He was consulted to Palliative Care on  fpr goals of care.      On visit today he is up in chair visiting with granddaughter. He denies pain. Dyspnea, NVD.  Reprots he is beig discahrged to [facility] today     ROS:  Review of Systems   Unable to perform ROS: Mental status change     Reviewed current scheduled and prn medications for route, type, dose and frequency.     •  acetaminophen  •  calcium carbonate  •  gabapentin  •  hydrOXYzine  •  ipratropium-albuterol  •  loperamide  •  magic mouthwash  •  melatonin  •  phenol  •  polyvinyl alcohol  •  potassium chloride  •  potassium chloride  •  [DISCONTINUED] potassium chloride **OR** [DISCONTINUED] potassium chloride **OR** potassium chloride  •  saline  •  sodium chloride  •  sodium chloride    Objective:   /81 (BP Location: Left arm, Patient Position: Lying)   Pulse  "78   Temp 97.4 °F (36.3 °C) (Oral)   Resp 14   Ht 170.2 cm (67\")   Wt 87.1 kg (192 lb 0.3 oz)   SpO2 96%   BMI 30.07 kg/m²    Intake & Output (last day)       08/12 0701 - 08/13 0700 08/13 0701 - 08/14 0700    P.O.      Total Intake(mL/kg)      Urine (mL/kg/hr) 825 (0.4)     Stool 0     Total Output 825     Net -825           Urine Unmeasured Occurrence 1 x     Stool Unmeasured Occurrence 2 x         Lab Results (last 24 hours)     ** No results found for the last 24 hours. **        Imaging Results (last 24 hours)     Procedure Component Value Units Date/Time    XR Shoulder 2+ View Right [[id number]] Collected:  08/12/19 1324     Updated:  08/12/19 1736    Narrative:       EXAMINATION: XR SHOULDER 2+ VW, RIGHT-08/12/2019:      INDICATION: Humerus fracture; R41.82-Altered mental status, unspecified;  R53.1-Weakness; S42.291D-Other displaced fracture of upper end of right  humerus, subsequent encounter for fracture with routine healing;  I10-Essential (primary) hypertension; Z74.09-Other reduced mobility;  Z74.09-Other reduced mobility; R41.841-Cognitive communication deficit;  R13.12-Dysphagia, oropharyngeal phase.      COMPARISON: 07/20/2019.     FINDINGS: Multipart fracture of acute/subacute etiology with angular  cortical margins at the surgical neck of the humerus with minimal  increase if any and displacement of multiple part fracture fragments in  similar alignment overall to prior comparison with background  degenerative changes of the glenohumeral joint and acromioclavicular  joint.           Impression:       Uotrky-gj-igzcolut prominent spacing of known acute/subacute  surgical neck fracture right humerus without humeral head displacement  or new fracture identified.     D:  08/12/2019  E:  08/12/2019     This report was finalized on 8/12/2019 5:33 PM by Dr. Rosalio Dey.             PPS:   40% based on the following measures:   Ambulation: Mainly in bed  Activity and Evidence of Disease: Unable to do " any work, extensive evidence of disease  Self-Care: Mainly assistance  Intake: Normal or reduced   LOC: Full, drowsy or confusion    Physical Exam   Constitutional: He is oriented to person, place, and time. He appears well-developed and well-nourished.   HENT:   Head: Normocephalic and atraumatic.   Eyes: Conjunctivae are normal. Pupils are equal, round, and reactive to light.   Neck: Neck supple.   Cardiovascular: Normal rate and regular rhythm.   Pulmonary/Chest: Effort normal and breath sounds normal.   Room air    Abdominal: Soft. Bowel sounds are normal.   Genitourinary:   Genitourinary Comments: Deferred    Musculoskeletal:   RUE in sling    Neurological: He is alert and oriented to person, place, and time.   Skin: Skin is warm and dry. Capillary refill takes 2 to 3 seconds.   Psychiatric: He has a normal mood and affect.   Nursing note and vitals reviewed.      Acute alteration in mental status    Essential hypertension    Crohn's colitis, with rectal bleeding (CMS/HCC)    GERD without esophagitis    Spinal stenosis in cervical region    PFO (patent foramen ovale)    History of stroke with residual deficit    Mixed hyperlipidemia    Multiple lacunar infarcts    Paroxysmal atrial fibrillation (CMS/HCC)    Fracture of proximal end of right humerus    Acute respiratory distress    Lewy body dementia    Assessment/Plan:  78 y.o. male with acute encephalopathy with underlying dementia, acute Right humerus fracture, acute hypoxia respiratory failure     Pain: tylenol 650 mg scheduled q 6 hr  And 500mg q 6 hr PRN;  voltaren gel topical; gabapentin 300 mg q HS;  lidoderm patch, prednisone 5 mg q day (spinal stenosis)   Dyspnea:  PRN duonebs   Nausea/Vomiting:  Anxiety/Agitation:  Confusion/Delerium:  Depression: cymbalta 20 mg q day   Bowel/bladder:   Nutrition:  Sleep: melatonin 5 mg q HS PRN   ADLs:  2) afib: apixaban   3) HLD: atorvastin 80 mg HS  4) HTN: Zebeta 5 mg q day; florastor 250 mg BID  5) dementia:  namenda 10 mg q 12 hrs,   6) GERD: Protonix EC 40 q AM     Pt is being discharged to [facility] today for continued rehab.       Total Visit Time: 40  Face to Face Time: 25     Isadora Garcia, APRN  [phone]  08/13/19  8:52 AM

## 2019-08-13 NOTE — THERAPY TREATMENT NOTE
Acute Care - Speech Language Pathology Treatment Note  [facility]     Patient Name: Vargas De  : 1940  MRN: [id number]  Today's Date: 2019         Admit Date: 2019    Visit Dx:      ICD-10-CM ICD-9-CM   1. Acute alteration in mental status R41.82 780.97   2. Generalized weakness R53.1 780.79   3. Other closed displaced fracture of proximal end of right humerus with routine healing, subsequent encounter S42.291D V54.11   4. Elevated blood pressure reading with diagnosis of hypertension I10 401.9   5. Impaired functional mobility, balance, gait, and endurance Z74.09 V49.89   6. Impaired mobility and ADLs Z74.09 799.89   7. Cognitive communication deficit R41.841 799.52   8. Oropharyngeal dysphagia R13.12 787.22     Patient Active Problem List   Diagnosis   • Peripheral neuropathy   • Essential hypertension   • Crohn's colitis, with rectal bleeding (CMS/HCC)   • Major depressive disorder with single episode, in partial remission (CMS/HCC)   • Macular degeneration of both eyes   • GERD without esophagitis   • Cervicalgia   • Spinal stenosis in cervical region   • Chronic right-sided low back pain without sciatica   • Osteoarthritis of toe joint, right   • PFO (patent foramen ovale)   • Ulcerative colitis (CMS/HCC)   • Body mass index (BMI) of 25.0 to 29.9   • History of stroke with residual deficit   • Mixed hyperlipidemia   • Multiple lacunar infarcts   • Paroxysmal atrial fibrillation (CMS/HCC)   • Chronic bilateral low back pain   • Acute alteration in mental status   • Fracture of proximal end of right humerus   • Acute respiratory distress   • Lewy body dementia        Therapy Treatment  Rehabilitation Treatment Summary     Row Name 19 0945             Treatment Time/Intention    Discipline  speech language pathologist  -RD      Document Type  therapy note (daily note)  -RD      Subjective Information  no complaints  -RD      Mode of Treatment  speech-language pathology;individual  therapy  -RD      Patient/Family Observations  family present  -RD      Care Plan Review  evaluation/treatment results reviewed;care plan/treatment goals reviewed;risks/benefits reviewed;current/potential barriers reviewed;patient/other agree to care plan  -RD      Care Plan Review, Other Participant(s)  family  -RD      Therapy Frequency (Swallow)  5 days per week  -RD      Therapy Frequency (SLP SLC)  5 days per week  -RD      Patient Effort  good  -RD      Recorded by [RD] Ansley Harding MS CCC-SLP 08/13/19 1152      Row Name 08/13/19 0945             Pain Assessment    Additional Documentation  Pain Scale: FACES Pre/Post-Treatment (Group)  -RD      Recorded by [RD] Ansley Harding MS CCC-SLP 08/13/19 1152      Row Name 08/13/19 0945             Pain Scale: FACES Pre/Post-Treatment    Pain: FACES Scale, Pretreatment  2-->hurts little bit  -RD      Pain: FACES Scale, Post-Treatment  2-->hurts little bit  -RD      Recorded by [RD] Ansley Harding MS CCC-SLP 08/13/19 1152      Row Name                Wound 07/20/19 1649 Left hand skin tear    Wound - Properties Group Date first assessed: 07/20/19 [SW] Time first assessed: 1649 [SW] Side: Left [SW] Location: hand [SW] Present On Admission : yes [SW] Type: skin tear [SW] Recorded by:  [SW] Odilia Joy RN 07/20/19 1649    Row Name                Wound 08/11/19 0400 perineum MASD ([place] associated skin damage)    Wound - Properties Group Date first assessed: 08/11/19 [YASSINE] Time first assessed: 0400 [YASSINE] Present on Hospital Admission: N [YASSINE] Orientation: -- [KC2] Location: perineum [KC2], scrotum and inner thigh  Primary Wound Type: MASD [YASSINE], Yeast dermatitis  Recorded by:  [YASSINE] Kari Culp RN 08/11/19 1404 [KC2] Kari Culp RN 08/11/19 1405    Row Name 08/13/19 0945             Outcome Summary/Treatment Plan (SLP)    Daily Summary of Progress (SLP)  progress toward functional goals as expected  -RD      Barriers to Overall  Progress (SLP)  fatigue, lethargy  -RD      Plan for Continued Treatment (SLP)  Continue dysphagia treatment. Continue soft solids and thin liquids + chin tuck and no straws precautions. Meds best whole/crushed in pureed. General aspiration precautions. Oral care BID/PRN. Continue cog/comm tx. Pt would benefit from continued SLP services at next level of care.   -RD      Anticipated Dischage Disposition  inpatient rehabilitation facility;anticipate therapy at next level of care  -RD      Recorded by [RD] Ansley Harding, MS CCC-SLP 08/13/19 0814        User Key  (r) = Recorded By, (t) = Taken By, (c) = Cosigned By    Initials Name Effective Dates Discipline    Odilia Gordillo RN 06/16/16 -  Nurse    Kari Mckay RN 06/16/16 -  Nurse    Ansley Oseguera, MS CCC-SLP 04/03/18 -  SLP          EDUCATION  The patient has been educated in the following areas:   Cognitive Impairment Communication Impairment Dysphagia (Swallowing Impairment) Oral Care/Hydration Modified Diet Instruction.    SLP Recommendation and Plan  Daily Summary of Progress (SLP): progress toward functional goals as expected  Barriers to Overall Progress (SLP): fatigue, lethargy  Plan for Continued Treatment (SLP): Continue dysphagia treatment. Continue soft solids and thin liquids + chin tuck and no straws precautions. Meds best whole/crushed in pureed. General aspiration precautions. Oral care BID/PRN. Continue cog/comm tx. Pt would benefit from continued SLP services at next level of care.   Anticipated Dischage Disposition: inpatient rehabilitation facility, anticipate therapy at next level of care             SLP GOALS     Row Name 08/13/19 0945             Oral Nutrition/Hydration Goal 1 (SLP)    Oral Nutrition/Hydration Goal 1, SLP  LTG: Pt will tolerate soft diet & thin liquid via small cup sips & chin tuck w/ no overt s/sxs aspiration or distress w/ 100% acc and no cues  -RD      Time Frame (Oral Nutrition/Hydration  Goal 1, SLP)  by discharge  -RD      Barriers (Oral Nutrition/Hydration Goal 1, SLP)  tolerating diet. Using chin tuck w/ min cues  -RD      Progress/Outcomes (Oral Nutrition/Hydration Goal 1, SLP)  continuing progress toward goal  -RD         Oral Nutrition/Hydration Goal 2 (SLP)    Oral Nutrition/Hydration Goal 2, SLP  Pt will tolerate soft solids & thin liquids via small single cups sips + chin tuck w/ no overt s/sxs aspiration or distress w/ 100% acc and no cues  -RD      Time Frame (Oral Nutrition/Hydration Goal 2, SLP)  short term goal (STG);by discharge  -RD      Barriers (Oral Nutrition/Hydration Goal 2, SLP)  Pt tolerating diet w/ compensations. Min cues required to remember chin tuck.  -RD      Progress/Outcomes (Oral Nutrition/Hydration Goal 2, SLP)  continuing progress toward goal  -RD         Pharyngeal Strengthening Exercise Goal 1 (SLP)    Activity (Pharyngeal Strengthening Goal 1, SLP)  increase timing;increase superior movement of the hyolaryngeal complex;increase anterior movement of the hyolaryngeal complex;increase closure at entrance to airway/closure of airway at glottis;increase squeeze/positive pressure generation;increase tongue base retraction  -RD      Increase Timing  prepping - 3 second prep or suck swallow or 3-step swallow  -RD      Increase Superior Movement of the Hyolaryngeal Complex  effortful pitch glide (falsetto + pharyngeal squeeze);Mendelsohn;falsetto  -RD      Increase Anterior Movement of the Hyolaryngeal Complex  chin tuck against resistance (CTAR)  -RD      Increase Closure at Entrance to Airway/Closure of Airway at Glottis  super-supraglottic swallow  -RD      Increase Squeeze/Positive Pressure Generation  hard effortful swallow  -RD      Increase Tongue Base Retraction  nabila  -RD      [place]/Accuracy (Pharyngeal Strengthening Goal 1, SLP)  with minimal cues (75-90% accuracy)  -RD      Time Frame (Pharyngeal Strengthening Goal 1, SLP)  short term goal (STG);by  discharge  -RD      Barriers (Pharyngeal Strengthening Goal 1, SLP)  Reviewed and completed exercises. Min reps 2' lethargy, but participated well. Difficulty w/ initiating volitional swallow. Completed effortful swallow x5, SSG x3, and CTAR for 30 sec x3  -RD      Progress/Outcomes (Pharyngeal Strengthening Goal 1, SLP)  continuing progress toward goal  -RD         Swallow Management Recall Goal 1 (SLP)    Activity (Swallow Management Recall Goal 1, SLP)  recall of;compensatory swallow strategies/techniques  -RD      [place]/Accuracy (Swallow Management Recall Goal 1, SLP)  with minimal cues (75-90% accuracy)  -RD      Time Frame (Swallow Management Recall Goal 1, SLP)  short term goal (STG);by discharge  -RD      Barriers (Swallow Management Recall Goal 1, SLP)  Pt able to recall need for chin tuck w/ min cues  -RD      Progress/Outcomes (Swallow Management Recall Goal 1, SLP)  continuing progress toward goal  -RD         Swallow Compensatory Strategies Goal 1 (SLP)    Activity (Swallow Compensatory Strategies/Techniques Goal 1, SLP)  compensatory strategies;small cup sips;chin tuck posture;alternate food/liquid intake;during p.o. trials;during meal intake  -RD      [place]/Accuracy (Swallow Compensatory Strategies/Techniques Goal 1, SLP)  with minimal cues (75-90% accuracy)  -RD      Time Frame (Swallow Compensatory Strategies/Techniques Goal 1, SLP)  short term goal (STG);by discharge  -RD      Progress/Outcomes (Swallow Compensatory Strategies/Techniques Goal 1, SLP)  continuing progress toward goal  -RD         Comprehend Questions Goal 1 (SLP)    Improve Ability to Comprehend Questions Goal 1 (SLP)  complex yes/no questions;80%;with minimal cues (75-90%)  -RD      Time Frame (Comprehend Questions Goal 1, SLP)  short term goal (STG);by discharge  -RD      Progress (Ability to Comprehend Questions Goal 1, SLP)  70%;with minimal cues (75-90%)  -RD      Progress/Outcomes (Comprehend Questions Goal 1,  SLP)  continuing progress toward goal  -RD         Word Retrieval Skills Goal 1 (SLP)    Improve Word Retrieval Skills By Goal 1 (SLP)  completing functional word finding tasks;80%;with minimal cues (75-90%)  -RD      Time Frame (Word Retrieval Goal 1, SLP)  short term goal (STG);by discharge  -RD      Progress (Word Retrieval Skills Goal 1, SLP)  70%;with minimal cues (75-90%)  -RD      Progress/Outcomes (Word Retrieval Goal 1, SLP)  continuing progress toward goal  -RD      Comment (Word Retrieval Goal 1, SLP)  Continued word-finding difficulty. Delayed responses noted  -RD         Ability to Construct Phrase and Sentence Level Response Goal 1 (SLP)    Improve Ability to Construct Phrase and Sentence Level Responses By Goal 1 (SLP)  answering question with phrase;constructing a sentence with a key word;80%;with minimal cues (75-90%)  -RD      Time Frame (Phrase and Sentence Level Response Goal 1, SLP)  short term goal (STG);by discharge  -RD      Progress (Construct Phrase and Sentence Level Response Goal 1, SLP)  70%;with minimal cues (75-90%)  -RD      Progress/Outcomes (Phrase and Sentence Level Response Goal 1, SLP)  goal ongoing;continuing progress toward goal  -RD         Orientation Goal 1 (SLP)    Improve Orientation Through Goal 1 (SLP)  demonstrating orientation to month;demonstrating orientation to place;80%;independently (over 90% accuracy)  -RD      Time Frame (Orientation Goal 1, SLP)  short term goal (STG);by discharge  -RD      Progress (Orientation Goal 1, SLP)  80%;with minimal cues (75-90%)  -RD      Progress/Outcomes (Orientation Goal 1, SLP)  continuing progress toward goal  -RD      Comment (Orientation Goal 1, SLP)  Oriented x4 w/ min cues for time  -RD         Additional Goal 1 (SLP)    Additional Goal 1, SLP  LTG: Pt will improve cognitive-communicaiton skills in order to participate in care in hospital setting w/ 100% acc w/o cues.  -RD      Time Frame (Additional Goal 1, SLP)  by  discharge  -RD      Progress/Outcomes (Additional Goal 1, SLP)  continuing progress toward goal  -RD        User Key  (r) = Recorded By, (t) = Taken By, (c) = Cosigned By    Initials Name Provider Type    Ansley Oseguera MS CCC-SLP Speech and Language Pathologist              Time Calculation:     Time Calculation- SLP     Row Name 08/13/19 1157             Time Calculation- SLP    SLP Start Time  0945  -RD      SLP Received On  08/13/19  -RD        User Key  (r) = Recorded By, (t) = Taken By, (c) = Cosigned By    Initials Name Provider Type    Ansley Oseguera MS CCC-SLP Speech and Language Pathologist          Therapy Charges for Today     Code Description Service Date Service Provider Modifiers Qty    19695900274 HC ST TREATMENT SWALLOW 2 8/13/2019 Ansley Harding MS CCC-SLP GN 1    [id number] HC ST TREATMENT SPEECH 2 8/13/2019 Ansley Harding MS CCC-SLP GN 1                     MS SONIYA Garibay  8/13/2019

## 2019-08-13 NOTE — PLAN OF CARE
Problem: Patient Care Overview  Goal: Plan of Care Review  Outcome: Ongoing (interventions implemented as appropriate)   08/13/19 2847   Coping/Psychosocial   Plan of Care Reviewed With patient   Plan of Care Review   Progress improving   OTHER   Outcome Summary patient ambulated 75 feet with min assist x1 and HHA on the left, distance limited by weakness and right shoulder pain( sling RUE). Tech followed with chair for safety.

## 2019-08-21 NOTE — TELEPHONE ENCOUNTER
[facility] ([phone]) called office to see if Dr. Arrington would sign orders for nursing care?

## 2019-08-28 NOTE — OUTREACH NOTE
Prep Survey      Responses   Facility patient discharged from?  [facility]   Is patient eligible?  Yes   Discharge diagnosis  Acute alteration in mental status, acute hypoxic respiratory failure likely secondary to aspiration, closed right proximal humerus fracture   Does the patient have one of the following disease processes/diagnoses(primary or secondary)?  Sepsis   Does the patient have Home health ordered?  Yes   What is the Home health agency?   [facility]   Is there a DME ordered?  No   General alerts for this patient  DC from acute care on 8/28   Prep survey completed?  Yes          Linda Campbell RN

## 2019-08-29 NOTE — OUTREACH NOTE
TCM call completed.  See TCM flowsheet for details.  Does have upcoming hospital follow up appt with Dr. Arrington 9/5/19.  Daughter returned call.  Unable to keep 9/6/19 appt, due to his appt for macular dengeneration injection at same time.  Rescheduled for 9/5/19.  States he still needs assistance. In w/c unless at home with family to help.  Unable to use walker due to the shoulder fracture.  She states pain is pretty well controlled with tylenol.  Eating and drinking, but trying to push more water since urine looks a little dark.  She says no fever, chills, or sob.  She states bp has been up and down and on and off bp meds.   She will bring medicines since there have been changes from neurologist and also at [facility].  She denied any needs at present and appreciated the call.

## 2019-08-29 NOTE — OUTREACH NOTE
Sepsis Week 3 Survey      Responses   Facility patient discharged from?  [facility]   Does the patient have one of the following disease processes/diagnoses(primary or secondary)?  Sepsis   Week 3 attempt successful?  No   Unsuccessful attempts  Attempt 1          Lynda Rodriguez RN

## 2019-08-29 NOTE — PROGRESS NOTES
Subjective:    CC: Vargas De is seen today  for Memory Loss       HPI:  Current visit- since the last visit patient sustained a fall causing him to fracture his right humerus.  He also became very confused at the time.  Was taken to [facility] where he had a series of tests including 2 MRIs of the brain that showed chronic lacunar infarcts in both sides but no acute intracranial abnormalities.  He also had an EEG that was normal.  He was diagnosed with acute hypoxic respiratory failure likely secondary to aspiration and treated with antibiotics.  After that patient became extremely weak all over more so in his right arm as a result of the fracture.  His speech has also become slurred again.  Was continued on Eliquis for atrial fibrillation and Lipitor 80 mg.  He was which was previously seen with his stroke.  He was sent to rehab where his blood pressure started dropping down.  Hence he was taken off of his blood pressure medications and told to start on midodrine which he has not yet started.  At home his blood pressure runs okay but when he stands up it has dropped by about 20 points as per the daughter.  In the past 2 months he has also started shuffling his feet a lot and occasionally has a slight tremor in the hands.  At the time of the fall he was having visual hallucinations but denies having any now.  For his memory he continues to take Namenda XR 28 mg daily.  Of note-I personally reviewed his MRI brain and Dr. Martinez's notes as summarized above.    Last visit-since his last visit patient continues to have some word finding difficulties as well as occasional drooping of the left side of his face especially when he is tired to sleep deprived.  He went off of aspirin and now only takes Eliquis 5 mg twice a day in addition to atorvastatin 40 mg daily.  With regards to his memory his daughter feels that he is stable and can still do most of his activities of daily living at home.  Occasionally  misplaces things around the house but does not have any problems with names.  Continues to be on Namenda XR 28 mg daily.  He is still having significant low back pain that radiates down his legs.  In the past he had imaging of his lumbar spine that showed spinal stenosis but was told by neurosurgery that he was not a surgical candidate.  He has been on prednisone 5 mg twice a day for several years now.  He also continues to take gabapentin 300 mg at night.    Last visit-patient started having symptoms of left facial drooping with speech problems and left arm weakness around 2/27.  He was brought to the hospital where he had an MRI brain that showed embolic infarcts in the right parietal and occipital region.  After that he had a carotid Doppler that did not show any significant stenosis and an echocardiogram that showed a normal EF with moderate left atrial dilatation, PFO and aortic valve regurgitation as well as a grade 2 plaque in the aortic arch.  TCD confirmed the PFO.  Patient was started on Eliquis 5 mg twice daily in addition to aspirin 81 mg daily.  His blood pressure medications were also modified.  He was also started on atorvastatin 80 mg.  Lipid panel was as follows-, TG 48, , HDL 52.  He underwent speech and physical therapy after discharge and is now back to baseline.  He also had a Holter monitor after discharge which showed atrial fibrillation.  With regards to his memory he feels that his symptoms are stable.  Continues to be on Namenda 28 mg daily.  Did not restart Aricept as he was afraid of the side effects.    Last visit-since his last visit since his last visit he denies having any major problems with his memory however he does admit to stuttering where he is unable to say a word despite knowing what to say.  As per the daughter he does this while conversing in English or Indonesian.  He also misplaces things around the house but can carry out all his activities of daily living.  He  continues to be on Namenda XR 28 mg daily.  For his neuropathy he is also taking gabapentin 300 mg at night which controls his symptoms well however he has recently started to have right arm pain off and on.  He does have degenerative changes in his cervical spine and has tried physical therapy several times before and does not want to try it again.      Last visit- since his last visit patient reports to his memory being stable.  He has reached a dose of 28 mg on his Namenda XR.  As per his daughter he still has word finding difficulties.  Today the patient also admits to having visual hallucinations once or twice where he has seen his daughter beside himself when she has not been there.  He denies any depressive symptoms currently and is doing well on Zoloft.  With regards to his neuropathy he feels it is stable on gabapentin 300 mg at night but he has to take up to 4 Tylenols a day for his arthritis.  His blood work was normal other than a low vitamin B6 level and he has started taking supplements.       Last visit- since his last visit he started taking Aricept however he had night terrors even on the 5 mg daily dose.  Hence he stopped taking it.  He continues to have word finding difficulties as well as problems recalling things more so when he is depressed or is in pain.  He continues to be on Zoloft 50 mg daily.  Right after he saw me he was admitted to the hospital for a right arm infection (possible MRSA) and just completed his course of antibiotics a week ago.  He had his MRI brain yesterday that shows atrophy and extensive white matter changes with chronic infarcts in the right frontal and parietal region as well as extensive microhemorrhages consistent with a amyloid angiopathy.  With regards to his neuropathy he did not have blood work however he did increase his gabapentin to 300 mg at night which has helped improve his symptoms.     Initial [id number] male accompanied by his daughter with a  history of hypertension, hyperlipidemia, stroke in 2013, ulcerative colitis, macular degeneration, hearing loss in left ear, depression now presents with word finding difficulties, shuffling gait and tingling and numbness in his feet.  As per patient he had a frontal stroke in 2013 that caused right-sided weakness.  He denies having any speech problems after the stroke however 6 months ago he started to have word finding difficulties.  Denies any severe problems with his memory.  He does report to being depressed after the death of his wife 3 years ago and has been on antidepressants since then.  He lives with his daughter and states that he doesn't feel like interacting with friends anymore.  He also reports to disturbances in sleep due to frequent awakenings to use the bathroom He has also had problems with his balance and walking where he has started to shuffle his feet.  He had MRIs of his back that showed severe degenerative changes in his spine throughout.  After that he saw a surgeon who told him that he was not a good surgical candidate.  He has also been having tingling and numbness that started in his feet and has progressed up into his legs a few years ago.  He was prescribed gabapentin but is only taking 200 mg at night.  He denies having any history of diabetes or any excessive alcohol intake.    The following portions of the patient's history were reviewed today and updated as of 08/29/2019  : allergies, current medications, past family history, past medical history, past social history, past surgical history and problem list  These document will be scanned to patient's chart.      Current Outpatient Medications:   •  acetaminophen (TYLENOL) 325 MG tablet, Take 2 tablets by mouth Every 4 (Four) Hours As Needed for Mild Pain  or Fever (Temperature greater than or equal to 37 C)., Disp: , Rfl:   •  apixaban (ELIQUIS) 5 MG tablet tablet, Take 1 tablet by mouth Every 12 (Twelve) Hours., Disp: 60 tablet,  Rfl: 11  •  APRISO 0.375 g 24 hr capsule, Take 0.75 g by mouth Every Night. 2 caps (0.750g total) nightly, Disp: , Rfl:   •  artificial tears (LUBRIFRESH P.M.) ointment ophthalmic ointment, Administer  to both eyes Every Night., Disp: , Rfl:   •  atorvastatin (LIPITOR) 80 MG tablet, Take 1 tablet by mouth Every Night., Disp: 30 tablet, Rfl: 11  •  bisoprolol (ZEBeta) 5 MG tablet, Take 5 mg by mouth Every Evening., Disp: , Rfl:   •  MELENDEZ PO, Take 2 capsules by mouth 2 (Two) Times a Day., Disp: , Rfl:   •  Cholecalciferol (VITAMIN D3) 5000 units capsule capsule, Take 5,000 Units by mouth Daily., Disp: , Rfl:   •  diclofenac (VOLTAREN) 1 % gel gel, Apply 2 g topically to the appropriate area as directed 4 (Four) Times a Day. Apply to right shoulder, Disp: , Rfl:   •  DULoxetine (CYMBALTA) 20 MG capsule, Take 1 capsule by mouth Daily., Disp: 30 capsule, Rfl: 11  •  fluticasone (FLONASE) 50 MCG/ACT nasal spray, 2 sprays into the nostril(s) as directed by provider Daily As Needed for Rhinitis., Disp: , Rfl:   •  gabapentin (NEURONTIN) 300 MG capsule, Take 300 mg by mouth every night at bedtime., Disp: , Rfl:   •  lidocaine (LIDODERM) 5 %, Place 2 patches on the skin as directed by provider Every Night. Remove & Discard patch within 12 hours- apply to right upper extremity, Disp: , Rfl:   •  memantine (NAMENDA XR) 28 MG capsule sustained-release 24 hr extended release capsule, Take 1 capsule by mouth Daily., Disp: 30 capsule, Rfl: 11  •  Multiple Vitamins-Minerals (PRESERVISION AREDS PO), Take 1 capsule by mouth 2 (Two) Times a Day., Disp: , Rfl:   •  Nystatin (MAGIC MOUTHWASH), Swish and spit 5 mL Every 4 (Four) Hours As Needed (thrush)., Disp: , Rfl:   •  omeprazole (priLOSEC) 20 MG capsule, Take 20 mg by mouth 2 (Two) Times a Day., Disp: , Rfl:   •  predniSONE (DELTASONE) 5 MG tablet, Take 5 mg by mouth Daily. (Trying to wean pt from using.  Recently reduced to 1 tab daily drom 2 tabs daily 7/23/19), Disp: , Rfl:   •   saccharomyces boulardii (FLORASTOR) 250 MG capsule, Take 1 capsule by mouth 2 (Two) Times a Day., Disp: , Rfl:   •  vitamin B-12 (CYANOCOBALAMIN) 500 MCG tablet, Take 500 mcg by mouth Daily., Disp: , Rfl:   •  Zinc 50 MG capsule, Take 50 mg by mouth Daily., Disp: , Rfl:   •  carbidopa-levodopa (SINEMET)  MG per tablet, Take 1 tablet by mouth 3 (Three) Times a Day., Disp: 90 tablet, Rfl: 3  •  midodrine (PROAMATINE) 5 MG tablet, Take 1 tablet 3 times a day with food.  Avoid lying down for 1 to 2 hours after taking the medication to prevent supine hypertension, Disp: 90 tablet, Rfl: 3   Past Medical History:   Diagnosis Date   • Allergic arthritis    • Arthritis    • BPH (benign prostatic hyperplasia)      urology   • BPH/nocturia/microhematuria (work up negative)    • chronic hearing loss left ear    • Crohn's colitis (CMS/HCC)     on colonoscopy 7/15.2017.  [facility]-continue Lialda   • Depression     medication, after death of wife 2014   • Elbow pain 7/25/2018   • Gout    • Hearing loss     left ear   • History of stroke 7/23/2018   • Hyperlipidemia    • Hypertension    • Ingrown toenail 03/18/2018   • Low back pain    • Macular degeneration    • Macular degeneration    • Multiple lacunar infarcts     noted on CT 2/2019   • Neck pain    • Rash 1/28/2019   • Rib pain on left side 07/11/2017   • Septic bursitis of elbow, right 7/23/2018   • Spinal stenosis     cervical and lumbosacral spine   • Spinal stenosis-cervical and lumbosacral spine    • Stroke (CMS/HCC)     aspirin, blood pressure conyrol.  Infarct frontal lobe.  R sided weakness, R hand and R foot numbness,dysphasia with stuttering.   • Stroke (CMS/HCC) 02/27/2019    MCA infarct with L facial droop and L sided weakness,dysphasia,dysarthria   • TIA (transient ischemic attack) 2/27/2019   • Ulcerative colitis (CMS/HCC)    • Urinary frequency       Past Surgical History:   Procedure Laterality Date   • CATARACT EXTRACTION     • COLONOSCOPY  07/15/2017   •  "HEMORRHOIDECTOMY     • HERNIA REPAIR     • KNEE SURGERY     • TONSILLECTOMY        Family History   Problem Relation Age of Onset   • Cancer Mother    • Alzheimer's disease Father    • Arthritis Father    • Cancer Brother    • Diabetes Maternal Aunt    • Arthritis Other    • Diabetes Other    • Stroke Maternal Cousin    • Cancer Other    • Hypertension Other       Social History     Socioeconomic History   • Marital status:      Spouse name: Not on file   • Number of children: Not on file   • Years of education: Not on file   • Highest education level: Not on file   Tobacco Use   • Smoking status: Never Smoker   • Smokeless tobacco: Never Used   Substance and Sexual Activity   • Alcohol use: Yes     Comment: rarely   • Drug use: No   • Sexual activity: Defer     Review of Systems   Constitutional: Positive for fatigue.   Eyes: Positive for visual disturbance.   Genitourinary: Positive for frequency.   Musculoskeletal: Positive for back pain, gait problem, joint swelling and myalgias.   Neurological: Positive for weakness, numbness and confusion.   Psychiatric/Behavioral: Positive for sleep disturbance and depressed mood. The patient is nervous/anxious.    All other systems reviewed and are negative.      Objective:    /64   Pulse 76   Ht 170.2 cm (67.01\")   SpO2 96%   BMI 30.07 kg/m²     Neurology Exam:    General apperance: NAD.     Mental status: Alert, awake and oriented to time place and person.    Recent and Remote memory: Mildly impaired, MMSE of 25/30 with a delayed recall of 1/3    Attention span and Concentration: Mildly impaired    Language and Speech: Intact-mild dysarthria.    Fluency, Naming , Repitition and Comprehension:  Intact    Cranial Nerves:   CN II: Visual fields are full. Intact. Fundi - Normal, No papillederma, Pupils - ANAHI  CN III, IV and VI: Extraocular movements are intact. Normal saccades.   CN V: Facial sensation is intact.   CN VII: Muscles of facial expression reveal " no asymmetry. Intact.   CN VIII: Hearing is intact. Whispered voice intact.   CN IX and X: Palate elevates symmetrically. Intact  CN XI: Shoulder shrug is intact.   CN XII: Tongue is midline without evidence of atrophy or fasciculation.     Ophthalmoscopic exam of optic disc-normal    Motor:-No resting or postural tremors noted  Right UE muscle strength 3/5 due to pain. Normal tone.     Left UE muscle strength 5/5. Normal tone.      Right LE muscle strength5/5. Normal tone.     Left LE muscle strength 5/5. Normal tone.      Sensory: Normal light touch, vibration and pinprick sensation bilaterally.    DTRs: 2+ bilaterally in upper and lower extremities.    Babinski: Negative bilaterally.    Co-ordination: Normal finger-to-nose, heel to shin B/L.    Rhomberg: Negative.    Gait: Shuffling gait    Cardiovascular: Regular rate and rhythm without murmur, gallop or rub.    Assessment and Plan:  1. Lewy body dementia without behavioral disturbance  Patient could have Lewy body dementia.  However his gait abnormalities could also be due to his chronic lumbar spine issues  I will start him on a trial of Sinemet 10/100 mg, 1 tablet 3 times a day with food  He should continue Namenda XR 28 mg daily     2.  Orthostatic hypotension  I have asked him to continue  midodrine 5 mg 3 times daily and to keep himself well-hydrated    3.  Embolic stroke-  Continue Eliquis for atrial fibrillation  Also on atorvastatin 80 mg daily.  Goal LDL of less than 100  Maintain blood pressure less than 130/90    Return in about 4 weeks (around 9/26/2019).     I spent over 50 minutes with the patient face to face out of which over 50% (30 minutes) was spent in management reviewing his scans and getting MMSE, instructions and education regarding his symptoms.     Rafaela Gutierrez MD

## 2019-08-30 NOTE — OUTREACH NOTE
Sepsis Week 3 Survey      Responses   Facility patient discharged from?  [place]   Does the patient have one of the following disease processes/diagnoses(primary or secondary)?  Sepsis   Week 3 attempt successful?  No   Unsuccessful attempts  Attempt 2          Kathie Saha RN

## 2019-08-30 NOTE — TELEPHONE ENCOUNTER
LIZBETH CALLED NEEDING A VERBAL TO RESCHEDULE PT'S OCCUPATIONAL THERAPY HE IS HAVING AN EYE PROCEDURE DONE TODAY     VERBAL WAS GIVEN   911 or go to the nearest Emergency Room [facility] ([facility]):

## 2019-09-03 NOTE — TELEPHONE ENCOUNTER
Nuha Childers O.T. From [facility] called for verbal orders for occupational therapy with frequency of :    1 week one  2 week six  1 week one    Verbal orders given.  Does provider agree?

## 2019-09-05 NOTE — PROGRESS NOTES
Ear Cerumen Removal  Date/Time: 9/5/2019 11:11 AM  Performed by: Saba Arrington MD  Authorized by: Saba Arrington MD     Anesthesia:  Local Anesthetic: none  Location details: left ear  Patient tolerance: Patient tolerated the procedure well with no immediate complications  Procedure type: instrumentation   Sedation:  Patient sedated: no

## 2019-09-05 NOTE — PROGRESS NOTES
"[facility]     Vargas De  1940   [id number]      Patient Care Team:  Saba Arrington MD as PCP - General (Internal Medicine)  Hermilo Shen MD as Consulting Physician (Colon and Rectal Surgery)  Rafaela Gutierrez MD as Consulting Physician (Neurology)  Taj Maldonado MD as Consulting Physician (Cardiology)    Chief Complaint;:   Chief Complaint   Patient presents with   • Transitional Care Management     DC from [facility] on 8/28      Patient is accompanied by his daughter who is his caretaker.      Patient was admitted to [facility] on 7/23/2019 and discharged to [facility] on 8/13/2019.  He was then discharged from [facility] on 8/29/2019.  TCM call was done by Renate Zuniga on 8/29/2019.      HPI  Patient is a 79 y.o. male .admitted to [facility] on 7/23/2019 with acute mental status changes.  MRI in the ER was negative for acute stroke but multiple old lacunar infarcts were present, EEG was negative for seizures, he was found to have acute hypoxic respiratory failure likely secondary to aspiration.  Treated with IV Zosyn and later transitioned to Augmentin for 7 days.    Right shoulder x-ray revealed a right proximal humerus fracture due to fall.  Seen by Dr. Steinberg and medical management recommended.PT/OT started at [facility]     Seen by Dr. Ramirez during hospitalization for tachycardia and junctional rhythm.  Noted to have orthostatic hypotension which required normal saline boluses.Started on midodrine, but it was only given at [facility] \"prn\" so we don't know if and when he got it.      Since home:    His daughter now has full-time help to stay with him at home.  He needs assistance with ADLs including bathing dressing getting meals ready.      Blood pressures have been low when standing, dropping as much as 20 points.  BP lying could be up as high as 150.  Dr. Maldonado's note recommended taking the midodrine to keep BP from dropping.  and continuing the bisoprolol to " control the A. fib.  He admits not drinking much water.    Complains of bilateral edema.  Dr. Maldonado recommended support socks.  The daughter has not had a chance to go get those yet.  He does elevate his feet sitting in a recliner at home.    He feels the left ear is stopped up again which happens chronically and needs to be washed out.      CHRONIC CONDITIONS  From the [facility] reconciliation we see that he has been taking doxylamine every night for sleep for many many years.  (We had not known he was taking it until today).  He has tried melatonin in the past and did not do well with that.  He has now been on gabapentin for several years in the evening for his peripheral neuropathy.  I explained today that that can help with sleep and explained the increased risk for side effects with the doxylamine especially at his age.    Past Medical History:   Diagnosis Date   • Acute respiratory distress 7/27/2019   • Allergic arthritis    • Arthritis    • BPH (benign prostatic hyperplasia)      urology   • BPH/nocturia/microhematuria (work up negative)    • chronic hearing loss left ear    • Crohn's colitis (CMS/HCC)     on colonoscopy 7/15.2017.  [facility]-continue Lialda   • Depression     medication, after death of wife 2014   • Elbow pain 7/25/2018   • Gout    • Hearing loss     left ear   • History of stroke 7/23/2018   • Hyperlipidemia    • Hypertension    • Ingrown toenail 03/18/2018   • Low back pain    • Macular degeneration    • Macular degeneration    • Multiple lacunar infarcts     noted on CT 2/2019   • Neck pain    • Rash 1/28/2019   • Rib pain on left side 07/11/2017   • Septic bursitis of elbow, right 7/23/2018   • Spinal stenosis     cervical and lumbosacral spine   • Spinal stenosis-cervical and lumbosacral spine    • Stroke (CMS/HCC)     aspirin, blood pressure conyrol.  Infarct frontal lobe.  R sided weakness, R hand and R foot numbness,dysphasia with stuttering.   • Stroke (CMS/HCC) 02/27/2019    MCA  infarct with L facial droop and L sided weakness,dysphasia,dysarthria   • TIA (transient ischemic attack) 2/27/2019   • Ulcerative colitis (CMS/HCC)    • Urinary frequency        Past Surgical History:   Procedure Laterality Date   • CATARACT EXTRACTION     • COLONOSCOPY  07/15/2017   • HEMORRHOIDECTOMY     • HERNIA REPAIR     • KNEE SURGERY     • TONSILLECTOMY         Family History   Problem Relation Age of Onset   • Cancer Mother    • Alzheimer's disease Father    • Arthritis Father    • Cancer Brother    • Diabetes Maternal Aunt    • Arthritis Other    • Diabetes Other    • Stroke Maternal Cousin    • Cancer Other    • Hypertension Other        Social History     Socioeconomic History   • Marital status:      Spouse name: Not on file   • Number of children: Not on file   • Years of education: Not on file   • Highest education level: Not on file   Tobacco Use   • Smoking status: Never Smoker   • Smokeless tobacco: Never Used   Substance and Sexual Activity   • Alcohol use: Yes     Comment: rarely   • Drug use: No   • Sexual activity: Defer       Allergies   Allergen Reactions   • Hydrocodone Other (See Comments)     Causes pt to become completely unresponsive   • Morphine Other (See Comments)     Causes pt to become completely unresponsive   • Oxycodone Other (See Comments)     Causes pt to become completely unresponsive   • Donepezil Other (See Comments)     nightmares   • Iodine    • Sulfa Antibiotics        Review of Systems:     Review of Systems   Constitutional: Positive for fatigue. Negative for chills and fever.   Respiratory: Negative for cough, shortness of breath and wheezing.    Cardiovascular: Positive for leg swelling. Negative for chest pain and palpitations.   Gastrointestinal: Negative for abdominal pain, blood in stool, constipation, diarrhea and GERD.   Endocrine: Negative for cold intolerance and heat intolerance.   Genitourinary: Positive for frequency and nocturia. Negative for  "hematuria.   Musculoskeletal: Positive for arthralgias and gait problem. Negative for joint swelling.   Neurological: Positive for speech difficulty, weakness, light-headedness, numbness and memory problem.   Psychiatric/Behavioral: Positive for sleep disturbance. The patient is not nervous/anxious.        Vital Signs  Vitals:    09/05/19 0932 09/05/19 1059   BP: (!) 84/68 108/70   BP Location: Left arm Left arm   Patient Position: Sitting Sitting   Cuff Size: Adult Adult   Pulse: 66    Weight: 86.2 kg (190 lb)    Height: 171.5 cm (67.5\")    PainSc: 0-No pain    Body mass index is 29.32 kg/m².        Current Outpatient Medications:   •  acetaminophen (TYLENOL) 325 MG tablet, Take 2 tablets by mouth Every 4 (Four) Hours As Needed for Mild Pain  or Fever (Temperature greater than or equal to 37 C)., Disp: , Rfl:   •  apixaban (ELIQUIS) 5 MG tablet tablet, Take 1 tablet by mouth Every 12 (Twelve) Hours., Disp: 60 tablet, Rfl: 11  •  APRISO 0.375 g 24 hr capsule, Take 0.75 g by mouth Every Night. 2 caps (0.750g total) nightly, Disp: , Rfl:   •  artificial tears (LUBRIFRESH P.M.) ointment ophthalmic ointment, Administer  to both eyes Every Night., Disp: , Rfl:   •  atorvastatin (LIPITOR) 80 MG tablet, Take 1 tablet by mouth Every Night., Disp: 30 tablet, Rfl: 11  •  bisoprolol (ZEBeta) 5 MG tablet, Take 5 mg by mouth Every Evening., Disp: , Rfl:   •  carbidopa-levodopa (SINEMET)  MG per tablet, Take 1 tablet by mouth 3 (Three) Times a Day., Disp: 90 tablet, Rfl: 3  •  MELENDEZ PO, Take 2 capsules by mouth 2 (Two) Times a Day., Disp: , Rfl:   •  Cholecalciferol (VITAMIN D3) 5000 units capsule capsule, Take 5,000 Units by mouth Daily., Disp: , Rfl:   •  diclofenac (VOLTAREN) 1 % gel gel, Apply 2 g topically to the appropriate area as directed 4 (Four) Times a Day. Apply to right shoulder, Disp: , Rfl:   •  DULoxetine (CYMBALTA) 20 MG capsule, Take 1 capsule by mouth Daily., Disp: 30 capsule, Rfl: 11  •  fluticasone " (FLONASE) 50 MCG/ACT nasal spray, 2 sprays into the nostril(s) as directed by provider Daily As Needed for Rhinitis., Disp: , Rfl:   •  gabapentin (NEURONTIN) 300 MG capsule, Take 300 mg by mouth every night at bedtime., Disp: , Rfl:   •  lidocaine (LIDODERM) 5 %, Place 2 patches on the skin as directed by provider Every Night. Remove & Discard patch within 12 hours- apply to right upper extremity, Disp: , Rfl:   •  memantine (NAMENDA XR) 28 MG capsule sustained-release 24 hr extended release capsule, Take 1 capsule by mouth Daily., Disp: 30 capsule, Rfl: 11  •  midodrine (PROAMATINE) 5 MG tablet, Take 1 tablet 3 times a day with food.  Avoid lying down for 1 to 2 hours after taking the medication to prevent supine hypertension, Disp: 90 tablet, Rfl: 3  •  Multiple Vitamins-Minerals (PRESERVISION AREDS PO), Take 1 capsule by mouth 2 (Two) Times a Day., Disp: , Rfl:   •  Nystatin (MAGIC MOUTHWASH), Swish and spit 5 mL Every 4 (Four) Hours As Needed (thrush)., Disp: , Rfl:   •  omeprazole (priLOSEC) 20 MG capsule, Take 20 mg by mouth 2 (Two) Times a Day., Disp: , Rfl:   •  potassium chloride (K-DUR) 10 MEQ CR tablet, 10 mEq Daily., Disp: , Rfl:   •  predniSONE (DELTASONE) 5 MG tablet, Take 5 mg by mouth Daily. (Trying to wean pt from using.  Recently reduced to 1 tab daily drom 2 tabs daily 7/23/19), Disp: , Rfl:   •  Probiotic Product (PROBIOTIC ADVANCED PO), Take  by mouth Daily., Disp: , Rfl:   •  saccharomyces boulardii (FLORASTOR) 250 MG capsule, Take 1 capsule by mouth 2 (Two) Times a Day., Disp: , Rfl:   •  vitamin B-12 (CYANOCOBALAMIN) 500 MCG tablet, Take 500 mcg by mouth Daily., Disp: , Rfl:   •  Zinc 50 MG capsule, Take 50 mg by mouth Daily., Disp: , Rfl:     Physical Exam:    Physical Exam   Constitutional: He is oriented to person, place, and time. He appears well-developed and well-nourished. He appears overweight.   HENT:   Head: Normocephalic.   Eyes: Conjunctivae and EOM are normal. Pupils are equal,  round, and reactive to light.   Neck: Normal range of motion. Neck supple. No thyromegaly present.   Cardiovascular: Normal rate, regular rhythm, normal heart sounds and intact distal pulses.   Pulmonary/Chest: Effort normal and breath sounds normal.   Musculoskeletal: He exhibits no edema.        Right shoulder: He exhibits decreased range of motion and tenderness.   Lymphadenopathy:     He has no cervical adenopathy.   Neurological: He is alert and oriented to person, place, and time. A sensory deficit is present. No cranial nerve deficit. Gait abnormal.   Using walker today.  General weakness which is worse on the right.  Left mouth droop.  Some bradykinesia and slow speech.   Psychiatric: He has a normal mood and affect. Thought content normal. His speech is delayed. He is slowed. He exhibits abnormal recent memory.   Nursing note and vitals reviewed.       ACE III MINI        Results Review:    I reviewed the patient's new clinical results.  We reviewed his hospitalization records in detail.  We reviewed his recent labs with BMP and CBC both improved.    CMP:  Lab Results   Component Value Date    GLU 82 04/02/2019    BUN 7 (L) 08/11/2019    CREATININE 0.62 (L) 08/11/2019    EGFRIFNONA 126 08/11/2019    EGFRIFAFRI 125 04/02/2019    BCR 11.3 08/11/2019     08/11/2019    K 3.6 08/11/2019    CO2 25.0 08/11/2019    CALCIUM 8.8 08/11/2019    PROTENTOTREF 6.5 04/02/2019    ALBUMIN 2.70 (L) 08/02/2019    LABGLOBREF 2.3 04/02/2019    LABIL2 1.8 04/02/2019    BILITOT 0.6 08/02/2019    ALKPHOS 93 08/02/2019    AST 45 (H) 08/02/2019    ALT 52 (H) 08/02/2019     HbA1c:  Lab Results   Component Value Date    HGBA1C 6.30 (H) 07/24/2019    HGBA1C 5.50 02/28/2019     Microalbumin:  No results found for: MICROALBUR, POCMALB, POCCREAT  Lipid Panel  Lab Results   Component Value Date    CHOL 89 07/24/2019    TRIG 47 07/24/2019    HDL 48 07/24/2019    LDL 32 07/24/2019    AST 45 (H) 08/02/2019    ALT 52 (H) 08/02/2019        Medication Review: Medications reviewed and noted    Current outpatient and discharge medications have been reconciled for the patient.  Reviewed by: Saba Arrington MD      Problem List Items Addressed This Visit        Cardiovascular and Mediastinum    Essential hypertension    Relevant Medications    bisoprolol (ZEBeta) 5 MG tablet    midodrine (PROAMATINE) 5 MG tablet    Paroxysmal atrial fibrillation (CMS/HCC)    Overview     · 30-day MCOT monitor (04/2019): Episode of likely atrial fibrillation.  Wide-complex tachycardia possibly atrial fibrillation with abberancy  · Chads Vasc= 6         Relevant Medications    bisoprolol (ZEBeta) 5 MG tablet    midodrine (PROAMATINE) 5 MG tablet    Orthostatic hypotension - Primary (Chronic)       Nervous and Auditory    Peripheral neuropathy    Multiple lacunar infarcts    Lewy body dementia    Overview     Dr. Gutierrez         Relevant Medications    memantine (NAMENDA XR) 28 MG capsule sustained-release 24 hr extended release capsule    DULoxetine (CYMBALTA) 20 MG capsule    gabapentin (NEURONTIN) 300 MG capsule    carbidopa-levodopa (SINEMET)  MG per tablet       Musculoskeletal and Integument    Fracture of proximal end of right humerus       Other    Acute alteration in mental status    Primary insomnia (Chronic)    Overview     We will try stopping doxylamine,not replacing it.gabapentin (for neuropathy) helps with sleep.consider adding trazodone at next visit or increase gabapentin           Other Visit Diagnoses     Acute respiratory failure with hypoxia (CMS/HCC)  (Chronic)       Likely secondary to aspiration.  Treated with antibiotics and improved.  Continue PT and OT.    Impacted cerumen of left ear  (Chronic)       Removal today    Relevant Orders    Ear Cerumen Removal (Completed)    High risk medication use  (Chronic)       Advised of high risk of side effects and complications from doxylamine            Diagnosis Plan   1. Orthostatic hypotension       Continue 1 Midodrine tablet 3X/day.Continue to monitor blood pressures at home.Be careful not to rise up to quickly from the chair or bed.Drink more water.   2. Essential hypertension      Continue bisoprolol once a day in the evening as recommended by Dr. Maldonado to control atrial fibrillation rate and supine blood pressure   3. Lewy body dementia without behavioral disturbance      Take carbidopa/levodopa 3 times a day as prescribed by .  Getting some physical activity and exercise every day is very important   4. Multiple lacunar infarcts      Continue Eliquis.  Continue good blood pressure control.  Continue atorvastatin every evening.   5. Acute alteration in mental status      Continue PT, OT, speech therapy.  We discussed stopping doxylamine due to high risk of confusion, sedation, hypotension, falls, urinary retention   6. Acute respiratory failure with hypoxia (CMS/HCC)      Likely secondary to aspiration.  Treated with antibiotics and improved.  Continue PT and OT.   7. Paroxysmal atrial fibrillation (CMS/HCC)      Continue Eliquis and bisoprolol.   8. Primary insomnia      We will try stopping doxylamine,not replacing it.gabapentin (for neuropathy) helps with sleep.consider adding trazodone at next visit or increase gabapentin   9. Other closed displaced fracture of proximal end of right humerus with routine healing, subsequent encounter      Continue physical therapy and Occupational Therapy and follow-up with Dr. Steinberg as planned.  Moist heat can help with the pain.  Tylenol as needed.   10. Impacted cerumen of left ear  Ear Cerumen Removal    Removal today   11. Idiopathic peripheral neuropathy      Continue gabapentin every evening.  Continue to exercise feet while sitting.   12. High risk medication use      Advised of high risk of side effects and complications from doxylamine       Patient Instructions   Lewy Body Dementia  Dementia is the loss of two or more brain functions, such  as:  · Memory.  · Decision making.  · Behavior.  · Speaking.  · Thinking.  · Problem solving.  Lewy body dementia is a type of dementia that gets worse with time. Lewy body dementia is also called dementia with Lewy bodies.  What are the causes?  This condition is caused by the buildup of proteins called Lewy bodies in brain cells. It is not known what causes the Lewy bodies to build up.  What are the signs or symptoms?  Early symptoms of this condition include:  · Seeing things that are not there (hallucinating).  · Trouble sleeping.  · Loss of smell.  Later symptoms include:  · Problems with problem solving, abstract thinking, and reasoning.  · Memory problems.  · Poor judgment.  · Confusion.  · Reduced attention span.  · False ideas about another person or situation (delusions).  · Disorganized speech.  · Sleep problems, such as acting out dreams.  · Shifts in alertness and attention, such as:  ? Periods of drowsiness or lack of energy (lethargy).  ? Long periods of time spent staring into space.  · Changes in movement. For example:  ? Trouble moving.  ? Slow movement.  ? Poor posture.  ? Rigid muscles.  ? Shuffling movements (gait).  ? Tremors.  How is this diagnosed?  This condition is diagnosed with an assessment by your health care provider. During this assessment, your health care provider will talk to you and your family, friends, or caregivers about your symptoms.  A thorough medical history will be taken, and you will have a physical exam and tests. Tests may include:  · Lab tests, such as blood or urine tests.  · Imaging tests, such as a CT scan, PET scan, or MRI.  · A lumbar puncture. This test involves removing and testing a small amount of the fluid that surrounds the brain and spinal cord.  · An electroencephalogram (EEG). In this test, small metal discs are used to measure electrical activity in the brain.  · Memory tests, cognitive tests, and neuropsychological tests. These tests evaluate brain  function.  How is this treated?  There is no cure for this condition. Medicines may be prescribed to help slow down how fast the dementia gets worse and to help with symptoms.  Follow these instructions at home:  Medicine  · Take over-the-counter and prescription medicines only as told by your health care provider.  · Avoid taking medicines that can affect thinking, such as pain or sleeping medicines.  Lifestyle    · Make healthy lifestyle choices:  ? Be physically active as told by your health care provider.  ? Do not use any tobacco products, such as cigarettes, chewing tobacco, and e-cigarettes. If you need help quitting, ask your health care provider.  ? Eat a healthy diet.  ? Practice stress-management techniques when you get stressed.  ? Stay social.  · Drink enough fluid to keep your urine clear or pale yellow.  · Make sure you sleep well. These tips can help you get a good night’s rest:  ? Avoid napping during the day.  ? Keep your sleeping area dark and cool.  ? Avoid exercising during the few hours before you go to bed.  ? Avoid caffeine products in the evening.  General instructions  · Work with your health care provider to determine what you need help with and what your safety needs are.  · If you were given a bracelet that tracks your location, make sure to wear it.  · Keep all follow-up visits as told by your health care provider. This is important.  Contact a health care provider if:  · You have any new symptoms.  · You have problems with choking or swallowing.  · You have any symptoms of a new or different illness.  Get help right away if:  · You develop a fever.  · You have new or worsening confusion.  · You have new or worsening trouble with sleeping.  · You have a hard time staying awake.  · You or your family members become concerned for your safety.  This information is not intended to replace advice given to you by your health care provider. Make sure you discuss any questions you have with  your health care provider.  Document Released: 09/09/2003 Document Revised: 01/23/2017 Document Reviewed: 09/14/2016  [employer] Interactive Patient Education © 2019 [employer]    Understanding Your Risk for Falls  Each year, millions of people suffer serious injuries from falls. It is important to understand your risk for falling. Talk with your health care provider about your risk and what you can do to lower it. There are actions you can take at home to lower your risk.  If you do have a serious fall, it is important to tell your health care provider. Falling once raises your risk for falling again.  How can falls affect me?  Serious injuries from falls are common. These include:  · Broken bones. Most hip fractures are caused by falls.  · Traumatic brain injury (TBI). Falls are the most common cause of TBI.  Fear of falling can also cause you to avoid activities and stay at home. This can make your muscles weaker and actually raise your risk for a fall.  What can increase my risk?  Serious injuries from a fall most often happen to people older than age 65. Children and young adults ages 15-29 are also at higher risk. The more risk factors you have for falling, the higher your risk. Risk factors include:  · Weakness in the lower body.  · Lack (deficiency) of vitamin D.  · Weak bones (osteoporosis).  · Being generally weak or confused due to long-term (chronic) illness.  · Dizziness or balance problems.  · Poor vision.  · Having depression.  · Medicine that causes dizziness or drowsiness. These can include medicines for your blood pressure, heart, anxiety, insomnia, or edema, as well as pain medicines and muscle relaxants.  · Drinking alcohol.  · Foot pain or improper footwear.  · Working at a dangerous job.  · Having had a fall in the past.  · Tripping hazards at home, such as floor clutter or loose rugs, or poor lighting.  · Having pets or clutter in your home.  What actions can I take to lower my risk of  falling?    · Maintain physical fitness:  ? Do strength and balance exercises. Consider taking a regular class to build strength and balance. Yoga and shiva chi are good options.  ? Have your eyes checked every year and your vision prescription updated as needed.  · Remove all clutter from walkways and stairways, including extension cords.  · Use a cordless phone.  · Do not use throw rugs. Make sure all carpeting is taped or tacked down securely.  · Use good lighting in all rooms. Keep a flashlight near your bed.  · Make sure there is a clear path from your bed to the bathroom. Use night-lights.  · Install grab bars for your tub, shower, and toilet. Use a bath mat in your tub or shower.  · Attach secure railings on both sides of your stairs.  · Repair uneven or broken steps.  · Use a cane or walker as directed by your health care provider.  · Wear nonskid shoes. Do not wear high heels. Do not walk around the house in socks or slippers.  · Avoid walking on icy or slippery surfaces. Walk on the grass instead of on icy or slick sidewalks. Where you can, use ice melt to get rid of ice on walkways.  Questions to ask your health care provider  · Can you help me evaluate my risk for a fall?  · Do any of my medicines make me more likely to fall?  · Should I take a vitamin D supplement?  · What exercises can I do to improve my strength and balance?  · Should I make an appointment to have my vision checked?  · Do I need a bone density test to check for osteoporosis?  · Would it help to use a cane or a walker?  Where to find more information  · Centers for Disease Control and Prevention, STEADI: cdc.gov  · Community-Based Fall Prevention Programs: cdc.gov  · National [place] on Aging: mg6zoci.[place].nih.gov  Contact a health care provider if:  · You fall at home.  · You are afraid of falling at home.  · You feel weak, drowsy, or dizzy at home.  Summary  · People 65 and older are at high risk for falling. However, older people are  not the only ones injured in falls. Children and young adults have a higher-than-normal risk, too.  · Talk with your health care provider about your risks for falling and how to lower those risks.  · Taking certain precautions at home can lower your risk for falling.  · If you fall, always tell your health care provider.  This information is not intended to replace advice given to you by your health care provider. Make sure you discuss any questions you have with your health care provider.  Document Released: 08/01/2018 Document Revised: 08/01/2018 Document Reviewed: 08/01/2018  [employer] Interactive Patient Education © 2019 [employer]    Orthostatic Hypotension  Orthostatic hypotension is a sudden drop in blood pressure that happens when you quickly change positions, such as when you get up from a seated or lying position. Blood pressure is a measurement of how strongly, or weakly, your blood is pressing against the walls of your arteries. Arteries are blood vessels that carry blood from your heart throughout your body. When blood pressure is too low, you may not get enough blood to your brain or to the rest of your organs. This can cause weakness, light-headedness, rapid heartbeat, and fainting. This can last for just a few seconds or for up to a few minutes.  Orthostatic hypotension is usually not a serious problem. However, if it happens frequently or gets worse, it may be a sign of something more serious.  What are the causes?  This condition may be caused by:  · Sudden changes in posture, such as standing up quickly after you have been sitting or lying down.  · Blood loss.  · Loss of body fluids (dehydration).  · Heart problems.  · Hormone (endocrine) problems.  · Pregnancy.  · Severe infection.  · Lack of certain nutrients.  · Severe allergic reactions (anaphylaxis).  · Certain medicines, such as blood pressure medicine or medicines that make the body lose excess fluids (diuretics). Sometimes, this  "condition can be caused by not taking medicine as directed, such as taking too much of a certain medicine.  What increases the risk?  Certain factors can make you more likely to develop orthostatic hypotension, including:  · Age. Risk increases as you get older.  · Conditions that affect the heart or the central nervous system.  · Taking certain medicines, such as blood pressure medicine or diuretics.  · Being pregnant.  What are the signs or symptoms?  Symptoms of this condition may include:  · Weakness.  · Light-headedness.  · Dizziness.  · Blurred vision.  · Fatigue.  · Rapid heartbeat.  · Fainting, in severe cases.  How is this diagnosed?  This condition is diagnosed based on:  · Your medical history.  · Your symptoms.  · Your blood pressure measurement. Your health care provider will check your blood pressure when you are:  ? Lying down.  ? Sitting.  ? Standing.  A blood pressure reading is recorded as two numbers, such as \"120 over 80\" (or 120/80). The first (\"top\") number is called the systolic pressure. It is a measure of the pressure in your arteries as your heart beats. The second (\"bottom\") number is called the diastolic pressure. It is a measure of the pressure in your arteries when your heart relaxes between beats. Blood pressure is measured in a unit called mm Hg. Healthy blood pressure for adults is 120/80. If your blood pressure is below 90/60, you may be diagnosed with hypotension.  Other information or tests that may be used to diagnose orthostatic hypotension include:  · Your other vital signs, such as your heart rate and temperature.  · Blood tests.  · Tilt table test. For this test, you will be safely secured to a table that moves you from a lying position to an upright position. Your heart rhythm and blood pressure will be monitored during the test.  How is this treated?  Treatment for this condition may include:  · Changing your diet. This may involve eating more salt (sodium) or drinking more " water.  · Taking medicines to raise your blood pressure.  · Changing the dosage of certain medicines you are taking that might be lowering your blood pressure.  · Wearing compression stockings. These stockings help to prevent blood clots and reduce swelling in your legs.  In some cases, you may need to go to the hospital for:  · Fluid replacement. This means you will receive fluids through an IV tube.  · Blood replacement. This means you will receive donated blood through an IV tube (transfusion).  · Treating an infection or heart problems, if this applies.  · Monitoring. You may need to be monitored while medicines that you are taking wear off.  Follow these instructions at home:  Eating and drinking    · Drink enough fluid to keep your urine clear or pale yellow.  · Eat a healthy diet and follow instructions from your health care provider about eating or drinking restrictions. A healthy diet includes:  ? Fresh fruits and vegetables.  ? Whole grains.  ? Lean meats.  ? Low-fat dairy products.  · Eat extra salt only as directed. Do not add extra salt to your diet unless your health care provider told you to do that.  · Eat frequent, small meals.  · Avoid standing up suddenly after eating.  Medicines  · Take over-the-counter and prescription medicines only as told by your health care provider.  ? Follow instructions from your health care provider about changing the dosage of your current medicines, if this applies.  ? Do not stop or adjust any of your medicines on your own.  General instructions    · Wear compression stockings as told by your health care provider.  · Get up slowly from lying down or sitting positions. This gives your blood pressure a chance to adjust.  · Avoid hot showers and excessive heat as directed by your health care provider.  · Return to your normal activities as told by your health care provider. Ask your health care provider what activities are safe for you.  · Do not use any products that  contain nicotine or tobacco, such as cigarettes and e-cigarettes. If you need help quitting, ask your health care provider.  · Keep all follow-up visits as told by your health care provider. This is important.  Contact a health care provider if:  · You vomit.  · You have diarrhea.  · You have a fever for more than 2-3 days.  · You feel more thirsty than usual.  · You feel weak and tired.  Get help right away if:  · You have chest pain.  · You have a fast or irregular heartbeat.  · You develop numbness in any part of your body.  · You cannot move your arms or your legs.  · You have trouble speaking.  · You become sweaty or feel lightheaded.  · You faint.  · You feel short of breath.  · You have trouble staying awake.  · You feel confused.  This information is not intended to replace advice given to you by your health care provider. Make sure you discuss any questions you have with your health care provider.  Document Released: 12/08/2003 Document Revised: 09/05/2017 Document Reviewed: 06/09/2017  [employer] Interactive Patient Education © 2019 [employer]        Plan of care reviewed with patient at the conclusion of today's visit. Education was provided regarding diagnosis, management, and any prescribed or recommended OTC medications.Patient verbalizes understanding of and agreement with management plan.         Saba Arrington MD

## 2019-09-06 PROBLEM — I95.1 ORTHOSTATIC HYPOTENSION: Chronic | Status: ACTIVE | Noted: 2019-01-01

## 2019-09-06 PROBLEM — F51.01 PRIMARY INSOMNIA: Chronic | Status: ACTIVE | Noted: 2019-01-01

## 2019-09-06 PROBLEM — R06.03 ACUTE RESPIRATORY DISTRESS: Status: RESOLVED | Noted: 2019-01-01 | Resolved: 2019-01-01

## 2019-09-06 NOTE — PATIENT INSTRUCTIONS
Lewy Body Dementia  Dementia is the loss of two or more brain functions, such as:  · Memory.  · Decision making.  · Behavior.  · Speaking.  · Thinking.  · Problem solving.  Lewy body dementia is a type of dementia that gets worse with time. Lewy body dementia is also called dementia with Lewy bodies.  What are the causes?  This condition is caused by the buildup of proteins called Lewy bodies in brain cells. It is not known what causes the Lewy bodies to build up.  What are the signs or symptoms?  Early symptoms of this condition include:  · Seeing things that are not there (hallucinating).  · Trouble sleeping.  · Loss of smell.  Later symptoms include:  · Problems with problem solving, abstract thinking, and reasoning.  · Memory problems.  · Poor judgment.  · Confusion.  · Reduced attention span.  · False ideas about another person or situation (delusions).  · Disorganized speech.  · Sleep problems, such as acting out dreams.  · Shifts in alertness and attention, such as:  ? Periods of drowsiness or lack of energy (lethargy).  ? Long periods of time spent staring into space.  · Changes in movement. For example:  ? Trouble moving.  ? Slow movement.  ? Poor posture.  ? Rigid muscles.  ? Shuffling movements (gait).  ? Tremors.  How is this diagnosed?  This condition is diagnosed with an assessment by your health care provider. During this assessment, your health care provider will talk to you and your family, friends, or caregivers about your symptoms.  A thorough medical history will be taken, and you will have a physical exam and tests. Tests may include:  · Lab tests, such as blood or urine tests.  · Imaging tests, such as a CT scan, PET scan, or MRI.  · A lumbar puncture. This test involves removing and testing a small amount of the fluid that surrounds the brain and spinal cord.  · An electroencephalogram (EEG). In this test, small metal discs are used to measure electrical activity in the brain.  · Memory tests,  cognitive tests, and neuropsychological tests. These tests evaluate brain function.  How is this treated?  There is no cure for this condition. Medicines may be prescribed to help slow down how fast the dementia gets worse and to help with symptoms.  Follow these instructions at home:  Medicine  · Take over-the-counter and prescription medicines only as told by your health care provider.  · Avoid taking medicines that can affect thinking, such as pain or sleeping medicines.  Lifestyle    · Make healthy lifestyle choices:  ? Be physically active as told by your health care provider.  ? Do not use any tobacco products, such as cigarettes, chewing tobacco, and e-cigarettes. If you need help quitting, ask your health care provider.  ? Eat a healthy diet.  ? Practice stress-management techniques when you get stressed.  ? Stay social.  · Drink enough fluid to keep your urine clear or pale yellow.  · Make sure you sleep well. These tips can help you get a good night’s rest:  ? Avoid napping during the day.  ? Keep your sleeping area dark and cool.  ? Avoid exercising during the few hours before you go to bed.  ? Avoid caffeine products in the evening.  General instructions  · Work with your health care provider to determine what you need help with and what your safety needs are.  · If you were given a bracelet that tracks your location, make sure to wear it.  · Keep all follow-up visits as told by your health care provider. This is important.  Contact a health care provider if:  · You have any new symptoms.  · You have problems with choking or swallowing.  · You have any symptoms of a new or different illness.  Get help right away if:  · You develop a fever.  · You have new or worsening confusion.  · You have new or worsening trouble with sleeping.  · You have a hard time staying awake.  · You or your family members become concerned for your safety.  This information is not intended to replace advice given to you by your  health care provider. Make sure you discuss any questions you have with your health care provider.  Document Released: 09/09/2003 Document Revised: 01/23/2017 Document Reviewed: 09/14/2016  [employer] Interactive Patient Education © 2019 [employer]    Understanding Your Risk for Falls  Each year, millions of people suffer serious injuries from falls. It is important to understand your risk for falling. Talk with your health care provider about your risk and what you can do to lower it. There are actions you can take at home to lower your risk.  If you do have a serious fall, it is important to tell your health care provider. Falling once raises your risk for falling again.  How can falls affect me?  Serious injuries from falls are common. These include:  · Broken bones. Most hip fractures are caused by falls.  · Traumatic brain injury (TBI). Falls are the most common cause of TBI.  Fear of falling can also cause you to avoid activities and stay at home. This can make your muscles weaker and actually raise your risk for a fall.  What can increase my risk?  Serious injuries from a fall most often happen to people older than age 65. Children and young adults ages 15-29 are also at higher risk. The more risk factors you have for falling, the higher your risk. Risk factors include:  · Weakness in the lower body.  · Lack (deficiency) of vitamin D.  · Weak bones (osteoporosis).  · Being generally weak or confused due to long-term (chronic) illness.  · Dizziness or balance problems.  · Poor vision.  · Having depression.  · Medicine that causes dizziness or drowsiness. These can include medicines for your blood pressure, heart, anxiety, insomnia, or edema, as well as pain medicines and muscle relaxants.  · Drinking alcohol.  · Foot pain or improper footwear.  · Working at a dangerous job.  · Having had a fall in the past.  · Tripping hazards at home, such as floor clutter or loose rugs, or poor lighting.  · Having pets or  clutter in your home.  What actions can I take to lower my risk of falling?    · Maintain physical fitness:  ? Do strength and balance exercises. Consider taking a regular class to build strength and balance. Yoga and shiva chi are good options.  ? Have your eyes checked every year and your vision prescription updated as needed.  · Remove all clutter from walkways and stairways, including extension cords.  · Use a cordless phone.  · Do not use throw rugs. Make sure all carpeting is taped or tacked down securely.  · Use good lighting in all rooms. Keep a flashlight near your bed.  · Make sure there is a clear path from your bed to the bathroom. Use night-lights.  · Install grab bars for your tub, shower, and toilet. Use a bath mat in your tub or shower.  · Attach secure railings on both sides of your stairs.  · Repair uneven or broken steps.  · Use a cane or walker as directed by your health care provider.  · Wear nonskid shoes. Do not wear high heels. Do not walk around the house in socks or slippers.  · Avoid walking on icy or slippery surfaces. Walk on the grass instead of on icy or slick sidewalks. Where you can, use ice melt to get rid of ice on walkways.  Questions to ask your health care provider  · Can you help me evaluate my risk for a fall?  · Do any of my medicines make me more likely to fall?  · Should I take a vitamin D supplement?  · What exercises can I do to improve my strength and balance?  · Should I make an appointment to have my vision checked?  · Do I need a bone density test to check for osteoporosis?  · Would it help to use a cane or a walker?  Where to find more information  · Centers for Disease Control and Prevention, [employer]: cdc.gov  · Community-Based Fall Prevention Programs: cdc.gov  · National [place] on Aging: kq4nxgy.[place].nih.gov  Contact a health care provider if:  · You fall at home.  · You are afraid of falling at home.  · You feel weak, drowsy, or dizzy at home.  Summary  · People  65 and older are at high risk for falling. However, older people are not the only ones injured in falls. Children and young adults have a higher-than-normal risk, too.  · Talk with your health care provider about your risks for falling and how to lower those risks.  · Taking certain precautions at home can lower your risk for falling.  · If you fall, always tell your health care provider.  This information is not intended to replace advice given to you by your health care provider. Make sure you discuss any questions you have with your health care provider.  Document Released: 08/01/2018 Document Revised: 08/01/2018 Document Reviewed: 08/01/2018  [employer] Interactive Patient Education © 2019 [employer]    Orthostatic Hypotension  Orthostatic hypotension is a sudden drop in blood pressure that happens when you quickly change positions, such as when you get up from a seated or lying position. Blood pressure is a measurement of how strongly, or weakly, your blood is pressing against the walls of your arteries. Arteries are blood vessels that carry blood from your heart throughout your body. When blood pressure is too low, you may not get enough blood to your brain or to the rest of your organs. This can cause weakness, light-headedness, rapid heartbeat, and fainting. This can last for just a few seconds or for up to a few minutes.  Orthostatic hypotension is usually not a serious problem. However, if it happens frequently or gets worse, it may be a sign of something more serious.  What are the causes?  This condition may be caused by:  · Sudden changes in posture, such as standing up quickly after you have been sitting or lying down.  · Blood loss.  · Loss of body fluids (dehydration).  · Heart problems.  · Hormone (endocrine) problems.  · Pregnancy.  · Severe infection.  · Lack of certain nutrients.  · Severe allergic reactions (anaphylaxis).  · Certain medicines, such as blood pressure medicine or medicines that  "make the body lose excess fluids (diuretics). Sometimes, this condition can be caused by not taking medicine as directed, such as taking too much of a certain medicine.  What increases the risk?  Certain factors can make you more likely to develop orthostatic hypotension, including:  · Age. Risk increases as you get older.  · Conditions that affect the heart or the central nervous system.  · Taking certain medicines, such as blood pressure medicine or diuretics.  · Being pregnant.  What are the signs or symptoms?  Symptoms of this condition may include:  · Weakness.  · Light-headedness.  · Dizziness.  · Blurred vision.  · Fatigue.  · Rapid heartbeat.  · Fainting, in severe cases.  How is this diagnosed?  This condition is diagnosed based on:  · Your medical history.  · Your symptoms.  · Your blood pressure measurement. Your health care provider will check your blood pressure when you are:  ? Lying down.  ? Sitting.  ? Standing.  A blood pressure reading is recorded as two numbers, such as \"120 over 80\" (or 120/80). The first (\"top\") number is called the systolic pressure. It is a measure of the pressure in your arteries as your heart beats. The second (\"bottom\") number is called the diastolic pressure. It is a measure of the pressure in your arteries when your heart relaxes between beats. Blood pressure is measured in a unit called mm Hg. Healthy blood pressure for adults is 120/80. If your blood pressure is below 90/60, you may be diagnosed with hypotension.  Other information or tests that may be used to diagnose orthostatic hypotension include:  · Your other vital signs, such as your heart rate and temperature.  · Blood tests.  · Tilt table test. For this test, you will be safely secured to a table that moves you from a lying position to an upright position. Your heart rhythm and blood pressure will be monitored during the test.  How is this treated?  Treatment for this condition may include:  · Changing your " diet. This may involve eating more salt (sodium) or drinking more water.  · Taking medicines to raise your blood pressure.  · Changing the dosage of certain medicines you are taking that might be lowering your blood pressure.  · Wearing compression stockings. These stockings help to prevent blood clots and reduce swelling in your legs.  In some cases, you may need to go to the hospital for:  · Fluid replacement. This means you will receive fluids through an IV tube.  · Blood replacement. This means you will receive donated blood through an IV tube (transfusion).  · Treating an infection or heart problems, if this applies.  · Monitoring. You may need to be monitored while medicines that you are taking wear off.  Follow these instructions at home:  Eating and drinking    · Drink enough fluid to keep your urine clear or pale yellow.  · Eat a healthy diet and follow instructions from your health care provider about eating or drinking restrictions. A healthy diet includes:  ? Fresh fruits and vegetables.  ? Whole grains.  ? Lean meats.  ? Low-fat dairy products.  · Eat extra salt only as directed. Do not add extra salt to your diet unless your health care provider told you to do that.  · Eat frequent, small meals.  · Avoid standing up suddenly after eating.  Medicines  · Take over-the-counter and prescription medicines only as told by your health care provider.  ? Follow instructions from your health care provider about changing the dosage of your current medicines, if this applies.  ? Do not stop or adjust any of your medicines on your own.  General instructions    · Wear compression stockings as told by your health care provider.  · Get up slowly from lying down or sitting positions. This gives your blood pressure a chance to adjust.  · Avoid hot showers and excessive heat as directed by your health care provider.  · Return to your normal activities as told by your health care provider. Ask your health care provider  what activities are safe for you.  · Do not use any products that contain nicotine or tobacco, such as cigarettes and e-cigarettes. If you need help quitting, ask your health care provider.  · Keep all follow-up visits as told by your health care provider. This is important.  Contact a health care provider if:  · You vomit.  · You have diarrhea.  · You have a fever for more than 2-3 days.  · You feel more thirsty than usual.  · You feel weak and tired.  Get help right away if:  · You have chest pain.  · You have a fast or irregular heartbeat.  · You develop numbness in any part of your body.  · You cannot move your arms or your legs.  · You have trouble speaking.  · You become sweaty or feel lightheaded.  · You faint.  · You feel short of breath.  · You have trouble staying awake.  · You feel confused.  This information is not intended to replace advice given to you by your health care provider. Make sure you discuss any questions you have with your health care provider.  Document Released: 12/08/2003 Document Revised: 09/05/2017 Document Reviewed: 06/09/2017  [employer] Interactive Patient Education © 2019 [employer]

## 2019-10-15 PROBLEM — L82.0 SEBORRHEIC KERATOSES, INFLAMED: Chronic | Status: ACTIVE | Noted: 2019-01-01

## 2019-10-15 NOTE — PROGRESS NOTES
[facility]     Vargas De  1940   [id number]      Patient Care Team:  Saba Arrington MD as PCP - General (Internal Medicine)  Hermilo Shen MD as Consulting Physician (Colon and Rectal Surgery)  Rafaela Gutierrez MD as Consulting Physician (Neurology)  Taj Maldonado MD as Consulting Physician (Cardiology)    Chief Complaint;:   Chief Complaint   Patient presents with   • Hypertension     f/u   • Hyperlipidemia   • Depression     would like to talk about increasing cymbalta with winter coming      Patient is accompanied by his son-in-law today.    HPI  Patient is a 79 y.o. male presents with cloudy urine, a little frequency and dysuria.  Also presents for follow-up of hypertension, depression, symptomatic seborrheic keratoses on the neck, right shoulder pain since humerus fracture.      CHRONIC CONDITIONS    Urine frequency is a chronic complaint.  He states that for the last couple days the urine has also been cloudy and there has been a little burning.  He would like to rule out UTI.    Patient has multiple seborrheic keratoses on his neck.  The ones on the left side bother him the most.  They itch.  No bleeding.  There has been a little redness.  He would like them treated with liquid nitrogen today.    Patient and his son-in-law both agree that he has a little depression off.  He would like to increase his duloxetine a little from 20 mg a day to 30 mg a day.  Feels that it has been very helpful.  No side effects.    Blood pressures have been controlled.  Takes medication regularly.    Shoulder and right arm pain are improving slowly.  Recent fracture.  He is able to move his arms and use the arm.  With the pain.        He would like to have his PSA checked.    Past Medical History:   Diagnosis Date   • Acute respiratory distress 7/27/2019   • Allergic arthritis    • Arthritis    • Atrial fibrillation (CMS/HCC)    • BPH (benign prostatic hyperplasia)      urology   •  BPH/nocturia/microhematuria (work up negative)    • chronic hearing loss left ear    • Crohn's colitis (CMS/HCC)     on colonoscopy 7/15.2017.  [facility]-continue Lialda   • Depression     medication, after death of wife 2014   • Elbow pain 7/25/2018   • Gout    • Hearing loss     left ear   • History of stroke 7/23/2018   • Hyperlipidemia    • Hypertension    • Ingrown toenail 03/18/2018   • Lewy body dementia (CMS/HCC)    • Low back pain    • Macular degeneration    • Macular degeneration    • Multiple lacunar infarcts (CMS/HCC)     noted on CT 2/2019   • Neck pain    • Rash 1/28/2019   • Rib pain on left side 07/11/2017   • Septic bursitis of elbow, right 7/23/2018   • Spinal stenosis     cervical and lumbosacral spine   • Spinal stenosis-cervical and lumbosacral spine    • Stroke (CMS/HCC)     aspirin, blood pressure conyrol.  Infarct frontal lobe.  R sided weakness, R hand and R foot numbness,dysphasia with stuttering.   • Stroke (CMS/HCC) 02/27/2019    MCA infarct with L facial droop and L sided weakness,dysphasia,dysarthria   • TIA (transient ischemic attack) 2/27/2019   • Ulcerative colitis (CMS/HCC)    • Urinary frequency        Past Surgical History:   Procedure Laterality Date   • CATARACT EXTRACTION     • COLONOSCOPY  07/15/2017   • HEMORRHOIDECTOMY     • HERNIA REPAIR     • KNEE SURGERY     • TONSILLECTOMY         Family History   Problem Relation Age of Onset   • Cancer Mother    • Alzheimer's disease Father    • Arthritis Father    • Cancer Brother    • Diabetes Maternal Aunt    • Arthritis Other    • Diabetes Other    • Stroke Maternal Cousin    • Cancer Other    • Hypertension Other        Social History     Socioeconomic History   • Marital status:      Spouse name: Not on file   • Number of children: Not on file   • Years of education: Not on file   • Highest education level: Not on file   Tobacco Use   • Smoking status: Never Smoker   • Smokeless tobacco: Never Used   Substance and Sexual  "Activity   • Alcohol use: Yes     Comment: rarely   • Drug use: No   • Sexual activity: Defer       Allergies   Allergen Reactions   • Hydrocodone Other (See Comments)     Causes pt to become completely unresponsive   • Morphine Other (See Comments)     Causes pt to become completely unresponsive   • Oxycodone Other (See Comments)     Causes pt to become completely unresponsive   • Donepezil Other (See Comments)     nightmares   • Iodine    • Sulfa Antibiotics        Review of Systems:     Review of Systems   Constitutional: Negative for chills, fatigue and fever.   Respiratory: Negative for cough, shortness of breath and wheezing.    Cardiovascular: Negative for chest pain, palpitations and leg swelling.   Gastrointestinal: Negative for abdominal pain, blood in stool, constipation and diarrhea.   Genitourinary: Positive for dysuria and frequency. Negative for hematuria.   Musculoskeletal: Positive for arthralgias and gait problem.   Neurological: Positive for weakness and memory problem. Negative for dizziness.       Vital Signs  Vitals:    10/15/19 1347   BP: 146/90   BP Location: Left arm   Patient Position: Sitting   Cuff Size: Adult   Pulse: 76   Weight: 84.8 kg (187 lb)   Height: 171.5 cm (67.5\")   PainSc: 0-No pain     Body mass index is 28.86 kg/m².      Current Outpatient Medications:   •  acetaminophen (TYLENOL) 325 MG tablet, Take 2 tablets by mouth Every 4 (Four) Hours As Needed for Mild Pain  or Fever (Temperature greater than or equal to 37 C)., Disp: , Rfl:   •  apixaban (ELIQUIS) 5 MG tablet tablet, Take 1 tablet by mouth Every 12 (Twelve) Hours., Disp: 60 tablet, Rfl: 11  •  APRISO 0.375 g 24 hr capsule, Take 0.75 g by mouth Every Night. 2 caps (0.750g total) nightly, Disp: , Rfl:   •  atorvastatin (LIPITOR) 80 MG tablet, Take 1 tablet by mouth Every Night., Disp: 30 tablet, Rfl: 11  •  bisoprolol (ZEBeta) 5 MG tablet, Take 5 mg by mouth Every Evening., Disp: , Rfl:   •  carbidopa-levodopa (SINEMET) "  MG per tablet, Take 1 tablet by mouth 3 (Three) Times a Day., Disp: 90 tablet, Rfl: 3  •  MELENDEZ PO, Take 2 capsules by mouth 2 (Two) Times a Day., Disp: , Rfl:   •  Cholecalciferol (VITAMIN D3) 5000 units capsule capsule, Take 5,000 Units by mouth Daily., Disp: , Rfl:   •  fluticasone (FLONASE) 50 MCG/ACT nasal spray, 2 sprays into the nostril(s) as directed by provider Daily As Needed for Rhinitis., Disp: , Rfl:   •  gabapentin (NEURONTIN) 300 MG capsule, Take 300 mg by mouth every night at bedtime., Disp: , Rfl:   •  memantine (NAMENDA XR) 28 MG capsule sustained-release 24 hr extended release capsule, TAKE ONE CAPSULE BY MOUTH EVERY DAY, Disp: 30 capsule, Rfl: 0  •  Multiple Vitamins-Minerals (PRESERVISION AREDS PO), Take 1 capsule by mouth 2 (Two) Times a Day., Disp: , Rfl:   •  omeprazole (priLOSEC) 20 MG capsule, TAKE ONE CAPSULE BY MOUTH TWICE A DAY BEFORE MEALS, Disp: 60 capsule, Rfl: 10  •  potassium chloride (K-DUR) 10 MEQ CR tablet, Take 2 tablets by mouth Daily., Disp: 180 tablet, Rfl: 1  •  predniSONE (DELTASONE) 5 MG tablet, TAKE ONE TABLET BY MOUTH TWICE A DAY, Disp: 180 tablet, Rfl: 3  •  vitamin B-12 (CYANOCOBALAMIN) 500 MCG tablet, Take 500 mcg by mouth Daily., Disp: , Rfl:   •  DULoxetine (CYMBALTA) 30 MG capsule, Take 1 capsule by mouth Daily., Disp: 30 capsule, Rfl: 5    Physical Exam:    Physical Exam   Constitutional: He is oriented to person, place, and time. He appears well-developed and well-nourished.   HENT:   Head: Normocephalic.   Eyes: Conjunctivae and EOM are normal. Pupils are equal, round, and reactive to light.   Neck: Normal range of motion. Neck supple. No thyromegaly present.   Cardiovascular: Normal rate, regular rhythm, normal heart sounds and intact distal pulses.   Pulmonary/Chest: Effort normal and breath sounds normal.   Musculoskeletal: Normal range of motion. He exhibits no edema.        Right shoulder: He exhibits normal range of motion, no tenderness and no  swelling.   Lymphadenopathy:     He has no cervical adenopathy.   Neurological: He is alert and oriented to person, place, and time. Gait abnormal.   General weakness.  So on the right side since history of stroke   Skin: Lesion noted.   Inflamed seborrheic keratoses, left side of the neck worse than the right.  Mild inflammation and redness.  6 lesions treated with liquid nitrogen today.  There are some smaller ones present as well   Psychiatric: He has a normal mood and affect. His behavior is normal. Thought content normal. His speech is delayed. He exhibits abnormal recent memory.   Speech is somewhat slow with trouble words.  This is unchanged since his last visit   Nursing note and vitals reviewed.       ACE III MINI        Results Review:    None    CMP:  Lab Results   Component Value Date     (H) 10/15/2019    BUN 15 10/17/2019    CREATININE 0.95 10/17/2019    EGFRIFNONA 77 10/17/2019    EGFRIFAFRI 119 10/15/2019    BCR 15.8 10/17/2019     10/17/2019    K 3.8 10/17/2019    CO2 26.0 10/17/2019    CALCIUM 8.9 10/17/2019    PROTENTOTREF 7.4 10/15/2019    ALBUMIN 3.40 (L) 10/17/2019    LABGLOBREF 2.9 10/15/2019    LABIL2 1.6 10/15/2019    BILITOT 1.8 (H) 10/17/2019    ALKPHOS 85 10/17/2019    AST 12 10/17/2019    ALT 13 10/17/2019     HbA1c:  Lab Results   Component Value Date    HGBA1C 5.80 (H) 10/17/2019    HGBA1C 6.30 (H) 07/24/2019     Microalbumin:  No results found for: MICROALBUR, POCMALB, POCCREAT  Lipid Panel  Lab Results   Component Value Date    CHOL 110 10/17/2019    TRIG 92 10/17/2019    HDL 46 10/17/2019    LDL 46 10/17/2019    AST 12 10/17/2019    ALT 13 10/17/2019       Medication Review: Medications reviewed and noted    Problem List Items Addressed This Visit        Cardiovascular and Mediastinum    Essential hypertension    Overview     10/15/2019 Saba Arrington MD    Continue bisoprolol daily         Relevant Medications    bisoprolol (ZEBeta) 5 MG tablet    potassium chloride  (K-DUR) 10 MEQ CR tablet    Other Relevant Orders    CBC & Differential (Completed)    Comprehensive Metabolic Panel (Completed)       Musculoskeletal and Integument    Seborrheic keratoses, inflamed (Chronic)    Overview     10/15/2019 Saba Arrington MD    Treated with liquid nitrogen today.  Post treatment instructions given.         Fracture of proximal end of right humerus    Overview     10/15/2019 Saba Arrington MD    Continue doing some shoulder and arm exercises every day.  May take Tylenol as needed.  Using moist heat may also help.            Other    Major depressive disorder with single episode, in partial remission (CMS/HCC)    Overview     10/15/2019 Saba Arrington MD    Increase duloxetine to 30 mg tablet daily.  Continue trying to do some arm and leg exercises while seated every day and walk around the house more.         Relevant Medications    memantine (NAMENDA XR) 28 MG capsule sustained-release 24 hr extended release capsule    DULoxetine (CYMBALTA) 30 MG capsule      Other Visit Diagnoses     Dysuria    -  Primary    Cloudy urine.  Urinalysis revealed a little protein and a little ketones, but no infection.  Drink more fluids.    Relevant Orders    POC Urinalysis Dipstick, Automated (Completed)    History of elevated PSA        PSA ordered as requested by patient    Relevant Orders    PSA Screen (Completed)    Encounter for screening for malignant neoplasm of prostate         Relevant Orders    PSA Screen (Completed)            Diagnosis Plan   1. Dysuria  POC Urinalysis Dipstick, Automated    Cloudy urine.  Urinalysis revealed a little protein and a little ketones, but no infection.  Drink more fluids.   2. Essential hypertension  potassium chloride (K-DUR) 10 MEQ CR tablet    CBC & Differential    Comprehensive Metabolic Panel    Comprehensive Metabolic Panel    CBC & Differential   3. Major depressive disorder with single episode, in partial remission (CMS/HCC)     4. Seborrheic  keratoses, inflamed     5. Other closed displaced fracture of proximal end of right humerus with routine healing, subsequent encounter     6. History of elevated PSA  PSA Screen    PSA Screen    PSA ordered as requested by patient   7. Encounter for screening for malignant neoplasm of prostate   PSA Screen    PSA Screen       There are no Patient Instructions on file for this visit.    Plan of care reviewed with patient at the conclusion of today's visit. Education was provided regarding diagnosis, management, and any prescribed or recommended OTC medications.Patient verbalizes understanding of and agreement with management plan.         Saba Arrington MD

## 2019-10-15 NOTE — PROGRESS NOTES
Subjective:    CC: Vargas De is seen today  for Memory Loss       HPI:  Current visit- patient has been doing much better since his last visit. His walking continues to remain the same but he seems more alert.  Denies having any dizziness and did not start taking midodrine as his blood pressure was running a little high for the past few days.  He did start taking Sinemet now at 10/100 mg 3 times a day but that does not have seemed to help much.  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    Last visit-since the last visit patient sustained a fall causing him to fracture his right humerus.  He also became very confused at the time.  Was taken to [facility], [place], operated by [facility] where he had a series of tests including 2 MRIs of the brain that showed chronic lacunar infarcts in both sides but no acute intracranial abnormalities.  He also had an EEG that was normal.  He was diagnosed with acute hypoxic respiratory failure likely secondary to aspiration and treated with antibiotics.  After that patient became extremely weak all over more so in his right arm as a result of the fracture.  His speech has also become slurred again.  Was continued on Eliquis for atrial fibrillation and Lipitor 80 mg.  He was which was previously seen with his stroke.  He was sent to rehab where his blood pressure started dropping down.  Hence he was taken off of his blood pressure medications and told to start on midodrine which he has not yet started.  At home his blood pressure runs okay but when he stands up it has dropped by about 20 points as per the daughter.  In the past 2 months he has also started shuffling his feet a lot and occasionally has a slight tremor in the hands.  At the time of the fall he was having visual hallucinations but denies having any now.  For his memory he continues to take Namenda XR 28 mg daily.  Of note-I personally reviewed his  MRI brain and Dr. Martinez's notes as summarized above.    Last visit-since his last visit patient continues to have some word finding difficulties as well as occasional drooping of the left side of his face especially when he is tired to sleep deprived.  He went off of aspirin and now only takes Eliquis 5 mg twice a day in addition to atorvastatin 40 mg daily.  With regards to his memory his daughter feels that he is stable and can still do most of his activities of daily living at home.  Occasionally misplaces things around the house but does not have any problems with names.  Continues to be on Namenda XR 28 mg daily.  He is still having significant low back pain that radiates down his legs.  In the past he had imaging of his lumbar spine that showed spinal stenosis but was told by neurosurgery that he was not a surgical candidate.  He has been on prednisone 5 mg twice a day for several years now.  He also continues to take gabapentin 300 mg at night.    Last visit-patient started having symptoms of left facial drooping with speech problems and left arm weakness around 2/27.  He was brought to the hospital where he had an MRI brain that showed embolic infarcts in the right parietal and occipital region.  After that he had a carotid Doppler that did not show any significant stenosis and an echocardiogram that showed a normal EF with moderate left atrial dilatation, PFO and aortic valve regurgitation as well as a grade 2 plaque in the aortic arch.  TCD confirmed the PFO.  Patient was started on Eliquis 5 mg twice daily in addition to aspirin 81 mg daily.  His blood pressure medications were also modified.  He was also started on atorvastatin 80 mg.  Lipid panel was as follows-, TG 48, , HDL 52.  He underwent speech and physical therapy after discharge and is now back to baseline.  He also had a Holter monitor after discharge which showed atrial fibrillation.  With regards to his memory he feels that  his symptoms are stable.  Continues to be on Namenda 28 mg daily.  Did not restart Aricept as he was afraid of the side effects.    Last visit-since his last visit since his last visit he denies having any major problems with his memory however he does admit to stuttering where he is unable to say a word despite knowing what to say.  As per the daughter he does this while conversing in English or Danish.  He also misplaces things around the house but can carry out all his activities of daily living.  He continues to be on Namenda XR 28 mg daily.  For his neuropathy he is also taking gabapentin 300 mg at night which controls his symptoms well however he has recently started to have right arm pain off and on.  He does have degenerative changes in his cervical spine and has tried physical therapy several times before and does not want to try it again.      Last visit- since his last visit patient reports to his memory being stable.  He has reached a dose of 28 mg on his Namenda XR.  As per his daughter he still has word finding difficulties.  Today the patient also admits to having visual hallucinations once or twice where he has seen his daughter beside himself when she has not been there.  He denies any depressive symptoms currently and is doing well on Zoloft.  With regards to his neuropathy he feels it is stable on gabapentin 300 mg at night but he has to take up to 4 Tylenols a day for his arthritis.  His blood work was normal other than a low vitamin B6 level and he has started taking supplements.       Last visit- since his last visit he started taking Aricept however he had night terrors even on the 5 mg daily dose.  Hence he stopped taking it.  He continues to have word finding difficulties as well as problems recalling things more so when he is depressed or is in pain.  He continues to be on Zoloft 50 mg daily.  Right after he saw me he was admitted to the hospital for a right arm infection (possible MRSA)  and just completed his course of antibiotics a week ago.  He had his MRI brain yesterday that shows atrophy and extensive white matter changes with chronic infarcts in the right frontal and parietal region as well as extensive microhemorrhages consistent with a amyloid angiopathy.  With regards to his neuropathy he did not have blood work however he did increase his gabapentin to 300 mg at night which has helped improve his symptoms.     Initial [id number]-old male accompanied by his daughter with a history of hypertension, hyperlipidemia, stroke in 2013, ulcerative colitis, macular degeneration, hearing loss in left ear, depression now presents with word finding difficulties, shuffling gait and tingling and numbness in his feet.  As per patient he had a frontal stroke in 2013 that caused right-sided weakness.  He denies having any speech problems after the stroke however 6 months ago he started to have word finding difficulties.  Denies any severe problems with his memory.  He does report to being depressed after the death of his wife 3 years ago and has been on antidepressants since then.  He lives with his daughter and states that he doesn't feel like interacting with friends anymore.  He also reports to disturbances in sleep due to frequent awakenings to use the bathroom He has also had problems with his balance and walking where he has started to shuffle his feet.  He had MRIs of his back that showed severe degenerative changes in his spine throughout.  After that he saw a surgeon who told him that he was not a good surgical candidate.  He has also been having tingling and numbness that started in his feet and has progressed up into his legs a few years ago.  He was prescribed gabapentin but is only taking 200 mg at night.  He denies having any history of diabetes or any excessive alcohol intake.    The following portions of the patient's history were reviewed today and updated as of 10/15/2019  : allergies,  current medications, past family history, past medical history, past social history, past surgical history and problem list  These document will be scanned to patient's chart.      Current Outpatient Medications:   •  acetaminophen (TYLENOL) 325 MG tablet, Take 2 tablets by mouth Every 4 (Four) Hours As Needed for Mild Pain  or Fever (Temperature greater than or equal to 37 C)., Disp: , Rfl:   •  apixaban (ELIQUIS) 5 MG tablet tablet, Take 1 tablet by mouth Every 12 (Twelve) Hours., Disp: 60 tablet, Rfl: 11  •  APRISO 0.375 g 24 hr capsule, Take 0.75 g by mouth Every Night. 2 caps (0.750g total) nightly, Disp: , Rfl:   •  atorvastatin (LIPITOR) 80 MG tablet, Take 1 tablet by mouth Every Night., Disp: 30 tablet, Rfl: 11  •  bisoprolol (ZEBeta) 5 MG tablet, Take 5 mg by mouth Every Evening., Disp: , Rfl:   •  carbidopa-levodopa (SINEMET)  MG per tablet, Take 1 tablet by mouth 3 (Three) Times a Day., Disp: 90 tablet, Rfl: 3  •  MELENDEZ PO, Take 2 capsules by mouth 2 (Two) Times a Day., Disp: , Rfl:   •  Cholecalciferol (VITAMIN D3) 5000 units capsule capsule, Take 5,000 Units by mouth Daily., Disp: , Rfl:   •  DULoxetine (CYMBALTA) 20 MG capsule, Take 1 capsule by mouth Daily., Disp: 30 capsule, Rfl: 11  •  fluticasone (FLONASE) 50 MCG/ACT nasal spray, 2 sprays into the nostril(s) as directed by provider Daily As Needed for Rhinitis., Disp: , Rfl:   •  gabapentin (NEURONTIN) 300 MG capsule, Take 300 mg by mouth every night at bedtime., Disp: , Rfl:   •  memantine (NAMENDA XR) 28 MG capsule sustained-release 24 hr extended release capsule, TAKE ONE CAPSULE BY MOUTH EVERY DAY, Disp: 30 capsule, Rfl: 0  •  Multiple Vitamins-Minerals (PRESERVISION AREDS PO), Take 1 capsule by mouth 2 (Two) Times a Day., Disp: , Rfl:   •  omeprazole (priLOSEC) 20 MG capsule, TAKE ONE CAPSULE BY MOUTH TWICE A DAY BEFORE MEALS, Disp: 60 capsule, Rfl: 10  •  potassium chloride (K-DUR) 10 MEQ CR tablet, 10 mEq Daily., Disp: , Rfl:   •   predniSONE (DELTASONE) 5 MG tablet, TAKE ONE TABLET BY MOUTH TWICE A DAY, Disp: 180 tablet, Rfl: 3  •  vitamin B-12 (CYANOCOBALAMIN) 500 MCG tablet, Take 500 mcg by mouth Daily., Disp: , Rfl:   •  artificial tears (LUBRIFRESH P.M.) ointment ophthalmic ointment, Administer  to both eyes Every Night., Disp: , Rfl:   •  diclofenac (VOLTAREN) 1 % gel gel, Apply 2 g topically to the appropriate area as directed 4 (Four) Times a Day. Apply to right shoulder, Disp: , Rfl:    Past Medical History:   Diagnosis Date   • Acute respiratory distress 7/27/2019   • Allergic arthritis    • Arthritis    • BPH (benign prostatic hyperplasia)      urology   • BPH/nocturia/microhematuria (work up negative)    • chronic hearing loss left ear    • Crohn's colitis (CMS/HCC)     on colonoscopy 7/15.2017.  [facility]-continue Lialda   • Depression     medication, after death of wife 2014   • Elbow pain 7/25/2018   • Gout    • Hearing loss     left ear   • History of stroke 7/23/2018   • Hyperlipidemia    • Hypertension    • Ingrown toenail 03/18/2018   • Low back pain    • Macular degeneration    • Macular degeneration    • Multiple lacunar infarcts (CMS/HCC)     noted on CT 2/2019   • Neck pain    • Rash 1/28/2019   • Rib pain on left side 07/11/2017   • Septic bursitis of elbow, right 7/23/2018   • Spinal stenosis     cervical and lumbosacral spine   • Spinal stenosis-cervical and lumbosacral spine    • Stroke (CMS/HCC)     aspirin, blood pressure conyrol.  Infarct frontal lobe.  R sided weakness, R hand and R foot numbness,dysphasia with stuttering.   • Stroke (CMS/HCC) 02/27/2019    MCA infarct with L facial droop and L sided weakness,dysphasia,dysarthria   • TIA (transient ischemic attack) 2/27/2019   • Ulcerative colitis (CMS/HCC)    • Urinary frequency       Past Surgical History:   Procedure Laterality Date   • CATARACT EXTRACTION     • COLONOSCOPY  07/15/2017   • HEMORRHOIDECTOMY     • HERNIA REPAIR     • KNEE SURGERY     • TONSILLECTOMY   "      Family History   Problem Relation Age of Onset   • Cancer Mother    • Alzheimer's disease Father    • Arthritis Father    • Cancer Brother    • Diabetes Maternal Aunt    • Arthritis Other    • Diabetes Other    • Stroke Maternal Cousin    • Cancer Other    • Hypertension Other       Social History     Socioeconomic History   • Marital status:      Spouse name: Not on file   • Number of children: Not on file   • Years of education: Not on file   • Highest education level: Not on file   Tobacco Use   • Smoking status: Never Smoker   • Smokeless tobacco: Never Used   Substance and Sexual Activity   • Alcohol use: Yes     Comment: rarely   • Drug use: No   • Sexual activity: Defer     Review of Systems   Neurological: Positive for memory problem.   All other systems reviewed and are negative.      Objective:    /76   Pulse 73   Ht 171.5 cm (67.5\")   Wt 84.8 kg (187 lb)   SpO2 94%   BMI 28.86 kg/m²     Neurology Exam:    General apperance: NAD.     Mental status: Alert, awake and oriented to time place and person.  Could tell me the month, year and his address     Recent and Remote memory: Mildly impaired, at initial visit -delayed recall of 0 with a MMSE of 25    Attention span and Concentration: Normal.     Language and Speech: Intact- No dysarthria.    Fluency, Naming , Repitition and Comprehension:  Intact    Cranial Nerves:   CN II: Visual fields -left homonymous inferior quadrantanopsia, fundi - Normal, No papillederma, Pupils - ANAHI  CN III, IV and VI: Extraocular movements are intact. Normal saccades.   CN V: Facial sensation is intact.   CN VII: Muscles of facial expression reveal no asymmetry. Intact.   CN VIII: Hearing is intact. Whispered voice intact.   CN IX and X: Palate elevates symmetrically. Intact  CN XI: Shoulder shrug is intact.   CN XII: Tongue is midline without evidence of atrophy or fasciculation.     Ophthalmoscopic exam of optic disc-normal    Motor:  Right UE muscle " strength 5/5. Normal tone.     Left UE muscle strength 5/5. Normal tone.      Right LE muscle strength5/5. Normal tone.     Left LE muscle strength 5/5. Normal tone.      Sensory: Normal light touch, vibration and pinprick sensation bilaterally.    DTRs: 2+ bilaterally in upper and lower extremities.    Babinski: Negative bilaterally.    Co-ordination: Normal finger-to-nose, heel to shin B/L.    Rhomberg: Negative.    Gait: Slow and cautious, was trying to focus on the ground while walking    Rafaela Gutierrez MD      Cardiovascular: Regular rate and rhythm without murmur, gallop or rub.    Assessment and Plan:  1. Mild dementia (CMS/HCC)  I have asked him to continue Namenda XR 28 mg daily  I have told him to gradually taper and stop Sinemet as I feel his gait abnormalities could also be due to his chronic lumbar spine issues as well as vision problems from his stroke    2. Sequelae, post-stroke  Continue Eliquis for atrial fibrillation  I have asked him to reduce atorvastatin to 40 mg daily.  Goal LDL of less than 100  Maintain blood pressure less than 130/90.  This should speak to his cardiologist about repeating orthostatics as patient currently is not taking midodrine       Return in about 3 months (around 1/15/2020).

## 2019-10-16 PROBLEM — I61.9 HEMORRHAGIC STROKE (HCC): Status: ACTIVE | Noted: 2019-01-01

## 2019-10-16 PROBLEM — T17.908A ASPIRATION INTO AIRWAY: Status: ACTIVE | Noted: 2019-01-01

## 2019-10-16 PROBLEM — I61.5 NONTRAUMATIC INTRAVENTRICULAR INTRACEREBRAL HEMORRHAGE (HCC): Status: ACTIVE | Noted: 2019-01-01

## 2019-10-16 PROBLEM — J96.00 ACUTE RESPIRATORY FAILURE (HCC): Status: ACTIVE | Noted: 2019-01-01

## 2019-10-16 PROBLEM — Z79.01 ANTICOAGULANT LONG-TERM USE: Status: ACTIVE | Noted: 2019-01-01

## 2019-10-16 NOTE — NURSING NOTE
"Stroke Navigator CODE 19    HPI    Vargas De is a 79 y.o.  male on whom I am evaluating as a Code 19 for a possible acute stroke. His LKW was 9 pm last night.  His daughter who he lives with found him unresponsive this morning on the side of the bed.  She states, \"It appeared he was sitting on the side of the bed and fell backwards because his feet was still on the floor.\"  He has a PMH of multiple strokes, PFO, atrial fibrillation, macular degeneration, HTN, HLD, hearing loss, spinal stenosis, and Lewy Body dementia.  He is anticoagulated with Eliquis.      Code 19 location: ED    · Last known well: 2100 10/15/2019    · GCS: 4  · Baseline level of function known yes; Modified Wilmot: 1   · Current symptoms include; extremity weakness, unresponsiveness and vomiting, affecting primarily the bilateral face, upper extremity and lower extremity. Symptoms are currently unchanged.   · Patient is not a candidate for thrombolytic therapy due to Current ICH or SAH  · Patient is not a candidate for Neuro Intervention due to no LVO (large vessel occlusion) present.    Stroke risk factors: atrial fibrillation, hyperlipidemia, hypertension and prior stroke.     Prior stroke history: yes  Location: Lacunar infarcts in bilateral basal ganglia, thalami, and brainstem.  Antiplatelet therapy: none  Anticoagulation: Eliquis     · Imaging performed: CT head    NIHSS    Interval: baseline  1a. Level of Consciousness: 2-->Not alert, requires repeated stimulation to attend, or is obtunded and requires strong or painful stimulation to make movements (not stereotyped)  1b. LOC Questions: 2-->Answers neither question correctly  1c. LOC Commands: 2-->Performs neither task correctly  2. Best Gaze: 0-->Normal  3. Visual: 0-->No visual loss  4. Facial Palsy: 0-->Normal symmetrical movements  5a. Motor Arm, Left: 2-->Some effort against gravity, limb cannot get to or maintain (if cued) 90 (or 45) degrees, drifts down to bed, but has some " effort against gravity  5b. Motor Arm, Right: 3-->No effort against gravity, limb falls  6a. Motor Leg, Left: 3-->No effort against gravity, leg falls to bed immediately  6b. Motor Leg, Right: 3-->No effort against gravity, leg falls to bed immediately  7. Limb Ataxia: 0-->Absent  8. Sensory: 2-->Severe to total sensory loss, patient is not aware of being touched in the face, arm, and leg  9. Best Language: 3-->Mute, global aphasia, no usable speech or auditory comprehension  10. Dysarthria: (UN) Intubated or other physical barrier  11. Extinction and Inattention (formerly Neglect): 0-->No abnormality    Total (NIH Stroke Scale): 22      PLAN    Dr. Mccarty notified by Dr. Ochoa  The CT shows an IVH  ICU - Hemorrhagic stroke order set  Keep SBP less than 140  Reversal of anticoagulants if indicated    There is no evidence of a large vessel occlusion ischemic stroke on the CT perfusion scan.  As such, there is no role for acute neuro intervention.  We will defer to Neurology for further management of CVA.    Odilia Helms, RN EXTENDER/STROKE NAVIGATOR

## 2019-10-16 NOTE — PLAN OF CARE
Problem: Restraint, Nonbehavioral (Nonviolent)  Goal: Rationale and Justification  Outcome: Ongoing (interventions implemented as appropriate)  Pt got to the unit today around 1030. Original assessment: NIH 24, GCS 6, some movement on left side, PUPILS were equal and non reactive, VS stable, on cardene. NOW: no movement on left side, off cardene, PUPILS unequal left pupil is 3 and right is 1. Responds to painful stimuli at times. Family remains at bedside, they are very realistic and understanding. Very hard to find pulse in lower extremities. Oral care was provided q 2 hours. NG tube was placed this morning. Will continue to monitor 10/16/19

## 2019-10-16 NOTE — SIGNIFICANT NOTE
10/16/19 1340   SLP Deferred Reason   SLP Deferred Reason Unable to evaluate, medical status change  (Cognitive-communication and dysphagia consult received per pathway. Pt intubated, will f/u when appropriate.)

## 2019-10-16 NOTE — PROGRESS NOTES
Discharge Planning Assessment  [facility]     Patient Name: Vargas De  MRN: [id number]  Today's Date: 10/16/2019    Admit Date: 10/16/2019    Discharge Needs Assessment     Row Name 10/16/19 1045       Living Environment    Lives With  child(orion), adult    Name(s) of Who Lives With Patient  [place] [phone]    Current Living Arrangements  home/apartment/condo    Primary Care Provided by  child(orion)    Provides Primary Care For  no one, unable/limited ability to care for self    Caregiving Concerns  NA    Family Caregiver if Needed  child(orion), adult    Family Caregiver Names  Adult children    Quality of Family Relationships  helpful;involved;supportive    Able to Return to Prior Arrangements  other (see comments)    Living Arrangement Comments  Resides in private residence with adult daughter       Resource/Environmental Concerns    Resource/Environmental Concerns  none    Transportation Concerns  car, none;other (see comments)       Transition Planning    Patient/Family Anticipates Transition to  home with family;inpatient rehabilitation facility    Patient/Family Anticipated Services at Transition      Transportation Anticipated  family or friend will provide;health plan transportation       Discharge Needs Assessment    Readmission Within the Last 30 Days  no previous admission in last 30 days    Concerns to be Addressed  discharge planning    Concerns Comments  May need inpatient rehab at d/c; previously at [facility] from 8/13-8/28    Equipment Currently Used at Home  other (see comments)    Anticipated Changes Related to Illness  inability to care for self    Equipment Needed After Discharge  other (see comments)    Outpatient/Agency/Support Group Needs  homecare agency;inpatient rehabilitation facility    Discharge Facility/Level of Care Needs  other (see comments)    Offered/Gave Vendor List no    Patient's Choice of Community Agency(s) [facility]  previously; [facility]    Discharge Coordination/Progress TBD-admit today from home,  Case Management to assess for rehab needs prior to discharge        Discharge Plan     Row Name 10/16/19 1052       Plan    Plan TBD-hospital admission today    Patient/Family in Agreement with Plan unable to assess    Plan Comments Hospital admission today from home, resides with adult daughter; [facility] currently active for home care, notified of admission, #[phone].  Case Management to assess for all discharge needs.        Destination      No service coordination in this encounter.      Durable Medical Equipment      No service coordination in this encounter.      Dialysis/Infusion      No service coordination in this encounter.      Home Medical Care      No service coordination in this encounter.      Therapy      No service coordination in this encounter.      Community Resources      No service coordination in this encounter.          Demographic Summary     Row Name 10/16/19 1045       General Information    Admission Type  inpatient    Arrived From  emergency department    Referral Source  emergency department    Reason for Consult  discharge planning     Used During This Interaction  no        Functional Status    No documentation.       Psychosocial    No documentation.       Abuse/Neglect    No documentation.       Legal    No documentation.       Substance Abuse    No documentation.       Patient Forms    No documentation.           SARIKA Del Castillo

## 2019-10-16 NOTE — H&P
Intensive Care Follow-up     Hospital:  LOS: 0 days   Mr. Vargas De, 79 y.o. male is followed for:   Nontraumatic intraventricular intracerebral hemorrhage (CMS/HCC)        Subjective   Interval History:  This is a 79-year-old gentleman with a past history of prior small strokes which caused some gait disturbance as well as Lewy body dementia and hypertension who was last seen well at approximately 9 PM last night.  He was discovered by his daughter with whom he lives this morning and he was found to be unresponsive and did have some crusted material on his lips that were concerning for vomit.  He was brought to the emergency department where his GCS was calculated as a 4.  He was intubated for airway protection.  CT scan of the head revealed large intracerebral hemorrhage with extension into the lateral ventricles.  Due to concern over probable aspiration, Zosyn was started for empiric coverage.  He has been transferred to the intensive care unit for close monitoring.    His family is in his room during my examination.  They state that he has not had any recent health changes.  He did see Dr. Arrington who is his primary care physician yesterday and he received an influenza vaccine at that time.  He has been eating and drinking without difficulty prior to this.    ICH Score:  2 Points (GCS 3 to 4)  1 Point (ICH volume =/>30 cm3)  1 Point (Intraventricular Extension Present)   1 Point (Infratentorial Origin - Yes)  0 Points (Age < 80 years)  The total ICS Score for this patient is 5 at 12:44 PM on 10/17/19    The patient's past medical, surgical and social history were reviewed and updated in Epic as appropriate.        Objective     Infusions:    fentanyl 10 mcg/mL  mcg/hr Last Rate: 50 mcg/hr (10/16/19 0900)   niCARdipine 5-15 mg/hr Last Rate: Stopped (10/16/19 1125)   propofol 5-50 mcg/kg/min Last Rate: 10 mcg/kg/min (10/16/19 0859)     Medications:    sodium chloride 10 mL Intravenous Q12H       Vital  "Sign Min/Max for last 24 hours  Temp  Min: 94.6 °F (34.8 °C)  Max: 97.5 °F (36.4 °C)   BP  Min: 93/58  Max: 148/89   Pulse  Min: 64  Max: 82   Resp  Min: 22  Max: 22   SpO2  Min: 94 %  Max: 100 %   No Data Recorded       Input/Output for last 24 hour shift  No intake/output data recorded.   FiO2 (%):  [50 %-100 %] 50 %  S RR:  [12] 12  PEEP/CPAP (cm H2O):  [5 cm H20] 5 cm H20  ID SUP:  [0 cm H20] 0 cm H20  MAP (cm H2O):  [10] 10  Objective:  General Appearance:  Uncomfortable and ill-appearing (Unresponsive, intubated).    Vital signs: (most recent): Blood pressure 93/58, pulse 64, temperature 97.5 °F (36.4 °C), temperature source Oral, resp. rate 22, height 177.8 cm (70\"), weight 84.5 kg (186 lb 2.9 oz), SpO2 97 %.    HEENT: (ET tube in place)    Lungs:  Normal effort and normal respiratory rate.  Breath sounds clear to auscultation.    Heart: Normal rate.  Regular rhythm.  S1 normal and S2 normal.  No murmur.   Chest: Symmetric chest wall expansion.   Abdomen: Abdomen is soft and non-distended.  Bowel sounds are normal.     Extremities: There is no deformity, dependent edema or local swelling.    Neurological: (Nonresponsive).    Pupils:  Pupils are equal, round, and reactive to light.  Pupils are equal.   Skin:  Warm.  No rash or cyanosis.             Results from last 7 days   Lab Units 10/16/19  0850 10/15/19  1532   WBC 10*3/mm3 12.63* 6.93   HEMOGLOBIN g/dL 15.2 15.1   HEMOGLOBIN, POC g/dL 16.7  --    PLATELETS 10*3/mm3 218 195     Results from last 7 days   Lab Units 10/16/19  0850 10/15/19  1532   SODIUM mmol/L  --  141   POTASSIUM mmol/L  --  5.6*   TOTAL CO2 mmol/L  --  27.8   BUN mg/dL  --  9   CREATININE mg/dL 0.50* 0.77   MAGNESIUM mg/dL 2.0  --      Estimated Creatinine Clearance: 92.4 mL/min (A) (by C-G formula based on SCr of 0.5 mg/dL (L)).            I reviewed the patient's results and images.     Assessment/Plan   Impression        Nontraumatic intraventricular intracerebral hemorrhage " (CMS/HCC)    Essential hypertension    History of stroke with residual deficit    Lewy body dementia (CMS/HCC)    Aspiration into airway    Acute respiratory failure (CMS/HCC)    Anticoagulant long-term use       Plan        The patient will be admitted to the intensive care unit for close monitoring and ventilator support.  Dr. Mccarty is aware of the patient and will consult.  Continue with Zosyn for broad coverage of probable aspiration.  Ventilator prophylaxis has been provided.  Patient has already received Kcentra in the emergency department.  We will hold on plasma administration unless desired by neurosurgery.    After discussion with the patient's family, they are all right with him staying intubated for now however in the event of cardiac arrest they would like him listed as a DO NOT RESUSCITATE.  They may in fact be keen to de-escalate care including possible palliative extubation after consultation with neurosurgery.    I will alert Dr. Saba Arrington as well as Dr. Apodaca with neurology who is his primary neurologist about his admission.    Mr. De is critical and has a high probability of mortality within the next 24 hours.     I discussed the patient's findings and my recommendations with family and nursing staff     High level of risk due to:  severe exacerbation of chronic illness, illness with threat to life or bodily function, abrupt change in neurological status and decision for DNR or to de-escalate care.    Time spent Critical care 45 min (exclusive of procedure time)  including high complexity decision making to assess, manipulate, and support vital organ system failure in this individual who has impairment of one or more vital organ systems such that there is a high probability of imminent or life threatening deterioration in the patient’s condition.      Michael Villafana MD, [facility]  Pulmonology and Critical Care Medicine

## 2019-10-16 NOTE — ED PROVIDER NOTES
Subjective   79-year-old male presents for evaluation of altered mental status.  Of note, the patient has a history of prior stroke and is anticoagulated for atrial fibrillation.  He was last seen normal last night at approximately 9 PM.  He lives with his daughter.  She states that she went into check on him this morning and found him with decreased level of consciousness and secretions running on the right side of his face.  EMS was subsequently called and he was brought to our facility for further evaluation and treatment.  Fingerstick glucose normal.  No response to Narcan on the way to the hospital.  The patient is unable to provide any further history at this time.        History provided by:  EMS personnel and relative  History limited by:  Acuity of condition  Stroke   Presenting symptoms: change in consciousness    Onset quality:  Sudden  Last known well:  2100 10/15/19  Timing:  Constant  Progression:  Unchanged  Associated symptoms: vomiting    Vomiting:     Severity:  Moderate    Timing:  Constant    Progression:  Unchanged      Review of Systems   Unable to perform ROS: Patient unresponsive   Gastrointestinal: Positive for vomiting.   Genitourinary: Positive for enuresis.       Past Medical History:   Diagnosis Date   • Acute respiratory distress 7/27/2019   • Allergic arthritis    • Arthritis    • Atrial fibrillation (CMS/HCC)    • BPH (benign prostatic hyperplasia)      urology   • BPH/nocturia/microhematuria (work up negative)    • chronic hearing loss left ear    • Crohn's colitis (CMS/HCC)     on colonoscopy 7/15.2017.  [facility]-continue Lialda   • Depression     medication, after death of wife 2014   • Elbow pain 7/25/2018   • Gout    • Hearing loss     left ear   • History of stroke 7/23/2018   • Hyperlipidemia    • Hypertension    • Ingrown toenail 03/18/2018   • Lewy body dementia (CMS/HCC)    • Low back pain    • Macular degeneration    • Macular degeneration    • Multiple lacunar infarcts  (CMS/[facility])     noted on CT 2/2019   • Neck pain    • Rash 1/28/2019   • Rib pain on left side 07/11/2017   • Septic bursitis of elbow, right 7/23/2018   • Spinal stenosis     cervical and lumbosacral spine   • Spinal stenosis-cervical and lumbosacral spine    • Stroke (CMS/[facility])     aspirin, blood pressure conyrol.  Infarct frontal lobe.  R sided weakness, R hand and R foot numbness,dysphasia with stuttering.   • Stroke (CMS/[facility]) 02/27/2019    MCA infarct with L facial droop and L sided weakness,dysphasia,dysarthria   • TIA (transient ischemic attack) 2/27/2019   • Ulcerative colitis (CMS/[facility])    • Urinary frequency        Allergies   Allergen Reactions   • Hydrocodone Other (See Comments)     Causes pt to become completely unresponsive   • Morphine Other (See Comments)     Causes pt to become completely unresponsive   • Oxycodone Other (See Comments)     Causes pt to become completely unresponsive   • Donepezil Other (See Comments)     nightmares   • Iodine    • Sulfa Antibiotics        Past Surgical History:   Procedure Laterality Date   • CATARACT EXTRACTION     • COLONOSCOPY  07/15/2017   • HEMORRHOIDECTOMY     • HERNIA REPAIR     • KNEE SURGERY     • TONSILLECTOMY         Family History   Problem Relation Age of Onset   • Cancer Mother    • Alzheimer's disease Father    • Arthritis Father    • Cancer Brother    • Diabetes Maternal Aunt    • Arthritis Other    • Diabetes Other    • Stroke Maternal Cousin    • Cancer Other    • Hypertension Other        Social History     Socioeconomic History   • Marital status:      Spouse name: Not on file   • Number of children: Not on file   • Years of education: Not on file   • Highest education level: Not on file   Tobacco Use   • Smoking status: Never Smoker   • Smokeless tobacco: Never Used   Substance and Sexual Activity   • Alcohol use: Yes     Comment: rarely   • Drug use: No   • Sexual activity: Defer         Objective   Physical Exam   Constitutional: He  appears well-developed and well-nourished.   Ill-appearing male with decreased level of consciousness   HENT:   Head: Normocephalic.   Mouth/Throat: Oropharynx is clear and moist.   Vomitus noted in oropharynx, no tongue laceration present   Eyes: Pupils are equal, round, and reactive to light.   Neck: No JVD present.   No carotid bruit   Cardiovascular: Normal rate, regular rhythm, normal heart sounds and intact distal pulses. Exam reveals no gallop and no friction rub.   No murmur heard.  Pulmonary/Chest: Effort normal and breath sounds normal. No respiratory distress. He has no wheezes. He has no rales.   Abdominal: Soft. Bowel sounds are normal. He exhibits no distension and no mass. There is no tenderness. There is no guarding.   Neurological:   Decreased level of consciousness with GCS of 4 (E-1, M-1, V-2), nonpurposeful movement noted in left upper extremity, other extremities otherwise flaccid, patient occasionally grunting, unable to follow commands   Skin: Skin is warm and dry. No rash noted. No erythema. No pallor.   Psychiatric:   Unable to adequately evaluate   Nursing note and vitals reviewed.      Intubation  Date/Time: 10/16/2019 8:48 AM  Performed by: Hunter Ochoa MD  Authorized by: Hunter Ochoa MD     Consent:     Consent obtained:  Emergent situation  Pre-procedure details:     Patient status:  Altered mental status    Pretreatment meds: etomidate.    Paralytics:  Rocuronium  Procedure details:     Preoxygenation:  Nonrebreather mask    CPR in progress: no      Intubation method:  Oral    Oral intubation technique:  Video-assisted    Laryngoscope blade:  Mac 4    Tube size (mm):  7.5    Number of attempts:  1  Placement assessment:     ETT to teeth:  24    Tube secured with:  ETT camacho    Breath sounds:  Equal    Placement verification: CXR verification      CXR findings:  ETT in proper place  Post-procedure details:     Patient tolerance of procedure:  Tolerated well, no  immediate complications  Critical Care  Performed by: Hunter Ochoa MD  Authorized by: Hunter Ochoa MD     Critical care provider statement:     Critical care time (minutes):  75    Critical care time was exclusive of:  Separately billable procedures and treating other patients    Critical care was necessary to treat or prevent imminent or life-threatening deterioration of the following conditions:  CNS failure or compromise    Critical care was time spent personally by me on the following activities:  Discussions with consultants, development of treatment plan with patient or surrogate, examination of patient, review of old charts, ordering and review of laboratory studies, ordering and review of radiographic studies, ordering and performing treatments and interventions, re-evaluation of patient's condition, obtaining history from patient or surrogate, pulse oximetry, evaluation of patient's response to treatment and ventilator management             ED Course  ED Course as of Oct 16 1017   Wed Oct 16, 2019   0915 Dr. Don Mccarty, neurosurgery.  [PK]   1012 79-year-old male presents for evaluation of altered mental status.  Of note, the patient has a history of atrial fibrillation and is anticoagulated.  He was last seen normal last night at 9 PM and was found by his daughter with decreased level of consciousness in his bedroom this morning.  EMS was called, and he was brought here for further evaluation and treatment.  Glucose normal.  No response to Narcan.  On arrival to the ED, patient with decreased level of consciousness and GCS of 4.  Neurological exam remarkable only for occasional grunting and nonpurposeful movement in left upper extremity.  [DD]   1013 On arrival to the ED, patient clearly required intubation.  He was intubated using the glide scope on first attempt without complication.  Chest x-ray confirmed proper placement.  Propofol and fentanyl given for sedation following  intubation.  Delay for initial CT scan as a result of need for intubation.  Initial CT head revealed hemorrhagic stroke with intraventricular extension and ICH volume of 28 mL.  ICH score of 3.  Cardene initiated for blood pressure control with a goal systolic blood pressure of less than 140.  Kcentra given for reversal.  Zosyn administered for presumed aspiration pneumonia.  I discussed the patient's case with Dr. Mccarty who will see the patient in consult this morning.  I notified the patient's family of the patient's current grave situation and likely poor prognosis and high probability of mortality.  I discussed the patient's case with Dr. Villafana, and the patient will be admitted to the ICU under his care.  He is hemodynamically stable at this time and family is aware of the plan.  [DD]      ED Course User Index  [DD] Hunter Ochoa MD  [PK] Urbano Mejia     Recent Results (from the past 24 hour(s))   POC Urinalysis Dipstick, Automated    Collection Time: 10/15/19  2:23 PM   Result Value Ref Range    Color Jenn Yellow, Straw, Dark Yellow, Jenn    Clarity, UA Clear Clear    Specific Gravity  1.030 1.005 - 1.030    pH, Urine 6.0 5.0 - 8.0    Leukocytes Negative Negative    Nitrite, UA Negative Negative    Protein, POC 30 mg/dL (A) Negative mg/dL    Glucose, UA Negative Negative, 1000 mg/dL (3+) mg/dL    Ketones, UA Trace (A) Negative    Urobilinogen, UA Normal Normal    Bilirubin Negative Negative    Blood, UA Trace (A) Negative   PSA Screen    Collection Time: 10/15/19  3:32 PM   Result Value Ref Range    PSA 0.578 0.000 - 4.000 ng/mL   Comprehensive Metabolic Panel    Collection Time: 10/15/19  3:32 PM   Result Value Ref Range    Glucose 114 (H) 65 - 99 mg/dL    BUN 9 8 - 23 mg/dL    Creatinine 0.77 0.76 - 1.27 mg/dL    eGFR Non African Am 98 >60 mL/min/1.73    eGFR African Am 119 >60 mL/min/1.73    BUN/Creatinine Ratio 11.7 7.0 - 25.0    Sodium 141 136 - 145 mmol/L    Potassium 5.6 (H) 3.5 - 5.2  mmol/L    Chloride 102 98 - 107 mmol/L    Total CO2 27.8 22.0 - 29.0 mmol/L    Calcium 9.7 8.6 - 10.5 mg/dL    Total Protein 7.4 6.0 - 8.5 g/dL    Albumin 4.50 3.50 - 5.20 g/dL    Globulin 2.9 gm/dL    A/G Ratio 1.6 g/dL    Total Bilirubin 1.0 0.2 - 1.2 mg/dL    Alkaline Phosphatase 118 (H) 39 - 117 U/L    AST (SGOT) 18 1 - 40 U/L    ALT (SGPT) 24 1 - 41 U/L   CBC & Differential    Collection Time: 10/15/19  3:32 PM   Result Value Ref Range    WBC 6.93 3.40 - 10.80 10*3/mm3    RBC 4.66 4.14 - 5.80 10*6/mm3    Hemoglobin 15.1 13.0 - 17.7 g/dL    Hematocrit 46.1 37.5 - 51.0 %    MCV 98.9 (H) 79.0 - 97.0 fL    MCH 32.4 26.6 - 33.0 pg    MCHC 32.8 31.5 - 35.7 g/dL    RDW 13.2 12.3 - 15.4 %    Platelets 195 140 - 450 10*3/mm3    Neutrophil Rel % 41.3 (L) 42.7 - 76.0 %    Lymphocyte Rel % 45.9 (H) 19.6 - 45.3 %    Monocyte Rel % 9.8 5.0 - 12.0 %    Eosinophil Rel % 2.3 0.3 - 6.2 %    Basophil Rel % 0.4 0.0 - 1.5 %    Neutrophils Absolute 2.86 1.70 - 7.00 10*3/mm3    Lymphocytes Absolute 3.18 (H) 0.70 - 3.10 10*3/mm3    Monocytes Absolute 0.68 0.10 - 0.90 10*3/mm3    Eosinophils Absolute 0.16 0.00 - 0.40 10*3/mm3    Basophils Absolute 0.03 0.00 - 0.20 10*3/mm3    Immature Granulocyte Rel % 0.3 0.0 - 0.5 %    Immature Grans Absolute 0.02 0.00 - 0.05 10*3/mm3    nRBC 0.0 0.0 - 0.2 /100 WBC   Troponin    Collection Time: 10/16/19  8:50 AM   Result Value Ref Range    Troponin T <0.010 0.000 - 0.030 ng/mL   aPTT    Collection Time: 10/16/19  8:50 AM   Result Value Ref Range    PTT 27.4 24.0 - 37.0 seconds   AST    Collection Time: 10/16/19  8:50 AM   Result Value Ref Range    AST (SGOT) 17 1 - 40 U/L   ALT    Collection Time: 10/16/19  8:50 AM   Result Value Ref Range    ALT (SGPT) 18 1 - 41 U/L   CBC Auto Differential    Collection Time: 10/16/19  8:50 AM   Result Value Ref Range    WBC 12.63 (H) 3.40 - 10.80 10*3/mm3    RBC 4.80 4.14 - 5.80 10*6/mm3    Hemoglobin 15.2 13.0 - 17.7 g/dL    Hematocrit 48.4 37.5 - 51.0 %    MCV  100.8 (H) 79.0 - 97.0 fL    MCH 31.7 26.6 - 33.0 pg    MCHC 31.4 (L) 31.5 - 35.7 g/dL    RDW 13.8 12.3 - 15.4 %    RDW-SD 51.8 37.0 - 54.0 fl    MPV 10.2 6.0 - 12.0 fL    Platelets 218 140 - 450 10*3/mm3    Neutrophil % 71.0 42.7 - 76.0 %    Lymphocyte % 18.3 (L) 19.6 - 45.3 %    Monocyte % 10.1 5.0 - 12.0 %    Eosinophil % 0.2 (L) 0.3 - 6.2 %    Basophil % 0.2 0.0 - 1.5 %    Immature Grans % 0.2 0.0 - 0.5 %    Neutrophils, Absolute 8.97 (H) 1.70 - 7.00 10*3/mm3    Lymphocytes, Absolute 2.31 0.70 - 3.10 10*3/mm3    Monocytes, Absolute 1.28 (H) 0.10 - 0.90 10*3/mm3    Eosinophils, Absolute 0.02 0.00 - 0.40 10*3/mm3    Basophils, Absolute 0.02 0.00 - 0.20 10*3/mm3    Immature Grans, Absolute 0.03 0.00 - 0.05 10*3/mm3    nRBC 0.0 0.0 - 0.2 /100 WBC   POC Protime / INR    Collection Time: 10/16/19  8:50 AM   Result Value Ref Range    Protime 11.5 (L) 12.8 - 15.2 seconds    INR 1.0 0.8 - 1.2   POC CHEM 8    Collection Time: 10/16/19  8:50 AM   Result Value Ref Range    Glucose 122 70 - 130 mg/dL    BUN 12 8 - 26 mg/dL    Creatinine 0.50 (L) 0.60 - 1.30 mg/dL    Sodium 139 138 - 146 mmol/L    Potassium 4.8 3.5 - 4.9 mmol/L    Chloride 102 98 - 109 mmol/L    Total CO2 28 24 - 29 mmol/L    Hemoglobin 16.7 12.0 - 17.0 g/dL    Hematocrit 49 38 - 51 %    Ionized Calcium 1.21 1.20 - 1.32 mmol/L   TSH    Collection Time: 10/16/19  8:50 AM   Result Value Ref Range    TSH 0.294 0.270 - 4.200 uIU/mL   T4, Free    Collection Time: 10/16/19  8:50 AM   Result Value Ref Range    Free T4 1.24 0.93 - 1.70 ng/dL   Magnesium    Collection Time: 10/16/19  8:50 AM   Result Value Ref Range    Magnesium 2.0 1.6 - 2.4 mg/dL   Acetaminophen Level    Collection Time: 10/16/19  8:50 AM   Result Value Ref Range    Acetaminophen <5.0 (L) 10.0 - 30.0 mcg/mL   Ethanol    Collection Time: 10/16/19  8:50 AM   Result Value Ref Range    Ethanol <10 0 - 10 mg/dL   Salicylate Level    Collection Time: 10/16/19  8:50 AM   Result Value Ref Range    Salicylate  <0.3 <=30.0 mg/dL   Lactic Acid, Plasma    Collection Time: 10/16/19  8:50 AM   Result Value Ref Range    Lactate 1.4 0.5 - 2.0 mmol/L   Procalcitonin    Collection Time: 10/16/19  8:50 AM   Result Value Ref Range    Procalcitonin 0.04 (L) 0.10 - 0.25 ng/mL   Urinalysis With Microscopic If Indicated (No Culture) - Urine, Catheter    Collection Time: 10/16/19  9:31 AM   Result Value Ref Range    Color, UA Yellow Yellow, Straw    Appearance, UA Clear Clear    pH, UA <=5.0 5.0 - 8.0    Specific Gravity, UA 1.015 1.001 - 1.030    Glucose, UA Negative Negative    Ketones, UA Trace (A) Negative    Bilirubin, UA Negative Negative    Blood, UA Negative Negative    Protein, UA Negative Negative    Leuk Esterase, UA Negative Negative    Nitrite, UA Negative Negative    Urobilinogen, UA 0.2 E.U./dL 0.2 - 1.0 E.U./dL     Note: In addition to lab results from this visit, the labs listed above may include labs taken at another facility or during a different encounter within the last 24 hours. Please correlate lab times with ED admission and discharge times for further clarification of the services performed during this visit.    CT Head Without Contrast Stroke Protocol   Preliminary Result   Interval development of extensive intraventricular   hemorrhage, apparently arising from a left thalamic bleed. Developing   hydrocephalus.               No: Exam time is shown as numbness exam. Study was reviewed a CT scan   monitor and discussed with Dr. Ochoa at 9:08 AM.              XR Chest 1 View   Preliminary Result   ET tube in good position. No new chest disease is seen.              CT Cervical Spine Without Contrast    (Results Pending)     Vitals:    10/16/19 0855 10/16/19 0928 10/16/19 0949   BP: 148/89  133/85   BP Location: Left arm  Left arm   Patient Position: Lying  Lying   Pulse: 82     Resp: 22     Temp:  94.6 °F (34.8 °C) 96.6 °F (35.9 °C)   TempSrc:  Bladder Bladder   SpO2: 100%     Weight: 84.5 kg (186 lb 2.9 oz)    "  Height: 177.8 cm (70\")       Medications   sodium chloride 0.9 % flush 10 mL (not administered)   propofol (DIPRIVAN) infusion 10 mg/mL 100 mL (10 mcg/kg/min × 84.8 kg Intravenous New Bag 10/16/19 0859)   fentaNYL 2500 mcg/250 mL NS infusion (50 mcg/hr Intravenous New Bag 10/16/19 0900)   piperacillin-tazobactam (ZOSYN) 3.375 g in iso-osmotic dextrose 50 ml (premix) (3.375 g Intravenous New Bag 10/16/19 0946)   prothrombin complex conc human (KCentra) IV solution 4,225 Units (4,225 Units Intravenous Given 10/16/19 0950)   niCARdipine (CARDENE) 25 mg/250 mL (0.1 mg/mL) 0.9% NS infusion (5 mg/hr Intravenous New Bag 10/16/19 0935)   ondansetron (ZOFRAN) injection 4 mg (4 mg Intravenous Given 10/16/19 0847)   etomidate (AMIDATE) injection 20 mg (20 mg Intravenous Given 10/16/19 0847)   succinylcholine (ANECTINE) injection 80 mg (80 mg Intravenous Given 10/16/19 0848)     ECG/EMG Results (last 24 hours)     ** No results found for the last 24 hours. **        ECG 12 Lead               Recent Results (from the past 24 hour(s))   Troponin    Collection Time: 10/16/19  8:50 AM   Result Value Ref Range    Troponin T <0.010 0.000 - 0.030 ng/mL   aPTT    Collection Time: 10/16/19  8:50 AM   Result Value Ref Range    PTT 27.4 24.0 - 37.0 seconds   AST    Collection Time: 10/16/19  8:50 AM   Result Value Ref Range    AST (SGOT) 17 1 - 40 U/L   ALT    Collection Time: 10/16/19  8:50 AM   Result Value Ref Range    ALT (SGPT) 18 1 - 41 U/L   Light Blue Top    Collection Time: 10/16/19  8:50 AM   Result Value Ref Range    Extra Tube hold for add-on    Green Top (Gel)    Collection Time: 10/16/19  8:50 AM   Result Value Ref Range    Extra Tube Hold for add-ons.    Lavender Top    Collection Time: 10/16/19  8:50 AM   Result Value Ref Range    Extra Tube hold for add-on    Gold Top - SST    Collection Time: 10/16/19  8:50 AM   Result Value Ref Range    Extra Tube Hold for add-ons.    CBC Auto Differential    Collection Time: 10/16/19 "  8:50 AM   Result Value Ref Range    WBC 12.63 (H) 3.40 - 10.80 10*3/mm3    RBC 4.80 4.14 - 5.80 10*6/mm3    Hemoglobin 15.2 13.0 - 17.7 g/dL    Hematocrit 48.4 37.5 - 51.0 %    .8 (H) 79.0 - 97.0 fL    MCH 31.7 26.6 - 33.0 pg    MCHC 31.4 (L) 31.5 - 35.7 g/dL    RDW 13.8 12.3 - 15.4 %    RDW-SD 51.8 37.0 - 54.0 fl    MPV 10.2 6.0 - 12.0 fL    Platelets 218 140 - 450 10*3/mm3    Neutrophil % 71.0 42.7 - 76.0 %    Lymphocyte % 18.3 (L) 19.6 - 45.3 %    Monocyte % 10.1 5.0 - 12.0 %    Eosinophil % 0.2 (L) 0.3 - 6.2 %    Basophil % 0.2 0.0 - 1.5 %    Immature Grans % 0.2 0.0 - 0.5 %    Neutrophils, Absolute 8.97 (H) 1.70 - 7.00 10*3/mm3    Lymphocytes, Absolute 2.31 0.70 - 3.10 10*3/mm3    Monocytes, Absolute 1.28 (H) 0.10 - 0.90 10*3/mm3    Eosinophils, Absolute 0.02 0.00 - 0.40 10*3/mm3    Basophils, Absolute 0.02 0.00 - 0.20 10*3/mm3    Immature Grans, Absolute 0.03 0.00 - 0.05 10*3/mm3    nRBC 0.0 0.0 - 0.2 /100 WBC   POC Protime / INR    Collection Time: 10/16/19  8:50 AM   Result Value Ref Range    Protime 11.5 (L) 12.8 - 15.2 seconds    INR 1.0 0.8 - 1.2   POC CHEM 8    Collection Time: 10/16/19  8:50 AM   Result Value Ref Range    Glucose 122 70 - 130 mg/dL    BUN 12 8 - 26 mg/dL    Creatinine 0.50 (L) 0.60 - 1.30 mg/dL    Sodium 139 138 - 146 mmol/L    Potassium 4.8 3.5 - 4.9 mmol/L    Chloride 102 98 - 109 mmol/L    Total CO2 28 24 - 29 mmol/L    Hemoglobin 16.7 12.0 - 17.0 g/dL    Hematocrit 49 38 - 51 %    Ionized Calcium 1.21 1.20 - 1.32 mmol/L   TSH    Collection Time: 10/16/19  8:50 AM   Result Value Ref Range    TSH 0.294 0.270 - 4.200 uIU/mL   T4, Free    Collection Time: 10/16/19  8:50 AM   Result Value Ref Range    Free T4 1.24 0.93 - 1.70 ng/dL   Magnesium    Collection Time: 10/16/19  8:50 AM   Result Value Ref Range    Magnesium 2.0 1.6 - 2.4 mg/dL   Acetaminophen Level    Collection Time: 10/16/19  8:50 AM   Result Value Ref Range    Acetaminophen <5.0 (L) 10.0 - 30.0 mcg/mL   Ethanol     Collection Time: 10/16/19  8:50 AM   Result Value Ref Range    Ethanol <10 0 - 10 mg/dL   Salicylate Level    Collection Time: 10/16/19  8:50 AM   Result Value Ref Range    Salicylate <0.3 <=30.0 mg/dL   Lactic Acid, Plasma    Collection Time: 10/16/19  8:50 AM   Result Value Ref Range    Lactate 1.4 0.5 - 2.0 mmol/L   Procalcitonin    Collection Time: 10/16/19  8:50 AM   Result Value Ref Range    Procalcitonin 0.04 (L) 0.10 - 0.25 ng/mL   Urinalysis With Microscopic If Indicated (No Culture) - Urine, Catheter    Collection Time: 10/16/19  9:31 AM   Result Value Ref Range    Color, UA Yellow Yellow, Straw    Appearance, UA Clear Clear    pH, UA <=5.0 5.0 - 8.0    Specific Gravity, UA 1.015 1.001 - 1.030    Glucose, UA Negative Negative    Ketones, UA Trace (A) Negative    Bilirubin, UA Negative Negative    Blood, UA Negative Negative    Protein, UA Negative Negative    Leuk Esterase, UA Negative Negative    Nitrite, UA Negative Negative    Urobilinogen, UA 0.2 E.U./dL 0.2 - 1.0 E.U./dL   Urine Drug Screen - Urine, Catheter    Collection Time: 10/16/19  9:31 AM   Result Value Ref Range    THC, Screen, Urine Negative Negative    Phencyclidine (PCP), Urine Negative Negative    Cocaine Screen, Urine Negative Negative    Methamphetamine, Ur Negative Negative    Opiate Screen Negative Negative    Amphetamine Screen, Urine Negative Negative    Benzodiazepine Screen, Urine Negative Negative    Tricyclic Antidepressants Screen Negative Negative    Methadone Screen, Urine Negative Negative    Barbiturates Screen, Urine Negative Negative    Oxycodone Screen, Urine Negative Negative    Propoxyphene Screen Negative Negative    Buprenorphine, Screen, Urine Negative Negative   Type & Screen    Collection Time: 10/16/19 10:20 AM   Result Value Ref Range    ABO Type O     RH type Positive     Antibody Screen Negative     T&S Expiration Date 10/19/2019 11:59:59 PM    POC Glucose Once    Collection Time: 10/16/19 11:35 AM   Result  Value Ref Range    Glucose 106 70 - 130 mg/dL   ABO RH Specimen Verification    Collection Time: 10/16/19 12:07 PM   Result Value Ref Range    ABO Type O     RH type Positive    Prepare Fresh Frozen Plasma, 2 Units    Collection Time: 10/16/19  2:19 PM   Result Value Ref Range    Product Code J1870L42     Unit Number [id number]     UNIT  ABO O     UNIT  RH NEG     Dispense Status RE     Blood Type ONEG     Blood Expiration Date 201910171205     Blood Type Barcode 9500     Product Code U6948I76     Unit Number [id number]     UNIT  ABO O     UNIT  RH POS     Dispense Status RE     Blood Type OPOS     Blood Expiration Date 201910171205     Blood Type Barcode 5100      Note: In addition to lab results from this visit, the labs listed above may include labs taken at another facility or during a different encounter within the last 24 hours. Please correlate lab times with ED admission and discharge times for further clarification of the services performed during this visit.    CT Cervical Spine Without Contrast   Final Result   Hemorrhage identified within the fourth ventricle. There are   multilevel degenerative changes identified throughout the cervical spine   with no fracture or dislocation.       D:  10/16/2019   E:  10/16/2019       This report was finalized on 10/16/2019 4:47 PM by Dr. Denise Hobson MD.          CT Head Without Contrast Stroke Protocol   Preliminary Result   Interval development of extensive intraventricular   hemorrhage, apparently arising from a left thalamic bleed. Developing   hydrocephalus.       NOTE: Exam time is shown as 9:06 AM. Study was reviewed on the CT scan   monitor and discussed with Dr. Ochoa at 9:08 AM.       D:  10/16/2019   E:  10/16/2019              XR Chest 1 View   Preliminary Result   ET tube in good position. No new chest disease is seen.       D:  10/16/2019   E:  10/16/2019          XR Chest 1 View    (Results Pending)     Vitals:    10/16/19 1530  10/16/19 1545 10/16/19 1600 10/16/19 1615   BP: (P) 106/63 (P) 103/59 (P) 116/68 (P) 105/65   BP Location:       Patient Position:       Pulse: (P) 70 (P) 70 (P) 71 (P) 73   Resp:       Temp:   (P) 100 °F (37.8 °C)    TempSrc:   (P) Bladder    SpO2: (P) 100% (P) 100% (P) 98% (P) 98%   Weight:       Height:         Medications   sodium chloride 0.9 % flush 10 mL (not administered)   propofol (DIPRIVAN) infusion 10 mg/mL 100 mL (5 mcg/kg/min × 84.8 kg Intravenous Rate/Dose Change 10/16/19 1221)   fentaNYL 2500 mcg/250 mL NS infusion (50 mcg/hr Intravenous New Bag 10/16/19 0900)   niCARdipine (CARDENE) 25 mg/250 mL (0.1 mg/mL) 0.9% NS infusion (0 mg/hr Intravenous Stopped 10/16/19 1125)   sodium chloride 0.9 % flush 10 mL (not administered)   sodium chloride 0.9 % flush 10 mL (not administered)   piperacillin-tazobactam (ZOSYN) 3.375 g in iso-osmotic dextrose 50 ml (premix) (not administered)   famotidine (PEPCID) injection 20 mg (not administered)   chlorhexidine (PERIDEX) 0.12 % solution 15 mL (not administered)   ondansetron (ZOFRAN) injection 4 mg (4 mg Intravenous Given 10/16/19 0847)   etomidate (AMIDATE) injection 20 mg (20 mg Intravenous Given 10/16/19 0847)   succinylcholine (ANECTINE) injection 80 mg (80 mg Intravenous Given 10/16/19 0848)   piperacillin-tazobactam (ZOSYN) 3.375 g in iso-osmotic dextrose 50 ml (premix) (0 g Intravenous Stopped 10/16/19 1005)   prothrombin complex conc human (KCentra) IV solution 4,225 Units (4,225 Units Intravenous Given 10/16/19 0950)     ECG/EMG Results (last 24 hours)     Procedure Component Value Units Date/Time    ECG 12 Lead [[id number]] Collected:  10/16/19 0949     Updated:  10/16/19 1012        ECG 12 Lead                         MDM  Number of Diagnoses or Management Options  Altered mental status, unspecified altered mental status type:   Hemorrhagic stroke (CMS/[facility]):   History of atrial fibrillation:   Hypertensive emergency:   Critical Care  Total time providing  critical care:  minutes      Final diagnoses:   Hemorrhagic stroke (CMS/[facility])   Hypertensive emergency   History of atrial fibrillation   Altered mental status, unspecified altered mental status type       Documentation assistance provided by amrit Mejia.  Information recorded by the scribe was done at my direction and has been verified and validated by me.     Urbano Mejia  10/16/19 0859       Urbano Mejia  10/16/19 0957       Hunter Ochoa MD  10/16/19 4734

## 2019-10-16 NOTE — CONSULTS
Reason for consultation; intracerebral hemorrhage    Chief Complaint (Narrative of present illness): This is a 79-year-old hypertensive male who has had previous cerebral ischemic events presenting to the emergency department in a coma.  Evaluation at that time showed a large intracerebral hemorrhage and neurosurgical consultation was obtained.    Neurological examination: Glascow coma scale 4 intubated on mechanical ventilation    Review of pertinent neurological data set: The CT scan shows the presence of blood within the ventricular system.  This stems from a hemorrhage within the chris extending cephalad into the thalamus and subsequently into the lateral ventricles.    RECOMMENDATIONS: Unfortunately this is a terminal event; there is no intervention that would be meaningful    I have discussed this in detail with the family and I have recommended only palliative care         I greatly appreciate the opportunity of seeing this patient.  I have reviewed my observations and suggestions with the patient/family.    Alex Mccarty M.D.  [facility]  [phone]

## 2019-10-17 NOTE — CONSULTS
A1C 5.8%, no history of diabetes or diabetes medications. Due to this and acuity of illness not appropriate for education at this time. If status changes and would like prediabetes education please re consult. Thank you

## 2019-10-17 NOTE — DISCHARGE SUMMARY
Death Summary    Patient name: Vargas De  CSN: [id number]  MRN: [id number]  : 1940  Today's date: 10/17/2019     Date of Admission: 10/16/2019  Date and Time of Death:  10/17/2019    Admitting Physician:  ANTONY Villafana MD  Attending Physician: DEMOND Rodriguez DO  Primary Care Provider: Saba Arrington MD  Consultations:   Dr. Mccarty- Neurosurgery    Admission Diagnosis:   Nontraumatic intraventricular intracerebral hemorrhage (CMS/HCC)    Essential hypertension    History of stroke with residual deficit    Lewy body dementia (CMS/HCC)    Aspiration into airway    Acute respiratory failure (CMS/HCC)    Anticoagulant long-term use     Diagnoses at the Time of Death:     Nontraumatic intraventricular intracerebral hemorrhage (CMS/HCC)    Essential hypertension    Lewy body dementia (CMS/HCC)    Aspiration into airway    Acute respiratory failure (CMS/HCC)    History of stroke with residual deficit    Anticoagulant long-term use    Procedures:  None     History of Present Illness:  This is a 79-year-old gentleman with a past history of prior small strokes which caused some gait disturbance as well as Lewy body dementia and hypertension who was last seen well at approximately 9 PM last night.  He was discovered by his daughter with whom he lives this morning and he was found to be unresponsive and did have some crusted material on his lips that were concerning for vomit.  He was brought to the emergency department where his GCS was calculated as a 4.  He was intubated for airway protection.  CT scan of the head revealed large intracerebral hemorrhage with extension into the lateral ventricles.  Due to concern over probable aspiration, Zosyn was started for empiric coverage.  He has been transferred to the intensive care unit for close monitoring.     His family is in his room during my examination.  They state that he has not had any recent health changes.  He did see Dr. Arrington who is his primary care  "physician yesterday and he received an influenza vaccine at that time.  He has been eating and drinking without difficulty prior to this.     ICH Score:  2 Points (GCS 3 to 4)  1 Point (ICH volume =/>30 cm3)  1 Point (Intraventricular Extension Present)   1 Point (Infratentorial Origin - Yes)  0 Points (Age < 80 years)  The total ICS Score for this patient is 5 at 12:44 PM on 10/17/19    Hospital Course:  He was admitted to ICU, debated and on ventilatory support.  Kcentra was given in the emergency department, and antibiotic prophylaxis were initiated.  Neurosurgery was consulted and noted that this was a \"terminal\" event with no surgical intervention to provide.  Further discussion was had with the patient's family after which he was made DNR no escalation of care.  The following day the patient was seen by palliative care after which time he was made comfort measures with plan to extubate in the afternoon/evening, after arrival of family.  The patient was palliatively extubated.    ANA MARÍA Shah, ACNP-BC  Pulmonary & Critical Care Medicine      "

## 2019-10-17 NOTE — PLAN OF CARE
Problem: Skin Injury Risk (Adult)  Goal: Skin Health and Integrity  Outcome: Ongoing (interventions implemented as appropriate)  Patient VS have remained stable. Comfort care is being provided for the patient. Restraints are off, profolol is off, and oral care is being provided every 2 hours. Patient is being turned every 2 hours as well. Will continue to monitor. 10/17/19

## 2019-10-17 NOTE — PLAN OF CARE
Problem: Ventilation, Mechanical Invasive (Adult)  Intervention: Prevent Airway Displacement/Mechanical Dysfunction   10/17/19 0112   Prevent Airway Displacement/Mechanical Dysfunction   Airway Safety Measures manual resuscitator/mask/valve in room;suction at bedside

## 2019-10-17 NOTE — PLAN OF CARE
Problem: Palliative Care (Adult)  Intervention: Promote Informed Decision Making and Goal Setting   10/17/19 1300   Coping/Psychosocial Interventions   Life Transition/Adjustment decision-making facilitated;end-of-life care initiated

## 2019-10-17 NOTE — PROGRESS NOTES
Intensive Care Follow-up     Hospital:  LOS: 1 day   Mr. Vargas De, 79 y.o. male is followed for:   Nontraumatic intraventricular intracerebral hemorrhage (CMS/HCC)        History of present illness:   79-year-old male with a history of prior CVA presented with a intracranial hemorrhage on 10/16/2019.  Bleed was considered to be an nonoperable.  Patient has had significant deficits with minimal recovery, and the family has requested to go palliative care on 10/17/2019.      Subjective   Interval History:  Currently on mechanical ventilation, normotensive, on no sedation, patient has been febrile up to 101.5 but is trending downward now.  No signs of infection.             The patient's past medical, surgical and social history were reviewed and updated in Epic as appropriate.       Objective     Infusions:    fentanyl 10 mcg/mL  mcg/hr Last Rate: 50 mcg/hr (10/17/19 0400)   niCARdipine 5-15 mg/hr Last Rate: Stopped (10/16/19 1125)   propofol 5-50 mcg/kg/min Last Rate: Stopped (10/16/19 1900)     Medications:    chlorhexidine 15 mL Mouth/Throat Q12H   famotidine 20 mg Intravenous Q12H   piperacillin-tazobactam 3.375 g Intravenous Q8H     I reviewed the patient's medications.    Vital Sign Min/Max for last 24 hours  Temp  Min: 97.7 °F (36.5 °C)  Max: 101.5 °F (38.6 °C)   BP  Min: 84/50  Max: 116/68   Pulse  Min: 61  Max: 78   Resp  Min: 12  Max: 22   SpO2  Min: 90 %  Max: 100 %   No Data Recorded       Input/Output for last 24 hour shift  10/16 0701 - 10/17 0700  In: 375.4 [I.V.:325.4]  Out: 675 [Urine:675]   FiO2 (%):  [35 %-50 %] 35 %  S RR:  [12] 12  PEEP/CPAP (cm H2O):  [5 cm H20] 5 cm H20  MT SUP:  [0 cm H20] 0 cm H20  MAP (cm H2O):  [8.9-9.7] 9.2  GENERAL : lying in bed, NAD  RESPIRATORY/THORAX : ET tube in place, Coarse breath sounds bilaterally  CARDIOVASCULAR : Normal S1/S2, RRR. 1+ lower ext edema.  GASTROINTESTINAL : Soft, NT/ND. BS x 4 normoactive. No hepatosplenomegaly.  MUSCULOSKELETAL : No  cyanosis, clubbing, or ischemia  NEUROLOGICAL: Nonpurposeful movements, spontaneous eye openings.    Results from last 7 days   Lab Units 10/17/19  0329 10/16/19  0850 10/15/19  1532   WBC 10*3/mm3 10.29 12.63* 6.93   HEMOGLOBIN g/dL 14.2 15.2 15.1   HEMOGLOBIN, POC g/dL  --  16.7  --    PLATELETS 10*3/mm3 167 218 195     Results from last 7 days   Lab Units 10/17/19  0329 10/16/19  0850 10/15/19  1532   SODIUM mmol/L 140  --  141   POTASSIUM mmol/L 3.8  --  5.6*   TOTAL CO2 mmol/L  --   --  27.8   CO2 mmol/L 26.0  --   --    BUN mg/dL 15  --  9   CREATININE mg/dL 0.95 0.50* 0.77   MAGNESIUM mg/dL 1.9 2.0  --    PHOSPHORUS mg/dL 3.3  --   --    GLUCOSE mg/dL 119*  --   --      Estimated Creatinine Clearance: 77.8 mL/min (by C-G formula based on SCr of 0.95 mg/dL).    Results from last 7 days   Lab Units 10/16/19  0930   PH, ARTERIAL pH units 7.359   PCO2, ARTERIAL mm Hg 45.4   PO2 ART mm Hg 90.7       I reviewed the patient's new clinical results.  I reviewed the patient's new imaging results/reports including actual images and agree with reports.       Imaging Results (last 24 hours)     Procedure Component Value Units Date/Time    XR Chest 1 View [[id number]] Collected:  10/17/19 0831     Updated:  10/17/19 0933    Narrative:       EXAMINATION: XR CHEST 1 VW-10/17/2019:      INDICATION: Respiratory failure; I61.9-Nontraumatic intracerebral  hemorrhage, unspecified; I16.1-Hypertensive emergency; Z86.79-Personal  history of other diseases of the circulatory system; R41.82-Altered  mental status, unspecified.      COMPARISON: 10/16/2019.     FINDINGS: ET tube is seen just below the clavicles. NG tube is seen in  the proximal stomach. The heart and vasculature appear normal in size.  The lungs are moderately well expanded and appear grossly clear.           Impression:       No new chest disease.     D:  10/17/2019  E:  10/17/2019             XR Chest 1 View [[id number]] Collected:  10/16/19 0913     Updated:  10/16/19  2210    Narrative:       EXAMINATION: XR CHEST 1 VW-      INDICATION: Acute stroke protocol (onset < 12 hrs).      COMPARISON: 07/20/2019.     FINDINGS: ET tube is in good position just below the clavicles. Heart is  normal in size. Vasculature appears at upper limits of normal. Slight  prominence of the superior mediastinal shadow with no mass effect on the  trachea is again noted. Lungs appear grossly clear. No pneumothorax is  seen.       Impression:       ET tube in good position. No new chest disease is seen.     D:  10/16/2019  E:  10/16/2019     This report was finalized on 10/16/2019 10:07 PM by DR. Jensen Markham MD.       CT Cervical Spine Without Contrast [[id number]] Collected:  10/16/19 1000     Updated:  10/16/19 1650    Narrative:       EXAMINATION: CT CERVICAL SPINE WO CONTRAST-10/16/2019:      INDICATION: CVA, bleed, neck pain.     TECHNIQUE: Multiple axial CT imaging was obtained of the cervical spine  without the administration of intravenous contrast.     The radiation dose reduction device was turned on for each scan per the  ALARA (As Low as Reasonably Achievable) protocol.     COMPARISON: NONE.     FINDINGS: Hemorrhage identified within the fourth ventricle. There is  severe atherosclerotic disease of the carotid bifurcations bilaterally.  Endotracheal tube is identified within the trachea. The upper lung  fields are grossly clear. Multilevel degenerative changes seen within  the spine. There are degenerative changes identified within C2. Anterior  spurring and posterior osteophyte formation seen diffusely throughout  the cervical spine. Mild anterolisthesis identified of C3 on C4 and C5  on C6. There is no loss of height. The facets are well aligned. Pedicles  are intact. No prevertebral soft tissue swelling.       Impression:       Hemorrhage identified within the fourth ventricle. There are  multilevel degenerative changes identified throughout the cervical spine  with no fracture or  dislocation.     D:  10/16/2019  E:  10/16/2019     This report was finalized on 10/16/2019 4:47 PM by Dr. Denise Hobson MD.             Assessment/Plan   Impression        Nontraumatic intraventricular intracerebral hemorrhage (CMS/HCC)    Essential hypertension    History of stroke with residual deficit    Lewy body dementia (CMS/HCC)    Aspiration into airway    Acute respiratory failure (CMS/HCC)    Anticoagulant long-term use    Hemorrhagic stroke (CMS/HCC)       Plan      Patient with a very poor prognosis, secondary to significant anterior ventricular hemorrhage that is nonoperable.  Family wishes to undergo hospice care.  Palliative care consult has been placed.  For now we will continue with current medical management with maintaining a blood pressure systolic less than 140 and mechanical ventilation.  Nicardipine ordered for blood pressure control  Adjust mechanical ventilation to maintain optimal oxygenation ventilation  Mechanical DVT prophylaxis  GI prophylaxis  Hold on initiating food tube feeds as they would likely go to hospice care today      Disposition: Patient to remain in the ICU as he is critically ill requiring mechanical ventilation secondary to intracranial hemorrhage.  We will have palliative care evaluate today.    Plan of care and goals reviewed with mulitdisciplinary/antibiotic stewardship team during rounds.   I discussed the patient's findings and my recommendations with nursing staff     Critical Care time spent in direct patient care: 35 minutes (excluding procedure time, if applicable) including high complexity decision making to assess, manipulate, and support vital organ system failure in this individual who has impairment of one or more vital organ systems such that there is a high probability of imminent or life threatening deterioration in the patient's condition.      Nadia Rodriguez, DO  Pulmonary, Critical care and Sleep Medicine

## 2019-10-17 NOTE — PROGRESS NOTES
Continued Stay Note  [facility]     Patient Name: Vargas De  MRN: [id number]  Today's Date: 10/17/2019    Admit Date: 10/16/2019    Discharge Plan     Row Name 10/17/19 0919       Plan    Plan  Poor prognosis.    Plan Comments  Palliative consulted.  Plan to withdraw care when family arrive.          Discharge Codes    No documentation.             Wendi Garcia RN

## 2019-10-17 NOTE — PROGRESS NOTES
Multidisciplinary Rounds    Time: 20min  Patient Name: Vargas De  Date of Encounter: 10/17/19 9:36 AM  MRN: [id number]  Admission date: 10/16/2019      Reason for visit: MDR. RD to continue to follow per protocol.     Additional information obtained during MDR: Pt with terminal event. Plan to withdrawal care this afternoon (10/17). Clinical nutrition will sign off.     Current diet: NPO Diet      Intervention:  Follow treatment plan  Care plan reviewed    Follow up:   Per protocol      Blanca Peoples MS RD/LD CNSC  9:36 AM

## 2019-10-17 NOTE — PROGRESS NOTES
[facility]    Theresa De 1940 79 y.o. male     10/17/19    Chief complaint:Patient is nonverbal    HPI  79-year-old gentleman with multiple medical issues and prior strokes presented with large intracerebral hemorrhage with ventricular involvement and found to be nonoperative.  The patient is currently in the intensive care unit on mechanical ventilation and the family is at the bedside.  Awaiting palliative care consultation.      Scheduled Meds:  chlorhexidine 15 mL Mouth/Throat Q12H   famotidine 20 mg Intravenous Q12H   piperacillin-tazobactam 3.375 g Intravenous Q8H       PRN Meds:.•  acetaminophen  •  sodium chloride  •  sodium chloride    Intake/Output       10/16/19 0701 - 10/17/19 0700 10/17/19 0701 - 10/18/19 0700      [id number] 0718-7358 Total 2206-1746 4868-4443 Total       Intake    I.V.  285.4  40 325.4  31  -- 31    IV Piggyback  50  -- 50  --  -- --    Total Intake 335.4 40 375.4 31 -- 31       Output    Urine  550  125 675  75  -- 75    Total Output 550 125 675 75 -- 75          Imaging Results (last 24 hours)     Procedure Component Value Units Date/Time    XR Chest 1 View [[id number]] Collected:  10/17/19 0831     Updated:  10/17/19 0933    Narrative:       EXAMINATION: XR CHEST 1 VW-10/17/2019:      INDICATION: Respiratory failure; I61.9-Nontraumatic intracerebral  hemorrhage, unspecified; I16.1-Hypertensive emergency; Z86.79-Personal  history of other diseases of the circulatory system; R41.82-Altered  mental status, unspecified.      COMPARISON: 10/16/2019.     FINDINGS: ET tube is seen just below the clavicles. NG tube is seen in  the proximal stomach. The heart and vasculature appear normal in size.  The lungs are moderately well expanded and appear grossly clear.           Impression:       No new chest disease.     D:  10/17/2019  E:  10/17/2019             XR Chest 1 View [[id number]] Collected:  10/16/19 0913      Updated:  10/16/19 2210    Narrative:       EXAMINATION: XR CHEST 1 VW-      INDICATION: Acute stroke protocol (onset < 12 hrs).      COMPARISON: 07/20/2019.     FINDINGS: ET tube is in good position just below the clavicles. Heart is  normal in size. Vasculature appears at upper limits of normal. Slight  prominence of the superior mediastinal shadow with no mass effect on the  trachea is again noted. Lungs appear grossly clear. No pneumothorax is  seen.       Impression:       ET tube in good position. No new chest disease is seen.     D:  10/16/2019  E:  10/16/2019     This report was finalized on 10/16/2019 10:07 PM by DR. Jensen Markham MD.       CT Cervical Spine Without Contrast [[id number]] Collected:  10/16/19 1000     Updated:  10/16/19 1650    Narrative:       EXAMINATION: CT CERVICAL SPINE WO CONTRAST-10/16/2019:      INDICATION: CVA, bleed, neck pain.     TECHNIQUE: Multiple axial CT imaging was obtained of the cervical spine  without the administration of intravenous contrast.     The radiation dose reduction device was turned on for each scan per the  ALARA (As Low as Reasonably Achievable) protocol.     COMPARISON: NONE.     FINDINGS: Hemorrhage identified within the fourth ventricle. There is  severe atherosclerotic disease of the carotid bifurcations bilaterally.  Endotracheal tube is identified within the trachea. The upper lung  fields are grossly clear. Multilevel degenerative changes seen within  the spine. There are degenerative changes identified within C2. Anterior  spurring and posterior osteophyte formation seen diffusely throughout  the cervical spine. Mild anterolisthesis identified of C3 on C4 and C5  on C6. There is no loss of height. The facets are well aligned. Pedicles  are intact. No prevertebral soft tissue swelling.       Impression:       Hemorrhage identified within the fourth ventricle. There are  multilevel degenerative changes identified throughout the cervical spine  with no  fracture or dislocation.     D:  10/16/2019  E:  10/16/2019     This report was finalized on 10/16/2019 4:47 PM by Dr. Denise Hobson MD.             Patient Active Problem List    Diagnosis   • *Nontraumatic intraventricular intracerebral hemorrhage (CMS/HCC) [I61.5]   • Aspiration into airway [T17.908A]   • Acute respiratory failure (CMS/HCC) [J96.00]   • Anticoagulant long-term use [Z79.01]   • Hemorrhagic stroke (CMS/HCC) [I61.9]   • Seborrheic keratoses, inflamed [L82.0]   • Orthostatic hypotension [I95.1]   • Primary insomnia [F51.01]   • Lewy body dementia (CMS/[facility]) [G31.83, F02.80]   • Acute alteration in mental status [R41.82]   • Fracture of proximal end of right humerus [S42.201A]   • Chronic bilateral low back pain [M54.5, G89.29]   • Paroxysmal atrial fibrillation (CMS/HCC) [I48.0]   • Mixed hyperlipidemia [E78.2]   • Multiple lacunar infarcts (CMS/HCC) [I63.81]   • Body mass index (BMI) of 25.0 to 29.9 [E66.3]   • History of stroke with residual deficit [I69.30]   • PFO (patent foramen ovale) [Q21.1]   • Ulcerative colitis (CMS/HCC) [K51.90]   • Major depressive disorder with single episode, in partial remission (CMS/[facility]) [F32.4]   • Macular degeneration of both eyes [H35.30]   • GERD without esophagitis [K21.9]   • Cervicalgia [M54.2]   • Spinal stenosis in cervical region [M48.02]   • Chronic right-sided low back pain without sciatica [M54.5, G89.29]   • Osteoarthritis of toe joint, right [M19.071]   • Peripheral neuropathy [G62.9]   • Essential hypertension [I10]   • Crohn's colitis, with rectal bleeding (CMS/HCC) [K50.111]        Neurologic Exam  No response to noxious    Assessment/Plan   1.  Large intracerebral hemorrhage with blood extending into the ventricular system originating from within the chris, Mellissa Coma Scale 4, intubated on mechanical ventilation, no response to noxious, no role for neurosurgical intervention, palliative consultation appropriate.  Discussed with the family and  they are in agreement for comfort measures only.  Awaiting further family arrival.  2.  Hypertension  3.  Previous strokes With residual deficits  4.  Lewy body dementia  5.  Acute respiratory failure with aspiration  6.  Long-term anticoagulation    Akhil Smith PA-C

## 2019-10-17 NOTE — PLAN OF CARE
Problem: Patient Care Overview  Goal: Interprofessional Rounds/Family Conf  Outcome: Ongoing (interventions implemented as appropriate)   10/17/19 1300   Interdisciplinary Rounds/Family Conf   Summary New Palliative consult on 10/17/2019 at 0823 for comfort care from Dr. Jose Rodriguez and seen by Palliative, ANA MARÍA Stark on 10/17 at 1132. Goals are set, extubate when family arrive,  on his way. Discussed photo opportunities with children and grandchildren. Family very supportive and at bedside. Waiting on one more son to arrive. Palliative will follow for support and symptom management.   Participants advanced practice nurse;nursing;physician;social work/services

## 2019-10-17 NOTE — CONSULTS
Palliative Care Initial Consultation Note    Name: Vargas De ,Age: 79 y.o. years old, Sex: male  Admit Date:  10/16/2019    Saba Arrington MD  Consulting physician: Jose Rodriguez DO  Reason for referral GOC, comfort care  Chief Complaint   Patient presents with   • Stroke       HPI: 80yo male with PMH of multiple strokes, PFO, atrial fibrillation, macular degeneration, HTN, HLD, hearing loss, spinal stenosis, and Lewy Body dementia, found by dtr at home unresponsive in bed. Rushed to ER, intubated, and found to have hemorrhagic stroke. This has been determined to be a terminal event without any meaningful interventions.     Symptoms:   Increased respiratory secretions      Code Status:   Current Code Status     Date Active Code Status Order ID Comments User Context       10/16/2019 11:54 No CPR [id number]  Michael Villafana MD Inpatient       Questions for Current Code Status     Question Answer Comment    Code Status No CPR     Medical Interventions (Level of Support Prior to Arrest) Limited     Limited Support to NOT Include Cardioversion/Defibrillation         Advance Directive: none on medical record  Surrogate decision maker: Next of Kin: 4 children    Past Medical History:   Diagnosis Date   • Acute respiratory distress 7/27/2019   • Allergic arthritis    • Arthritis    • Atrial fibrillation (CMS/HCC)    • BPH (benign prostatic hyperplasia)      urology   • BPH/nocturia/microhematuria (work up negative)    • chronic hearing loss left ear    • Crohn's colitis (CMS/HCC)     on colonoscopy 7/15.2017.  UC-continue Lialda   • Depression     medication, after death of wife 2014   • Elbow pain 7/25/2018   • Gout    • Hearing loss     left ear   • History of stroke 7/23/2018   • Hyperlipidemia    • Hypertension    • Ingrown toenail 03/18/2018   • Lewy body dementia (CMS/HCC)    • Low back pain    • Macular degeneration    • Macular degeneration    • Multiple lacunar infarcts (CMS/HCC)     noted on  CT 2/2019   • Neck pain    • Rash 1/28/2019   • Rib pain on left side 07/11/2017   • Septic bursitis of elbow, right 7/23/2018   • Spinal stenosis     cervical and lumbosacral spine   • Spinal stenosis-cervical and lumbosacral spine    • Stroke (CMS/[facility])     aspirin, blood pressure conyrol.  Infarct frontal lobe.  R sided weakness, R hand and R foot numbness,dysphasia with stuttering.   • Stroke (CMS/[facility]) 02/27/2019    MCA infarct with L facial droop and L sided weakness,dysphasia,dysarthria   • TIA (transient ischemic attack) 2/27/2019   • Ulcerative colitis (CMS/[facility])    • Urinary frequency      Past Surgical History:   Procedure Laterality Date   • CATARACT EXTRACTION     • COLONOSCOPY  07/15/2017   • HEMORRHOIDECTOMY     • HERNIA REPAIR     • KNEE SURGERY     • TONSILLECTOMY         Reviewed current scheduled and prn medications for route, type, dose and frequency.    Current Facility-Administered Medications   Medication Dose Route Frequency Provider Last Rate Last Dose   • acetaminophen (TYLENOL) 160 MG/5ML solution 650 mg  650 mg Oral Q6H PRN Pearl Baca, APRAURE   649.6 mg at 10/16/19 2301   • chlorhexidine (PERIDEX) 0.12 % solution 15 mL  15 mL Mouth/Throat Q12H Michael Villafana MD   15 mL at 10/17/19 0812   • famotidine (PEPCID) injection 20 mg  20 mg Intravenous Q12H Michael Villafana MD   20 mg at 10/17/19 0812   • fentaNYL 2500 mcg/250 mL NS infusion   mcg/hr Intravenous Titrated Hunter Ochoa MD 5 mL/hr at 10/17/19 0400 50 mcg/hr at 10/17/19 0400   • niCARdipine (CARDENE) 25 mg/250 mL (0.1 mg/mL) 0.9% NS infusion  5-15 mg/hr Intravenous Titrated Hunter Ochoa MD   Stopped at 10/16/19 1125   • piperacillin-tazobactam (ZOSYN) 3.375 g in iso-osmotic dextrose 50 ml (premix)  3.375 g Intravenous Q8H Michael Villafana MD   3.375 g at 10/17/19 0456   • propofol (DIPRIVAN) infusion 10 mg/mL 100 mL  5-50 mcg/kg/min Intravenous Titrated Hunter Ochoa MD   Stopped at 10/16/19  "1900   • sodium chloride 0.9 % flush 10 mL  10 mL Intravenous PRN Hunter Ochoa MD       • sodium chloride 0.9 % flush 10 mL  10 mL Intravenous PRN Paul Villagran APRN           fentanyl 10 mcg/mL  mcg/hr Last Rate: 50 mcg/hr (10/17/19 0400)   niCARdipine 5-15 mg/hr Last Rate: Stopped (10/16/19 1125)   propofol 5-50 mcg/kg/min Last Rate: Stopped (10/16/19 1900)     •  acetaminophen  •  sodium chloride  •  sodium chloride  Allergies   Allergen Reactions   • Hydrocodone Other (See Comments)     Causes pt to become completely unresponsive   • Morphine Other (See Comments)     Causes pt to become completely unresponsive   • Oxycodone Other (See Comments)     Causes pt to become completely unresponsive   • Donepezil Other (See Comments)     nightmares   • Iodine    • Sulfa Antibiotics      Family History   Problem Relation Age of Onset   • Cancer Mother    • Alzheimer's disease Father    • Arthritis Father    • Cancer Brother    • Diabetes Maternal Aunt    • Arthritis Other    • Diabetes Other    • Stroke Maternal Cousin    • Cancer Other    • Hypertension Other      Social History     Socioeconomic History   • Marital status:      Spouse name: Not on file   • Number of children: Not on file   • Years of education: Not on file   • Highest education level: Not on file   Tobacco Use   • Smoking status: Never Smoker   • Smokeless tobacco: Never Used   Substance and Sexual Activity   • Alcohol use: Yes     Comment: rarely   • Drug use: No   • Sexual activity: Defer         PPS: 10%  /57   Pulse 68   Temp 99.1 °F (37.3 °C) (Core)   Resp 12   Ht 177.8 cm (70\")   Wt 84.5 kg (186 lb 2.9 oz)   SpO2 98%   BMI 26.71 kg/m²   84.5 kg (186 lb 2.9 oz) Body mass index is 26.71 kg/m².  Intake & Output (last day)       10/16 0701 - 10/17 0700 10/17 0701 - 10/18 0700    I.V. (mL/kg) 325.4 (3.9) 31 (0.4)    IV Piggyback 50     Total Intake(mL/kg) 375.4 (4.4) 31 (0.4)    Urine (mL/kg/hr) 675 75 (0.2) "    Total Output 675 75    Net -299.6 -44                Physical Exam:    Gen: intubated elderly male in bed with family at bedside   CV: RRR per monitor, no edema   Pulm: intubated   Neuro: sedated, unresponsive      Reviewed labs and diagnostic results.  Results from last 7 days   Lab Units 10/17/19  0329   WBC 10*3/mm3 10.29   HEMOGLOBIN g/dL 14.2   HEMATOCRIT % 44.6   PLATELETS 10*3/mm3 167     Results from last 7 days   Lab Units 10/17/19  0329   SODIUM mmol/L 140   POTASSIUM mmol/L 3.8   CHLORIDE mmol/L 104   CO2 mmol/L 26.0   BUN mg/dL 15   CREATININE mg/dL 0.95   CALCIUM mg/dL 8.9   BILIRUBIN mg/dL 1.8*   ALK PHOS U/L 85   ALT (SGPT) U/L 13   AST (SGOT) U/L 12   GLUCOSE mg/dL 119*       Impression: 79 y.o. year old male terminal hemorrhagic stroke. Family desires comfort measures.    Plan:   Increased respiratory secretions- family reports need for suction earlier this morning. Extubation planned this afternoon. Give Robinul now and prn.   Symptom management for end-of-life process: ativan for restlessness, cont fentanyl infusion with prn dilaudid for dyspnea    GOC- 3/4 children at [facility], awaiting last brother to arrive from [place] and a few other family members to arrive before palliative extubation planned this afternoon. Comfort care measures in place.       Ansley Ge NP  [phone]  10/17/19  11:32 AM    Time:45 minutes spent reviewing medical and medication records, assessing and examining patient, discussing with family, answering questions, formulating a plan and documentation of care. > 50% time spent face to face

## 2019-10-18 NOTE — NURSING NOTE
Patient noted to be asytole on ECG and no respirations noted. House Supervisor notified. Family at bedside. Shakir notified.

## 2019-10-18 NOTE — SIGNIFICANT NOTE
Exam confirms with auscultation zero audible heart tones and zero audible respirations. Mr.Raul DEISY De was pronounced dead at 0110.  MD notified by Patient's RN.    Glenna De La Cruz RN  Clinical House Supervisor  10/18/2019 1:37 AM

## 2019-10-19 LAB
BACTERIA SPEC RESP CULT: NORMAL
GRAM STN SPEC: NORMAL

## 2019-10-21 LAB
BACTERIA SPEC AEROBE CULT: NORMAL
BACTERIA SPEC AEROBE CULT: NORMAL

## 2022-06-18 NOTE — TELEPHONE ENCOUNTER
----- Message from Rossana Chavez PA-C sent at 3/1/2017  8:35 AM EST -----  Regarding: FW: Referral  I am going to go ahead and put the CT in today. Would you care to let Mr. De know? Thanks!     ----- Message -----     From: Demetrius Fowler MD     Sent: 2/28/2017   4:38 PM       To: Rossana Chavez PA-C  Subject: RE: Referral                                     Yes, if that's what makes everybody happy. Thanks.  ----- Message -----     From: Rossana Chavez PA-C     Sent: 2/28/2017   3:34 PM       To: Demetrius Fowler MD  Subject: FW: Referral                                     Would you order a CT of the c-spine in this case? Thanks.         ----- Message -----     From: Danuta Marin CMA     Sent: 2/27/2017  11:45 AM       To: Rossana Chavez PA-C  Subject: Referral                                         Dr. Howard's  Office called and said the pt need either a Lumbar with/without or a Neck C-Spine with/without to be completed before their office could see this pt.  What would you like to do?         Pacing/Cursing

## 2022-12-26 NOTE — PLAN OF CARE
Normal for race Problem: Patient Care Overview  Goal: Plan of Care Review  Outcome: Ongoing (interventions implemented as appropriate)   08/06/19 1601   Coping/Psychosocial   Plan of Care Reviewed With patient;son   SLP FEES re-evaluation completed. Will continue to address dysphagia & cognitive-communication. Please see note for further details and recommendations.

## 2025-02-25 NOTE — PROGRESS NOTES
Subjective:    CC: Vargas De is seen today in consultation at the request of Saba Arrington MD for Gait Problem       HPI:  77-year-old male accompanied by his daughter with a history of hypertension, hyperlipidemia, stroke in 2013, ulcerative colitis, macular degeneration, hearing loss in left ear, depression now presents with word finding difficulties, shuffling gait and tingling and numbness in his feet.  As per patient he had a frontal stroke in 2013 that caused right-sided weakness.  He denies having any speech problems after the stroke however 6 months ago he started to have word finding difficulties.  Denies any severe problems with his memory.  He does report to being depressed after the death of his wife 3 years ago and has been on antidepressants since then.  He lives with his daughter and states that he doesn't feel like interacting with friends anymore.  He also reports to disturbances in sleep due to frequent awakenings to use the bathroom He has also had problems with his balance and walking where he has started to shuffle his feet.  He had MRIs of his back that showed severe degenerative changes in his spine throughout.  After that he saw a surgeon who told him that he was not a good surgical candidate.  He has also been having tingling and numbness that started in his feet and has progressed up into his legs a few years ago.  He was prescribed gabapentin but is only taking 200 mg at night.  He denies having any history of diabetes or any excessive alcohol intake.    The following portions of the patient's history were reviewed today and updated as of 07/19/2018  : allergies, current medications, past family history, past medical history, past social history, past surgical history and problem list  These document will be scanned to patient's chart.      Current Outpatient Prescriptions:   •  acetaminophen (TYLENOL) 500 MG tablet, Take 500 mg by mouth Every 6 (Six) Hours As Needed for mild  pain (1-3)., Disp: , Rfl:   •  aspirin 81 MG EC tablet, Take 81 mg by mouth Daily., Disp: , Rfl:   •  bisoprolol-hydrochlorothiazide (ZIAC) 5-6.25 MG per tablet, , Disp: , Rfl:   •  Cholecalciferol (VITAMIN D3) 5000 units capsule capsule, Take 5,000 Units by mouth Daily., Disp: , Rfl:   •  dicyclomine (BENTYL) 20 MG tablet, , Disp: , Rfl:   •  doxazosin (CARDURA) 4 MG tablet, Take 4 mg by mouth Every Night., Disp: , Rfl:   •  fluticasone (FLONASE) 50 MCG/ACT nasal spray, 2 sprays into each nostril Daily., Disp: , Rfl:   •  gabapentin (NEURONTIN) 100 MG capsule, Take 100 mg by mouth 3 (Three) Times a Day., Disp: , Rfl:   •  HYDROcodone-acetaminophen (NORCO) 7.5-325 MG per tablet, , Disp: , Rfl:   •  ipratropium (ATROVENT) 0.06 % nasal spray, 2 sprays into each nostril 4 (Four) Times a Day., Disp: , Rfl:   •  lisinopril (PRINIVIL,ZESTRIL) 10 MG tablet, , Disp: , Rfl:   •  mesalamine (LIALDA) 1.2 G EC tablet, Take 1,200 mg by mouth Daily With Breakfast., Disp: , Rfl:   •  Multiple Vitamins-Minerals (PRESERVISION AREDS PO), Take  by mouth., Disp: , Rfl:   •  omeprazole (priLOSEC) 20 MG capsule, Take 20 mg by mouth Daily., Disp: , Rfl:   •  pravastatin (PRAVACHOL) 40 MG tablet, , Disp: , Rfl:   •  predniSONE (DELTASONE) 5 MG tablet, , Disp: , Rfl:   •  sertraline (ZOLOFT) 50 MG tablet, Take 50 mg by mouth Daily., Disp: , Rfl:   •  trospium 60 MG capsule sustained-release 24 hr capsule, Take 20 mg by mouth Every Morning., Disp: , Rfl:   •  vitamin B-12 (CYANOCOBALAMIN) 500 MCG tablet, Take 500 mcg by mouth Daily., Disp: , Rfl:   •  Zinc 50 MG capsule, Take  by mouth., Disp: , Rfl:   •  donepezil (ARICEPT) 10 MG tablet, Take 1 tablet by mouth Daily., Disp: 30 tablet, Rfl: 11  •  donepezil (ARICEPT) 5 MG tablet, Take 1 tablet by mouth Every Night., Disp: 30 tablet, Rfl: 0   Past Medical History:   Diagnosis Date   • Arthritis    • Depression    • Gout    • Hyperlipidemia    • Hypertension    • Macular degeneration    •  "Stroke (CMS/HCC)    • Stroke (CMS/HCC)    • Ulcerative colitis (CMS/HCC)       History reviewed. No pertinent surgical history.   Family History   Problem Relation Age of Onset   • Cancer Mother    • Alzheimer's disease Father    • Cancer Brother    • Diabetes Maternal Aunt       Social History     Social History   • Marital status:      Spouse name: N/A   • Number of children: N/A   • Years of education: N/A     Occupational History   • Not on file.     Social History Main Topics   • Smoking status: Never Smoker   • Smokeless tobacco: Never Used   • Alcohol use Yes      Comment: 1 a month   • Drug use: Unknown   • Sexual activity: Not on file     Other Topics Concern   • Not on file     Social History Narrative   • No narrative on file     Review of Systems   Constitutional: Positive for activity change and fatigue.   HENT: Positive for hearing loss.    Eyes: Negative.    Respiratory: Negative.    Cardiovascular: Positive for palpitations.   Gastrointestinal: Negative.    Endocrine: Negative.    Genitourinary: Negative.    Musculoskeletal: Positive for back pain, gait problem and joint swelling.        As noted in my HPI.    Skin: Negative.    Allergic/Immunologic: Negative.    Neurological: Positive for speech difficulty, weakness, numbness, memory problem and confusion.        As noted in my HPI.    Hematological: Bruises/bleeds easily.       Objective:    /80 (BP Location: Right arm, Patient Position: Sitting, Cuff Size: Adult)   Pulse 68   Ht 170.2 cm (67\")   Wt 83.9 kg (185 lb)   SpO2 98%   BMI 28.98 kg/m²     Neurology Exam:    General apperance: NAD.     Mental status: Alert, awake and oriented to time place and person.    Recent and Remote memory: Mildly impaired, MMSE of 25/30 with a delayed recall of 0 out of 3, clock drawing-1/3    Attention span and Concentration: Normal.     Language and Speech: Intact- mild dysarthria.    Fluency, Naming , Repitition and Comprehension:  " Intact    Cranial Nerves:   CN II: Visual fields are full. Intact. Fundi - Normal, No papillederma, Pupils - ANAHI  CN III, IV and VI: Extraocular movements are intact. Normal saccades.   CN V: Facial sensation is intact.   CN VII: Muscles of facial expression reveal no asymmetry. Intact.   CN VIII: Hearing is impaired in left ear  CN IX and X: Palate elevates symmetrically. Intact  CN XI: Shoulder shrug is intact.   CN XII: Tongue is midline without evidence of atrophy or fasciculation.     Motor:  Right UE muscle strength 5/5. Normal tone.     Left UE muscle strength 5/5. Normal tone.      Right LE muscle strength5/5. Normal tone.     Left LE muscle strength 5/5. Normal tone.      Sensory: Decreased to pinprick and vibration in both lower extremities till above ankles    DTRs: 2+ bilaterally in upper and lower extremities.    Babinski: Negative bilaterally.    Co-ordination: Normal finger-to-nose, heel to shin B/L.    Rhomberg: Negative.    Gait: Slow and cautious with some stooping of posture but no shuffling, unable to do tandem walking    Cardiovascular: Regular rate and rhythm without murmur, gallop or rub.    Assessment and Plan:  1. Mild cognitive impairment  Patient's word finding difficulties could be a result of mild cognitive impairment most likely due to his frontal infarct.  Other factors such as hearing loss and underlying depression could be contributing to his symptoms.  I have encouraged him to wear a hearing aid and also interact with his friends.  If mood does not improve then he can try increasing Zoloft to 100 mg daily.  I will start him on Aricept gradually increasing to 10 mg a day.  I will also get an MRI of the brain to rule out any new ischemic lesions.  I have counseled him to carry out physical and mental exercises.  - MRI Brain Without Contrast; Future    2. Idiopathic peripheral neuropathy  He has a history of peripheral neuropathy.  I will get a neuropathy panel.  I have also increase  his gabapentin 100 mg in the morning, 100 mg at known and 300 mg at bedtime.  - TUYET; Future  - Protein Elec + Interp, Serum; Future  - TSH+Free T4; Future  - Vitamin B12 & Folate; Future  - Vitamin B6; Future  - Basic Metabolic Panel; Future    3. Balance problems- could be due to severe degenerative changes in his back as well as underlying neuropathy.  He will benefit from balance therapy.       Return in about 6 weeks (around 8/30/2018).         Rafaela Gutierrez MD   done